# Patient Record
Sex: FEMALE | Race: WHITE | NOT HISPANIC OR LATINO | URBAN - METROPOLITAN AREA
[De-identification: names, ages, dates, MRNs, and addresses within clinical notes are randomized per-mention and may not be internally consistent; named-entity substitution may affect disease eponyms.]

---

## 2018-03-04 ENCOUNTER — EMERGENCY (EMERGENCY)
Facility: HOSPITAL | Age: 78
LOS: 1 days | Discharge: HOME | End: 2018-03-04
Attending: EMERGENCY MEDICINE | Admitting: EMERGENCY MEDICINE
Payer: MEDICARE

## 2018-03-04 VITALS
DIASTOLIC BLOOD PRESSURE: 80 MMHG | OXYGEN SATURATION: 99 % | HEART RATE: 84 BPM | RESPIRATION RATE: 14 BRPM | SYSTOLIC BLOOD PRESSURE: 150 MMHG

## 2018-03-04 VITALS
OXYGEN SATURATION: 97 % | RESPIRATION RATE: 20 BRPM | HEART RATE: 120 BPM | DIASTOLIC BLOOD PRESSURE: 74 MMHG | TEMPERATURE: 98 F | SYSTOLIC BLOOD PRESSURE: 169 MMHG

## 2018-03-04 DIAGNOSIS — Z88.8 ALLERGY STATUS TO OTHER DRUGS, MEDICAMENTS AND BIOLOGICAL SUBSTANCES STATUS: ICD-10-CM

## 2018-03-04 DIAGNOSIS — R19.7 DIARRHEA, UNSPECIFIED: ICD-10-CM

## 2018-03-04 DIAGNOSIS — R06.02 SHORTNESS OF BREATH: ICD-10-CM

## 2018-03-04 DIAGNOSIS — Z98.89 OTHER SPECIFIED POSTPROCEDURAL STATES: Chronic | ICD-10-CM

## 2018-03-04 DIAGNOSIS — Z90.49 ACQUIRED ABSENCE OF OTHER SPECIFIED PARTS OF DIGESTIVE TRACT: Chronic | ICD-10-CM

## 2018-03-04 DIAGNOSIS — Z98.62 PERIPHERAL VASCULAR ANGIOPLASTY STATUS: Chronic | ICD-10-CM

## 2018-03-04 DIAGNOSIS — R00.0 TACHYCARDIA, UNSPECIFIED: ICD-10-CM

## 2018-03-04 DIAGNOSIS — Z88.1 ALLERGY STATUS TO OTHER ANTIBIOTIC AGENTS STATUS: ICD-10-CM

## 2018-03-04 DIAGNOSIS — Z79.52 LONG TERM (CURRENT) USE OF SYSTEMIC STEROIDS: ICD-10-CM

## 2018-03-04 DIAGNOSIS — Z79.899 OTHER LONG TERM (CURRENT) DRUG THERAPY: ICD-10-CM

## 2018-03-04 LAB
ALBUMIN SERPL ELPH-MCNC: 3 G/DL — LOW (ref 3.4–5)
ALP SERPL-CCNC: 105 U/L — SIGNIFICANT CHANGE UP (ref 40–120)
ALT FLD-CCNC: 19 U/L — SIGNIFICANT CHANGE UP (ref 12–42)
AMYLASE P1 CFR SERPL: 66 U/L — SIGNIFICANT CHANGE UP (ref 25–115)
ANION GAP SERPL CALC-SCNC: 7 MMOL/L — LOW (ref 9–16)
AST SERPL-CCNC: 11 U/L — LOW (ref 15–37)
BILIRUB SERPL-MCNC: 0.2 MG/DL — SIGNIFICANT CHANGE UP (ref 0.2–1.2)
BUN SERPL-MCNC: 37 MG/DL — HIGH (ref 7–23)
CALCIUM SERPL-MCNC: 9.6 MG/DL — SIGNIFICANT CHANGE UP (ref 8.5–10.5)
CHLORIDE SERPL-SCNC: 109 MMOL/L — HIGH (ref 96–108)
CO2 SERPL-SCNC: 29 MMOL/L — SIGNIFICANT CHANGE UP (ref 22–31)
CREAT SERPL-MCNC: 1.35 MG/DL — HIGH (ref 0.5–1.3)
D DIMER BLD IA.RAPID-MCNC: 175 NG/ML DDU — SIGNIFICANT CHANGE UP
GLUCOSE SERPL-MCNC: 105 MG/DL — HIGH (ref 70–99)
HCT VFR BLD CALC: 31.4 % — LOW (ref 34.5–45)
HGB BLD-MCNC: 9.5 G/DL — LOW (ref 11.5–15.5)
INR BLD: 1.49 — HIGH (ref 0.88–1.16)
LACTATE SERPL-SCNC: 1.4 MMOL/L — SIGNIFICANT CHANGE UP (ref 0.4–2)
LIDOCAIN IGE QN: 245 U/L — SIGNIFICANT CHANGE UP (ref 73–393)
MCHC RBC-ENTMCNC: 27.5 PG — SIGNIFICANT CHANGE UP (ref 27–34)
MCHC RBC-ENTMCNC: 30.3 G/DL — LOW (ref 32–36)
MCV RBC AUTO: 91 FL — SIGNIFICANT CHANGE UP (ref 80–100)
NT-PROBNP SERPL-SCNC: 233 PG/ML — SIGNIFICANT CHANGE UP
PLATELET # BLD AUTO: 158 K/UL — SIGNIFICANT CHANGE UP (ref 150–400)
POTASSIUM SERPL-MCNC: 4.9 MMOL/L — SIGNIFICANT CHANGE UP (ref 3.5–5.3)
POTASSIUM SERPL-SCNC: 4.9 MMOL/L — SIGNIFICANT CHANGE UP (ref 3.5–5.3)
PROT SERPL-MCNC: 7.5 G/DL — SIGNIFICANT CHANGE UP (ref 6.4–8.2)
PROTHROM AB SERPL-ACNC: 16.5 SEC — HIGH (ref 9.8–12.7)
RBC # BLD: 3.45 M/UL — LOW (ref 3.8–5.2)
RBC # FLD: 16.4 % — SIGNIFICANT CHANGE UP (ref 10.3–16.9)
SODIUM SERPL-SCNC: 145 MMOL/L — SIGNIFICANT CHANGE UP (ref 132–145)
TROPONIN I SERPL-MCNC: <0.017 NG/ML — LOW (ref 0.02–0.06)
TSH SERPL-MCNC: 2.2 UIU/ML — SIGNIFICANT CHANGE UP (ref 0.36–3.74)
WBC # BLD: 6.5 K/UL — SIGNIFICANT CHANGE UP (ref 3.8–10.5)
WBC # FLD AUTO: 6.5 K/UL — SIGNIFICANT CHANGE UP (ref 3.8–10.5)

## 2018-03-04 PROCEDURE — 71045 X-RAY EXAM CHEST 1 VIEW: CPT | Mod: 26

## 2018-03-04 PROCEDURE — 93010 ELECTROCARDIOGRAM REPORT: CPT

## 2018-03-04 PROCEDURE — 99285 EMERGENCY DEPT VISIT HI MDM: CPT | Mod: 25

## 2018-03-04 PROCEDURE — 74177 CT ABD & PELVIS W/CONTRAST: CPT | Mod: 26

## 2018-03-04 RX ORDER — DIPHENOXYLATE HCL/ATROPINE 2.5-.025MG
2 TABLET ORAL ONCE
Refills: 0 | Status: COMPLETED | OUTPATIENT
Start: 2018-03-04 | End: 2018-03-04

## 2018-03-04 RX ORDER — IOHEXOL 300 MG/ML
50 INJECTION, SOLUTION INTRAVENOUS ONCE
Refills: 0 | Status: COMPLETED | OUTPATIENT
Start: 2018-03-04 | End: 2018-03-04

## 2018-03-04 RX ORDER — SODIUM CHLORIDE 9 MG/ML
1000 INJECTION INTRAMUSCULAR; INTRAVENOUS; SUBCUTANEOUS ONCE
Refills: 0 | Status: COMPLETED | OUTPATIENT
Start: 2018-03-04 | End: 2018-03-04

## 2018-03-04 RX ORDER — DIPHENOXYLATE HCL/ATROPINE 2.5-.025MG
1 TABLET ORAL
Qty: 12 | Refills: 0
Start: 2018-03-04 | End: 2018-03-06

## 2018-03-04 RX ADMIN — IOHEXOL 50 MILLILITER(S): 300 INJECTION, SOLUTION INTRAVENOUS at 16:00

## 2018-03-04 RX ADMIN — SODIUM CHLORIDE 1000 MILLILITER(S): 9 INJECTION INTRAMUSCULAR; INTRAVENOUS; SUBCUTANEOUS at 16:35

## 2018-03-04 NOTE — ED PROVIDER NOTE - CONSTITUTIONAL, MLM
normal... Well appearing, well nourished, awake, alert, oriented to person, place, time/situation and in no apparent distress. No word dyspnea.

## 2018-03-04 NOTE — ED PROVIDER NOTE - CHPI ED SYMPTOMS NEG
no burning urination/no chills/no dysuria/no blood in stool/no hematuria/no nausea/no abdominal distension/no vomiting/no fever

## 2018-03-04 NOTE — ED PROVIDER NOTE - PMH
Brain embolism and thrombosis    DVT (deep venous thrombosis)    Lyme disease    MRSA (methicillin resistant Staphylococcus aureus)    PNA (pneumonia)    Skin cancer

## 2018-03-04 NOTE — ED PROVIDER NOTE - OBJECTIVE STATEMENT
78 yo female pmhx DVT (right leg 5/15/16), fem-pop bypass, lyme dz to ED c/o sudden onset "explosive" diarrhea around 12 noon today after eating a sandwich. Also states feels  out of breath. Denies cough/hemoptysis, f/c, no international travel within past 30 days, no recent abx within past 3 months. Denies abd pain/n/v. Denies cp.

## 2018-03-04 NOTE — ED PROVIDER NOTE - DIAGNOSTIC INTERPRETATION
Interpreted by MD  _chest x-ray, 1_ view  Lungs clear, heart shadow normal, bony structures normal, no free air under diaphragm, no PTX

## 2019-07-25 ENCOUNTER — EMERGENCY (EMERGENCY)
Facility: HOSPITAL | Age: 79
LOS: 1 days | Discharge: HOME | End: 2019-07-25
Attending: EMERGENCY MEDICINE | Admitting: EMERGENCY MEDICINE
Payer: MEDICARE

## 2019-07-25 VITALS — OXYGEN SATURATION: 95 % | HEART RATE: 107 BPM | RESPIRATION RATE: 20 BRPM

## 2019-07-25 VITALS
TEMPERATURE: 99 F | SYSTOLIC BLOOD PRESSURE: 136 MMHG | HEART RATE: 108 BPM | OXYGEN SATURATION: 90 % | DIASTOLIC BLOOD PRESSURE: 83 MMHG | RESPIRATION RATE: 22 BRPM

## 2019-07-25 DIAGNOSIS — Z98.89 OTHER SPECIFIED POSTPROCEDURAL STATES: Chronic | ICD-10-CM

## 2019-07-25 DIAGNOSIS — Z98.62 PERIPHERAL VASCULAR ANGIOPLASTY STATUS: Chronic | ICD-10-CM

## 2019-07-25 DIAGNOSIS — Z90.49 ACQUIRED ABSENCE OF OTHER SPECIFIED PARTS OF DIGESTIVE TRACT: Chronic | ICD-10-CM

## 2019-07-25 DIAGNOSIS — L03.221 CELLULITIS OF NECK: ICD-10-CM

## 2019-07-25 DIAGNOSIS — R21 RASH AND OTHER NONSPECIFIC SKIN ERUPTION: ICD-10-CM

## 2019-07-25 PROCEDURE — 99284 EMERGENCY DEPT VISIT MOD MDM: CPT

## 2019-07-25 NOTE — ED ADULT TRIAGE NOTE - CHIEF COMPLAINT QUOTE
Pt complaining of redness, swelling, and warmth to left chest and neck x 1 day. Pt complaining of itchiness to site. Pt denies worsening of sob, fever, and chills.

## 2019-07-26 LAB
ANION GAP SERPL CALC-SCNC: 10 MMOL/L — SIGNIFICANT CHANGE UP (ref 9–16)
BASOPHILS NFR BLD AUTO: 0.2 % — SIGNIFICANT CHANGE UP (ref 0–2)
BUN SERPL-MCNC: 46 MG/DL — HIGH (ref 7–23)
CALCIUM SERPL-MCNC: 9.9 MG/DL — SIGNIFICANT CHANGE UP (ref 8.5–10.5)
CHLORIDE SERPL-SCNC: 106 MMOL/L — SIGNIFICANT CHANGE UP (ref 96–108)
CO2 SERPL-SCNC: 27 MMOL/L — SIGNIFICANT CHANGE UP (ref 22–31)
CREAT SERPL-MCNC: 1.86 MG/DL — HIGH (ref 0.5–1.3)
EOSINOPHIL NFR BLD AUTO: 3.6 % — SIGNIFICANT CHANGE UP (ref 0–6)
GLUCOSE SERPL-MCNC: 117 MG/DL — HIGH (ref 70–99)
HCT VFR BLD CALC: 36.5 % — SIGNIFICANT CHANGE UP (ref 34.5–45)
HGB BLD-MCNC: 11.5 G/DL — SIGNIFICANT CHANGE UP (ref 11.5–15.5)
IMM GRANULOCYTES NFR BLD AUTO: 0.5 % — SIGNIFICANT CHANGE UP (ref 0–1.5)
LYMPHOCYTES # BLD AUTO: 14.2 % — SIGNIFICANT CHANGE UP (ref 13–44)
MCHC RBC-ENTMCNC: 28.7 PG — SIGNIFICANT CHANGE UP (ref 27–34)
MCHC RBC-ENTMCNC: 31.5 G/DL — LOW (ref 32–36)
MCV RBC AUTO: 91 FL — SIGNIFICANT CHANGE UP (ref 80–100)
MONOCYTES NFR BLD AUTO: 8 % — SIGNIFICANT CHANGE UP (ref 2–14)
NEUTROPHILS NFR BLD AUTO: 73.5 % — SIGNIFICANT CHANGE UP (ref 43–77)
PLATELET # BLD AUTO: 175 K/UL — SIGNIFICANT CHANGE UP (ref 150–400)
POTASSIUM SERPL-MCNC: 5.2 MMOL/L — SIGNIFICANT CHANGE UP (ref 3.5–5.3)
POTASSIUM SERPL-SCNC: 5.2 MMOL/L — SIGNIFICANT CHANGE UP (ref 3.5–5.3)
RBC # BLD: 4.01 M/UL — SIGNIFICANT CHANGE UP (ref 3.8–5.2)
RBC # FLD: 16 % — HIGH (ref 10.3–14.5)
SODIUM SERPL-SCNC: 143 MMOL/L — SIGNIFICANT CHANGE UP (ref 132–145)
WBC # BLD: 8.3 K/UL — SIGNIFICANT CHANGE UP (ref 3.8–10.5)
WBC # FLD AUTO: 8.3 K/UL — SIGNIFICANT CHANGE UP (ref 3.8–10.5)

## 2019-07-26 RX ORDER — DEXAMETHASONE 0.5 MG/5ML
10 ELIXIR ORAL ONCE
Refills: 0 | Status: COMPLETED | OUTPATIENT
Start: 2019-07-26 | End: 2019-07-26

## 2019-07-26 RX ORDER — DIPHENHYDRAMINE HCL 50 MG
1 CAPSULE ORAL
Qty: 15 | Refills: 0
Start: 2019-07-26 | End: 2019-07-30

## 2019-07-26 RX ORDER — DIPHENHYDRAMINE HCL 50 MG
25 CAPSULE ORAL ONCE
Refills: 0 | Status: COMPLETED | OUTPATIENT
Start: 2019-07-26 | End: 2019-07-26

## 2019-07-26 RX ORDER — AZTREONAM 2 G
1 VIAL (EA) INJECTION
Qty: 20 | Refills: 0
Start: 2019-07-26 | End: 2019-08-04

## 2019-07-26 RX ADMIN — Medication 1 TABLET(S): at 01:00

## 2019-07-26 RX ADMIN — Medication 25 MILLIGRAM(S): at 01:00

## 2019-07-26 RX ADMIN — Medication 100 MILLIGRAM(S): at 01:00

## 2019-07-26 RX ADMIN — Medication 10 MILLIGRAM(S): at 01:01

## 2019-07-26 NOTE — ED PROVIDER NOTE - OBJECTIVE STATEMENT
77 y/o F w/hx HTN HLD DVTs (most recent RLE , on xarelto), obesity (taking metformin for weight loss), h/o angioplasty of groin and leg with stents, SCC of left leg (5060-1887) with complication of MRSA infection, brain embolism with coiling, Lyme disease with chronic neuropathy, p/w red raised pruritic rash which started with what pt believes is a bug bite L neck that has spread over most of her upper L chest and neck over the past 24hrs, unimproved with benadryl and calamine lotion. No fever/chills. No bleeding/discharge/ulcerations.

## 2019-07-26 NOTE — ED PROVIDER NOTE - NSFOLLOWUPINSTRUCTIONS_ED_ALL_ED_FT
Log Out.    [employer] CareNotes®     :  [facility]             CELLULITIS - AfterCare(R) Instructions(ER/ED)     Cellulitis    WHAT YOU NEED TO KNOW:    Cellulitis is a skin infection caused by bacteria. Cellulitis may go away on its own or you may need treatment. Your healthcare provider may draw a [place] around the outside edges of your cellulitis. If your cellulitis spreads, your healthcare provider will see it outside of the [place]. Cellulitis          DISCHARGE INSTRUCTIONS:    Call 911 if:     You have sudden trouble breathing or chest pain.        Return to the emergency department if:     Your wound gets larger and more painful.       You feel a crackling under your skin when you touch it.      You have purple dots or bumps on your skin, or you see bleeding under your skin.      You have new swelling and pain in your legs.      The red, warm, swollen area gets larger.      You see red streaks coming from the infected area.    Contact your healthcare provider if:     You have a fever.      Your fever or pain does not go away or gets worse.      The area does not get smaller after 2 days of antibiotics.      Your skin is flaking or peeling off.      You have questions or concerns about your condition or care.    Medicines:     Antibiotics help treat the bacterial infection.       NSAIDs, such as ibuprofen, help decrease swelling, pain, and fever. NSAIDs can cause stomach bleeding or kidney problems in certain people. If you take blood thinner medicine, always ask if NSAIDs are safe for you. Always read the medicine label and follow directions. Do not give these medicines to children under 6 months of age without direction from your child's healthcare provider.      Acetaminophen decreases pain and fever. It is available without a doctor's order. Ask how much to take and how often to take it. Follow directions. Read the labels of all other medicines you are using to see if they also contain acetaminophen, or ask your doctor or pharmacist. Acetaminophen can cause liver damage if not taken correctly. Do not use more than 4 grams (4,000 milligrams) total of acetaminophen in one day.       Take your medicine as directed. Contact your healthcare provider if you think your medicine is not helping or if you have side effects. Tell him or her if you are allergic to any medicine. Keep a list of the medicines, vitamins, and herbs you take. Include the amounts, and when and why you take them. Bring the list or the pill bottles to follow-up visits. Carry your medicine list with you in case of an emergency.    Self-care:     Elevate the area above the level of your heart as often as you can. This will help decrease swelling and pain. Prop the area on pillows or blankets to keep it elevated comfortably.       Clean the area daily until the wound scabs over. Gently wash the area with soap and water. Pat dry. Use dressings as directed.       Place cool or warm, wet cloths on the area as directed. Use clean cloths and clean water. Leave it on the area until the cloth is room temperature. Pat the area dry with a clean, dry cloth. The cloths may help decrease pain.     Prevent cellulitis:     Do not scratch bug bites or areas of injury. You increase your risk for cellulitis by scratching these areas.       Do not share personal items, such as towels, clothing, and razors.       Clean exercise equipment with germ-killing  before and after you use it.      Wash your hands often. Use soap and water. Wash your hands after you use the bathroom, change a child's diapers, or sneeze. Wash your hands before you prepare or eat food. Use lotion to prevent dry, cracked skin. Handwashing           Wear pressure stockings as directed. You may be told to wear the stockings if you have peripheral edema. The stockings improve blood flow and decrease swelling.      Treat athlete’s foot. This can help prevent the spread of a bacterial skin infection.    Follow up with your healthcare provider within 3 days, or as directed: Your healthcare provider will check if your cellulitis is getting better. You may need different medicine. Write down your questions so you remember to ask them during your visits.

## 2019-07-26 NOTE — ED PROVIDER NOTE - CLINICAL SUMMARY MEDICAL DECISION MAKING FREE TEXT BOX
+ cellulitic changes to insect bite w/possible allergic component, afebrile, checking labs, starting on oral abx (doxy and bactim) given pt's many drug allergies and need for MRSA coverage (prior MRSA coverage). cellulitic rim demarcated, will d/c home on oral abx and encourage prompt wound check f/u.

## 2019-07-26 NOTE — ED ADULT NURSE NOTE - OBJECTIVE STATEMENT
Patient to ED with red raised pruritic rash which started with what pt believes is a bug bite L neck that has spread over most of her upper L chest and neck over the past 24hrs, unimproved with benadryl and calamine lotion. No fever/chills.

## 2019-07-26 NOTE — ED PROVIDER NOTE - PHYSICAL EXAMINATION
VITAL SIGNS: I have reviewed nursing notes and confirm.  CONSTITUTIONAL: Well-developed; well-nourished; in no acute distress.  SKIN: + erythematous blanching warm rash w/out clearly demarcated border approx [id number] L anterior neck w/2mm scabbed lesion in the center non-ttp, no induration or fluctuance, Remainder of skin exam is warm and dry  HEAD: Normocephalic; atraumatic.  EYES: PERRL, EOM intact; conjunctiva and sclera clear.  ENT: No nasal discharge; airway clear.  NECK: Supple; non tender.  CARD: + tachycardic regular rhythm  RESP: No wheezes, rales or rhonchi.  ABD: NTND  EXT: Normal ROM. No clubbing, cyanosis or edema.  LYMPH: No acute cervical adenopathy.  NEURO: Alert, oriented. Grossly unremarkable.  PSYCH: Cooperative, appropriate.

## 2019-07-26 NOTE — ED PROVIDER NOTE - DISCHARGE DATE
26-Jul-2019 Complex Repair And Bilobe Flap Text: The defect edges were debeveled with a #15 scalpel blade.  The primary defect was closed partially with a complex linear closure.  Given the location of the remaining defect, shape of the defect and the proximity to free margins a bilobe flap was deemed most appropriate for complete closure of the defect.  Using a sterile surgical marker, an appropriate advancement flap was drawn incorporating the defect and placing the expected incisions within the relaxed skin tension lines where possible.    The area thus outlined was incised deep to adipose tissue with a #15 scalpel blade.  The skin margins were undermined to an appropriate distance in all directions utilizing iris scissors.

## 2019-08-08 ENCOUNTER — EMERGENCY (EMERGENCY)
Facility: HOSPITAL | Age: 79
LOS: 1 days | Discharge: HOME | End: 2019-08-08
Attending: EMERGENCY MEDICINE | Admitting: EMERGENCY MEDICINE
Payer: MEDICARE

## 2019-08-08 VITALS
HEART RATE: 97 BPM | RESPIRATION RATE: 24 BRPM | OXYGEN SATURATION: 99 % | DIASTOLIC BLOOD PRESSURE: 73 MMHG | SYSTOLIC BLOOD PRESSURE: 172 MMHG

## 2019-08-08 VITALS
HEART RATE: 108 BPM | OXYGEN SATURATION: 91 % | TEMPERATURE: 98 F | DIASTOLIC BLOOD PRESSURE: 72 MMHG | RESPIRATION RATE: 20 BRPM | SYSTOLIC BLOOD PRESSURE: 128 MMHG

## 2019-08-08 DIAGNOSIS — R30.0 DYSURIA: ICD-10-CM

## 2019-08-08 DIAGNOSIS — Z98.89 OTHER SPECIFIED POSTPROCEDURAL STATES: Chronic | ICD-10-CM

## 2019-08-08 DIAGNOSIS — Z98.62 PERIPHERAL VASCULAR ANGIOPLASTY STATUS: Chronic | ICD-10-CM

## 2019-08-08 DIAGNOSIS — Z90.49 ACQUIRED ABSENCE OF OTHER SPECIFIED PARTS OF DIGESTIVE TRACT: Chronic | ICD-10-CM

## 2019-08-08 DIAGNOSIS — B37.2 CANDIDIASIS OF SKIN AND NAIL: ICD-10-CM

## 2019-08-08 DIAGNOSIS — N39.0 URINARY TRACT INFECTION, SITE NOT SPECIFIED: ICD-10-CM

## 2019-08-08 LAB
ALBUMIN SERPL ELPH-MCNC: 2.5 G/DL — LOW (ref 3.4–5)
ALP SERPL-CCNC: 138 U/L — HIGH (ref 40–120)
ALT FLD-CCNC: 30 U/L — SIGNIFICANT CHANGE UP (ref 12–42)
ANION GAP SERPL CALC-SCNC: 6 MMOL/L — LOW (ref 9–16)
AST SERPL-CCNC: 23 U/L — SIGNIFICANT CHANGE UP (ref 15–37)
BASOPHILS NFR BLD AUTO: 0.3 % — SIGNIFICANT CHANGE UP (ref 0–2)
BILIRUB SERPL-MCNC: 0.1 MG/DL — LOW (ref 0.2–1.2)
BUN SERPL-MCNC: 70 MG/DL — HIGH (ref 7–23)
CALCIUM SERPL-MCNC: 9.1 MG/DL — SIGNIFICANT CHANGE UP (ref 8.5–10.5)
CHLORIDE SERPL-SCNC: 106 MMOL/L — SIGNIFICANT CHANGE UP (ref 96–108)
CO2 SERPL-SCNC: 28 MMOL/L — SIGNIFICANT CHANGE UP (ref 22–31)
CREAT SERPL-MCNC: 2.04 MG/DL — HIGH (ref 0.5–1.3)
EOSINOPHIL NFR BLD AUTO: 1.5 % — SIGNIFICANT CHANGE UP (ref 0–6)
GLUCOSE SERPL-MCNC: 121 MG/DL — HIGH (ref 70–99)
HCT VFR BLD CALC: 35.1 % — SIGNIFICANT CHANGE UP (ref 34.5–45)
HGB BLD-MCNC: 11.1 G/DL — LOW (ref 11.5–15.5)
IMM GRANULOCYTES NFR BLD AUTO: 0.7 % — SIGNIFICANT CHANGE UP (ref 0–1.5)
LACTATE SERPL-SCNC: 1 MMOL/L — SIGNIFICANT CHANGE UP (ref 0.4–2)
LYMPHOCYTES # BLD AUTO: 17.3 % — SIGNIFICANT CHANGE UP (ref 13–44)
MCHC RBC-ENTMCNC: 29 PG — SIGNIFICANT CHANGE UP (ref 27–34)
MCHC RBC-ENTMCNC: 31.6 G/DL — LOW (ref 32–36)
MCV RBC AUTO: 91.6 FL — SIGNIFICANT CHANGE UP (ref 80–100)
MONOCYTES NFR BLD AUTO: 5.1 % — SIGNIFICANT CHANGE UP (ref 2–14)
NEUTROPHILS NFR BLD AUTO: 75.1 % — SIGNIFICANT CHANGE UP (ref 43–77)
PLATELET # BLD AUTO: 189 K/UL — SIGNIFICANT CHANGE UP (ref 150–400)
POTASSIUM SERPL-MCNC: 4.9 MMOL/L — SIGNIFICANT CHANGE UP (ref 3.5–5.3)
POTASSIUM SERPL-SCNC: 4.9 MMOL/L — SIGNIFICANT CHANGE UP (ref 3.5–5.3)
PROT SERPL-MCNC: 7.2 G/DL — SIGNIFICANT CHANGE UP (ref 6.4–8.2)
RBC # BLD: 3.83 M/UL — SIGNIFICANT CHANGE UP (ref 3.8–5.2)
RBC # FLD: 16 % — HIGH (ref 10.3–14.5)
SODIUM SERPL-SCNC: 140 MMOL/L — SIGNIFICANT CHANGE UP (ref 132–145)
WBC # BLD: 8.8 K/UL — SIGNIFICANT CHANGE UP (ref 3.8–10.5)
WBC # FLD AUTO: 8.8 K/UL — SIGNIFICANT CHANGE UP (ref 3.8–10.5)

## 2019-08-08 PROCEDURE — 99284 EMERGENCY DEPT VISIT MOD MDM: CPT | Mod: GC

## 2019-08-08 RX ORDER — PHENAZOPYRIDINE HCL 100 MG
200 TABLET ORAL ONCE
Refills: 0 | Status: COMPLETED | OUTPATIENT
Start: 2019-08-08 | End: 2019-08-08

## 2019-08-08 RX ORDER — CEPHALEXIN 500 MG
1 CAPSULE ORAL
Qty: 20 | Refills: 0
Start: 2019-08-08 | End: 2019-08-17

## 2019-08-08 RX ORDER — PHENAZOPYRIDINE HCL 100 MG
1 TABLET ORAL
Qty: 6 | Refills: 0
Start: 2019-08-08 | End: 2019-08-09

## 2019-08-08 RX ORDER — CEFTRIAXONE 500 MG/1
1000 INJECTION, POWDER, FOR SOLUTION INTRAMUSCULAR; INTRAVENOUS ONCE
Refills: 0 | Status: COMPLETED | OUTPATIENT
Start: 2019-08-08 | End: 2019-08-08

## 2019-08-08 RX ADMIN — Medication 200 MILLIGRAM(S): at 17:38

## 2019-08-08 RX ADMIN — CEFTRIAXONE 100 MILLIGRAM(S): 500 INJECTION, POWDER, FOR SOLUTION INTRAMUSCULAR; INTRAVENOUS at 17:05

## 2019-08-08 NOTE — ED PROVIDER NOTE - NSFOLLOWUPINSTRUCTIONS_ED_ALL_ED_FT
TAKE THE ANTIBIOTIC WE ARE SENDING TO YOUR PHARMACY TO TREAT YOUR UTI    WE ARE ALSO SENDING PYRIDIUM TO YOUR PHARMACY, THIS MEDICATION WILL HELP WITH THE BURNING WITH URINATION UNTIL THE ANTIBIOTICS KICK IN    USE THE CREAM WE ARE SENDING TO YOUR PHARMACY TO TREAT THE YEAST INFECTION AROUND YOUR GROIN

## 2019-08-08 NOTE — ED PROVIDER NOTE - PROGRESS NOTE DETAILS
Alfreda, pgy3: uti on UA, will admin antibx here and send to pharmacy along w/ pyridium, will send clotrimazole cream, and dc pt home after meds and revital. Initially mild tachycardia likely due to exertion/discomfort during triage

## 2019-08-08 NOTE — ED PROVIDER NOTE - OBJECTIVE STATEMENT
79 y/o F w/hx HTN, HLD, DVTs (most recent RLE, on xarelto), CHRrEF, obesity (taking metformin for weight loss), h/o angioplasty of groin and leg with stents, SCC of left leg ([id number]) with complication of MRSA infection, brain embolism with coiling, Lyme disease with chronic neuropathy, p/w urinary burning and itchy, and rash on inner thighs. Pt states these symptoms have been present for approx 1 week. Reports occasional hematuria. Denies f/c/n/v/d.

## 2019-08-08 NOTE — ED PROVIDER NOTE - ATTENDING CONTRIBUTION TO CARE
Multiple co-morbidities, wheelchair/diaper use presents with weeks of groin rash, itching.  Exam consistent with tinea vs candidiasis.  Topical antifungal ordered.  Urine also consistent with UTI and will covered with antibiotics.  Chronically ill appearing.  Presents with her HHA.  Has close cardiology follow up.  Conservative management discussed with the patient in detail.  Close PMD follow up encouraged.  Strict ED return instructions discussed in detail and patient given the opportunity to ask any questions about their discharge diagnosis and instructions

## 2019-08-08 NOTE — ED PROVIDER NOTE - CARE PLAN
Principal Discharge DX:	Candidiasis of skin Principal Discharge DX:	UTI (urinary tract infection)  Secondary Diagnosis:	Candidiasis of skin

## 2019-08-08 NOTE — ED PROVIDER NOTE - CLINICAL SUMMARY MEDICAL DECISION MAKING FREE TEXT BOX
Pt w/ mult comorbidities p/w urinary sx susp for UTI, will obtain UA and check Ucx; pt also possibly with irritated urethra from frequent voiding 2/2 increased lasix dosing. Pt also reporting rash on inner thighs, susp for possible candida vs cellulitis, will empirically  treat. No systemic symptoms, no blood work indicated at this time, cont to reassess -Alfreda Pt w/ mult comorbidities p/w urinary sx susp for UTI, will obtain UA and check Ucx; pt also possibly with irritated urethra from frequent voiding 2/2 increased lasix dosing. Pt also reporting rash on inner thighs, susp for possible candida vs cellulitis, will empirically  treat. No systemic symptoms, but pt tachycardic in traige; will obtain basic labs and lactate at this time to assess for more significant infx, cont to reassess -Alfreda Pt w/ mult comorbidities p/w urinary sx susp for UTI, will obtain UA and check Ucx; pt also possibly with irritated urethra from frequent voiding 2/2 increased lasix dosing. Pt also reporting rash on inner thighs, susp for cutaneous candidiasis, will send clotrimazole cream to [email or web address]. No systemic symptoms, but pt tachycardic in traige; will obtain basic labs and lactate at this time to assess for more significant infx, cont to reassess -Alfreda

## 2019-08-08 NOTE — ED ADULT TRIAGE NOTE - CHIEF COMPLAINT QUOTE
Pt with burning on UTI and itching to groin are. Pt wheelchair bound. States "I feel like I have a UTI"

## 2020-01-11 ENCOUNTER — EMERGENCY (EMERGENCY)
Facility: HOSPITAL | Age: 80
LOS: 1 days | Discharge: HOME | End: 2020-01-11
Attending: EMERGENCY MEDICINE | Admitting: EMERGENCY MEDICINE
Payer: MEDICARE

## 2020-01-11 VITALS
TEMPERATURE: 98 F | HEIGHT: 68 IN | DIASTOLIC BLOOD PRESSURE: 70 MMHG | SYSTOLIC BLOOD PRESSURE: 145 MMHG | HEART RATE: 98 BPM | WEIGHT: 270.07 LBS | OXYGEN SATURATION: 95 % | RESPIRATION RATE: 17 BRPM

## 2020-01-11 DIAGNOSIS — Z98.62 PERIPHERAL VASCULAR ANGIOPLASTY STATUS: Chronic | ICD-10-CM

## 2020-01-11 DIAGNOSIS — Z98.89 OTHER SPECIFIED POSTPROCEDURAL STATES: Chronic | ICD-10-CM

## 2020-01-11 DIAGNOSIS — Z90.49 ACQUIRED ABSENCE OF OTHER SPECIFIED PARTS OF DIGESTIVE TRACT: Chronic | ICD-10-CM

## 2020-01-11 PROCEDURE — 99283 EMERGENCY DEPT VISIT LOW MDM: CPT

## 2020-01-11 RX ORDER — AZTREONAM 2 G
1 VIAL (EA) INJECTION
Qty: 20 | Refills: 0
Start: 2020-01-11 | End: 2020-01-20

## 2020-01-11 RX ORDER — CHLORHEXIDINE GLUCONATE 213 G/1000ML
1 SOLUTION TOPICAL
Qty: 1 | Refills: 0
Start: 2020-01-11 | End: 2020-01-20

## 2020-01-11 RX ORDER — HYDROXYZINE HCL 10 MG
2 TABLET ORAL
Qty: 30 | Refills: 0
Start: 2020-01-11 | End: 2020-01-11

## 2020-01-11 RX ORDER — MUPIROCIN 20 MG/G
1 OINTMENT TOPICAL
Qty: 44 | Refills: 1
Start: 2020-01-11 | End: 2020-02-07

## 2020-01-11 RX ORDER — HYDROXYZINE HCL 10 MG
25 TABLET ORAL ONCE
Refills: 0 | Status: COMPLETED | OUTPATIENT
Start: 2020-01-11 | End: 2020-01-11

## 2020-01-11 RX ORDER — SACCHAROMYCES BOULARDII 250 MG
1 POWDER IN PACKET (EA) ORAL
Qty: 30 | Refills: 0
Start: 2020-01-11 | End: 2020-02-09

## 2020-01-11 RX ADMIN — Medication 25 MILLIGRAM(S): at 14:55

## 2020-01-11 RX ADMIN — Medication 1 TABLET(S): at 14:55

## 2020-01-11 NOTE — ED PROVIDER NOTE - CLINICAL SUMMARY MEDICAL DECISION MAKING FREE TEXT BOX
Pt with scattered erythematous lesions on neck, back, anterior chest and bilateral upper extremities x 2 weeks. Given pt allergy to prednisone, will give atarax - Bactrim also ordered. will reassess. Pt with scattered erythematous lesions on neck, back, anterior chest and bilateral upper extremities x 2 weeks. Will ocver for MRSA as they look like they may be slightly super infected. Given pt allergy to prednisone, will give atarax - Bactrim also ordered. will reassess.

## 2020-01-11 NOTE — ED PROVIDER NOTE - NSFOLLOWUPINSTRUCTIONS_ED_ALL_ED_FT
Please apply the topical Mupirocin to the sores 2-3 times per day as well as in the nostrils.    use the Hibiclens as a body wash.    Please follow up with our dermatologist.    Return for worse symptoms.     I am treating you presumptively for a non-specific rash/follicultis.

## 2020-01-11 NOTE — ED ADULT TRIAGE NOTE - NSWEIGHTCALCTOOLDRUG_GEN_A_CORE
Hypertensive Retinopathy Counseling: The pathophysiology of hypertensive retinopathy and associated comorbidity was discussed with the patient. The importance of compliance with medications, and good blood pressure control was emphasized to limit risk of retinal ischemia and loss of vision. The patient should return for follow up immediately if any change of vision.  used

## 2020-01-11 NOTE — ED PROVIDER NOTE - PATIENT PORTAL LINK FT
You can access the FollowMyHealth Patient Portal offered by [facility] by registering at the following website: [email or web address]. By joining [employer]’s FollowMyHealth portal, you will also be able to view your health information using other applications (apps) compatible with our system.

## 2020-01-11 NOTE — ED PROVIDER NOTE - CARE PROVIDER_API CALL
Nallely Cardona)  Dermatology  [address] [place] [address], [place] [address]  Phone: [phone]  Fax: [phone]  Follow Up Time:

## 2020-01-11 NOTE — ED PROVIDER NOTE - OBJECTIVE STATEMENT
78 yo F itchy rash to chest, abd bl arm x 2wks  denies bed bugs  copd mrsa lyme neuropathy dvt sigmoidectomy squamous cell cardinoma diastolic dysfunction  lipitor 20 qd buproprion 150 bid eliquis 5 bid bumetanide 2 qd diltiazem 180 qd mirapex 1.5 mg yupelri 175 mcg  aller pred clinda augment altabax 80 yo F w/ PMHx of COPD, MRSA, neuropathy secondary to lyme disease, DVT, sigmoidectomy, squamous cell carcinoma, diastolic dysfunction c/o itchy scattered rash to chest, abdomen, and bilateral arms x 2 weeks. Denies fever, chills, SOB, CP, tongue/lip swelling, paresthesia, numbness, tingling.  No new products/meds/food reported. Pt denies exposure to bed bugs, but is concerned for scabies.     Medications: Lipitor 20mg qd, buproprion 150mg BID, Eliquis 5mg BID, bumetanide 2mg qd, diltiazem 180mg qd, Mirapex 1.5 mg, yupelri 175 mcg qd. Allergy: prednisone, clindamycin, augmentin Altabax

## 2020-01-11 NOTE — ED PROVIDER NOTE - TIMING
[facility], [place]    Brief Operative Note    Pre-operative diagnosis: Osteoarthritis Left Knee, Bilateral Knee Pain  Post-operative diagnosis Same  Procedure: Procedure(s):  Left Total Knee Replacement  Surgeon: Surgeon(s) and Role:     * Beto Jenkins MD - Primary     * Ciro Wilkes MD - Assisting     * Heriberto Acosta MD - Resident - Assisting  Anesthesia: General     Estimated blood loss: 25 cc  Drains: Vladimir-Riley  Specimens: * No specimens in log *  Findings:   None.  Complications: None.  Implants: [employer] Vanguard Total knee system Size 62.5 posterior stabilized femur, 67 mm tibial baseplate with locking bar, 34 mm patella, 18 mm posterior stabilized polyethylene liner.     Post op Plan:     Ortho Primary  Activity: Up with assist until independent.  Weight bearing status: WBAT LLE  Pain management: Transition from IV to PO as tolerated.    Antibiotics: Ancef x 24 hours.  Diet: Begin with clear fluids and progress diet as tolerated.   DVT prophylaxis: Aspirin and mechanical while in the hospital, discharge on Aspirin 325mg BID x 4 weeks.  Imaging: PACU.  Labs: Hgb POD#1-2.  Dressings: Keep clean, dry and intact until follow up   Drains: Document output per shift, discontinue when <30cc/shift.   Physical Therapy/Occupational Therapy: Eval and treat.  Consults: Hospitalist.  Follow-up: Clinic with Dr. Jenkins in 4 weeks   With left knee x-rays needed.   Disposition: Pending progress with therapies, pain control on orals, and medical stability, anticipate discharge to home vs. TCU on POD #2-3.    Heriberto Acosta MD  Orthopaedic Surgery Resident, PGY-4  Pager: [phone]              gradual onset

## 2020-01-15 DIAGNOSIS — R21 RASH AND OTHER NONSPECIFIC SKIN ERUPTION: ICD-10-CM

## 2020-01-15 DIAGNOSIS — Z79.52 LONG TERM (CURRENT) USE OF SYSTEMIC STEROIDS: ICD-10-CM

## 2020-01-15 DIAGNOSIS — Z87.891 PERSONAL HISTORY OF NICOTINE DEPENDENCE: ICD-10-CM

## 2020-01-15 DIAGNOSIS — Z79.899 OTHER LONG TERM (CURRENT) DRUG THERAPY: ICD-10-CM

## 2020-01-15 DIAGNOSIS — L53.9 ERYTHEMATOUS CONDITION, UNSPECIFIED: ICD-10-CM

## 2020-01-15 DIAGNOSIS — Z88.1 ALLERGY STATUS TO OTHER ANTIBIOTIC AGENTS STATUS: ICD-10-CM

## 2020-01-15 DIAGNOSIS — Z79.2 LONG TERM (CURRENT) USE OF ANTIBIOTICS: ICD-10-CM

## 2020-01-15 DIAGNOSIS — L29.9 PRURITUS, UNSPECIFIED: ICD-10-CM

## 2020-01-15 DIAGNOSIS — Z88.8 ALLERGY STATUS TO OTHER DRUGS, MEDICAMENTS AND BIOLOGICAL SUBSTANCES STATUS: ICD-10-CM

## 2020-02-04 ENCOUNTER — EMERGENCY (EMERGENCY)
Facility: HOSPITAL | Age: 80
LOS: 1 days | Discharge: HOME | End: 2020-02-04
Admitting: EMERGENCY MEDICINE
Payer: MEDICARE

## 2020-02-04 VITALS
WEIGHT: 266.1 LBS | DIASTOLIC BLOOD PRESSURE: 70 MMHG | RESPIRATION RATE: 16 BRPM | HEART RATE: 97 BPM | SYSTOLIC BLOOD PRESSURE: 137 MMHG | HEIGHT: 68 IN | OXYGEN SATURATION: 92 % | TEMPERATURE: 99 F

## 2020-02-04 DIAGNOSIS — Z90.49 ACQUIRED ABSENCE OF OTHER SPECIFIED PARTS OF DIGESTIVE TRACT: Chronic | ICD-10-CM

## 2020-02-04 DIAGNOSIS — F17.200 NICOTINE DEPENDENCE, UNSPECIFIED, UNCOMPLICATED: ICD-10-CM

## 2020-02-04 DIAGNOSIS — Z98.62 PERIPHERAL VASCULAR ANGIOPLASTY STATUS: Chronic | ICD-10-CM

## 2020-02-04 DIAGNOSIS — I73.9 PERIPHERAL VASCULAR DISEASE, UNSPECIFIED: ICD-10-CM
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
## 2020-02-04 DIAGNOSIS — Z88.1 ALLERGY STATUS TO OTHER ANTIBIOTIC AGENTS STATUS: ICD-10-CM

## 2020-02-04 DIAGNOSIS — Z88.8 ALLERGY STATUS TO OTHER DRUGS, MEDICAMENTS AND BIOLOGICAL SUBSTANCES STATUS: ICD-10-CM
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
## 2020-02-04 DIAGNOSIS — Z98.89 OTHER SPECIFIED POSTPROCEDURAL STATES: Chronic | ICD-10-CM

## 2020-02-04 DIAGNOSIS — S91.301A UNSPECIFIED OPEN WOUND, RIGHT FOOT, INITIAL ENCOUNTER: ICD-10-CM
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
## 2020-02-04 PROCEDURE — 99283 EMERGENCY DEPT VISIT LOW MDM: CPT

## 2020-02-04 RX ORDER — IBUPROFEN 200 MG
800 TABLET ORAL ONCE
Refills: 0 | Status: COMPLETED | OUTPATIENT
Start: 2020-02-04 | End: 2020-02-04

## 2020-02-04 RX ORDER — CEPHALEXIN 500 MG
1 CAPSULE ORAL
Qty: 14 | Refills: 0
Start: 2020-02-04 | End: 2020-02-10

## 2020-02-04 RX ORDER — CEPHALEXIN 500 MG
1 CAPSULE ORAL
Qty: 20 | Refills: 0
Start: 2020-02-04 | End: 2020-02-13

## 2020-02-04 RX ORDER — OXYCODONE AND ACETAMINOPHEN 5; 325 MG/1; MG/1
2 TABLET ORAL ONCE
Refills: 0 | Status: DISCONTINUED | OUTPATIENT
Start: 2020-02-04 | End: 2020-02-04

## 2020-02-04 RX ORDER — CEPHALEXIN 500 MG
500 CAPSULE ORAL ONCE
Refills: 0 | Status: COMPLETED | OUTPATIENT
Start: 2020-02-04 | End: 2020-02-04

## 2020-02-04 RX ADMIN — Medication 800 MILLIGRAM(S): at 13:26

## 2020-02-04 RX ADMIN — OXYCODONE AND ACETAMINOPHEN 2 TABLET(S): 5; 325 TABLET ORAL at 13:26

## 2020-02-04 RX ADMIN — Medication 500 MILLIGRAM(S): at 14:30

## 2020-02-04 NOTE — ED ADULT NURSE NOTE - OBJECTIVE STATEMENT
bilateral cellulitis, chronic, crusting and scaling noted with redness and edema; stated bleeding wounds but no clotted blood or actve bleeding noted

## 2020-02-04 NOTE — ED PROVIDER NOTE - CLINICAL SUMMARY MEDICAL DECISION MAKING FREE TEXT BOX
Pt. with PVD, c/o of her chronic le leg pain, pt. ran out of meds, requesting pain meds, exam not consistent with cellulites, vss, pain meds, stable for d/c, out pt f/u.

## 2020-02-04 NOTE — ED PROVIDER NOTE - OBJECTIVE STATEMENT
78 yo F w/ PMHx of COPD, MRSA, neuropathy secondary to lyme disease, DVT, sigmoidectomy, squamous cell carcinoma, diastolic dysfunction presents to the ED c/o wounds to b/l feet with associated pain. Pt is wheelchair bound. Denies fever, chills, chest pain, shortness of breath, numbness, or tingling.     Medications: Lipitor 40mg qd, Mirapex ER 15mg qd, Buproprion SR 150mg BID, Xarelto 15mg BID, Bumetanide 2mg qd, Diltiazem 180mg qd, Tupelri 175MCG qd, Perforomist 20MCG qd, Albuterol Neb, Proair HFA, Mupirocin Cream 2% TID, Dilaudid 4mg every 3 hours, Valium prn.   Allergy: Prednisone, Clindamycin, Augmentin

## 2020-02-04 NOTE — ED PROVIDER NOTE - MUSCULOSKELETAL, MLM
LE b/l  swelling, chronic changes of PVD, no erythema, not warm , no fluctuance, + pulse, n/v intact, Spine appears normal, range of motion is not limited, no muscle or joint tenderness

## 2020-02-04 NOTE — ED PROVIDER NOTE - NSFOLLOWUPINSTRUCTIONS_ED_ALL_ED_FT
Peripheral Vascular Disease     Peripheral vascular disease (PVD) is a disease of the blood vessels that are not part of your heart and brain. A simple term for PVD is poor circulation. In most cases, PVD narrows the blood vessels that carry blood from your heart to the rest of your body. This can reduce the supply of blood to your arms, legs, and internal organs, like your stomach or kidneys. However, PVD most often affects a person’s lower legs and feet. Without treatment, PVD tends to get worse.  PVD can also lead to acute ischemic limb. This is when an arm or leg suddenly cannot get enough blood. This is a medical emergency.  Follow these instructions at home:  Lifestyle     Do not use any products that contain nicotine or tobacco, such as cigarettes and e-cigarettes. If you need help quitting, ask your doctor.Lose weight if you are overweight. Or, stay at a healthy weight as told by your doctor.Eat a diet that is low in fat and cholesterol. If you need help, ask your doctor.Exercise regularly. Ask your doctor for activities that are right for you.General instructions     Take over-the-counter and prescription medicines only as told by your doctor.Take good care of your feet:  Wear comfortable shoes that fit well.Check your feet often for any cuts or sores.Keep all follow-up visits as told by your doctor This is important.Contact a doctor if:  You have cramps in your legs when you walk.You have leg pain when you are at rest.You have coldness in a leg or foot.Your skin changes.You are unable to get or have an erection (erectile dysfunction).You have cuts or sores on your feet that do not heal.Get help right away if:  Your arm or leg turns cold, numb, and blue.Your arms or legs become red, warm, swollen, painful, or numb.You have chest pain.You have trouble breathing.You suddenly have weakness in your face, arm, or leg.You become very confused or you cannot speak.You suddenly have a very bad headache.You suddenly cannot see.Summary  Peripheral vascular disease (PVD) is a disease of the blood vessels.A simple term for PVD is poor circulation. Without treatment, PVD tends to get worse.Treatment may include exercise, low fat and low cholesterol diet, and quitting smoking.This information is not intended to replace advice given to you by your health care provider. Make sure you discuss any questions you have with your health care provider.

## 2020-02-04 NOTE — ED ADULT NURSE NOTE - NSIMPLEMENTINTERV_GEN_ALL_ED
Implemented All Fall with Harm Risk Interventions:  [place] to call system. Call bell, personal items and telephone within reach. Instruct patient to call for assistance. Room bathroom lighting operational. Non-slip footwear when patient is off stretcher. Physically safe environment: no spills, clutter or unnecessary equipment. Stretcher in lowest position, wheels locked, appropriate side rails in place. Provide visual cue, wrist band, yellow gown, etc. Monitor gait and stability. Monitor for mental status changes and reorient to person, place, and time. Review medications for side effects contributing to fall risk. Reinforce activity limits and safety measures with patient and family. Provide visual clues: red socks.

## 2020-02-04 NOTE — ED PROVIDER NOTE - CARE PLAN
Principal Discharge DX:	PVD (posterior vitreous detachment), bilateral Principal Discharge DX:	PVD (peripheral vascular disease)

## 2020-02-04 NOTE — ED PROVIDER NOTE - PATIENT PORTAL LINK FT
You can access the FollowMyHealth Patient Portal offered by [facility] by registering at the following website: [email or web address]. By joining [employer]’s FollowMyHealth portal, you will also be able to view your health information using other applications (apps) compatible with our system.

## 2020-02-04 NOTE — ED PROVIDER NOTE - CONSTITUTIONAL, MLM
normal... obese, Well appearing, awake, alert, oriented to person, place, time/situation and in no apparent distress.

## 2020-02-16 ENCOUNTER — INPATIENT (INPATIENT)
Facility: HOSPITAL | Age: 80
LOS: 2 days | Discharge: HOME | DRG: 602 | End: 2020-02-19
Attending: INTERNAL MEDICINE | Admitting: INTERNAL MEDICINE
Payer: MEDICARE

## 2020-02-16 VITALS
WEIGHT: 266.98 LBS | HEART RATE: 97 BPM | HEIGHT: 68 IN | TEMPERATURE: 98 F | DIASTOLIC BLOOD PRESSURE: 74 MMHG | RESPIRATION RATE: 18 BRPM | SYSTOLIC BLOOD PRESSURE: 146 MMHG | OXYGEN SATURATION: 92 %

## 2020-02-16 DIAGNOSIS — Z91.89 OTHER SPECIFIED PERSONAL RISK FACTORS, NOT ELSEWHERE CLASSIFIED: ICD-10-CM

## 2020-02-16 DIAGNOSIS — Z98.89 OTHER SPECIFIED POSTPROCEDURAL STATES: Chronic | ICD-10-CM

## 2020-02-16 DIAGNOSIS — I50.9 HEART FAILURE, UNSPECIFIED: ICD-10-CM

## 2020-02-16 DIAGNOSIS — I10 ESSENTIAL (PRIMARY) HYPERTENSION: ICD-10-CM

## 2020-02-16 DIAGNOSIS — J44.9 CHRONIC OBSTRUCTIVE PULMONARY DISEASE, UNSPECIFIED: ICD-10-CM

## 2020-02-16 DIAGNOSIS — G62.9 POLYNEUROPATHY, UNSPECIFIED: ICD-10-CM

## 2020-02-16 DIAGNOSIS — F32.9 MAJOR DEPRESSIVE DISORDER, SINGLE EPISODE, UNSPECIFIED: ICD-10-CM

## 2020-02-16 DIAGNOSIS — R63.8 OTHER SYMPTOMS AND SIGNS CONCERNING FOOD AND FLUID INTAKE: ICD-10-CM

## 2020-02-16 DIAGNOSIS — Z90.49 ACQUIRED ABSENCE OF OTHER SPECIFIED PARTS OF DIGESTIVE TRACT: Chronic | ICD-10-CM

## 2020-02-16 DIAGNOSIS — Z86.718 PERSONAL HISTORY OF OTHER VENOUS THROMBOSIS AND EMBOLISM: ICD-10-CM

## 2020-02-16 DIAGNOSIS — L03.90 CELLULITIS, UNSPECIFIED: ICD-10-CM

## 2020-02-16 DIAGNOSIS — Z98.62 PERIPHERAL VASCULAR ANGIOPLASTY STATUS: Chronic | ICD-10-CM

## 2020-02-16 DIAGNOSIS — R60.0 LOCALIZED EDEMA: ICD-10-CM

## 2020-02-16 DIAGNOSIS — N18.9 CHRONIC KIDNEY DISEASE, UNSPECIFIED: ICD-10-CM

## 2020-02-16 LAB
ALBUMIN SERPL ELPH-MCNC: 3.5 G/DL — SIGNIFICANT CHANGE UP (ref 3.3–5)
ALP SERPL-CCNC: 107 U/L — SIGNIFICANT CHANGE UP (ref 40–120)
ALT FLD-CCNC: 19 U/L — SIGNIFICANT CHANGE UP (ref 10–45)
ANION GAP SERPL CALC-SCNC: 12 MMOL/L — SIGNIFICANT CHANGE UP (ref 5–17)
ANION GAP SERPL CALC-SCNC: 13 MMOL/L — SIGNIFICANT CHANGE UP (ref 5–17)
APPEARANCE UR: CLEAR — SIGNIFICANT CHANGE UP
AST SERPL-CCNC: 15 U/L — SIGNIFICANT CHANGE UP (ref 10–40)
BASOPHILS # BLD AUTO: 0.02 K/UL — SIGNIFICANT CHANGE UP (ref 0–0.2)
BASOPHILS # BLD AUTO: 0.03 K/UL — SIGNIFICANT CHANGE UP (ref 0–0.2)
BASOPHILS NFR BLD AUTO: 0.3 % — SIGNIFICANT CHANGE UP (ref 0–2)
BASOPHILS NFR BLD AUTO: 0.4 % — SIGNIFICANT CHANGE UP (ref 0–2)
BILIRUB SERPL-MCNC: 0.2 MG/DL — SIGNIFICANT CHANGE UP (ref 0.2–1.2)
BILIRUB UR-MCNC: NEGATIVE — SIGNIFICANT CHANGE UP
BUN SERPL-MCNC: 55 MG/DL — HIGH (ref 7–23)
BUN SERPL-MCNC: 58 MG/DL — HIGH (ref 7–23)
CALCIUM SERPL-MCNC: 9.3 MG/DL — SIGNIFICANT CHANGE UP (ref 8.4–10.5)
CALCIUM SERPL-MCNC: 9.4 MG/DL — SIGNIFICANT CHANGE UP (ref 8.4–10.5)
CHLORIDE SERPL-SCNC: 101 MMOL/L — SIGNIFICANT CHANGE UP (ref 96–108)
CHLORIDE SERPL-SCNC: 104 MMOL/L — SIGNIFICANT CHANGE UP (ref 96–108)
CO2 SERPL-SCNC: 22 MMOL/L — SIGNIFICANT CHANGE UP (ref 22–31)
CO2 SERPL-SCNC: 22 MMOL/L — SIGNIFICANT CHANGE UP (ref 22–31)
COLOR SPEC: YELLOW — SIGNIFICANT CHANGE UP
CREAT SERPL-MCNC: 1.85 MG/DL — HIGH (ref 0.5–1.3)
CREAT SERPL-MCNC: 1.9 MG/DL — HIGH (ref 0.5–1.3)
DIFF PNL FLD: NEGATIVE — SIGNIFICANT CHANGE UP
EOSINOPHIL # BLD AUTO: 0.18 K/UL — SIGNIFICANT CHANGE UP (ref 0–0.5)
EOSINOPHIL # BLD AUTO: 0.19 K/UL — SIGNIFICANT CHANGE UP (ref 0–0.5)
EOSINOPHIL NFR BLD AUTO: 2.4 % — SIGNIFICANT CHANGE UP (ref 0–6)
EOSINOPHIL NFR BLD AUTO: 2.8 % — SIGNIFICANT CHANGE UP (ref 0–6)
FLU A RESULT: SIGNIFICANT CHANGE UP
FLU A RESULT: SIGNIFICANT CHANGE UP
FLUAV AG NPH QL: SIGNIFICANT CHANGE UP
FLUBV AG NPH QL: SIGNIFICANT CHANGE UP
GAS PNL BLDA: SIGNIFICANT CHANGE UP
GAS PNL BLDV: SIGNIFICANT CHANGE UP
GAS PNL BLDV: SIGNIFICANT CHANGE UP
GLUCOSE SERPL-MCNC: 107 MG/DL — HIGH (ref 70–99)
GLUCOSE SERPL-MCNC: 113 MG/DL — HIGH (ref 70–99)
GLUCOSE UR QL: NEGATIVE — SIGNIFICANT CHANGE UP
HCT VFR BLD CALC: 31.5 % — LOW (ref 34.5–45)
HCT VFR BLD CALC: 33.2 % — LOW (ref 34.5–45)
HGB BLD-MCNC: 9.6 G/DL — LOW (ref 11.5–15.5)
HGB BLD-MCNC: 9.9 G/DL — LOW (ref 11.5–15.5)
IMM GRANULOCYTES NFR BLD AUTO: 0.6 % — SIGNIFICANT CHANGE UP (ref 0–1.5)
IMM GRANULOCYTES NFR BLD AUTO: 1 % — SIGNIFICANT CHANGE UP (ref 0–1.5)
KETONES UR-MCNC: NEGATIVE — SIGNIFICANT CHANGE UP
LEUKOCYTE ESTERASE UR-ACNC: NEGATIVE — SIGNIFICANT CHANGE UP
LYMPHOCYTES # BLD AUTO: 0.78 K/UL — LOW (ref 1–3.3)
LYMPHOCYTES # BLD AUTO: 0.91 K/UL — LOW (ref 1–3.3)
LYMPHOCYTES # BLD AUTO: 11.4 % — LOW (ref 13–44)
LYMPHOCYTES # BLD AUTO: 12.4 % — LOW (ref 13–44)
MAGNESIUM SERPL-MCNC: 2.3 MG/DL — SIGNIFICANT CHANGE UP (ref 1.6–2.6)
MCHC RBC-ENTMCNC: 28 PG — SIGNIFICANT CHANGE UP (ref 27–34)
MCHC RBC-ENTMCNC: 28.2 PG — SIGNIFICANT CHANGE UP (ref 27–34)
MCHC RBC-ENTMCNC: 29.8 GM/DL — LOW (ref 32–36)
MCHC RBC-ENTMCNC: 30.5 GM/DL — LOW (ref 32–36)
MCV RBC AUTO: 92.6 FL — SIGNIFICANT CHANGE UP (ref 80–100)
MCV RBC AUTO: 94.1 FL — SIGNIFICANT CHANGE UP (ref 80–100)
MONOCYTES # BLD AUTO: 0.57 K/UL — SIGNIFICANT CHANGE UP (ref 0–0.9)
MONOCYTES # BLD AUTO: 0.64 K/UL — SIGNIFICANT CHANGE UP (ref 0–0.9)
MONOCYTES NFR BLD AUTO: 8.3 % — SIGNIFICANT CHANGE UP (ref 2–14)
MONOCYTES NFR BLD AUTO: 8.7 % — SIGNIFICANT CHANGE UP (ref 2–14)
NEUTROPHILS # BLD AUTO: 5.26 K/UL — SIGNIFICANT CHANGE UP (ref 1.8–7.4)
NEUTROPHILS # BLD AUTO: 5.54 K/UL — SIGNIFICANT CHANGE UP (ref 1.8–7.4)
NEUTROPHILS NFR BLD AUTO: 75.2 % — SIGNIFICANT CHANGE UP (ref 43–77)
NEUTROPHILS NFR BLD AUTO: 76.5 % — SIGNIFICANT CHANGE UP (ref 43–77)
NITRITE UR-MCNC: NEGATIVE — SIGNIFICANT CHANGE UP
NRBC # BLD: 0 /100 WBCS — SIGNIFICANT CHANGE UP (ref 0–0)
NRBC # BLD: 0 /100 WBCS — SIGNIFICANT CHANGE UP (ref 0–0)
NT-PROBNP SERPL-SCNC: 456 PG/ML — HIGH (ref 0–300)
PH UR: 6 — SIGNIFICANT CHANGE UP (ref 5–8)
PLATELET # BLD AUTO: 187 K/UL — SIGNIFICANT CHANGE UP (ref 150–400)
PLATELET # BLD AUTO: 197 K/UL — SIGNIFICANT CHANGE UP (ref 150–400)
POTASSIUM SERPL-MCNC: 5.4 MMOL/L — HIGH (ref 3.5–5.3)
POTASSIUM SERPL-MCNC: 5.4 MMOL/L — HIGH (ref 3.5–5.3)
POTASSIUM SERPL-SCNC: 5.4 MMOL/L — HIGH (ref 3.5–5.3)
POTASSIUM SERPL-SCNC: 5.4 MMOL/L — HIGH (ref 3.5–5.3)
PROT SERPL-MCNC: 7.5 G/DL — SIGNIFICANT CHANGE UP (ref 6–8.3)
PROT UR-MCNC: 30 MG/DL
RBC # BLD: 3.4 M/UL — LOW (ref 3.8–5.2)
RBC # BLD: 3.53 M/UL — LOW (ref 3.8–5.2)
RBC # FLD: 15.8 % — HIGH (ref 10.3–14.5)
RBC # FLD: 15.9 % — HIGH (ref 10.3–14.5)
RSV RESULT: SIGNIFICANT CHANGE UP
RSV RNA RESP QL NAA+PROBE: SIGNIFICANT CHANGE UP
SODIUM SERPL-SCNC: 135 MMOL/L — SIGNIFICANT CHANGE UP (ref 135–145)
SODIUM SERPL-SCNC: 139 MMOL/L — SIGNIFICANT CHANGE UP (ref 135–145)
SP GR SPEC: 1.02 — SIGNIFICANT CHANGE UP (ref 1–1.03)
UROBILINOGEN FLD QL: 0.2 E.U./DL — SIGNIFICANT CHANGE UP
WBC # BLD: 6.87 K/UL — SIGNIFICANT CHANGE UP (ref 3.8–10.5)
WBC # BLD: 7.36 K/UL — SIGNIFICANT CHANGE UP (ref 3.8–10.5)
WBC # FLD AUTO: 6.87 K/UL — SIGNIFICANT CHANGE UP (ref 3.8–10.5)
WBC # FLD AUTO: 7.36 K/UL — SIGNIFICANT CHANGE UP (ref 3.8–10.5)

## 2020-02-16 PROCEDURE — 71045 X-RAY EXAM CHEST 1 VIEW: CPT | Mod: 26

## 2020-02-16 PROCEDURE — 99285 EMERGENCY DEPT VISIT HI MDM: CPT

## 2020-02-16 PROCEDURE — 93010 ELECTROCARDIOGRAM REPORT: CPT

## 2020-02-16 PROCEDURE — 99223 1ST HOSP IP/OBS HIGH 75: CPT | Mod: GC

## 2020-02-16 RX ORDER — HYDROMORPHONE HYDROCHLORIDE 2 MG/ML
0.5 INJECTION INTRAMUSCULAR; INTRAVENOUS; SUBCUTANEOUS EVERY 6 HOURS
Refills: 0 | Status: DISCONTINUED | OUTPATIENT
Start: 2020-02-16 | End: 2020-02-19

## 2020-02-16 RX ORDER — IPRATROPIUM/ALBUTEROL SULFATE 18-103MCG
3 AEROSOL WITH ADAPTER (GRAM) INHALATION ONCE
Refills: 0 | Status: COMPLETED | OUTPATIENT
Start: 2020-02-16 | End: 2020-02-16

## 2020-02-16 RX ORDER — ATORVASTATIN CALCIUM 80 MG/1
40 TABLET, FILM COATED ORAL AT BEDTIME
Refills: 0 | Status: DISCONTINUED | OUTPATIENT
Start: 2020-02-16 | End: 2020-02-19

## 2020-02-16 RX ORDER — ACETAMINOPHEN 500 MG
650 TABLET ORAL EVERY 6 HOURS
Refills: 0 | Status: DISCONTINUED | OUTPATIENT
Start: 2020-02-16 | End: 2020-02-16

## 2020-02-16 RX ORDER — ONDANSETRON 8 MG/1
4 TABLET, FILM COATED ORAL EVERY 8 HOURS
Refills: 0 | Status: DISCONTINUED | OUTPATIENT
Start: 2020-02-16 | End: 2020-02-16

## 2020-02-16 RX ORDER — VANCOMYCIN HCL 1 G
2000 VIAL (EA) INTRAVENOUS ONCE
Refills: 0 | Status: COMPLETED | OUTPATIENT
Start: 2020-02-16 | End: 2020-02-16

## 2020-02-16 RX ORDER — DILTIAZEM HCL 120 MG
180 CAPSULE, EXT RELEASE 24 HR ORAL DAILY
Refills: 0 | Status: DISCONTINUED | OUTPATIENT
Start: 2020-02-17 | End: 2020-02-19

## 2020-02-16 RX ORDER — AZITHROMYCIN 500 MG/1
500 TABLET, FILM COATED ORAL EVERY 24 HOURS
Refills: 0 | Status: DISCONTINUED | OUTPATIENT
Start: 2020-02-16 | End: 2020-02-19

## 2020-02-16 RX ORDER — APIXABAN 2.5 MG/1
5 TABLET, FILM COATED ORAL EVERY 12 HOURS
Refills: 0 | Status: DISCONTINUED | OUTPATIENT
Start: 2020-02-16 | End: 2020-02-19

## 2020-02-16 RX ORDER — FUROSEMIDE 40 MG
40 TABLET ORAL EVERY 12 HOURS
Refills: 0 | Status: DISCONTINUED | OUTPATIENT
Start: 2020-02-17 | End: 2020-02-18

## 2020-02-16 RX ORDER — DIPHENHYDRAMINE HCL 50 MG
25 CAPSULE ORAL ONCE
Refills: 0 | Status: COMPLETED | OUTPATIENT
Start: 2020-02-16 | End: 2020-02-16

## 2020-02-16 RX ORDER — IPRATROPIUM/ALBUTEROL SULFATE 18-103MCG
3 AEROSOL WITH ADAPTER (GRAM) INHALATION EVERY 4 HOURS
Refills: 0 | Status: DISCONTINUED | OUTPATIENT
Start: 2020-02-16 | End: 2020-02-17

## 2020-02-16 RX ORDER — SODIUM POLYSTYRENE SULFONATE 4.1 MEQ/G
15 POWDER, FOR SUSPENSION ORAL ONCE
Refills: 0 | Status: COMPLETED | OUTPATIENT
Start: 2020-02-16 | End: 2020-02-16

## 2020-02-16 RX ORDER — BUPROPION HYDROCHLORIDE 150 MG/1
300 TABLET, EXTENDED RELEASE ORAL DAILY
Refills: 0 | Status: DISCONTINUED | OUTPATIENT
Start: 2020-02-17 | End: 2020-02-19

## 2020-02-16 RX ORDER — HYDROMORPHONE HYDROCHLORIDE 2 MG/ML
4 INJECTION INTRAMUSCULAR; INTRAVENOUS; SUBCUTANEOUS EVERY 4 HOURS
Refills: 0 | Status: DISCONTINUED | OUTPATIENT
Start: 2020-02-16 | End: 2020-02-19

## 2020-02-16 RX ORDER — IPRATROPIUM/ALBUTEROL SULFATE 18-103MCG
3 AEROSOL WITH ADAPTER (GRAM) INHALATION EVERY 4 HOURS
Refills: 0 | Status: DISCONTINUED | OUTPATIENT
Start: 2020-02-16 | End: 2020-02-16

## 2020-02-16 RX ORDER — HYDROMORPHONE HYDROCHLORIDE 2 MG/ML
4 INJECTION INTRAMUSCULAR; INTRAVENOUS; SUBCUTANEOUS ONCE
Refills: 0 | Status: DISCONTINUED | OUTPATIENT
Start: 2020-02-16 | End: 2020-02-16

## 2020-02-16 RX ORDER — FUROSEMIDE 40 MG
60 TABLET ORAL ONCE
Refills: 0 | Status: COMPLETED | OUTPATIENT
Start: 2020-02-16 | End: 2020-02-16

## 2020-02-16 RX ORDER — FUROSEMIDE 40 MG
40 TABLET ORAL ONCE
Refills: 0 | Status: COMPLETED | OUTPATIENT
Start: 2020-02-16 | End: 2020-02-16

## 2020-02-16 RX ORDER — HYDROMORPHONE HYDROCHLORIDE 2 MG/ML
0.5 INJECTION INTRAMUSCULAR; INTRAVENOUS; SUBCUTANEOUS ONCE
Refills: 0 | Status: DISCONTINUED | OUTPATIENT
Start: 2020-02-16 | End: 2020-02-16

## 2020-02-16 RX ORDER — PRAMIPEXOLE DIHYDROCHLORIDE 0.12 MG/1
0.12 TABLET ORAL AT BEDTIME
Refills: 0 | Status: DISCONTINUED | OUTPATIENT
Start: 2020-02-16 | End: 2020-02-19

## 2020-02-16 RX ADMIN — Medication 60 MILLIGRAM(S): at 22:10

## 2020-02-16 RX ADMIN — Medication 40 MILLIGRAM(S): at 17:26

## 2020-02-16 RX ADMIN — Medication 3 MILLILITER(S): at 22:11

## 2020-02-16 RX ADMIN — Medication 250 MILLIGRAM(S): at 15:24

## 2020-02-16 RX ADMIN — ATORVASTATIN CALCIUM 40 MILLIGRAM(S): 80 TABLET, FILM COATED ORAL at 22:12

## 2020-02-16 RX ADMIN — APIXABAN 5 MILLIGRAM(S): 2.5 TABLET, FILM COATED ORAL at 22:12

## 2020-02-16 RX ADMIN — HYDROMORPHONE HYDROCHLORIDE 0.5 MILLIGRAM(S): 2 INJECTION INTRAMUSCULAR; INTRAVENOUS; SUBCUTANEOUS at 22:10

## 2020-02-16 RX ADMIN — Medication 3 MILLILITER(S): at 15:24

## 2020-02-16 RX ADMIN — Medication 3 MILLILITER(S): at 15:14

## 2020-02-16 RX ADMIN — PRAMIPEXOLE DIHYDROCHLORIDE 0.12 MILLIGRAM(S): 0.12 TABLET ORAL at 22:11

## 2020-02-16 RX ADMIN — HYDROMORPHONE HYDROCHLORIDE 0.5 MILLIGRAM(S): 2 INJECTION INTRAMUSCULAR; INTRAVENOUS; SUBCUTANEOUS at 22:40

## 2020-02-16 RX ADMIN — SODIUM POLYSTYRENE SULFONATE 15 GRAM(S): 4.1 POWDER, FOR SUSPENSION ORAL at 22:11

## 2020-02-16 RX ADMIN — Medication 3 MILLILITER(S): at 15:03

## 2020-02-16 RX ADMIN — HYDROMORPHONE HYDROCHLORIDE 4 MILLIGRAM(S): 2 INJECTION INTRAMUSCULAR; INTRAVENOUS; SUBCUTANEOUS at 15:41

## 2020-02-16 RX ADMIN — Medication 25 MILLIGRAM(S): at 16:16

## 2020-02-16 NOTE — H&P ADULT - NSHPSOCIALHISTORY_GEN_ALL_CORE
lives alone, former smoker (quit 2 years ago, 50 pack year hx), occasional alcohol use, denies other drug use. Lives on the ground floor. Doesn't have family.

## 2020-02-16 NOTE — ED PROVIDER NOTE - CLINICAL SUMMARY MEDICAL DECISION MAKING FREE TEXT BOX
Pt p/w worsening LE edema, skin changes, PHIPPS / SOB. No CP. Temp 100 rectally. DDx includes but not limited to decompensated CHF, COPD exacerbation, PNA, effusion, RVI, cellulitis, less likely other pathology. Steroids withheld as pt reports prior cardiac arrest in association w/ steroids and reports several times her doctors told her to never get steroids and has medical alert blair saying so. Duonebs, diuretics, labs, EKG, CXR, abx, anticipate admission

## 2020-02-16 NOTE — H&P ADULT - NSHPPHYSICALEXAM_GEN_ALL_CORE
PHYSICAL EXAM:  GENERAL: NAD, in mild discomfort due to the leg pain   HEAD:  Atraumatic, Normocephalic  EYES: EOMI, PERRLA, conjunctiva and sclera clear  ENMT: No tonsillar erythema, exudates, or enlargement; Moist mucous membranes  NECK: Supple, No JVD, Normal thyroid  HEART: Regular rate and rhythm; No murmurs, rubs, or gallops  RESPIRATORY: CTA B/L, No W/R/R  ABDOMEN: Soft, Nontender, Nondistended; Bowel sounds present  NEUROLOGY: A&Ox3, nonfocal, moving all extremities  EXTREMITIES:  2+ Peripheral Pulses, No clubbing, cyanosis, or edema  SKIN: warm, dry, normal color, no rash or abnormal lesions PHYSICAL EXAM:  GENERAL: NAD, elderly obese female in mild discomfort due to the leg pain   HEAD:  Atraumatic, Normocephalic  EYES: EOMI, PERRLA, conjunctiva and sclera clear  ENMT: No tonsillar erythema, exudates, or enlargement; Moist mucous membranes  NECK: Supple, No JVD, Normal thyroid  HEART: Regular rate and rhythm; No murmurs, rubs, or gallops  RESPIRATORY: decrease breath sounds throughout (wheezing on presentation per ED doctor)  ABDOMEN: obese, Soft, Nontender, Nondistended; Bowel sounds present, mid lower abdominal scar well healed  NEUROLOGY: A&Ox3, nonfocal, moving all extremities  EXTREMITIES:  1+ Peripheral Pulses, b/l edema, erythema and warmth tender to palpation up to knee level with lateral mid tibial skin scaling, and trace serous discharge  SKIN: warm, dry, normal color except b/l LE

## 2020-02-16 NOTE — H&P ADULT - PROBLEM SELECTOR PLAN 6
Pt with known CKD (unknown Cr baseline). On presentation elevated BUN/Cr at 58/1.9  -closely monitor BMP  -avoid nephrotoxic agents  -renally dose medications  -obtain collateral from PCP in the AM     #Normocytic Anemia  On presentation Hgb 9.9. No s/s of bleeding, hemodynamically stable. Likely anemia of chronic disease vs RADHA  -f/u iron studies   -closely monitor Hgb   -transfuse for Hgb <7

## 2020-02-16 NOTE — ED ADULT NURSE NOTE - OBJECTIVE STATEMENT
78 y/o female w/ hx of Lyme disease, MRSA, brain aneurysm w/ coil, DVT, CHF, venous stasis changes to LE, and PNA presents to the ED c/o worsening SOB x 2 days associated w/ increased leg swelling. Pt states, "My feet are infected". Pt also reports productive cough w/ yellow sputum. Denies fever, chills, abdominal pain, NVD, back pain, CP, and any other sx. No JVD. Venous stasis changes noted to bilateral LE w/ crusting.

## 2020-02-16 NOTE — H&P ADULT - PROBLEM SELECTOR PLAN 3
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 On home Eliquis 5mg q12h   -c/w Eliquis 5mg q12h Pt with hx of COPD, not on home Oxygen. On home Yupelri qd, perforomist BID, DuoNebs PRN, pro air HFA PRN. On presentation with wheezing on PE. Reports occasional cough with greenish sputum, unchanged from baseline. Former smoker (quit 2 years ago)  -s/p Duonebs x3 in the ED  -c/w Duonebs q4h  -start Azithromycin 500mg q24h

## 2020-02-16 NOTE — ED ADULT NURSE NOTE - NSIMPLEMENTINTERV_GEN_ALL_ED
Implemented All Fall Risk Interventions:  [place] to call system. Call bell, personal items and telephone within reach. Instruct patient to call for assistance. Room bathroom lighting operational. Non-slip footwear when patient is off stretcher. Physically safe environment: no spills, clutter or unnecessary equipment. Stretcher in lowest position, wheels locked, appropriate side rails in place. Provide visual cue, wrist band, yellow gown, etc. Monitor gait and stability. Monitor for mental status changes and reorient to person, place, and time. Review medications for side effects contributing to fall risk. Reinforce activity limits and safety measures with patient and family.

## 2020-02-16 NOTE — H&P ADULT - PROBLEM SELECTOR PLAN 7
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 On home Mirapex 1.5mg qd.   -c/w Mirapex 1.5mg qd  -obtain collateral from neurologist in the AM     #Chronic LBP  pt with hx of chronic LBP. 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 On home Mirapex 1.5mg qd.   -c/w Mirapex 1.5mg qd  -obtain collateral from neurologist in the AM     #Chronic LBP  pt with hx of chronic LBP. On home Dilaudid 4mg PRN and meloxicam 15mg qd  -c/w Dilaudid 4mg PO q4h for severe pain and Dilaudid 0.5 IV for breakthrough pain

## 2020-02-16 NOTE — H&P ADULT - NSICDXPASTMEDICALHX_GEN_ALL_CORE_FT
PAST MEDICAL HISTORY:  Brain embolism and thrombosis     DVT (deep venous thrombosis)     Lyme disease     MRSA (methicillin resistant Staphylococcus aureus)     PNA (pneumonia)     Skin cancer

## 2020-02-16 NOTE — H&P ADULT - PROBLEM SELECTOR PLAN 1
Pt with b/l LE worsening edema, weeping, and pain since Feb 5th. Went to urgent care last week and was prescribed mupirocin cream. Has hx of PVD s/p LE stents. Follows up outpatient with vascular (Dr. Barrera). Also w/ hx of SCC of L leg s/p resection c/b MRSA infection (~[id number]). Last hospitalization for similar complaints in October 2019 at [facility] c/b cardiac arrest 2/2 prednisone (per pt). Likely b/l LE cellulitis. Does not meet SIRS criteria, afebrile and no leukocytosis. No s/s of systemic infection given UA neg, Flu/RSV neg, and CXR with mild pulmonary edema.   -s/p Vancomycin 2g in the ED and Dilaudid 4mg for pain   -f/u blood cultures  -c/w Vancomycin q12h given hx of MRSA   -consider vascular consult if pain/erythema and edema worsens Pt with b/l LE worsening edema, weeping, and pain since Feb 5th. Went to urgent care last week and was prescribed mupirocin cream. Has hx of PVD s/p LE stents. Follows up outpatient with vascular (Dr. Barrera). Also w/ hx of SCC of L leg s/p resection c/b MRSA infection (~[id number]). Last hospitalization for similar complaints in October 2019 at [facility] c/b cardiac arrest 2/2 prednisone (per pt). Likely b/l LE cellulitis. Does not meet SIRS criteria, afebrile and no leukocytosis. No s/s of systemic infection given UA neg, Flu/RSV neg, and CXR with mild pulmonary edema.   -s/p Vancomycin 2g in the ED and Dilaudid 4mg for pain   -f/u blood cultures  -c/w Vancomycin 1.5g q12h given hx of MRSA   -consider vascular consult if pain/erythema and edema worsens Pt with b/l LE worsening edema, draining clear fluid and pain since Feb 5th. Went to urgent care last week and was prescribed mupirocin cream. Has hx of PVD s/p LE stents. Follows up outpatient with vascular (Dr. Barrera). Also w/ hx of SCC of L leg s/p resection c/b MRSA infection (~[id number]). Last hospitalization for similar complaints in October 2019 at [facility] c/b cardiac arrest 2/2 prednisone (per pt). Likely b/l LE edema 2/2 acute heart failure exacerbation. No purulent discharge, does not meet SIRS criteria, afebrile and no leukocytosis. No s/s of systemic infection given UA neg, Flu/RSV neg, and CXR with mild pulmonary edema.   -s/p Vancomycin 2g in the ED and Dilaudid 4mg for pain   -will hold off on antibiotics for now   -f/u blood cultures  -consider vascular consult if pain/erythema and edema worsens

## 2020-02-16 NOTE — H&P ADULT - NSHPLABSRESULTS_GEN_ALL_CORE
LABS:                         9.6    7.36  )-----------( 197      ( 2020 16:19 )             31.5     02-    139  |  104  |  55<H>  ----------------------------<  113<H>  5.4<H>   |  22  |  1.85<H>    Ca    9.4      2020 16:19  Mg     2.3         TPro  7.5  /  Alb  3.5  /  TBili  0.2  /  DBili  x   /  AST  15  /  ALT  19  /  AlkPhos  107  -16      Urinalysis Basic - ( 2020 16:30 )    Color: Yellow / Appearance: Clear / S.025 / pH: x  Gluc: x / Ketone: NEGATIVE  / Bili: Negative / Urobili: 0.2 E.U./dL   Blood: x / Protein: 30 mg/dL / Nitrite: NEGATIVE   Leuk Esterase: NEGATIVE / RBC: < 5 /HPF / WBC < 5 /HPF   Sq Epi: x / Non Sq Epi: 5-10 /HPF / Bacteria: None /HPF      CARDIAC MARKERS ( 2020 16:19 )  x     / 0.01 ng/mL / x     / x     / x          Serum Pro-Brain Natriuretic Peptide: 456 pg/mL ( @ 14:56)      RADIOLOGY, EKG & ADDITIONAL TESTS: Reviewed.

## 2020-02-16 NOTE — H&P ADULT - PROBLEM SELECTOR PLAN 2
Pt with hx of COPD, not on home Oxygen. On home Yupelri qd, perforomist BID, DuoNebs PRN, pro air HFA PRN. On presentation with wheezing on PE. Reports occasional cough with greenish sputum, unchanged from baseline. Former smoker (quit 2 years ago)  -s/p Duonebs x3 in the ED  -c/w Duonebs q4h  -c/w home Yupelri qd and perforomist BID, DuoNebs PRN, pro air HFA PRN Pt with hx of COPD, not on home Oxygen. On home Yupelri qd, perforomist BID, DuoNebs PRN, pro air HFA PRN. On presentation with wheezing on PE. Reports occasional cough with greenish sputum, unchanged from baseline. Former smoker (quit 2 years ago)  -s/p Duonebs x3 in the ED  -c/w Duonebs q4h Pt with known HFpEF, unknown EF. Presents with PHIPPS for one day. Able to walk 20 feet in the past, now can only walk 10 feet before getting short of breath. On home Bumex 2mg daily. Trops neg, BNP at 456 (could be low 2/2 obesity). CXR with mild pulmonary edema. Likely acute on chronic CHF   -s/p Lasix 40mg in the ED   -Lasix 60mg IV now, and then continue with Lasix 40mg IV q12h  -strict I&Os  -obtain collateral from cardiologist at [facility] in the AM   -obtain echo in the AM given pt with PHIPPS and b/l LE edema Pt with known HFpEF, unknown EF. Presents with PHIPPS for one day. Able to walk 20 feet in the past, now can only walk 10 feet before getting short of breath. On home Bumex 2mg daily. Trops neg, BNP at 456 (could be low 2/2 obesity). CXR with mild pulmonary edema. Likely acute on chronic CHF   -s/p Lasix 40mg in the ED   -Lasix 60mg IV now, and then continue with Lasix 40mg IV BID  -strict I&Os  -obtain collateral from cardiologist at [facility] in the AM   -obtain echo in the AM given pt with PHIPPS and b/l LE edema  -Monitor renal function

## 2020-02-16 NOTE — H&P ADULT - ATTENDING COMMENTS
Agree with above with the following changes/additions:    Pt with worsening LE serous drainage and pain x3d onset. Edema may be worse than baseline per pt. Worsening PHIPPS from baseline over same time period. Cough more productive than baseline.   Vitals reviewed, no significant hypoxia, afebrile, normo-hypertensive.      Obese  speaking in full sentences, mild expiratory diffuse wheezing b/l  JVD difficult to assess  Abd soft, NT, ND, BS+  B/L LEs edematous, erythematous, minimal serous drainage, chronic skin changes    Labs without leukocytosis, normocytic anemia, elevated BNP, elevated Cr (at baseline), Trop neg x1, Flu/RSV neg, ABG wnl    CXR with increased interstitial markings.    A/  // SOB likely 2/t HFpEF exacerbation, mild COPD exacerbation with atypical/viral PNA on DDx  // LE edema with increased pain and serous drainage from baseline, in setting of chronic edema, LE neuropathy, PVD with stents, and history of DVT, likely 2/t fluid overload, unlikely cellulitis  // CKD  // Anemia, normocytic  // History of LE DVTs compliant with Eliquis  // PVD s/p stenting, not on antiplatelet agents    P/  -S/p IV lasix 40mg in ED earlier, give additonal IV 60mg tonight, cont with IV 40mg BID tomorrow  -Hold home bumex  -External female catheter, strict I/Os  -Close renal function monitoring  -AM ECHO  -Check full RVP  -Duonebs q4-6h  -Avoid steroids given severe allergy history  -Azithromycin  -Consider repeat LE US to assess for clot burden  -Consider AM vascular and cardiology consults  -AM collateral     -Rest of plan per resident note Agree with above with the following changes/additions:    Pt with worsening LE serous drainage and pain x3d onset. Edema may be worse than baseline per pt. Worsening PHIPPS from baseline over same time period. Cough more productive than baseline.   Vitals reviewed, no significant hypoxia, afebrile, normo-hypertensive.      Obese  speaking in full sentences, mild expiratory diffuse wheezing b/l  JVD difficult to assess  Abd soft, NT, ND, BS+  B/L LEs edematous, erythematous, minimal serous drainage, chronic skin changes    Labs without leukocytosis, normocytic anemia, elevated BNP, elevated Cr (at baseline), Trop neg x1, Flu/RSV neg, ABG wnl    CXR with increased interstitial markings.    A/  // SOB likely 2/t HFpEF exacerbation, mild COPD exacerbation with atypical/viral PNA on DDx  // LE edema with increased pain and serous drainage from baseline, in setting of chronic edema, LE neuropathy, PVD with stents, and history of DVT, likely 2/t fluid overload, unlikely cellulitis  // CKD  // Anemia, normocytic  // History of LE DVTs compliant with Eliquis  // PVD s/p stenting, not on antiplatelet agents    P/  -S/p IV lasix 40mg in ED earlier, give additonal IV 60mg tonight, cont with IV 40mg BID tomorrow  -Hold home bumex  -External female catheter, strict I/Os  -Close renal function monitoring  -BP control with home meds  -AM ECHO  -Check full RVP  -Duonebs q4-6h  -Avoid steroids given severe allergy history  -Azithromycin  -Consider repeat LE US to assess for clot burden  -Consider AM vascular and cardiology consults  -AM collateral     -Rest of plan per resident note Agree with above with the following changes/additions:    Pt with worsening LE serous drainage and pain x3d onset. Edema may be worse than baseline per pt. Worsening PHIPPS from baseline over same time period. Cough more productive than baseline.   Vitals reviewed, no significant hypoxia, afebrile, normo-hypertensive.      Obese  speaking in full sentences, mild expiratory diffuse wheezing b/l  JVD difficult to assess  Abd soft, NT, ND, BS+  B/L LEs edematous, erythematous, minimal serous drainage, chronic skin changes    Labs without leukocytosis, normocytic anemia, elevated BNP, elevated Cr (at baseline), Trop neg x1, Flu/RSV neg, ABG wnl    CXR with increased interstitial markings.    A/  // SOB likely 2/t HFpEF exacerbation, mild COPD exacerbation with atypical/viral PNA on DDx  // LE edema with increased pain and serous drainage from baseline, in setting of chronic edema, LE neuropathy, PVD with stents, and history of DVT, likely 2/t fluid overload, unlikely cellulitis  // CKD  // Anemia, normocytic  // History of LE DVTs compliant with Eliquis  // PVD s/p stenting, not on antiplatelet agents    P/  -S/p IV lasix 40mg in ED earlier, give additonal IV 60mg tonight, cont with IV 40mg BID tomorrow  -Hold home bumex  -External female catheter, strict I/Os  -Close renal function monitoring  -BP control with home meds  -AM ECHO  -Check full RVP  -Duonebs q4-6h  -Avoid steroids given severe allergy history  -Azithromycin  -Keep SpO2 88-92%  -Consider repeat LE US to assess for clot burden  -Consider AM vascular and cardiology consults  -AM collateral     -Rest of plan per resident note

## 2020-02-16 NOTE — H&P ADULT - HISTORY OF PRESENT ILLNESS
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 Pain is "excrutiating" aleviates with cold water compresses, burning like sensation, 10/10 b/l lower legs. Denies any leg trauma. Last week went to urgent care and was prescribed mupirocin cream without improvement in symptoms. Also c/o worsening PHIPPS for one day able to walk 10 feet before getting SOB (at baseline able to walk 20 feet). Denies orthopnea, PND, and cough unchanged from baseline with green sputum. Last hospitalization was in October, 2019 at [facility] for similar complaints w/ hospital course complicated by cardiac arrest 2/2 prednisone (per pt) and one week admission in the ICU.     In the ED vitals were: 100F rectal, /74, RR18-20, Spo2 92% on RA. Labs significant for Hgb 9.9, K 5.4, BUN/Cr 58/1.9, , trops neg, UA neg, ABG with pH 7.38/CO2 40/HCO3 24, O2sat 97%. Flu/RSV neg.   In the ED received Vancomycin 2g at 3:30Pm, Benadryl 25mg for questionable rash (initially thought to be due to Vanc however pt with rash prior to admission), Dilaudid 4mg, DuoNebs x3, and Lasix 40mg.   CXR with mild b/l pulmonary edema. EKG NSR.     Pt seen and examined at bedside. 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 Pain is "excrutiating" aleviates with cold water compresses, burning like sensation, 10/10 b/l lower legs. Denies any leg trauma. Last week went to urgent care and was prescribed mupirocin cream without improvement in symptoms. Also c/o worsening PHIPPS for one day able to walk 10 feet before getting SOB (at baseline able to walk 20 feet). Denies orthopnea, PND, and cough unchanged from baseline with green sputum. Last hospitalization was in October, 2019 at [facility] [place] for similar complaints w/ hospital course complicated by cardiac arrest 2/2 prednisone (per pt) and one week admission in the ICU.     In the ED vitals were: 100F rectal, /74, RR18-20, Spo2 92% on RA. Labs significant for Hgb 9.9, K 5.4, BUN/Cr 58/1.9, , trops neg, UA neg, ABG with pH 7.38/CO2 40/HCO3 24, O2sat 97%. Flu/RSV neg.   In the ED received Vancomycin 2g at 3:30Pm, Benadryl 25mg for questionable rash (initially thought to be due to Vanc however pt with rash prior to admission), Dilaudid 4mg, DuoNebs x3, and Lasix 40mg.   CXR with mild b/l pulmonary edema. EKG NSR.     Pt seen and examined at bedside. Reports ongoing 10/10 b/l LE pain. Denies CP, SOB, abdominal pain, N/V/F/C. 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 Pain is "excruciating" alleviated with cold water compresses, burning like sensation, 10/10 b/l lower legs. Denies any leg trauma. Last week went to urgent care and was prescribed mupirocin cream without improvement in symptoms. Also c/o worsening PHIPPS for one day able to walk 10 feet before getting SOB (at baseline able to walk 20 feet). Denies orthopnea, PND, and cough unchanged from baseline with green sputum. Last hospitalization was in October, 2019 at [facility] for similar complaints w/ hospital course complicated by cardiac arrest 2/2 prednisone (per pt) and one week admission in the ICU.     In the ED vitals were: 100F rectal, /74, RR18-20, Spo2 92% on RA. Labs significant for Hgb 9.9, K 5.4, BUN/Cr 58/1.9, , trops neg, UA neg, ABG with pH 7.38/CO2 40/HCO3 24, O2sat 97%. Flu/RSV neg.   In the ED received Vancomycin 2g at 3:30Pm, Benadryl 25mg for questionable rash (initially thought to be due to Vanc however pt with rash prior to admission), Dilaudid 4mg, DuoNebs x3, and Lasix 40mg.   CXR with mild b/l pulmonary edema. EKG NSR.     Pt seen and examined at bedside. Reports ongoing 10/10 b/l LE pain. Denies CP, SOB, abdominal pain, N/V/F/C.

## 2020-02-16 NOTE — H&P ADULT - NSICDXPASTSURGICALHX_GEN_ALL_CORE_FT
PAST SURGICAL HISTORY:  H/O angioplasty     H/O shoulder surgery     Status post laparoscopic-assisted sigmoidectomy

## 2020-02-16 NOTE — ED PROVIDER NOTE - OBJECTIVE STATEMENT
Pt w/ PMHx HTN, HLD, CHF (diastolic dysfunction on Bumex 1 mg QD), COPD (no home O2, no hx intubations), DALIA non compliant w/ DALIA, chronic pain on oral Dilaudid, cardiac arrest s/p prednisone use, PVD s/p LE stents, DVT on Eliquis, cerebral aneurysm s/p coil, neuropathy, lyme disease, CKD p/w multiple medical complaints. Pt reports worsening b/l LE edema w/ weeping for days, and worsening PHIPPS. Denies orthopnea, PND. No CP. + cough w/ green phlegm. Pt also reports acute on chronic worsening b/l LE erythema over the past 1-2 months.

## 2020-02-16 NOTE — H&P ADULT - PROBLEM SELECTOR PLAN 8
Pt with hx of depression on home Bupropion 150mg BID and Valium 5mg qd PRN  -c/w Bupropion XL 300mg qd (therapeutic interchange)

## 2020-02-16 NOTE — ED ADULT TRIAGE NOTE - CHIEF COMPLAINT QUOTE
patient with SOB for the last 2 days, and worsening leg swelling. she states that her feet are infected and she feels like she has a fever. history of CHF. also complains of productive cough with yellow sputum

## 2020-02-16 NOTE — H&P ADULT - PROBLEM SELECTOR PLAN 4
Pt with known HFpEF, unknown EF. Presents with PHIPPS for one day. Able to walk 20 feet in the past, now can only walk 10 feet before getting short of breath. On home Bumex 2mg daily. Trops neg, BNP at 456 (could be low 2/2 obesity). CXR with mild pulmonary edema.   -s/p Lasix 40mg in the ED   -c/w Bumex 2mg qd  -strict I&Os  -obtain collateral from cardiologist at [facility] in the AM   -obtain echo in the AM given pt with PHIPPS and b/l LE edema Pt with hx of several LE DVTs in the past. On home Eliquis 5mg q12h   -c/w Eliquis 5mg q12h

## 2020-02-16 NOTE — ED PROVIDER NOTE - NS ED ROS FT
Constitutional: See HPI  Eyes: No pain, blurry vision, or discharge.  ENMT: No hearing changes, pain, discharge or infections. No neck pain or stiffness.  Cardiac: See HPI.  Respiratory: See HPI  GI: No nausea, vomiting, diarrhea or abdominal pain.  : No dysuria, frequency or burning.  MS: No myalgia, muscle weakness, joint pain or back pain.  Neuro: No headache or weakness. No LOC.  Skin: See HPI  Endocrine: No history of thyroid disease or diabetes.  Except as documented in the HPI, all other systems are negative.

## 2020-02-16 NOTE — H&P ADULT - PROBLEM SELECTOR PLAN 5
Pt w hx of HTN. On home Diltiazem 180mg qd and Bumex 2mg qd.  -c/w Diltiazem 180mg qd   -c/w Bumex 2mg qd Pt w hx of HTN. On home Diltiazem 180mg qd and Bumex 2mg qd.  -c/w Diltiazem 180mg qd   -hold bumex as pt on IV lasix

## 2020-02-16 NOTE — ED PROVIDER NOTE - PROGRESS NOTE DETAILS
H&P most c/w COPD, rather than CHF, w/ cellulitis. Will admit to medicine. Neg trop. Chronic CKD. VBG likely inaccurate based on lack of AG or acidosis on BMP. Pt clinically improved and non toxic appearing. ABG performed.

## 2020-02-16 NOTE — H&P ADULT - ASSESSMENT
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 Admitted for b/l LE cellulitis. 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 Admitted for b/l LE cellulitis. 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 Admitted for further management.

## 2020-02-16 NOTE — ED PROVIDER NOTE - CARE PLAN
Principal Discharge DX:	Cellulitis, unspecified cellulitis site  Secondary Diagnosis:	COPD exacerbation  Secondary Diagnosis:	CHF (congestive heart failure)

## 2020-02-16 NOTE — ED PROVIDER NOTE - PHYSICAL EXAMINATION
Constitutional: Well appearing, well nourished, awake, alert, oriented to person, place, time/situation and in no apparent distress.  ENMT: Airway patent. Normal MM  Eyes: Clear bilaterally  Cardiac: Normal rate, regular rhythm.  Heart sounds S1, S2.  No murmurs, rubs or gallops.  Respiratory: + diffuse exp wheezing with fair aeration. no appreciable rales or rhonchi. + mild tachypnea. No hypoxia - lowest O2 sat on RA 94%. No accessory mm use. Pt speaks in full sentences.   Gastrointestinal: Abd soft, NT, ND, NABS. No guarding, rebound, or rigidity. No pulsatile abdominal masses. No organomegaly appreciated. No CVAT   Musculoskeletal: Range of motion is not limited b/l LE edema, non pitting. no calf ttp  Neuro: Alert and oriented x 3, face symmetric and speech fluent. Strength 5/5 x 4 ext and symmetric, nml gross motor movement, nml gait. No focal deficits noted.  Skin: Skin normal color for race, warm, dry and intact. diffuse erythema, warmth to b/l LE, some scaling and weeping.  Psych: Alert and oriented to person, place, time/situation. normal mood and affect. no apparent risk to self or others.

## 2020-02-16 NOTE — H&P ADULT - PROBLEM SELECTOR PLAN 9
F: none  E: replete electrolytes K< 4, Mg <2  N: DASH/TLC diet  DVT PPx: Eliquis   Code status: Full Code  Dispo: EFREN

## 2020-02-17 LAB
ALBUMIN SERPL ELPH-MCNC: 3.4 G/DL — SIGNIFICANT CHANGE UP (ref 3.3–5)
ALP SERPL-CCNC: 98 U/L — SIGNIFICANT CHANGE UP (ref 40–120)
ALT FLD-CCNC: 15 U/L — SIGNIFICANT CHANGE UP (ref 10–45)
ANION GAP SERPL CALC-SCNC: 12 MMOL/L — SIGNIFICANT CHANGE UP (ref 5–17)
ANION GAP SERPL CALC-SCNC: 13 MMOL/L — SIGNIFICANT CHANGE UP (ref 5–17)
AST SERPL-CCNC: 14 U/L — SIGNIFICANT CHANGE UP (ref 10–40)
BASOPHILS # BLD AUTO: 0.01 K/UL — SIGNIFICANT CHANGE UP (ref 0–0.2)
BASOPHILS NFR BLD AUTO: 0.1 % — SIGNIFICANT CHANGE UP (ref 0–2)
BILIRUB SERPL-MCNC: 0.2 MG/DL — SIGNIFICANT CHANGE UP (ref 0.2–1.2)
BUN SERPL-MCNC: 53 MG/DL — HIGH (ref 7–23)
BUN SERPL-MCNC: 54 MG/DL — HIGH (ref 7–23)
CALCIUM SERPL-MCNC: 9.1 MG/DL — SIGNIFICANT CHANGE UP (ref 8.4–10.5)
CALCIUM SERPL-MCNC: 9.2 MG/DL — SIGNIFICANT CHANGE UP (ref 8.4–10.5)
CHLORIDE SERPL-SCNC: 101 MMOL/L — SIGNIFICANT CHANGE UP (ref 96–108)
CHLORIDE SERPL-SCNC: 102 MMOL/L — SIGNIFICANT CHANGE UP (ref 96–108)
CO2 SERPL-SCNC: 25 MMOL/L — SIGNIFICANT CHANGE UP (ref 22–31)
CO2 SERPL-SCNC: 26 MMOL/L — SIGNIFICANT CHANGE UP (ref 22–31)
CREAT SERPL-MCNC: 1.83 MG/DL — HIGH (ref 0.5–1.3)
CREAT SERPL-MCNC: 2 MG/DL — HIGH (ref 0.5–1.3)
EOSINOPHIL # BLD AUTO: 0.17 K/UL — SIGNIFICANT CHANGE UP (ref 0–0.5)
EOSINOPHIL NFR BLD AUTO: 2.5 % — SIGNIFICANT CHANGE UP (ref 0–6)
FERRITIN SERPL-MCNC: 70 NG/ML — SIGNIFICANT CHANGE UP (ref 15–150)
GLUCOSE SERPL-MCNC: 115 MG/DL — HIGH (ref 70–99)
GLUCOSE SERPL-MCNC: 151 MG/DL — HIGH (ref 70–99)
HCT VFR BLD CALC: 31.9 % — LOW (ref 34.5–45)
HGB BLD-MCNC: 9.7 G/DL — LOW (ref 11.5–15.5)
IMM GRANULOCYTES NFR BLD AUTO: 0.7 % — SIGNIFICANT CHANGE UP (ref 0–1.5)
IRON SATN MFR SERPL: 25 UG/DL — LOW (ref 30–160)
IRON SATN MFR SERPL: 9 % — LOW (ref 14–50)
LYMPHOCYTES # BLD AUTO: 0.54 K/UL — LOW (ref 1–3.3)
LYMPHOCYTES # BLD AUTO: 7.9 % — LOW (ref 13–44)
MAGNESIUM SERPL-MCNC: 2.1 MG/DL — SIGNIFICANT CHANGE UP (ref 1.6–2.6)
MCHC RBC-ENTMCNC: 28 PG — SIGNIFICANT CHANGE UP (ref 27–34)
MCHC RBC-ENTMCNC: 30.4 GM/DL — LOW (ref 32–36)
MCV RBC AUTO: 92.2 FL — SIGNIFICANT CHANGE UP (ref 80–100)
MONOCYTES # BLD AUTO: 0.34 K/UL — SIGNIFICANT CHANGE UP (ref 0–0.9)
MONOCYTES NFR BLD AUTO: 5 % — SIGNIFICANT CHANGE UP (ref 2–14)
NEUTROPHILS # BLD AUTO: 5.72 K/UL — SIGNIFICANT CHANGE UP (ref 1.8–7.4)
NEUTROPHILS NFR BLD AUTO: 83.8 % — HIGH (ref 43–77)
NRBC # BLD: 0 /100 WBCS — SIGNIFICANT CHANGE UP (ref 0–0)
PHOSPHATE SERPL-MCNC: 3.3 MG/DL — SIGNIFICANT CHANGE UP (ref 2.5–4.5)
PLATELET # BLD AUTO: 192 K/UL — SIGNIFICANT CHANGE UP (ref 150–400)
POTASSIUM SERPL-MCNC: 4.2 MMOL/L — SIGNIFICANT CHANGE UP (ref 3.5–5.3)
POTASSIUM SERPL-MCNC: 4.9 MMOL/L — SIGNIFICANT CHANGE UP (ref 3.5–5.3)
POTASSIUM SERPL-SCNC: 4.2 MMOL/L — SIGNIFICANT CHANGE UP (ref 3.5–5.3)
POTASSIUM SERPL-SCNC: 4.9 MMOL/L — SIGNIFICANT CHANGE UP (ref 3.5–5.3)
PROT SERPL-MCNC: 7.3 G/DL — SIGNIFICANT CHANGE UP (ref 6–8.3)
RBC # BLD: 3.46 M/UL — LOW (ref 3.8–5.2)
RBC # FLD: 15.7 % — HIGH (ref 10.3–14.5)
SODIUM SERPL-SCNC: 138 MMOL/L — SIGNIFICANT CHANGE UP (ref 135–145)
SODIUM SERPL-SCNC: 141 MMOL/L — SIGNIFICANT CHANGE UP (ref 135–145)
TIBC SERPL-MCNC: 284 UG/DL — SIGNIFICANT CHANGE UP (ref 220–430)
TRANSFERRIN SERPL-MCNC: 226 MG/DL — SIGNIFICANT CHANGE UP (ref 200–360)
UIBC SERPL-MCNC: 259 UG/DL — SIGNIFICANT CHANGE UP (ref 110–370)
WBC # BLD: 6.83 K/UL — SIGNIFICANT CHANGE UP (ref 3.8–10.5)
WBC # FLD AUTO: 6.83 K/UL — SIGNIFICANT CHANGE UP (ref 3.8–10.5)

## 2020-02-17 PROCEDURE — 99222 1ST HOSP IP/OBS MODERATE 55: CPT | Mod: GC

## 2020-02-17 PROCEDURE — 71045 X-RAY EXAM CHEST 1 VIEW: CPT | Mod: 26

## 2020-02-17 PROCEDURE — 99233 SBSQ HOSP IP/OBS HIGH 50: CPT | Mod: GC

## 2020-02-17 RX ORDER — CEFAZOLIN SODIUM 1 G
2000 VIAL (EA) INJECTION EVERY 12 HOURS
Refills: 0 | Status: DISCONTINUED | OUTPATIENT
Start: 2020-02-17 | End: 2020-02-19

## 2020-02-17 RX ORDER — CEFAZOLIN SODIUM 1 G
VIAL (EA) INJECTION
Refills: 0 | Status: DISCONTINUED | OUTPATIENT
Start: 2020-02-17 | End: 2020-02-17

## 2020-02-17 RX ORDER — IPRATROPIUM/ALBUTEROL SULFATE 18-103MCG
3 AEROSOL WITH ADAPTER (GRAM) INHALATION EVERY 4 HOURS
Refills: 0 | Status: DISCONTINUED | OUTPATIENT
Start: 2020-02-17 | End: 2020-02-19

## 2020-02-17 RX ADMIN — HYDROMORPHONE HYDROCHLORIDE 4 MILLIGRAM(S): 2 INJECTION INTRAMUSCULAR; INTRAVENOUS; SUBCUTANEOUS at 01:48

## 2020-02-17 RX ADMIN — BUPROPION HYDROCHLORIDE 300 MILLIGRAM(S): 150 TABLET, EXTENDED RELEASE ORAL at 10:06

## 2020-02-17 RX ADMIN — HYDROMORPHONE HYDROCHLORIDE 4 MILLIGRAM(S): 2 INJECTION INTRAMUSCULAR; INTRAVENOUS; SUBCUTANEOUS at 02:10

## 2020-02-17 RX ADMIN — HYDROMORPHONE HYDROCHLORIDE 4 MILLIGRAM(S): 2 INJECTION INTRAMUSCULAR; INTRAVENOUS; SUBCUTANEOUS at 20:38

## 2020-02-17 RX ADMIN — HYDROMORPHONE HYDROCHLORIDE 0.5 MILLIGRAM(S): 2 INJECTION INTRAMUSCULAR; INTRAVENOUS; SUBCUTANEOUS at 23:19

## 2020-02-17 RX ADMIN — APIXABAN 5 MILLIGRAM(S): 2.5 TABLET, FILM COATED ORAL at 09:43

## 2020-02-17 RX ADMIN — HYDROMORPHONE HYDROCHLORIDE 4 MILLIGRAM(S): 2 INJECTION INTRAMUSCULAR; INTRAVENOUS; SUBCUTANEOUS at 07:01

## 2020-02-17 RX ADMIN — HYDROMORPHONE HYDROCHLORIDE 4 MILLIGRAM(S): 2 INJECTION INTRAMUSCULAR; INTRAVENOUS; SUBCUTANEOUS at 21:40

## 2020-02-17 RX ADMIN — Medication 40 MILLIGRAM(S): at 23:19

## 2020-02-17 RX ADMIN — APIXABAN 5 MILLIGRAM(S): 2.5 TABLET, FILM COATED ORAL at 23:20

## 2020-02-17 RX ADMIN — ATORVASTATIN CALCIUM 40 MILLIGRAM(S): 80 TABLET, FILM COATED ORAL at 23:20

## 2020-02-17 RX ADMIN — Medication 3 MILLILITER(S): at 23:20

## 2020-02-17 RX ADMIN — Medication 3 MILLILITER(S): at 05:54

## 2020-02-17 RX ADMIN — HYDROMORPHONE HYDROCHLORIDE 0.5 MILLIGRAM(S): 2 INJECTION INTRAMUSCULAR; INTRAVENOUS; SUBCUTANEOUS at 23:40

## 2020-02-17 RX ADMIN — HYDROMORPHONE HYDROCHLORIDE 0.5 MILLIGRAM(S): 2 INJECTION INTRAMUSCULAR; INTRAVENOUS; SUBCUTANEOUS at 09:43

## 2020-02-17 RX ADMIN — Medication 3 MILLILITER(S): at 19:23

## 2020-02-17 RX ADMIN — AZITHROMYCIN 255 MILLIGRAM(S): 500 TABLET, FILM COATED ORAL at 05:53

## 2020-02-17 RX ADMIN — HYDROMORPHONE HYDROCHLORIDE 4 MILLIGRAM(S): 2 INJECTION INTRAMUSCULAR; INTRAVENOUS; SUBCUTANEOUS at 07:30

## 2020-02-17 RX ADMIN — Medication 40 MILLIGRAM(S): at 09:43

## 2020-02-17 RX ADMIN — Medication 2000 MILLIGRAM(S): at 15:47

## 2020-02-17 RX ADMIN — HYDROMORPHONE HYDROCHLORIDE 4 MILLIGRAM(S): 2 INJECTION INTRAMUSCULAR; INTRAVENOUS; SUBCUTANEOUS at 13:30

## 2020-02-17 RX ADMIN — PRAMIPEXOLE DIHYDROCHLORIDE 0.12 MILLIGRAM(S): 0.12 TABLET ORAL at 23:20

## 2020-02-17 RX ADMIN — Medication 3 MILLILITER(S): at 13:09

## 2020-02-17 RX ADMIN — HYDROMORPHONE HYDROCHLORIDE 4 MILLIGRAM(S): 2 INJECTION INTRAMUSCULAR; INTRAVENOUS; SUBCUTANEOUS at 13:09

## 2020-02-17 RX ADMIN — Medication 3 MILLILITER(S): at 09:43

## 2020-02-17 RX ADMIN — HYDROMORPHONE HYDROCHLORIDE 0.5 MILLIGRAM(S): 2 INJECTION INTRAMUSCULAR; INTRAVENOUS; SUBCUTANEOUS at 10:29

## 2020-02-17 NOTE — PROGRESS NOTE ADULT - ASSESSMENT
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 Admitted for further management.

## 2020-02-17 NOTE — PROGRESS NOTE ADULT - SUBJECTIVE AND OBJECTIVE BOX
OVERNIGHT EVENTS: None    SUBJECTIVE / INTERVAL HPI: Patient seen and examined at bedside. Complaining of bilateral leg pain unchanged from admission. Not currently short of breath or with chest pain/discomfort. No fevers/chills, N/V, abdominal pain.     VITAL SIGNS:  Vital Signs Last 24 Hrs  T(C): 37.1 (2020 05:19), Max: 37.8 (2020 14:30)  T(F): 98.7 (2020 05:19), Max: 100 (2020 14:30)  HR: 100 (2020 05:19) (92 - 100)  BP: 161/56 (2020 05:19) (115/69 - 161/56)  BP(mean): --  RR: 20 (2020 05:19) (18 - 21)  SpO2: 95% (2020 05:19) (92% - 97%)    20 @ 07:01  -  20 @ 07:00  --------------------------------------------------------  IN: 240 mL / OUT: 1400 mL / NET: -1160 mL        PHYSICAL EXAM:    General: WDWN  HEENT: NC/AT; PERRL, anicteric sclera; MMM  Neck: supple  Cardiovascular: +S1/S2; RRR  Respiratory: CTA B/L; no W/R/R  Gastrointestinal: soft, NT/ND; +BSx4  Extremities: WWP; no edema, clubbing or cyanosis  Vascular: 2+ radial, DP/PT pulses B/L  Neurological: AAOx3; no focal deficits    MEDICATIONS:  MEDICATIONS  (STANDING):  albuterol/ipratropium for Nebulization. 3 milliLiter(s) Nebulizer every 4 hours  apixaban 5 milliGRAM(s) Oral every 12 hours  atorvastatin 40 milliGRAM(s) Oral at bedtime  azithromycin  IVPB 500 milliGRAM(s) IV Intermittent every 24 hours  buPROPion XL . 300 milliGRAM(s) Oral daily  diltiazem    milliGRAM(s) Oral daily  furosemide   Injectable 40 milliGRAM(s) IV Push every 12 hours  pramipexole 0.125 milliGRAM(s) Oral at bedtime    MEDICATIONS  (PRN):  HYDROmorphone   Tablet 4 milliGRAM(s) Oral every 4 hours PRN Severe Pain (7 - 10)  HYDROmorphone  Injectable 0.5 milliGRAM(s) IV Push every 6 hours PRN breakthrough pain      ALLERGIES:  Allergies    Augmentin (Rash)  Augmentin (Unknown)  clindamycin (Hives)  clindamycin (Unknown)  predniSONE (Unknown)  Vaseline (Swelling)    Intolerances        LABS:                        9.7    6.83  )-----------( 192      ( 2020 06:36 )             31.9     02-    141  |  102  |  53<H>  ----------------------------<  115<H>  4.9   |  26  |  2.00<H>    Ca    9.2      2020 06:36  Phos  3.3     -  Mg     2.1         TPro  7.3  /  Alb  3.4  /  TBili  0.2  /  DBili  x   /  AST  14  /  ALT  15  /  AlkPhos  98  02-      Urinalysis Basic - ( 2020 16:30 )    Color: Yellow / Appearance: Clear / S.025 / pH: x  Gluc: x / Ketone: NEGATIVE  / Bili: Negative / Urobili: 0.2 E.U./dL   Blood: x / Protein: 30 mg/dL / Nitrite: NEGATIVE   Leuk Esterase: NEGATIVE / RBC: < 5 /HPF / WBC < 5 /HPF   Sq Epi: x / Non Sq Epi: 5-10 /HPF / Bacteria: None /HPF      CAPILLARY BLOOD GLUCOSE            RADIOLOGY & ADDITIONAL TESTS: Reviewed.    ASSESSMENT:    PLAN: OVERNIGHT EVENTS: None    SUBJECTIVE / INTERVAL HPI: Patient seen and examined at bedside. Complaining of bilateral leg pain unchanged from admission. Not currently short of breath or with chest pain/discomfort. No fevers/chills, N/V, abdominal pain.     VITAL SIGNS:  Vital Signs Last 24 Hrs  T(C): 37.1 (2020 05:19), Max: 37.8 (2020 14:30)  T(F): 98.7 (2020 05:19), Max: 100 (2020 14:30)  HR: 100 (2020 05:19) (92 - 100)  BP: 161/56 (2020 05:19) (115/69 - 161/56)  BP(mean): --  RR: 20 (2020 05:19) (18 - 21)  SpO2: 95% (2020 05:19) (92% - 97%)    20 @ 07:01  -  20 @ 07:00  --------------------------------------------------------  IN: 240 mL / OUT: 1400 mL / NET: -1160 mL        PHYSICAL EXAM:    General: WDWN  HEENT: NC/AT; PERRL, anicteric sclera; MMM  Neck: supple  Cardiovascular: +S1/S2; RRR  Respiratory: CTA B/L; no W/R/R  Gastrointestinal: soft, NT/ND; +BSx4  Extremities: bilateral chronic venous stasis changes with ford brown and red weeping rash without purulence  Vascular: DP pulses appreciated with doppler only  Neurological: AAOx3; no focal deficits    MEDICATIONS:  MEDICATIONS  (STANDING):  albuterol/ipratropium for Nebulization. 3 milliLiter(s) Nebulizer every 4 hours  apixaban 5 milliGRAM(s) Oral every 12 hours  atorvastatin 40 milliGRAM(s) Oral at bedtime  azithromycin  IVPB 500 milliGRAM(s) IV Intermittent every 24 hours  buPROPion XL . 300 milliGRAM(s) Oral daily  diltiazem    milliGRAM(s) Oral daily  furosemide   Injectable 40 milliGRAM(s) IV Push every 12 hours  pramipexole 0.125 milliGRAM(s) Oral at bedtime    MEDICATIONS  (PRN):  HYDROmorphone   Tablet 4 milliGRAM(s) Oral every 4 hours PRN Severe Pain (7 - 10)  HYDROmorphone  Injectable 0.5 milliGRAM(s) IV Push every 6 hours PRN breakthrough pain      ALLERGIES:  Allergies    Augmentin (Rash)  Augmentin (Unknown)  clindamycin (Hives)  clindamycin (Unknown)  predniSONE (Unknown)  Vaseline (Swelling)    Intolerances        LABS:                        9.7    6.83  )-----------( 192      ( 2020 06:36 )             31.9     02-    141  |  102  |  53<H>  ----------------------------<  115<H>  4.9   |  26  |  2.00<H>    Ca    9.2      2020 06:36  Phos  3.3     -  Mg     2.1         TPro  7.3  /  Alb  3.4  /  TBili  0.2  /  DBili  x   /  AST  14  /  ALT  15  /  AlkPhos  98  02-      Urinalysis Basic - ( 2020 16:30 )    Color: Yellow / Appearance: Clear / S.025 / pH: x  Gluc: x / Ketone: NEGATIVE  / Bili: Negative / Urobili: 0.2 E.U./dL   Blood: x / Protein: 30 mg/dL / Nitrite: NEGATIVE   Leuk Esterase: NEGATIVE / RBC: < 5 /HPF / WBC < 5 /HPF   Sq Epi: x / Non Sq Epi: 5-10 /HPF / Bacteria: None /HPF      CAPILLARY BLOOD GLUCOSE            RADIOLOGY & ADDITIONAL TESTS: Reviewed.    ASSESSMENT:    PLAN:

## 2020-02-17 NOTE — PROGRESS NOTE ADULT - PROBLEM SELECTOR PLAN 3
Pt with hx of COPD, not on home Oxygen. On home Yupelri qd, perforomist BID, DuoNebs PRN, pro air HFA PRN. On presentation with wheezing on PE. Reports occasional cough with greenish sputum, unchanged from baseline. Former smoker (quit 2 years ago)  -s/p Duonebs x3 in the ED  -c/w Duonebs q4h  -start Azithromycin 500mg q24h Pt with hx of COPD, not on home Oxygen. On home Yupelri qd, perforomist BID, DuoNebs PRN, pro air HFA PRN. On presentation with wheezing on PE. Reports occasional cough with greenish sputum, unchanged from baseline. Former smoker (quit 2 years ago)  -c/w Duonebs q4h  -start Azithromycin 500mg q24h

## 2020-02-17 NOTE — CONSULT NOTE ADULT - SUBJECTIVE AND OBJECTIVE BOX
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 Vascular surgery consulted for b/l LE swelling. Pt reports pain in the right foot for 1 day. Usually ambulates with a walker at home and use a wheelchair outside. Right foot pain described as excruciating, burning like sensation, improves with cold water compresses. Denies orthopnoea and PND. Complains of PHIPPS after walking 10 feet.     Pt follows up with vascular surgeon Dr. Barrera as outpatient. Last MICHELLE in 2019. 0.5 on the right, 0.55 on the left.       In the ED vitals were: 100F rectal, /74, RR18-20, Spo2 92% on RA. Labs significant for Hgb 9.9, K 5.4, BUN/Cr 58/1.9, , trops neg, UA neg, ABG with pH 7.38/CO2 40/HCO3 24, O2sat 97%. Flu/RSV neg.   In the ED received Vancomycin 2g at 3:30Pm, Benadryl 25mg for questionable rash (initially thought to be due to Vanc however pt with rash prior to admission), Dilaudid 4mg, DuoNebs x3, and Lasix 40mg.   CXR with mild b/l pulmonary edema. EKG NSR.         PAST MEDICAL & SURGICAL HISTORY:  PNA (pneumonia)  MRSA (methicillin resistant Staphylococcus aureus)  Brain embolism and thrombosis  Skin cancer  DVT (deep venous thrombosis)  Lyme disease  H/O shoulder surgery  Status post laparoscopic-assisted sigmoidectomy  H/O angioplasty      MEDICATIONS  (STANDING):  albuterol/ipratropium for Nebulization. 3 milliLiter(s) Nebulizer every 4 hours  apixaban 5 milliGRAM(s) Oral every 12 hours  atorvastatin 40 milliGRAM(s) Oral at bedtime  azithromycin  IVPB 500 milliGRAM(s) IV Intermittent every 24 hours  buPROPion XL . 300 milliGRAM(s) Oral daily  diltiazem    milliGRAM(s) Oral daily  furosemide   Injectable 40 milliGRAM(s) IV Push every 12 hours  pramipexole 0.125 milliGRAM(s) Oral at bedtime    MEDICATIONS  (PRN):  HYDROmorphone   Tablet 4 milliGRAM(s) Oral every 4 hours PRN Severe Pain (7 - 10)  HYDROmorphone  Injectable 0.5 milliGRAM(s) IV Push every 6 hours PRN breakthrough pain      Allergies    Augmentin (Rash)  Augmentin (Unknown)  clindamycin (Hives)  clindamycin (Unknown)  predniSONE (Unknown)  Vaseline (Swelling)      SOCIAL HISTORY:    FAMILY HISTORY:  Family history unknown    Vital Signs Last 24 Hrs  T(C): 37.1 (2020 05:19), Max: 37.8 (2020 14:30)  T(F): 98.7 (2020 05:19), Max: 100 (2020 14:30)  HR: 100 (2020 05:19) (92 - 100)  BP: 161/56 (2020 05:19) (115/69 - 161/56)  BP(mean): --  RR: 20 (2020 05:19) (18 - 21)  SpO2: 95% (2020 05:19) (92% - 97%)    I&O's Summary    2020 07:01  -  2020 07:00  --------------------------------------------------------  IN: 240 mL / OUT: 1400 mL / NET: -1160 mL        Physical Exam:  General: NAD, resting comfortably  HEENT: NC/AT, EOMI, normal hearing, no carotid bruits  Pulmonary: normal resp effort  Cardiovascular: NSR  Abdominal: soft, NT/ND, no organomegaly  Extremities: Bilateral venous stasis changes in the foot and legs with overlying erythematous changes, no discharge or drainage.   Neuro: A/O x 3, CNs II-XII grossly intact, normal sensation, no focal deficits  Pulses:   Right:  FEM [ ]2+ [ ]1+ [+]doppler: Triphasic  POP [ ]2+ [ ]1+ [+]doppler: Triphasic  DP [ ]2+ [ ]1+ [+]doppler: Triphasic  PT[ ]2+ [ ]1+ [+]doppler: Triphasic    Left:  FEM [ ]2+ [ ]1+ [+]doppler: Triphasic  POP [ ]2+ [ ]1+ [+]doppler: Triphasic  DP [ ]2+ [ ]1+ [+]doppler: Triphasic  PT [ ]2+ [ ]1+ [+]doppler:Triphasic    Lines/drains/tubes:    LABS:                        9.7    6.83  )-----------( 192      ( 2020 06:36 )             31.9         141  |  102  |  53<H>  ----------------------------<  115<H>  4.9   |  26  |  2.00<H>    Ca    9.2      2020 06:36  Phos  3.3       Mg     2.1         TPro  7.3  /  Alb  3.4  /  TBili  0.2  /  DBili  x   /  AST  14  /  ALT  15  /  AlkPhos  98        Urinalysis Basic - ( 2020 16:30 )    Color: Yellow / Appearance: Clear / S.025 / pH: x  Gluc: x / Ketone: NEGATIVE  / Bili: Negative / Urobili: 0.2 E.U./dL   Blood: x / Protein: 30 mg/dL / Nitrite: NEGATIVE   Leuk Esterase: NEGATIVE / RBC: < 5 /HPF / WBC < 5 /HPF   Sq Epi: x / Non Sq Epi: 5-10 /HPF / Bacteria: None /HPF      CAPILLARY BLOOD GLUCOSE        LIVER FUNCTIONS - ( 2020 06:36 )  Alb: 3.4 g/dL / Pro: 7.3 g/dL / ALK PHOS: 98 U/L / ALT: 15 U/L / AST: 14 U/L / GGT: x             Cultures:  Culture Results:   No growth at 12 hours ( @ 21:49)  Culture Results:   No growth at 12 hours ( @ 21:49)

## 2020-02-17 NOTE — PROGRESS NOTE ADULT - PROBLEM SELECTOR PLAN 1
Pt with b/l LE worsening edema, draining clear fluid and pain since Feb 5th. Went to urgent care last week and was prescribed mupirocin cream. Has hx of PVD s/p LE stents. Follows up outpatient with vascular (Dr. Barrera). Also w/ hx of SCC of L leg s/p resection c/b MRSA infection (~[id number]). Last hospitalization for similar complaints in October 2019 at [facility] c/b cardiac arrest 2/2 prednisone (per pt). Likely b/l LE edema 2/2 acute heart failure exacerbation. No purulent discharge, does not meet SIRS criteria, afebrile and no leukocytosis. No s/s of systemic infection given UA neg, Flu/RSV neg, and CXR with mild pulmonary edema.   -s/p Vancomycin 2g in the ED and Dilaudid 4mg for pain   -will hold off on antibiotics for now   -f/u blood cultures  -consider vascular consult if pain/erythema and edema worsens initially on abx, now dced given low suspicion for active infection  hx of PVD s/p LE stents. Follows up outpatient with vascular (Dr. Barrera). Also w/ hx of SCC of L leg s/p resection c/b MRSA infection (~[id number]).   Likely b/l LE edema 2/2 acute heart failure exacerbation. No purulent discharge, does not meet SIRS criteria, afebrile and no leukocytosis. No s/s of systemic infection given UA neg, Flu/RSV neg, and CXR with mild pulmonary edema.   -s/p Vancomycin 2g in the ED and Dilaudid 4mg for pain   -f/u blood cultures  -vascular consulted

## 2020-02-17 NOTE — CONSULT NOTE ADULT - ASSESSMENT
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 Vascular surgery consulted for b/l LE swelling.    Plan  No vascular intervention indicated  Moisturise bilateral LE daily  Antibiotics to cover the LE cellulitis  Lower extremity US to rule out DVT   No ACE bandage  Discussed with chief resident.   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 Vascular surgery consulted for b/l LE swelling.    Plan  No vascular intervention indicated  Moisturise bilateral LE daily  Antibiotics to cover the LE cellulitis  Lower extremity US to rule out DVT   Elevate b/l LE  No ACE bandage  Discussed with chief resident.   Vascular surgery will follow

## 2020-02-17 NOTE — PROGRESS NOTE ADULT - PROBLEM SELECTOR PLAN 2
Pt with known HFpEF, unknown EF. Presents with PHIPPS for one day. Able to walk 20 feet in the past, now can only walk 10 feet before getting short of breath. On home Bumex 2mg daily. Trops neg, BNP at 456 (could be low 2/2 obesity). CXR with mild pulmonary edema. Likely acute on chronic CHF   -s/p Lasix 40mg in the ED   -Lasix 60mg IV now, and then continue with Lasix 40mg IV BID  -strict I&Os  -obtain collateral from cardiologist at [facility] in the AM   -obtain echo in the AM given pt with PHIPPS and b/l LE edema  -Monitor renal function Pt with known HFpEF, unknown EF. Presents with PHIPPS for one day. Able to walk 20 feet in the past, now can only walk 10 feet before getting short of breath. On home Bumex 2mg daily. Trops neg, BNP at 456 (could be low 2/2 obesity). CXR with mild pulmonary edema. Likely acute on chronic CHF   -c/w Lasix 40mg IV BID  -strict I&Os  -obtain collateral from cardiologist at [facility] in the AM   -pending echo  -Monitor renal function

## 2020-02-17 NOTE — PROGRESS NOTE ADULT - PROBLEM SELECTOR PLAN 7
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 On home Mirapex 1.5mg qd.   -c/w Mirapex 1.5mg qd  -obtain collateral from neurologist in the AM     #Chronic LBP  pt with hx of chronic LBP. On home Dilaudid 4mg PRN and meloxicam 15mg qd  -c/w Dilaudid 4mg PO q4h for severe pain and Dilaudid 0.5 IV for breakthrough pain

## 2020-02-18 LAB
ANION GAP SERPL CALC-SCNC: 12 MMOL/L — SIGNIFICANT CHANGE UP (ref 5–17)
BUN SERPL-MCNC: 52 MG/DL — HIGH (ref 7–23)
CALCIUM SERPL-MCNC: 8.9 MG/DL — SIGNIFICANT CHANGE UP (ref 8.4–10.5)
CHLORIDE SERPL-SCNC: 104 MMOL/L — SIGNIFICANT CHANGE UP (ref 96–108)
CO2 SERPL-SCNC: 26 MMOL/L — SIGNIFICANT CHANGE UP (ref 22–31)
CREAT SERPL-MCNC: 1.89 MG/DL — HIGH (ref 0.5–1.3)
GLUCOSE SERPL-MCNC: 104 MG/DL — HIGH (ref 70–99)
PHOSPHATE SERPL-MCNC: 4.4 MG/DL — SIGNIFICANT CHANGE UP (ref 2.5–4.5)
POTASSIUM SERPL-MCNC: 4.4 MMOL/L — SIGNIFICANT CHANGE UP (ref 3.5–5.3)
POTASSIUM SERPL-SCNC: 4.4 MMOL/L — SIGNIFICANT CHANGE UP (ref 3.5–5.3)
SODIUM SERPL-SCNC: 142 MMOL/L — SIGNIFICANT CHANGE UP (ref 135–145)

## 2020-02-18 PROCEDURE — 99233 SBSQ HOSP IP/OBS HIGH 50: CPT | Mod: GC

## 2020-02-18 PROCEDURE — 93306 TTE W/DOPPLER COMPLETE: CPT | Mod: 26

## 2020-02-18 PROCEDURE — 93970 EXTREMITY STUDY: CPT | Mod: 26

## 2020-02-18 RX ORDER — FUROSEMIDE 40 MG
40 TABLET ORAL EVERY 24 HOURS
Refills: 0 | Status: DISCONTINUED | OUTPATIENT
Start: 2020-02-19 | End: 2020-02-19

## 2020-02-18 RX ADMIN — PRAMIPEXOLE DIHYDROCHLORIDE 0.12 MILLIGRAM(S): 0.12 TABLET ORAL at 21:54

## 2020-02-18 RX ADMIN — HYDROMORPHONE HYDROCHLORIDE 4 MILLIGRAM(S): 2 INJECTION INTRAMUSCULAR; INTRAVENOUS; SUBCUTANEOUS at 02:47

## 2020-02-18 RX ADMIN — HYDROMORPHONE HYDROCHLORIDE 4 MILLIGRAM(S): 2 INJECTION INTRAMUSCULAR; INTRAVENOUS; SUBCUTANEOUS at 12:52

## 2020-02-18 RX ADMIN — HYDROMORPHONE HYDROCHLORIDE 4 MILLIGRAM(S): 2 INJECTION INTRAMUSCULAR; INTRAVENOUS; SUBCUTANEOUS at 09:01

## 2020-02-18 RX ADMIN — HYDROMORPHONE HYDROCHLORIDE 0.5 MILLIGRAM(S): 2 INJECTION INTRAMUSCULAR; INTRAVENOUS; SUBCUTANEOUS at 05:42

## 2020-02-18 RX ADMIN — HYDROMORPHONE HYDROCHLORIDE 0.5 MILLIGRAM(S): 2 INJECTION INTRAMUSCULAR; INTRAVENOUS; SUBCUTANEOUS at 05:49

## 2020-02-18 RX ADMIN — Medication 40 MILLIGRAM(S): at 09:53

## 2020-02-18 RX ADMIN — BUPROPION HYDROCHLORIDE 300 MILLIGRAM(S): 150 TABLET, EXTENDED RELEASE ORAL at 12:50

## 2020-02-18 RX ADMIN — Medication 3 MILLILITER(S): at 15:22

## 2020-02-18 RX ADMIN — Medication 180 MILLIGRAM(S): at 05:51

## 2020-02-18 RX ADMIN — HYDROMORPHONE HYDROCHLORIDE 4 MILLIGRAM(S): 2 INJECTION INTRAMUSCULAR; INTRAVENOUS; SUBCUTANEOUS at 13:52

## 2020-02-18 RX ADMIN — AZITHROMYCIN 255 MILLIGRAM(S): 500 TABLET, FILM COATED ORAL at 07:53

## 2020-02-18 RX ADMIN — HYDROMORPHONE HYDROCHLORIDE 4 MILLIGRAM(S): 2 INJECTION INTRAMUSCULAR; INTRAVENOUS; SUBCUTANEOUS at 18:00

## 2020-02-18 RX ADMIN — Medication 3 MILLILITER(S): at 21:54

## 2020-02-18 RX ADMIN — HYDROMORPHONE HYDROCHLORIDE 4 MILLIGRAM(S): 2 INJECTION INTRAMUSCULAR; INTRAVENOUS; SUBCUTANEOUS at 03:50

## 2020-02-18 RX ADMIN — HYDROMORPHONE HYDROCHLORIDE 4 MILLIGRAM(S): 2 INJECTION INTRAMUSCULAR; INTRAVENOUS; SUBCUTANEOUS at 07:52

## 2020-02-18 RX ADMIN — APIXABAN 5 MILLIGRAM(S): 2.5 TABLET, FILM COATED ORAL at 09:53

## 2020-02-18 RX ADMIN — HYDROMORPHONE HYDROCHLORIDE 0.5 MILLIGRAM(S): 2 INJECTION INTRAMUSCULAR; INTRAVENOUS; SUBCUTANEOUS at 15:22

## 2020-02-18 RX ADMIN — Medication 3 MILLILITER(S): at 09:54

## 2020-02-18 RX ADMIN — Medication 3 MILLILITER(S): at 02:47

## 2020-02-18 RX ADMIN — APIXABAN 5 MILLIGRAM(S): 2.5 TABLET, FILM COATED ORAL at 21:54

## 2020-02-18 RX ADMIN — Medication 2000 MILLIGRAM(S): at 19:20

## 2020-02-18 RX ADMIN — HYDROMORPHONE HYDROCHLORIDE 4 MILLIGRAM(S): 2 INJECTION INTRAMUSCULAR; INTRAVENOUS; SUBCUTANEOUS at 21:54

## 2020-02-18 RX ADMIN — Medication 2000 MILLIGRAM(S): at 05:51

## 2020-02-18 RX ADMIN — ATORVASTATIN CALCIUM 40 MILLIGRAM(S): 80 TABLET, FILM COATED ORAL at 21:54

## 2020-02-18 RX ADMIN — Medication 40 MILLIGRAM(S): at 16:15

## 2020-02-18 RX ADMIN — HYDROMORPHONE HYDROCHLORIDE 4 MILLIGRAM(S): 2 INJECTION INTRAMUSCULAR; INTRAVENOUS; SUBCUTANEOUS at 17:36

## 2020-02-18 RX ADMIN — HYDROMORPHONE HYDROCHLORIDE 0.5 MILLIGRAM(S): 2 INJECTION INTRAMUSCULAR; INTRAVENOUS; SUBCUTANEOUS at 15:45

## 2020-02-18 RX ADMIN — HYDROMORPHONE HYDROCHLORIDE 4 MILLIGRAM(S): 2 INJECTION INTRAMUSCULAR; INTRAVENOUS; SUBCUTANEOUS at 22:50

## 2020-02-18 RX ADMIN — Medication 3 MILLILITER(S): at 05:52

## 2020-02-18 NOTE — PHYSICAL THERAPY INITIAL EVALUATION ADULT - PERTINENT HX OF CURRENT PROBLEM, REHAB EVAL
79F presenting with worsening SOB for one day and b/l LE swelling, serous discharge and pain since February 5th. Admitted for further management.

## 2020-02-18 NOTE — PHYSICAL THERAPY INITIAL EVALUATION ADULT - GENERAL OBSERVATIONS, REHAB EVAL
Received supine complaints of R dorsal foot pain 8/10 with light touch +IV hep, 2L NC, primafit. Left as found +RN Vicente (covering) aware Received supine complaints of R dorsal foot pain 8/10 with light touch +IV hep, 2L NC, primafit. Left as found +RN Benita aware

## 2020-02-18 NOTE — PROGRESS NOTE ADULT - SUBJECTIVE AND OBJECTIVE BOX
Pt seen and examined at bedside, no acute event overnight, haemodynamically stable. Denies fever, chills. Still complains of pain in the right foot.     Vital Signs Last 24 Hrs  T(C): 36.3 (2020 05:22), Max: 36.9 (2020 13:15)  T(F): 97.3 (2020 05:22), Max: 98.4 (2020 13:15)  HR: 88 (2020 05:22) (88 - 90)  BP: 114/68 (2020 05:22) (111/67 - 117/70)  BP(mean): --  RR: 18 (2020 05:22) (18 - 20)  SpO2: 93% (2020 05:22) (93% - 97%)    Physical Exam:  General: NAD  Pulmonary: Nonlaboured breathing, no respiratory distress, CTAB, no crackles.  Cardiovascular: RRR, no mrg.   Abdominal: soft, NT/ND, no organomegaly, no rebound tenderness  Extremities: Bilateral venous stasis changes in the foot and legs with overlying erythematous changes, no discharge or drainage.   Neuro: A/O x3, no focal deficits, normal sensation  Right:  FEM [ ]2+ [ ]1+ [+]doppler: Triphasic  POP [ ]2+ [ ]1+ [+]doppler: Triphasic  DP [ ]2+ [ ]1+ [+]doppler: Triphasic  PT[ ]2+ [ ]1+ [+]doppler: Triphasic    Left:  FEM [ ]2+ [ ]1+ [+]doppler: Triphasic  POP [ ]2+ [ ]1+ [+]doppler: Triphasic  DP [ ]2+ [ ]1+ [+]doppler: Triphasic  PT [ ]2+ [ ]1+ [+]doppler:Triphasic  Lines/drains/tubes:    I&O's Summary    2020 07:01  -  2020 07:00  --------------------------------------------------------  IN: 0 mL / OUT: 1000 mL / NET: -1000 mL        LABS:                        9.7    6.83  )-----------( 192      ( 2020 06:36 )             31.9     02-17    138  |  101  |  54<H>  ----------------------------<  151<H>  4.2   |  25  |  1.83<H>    Ca    9.1      2020 15:22  Phos  3.3     -  Mg     2.1         TPro  7.3  /  Alb  3.4  /  TBili  0.2  /  DBili  x   /  AST  14  /  ALT  15  /  AlkPhos  98  -      Urinalysis Basic - ( 2020 16:30 )    Color: Yellow / Appearance: Clear / S.025 / pH: x  Gluc: x / Ketone: NEGATIVE  / Bili: Negative / Urobili: 0.2 E.U./dL   Blood: x / Protein: 30 mg/dL / Nitrite: NEGATIVE   Leuk Esterase: NEGATIVE / RBC: < 5 /HPF / WBC < 5 /HPF   Sq Epi: x / Non Sq Epi: 5-10 /HPF / Bacteria: None /HPF      CAPILLARY BLOOD GLUCOSE        LIVER FUNCTIONS - ( 2020 06:36 )  Alb: 3.4 g/dL / Pro: 7.3 g/dL / ALK PHOS: 98 U/L / ALT: 15 U/L / AST: 14 U/L / GGT: x             RADIOLOGY & ADDITIONAL STUDIES:

## 2020-02-18 NOTE — PROGRESS NOTE ADULT - PROBLEM SELECTOR PLAN 5
Pt w hx of HTN. On home Diltiazem 180mg qd and Bumex 2mg qd.  -c/w Diltiazem 180mg qd   -hold bumex as pt on IV lasix
Pt w hx of HTN. On home Diltiazem 180mg qd and Bumex 2mg qd.  -c/w Diltiazem 180mg qd   -hold bumex as pt on IV lasix

## 2020-02-18 NOTE — PROGRESS NOTE ADULT - PROBLEM SELECTOR PLAN 6
Pt with known CKD (unknown Cr baseline). On presentation elevated BUN/Cr at 58/1.9  -closely monitor BMP  -avoid nephrotoxic agents  -renally dose medications  -obtain collateral from PCP in the AM     #Normocytic Anemia  On presentation Hgb 9.9. No s/s of bleeding, hemodynamically stable. Likely anemia of chronic disease vs RADHA  -f/u iron studies   -closely monitor Hgb   -transfuse for Hgb <7
Pt with known CKD (unknown Cr baseline). On presentation elevated BUN/Cr at 58/1.9  -closely monitor BMP  -avoid nephrotoxic agents  -renally dose medications  -obtain collateral from PCP in the AM     #Normocytic Anemia  On presentation Hgb 9.9. No s/s of bleeding, hemodynamically stable. Likely anemia of chronic disease vs RADHA  -f/u iron studies   -closely monitor Hgb   -transfuse for Hgb <7

## 2020-02-18 NOTE — PROGRESS NOTE ADULT - PROBLEM SELECTOR PLAN 8
Pt with hx of depression on home Bupropion 150mg BID and Valium 5mg qd PRN  -c/w Bupropion XL 300mg qd (therapeutic interchange)
Pt with hx of depression on home Bupropion 150mg BID and Valium 5mg qd PRN  -c/w Bupropion XL 300mg qd (therapeutic interchange)

## 2020-02-18 NOTE — PROGRESS NOTE ADULT - ASSESSMENT
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 Vascular surgery consulted for b/l LE swelling.    Plan  No vascular intervention indicated  Moisturise bilateral LE daily  Antibiotics to cover the LE cellulitis  URGENT Lower extremity US to rule out DVT   Elevate b/l LE  No ACE bandage  Discussed with chief resident. 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 Vascular surgery consulted for b/l LE swelling.    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 Vascular surgery consulted for b/l LE swelling.    Plan  No vascular intervention indicated  Moisturise bilateral LE daily  Antibiotics to cover the LE cellulitis  Kerlix, light ACE wrap, elevate b/l LEs  URGENT Lower extremity US to rule out DVT   Discussed with Chief Resident    ADDENDUM:  - US (-) for DVT  - Wound care instructions as above  - Vascular signing off at this time  - Discussed with Attending

## 2020-02-18 NOTE — PROGRESS NOTE ADULT - PROBLEM SELECTOR PLAN 7
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 On home Mirapex 1.5mg qd.   -c/w Mirapex 1.5mg qd  -obtain collateral from neurologist    #Chronic LBP  pt with hx of chronic LBP. 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 On home Mirapex 1.5mg qd.   -c/w Mirapex 1.5mg qd    #Chronic LBP  pt with hx of chronic LBP. On home Dilaudid 4mg PRN and meloxicam 15mg qd  -c/w Dilaudid 4mg PO q4h for severe pain and Dilaudid 0.5 IV for breakthrough pain

## 2020-02-18 NOTE — PHYSICAL THERAPY INITIAL EVALUATION ADULT - LIVES WITH, PROFILE
alone/has home health aid 2 days x 5-6 hours, has house keeper 3 days, has neighbor nearby to assist in addition

## 2020-02-18 NOTE — PHYSICAL THERAPY INITIAL EVALUATION ADULT - GAIT DEVIATIONS NOTED, PT EVAL
decreased step length/decreased bonny/minimal step clearance, unsteady crouch like gait, SOB, Desat to 86% on RA, supine rest break x 2 min to recover to 95% on 2L/decreased weight-shifting ability

## 2020-02-18 NOTE — DISCHARGE NOTE PROVIDER - NSDCMRMEDTOKEN_GEN_ALL_CORE_FT
Advair Diskus 250 mcg-50 mcg inhalation powder: 1 puff(s) inhaled 2 times a day  albuterol 2.5 mg/3 mL (0.083%) inhalation solution: 3 milliliter(s) inhaled every 6 hours with spacer, initially take every 6 hours regulalry then after 24 hours take as needed  bumetanide 2 mg oral tablet: 1 tab(s) orally once a day  buPROPion 150 mg/12 hours (SR) oral tablet, extended release: 1 tab(s) orally 2 times a day  dilTIAZem 180 mg/24 hours oral capsule, extended release: 1 cap(s) orally once a day  Eliquis 5 mg oral tablet: 1 tab(s) orally 2 times a day  Lipitor 40 mg oral tablet: 1 tab(s) orally once a day  meloxicam 15 mg oral tablet: 1 tab(s) orally once a day  Mirapex ER 1.5 mg oral tablet, extended release: 1 tab(s) orally once a day  Perforomist 20 mcg/2 mL inhalation solution: 2 milliliter(s) inhaled 2 times a day  Pyridium 200 mg oral tablet: 1 tab(s) orally 3 times a day (after meals)   Valium 5 mg oral tablet:  orally   Yupelri 175 mcg/3 mL inhalation solution: 175 microgram(s) inhaled once a day Advair Diskus 250 mcg-50 mcg inhalation powder: 1 puff(s) inhaled 2 times a day  albuterol 2.5 mg/3 mL (0.083%) inhalation solution: 3 milliliter(s) inhaled every 6 hours with spacer, initially take every 6 hours regulalry then after 24 hours take as needed  bumetanide 2 mg oral tablet: 1 tab(s) orally once a day  buPROPion 150 mg/12 hours (SR) oral tablet, extended release: 1 tab(s) orally 2 times a day  cephalexin 500 mg oral tablet: 1 tab(s) orally every 6 hours   dilTIAZem 180 mg/24 hours oral capsule, extended release: 1 cap(s) orally once a day  Eliquis 5 mg oral tablet: 1 tab(s) orally 2 times a day  Lipitor 40 mg oral tablet: 1 tab(s) orally once a day  meloxicam 15 mg oral tablet: 1 tab(s) orally once a day  Mirapex ER 1.5 mg oral tablet, extended release: 1 tab(s) orally once a day  Perforomist 20 mcg/2 mL inhalation solution: 2 milliliter(s) inhaled 2 times a day  predniSONE 20 mg oral tablet: 2 tab(s) orally once a day  Pyridium 200 mg oral tablet: 1 tab(s) orally 3 times a day (after meals)   Valium 5 mg oral tablet:  orally   Yupelri 175 mcg/3 mL inhalation solution: 175 microgram(s) inhaled once a day Advair Diskus 250 mcg-50 mcg inhalation powder: 1 puff(s) inhaled 2 times a day  albuterol 2.5 mg/3 mL (0.083%) inhalation solution: 3 milliliter(s) inhaled every 6 hours with spacer, initially take every 6 hours regulalry then after 24 hours take as needed  bumetanide 2 mg oral tablet: 1 tab(s) orally once a day  buPROPion 150 mg/12 hours (SR) oral tablet, extended release: 1 tab(s) orally 2 times a day  cephalexin 500 mg oral tablet: 1 tab(s) orally every 6 hours   dilTIAZem 180 mg/24 hours oral capsule, extended release: 1 cap(s) orally once a day  Eliquis 5 mg oral tablet: 1 tab(s) orally 2 times a day  Lipitor 40 mg oral tablet: 1 tab(s) orally once a day  Mirapex ER 1.5 mg oral tablet, extended release: 1 tab(s) orally once a day  Perforomist 20 mcg/2 mL inhalation solution: 2 milliliter(s) inhaled 2 times a day  predniSONE 20 mg oral tablet: 2 tab(s) orally once a day  Pyridium 200 mg oral tablet: 1 tab(s) orally 3 times a day (after meals)   Valium 5 mg oral tablet:  orally   Yupelri 175 mcg/3 mL inhalation solution: 175 microgram(s) inhaled once a day

## 2020-02-18 NOTE — DISCHARGE NOTE PROVIDER - CARE PROVIDER_API CALL
MARIALUISA Rothman  Phone: (   )    -  Fax: (   )    -  Established Patient  Follow Up Time: MARIALUISA Rothman  Phone: (   )    -  Fax: (   )    -  Established Patient  Follow Up Time:     NELA Bowser  Phone: (   )    -  Fax: (   )    -  Established Patient  Follow Up Time:

## 2020-02-18 NOTE — PROGRESS NOTE ADULT - PROBLEM SELECTOR PLAN 9
F: none  E: replete electrolytes K< 4, Mg <2  N: DASH/TLC diet  DVT PPx: Eliquis   Code status: Full Code  Dispo: EFREN
F: none  E: replete electrolytes K< 4, Mg <2  N: DASH/TLC diet  DVT PPx: Eliquis   Code status: Full Code  Dispo: EFREN

## 2020-02-18 NOTE — PROGRESS NOTE ADULT - ATTENDING COMMENTS
Patient seen and examined at bedside. Agree with exam, a/p as above with the following addendum/edits:     79 year old F w/ HFpEF, COPD, DALIA (never finished sleep study), ?cardiac arrest at [facility] Oct 2019 related to PNA, p/w chronic LE swelling and SOB. She emailed her vascular surgeon twice about her legs and did not get a response so came to ED so doctor could have a look. States worsening swelling and redness for months. As for SOB, she believes it is cardiac in origin although her wheezing is quite noticeable as she speaks. Had bad experience with prednisone although appears she was on it for a long time as she still has pills at home, she is concerned about weight gain with prednisone. On exam, she is obese, MMM, lungs w/ expiratory wheezes in all fields, difficult to auscultate heart 2/2 body habitus, abd soft NTND, b/l extremities covered in white ointment, nontender but underlying erythema see. Patient showed me a picture of her legs prior to admission as well. It appears that her LE swelling has significantly improved, unable to assess JVD but no crackles on lung exam. Her echo showed some diastolic dysfunction and I question whether she had a HF exacerbation to begin with. She agrees to trial of steroids for her likely COPD exacerbation. C/w 1st gen cepahalosporin for presumed strep cellulitis. Reduce lasix back to home dose. If breathing improved tomorrow, possible d/c. She uses wheelchair at baseline except in the house. Elevate legs as well. Dopplers negative for DVT, confirms low suspicion as she was already on Eliquis.
pt seen and examined by me case d/w housestaff agree with VS, PE, assessment and plan as above

## 2020-02-18 NOTE — PROGRESS NOTE ADULT - PROBLEM SELECTOR PLAN 2
-c/w Lasix 40mg IV daily  -strict I&Os  -obtain collateral from cardiologist at [facility] in the AM   -pending echo  -Monitor renal function

## 2020-02-18 NOTE — DISCHARGE NOTE PROVIDER - NSDCCPCAREPLAN_GEN_ALL_CORE_FT
PRINCIPAL DISCHARGE DIAGNOSIS  Diagnosis: Cellulitis, unspecified cellulitis site  Assessment and Plan of Treatment:       SECONDARY DISCHARGE DIAGNOSES  Diagnosis: Peripheral vascular disease  Assessment and Plan of Treatment:     Diagnosis: CHF (congestive heart failure)  Assessment and Plan of Treatment:     Diagnosis: COPD exacerbation  Assessment and Plan of Treatment: PRINCIPAL DISCHARGE DIAGNOSIS  Diagnosis: Cellulitis, unspecified cellulitis site  Assessment and Plan of Treatment: Please continue your antibiotics and do not skip doses. Please keep your legs wrapped and elevated. Please follow up with your primary care doctor Dr. Rothman and your other providers as needed for further care.      SECONDARY DISCHARGE DIAGNOSES  Diagnosis: CKD (chronic kidney disease) stage 4, GFR 15-29 ml/min  Assessment and Plan of Treatment: You currently have moderate to severe chronic kidney disease based on your bloodowrk this admission. This puts you at risk of eventually needed dialysis. Please continue to have your bloodwork closely monitored and avoid medications like advil, aleve, meloxicam (NSAIDS) that can worsen kidney function.    Diagnosis: Peripheral vascular disease  Assessment and Plan of Treatment: Please keep your legs wrapped and elevated as mentioned above and follow up with your vsacular surgeon as needed.    Diagnosis: CHF (congestive heart failure)  Assessment and Plan of Treatment: Please continue to follow up with Dr. Bowser and take your bumex.    Diagnosis: COPD exacerbation  Assessment and Plan of Treatment: Please continue taking the oral prednisone starting 2/20 through 2/23. Please continue all your home inhalers. Please follow up with Dr. Rothman and your pulmonologist for close monitoring of your breathing.

## 2020-02-18 NOTE — PROGRESS NOTE ADULT - PROBLEM SELECTOR PLAN 1
c/w cefazolin per vascular recs for cellulitis  f/u LE dopplers  -c/w lasix 40 IV daily (decreased from bid -- no crackles in lungs, no JVD)  -vascular following

## 2020-02-18 NOTE — PROGRESS NOTE ADULT - PROBLEM SELECTOR PLAN 4
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 On home Eliquis 5mg q12h   -c/w Eliquis 5mg q12h
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 On home Eliquis 5mg q12h   -c/w Eliquis 5mg q12h  pending repeat dopplers

## 2020-02-18 NOTE — PROGRESS NOTE ADULT - SUBJECTIVE AND OBJECTIVE BOX
OVERNIGHT EVENTS: None    SUBJECTIVE / INTERVAL HPI: Patient seen and examined at bedside.     VITAL SIGNS:  Vital Signs Last 24 Hrs  T(C): 36.3 (2020 05:22), Max: 36.9 (2020 13:15)  T(F): 97.3 (2020 05:22), Max: 98.4 (2020 13:15)  HR: 88 (2020 05:22) (88 - 90)  BP: 114/68 (2020 05:22) (111/67 - 117/70)  BP(mean): --  RR: 18 (2020 05:22) (18 - 20)  SpO2: 93% (2020 05:22) (93% - 97%)    20 @ 07:01  -  20 @ 07:00  --------------------------------------------------------  IN: 0 mL / OUT: 1000 mL / NET: -1000 mL        PHYSICAL EXAM:    General: WDWN  HEENT: NC/AT; PERRL, anicteric sclera; MMM  Neck: supple  Cardiovascular: +S1/S2; RRR  Respiratory: CTA B/L; no W/R/R  Gastrointestinal: soft, NT/ND; +BSx4  Extremities: WWP; no edema, clubbing or cyanosis  Vascular: 2+ radial, DP/PT pulses B/L  Neurological: AAOx3; no focal deficits    MEDICATIONS:  MEDICATIONS  (STANDING):  albuterol/ipratropium for Nebulization. 3 milliLiter(s) Nebulizer every 4 hours  apixaban 5 milliGRAM(s) Oral every 12 hours  atorvastatin 40 milliGRAM(s) Oral at bedtime  azithromycin  IVPB 500 milliGRAM(s) IV Intermittent every 24 hours  buPROPion XL . 300 milliGRAM(s) Oral daily  ceFAZolin  Injectable. 2000 milliGRAM(s) IV Push every 12 hours  diltiazem    milliGRAM(s) Oral daily  furosemide   Injectable 40 milliGRAM(s) IV Push every 12 hours  pramipexole 0.125 milliGRAM(s) Oral at bedtime    MEDICATIONS  (PRN):  HYDROmorphone   Tablet 4 milliGRAM(s) Oral every 4 hours PRN Severe Pain (7 - 10)  HYDROmorphone  Injectable 0.5 milliGRAM(s) IV Push every 6 hours PRN breakthrough pain      ALLERGIES:  Allergies    Augmentin (Rash)  Augmentin (Unknown)  clindamycin (Hives)  clindamycin (Unknown)  predniSONE (Unknown)  Vaseline (Swelling)    Intolerances        LABS:                        9.7    6.83  )-----------( 192      ( 2020 06:36 )             31.9     02-18    142  |  104  |  52<H>  ----------------------------<  104<H>  4.4   |  26  |  1.89<H>    Ca    8.9      2020 07:36  Phos  4.4     02-18  Mg     2.1     -    TPro  7.3  /  Alb  3.4  /  TBili  0.2  /  DBili  x   /  AST  14  /  ALT  15  /  AlkPhos  98  02-17      Urinalysis Basic - ( 2020 16:30 )    Color: Yellow / Appearance: Clear / S.025 / pH: x  Gluc: x / Ketone: NEGATIVE  / Bili: Negative / Urobili: 0.2 E.U./dL   Blood: x / Protein: 30 mg/dL / Nitrite: NEGATIVE   Leuk Esterase: NEGATIVE / RBC: < 5 /HPF / WBC < 5 /HPF   Sq Epi: x / Non Sq Epi: 5-10 /HPF / Bacteria: None /HPF      CAPILLARY BLOOD GLUCOSE            RADIOLOGY & ADDITIONAL TESTS: Reviewed.    ASSESSMENT:    PLAN: OVERNIGHT EVENTS: None    SUBJECTIVE / INTERVAL HPI: Patient seen and examined at bedside. Complaining of severe pain especially dorsal left foot. Otherwise denies shortness of breath, wheezing, abdominal pain, fevers, chills.     VITAL SIGNS:  Vital Signs Last 24 Hrs  T(C): 36.3 (2020 05:22), Max: 36.9 (2020 13:15)  T(F): 97.3 (2020 05:22), Max: 98.4 (2020 13:15)  HR: 88 (2020 05:22) (88 - 90)  BP: 114/68 (2020 05:22) (111/67 - 117/70)  BP(mean): --  RR: 18 (2020 05:22) (18 - 20)  SpO2: 93% (:22) (93% - 97%)    20 @ 07:01  -  20 @ 07:00  --------------------------------------------------------  IN: 0 mL / OUT: 1000 mL / NET: -1000 mL        PHYSICAL EXAM:    General: WDWN obese female on NC  HEENT: NC/AT; PERRL, anicteric sclera; MMM  Neck: supple  Cardiovascular: +S1/S2; RRR  Respiratory: poor air movement with expiratory wheezes in all fields  Gastrointestinal: soft, NT/ND; +BSx4  Extremities: b/l weeping/crusting/scaly red and sand-colored rashes up to knees  Vascular: no pedal pulses palpable  Neurological: AAOx3    MEDICATIONS:  MEDICATIONS  (STANDING):  albuterol/ipratropium for Nebulization. 3 milliLiter(s) Nebulizer every 4 hours  apixaban 5 milliGRAM(s) Oral every 12 hours  atorvastatin 40 milliGRAM(s) Oral at bedtime  azithromycin  IVPB 500 milliGRAM(s) IV Intermittent every 24 hours  buPROPion XL . 300 milliGRAM(s) Oral daily  ceFAZolin  Injectable. 2000 milliGRAM(s) IV Push every 12 hours  diltiazem    milliGRAM(s) Oral daily  furosemide   Injectable 40 milliGRAM(s) IV Push every 12 hours  pramipexole 0.125 milliGRAM(s) Oral at bedtime    MEDICATIONS  (PRN):  HYDROmorphone   Tablet 4 milliGRAM(s) Oral every 4 hours PRN Severe Pain (7 - 10)  HYDROmorphone  Injectable 0.5 milliGRAM(s) IV Push every 6 hours PRN breakthrough pain      ALLERGIES:  Allergies    Augmentin (Rash)  Augmentin (Unknown)  clindamycin (Hives)  clindamycin (Unknown)  predniSONE (Unknown)  Vaseline (Swelling)    Intolerances        LABS:                        9.7    6.83  )-----------( 192      ( 2020 06:36 )             31.9     02-18    142  |  104  |  52<H>  ----------------------------<  104<H>  4.4   |  26  |  1.89<H>    Ca    8.9      2020 07:36  Phos  4.4     -  Mg     2.1         TPro  7.3  /  Alb  3.4  /  TBili  0.2  /  DBili  x   /  AST  14  /  ALT  15  /  AlkPhos  98  -17      Urinalysis Basic - ( 2020 16:30 )    Color: Yellow / Appearance: Clear / S.025 / pH: x  Gluc: x / Ketone: NEGATIVE  / Bili: Negative / Urobili: 0.2 E.U./dL   Blood: x / Protein: 30 mg/dL / Nitrite: NEGATIVE   Leuk Esterase: NEGATIVE / RBC: < 5 /HPF / WBC < 5 /HPF   Sq Epi: x / Non Sq Epi: 5-10 /HPF / Bacteria: None /HPF      CAPILLARY BLOOD GLUCOSE            RADIOLOGY & ADDITIONAL TESTS: Reviewed.    ASSESSMENT:    PLAN:

## 2020-02-18 NOTE — PROGRESS NOTE ADULT - PROBLEM SELECTOR PLAN 3
Pt with hx of COPD, not on home Oxygen. On home Yupelri qd, perforomist BID, DuoNebs PRN, pro air HFA PRN. On presentation with wheezing on PE. Reports occasional cough with greenish sputum, unchanged from baseline. Former smoker (quit 2 years ago)  -c/w Duonebs q4h  -start Azithromycin 500mg q24h

## 2020-02-18 NOTE — DISCHARGE NOTE PROVIDER - PROVIDER TOKENS
FREE:[LAST:[Ford],FIRST:[M],PHONE:[(   )    -],FAX:[(   )    -],ESTABLISHEDPATIENT:[T]] FREE:[LAST:[Ford],FIRST:[M],PHONE:[(   )    -],FAX:[(   )    -],ESTABLISHEDPATIENT:[T]],FREE:[LAST:[Bowser],FIRST:[A],PHONE:[(   )    -],FAX:[(   )    -],ESTABLISHEDPATIENT:[T]]

## 2020-02-18 NOTE — DISCHARGE NOTE PROVIDER - HOSPITAL COURSE
#Discharge: do not delete        Patient is 78 yo F with past medical history of HFpEF, bilateral LE stents, chronic venous insufficiency, COPD, HTN, DALIA, DVTs on eliquis, CKD who presented with bilateral lower extremity pain and swelling, treated for bilateral cellulitis.        Problem List/Main Diagnoses (system-based):     #leg swelling    felt likely multifactorial --- component of CHF as well as cellulitis    diuresed with IV lasix    treated with IV cefazolin    evaluated by vascular - no interventions recommended apart from ace wrap and elevation        #COPD    persistent wheezing on exam    azithromycin     solumedrol IV then PO prednisone        #HFpEF    repeat echo ---        #HTN        #DVTs on eliquis    repeat lower extremity dopplers negative for DVT    eliquis continued        Inpatient treatment course:     New medications:     Labs to be followed outpatient:     Exam to be followed outpatient: #Discharge: do not delete        Patient is 80 yo F with past medical history of HFpEF, bilateral LE stents, chronic venous insufficiency, COPD, HTN, DALIA, DVTs on eliquis, CKD4 who presented with bilateral lower extremity pain and swelling, treated for bilateral cellulitis.        Problem List/Main Diagnoses (system-based):     #leg swelling    felt likely multifactorial --- component of CHF as well as cellulitis    diuresed with IV lasix    treated with IV cefazolin    evaluated by vascular - no interventions recommended apart from ace wrap and elevation    discharged on keflex        #COPD    persistent wheezing on exam    azithromycin administered    solumedrol IV then PO prednisone on discharge        #HFpEF    repeat echo --- no significant findings but per outpatient cardiologist has sig. hx of diastolic dysfunction    c/w home bumex    follow up with Dr. NELA Bowser cardiologist         #HTN    c/w home meds        #DVTs on eliquis    repeat lower extremity dopplers negative for DVT    eliquis continued        New medications: keflex

## 2020-02-19 VITALS
TEMPERATURE: 98 F | RESPIRATION RATE: 20 BRPM | SYSTOLIC BLOOD PRESSURE: 157 MMHG | HEART RATE: 97 BPM | OXYGEN SATURATION: 90 % | DIASTOLIC BLOOD PRESSURE: 74 MMHG

## 2020-02-19 LAB
ANION GAP SERPL CALC-SCNC: 13 MMOL/L — SIGNIFICANT CHANGE UP (ref 5–17)
BUN SERPL-MCNC: 48 MG/DL — HIGH (ref 7–23)
CALCIUM SERPL-MCNC: 9.4 MG/DL — SIGNIFICANT CHANGE UP (ref 8.4–10.5)
CHLORIDE SERPL-SCNC: 102 MMOL/L — SIGNIFICANT CHANGE UP (ref 96–108)
CO2 SERPL-SCNC: 25 MMOL/L — SIGNIFICANT CHANGE UP (ref 22–31)
CREAT SERPL-MCNC: 1.67 MG/DL — HIGH (ref 0.5–1.3)
GLUCOSE SERPL-MCNC: 262 MG/DL — HIGH (ref 70–99)
POTASSIUM SERPL-MCNC: 5.2 MMOL/L — SIGNIFICANT CHANGE UP (ref 3.5–5.3)
POTASSIUM SERPL-SCNC: 5.2 MMOL/L — SIGNIFICANT CHANGE UP (ref 3.5–5.3)
SODIUM SERPL-SCNC: 140 MMOL/L — SIGNIFICANT CHANGE UP (ref 135–145)

## 2020-02-19 PROCEDURE — 85025 COMPLETE CBC W/AUTO DIFF WBC: CPT

## 2020-02-19 PROCEDURE — 82728 ASSAY OF FERRITIN: CPT

## 2020-02-19 PROCEDURE — 81001 URINALYSIS AUTO W/SCOPE: CPT

## 2020-02-19 PROCEDURE — 83550 IRON BINDING TEST: CPT

## 2020-02-19 PROCEDURE — 84295 ASSAY OF SERUM SODIUM: CPT

## 2020-02-19 PROCEDURE — 87631 RESP VIRUS 3-5 TARGETS: CPT

## 2020-02-19 PROCEDURE — 36415 COLL VENOUS BLD VENIPUNCTURE: CPT

## 2020-02-19 PROCEDURE — 93005 ELECTROCARDIOGRAM TRACING: CPT

## 2020-02-19 PROCEDURE — 83880 ASSAY OF NATRIURETIC PEPTIDE: CPT

## 2020-02-19 PROCEDURE — 84484 ASSAY OF TROPONIN QUANT: CPT

## 2020-02-19 PROCEDURE — 83735 ASSAY OF MAGNESIUM: CPT

## 2020-02-19 PROCEDURE — 84132 ASSAY OF SERUM POTASSIUM: CPT

## 2020-02-19 PROCEDURE — 83540 ASSAY OF IRON: CPT

## 2020-02-19 PROCEDURE — 71045 X-RAY EXAM CHEST 1 VIEW: CPT

## 2020-02-19 PROCEDURE — 93306 TTE W/DOPPLER COMPLETE: CPT

## 2020-02-19 PROCEDURE — 80053 COMPREHEN METABOLIC PANEL: CPT

## 2020-02-19 PROCEDURE — 80048 BASIC METABOLIC PNL TOTAL CA: CPT

## 2020-02-19 PROCEDURE — 94640 AIRWAY INHALATION TREATMENT: CPT

## 2020-02-19 PROCEDURE — 93970 EXTREMITY STUDY: CPT

## 2020-02-19 PROCEDURE — 84466 ASSAY OF TRANSFERRIN: CPT

## 2020-02-19 PROCEDURE — 82330 ASSAY OF CALCIUM: CPT

## 2020-02-19 PROCEDURE — 87040 BLOOD CULTURE FOR BACTERIA: CPT

## 2020-02-19 PROCEDURE — 99239 HOSP IP/OBS DSCHRG MGMT >30: CPT

## 2020-02-19 PROCEDURE — 97161 PT EVAL LOW COMPLEX 20 MIN: CPT

## 2020-02-19 PROCEDURE — 99285 EMERGENCY DEPT VISIT HI MDM: CPT | Mod: 25

## 2020-02-19 PROCEDURE — 82803 BLOOD GASES ANY COMBINATION: CPT

## 2020-02-19 PROCEDURE — 96374 THER/PROPH/DIAG INJ IV PUSH: CPT

## 2020-02-19 PROCEDURE — 84100 ASSAY OF PHOSPHORUS: CPT

## 2020-02-19 PROCEDURE — 96375 TX/PRO/DX INJ NEW DRUG ADDON: CPT

## 2020-02-19 RX ORDER — MELOXICAM 15 MG/1
1 TABLET ORAL
Qty: 0 | Refills: 0 | DISCHARGE

## 2020-02-19 RX ORDER — CEPHALEXIN 500 MG
1 CAPSULE ORAL
Qty: 16 | Refills: 0
Start: 2020-02-19 | End: 2020-02-22

## 2020-02-19 RX ADMIN — Medication 3 MILLILITER(S): at 03:33

## 2020-02-19 RX ADMIN — AZITHROMYCIN 255 MILLIGRAM(S): 500 TABLET, FILM COATED ORAL at 07:45

## 2020-02-19 RX ADMIN — Medication 40 MILLIGRAM(S): at 05:52

## 2020-02-19 RX ADMIN — HYDROMORPHONE HYDROCHLORIDE 0.5 MILLIGRAM(S): 2 INJECTION INTRAMUSCULAR; INTRAVENOUS; SUBCUTANEOUS at 00:35

## 2020-02-19 RX ADMIN — BUPROPION HYDROCHLORIDE 300 MILLIGRAM(S): 150 TABLET, EXTENDED RELEASE ORAL at 11:59

## 2020-02-19 RX ADMIN — Medication 3 MILLILITER(S): at 07:46

## 2020-02-19 RX ADMIN — Medication 40 MILLIGRAM(S): at 11:59

## 2020-02-19 RX ADMIN — HYDROMORPHONE HYDROCHLORIDE 4 MILLIGRAM(S): 2 INJECTION INTRAMUSCULAR; INTRAVENOUS; SUBCUTANEOUS at 05:25

## 2020-02-19 RX ADMIN — APIXABAN 5 MILLIGRAM(S): 2.5 TABLET, FILM COATED ORAL at 10:05

## 2020-02-19 RX ADMIN — HYDROMORPHONE HYDROCHLORIDE 4 MILLIGRAM(S): 2 INJECTION INTRAMUSCULAR; INTRAVENOUS; SUBCUTANEOUS at 10:04

## 2020-02-19 RX ADMIN — HYDROMORPHONE HYDROCHLORIDE 4 MILLIGRAM(S): 2 INJECTION INTRAMUSCULAR; INTRAVENOUS; SUBCUTANEOUS at 04:25

## 2020-02-19 RX ADMIN — Medication 3 MILLILITER(S): at 11:59

## 2020-02-19 RX ADMIN — Medication 180 MILLIGRAM(S): at 05:52

## 2020-02-19 RX ADMIN — Medication 2000 MILLIGRAM(S): at 05:52

## 2020-02-19 RX ADMIN — Medication 3 MILLILITER(S): at 00:16

## 2020-02-19 RX ADMIN — HYDROMORPHONE HYDROCHLORIDE 0.5 MILLIGRAM(S): 2 INJECTION INTRAMUSCULAR; INTRAVENOUS; SUBCUTANEOUS at 00:17

## 2020-02-19 NOTE — DISCHARGE NOTE NURSING/CASE MANAGEMENT/SOCIAL WORK - PATIENT PORTAL LINK FT
You can access the FollowMyHealth Patient Portal offered by [facility] by registering at the following website: [email or web address]. By joining [employer]’s FollowMyHealth portal, you will also be able to view your health information using other applications (apps) compatible with our system.

## 2020-02-21 LAB
CULTURE RESULTS: SIGNIFICANT CHANGE UP
CULTURE RESULTS: SIGNIFICANT CHANGE UP
SPECIMEN SOURCE: SIGNIFICANT CHANGE UP
SPECIMEN SOURCE: SIGNIFICANT CHANGE UP

## 2020-02-24 DIAGNOSIS — L03.116 CELLULITIS OF LEFT LOWER LIMB: ICD-10-CM

## 2020-02-24 DIAGNOSIS — N18.4 CHRONIC KIDNEY DISEASE, STAGE 4 (SEVERE): ICD-10-CM

## 2020-02-24 DIAGNOSIS — Z88.1 ALLERGY STATUS TO OTHER ANTIBIOTIC AGENTS STATUS: ICD-10-CM

## 2020-02-24 DIAGNOSIS — Z91.19 PATIENT'S NONCOMPLIANCE WITH OTHER MEDICAL TREATMENT AND REGIMEN: ICD-10-CM

## 2020-02-24 DIAGNOSIS — Z85.828 PERSONAL HISTORY OF OTHER MALIGNANT NEOPLASM OF SKIN: ICD-10-CM

## 2020-02-24 DIAGNOSIS — Z98.62 PERIPHERAL VASCULAR ANGIOPLASTY STATUS: ICD-10-CM

## 2020-02-24 DIAGNOSIS — G47.33 OBSTRUCTIVE SLEEP APNEA (ADULT) (PEDIATRIC): ICD-10-CM

## 2020-02-24 DIAGNOSIS — G89.29 OTHER CHRONIC PAIN: ICD-10-CM

## 2020-02-24 DIAGNOSIS — J44.1 CHRONIC OBSTRUCTIVE PULMONARY DISEASE WITH (ACUTE) EXACERBATION: ICD-10-CM

## 2020-02-24 DIAGNOSIS — Z79.82 LONG TERM (CURRENT) USE OF ASPIRIN: ICD-10-CM

## 2020-02-24 DIAGNOSIS — I25.2 OLD MYOCARDIAL INFARCTION: ICD-10-CM
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
## 2020-02-24 DIAGNOSIS — Z87.891 PERSONAL HISTORY OF NICOTINE DEPENDENCE: ICD-10-CM

## 2020-02-24 DIAGNOSIS — E66.9 OBESITY, UNSPECIFIED: ICD-10-CM

## 2020-02-24 DIAGNOSIS — Z79.52 LONG TERM (CURRENT) USE OF SYSTEMIC STEROIDS: ICD-10-CM

## 2020-02-24 DIAGNOSIS — L03.115 CELLULITIS OF RIGHT LOWER LIMB: ICD-10-CM

## 2020-02-24 DIAGNOSIS — Z86.19 PERSONAL HISTORY OF OTHER INFECTIOUS AND PARASITIC DISEASES: ICD-10-CM

## 2020-02-24 DIAGNOSIS — Z79.891 LONG TERM (CURRENT) USE OF OPIATE ANALGESIC: ICD-10-CM

## 2020-02-24 DIAGNOSIS — F32.9 MAJOR DEPRESSIVE DISORDER, SINGLE EPISODE, UNSPECIFIED: ICD-10-CM
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
## 2020-02-24 DIAGNOSIS — B95.5 UNSPECIFIED STREPTOCOCCUS AS THE CAUSE OF DISEASES CLASSIFIED ELSEWHERE: ICD-10-CM

## 2020-02-24 DIAGNOSIS — Z86.14 PERSONAL HISTORY OF METHICILLIN RESISTANT STAPHYLOCOCCUS AUREUS INFECTION: ICD-10-CM

## 2020-02-24 DIAGNOSIS — D63.1 ANEMIA IN CHRONIC KIDNEY DISEASE: ICD-10-CM

## 2020-02-24 DIAGNOSIS — Z79.51 LONG TERM (CURRENT) USE OF INHALED STEROIDS: ICD-10-CM

## 2020-02-24 DIAGNOSIS — I73.9 PERIPHERAL VASCULAR DISEASE, UNSPECIFIED: ICD-10-CM

## 2020-02-24 DIAGNOSIS — I50.33 ACUTE ON CHRONIC DIASTOLIC (CONGESTIVE) HEART FAILURE: ICD-10-CM

## 2020-02-24 DIAGNOSIS — Z90.49 ACQUIRED ABSENCE OF OTHER SPECIFIED PARTS OF DIGESTIVE TRACT: ICD-10-CM

## 2020-02-24 DIAGNOSIS — Z79.01 LONG TERM (CURRENT) USE OF ANTICOAGULANTS: ICD-10-CM

## 2020-02-24 DIAGNOSIS — Z88.0 ALLERGY STATUS TO PENICILLIN: ICD-10-CM

## 2020-02-24 DIAGNOSIS — E78.5 HYPERLIPIDEMIA, UNSPECIFIED: ICD-10-CM

## 2020-02-24 DIAGNOSIS — Z86.718 PERSONAL HISTORY OF OTHER VENOUS THROMBOSIS AND EMBOLISM: ICD-10-CM

## 2020-02-24 DIAGNOSIS — M54.5 LOW BACK PAIN: ICD-10-CM

## 2020-02-24 DIAGNOSIS — I13.0 HYPERTENSIVE HEART AND CHRONIC KIDNEY DISEASE WITH HEART FAILURE AND STAGE 1 THROUGH STAGE 4 CHRONIC KIDNEY DISEASE, OR UNSPECIFIED CHRONIC KIDNEY DISEASE: ICD-10-CM

## 2020-05-02 ENCOUNTER — INPATIENT (INPATIENT)
Facility: HOSPITAL | Age: 80
LOS: 1 days | Discharge: LEFT BEFORE DISCHARGE | DRG: 871 | End: 2020-05-04
Attending: INTERNAL MEDICINE | Admitting: INTERNAL MEDICINE
Payer: MEDICARE

## 2020-05-02 VITALS
DIASTOLIC BLOOD PRESSURE: 49 MMHG | HEIGHT: 68 IN | OXYGEN SATURATION: 87 % | TEMPERATURE: 99 F | SYSTOLIC BLOOD PRESSURE: 117 MMHG | WEIGHT: 250 LBS | HEART RATE: 99 BPM | RESPIRATION RATE: 24 BRPM

## 2020-05-02 DIAGNOSIS — N17.9 ACUTE KIDNEY FAILURE, UNSPECIFIED: ICD-10-CM

## 2020-05-02 DIAGNOSIS — I82.409 ACUTE EMBOLISM AND THROMBOSIS OF UNSPECIFIED DEEP VEINS OF UNSPECIFIED LOWER EXTREMITY: ICD-10-CM

## 2020-05-02 DIAGNOSIS — I50.32 CHRONIC DIASTOLIC (CONGESTIVE) HEART FAILURE: ICD-10-CM

## 2020-05-02 DIAGNOSIS — I10 ESSENTIAL (PRIMARY) HYPERTENSION: ICD-10-CM

## 2020-05-02 DIAGNOSIS — Z90.49 ACQUIRED ABSENCE OF OTHER SPECIFIED PARTS OF DIGESTIVE TRACT: Chronic | ICD-10-CM

## 2020-05-02 DIAGNOSIS — Z98.89 OTHER SPECIFIED POSTPROCEDURAL STATES: Chronic | ICD-10-CM

## 2020-05-02 DIAGNOSIS — Z99.3 DEPENDENCE ON WHEELCHAIR: ICD-10-CM

## 2020-05-02 DIAGNOSIS — J96.01 ACUTE RESPIRATORY FAILURE WITH HYPOXIA: ICD-10-CM

## 2020-05-02 DIAGNOSIS — A41.9 SEPSIS, UNSPECIFIED ORGANISM: ICD-10-CM

## 2020-05-02 DIAGNOSIS — G47.33 OBSTRUCTIVE SLEEP APNEA (ADULT) (PEDIATRIC): ICD-10-CM

## 2020-05-02 DIAGNOSIS — Z98.62 PERIPHERAL VASCULAR ANGIOPLASTY STATUS: Chronic | ICD-10-CM

## 2020-05-02 LAB
ALBUMIN SERPL ELPH-MCNC: 3 G/DL — LOW (ref 3.4–5)
ALP SERPL-CCNC: 109 U/L — SIGNIFICANT CHANGE UP (ref 40–120)
ALT FLD-CCNC: 26 U/L — SIGNIFICANT CHANGE UP (ref 12–42)
ANION GAP SERPL CALC-SCNC: 9 MMOL/L — SIGNIFICANT CHANGE UP (ref 9–16)
APPEARANCE UR: CLEAR — SIGNIFICANT CHANGE UP
AST SERPL-CCNC: 15 U/L — SIGNIFICANT CHANGE UP (ref 15–37)
BILIRUB SERPL-MCNC: 0.2 MG/DL — SIGNIFICANT CHANGE UP (ref 0.2–1.2)
BILIRUB UR-MCNC: NEGATIVE — SIGNIFICANT CHANGE UP
BUN SERPL-MCNC: 83 MG/DL — HIGH (ref 7–23)
CALCIUM SERPL-MCNC: 9.3 MG/DL — SIGNIFICANT CHANGE UP (ref 8.5–10.5)
CHLORIDE SERPL-SCNC: 100 MMOL/L — SIGNIFICANT CHANGE UP (ref 96–108)
CO2 SERPL-SCNC: 28 MMOL/L — SIGNIFICANT CHANGE UP (ref 22–31)
COLOR SPEC: YELLOW — SIGNIFICANT CHANGE UP
CREAT SERPL-MCNC: 2.93 MG/DL — HIGH (ref 0.5–1.3)
CRP SERPL-MCNC: >12 MG/DL — HIGH (ref 0–0.9)
D DIMER BLD IA.RAPID-MCNC: <187 NG/ML DDU — SIGNIFICANT CHANGE UP
DIFF PNL FLD: NEGATIVE — SIGNIFICANT CHANGE UP
GLUCOSE SERPL-MCNC: 112 MG/DL — HIGH (ref 70–99)
GLUCOSE UR QL: NEGATIVE — SIGNIFICANT CHANGE UP
HCT VFR BLD CALC: 35.2 % — SIGNIFICANT CHANGE UP (ref 34.5–45)
HGB BLD-MCNC: 10.7 G/DL — LOW (ref 11.5–15.5)
KETONES UR-MCNC: NEGATIVE — SIGNIFICANT CHANGE UP
LACTATE SERPL-SCNC: 1 MMOL/L — SIGNIFICANT CHANGE UP (ref 0.4–2)
LEUKOCYTE ESTERASE UR-ACNC: NEGATIVE — SIGNIFICANT CHANGE UP
MAGNESIUM SERPL-MCNC: 2.5 MG/DL — SIGNIFICANT CHANGE UP (ref 1.6–2.6)
MCHC RBC-ENTMCNC: 27.6 PG — SIGNIFICANT CHANGE UP (ref 27–34)
MCHC RBC-ENTMCNC: 30.4 GM/DL — LOW (ref 32–36)
MCV RBC AUTO: 91 FL — SIGNIFICANT CHANGE UP (ref 80–100)
NITRITE UR-MCNC: NEGATIVE — SIGNIFICANT CHANGE UP
NRBC # BLD: 0 /100 WBCS — SIGNIFICANT CHANGE UP (ref 0–0)
NT-PROBNP SERPL-SCNC: 236 PG/ML — SIGNIFICANT CHANGE UP
PCO2 BLDV: 69 MMHG — HIGH (ref 41–51)
PH BLDV: 7.26 — LOW (ref 7.32–7.43)
PH UR: 5.5 — SIGNIFICANT CHANGE UP (ref 5–8)
PLATELET # BLD AUTO: 191 K/UL — SIGNIFICANT CHANGE UP (ref 150–400)
PO2 BLDV: 31 MMHG — LOW (ref 35–40)
POTASSIUM SERPL-MCNC: 4.9 MMOL/L — SIGNIFICANT CHANGE UP (ref 3.5–5.3)
POTASSIUM SERPL-SCNC: 4.9 MMOL/L — SIGNIFICANT CHANGE UP (ref 3.5–5.3)
PROT SERPL-MCNC: 8.5 G/DL — HIGH (ref 6.4–8.2)
PROT UR-MCNC: ABNORMAL MG/DL
RBC # BLD: 3.87 M/UL — SIGNIFICANT CHANGE UP (ref 3.8–5.2)
RBC # FLD: 18.6 % — HIGH (ref 10.3–14.5)
SAO2 % BLDV: 52 % — SIGNIFICANT CHANGE UP
SARS-COV-2 RNA SPEC QL NAA+PROBE: SIGNIFICANT CHANGE UP
SODIUM SERPL-SCNC: 137 MMOL/L — SIGNIFICANT CHANGE UP (ref 132–145)
SP GR SPEC: >=1.03 — SIGNIFICANT CHANGE UP (ref 1–1.03)
TROPONIN I SERPL-MCNC: <0.017 NG/ML — LOW (ref 0.02–0.06)
UROBILINOGEN FLD QL: 0.2 E.U./DL — SIGNIFICANT CHANGE UP
WBC # BLD: 7.57 K/UL — SIGNIFICANT CHANGE UP (ref 3.8–10.5)
WBC # FLD AUTO: 7.57 K/UL — SIGNIFICANT CHANGE UP (ref 3.8–10.5)

## 2020-05-02 PROCEDURE — 99285 EMERGENCY DEPT VISIT HI MDM: CPT | Mod: CS

## 2020-05-02 PROCEDURE — 71045 X-RAY EXAM CHEST 1 VIEW: CPT | Mod: 26

## 2020-05-02 PROCEDURE — 99223 1ST HOSP IP/OBS HIGH 75: CPT | Mod: GC

## 2020-05-02 PROCEDURE — 93010 ELECTROCARDIOGRAM REPORT: CPT

## 2020-05-02 RX ORDER — SODIUM CHLORIDE 9 MG/ML
500 INJECTION INTRAMUSCULAR; INTRAVENOUS; SUBCUTANEOUS ONCE
Refills: 0 | Status: COMPLETED | OUTPATIENT
Start: 2020-05-02 | End: 2020-05-02

## 2020-05-02 RX ORDER — FAMOTIDINE 10 MG/ML
20 INJECTION INTRAVENOUS DAILY
Refills: 0 | Status: DISCONTINUED | OUTPATIENT
Start: 2020-05-02 | End: 2020-05-04

## 2020-05-02 RX ORDER — AZITHROMYCIN 500 MG/1
500 TABLET, FILM COATED ORAL ONCE
Refills: 0 | Status: COMPLETED | OUTPATIENT
Start: 2020-05-02 | End: 2020-05-02

## 2020-05-02 RX ORDER — ALBUTEROL 90 UG/1
2 AEROSOL, METERED ORAL EVERY 6 HOURS
Refills: 0 | Status: DISCONTINUED | OUTPATIENT
Start: 2020-05-02 | End: 2020-05-02

## 2020-05-02 RX ORDER — SODIUM CHLORIDE 9 MG/ML
1000 INJECTION INTRAMUSCULAR; INTRAVENOUS; SUBCUTANEOUS
Refills: 0 | Status: DISCONTINUED | OUTPATIENT
Start: 2020-05-02 | End: 2020-05-03

## 2020-05-02 RX ORDER — AZITHROMYCIN 500 MG/1
250 TABLET, FILM COATED ORAL EVERY 24 HOURS
Refills: 0 | Status: DISCONTINUED | OUTPATIENT
Start: 2020-05-03 | End: 2020-05-03

## 2020-05-02 RX ORDER — ASPIRIN/CALCIUM CARB/MAGNESIUM 324 MG
162 TABLET ORAL ONCE
Refills: 0 | Status: COMPLETED | OUTPATIENT
Start: 2020-05-02 | End: 2020-05-02

## 2020-05-02 RX ADMIN — Medication 40 MILLIGRAM(S): at 23:53

## 2020-05-02 RX ADMIN — SODIUM CHLORIDE 1000 MILLILITER(S): 9 INJECTION INTRAMUSCULAR; INTRAVENOUS; SUBCUTANEOUS at 23:53

## 2020-05-02 RX ADMIN — ALBUTEROL 2 PUFF(S): 90 AEROSOL, METERED ORAL at 15:31

## 2020-05-02 RX ADMIN — Medication 162 MILLIGRAM(S): at 15:30

## 2020-05-02 RX ADMIN — AZITHROMYCIN 255 MILLIGRAM(S): 500 TABLET, FILM COATED ORAL at 19:00

## 2020-05-02 NOTE — ED ADULT NURSE NOTE - OBJECTIVE STATEMENT
80 y/o female with h/o copd, CHF - reports of having fever x 8 days and now sob x 4-5 days. pt denies chest pain, fever now- pt arrives hypoxic on 88ra. pt placed on 2 L with improvement. IV in place, blood and nasal swab sent. Pt placed on cardiac monitor and will continue to monitor/

## 2020-05-02 NOTE — H&P ADULT - ASSESSMENT
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 Pt states she has doubled her Bumetanide dose x 2 days with no relief in symptoms.   [facility] and/or [facility] infection. 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 [facility]or [facility]. Additionally found to have BRANT on CKD likely 2/2 increased diuretic usage and sepsis.

## 2020-05-02 NOTE — H&P ADULT - PROBLEM SELECTOR PLAN 9
BP stable  - Will hold Bumex as above BP stable  - Will hold Bumex and Diltiazem as above in setting of sepsis/BRANT

## 2020-05-02 NOTE — H&P ADULT - NSHPLABSRESULTS_GEN_ALL_CORE
10.7   7.57  )-----------( 191      ( 02 May 2020 16:08 )             35.2   05-02    137  |  100  |  83<H>  ----------------------------<  112<H>  4.9   |  28  |  2.93<H>    Ca    9.3      02 May 2020 16:08  Mg     2.5     05-02    TPro  8.5<H>  /  Alb  3.0<L>  /  TBili  0.2  /  DBili  x   /  AST  15  /  ALT  26  /  AlkPhos  109  05-02

## 2020-05-02 NOTE — PATIENT PROFILE ADULT - DISASTER - NSPROGENPREVTRANSF_GEN_A_NUR
November 24, 2019      yCnthia Oh Jr., MD  [address]  [address]  [place] [address]           [facility]  [address]  [place] [address]  Phone: [phone]  Fax: [phone]          Patient: Heri Muhammad   MR Number: [id number]   YOB: 1936   Date of Visit: 11/21/2019       Dear Dr. Cynthia Oh Jr.:    Thank you for referring Heri Muhammad to me for evaluation. Attached you will find relevant portions of my assessment and plan of care.    If you have questions, please do not hesitate to call me. I look forward to following Heri Muhammad along with you.    Sincerely,    Roque Villalobos, DPM    Enclosure  CC:  No Recipients    If you would like to receive this communication electronically, please contact [email or web address] or [phone] to request more information on [employer] Link access.    For providers and/or their staff who would like to refer a patient to [facility], please contact us through our one-stop-shop provider referral line, [facility], at [phone].    If you feel you have received this communication in error or would no longer like to receive these types of communications, please e-mail [email or web address]         
no

## 2020-05-02 NOTE — H&P ADULT - PROBLEM SELECTOR PLAN 7
Currently euvolemic to dry on exam. BNP WNL  - Echo on 2/18/20 revealed EF 55-60% and no diastolic dysfunction, but per medical record, pt with diastolic CHF  - Will hold home Bumex in light of BRANT

## 2020-05-02 NOTE — H&P ADULT - PROBLEM SELECTOR PLAN 4
Likely 2/2 COPD exacerbation vs Covid infection  - On presentation RR 24, SpO2 87% on RA, improved to 98% on 4L  - Incentive spirometry ordered Likely 2/2 COPD exacerbation vs Covid infection  - On presentation RR 24, SpO2 87% on RA, improved to 96% on 3L currently  - Incentive spirometry ordered

## 2020-05-02 NOTE — H&P ADULT - ATTENDING COMMENTS
patient seen and examined overnight ; reports fevers 3 weeks ago, progressing to cough that has become productive in the last few days; denies n/v, reports some loose stools ; reports cleaning person comes to her house ; also has a neighbor who has been coming by to help; had virtual visit w/ her cardiologist, informed to increase bumex dose ; pt also reports 5months of pruritus , rash on back , arms, chest and abdomen, gluteal region; reports being seen by 2 dermatologists, given topical medications w/ no relief.     reviewed pertinent data, h&p; pt is obese, w/ audible wheezing; s1s2; +b/l wheezing/crackles at bases; abdomen is large, nontender; +lower ext erythema b/l w/ patches of dry thick skin; there are some tender denuded skin at the RLEXT , w/ mild clear weeping discharge; +skin excoriation lesions scattered on back , upper arms b/l, chest , abdomen and Left gluteal region/ L posterior thigh     a/f sepsis /respiratory failure w/ COPD exacerbation; r/o COVID -19;     1. COPD exacerbation: Acute respiratory acidosis w/ hypercarbia and hypoxia; monitor respiratory status, inflammatory markers, QTc, moved to negative pressure room to start Bipap. c/w azithromycin for COPD, along w/ IV solumedrol. repeat ABG after BIPAP, consult pulm, obtain CT chest. may need 3rd swab if inflammatory markers consistent w/ covid. followup RVP   2. monitor troponins, EKG  3. BRANT on CKD: hold bumex, IVFs o/n, monitor fluid status.   4. permethrin x1 in setting of skin excoriations noted  5. wound care for lower ext stasis dermatitis, monitor for cellulitis     rest of plan as above.

## 2020-05-02 NOTE — ED PROVIDER NOTE - PROGRESS NOTE DETAILS
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    Patient is on the following meds: 40mg Lipitor, 15mg Mirapex ER, 150mg Bupropion ST, 15mg Meloxicam, 5mg Eliquis, 2mg Bumetanide, 180mg Diltiazem, 2mg Doxazosin, Yupelri, Perforomist, Albuterol neb, Proair. case dw the duyen and hospitalist @ 630pm - request for ddimer and crp prior to transfer

## 2020-05-02 NOTE — ED ADULT TRIAGE NOTE - CHIEF COMPLAINT QUOTE
pt sent in by PCP for increasing SOB x1 week, as per pt she has doubled her Bumetanide dose x2 days with no relief in symptoms. denies cough.

## 2020-05-02 NOTE — H&P ADULT - PROBLEM SELECTOR PLAN 1
Pt presented with 2/4 SIRS criteris (HR 99 and RR 24) with presumed source of infection (Covid, initial test negative), lactate WNL. Currently afebrile and no leukocytosis. UA unremarkable  - CXR c/f Covid infection, f/u official read  - Reswab for Covid, f/u results  - s/p Aziththromycin 500mg IV x 1 in ED  - BCx if pt develops fever or leukocytosis  - Will order IVF bolus Pt presented with 2/4 SIRS criteris (HR 99 and RR 24) with presumed source of infection (Covid, initial test negative), lactate WNL. Currently afebrile and no leukocytosis. UA unremarkable  - CXR c/f Covid infection, f/u official read  - Reswab for Covid, f/u results  - s/p Aziththromycin 500mg IV x 1 in ED. C/w Azithromycin 25mg QD x 4 days in light of ?COPD exacerbation and ?Covid infection  - Ordered 500cc NS bolus and 100cc/hr x 8 hours  - BCx if pt develops fever or leukocytosis Pt presented with 2/4 SIRS criteris (HR 99 and RR 24) with presumed source of infection (Covid, initial test negative), lactate WNL. Currently afebrile and no leukocytosis. UA unremarkable  - CXR c/f Covid infection, f/u official read  - Reswab for Covid, f/u results  - s/p Aziththromycin 500mg IV x 1 in ED. C/w Azithromycin 25mg QD x 4 days in light of ?COPD exacerbation and ?Covid infection  - Ordered 500cc NS bolus and 100cc/hr x 5 hours  - BCx if pt develops fever or leukocytosis

## 2020-05-02 NOTE — ED PROVIDER NOTE - SKIN, MLM
lower legs with chronic stasis changes 1, edema bilateral right leg erythematous (no change in color according to patient in months)

## 2020-05-02 NOTE — H&P ADULT - EXTREMITIES COMMENTS
lower legs with chronic stasis changes, 1+ edema bilateral LE, right leg erythematous (no change in color according to patient in months)

## 2020-05-02 NOTE — H&P ADULT - PROBLEM SELECTOR PLAN 2
Currently satting ... on 4L NC..., RR (pt not on home O2)  - s/p Albuterol inhaler at [facility]  - Per pt, recent cardiac arrest in October 2019 from Prednisone, but tolerated during last admission in February 2020  - C/w Albuterol inhaler  - Incentive spirometry ordered Currently satting 96% on 3L NC, RR 18 with audible wheezing on exam. Pt not on home O2  - s/p Albuterol inhaler and Azithromycin at [facility]  - Per pt, recent cardiac arrest in October 2019 from Prednisone, but tolerated during last admission in February 2020  - Started Solumderol 40mg IV BID x 5 days  - C/w Albuterol inhaler, home Advair TIC to Symbicort  - C/w Azithromycin 250mg QD x 4 days  - Incentive spirometry ordered Currently satting 96% on 3L NC, RR 18 with audible wheezing on exam. Pt not on home O2  - s/p Albuterol inhaler and Azithromycin at [facility]  - Per pt, recent cardiac arrest in October 2019 from Prednisone, but tolerated during last admission in February 2020  - Started Solumderol 40mg IV BID x 5 days  - C/w Albuterol inhaler and home Advair TIC to Symbicort. Home Yupelri (Revefenacin) non-formulary  - C/w Azithromycin 250mg QD x 4 days  - Incentive spirometry ordered Currently satting 96% on 3L NC, RR 18 with audible wheezing on exam. Pt not on home O2  - s/p Albuterol inhaler and Azithromycin at [facility]  - Per pt, recent cardiac arrest in October 2019 from Prednisone, but tolerated during last admission in February 2020  - Started Solumderol 40mg IV BID x 5 days  - C/w Albuterol inhaler and home Advair TIC to Symbicort. Home Yupelri (Revefenacin) non-formulary  - C/w Azithromycin 250mg QD x 4 days  - Incentive spirometry ordered  - ABG ordered, f/u results Currently satting 96% on 3L NC, RR 18 with audible wheezing on exam. Pt not on home O2  - s/p Albuterol inhaler and Azithromycin at [facility]  - Per pt, recent cardiac arrest in October 2019 from Prednisone, but tolerated during last admission in February 2020  - Started Solumderol 40mg IV BID x 5 days  - C/w Albuterol inhaler and home Advair TIC to Symbicort. Home Yupelri (Revefenacin) non-formulary  - C/w Azithromycin 250mg QD x 4 days  - Incentive spirometry ordered  - ABG ordered, f/u results    ADDENDUM: pt w/ increasing o2 requirements o/n, placed on bipap in setting of acute respiratory acidosis, +hypercarbia (pt moved to negative pressure room) ; pulm consult in AM

## 2020-05-02 NOTE — H&P ADULT - PROBLEM SELECTOR PLAN 5
BRANT on CKD, Cr 2.93 on admission with baseline ~1.7-2.0  - Likely pre-renal from diuretic and/or sepsis. Per pt, has been doubling Bumex dose with no relief of SOB. Will hold Bumex at this time and hydrate w/ IVF  - Consider ULytes if BRANT not improving BRANT on CKD, Cr 2.93 on admission with baseline ~1.7-2.0  - Likely pre-renal from diuretic and/or sepsis. Per pt, has been doubling Bumex dose with no relief of SOB. Will hold Bumex at this time and hydrate w/ IVF  - Consider ULytes if BRANT not improving  - Renally dose medications BRANT on CKD, Cr 2.93 on admission with baseline ~1.7-2.0  - Likely pre-renal from diuretic and/or sepsis. Per pt, has been doubling Bumex dose with no relief of SOB. Will hold Bumex at this time and hydrate w/ IVF  - Holding home Mirapex  - Consider ULytes if BRANT not improving  - Renally dose medications

## 2020-05-02 NOTE — ED PROVIDER NOTE - RESPIRATORY, MLM
[FreeTextEntry6] : Lily is a 6mo female here for sick visit. \par \par Patient returned 3 days ago from [place] with mother and PGM where she lived for the past 5 months due to pandemic and was unable to return to [place].\par Currently lives with parents and PGM currently. \par \par CC: coughing, diarrhea 15x/day the past 3 days ago, no fever, eating drinking well and active. 10 days ago she had cough and fever and was prescribed antibiotic for cold in [place]. Patient went to [facility] yesterday and was put on Amoxil for OM, COVID PCR done but won't get results for 7 days. Mother's COVID PCR negative upon arrival back to the [place]\par \par Enfamil powder formula made with boiled tap water in [place] - [id number]\par Currently eating both table food and baby food
Breath sounds clear and equal bilaterally.

## 2020-05-02 NOTE — H&P ADULT - PROBLEM SELECTOR PLAN 10
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 Pt lives alone  - PT and SW consults placed, f/u recs    VTE ppx: Eliquis  GI: Famotidine (renally dosed)  Case d/w Dr. 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 Pt lives alone  - PT and SW consults placed, f/u recs    #Possible scabies  Pt with excoriations 2/2 persistent itching on scapulas x 5 months  - Ordered for Permetherin x 1 and given Benadryl x 1 for itching    VTE ppx: Eliquis  GI: Famotidine (renally dosed)  Case d/w Dr. Goode

## 2020-05-02 NOTE — H&P ADULT - HISTORY OF PRESENT ILLNESS
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 Pt states she has doubled her Bumetanide dose x 2 days with no relief in symptoms. Pt reports having had cough 2+ weeks ago which has since resolved. She also endorses chronic b/l LE swelling, but not recent change. She denies HA, dizziness, fevers, chills, cough, CP, palpitations, abdominal pain, n/v/d, or any other symptoms at this time.   On arrival to ED, VS noted as T 98.6, HR 99, /49, RR 24, SpO2 87% on RA. Labs significant for D-Dimer WNL, CRP >12, Lactate 1.0, Trop negative, BNP WNL, BUN/Cr 83/2.93, pH 7.26, pCO2 69, pO2 31. EKG SR @ 86BPM, QTc 423. CXR wet read by PA w/ b/l haziness/opacities c/f COVID infection.   In ED, pt given ASA 162mg PO x 1, Azithromycin 500mg IV x 1, and Albuterol inhaler x 1.  Pt is now transferred to [facility] for management of COPD exacerbation and/or COVID infection. 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 Pt states she has doubled her Bumetanide dose x 2 days with no relief in symptoms. Pt reports having had cough 2+ weeks ago which has since resolved. She also endorses chronic b/l LE swelling, but not recent change. She denies HA, dizziness, fevers, chills, cough, CP, palpitations, abdominal pain, n/v/d, or any other symptoms at this time.   On arrival to ED, VS noted as T 98.6, HR 99, /49, RR 24, SpO2 87% on RA. Labs significant for D-Dimer WNL, CRP >12, Lactate 1.0, Trop negative, BNP WNL, BUN/Cr 83/2.93, pH 7.26, pCO2 69, pO2 31. EKG SR @ 86BPM, QTc 423. CXR wet read by PA w/ b/l haziness/opacities c/f COVID infection.   In ED, pt given ASA 162mg PO x 1, Azithromycin 500mg IV x 1, and Albuterol inhaler x 1.  Pt is now transferred to [facility] for management of COPD exacerbation and/or COVID infection. 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 She can walk a maximum of 10 steps before becoming SOB. Pt states she called her cardiologist 2 weeks ago and was instructed to double her Bumex dose for 2 weeks with no relief in symptoms. Pt reports having had cough 2+ weeks ago which has since resolved. She also endorses chronic b/l LE swelling, but no recent change. She denies HA, dizziness, fevers, chills, cough, CP, palpitations, abdominal pain, n/v/d, or any other symptoms at this time.   On arrival to ED, VS noted as T 98.6, HR 99, /49, RR 24, SpO2 87% on RA. Labs significant for D-Dimer WNL, CRP >12, Lactate 1.0, Trop negative, BNP WNL, BUN/Cr 83/2.93, pH 7.26, pCO2 69, pO2 31. EKG SR @ 86BPM, QTc 423. CXR wet read by PA w/ b/l haziness/opacities c/f COVID infection.   In ED, pt given ASA 162mg PO x 1, Azithromycin 500mg IV x 1, and Albuterol inhaler x 1.  Pt is now transferred to [facility] for management of COPD exacerbation and/or COVID infection. 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 on 5/2/20 c/o progressively worsening SOB and malaise x 4 weeks. She can walk a maximum of 10 steps before becoming SOB. Pt states she called her cardiologist 2 weeks ago and was instructed to double her Bumex dose for 2 weeks with no relief in symptoms. Pt reports having had cough 2+ weeks ago which has since resolved. She also endorses chronic b/l LE swelling, but no recent change. She denies HA, dizziness, fevers, chills, cough, CP, palpitations, abdominal pain, n/v/d, or any other symptoms at this time.   On arrival to ED, VS noted as T 98.6, HR 99, /49, RR 24, SpO2 87% on RA. Labs significant for D-Dimer WNL, CRP >12, Lactate 1.0, Trop negative, BNP WNL, BUN/Cr 83/2.93, pH 7.26, pCO2 69, pO2 31. EKG SR @ 86BPM, QTc 423. CXR wet read by PA w/ b/l haziness/opacities c/f COVID infection.   In ED, pt given ASA 162mg PO x 1, Azithromycin 500mg IV x 1, and Albuterol inhaler x 1.  Pt is now transferred to [facility] for management of COPD exacerbation and/or COVID infection.

## 2020-05-02 NOTE — H&P ADULT - PROBLEM SELECTOR PLAN 6
Pt with h/o multiple LE DVTs. D-Dimer WNL  - Continue home Eliquis 5mg BID Pt with h/o multiple LE DVTs. D-Dimer WNL  - Continue home Eliquis 5mg BID    ADDENDUM:   #venbous stasis dermatitis: pt w/ chronic lower ext venous stasis dermatitis; currently w/ b/l erythema, dry thick skin w/ denuded tender skin at the RLEXT, w/ mild amount of weeping clear discharge. low suspicion of cellulitis, will consult wound care

## 2020-05-02 NOTE — ED ADULT NURSE REASSESSMENT NOTE - NS ED NURSE REASSESS COMMENT FT1
pt tolerating PO, appears comfortable, TV provided, on telemetry and on continuous O2 monitoring. will continue to monitor.

## 2020-05-02 NOTE — ED PROVIDER NOTE - OBJECTIVE STATEMENT
Pt w/ PMHx HTN, HLD, CHF (diastolic dysfunction on Bumex 1 mg QD), COPD (no home O2, no hx intubations), DALIA non compliant w/ DALIA, chronic pain on oral Dilaudid, cardiac arrest s/p prednisone use, PVD s/p LE stents, DVT on Eliquis, cerebral aneurysm s/p coil, neuropathy, lyme disease, CKD p/w multiple medical complaints. Pt  presents to the ER from home - pt is motorized wheel chair bound secondary to lyme disease - with complaint of slowly progressive sob x 4 days. no chest pain no fever no cough (had cough 2 + weeks ago which resolved). chronic bilaterally leg swelling no recent change. admission to [facility] in 2/2020 for lower extremity cellulitis - record reviewed.

## 2020-05-02 NOTE — H&P ADULT - PROBLEM SELECTOR PLAN 3
1st Covid swab negative, f/u 2nd swab results  - CRP >12, D-Dimer WNL, no leukocytosis   - s/p Azithromycin 500mg IV x 1 in ED  - F/u daily Covid labs  - EKG QTc 423, daily EKG while on QT prolonging medications 1st Covid swab negative, f/u 2nd swab results  - CRP >12, D-Dimer WNL, no leukocytosis   - s/p Azithromycin 500mg IV x 1 in ED, c/w Azithro x 4 days as above  - F/u daily Covid labs  - EKG QTc 423, daily EKG while on QT prolonging medications 1st Covid swab negative, f/u 2nd swab results  - CRP >12, D-Dimer WNL, no leukocytosis   - s/p Azithromycin 500mg IV x 1 in ED, c/w Azithro x 4 days as above  - F/u daily Covid labs. F/u G6PD and Quantiferon results  - EKG QTc 423, daily EKG while on QT prolonging medications Covid negative x 2  - CXR wet read c/f COVID w/ b/l opacities (f/u official read), CRP >12, D-Dimer WNL, no leukocytosis   - CT Chest non-contrast ordered, f/u results  - s/p Azithromycin 500mg IV x 1 in ED, c/w Azithro x 4 days as above  - F/u daily Covid labs. F/u G6PD and Quantiferon results  - EKG QTc 423, daily EKG while on QT prolonging medications

## 2020-05-02 NOTE — ED ADULT NURSE NOTE - NS ED NURSE LEVEL OF CONSCIOUSNESS AFFECT
Appropriate/Calm Purse String (Intermediate) Text: Given the location of the defect and the characteristics of the surrounding skin a purse string intermediate closure was deemed most appropriate.  Undermining was performed circumfirentially around the surgical defect.  A purse string suture was then placed and tightened.

## 2020-05-02 NOTE — ED PROVIDER NOTE - CLINICAL SUMMARY MEDICAL DECISION MAKING FREE TEXT BOX
pt remained stable throughout ed stay. tolerates po   case discussed with dr feliciano at 730pm w/ d-dimer and crp results  plan to admit regional medicine r/o covid

## 2020-05-03 DIAGNOSIS — J96.02 ACUTE RESPIRATORY FAILURE WITH HYPERCAPNIA: ICD-10-CM

## 2020-05-03 DIAGNOSIS — J44.1 CHRONIC OBSTRUCTIVE PULMONARY DISEASE WITH (ACUTE) EXACERBATION: ICD-10-CM

## 2020-05-03 LAB
ALBUMIN SERPL ELPH-MCNC: 3.5 G/DL — SIGNIFICANT CHANGE UP (ref 3.3–5)
ALP SERPL-CCNC: 99 U/L — SIGNIFICANT CHANGE UP (ref 40–120)
ALT FLD-CCNC: 19 U/L — SIGNIFICANT CHANGE UP (ref 10–45)
ANION GAP SERPL CALC-SCNC: 10 MMOL/L — SIGNIFICANT CHANGE UP (ref 5–17)
ANION GAP SERPL CALC-SCNC: 9 MMOL/L — SIGNIFICANT CHANGE UP (ref 5–17)
ANION GAP SERPL CALC-SCNC: 9 MMOL/L — SIGNIFICANT CHANGE UP (ref 5–17)
AST SERPL-CCNC: 15 U/L — SIGNIFICANT CHANGE UP (ref 10–40)
BASE EXCESS BLDA CALC-SCNC: -0.1 MMOL/L — SIGNIFICANT CHANGE UP (ref -2–3)
BASE EXCESS BLDA CALC-SCNC: -0.7 MMOL/L — SIGNIFICANT CHANGE UP (ref -2–3)
BASE EXCESS BLDA CALC-SCNC: SIGNIFICANT CHANGE UP MMOL/L (ref -2–3)
BASOPHILS # BLD AUTO: 0 K/UL — SIGNIFICANT CHANGE UP (ref 0–0.2)
BASOPHILS NFR BLD AUTO: 0 % — SIGNIFICANT CHANGE UP (ref 0–2)
BILIRUB SERPL-MCNC: 0.2 MG/DL — SIGNIFICANT CHANGE UP (ref 0.2–1.2)
BUN SERPL-MCNC: 69 MG/DL — HIGH (ref 7–23)
BUN SERPL-MCNC: 73 MG/DL — HIGH (ref 7–23)
BUN SERPL-MCNC: 80 MG/DL — HIGH (ref 7–23)
CALCIUM SERPL-MCNC: 9.2 MG/DL — SIGNIFICANT CHANGE UP (ref 8.4–10.5)
CALCIUM SERPL-MCNC: 9.6 MG/DL — SIGNIFICANT CHANGE UP (ref 8.4–10.5)
CALCIUM SERPL-MCNC: 9.7 MG/DL — SIGNIFICANT CHANGE UP (ref 8.4–10.5)
CHLORIDE SERPL-SCNC: 101 MMOL/L — SIGNIFICANT CHANGE UP (ref 96–108)
CHLORIDE SERPL-SCNC: 101 MMOL/L — SIGNIFICANT CHANGE UP (ref 96–108)
CHLORIDE SERPL-SCNC: 104 MMOL/L — SIGNIFICANT CHANGE UP (ref 96–108)
CO2 SERPL-SCNC: 26 MMOL/L — SIGNIFICANT CHANGE UP (ref 22–31)
CO2 SERPL-SCNC: 27 MMOL/L — SIGNIFICANT CHANGE UP (ref 22–31)
CO2 SERPL-SCNC: 27 MMOL/L — SIGNIFICANT CHANGE UP (ref 22–31)
CREAT SERPL-MCNC: 2.15 MG/DL — HIGH (ref 0.5–1.3)
CREAT SERPL-MCNC: 2.34 MG/DL — HIGH (ref 0.5–1.3)
CREAT SERPL-MCNC: 2.61 MG/DL — HIGH (ref 0.5–1.3)
CRP SERPL-MCNC: 12.27 MG/DL — HIGH (ref 0–0.4)
D DIMER BLD IA.RAPID-MCNC: 167 NG/ML DDU — SIGNIFICANT CHANGE UP
EOSINOPHIL # BLD AUTO: 0.06 K/UL — SIGNIFICANT CHANGE UP (ref 0–0.5)
EOSINOPHIL NFR BLD AUTO: 0.9 % — SIGNIFICANT CHANGE UP (ref 0–6)
FERRITIN SERPL-MCNC: 107 NG/ML — SIGNIFICANT CHANGE UP (ref 15–150)
GAS PNL BLDA: SIGNIFICANT CHANGE UP
GLUCOSE BLDC GLUCOMTR-MCNC: 361 MG/DL — HIGH (ref 70–99)
GLUCOSE BLDC GLUCOMTR-MCNC: 50 MG/DL — LOW (ref 70–99)
GLUCOSE BLDC GLUCOMTR-MCNC: 91 MG/DL — SIGNIFICANT CHANGE UP (ref 70–99)
GLUCOSE SERPL-MCNC: 185 MG/DL — HIGH (ref 70–99)
GLUCOSE SERPL-MCNC: 216 MG/DL — HIGH (ref 70–99)
GLUCOSE SERPL-MCNC: 81 MG/DL — SIGNIFICANT CHANGE UP (ref 70–99)
HCO3 BLDA-SCNC: 27 MMOL/L — SIGNIFICANT CHANGE UP (ref 21–28)
HCO3 BLDA-SCNC: 28 MMOL/L — SIGNIFICANT CHANGE UP (ref 21–28)
HCO3 BLDA-SCNC: SIGNIFICANT CHANGE UP MMOL/L (ref 21–28)
HCT VFR BLD CALC: 35.4 % — SIGNIFICANT CHANGE UP (ref 34.5–45)
HGB BLD-MCNC: 10.2 G/DL — LOW (ref 11.5–15.5)
LYMPHOCYTES # BLD AUTO: 0.12 K/UL — LOW (ref 1–3.3)
LYMPHOCYTES # BLD AUTO: 1.7 % — LOW (ref 13–44)
MAGNESIUM SERPL-MCNC: 2.7 MG/DL — HIGH (ref 1.6–2.6)
MCHC RBC-ENTMCNC: 27.3 PG — SIGNIFICANT CHANGE UP (ref 27–34)
MCHC RBC-ENTMCNC: 28.8 GM/DL — LOW (ref 32–36)
MCV RBC AUTO: 94.7 FL — SIGNIFICANT CHANGE UP (ref 80–100)
MONOCYTES # BLD AUTO: 0.06 K/UL — SIGNIFICANT CHANGE UP (ref 0–0.9)
MONOCYTES NFR BLD AUTO: 0.9 % — LOW (ref 2–14)
NEUTROPHILS # BLD AUTO: 6.89 K/UL — SIGNIFICANT CHANGE UP (ref 1.8–7.4)
NEUTROPHILS NFR BLD AUTO: 96.5 % — HIGH (ref 43–77)
PCO2 BLDA: 62 MMHG — CRITICAL HIGH (ref 32–45)
PCO2 BLDA: 64 MMHG — CRITICAL HIGH (ref 32–45)
PCO2 BLDA: SIGNIFICANT CHANGE UP MMHG (ref 32–45)
PH BLDA: 7.26 — LOW (ref 7.35–7.45)
PH BLDA: 7.27 — LOW (ref 7.35–7.45)
PH BLDA: SIGNIFICANT CHANGE UP (ref 7.35–7.45)
PHOSPHATE SERPL-MCNC: 5 MG/DL — HIGH (ref 2.5–4.5)
PLATELET # BLD AUTO: 184 K/UL — SIGNIFICANT CHANGE UP (ref 150–400)
PO2 BLDA: 123 MMHG — HIGH (ref 83–108)
PO2 BLDA: 92 MMHG — SIGNIFICANT CHANGE UP (ref 83–108)
PO2 BLDA: SIGNIFICANT CHANGE UP MMHG (ref 83–108)
POTASSIUM SERPL-MCNC: 5 MMOL/L — SIGNIFICANT CHANGE UP (ref 3.5–5.3)
POTASSIUM SERPL-MCNC: 6 MMOL/L — HIGH (ref 3.5–5.3)
POTASSIUM SERPL-MCNC: 6.6 MMOL/L — CRITICAL HIGH (ref 3.5–5.3)
POTASSIUM SERPL-SCNC: 5 MMOL/L — SIGNIFICANT CHANGE UP (ref 3.5–5.3)
POTASSIUM SERPL-SCNC: 6 MMOL/L — HIGH (ref 3.5–5.3)
POTASSIUM SERPL-SCNC: 6.6 MMOL/L — CRITICAL HIGH (ref 3.5–5.3)
PROCALCITONIN SERPL-MCNC: 0.13 NG/ML — HIGH (ref 0.02–0.1)
PROT SERPL-MCNC: 8.1 G/DL — SIGNIFICANT CHANGE UP (ref 6–8.3)
RAPID RVP RESULT: SIGNIFICANT CHANGE UP
RBC # BLD: 3.74 M/UL — LOW (ref 3.8–5.2)
RBC # FLD: 17.9 % — HIGH (ref 10.3–14.5)
SAO2 % BLDA: 96 % — SIGNIFICANT CHANGE UP (ref 95–100)
SAO2 % BLDA: 98 % — SIGNIFICANT CHANGE UP (ref 95–100)
SAO2 % BLDA: SIGNIFICANT CHANGE UP % (ref 95–100)
SARS-COV-2 RNA SPEC QL NAA+PROBE: SIGNIFICANT CHANGE UP
SODIUM SERPL-SCNC: 137 MMOL/L — SIGNIFICANT CHANGE UP (ref 135–145)
SODIUM SERPL-SCNC: 137 MMOL/L — SIGNIFICANT CHANGE UP (ref 135–145)
SODIUM SERPL-SCNC: 140 MMOL/L — SIGNIFICANT CHANGE UP (ref 135–145)
TROPONIN T SERPL-MCNC: 0.01 NG/ML — SIGNIFICANT CHANGE UP (ref 0–0.01)
WBC # BLD: 7.14 K/UL — SIGNIFICANT CHANGE UP (ref 3.8–10.5)
WBC # FLD AUTO: 7.14 K/UL — SIGNIFICANT CHANGE UP (ref 3.8–10.5)

## 2020-05-03 PROCEDURE — 71250 CT THORAX DX C-: CPT | Mod: 26

## 2020-05-03 PROCEDURE — 99233 SBSQ HOSP IP/OBS HIGH 50: CPT

## 2020-05-03 RX ORDER — BUPROPION HYDROCHLORIDE 150 MG/1
300 TABLET, EXTENDED RELEASE ORAL DAILY
Refills: 0 | Status: DISCONTINUED | OUTPATIENT
Start: 2020-05-03 | End: 2020-05-04

## 2020-05-03 RX ORDER — PRAMIPEXOLE DIHYDROCHLORIDE 0.12 MG/1
0.12 TABLET ORAL AT BEDTIME
Refills: 0 | Status: DISCONTINUED | OUTPATIENT
Start: 2020-05-03 | End: 2020-05-04

## 2020-05-03 RX ORDER — FUROSEMIDE 40 MG
40 TABLET ORAL ONCE
Refills: 0 | Status: DISCONTINUED | OUTPATIENT
Start: 2020-05-03 | End: 2020-05-03

## 2020-05-03 RX ORDER — DIPHENHYDRAMINE HCL 50 MG
25 CAPSULE ORAL ONCE
Refills: 0 | Status: COMPLETED | OUTPATIENT
Start: 2020-05-03 | End: 2020-05-03

## 2020-05-03 RX ORDER — ATORVASTATIN CALCIUM 80 MG/1
40 TABLET, FILM COATED ORAL AT BEDTIME
Refills: 0 | Status: DISCONTINUED | OUTPATIENT
Start: 2020-05-03 | End: 2020-05-04

## 2020-05-03 RX ORDER — DEXTROSE 50 % IN WATER 50 %
50 SYRINGE (ML) INTRAVENOUS ONCE
Refills: 0 | Status: COMPLETED | OUTPATIENT
Start: 2020-05-03 | End: 2020-05-03

## 2020-05-03 RX ORDER — AZITHROMYCIN 500 MG/1
250 TABLET, FILM COATED ORAL EVERY 24 HOURS
Refills: 0 | Status: DISCONTINUED | OUTPATIENT
Start: 2020-05-03 | End: 2020-05-04

## 2020-05-03 RX ORDER — PERMETHRIN CREAM 5% W/W 50 MG/G
1 CREAM TOPICAL ONCE
Refills: 0 | Status: COMPLETED | OUTPATIENT
Start: 2020-05-03 | End: 2020-05-03

## 2020-05-03 RX ORDER — GLYCOPYRROLATE AND FORMOTEROL FUMARATE 9; 4.8 UG/1; UG/1
2 AEROSOL, METERED RESPIRATORY (INHALATION)
Refills: 0 | Status: DISCONTINUED | OUTPATIENT
Start: 2020-05-03 | End: 2020-05-03

## 2020-05-03 RX ORDER — APIXABAN 2.5 MG/1
5 TABLET, FILM COATED ORAL
Refills: 0 | Status: DISCONTINUED | OUTPATIENT
Start: 2020-05-03 | End: 2020-05-04

## 2020-05-03 RX ORDER — CALCIUM GLUCONATE 100 MG/ML
1 VIAL (ML) INTRAVENOUS ONCE
Refills: 0 | Status: COMPLETED | OUTPATIENT
Start: 2020-05-03 | End: 2020-05-03

## 2020-05-03 RX ORDER — DEXTROSE 50 % IN WATER 50 %
25 SYRINGE (ML) INTRAVENOUS ONCE
Refills: 0 | Status: COMPLETED | OUTPATIENT
Start: 2020-05-03 | End: 2020-05-03

## 2020-05-03 RX ORDER — BUDESONIDE AND FORMOTEROL FUMARATE DIHYDRATE 160; 4.5 UG/1; UG/1
2 AEROSOL RESPIRATORY (INHALATION)
Refills: 0 | Status: DISCONTINUED | OUTPATIENT
Start: 2020-05-02 | End: 2020-05-04

## 2020-05-03 RX ORDER — FUROSEMIDE 40 MG
20 TABLET ORAL ONCE
Refills: 0 | Status: COMPLETED | OUTPATIENT
Start: 2020-05-03 | End: 2020-05-03

## 2020-05-03 RX ORDER — SODIUM CHLORIDE 9 MG/ML
1000 INJECTION INTRAMUSCULAR; INTRAVENOUS; SUBCUTANEOUS
Refills: 0 | Status: DISCONTINUED | OUTPATIENT
Start: 2020-05-03 | End: 2020-05-03

## 2020-05-03 RX ORDER — HYDROMORPHONE HYDROCHLORIDE 2 MG/ML
2 INJECTION INTRAMUSCULAR; INTRAVENOUS; SUBCUTANEOUS THREE TIMES A DAY
Refills: 0 | Status: DISCONTINUED | OUTPATIENT
Start: 2020-05-02 | End: 2020-05-03

## 2020-05-03 RX ORDER — INSULIN HUMAN 100 [IU]/ML
10 INJECTION, SOLUTION SUBCUTANEOUS ONCE
Refills: 0 | Status: COMPLETED | OUTPATIENT
Start: 2020-05-03 | End: 2020-05-03

## 2020-05-03 RX ORDER — ALBUTEROL 90 UG/1
2 AEROSOL, METERED ORAL EVERY 6 HOURS
Refills: 0 | Status: DISCONTINUED | OUTPATIENT
Start: 2020-05-02 | End: 2020-05-04

## 2020-05-03 RX ADMIN — INSULIN HUMAN 10 UNIT(S): 100 INJECTION, SOLUTION SUBCUTANEOUS at 09:28

## 2020-05-03 RX ADMIN — Medication 40 MILLIGRAM(S): at 10:34

## 2020-05-03 RX ADMIN — APIXABAN 5 MILLIGRAM(S): 2.5 TABLET, FILM COATED ORAL at 00:53

## 2020-05-03 RX ADMIN — AZITHROMYCIN 250 MILLIGRAM(S): 500 TABLET, FILM COATED ORAL at 21:38

## 2020-05-03 RX ADMIN — APIXABAN 5 MILLIGRAM(S): 2.5 TABLET, FILM COATED ORAL at 19:17

## 2020-05-03 RX ADMIN — Medication 50 MILLILITER(S): at 09:28

## 2020-05-03 RX ADMIN — Medication 40 MILLIGRAM(S): at 21:39

## 2020-05-03 RX ADMIN — BUDESONIDE AND FORMOTEROL FUMARATE DIHYDRATE 2 PUFF(S): 160; 4.5 AEROSOL RESPIRATORY (INHALATION) at 06:10

## 2020-05-03 RX ADMIN — SODIUM CHLORIDE 100 MILLILITER(S): 9 INJECTION INTRAMUSCULAR; INTRAVENOUS; SUBCUTANEOUS at 02:08

## 2020-05-03 RX ADMIN — Medication 100 GRAM(S): at 09:28

## 2020-05-03 RX ADMIN — ALBUTEROL 2 PUFF(S): 90 AEROSOL, METERED ORAL at 10:33

## 2020-05-03 RX ADMIN — ALBUTEROL 2 PUFF(S): 90 AEROSOL, METERED ORAL at 22:10

## 2020-05-03 RX ADMIN — Medication 20 MILLIGRAM(S): at 23:30

## 2020-05-03 RX ADMIN — FAMOTIDINE 20 MILLIGRAM(S): 10 INJECTION INTRAVENOUS at 13:45

## 2020-05-03 RX ADMIN — ALBUTEROL 2 PUFF(S): 90 AEROSOL, METERED ORAL at 06:10

## 2020-05-03 RX ADMIN — HYDROMORPHONE HYDROCHLORIDE 2 MILLIGRAM(S): 2 INJECTION INTRAMUSCULAR; INTRAVENOUS; SUBCUTANEOUS at 12:25

## 2020-05-03 RX ADMIN — Medication 25 MILLILITER(S): at 12:17

## 2020-05-03 RX ADMIN — PERMETHRIN CREAM 5% W/W 1 APPLICATION(S): 50 CREAM TOPICAL at 06:09

## 2020-05-03 RX ADMIN — ATORVASTATIN CALCIUM 40 MILLIGRAM(S): 80 TABLET, FILM COATED ORAL at 21:38

## 2020-05-03 RX ADMIN — BUPROPION HYDROCHLORIDE 300 MILLIGRAM(S): 150 TABLET, EXTENDED RELEASE ORAL at 13:45

## 2020-05-03 RX ADMIN — Medication 25 MILLIGRAM(S): at 00:53

## 2020-05-03 NOTE — PROGRESS NOTE ADULT - PROBLEM SELECTOR PLAN 3
Covid negative x 2  - CXR  w/ b/l opacities., CRP >12, D-Dimer WNL, no leukocytosis   - CT Chest non-contrast ordered, f/u results  , c/w Azithro x 4 days as above  - F/u daily Covid labs. F/u G6PD and Quantiferon results  - EKG QTc 423, daily EKG while on QT prolonging medications

## 2020-05-03 NOTE — PROGRESS NOTE ADULT - SUBJECTIVE AND OBJECTIVE BOX
OVERNIGHT EVENTS: Maintained on BiPAP     SUBJECTIVE / INTERVAL HPI: Patient seen and examined at bedside. Awake and alert. Threatening AMA to leave AMA unless taken off BIPAP.     VITAL SIGNS:  Vital Signs Last 24 Hrs  T(C): 36.3 (03 May 2020 10:42), Max: 37 (02 May 2020 14:56)  T(F): 97.4 (03 May 2020 10:42), Max: 98.6 (02 May 2020 14:56)  HR: 82 (03 May 2020 10:42) (78 - 99)  BP: 110/65 (03 May 2020 10:42) (110/65 - 128/62)  BP(mean): 84 (02 May 2020 22:14) (84 - 84)  RR: 18 (03 May 2020 10:42) (14 - 24)  SpO2: 97% (03 May 2020 10:42) (87% - 100%)    PHYSICAL EXAM:    General: WDWN on BiPAP  HEENT: NC/AT; PERRL, anicteric sclera; MMM  Neck: supple  Cardiovascular: +S1/S2; RRR  Respiratory: CTA B/L; no W/R/R  Gastrointestinal: soft, NT/ND; +BSx4  Extremities: b/l edema with venous stasis changes   Vascular: 2+ radial, DP/PT pulses B/L  Neurological: AAOx3; no focal deficits    MEDICATIONS:  MEDICATIONS  (STANDING):  ALBUTerol    90 MICROgram(s) HFA Inhaler 2 Puff(s) Inhalation every 6 hours  apixaban 5 milliGRAM(s) Oral two times a day  atorvastatin 40 milliGRAM(s) Oral at bedtime  azithromycin   Tablet 250 milliGRAM(s) Oral every 24 hours  budesonide 160 MICROgram(s)/formoterol 4.5 MICROgram(s) Inhaler 2 Puff(s) Inhalation two times a day  buPROPion XL . 300 milliGRAM(s) Oral daily  famotidine    Tablet 20 milliGRAM(s) Oral daily  methylPREDNISolone sodium succinate Injectable 40 milliGRAM(s) IV Push two times a day  sodium chloride 0.9%. 1000 milliLiter(s) (100 mL/Hr) IV Continuous <Continuous>    MEDICATIONS  (PRN):  HYDROmorphone   Tablet 2 milliGRAM(s) Oral three times a day PRN Severe Pain (7 - 10)      ALLERGIES:  Allergies    Augmentin (Unknown)  clindamycin (Unknown)  Vaseline (Swelling)    Intolerances        LABS:                        10.2   7.14  )-----------( 184      ( 03 May 2020 07:03 )             35.4     05-03    140  |  104  |  69<H>  ----------------------------<  81  x    |  26  |  2.34<H>    Ca    9.7      03 May 2020 12:18  Phos  5.0     05-03  Mg     2.7     05-03    TPro  8.1  /  Alb  3.5  /  TBili  0.2  /  DBili  x   /  AST  15  /  ALT  19  /  AlkPhos  99  05-03      Urinalysis Basic - ( 02 May 2020 16:08 )    Color: Yellow / Appearance: Clear / SG: >=1.030 / pH: x  Gluc: x / Ketone: NEGATIVE  / Bili: NEGATIVE / Urobili: 0.2 E.U./dL   Blood: x / Protein: Trace mg/dL / Nitrite: NEGATIVE   Leuk Esterase: NEGATIVE / RBC: < 5 /HPF / WBC < 5 /HPF   Sq Epi: x / Non Sq Epi: 0-5 /HPF / Bacteria: Present /HPF      CAPILLARY BLOOD GLUCOSE      POCT Blood Glucose.: 91 mg/dL (03 May 2020 12:21)      RADIOLOGY & ADDITIONAL TESTS: Reviewed.    ASSESSMENT:    PLAN: OVERNIGHT EVENTS: Maintained on BiPAP     SUBJECTIVE / INTERVAL HPI: Patient seen and examined at bedside. Awake and alert. Threatening to leave AMA unless taken off BIPAP.     VITAL SIGNS:  Vital Signs Last 24 Hrs  T(C): 36.3 (03 May 2020 10:42), Max: 37 (02 May 2020 14:56)  T(F): 97.4 (03 May 2020 10:42), Max: 98.6 (02 May 2020 14:56)  HR: 82 (03 May 2020 10:42) (78 - 99)  BP: 110/65 (03 May 2020 10:42) (110/65 - 128/62)  BP(mean): 84 (02 May 2020 22:14) (84 - 84)  RR: 18 (03 May 2020 10:42) (14 - 24)  SpO2: 97% (03 May 2020 10:42) (87% - 100%)    PHYSICAL EXAM:    General: WDWN on BiPAP  HEENT: NC/AT; PERRL, anicteric sclera; MMM  Neck: supple  Cardiovascular: +S1/S2; RRR  Respiratory: CTA B/L; no W/R/R  Gastrointestinal: soft, NT/ND; +BSx4  Extremities: b/l edema with venous stasis changes   Vascular: 2+ radial, DP/PT pulses B/L  Neurological: AAOx3; no focal deficits    MEDICATIONS:  MEDICATIONS  (STANDING):  ALBUTerol    90 MICROgram(s) HFA Inhaler 2 Puff(s) Inhalation every 6 hours  apixaban 5 milliGRAM(s) Oral two times a day  atorvastatin 40 milliGRAM(s) Oral at bedtime  azithromycin   Tablet 250 milliGRAM(s) Oral every 24 hours  budesonide 160 MICROgram(s)/formoterol 4.5 MICROgram(s) Inhaler 2 Puff(s) Inhalation two times a day  buPROPion XL . 300 milliGRAM(s) Oral daily  famotidine    Tablet 20 milliGRAM(s) Oral daily  methylPREDNISolone sodium succinate Injectable 40 milliGRAM(s) IV Push two times a day  sodium chloride 0.9%. 1000 milliLiter(s) (100 mL/Hr) IV Continuous <Continuous>    MEDICATIONS  (PRN):  HYDROmorphone   Tablet 2 milliGRAM(s) Oral three times a day PRN Severe Pain (7 - 10)      ALLERGIES:  Allergies    Augmentin (Unknown)  clindamycin (Unknown)  Vaseline (Swelling)    Intolerances        LABS:                        10.2   7.14  )-----------( 184      ( 03 May 2020 07:03 )             35.4     05-03    140  |  104  |  69<H>  ----------------------------<  81  x    |  26  |  2.34<H>    Ca    9.7      03 May 2020 12:18  Phos  5.0     05-03  Mg     2.7     05-03    TPro  8.1  /  Alb  3.5  /  TBili  0.2  /  DBili  x   /  AST  15  /  ALT  19  /  AlkPhos  99  05-03      Urinalysis Basic - ( 02 May 2020 16:08 )    Color: Yellow / Appearance: Clear / SG: >=1.030 / pH: x  Gluc: x / Ketone: NEGATIVE  / Bili: NEGATIVE / Urobili: 0.2 E.U./dL   Blood: x / Protein: Trace mg/dL / Nitrite: NEGATIVE   Leuk Esterase: NEGATIVE / RBC: < 5 /HPF / WBC < 5 /HPF   Sq Epi: x / Non Sq Epi: 0-5 /HPF / Bacteria: Present /HPF      CAPILLARY BLOOD GLUCOSE      POCT Blood Glucose.: 91 mg/dL (03 May 2020 12:21)      RADIOLOGY & ADDITIONAL TESTS: Reviewed.    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 Of note, pt called her cardiologist 2 weeks ago and was instructed to double her Bumex dose for 2 weeks with no relief in symptoms.   On arrival to ED, VS noted as T 98.6, HR 99, /49, RR 24, SpO2 87% on RA. Labs significant for D-Dimer WNL, CRP >12, Lactate 1.0, Trop negative, BNP WNL, BUN/Cr 83/2.93, pH 7.26, pCO2 69, pO2 31. EKG SR @ 86BPM, QTc 423. CXR wet read by PA w/ b/l haziness/opacities concerning for COVID infection despite negative culture.    In ED, pt given ASA 162mg PO x 1, Azithromycin 500mg IV x 1, and Albuterol inhaler x 1.  Pt was admitted COPD exacerbation and/or COVID infection. She was maintained on Azithro, Steroid for COPD exacerbation. Pt required BiPAP for SOB and hypercarbia.  Bumex held 2/2 BRANT. Scabies treatment given. Hyperkalemia treatment given this AM. No EKG changes.   Plan to place patient back on BiPAP 14/6 40%. Holding home dilaudid as well       OVERNIGHT EVENTS: Maintained on BiPAP     SUBJECTIVE / INTERVAL HPI: Patient seen and examined at bedside. Awake and alert. Threatening to leave AMA unless taken off BIPAP.     VITAL SIGNS:  Vital Signs Last 24 Hrs  T(C): 36.3 (03 May 2020 10:42), Max: 37 (02 May 2020 14:56)  T(F): 97.4 (03 May 2020 10:42), Max: 98.6 (02 May 2020 14:56)  HR: 82 (03 May 2020 10:42) (78 - 99)  BP: 110/65 (03 May 2020 10:42) (110/65 - 128/62)  BP(mean): 84 (02 May 2020 22:14) (84 - 84)  RR: 18 (03 May 2020 10:42) (14 - 24)  SpO2: 97% (03 May 2020 10:42) (87% - 100%)    PHYSICAL EXAM:    General: WDWN on BiPAP  HEENT: NC/AT; PERRL, anicteric sclera; MMM  Neck: supple  Cardiovascular: +S1/S2; RRR  Respiratory: CTA B/L; no W/R/R  Gastrointestinal: soft, NT/ND; +BSx4  Extremities: b/l edema with venous stasis changes   Vascular: 2+ radial, DP/PT pulses B/L  Neurological: AAOx3; no focal deficits    MEDICATIONS:  MEDICATIONS  (STANDING):  ALBUTerol    90 MICROgram(s) HFA Inhaler 2 Puff(s) Inhalation every 6 hours  apixaban 5 milliGRAM(s) Oral two times a day  atorvastatin 40 milliGRAM(s) Oral at bedtime  azithromycin   Tablet 250 milliGRAM(s) Oral every 24 hours  budesonide 160 MICROgram(s)/formoterol 4.5 MICROgram(s) Inhaler 2 Puff(s) Inhalation two times a day  buPROPion XL . 300 milliGRAM(s) Oral daily  famotidine    Tablet 20 milliGRAM(s) Oral daily  methylPREDNISolone sodium succinate Injectable 40 milliGRAM(s) IV Push two times a day  sodium chloride 0.9%. 1000 milliLiter(s) (100 mL/Hr) IV Continuous <Continuous>    MEDICATIONS  (PRN):  HYDROmorphone   Tablet 2 milliGRAM(s) Oral three times a day PRN Severe Pain (7 - 10)      ALLERGIES:  Allergies    Augmentin (Unknown)  clindamycin (Unknown)  Vaseline (Swelling)    Intolerances        LABS:                        10.2   7.14  )-----------( 184      ( 03 May 2020 07:03 )             35.4     05-03    140  |  104  |  69<H>  ----------------------------<  81  x    |  26  |  2.34<H>    Ca    9.7      03 May 2020 12:18  Phos  5.0     05-03  Mg     2.7     05-03    TPro  8.1  /  Alb  3.5  /  TBili  0.2  /  DBili  x   /  AST  15  /  ALT  19  /  AlkPhos  99  05-03      Urinalysis Basic - ( 02 May 2020 16:08 )    Color: Yellow / Appearance: Clear / SG: >=1.030 / pH: x  Gluc: x / Ketone: NEGATIVE  / Bili: NEGATIVE / Urobili: 0.2 E.U./dL   Blood: x / Protein: Trace mg/dL / Nitrite: NEGATIVE   Leuk Esterase: NEGATIVE / RBC: < 5 /HPF / WBC < 5 /HPF   Sq Epi: x / Non Sq Epi: 0-5 /HPF / Bacteria: Present /HPF      CAPILLARY BLOOD GLUCOSE      POCT Blood Glucose.: 91 mg/dL (03 May 2020 12:21)      RADIOLOGY & ADDITIONAL TESTS: Reviewed.    ASSESSMENT:    PLAN:

## 2020-05-03 NOTE — CONSULT NOTE ADULT - PROBLEM SELECTOR RECOMMENDATION 2
Persistent hypercapnic respiratory failure since arrival to [facility] in the setting of AECOPD. She had a normal ABG in Feb 2020 and serum chemistries at that time also revealed normal bicarb which essentially rules out OHS as etiology for her CO2 retention.     Recommendations:  - would continue NiV with BiPAP, and increase pressure support given persistent hypercarbia -- consider IPAP 14 / EPAP 5-6 at minimal FiO2 (oxygenation does not appear to be an issue and would not hyperoxygenate her) and keep on during day today and overnight tonight  - repeat ABG about an hour after increasing the pressure support on BiPAP  - if persistently acidotic, would have low threshold to transfer to monitored unit

## 2020-05-03 NOTE — CONSULT NOTE ADULT - SUBJECTIVE AND OBJECTIVE BOX
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 She can walk a maximum of 10 steps before becoming SOB. Pt states she called her cardiologist 2 weeks ago and was instructed to double her Bumex dose for 2 weeks with no relief in symptoms. Pt reports having had cough 2+ weeks ago which has since resolved. She also endorses chronic b/l LE swelling, but no recent change. She denies HA, dizziness, fevers, chills, cough, CP, palpitations, abdominal pain, n/v/d, or any other symptoms at this time.   On arrival to ED, VS noted as T 98.6, HR 99, /49, RR 24, SpO2 87% on RA. Labs significant for D-Dimer WNL, CRP >12, Lactate 1.0, Trop negative, BNP WNL, BUN/Cr 83/2.93, pH 7.26, pCO2 69, pO2 31. EKG SR @ 86BPM, QTc 423. CXR wet read by PA w/ b/l haziness/opacities c/f COVID infection.   In ED, pt given ASA 162mg PO x 1, Azithromycin 500mg IV x 1, and Albuterol inhaler x 1.  Pt is now transferred to [facility] for management of COPD exacerbation and/or COVID infection. (02 May 2020 22:14)      REVIEW OF SYSTEMS:  Constitutional: No fever, weight loss or fatigue  Eyes: No eye pain, visual disturbances, or discharge  ENMT:  No difficulty hearing, tinnitus, vertigo; No sinus or throat pain  Neck: No pain, stiffness or neck swelling  Respiratory: see HPI  Cardiovascular: No chest pain, palpitations, dizziness or leg swelling  Gastrointestinal: No abdominal or epigastric pain. No nausea, vomiting or hematemesis; No diarrhea or constipation. No melena or hematochezia.  Genitourinary: No dysuria, frequency, hematuria or incontinence  Neurological: No headaches, memory loss, loss of strength, numbness or tremors  Skin: No itching, burning, rashes or lesions   Lymph Nodes: No enlarged glands  Endocrine: No heat or cold intolerance; No hair loss  Musculoskeletal: No joint pain or swelling; No muscle, back or extremity pain  Psychiatric: No depression, anxiety, mood swings or difficulty sleeping  Heme/Lymph: No easy bruising or bleeding gums  Allergy and Immunologic: No hives or eczema    PAST MEDICAL & SURGICAL HISTORY:  PNA (pneumonia)  MRSA (methicillin resistant Staphylococcus aureus)  Brain embolism and thrombosis  Skin cancer  DVT (deep venous thrombosis)  Lyme disease  H/O shoulder surgery  Status post laparoscopic-assisted sigmoidectomy  H/O angioplasty      FAMILY HISTORY:  FH: heart failure: mother      SOCIAL HISTORY:  Smoking Status: [ ] Current, [ ] Former, [ ] Never  Pack Years:    MEDICATIONS:  Pulmonary:  ALBUTerol    90 MICROgram(s) HFA Inhaler 2 Puff(s) Inhalation every 6 hours  budesonide 160 MICROgram(s)/formoterol 4.5 MICROgram(s) Inhaler 2 Puff(s) Inhalation two times a day    Antimicrobials:  azithromycin   Tablet 250 milliGRAM(s) Oral every 24 hours    Anticoagulants:  apixaban 5 milliGRAM(s) Oral two times a day    Onc:    GI/:  famotidine    Tablet 20 milliGRAM(s) Oral daily    Endocrine:  atorvastatin 40 milliGRAM(s) Oral at bedtime  methylPREDNISolone sodium succinate Injectable 40 milliGRAM(s) IV Push two times a day    Cardiac:    Other Medications:  buPROPion XL . 300 milliGRAM(s) Oral daily  sodium chloride 0.9%. 1000 milliLiter(s) IV Continuous <Continuous>      Allergies    Augmentin (Unknown)  clindamycin (Unknown)  Vaseline (Swelling)    Intolerances        Vital Signs Last 24 Hrs  T(C): 36.3 (03 May 2020 10:42), Max: 36.8 (02 May 2020 21:49)  T(F): 97.4 (03 May 2020 10:42), Max: 98.2 (02 May 2020 21:49)  HR: 82 (03 May 2020 10:42) (78 - 89)  BP: 110/65 (03 May 2020 10:42) (110/65 - 128/62)  BP(mean): 84 (02 May 2020 22:14) (84 - 84)  RR: 18 (03 May 2020 10:42) (14 - 24)  SpO2: 97% (03 May 2020 10:42) (96% - 100%)    05-02 @ 07:01  -  05-03 @ 07:00  --------------------------------------------------------  IN: 1100 mL / OUT: 0 mL / NET: 1100 mL          PHYSICAL EXAM:  Constitutional: WDWN  Head: NC/AT  EENT: PERRL, anicteric sclera; oropharynx clear, MMM  Neck: supple, no appreciable JVD  Respiratory: CTA B/L; no W/R/R  Cardiovascular: +S1/S2, RRR  Gastrointestinal: soft, NT/ND; +BSx4  Extremities: WWP; no edema, clubbing or cyanosis  Vascular: 2+ radial, DP/PT pulses B/L  Neurological: AAOx3; no focal deficits    LABS:  ABG - ( 03 May 2020 15:05 )  pH, Arterial: 7.29  pH, Blood: x     /  pCO2: 63    /  pO2: 234   / HCO3: 29    / Base Excess: 1.7   /  SaO2: 99                  CBC Full  -  ( 03 May 2020 07:03 )  WBC Count : 7.14 K/uL  RBC Count : 3.74 M/uL  Hemoglobin : 10.2 g/dL  Hematocrit : 35.4 %  Platelet Count - Automated : 184 K/uL  Mean Cell Volume : 94.7 fl  Mean Cell Hemoglobin : 27.3 pg  Mean Cell Hemoglobin Concentration : 28.8 gm/dL  Auto Neutrophil # : 6.89 K/uL  Auto Lymphocyte # : 0.12 K/uL  Auto Monocyte # : 0.06 K/uL  Auto Eosinophil # : 0.06 K/uL  Auto Basophil # : 0.00 K/uL  Auto Neutrophil % : 96.5 %  Auto Lymphocyte % : 1.7 %  Auto Monocyte % : 0.9 %  Auto Eosinophil % : 0.9 %  Auto Basophil % : 0.0 %    05-03    140  |  104  |  69<H>  ----------------------------<  81  5.0   |  26  |  2.34<H>    Ca    9.7      03 May 2020 12:18  Phos  5.0     05-03  Mg     2.7     05-03    TPro  8.1  /  Alb  3.5  /  TBili  0.2  /  DBili  x   /  AST  15  /  ALT  19  /  AlkPhos  99  05-03          Urinalysis Basic - ( 02 May 2020 16:08 )    Color: Yellow / Appearance: Clear / SG: >=1.030 / pH: x  Gluc: x / Ketone: NEGATIVE  / Bili: NEGATIVE / Urobili: 0.2 E.U./dL   Blood: x / Protein: Trace mg/dL / Nitrite: NEGATIVE   Leuk Esterase: NEGATIVE / RBC: < 5 /HPF / WBC < 5 /HPF   Sq Epi: x / Non Sq Epi: 0-5 /HPF / Bacteria: Present /HPF                RADIOLOGY & ADDITIONAL STUDIES: PULMONARY SERVICE INITIAL CONSULT NOTE    HPI:  80yo Female, mostly motorized wheelchair bound (2/2 lyme disease neuropathy), former smoker, with PMHx of HTN, HLD, HFpEF (EF 55-60% by Echo on 2/18/20), COPD (on no home O2), DALIA (non compliant with CPAP), chronic back pain on PO Dilaudid, PVD s/p LE stents, 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, cerebral aneurysm s/p coil, 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 She can walk a maximum of 10 steps before becoming SOB. Pt states she called her cardiologist 2 weeks ago and was instructed to double her Bumex dose for 2 weeks with no relief in symptoms. Pt reports having had cough 2+ weeks ago which has since resolved. She also endorses chronic b/l LE swelling, but no recent change. She denies HA, dizziness, fevers, chills, cough, CP, palpitations, abdominal pain, n/v/d, or any other symptoms at this time.   On arrival to ED, VS noted as T 98.6, HR 99, /49, RR 24, SpO2 87% on RA. Labs significant for D-Dimer WNL, CRP >12, Lactate 1.0, Trop negative, BNP WNL, BUN/Cr 83/2.93, pH 7.26, pCO2 69, pO2 31. EKG SR @ 86BPM, QTc 423. CXR wet read by PA w/ b/l haziness/opacities c/f COVID infection.   In ED, pt given ASA 162mg PO x 1, Azithromycin 500mg IV x 1, and Albuterol inhaler x 1.  Pt is now transferred to [facility] for management of COPD exacerbation and/or COVID infection. (02 May 2020 22:14)    INTERVAL/PULMONARY HPI:  80yo former ~50+py smoker, reports about 2 weeks of progressive PHIPPS of about 10 ft (unable to walk to bathroom from bedroom in apartment) associated w/ malaise/fatigue, and fever to 101.3. She denies cough, wheezing, or sputa production during this time or even when she is healthy. Had had a couple of instances of chest pressure in the last few days with her dyspnea for which she spoke to her cardiologist via telemedicine who told her to double her home bumex dose -- this did not seem to help her symptoms and patient notes she did not urinate more often as she'd expect from this higher dose.     She has previously followed with pulmonologist Dr. Maza who manages her COPD with Yupelri nebulizer, albuterol, and perforomist however she only uses her inhalers when she feels she is dyspneic, not in general for maintenance. She denies prior hospitalizations or prior known episodes for COPD exacerbation and cannot recall being given steroids in the past for her breathing. She has done two sleep studies in the past, and was prescribed a CPAP device for home but does not use it because she doesn't know why she needs it and finds the mask to be uncomfortable.     REVIEW OF SYSTEMS:  Constitutional: No fever, weight loss or fatigue  Eyes: No eye pain, visual disturbances, or discharge  ENMT:  No difficulty hearing, tinnitus, vertigo; No sinus or throat pain  Neck: No pain, stiffness or neck swelling  Respiratory: see HPI  Cardiovascular: No chest pain, palpitations, dizziness or leg swelling  Gastrointestinal: No abdominal or epigastric pain. No nausea, vomiting or hematemesis; No diarrhea or constipation. No melena or hematochezia.  Genitourinary: No dysuria, frequency, hematuria or incontinence  Neurological: No headaches, memory loss, loss of strength, numbness or tremors  Skin: No itching, burning, rashes or lesions   Lymph Nodes: No enlarged glands  Endocrine: No heat or cold intolerance; No hair loss  Musculoskeletal: No joint pain or swelling; No muscle, back or extremity pain  Psychiatric: No depression, anxiety, mood swings or difficulty sleeping  Heme/Lymph: No easy bruising or bleeding gums  Allergy and Immunologic: No hives or eczema    PAST MEDICAL & SURGICAL HISTORY:  PNA (pneumonia)  MRSA (methicillin resistant Staphylococcus aureus)  Brain embolism and thrombosis  Skin cancer  DVT (deep venous thrombosis)  Lyme disease  H/O shoulder surgery  Status post laparoscopic-assisted sigmoidectomy  H/O angioplasty    FAMILY HISTORY:  FH: heart failure: mother    SOCIAL HISTORY:  Smoking Status: [ ] Current, [X] Former, [ ] Never  Pack Years: ~50+py (1ppd, quit 3yrs ago)    Retired /stock exchange regulations consultant    MEDICATIONS:  Pulmonary:  ALBUTerol    90 MICROgram(s) HFA Inhaler 2 Puff(s) Inhalation every 6 hours  budesonide 160 MICROgram(s)/formoterol 4.5 MICROgram(s) Inhaler 2 Puff(s) Inhalation two times a day    Antimicrobials:  azithromycin   Tablet 250 milliGRAM(s) Oral every 24 hours    Anticoagulants:  apixaban 5 milliGRAM(s) Oral two times a day    Onc:    GI/:  famotidine    Tablet 20 milliGRAM(s) Oral daily    Endocrine:  atorvastatin 40 milliGRAM(s) Oral at bedtime  methylPREDNISolone sodium succinate Injectable 40 milliGRAM(s) IV Push two times a day    Cardiac:    Other Medications:  buPROPion XL . 300 milliGRAM(s) Oral daily  sodium chloride 0.9%. 1000 milliLiter(s) IV Continuous <Continuous>    Allergies    Augmentin (Unknown)  clindamycin (Unknown)  Vaseline (Swelling)    Intolerances    Vital Signs Last 24 Hrs  T(C): 36.3 (03 May 2020 10:42), Max: 36.8 (02 May 2020 21:49)  T(F): 97.4 (03 May 2020 10:42), Max: 98.2 (02 May 2020 21:49)  HR: 82 (03 May 2020 10:42) (78 - 89)  BP: 110/65 (03 May 2020 10:42) (110/65 - 128/62)  BP(mean): 84 (02 May 2020 22:14) (84 - 84)  RR: 18 (03 May 2020 10:42) (14 - 24)  SpO2: 97% (03 May 2020 10:42) (96% - 100%)    05-02 @ 07:01  -  05-03 @ 07:00  --------------------------------------------------------  IN: 1100 mL / OUT: 0 mL / NET: 1100 mL    PHYSICAL EXAM:  Constitutional: obese woman resting comfortably in bed on BiPAP; not appearing in distress  Head: NC/AT  EENT: PERRL, anicteric sclera; +BiPAP facemask in place  Neck: supple, no appreciable JVD  Respiratory: trace, faint end-expiratory wheezes heard in bilateral mid-upper posterior lung fields; good air movement otherwise bilaterally including the bases on BiPAP; no increased work of breathing  Cardiovascular: +S1/S2, RRR  Gastrointestinal: soft, NT/ND  Extremities: WWP; 2+ LE pitting, weeping edema with chronic venous stasis changes bilaterally; no clubbing or cyanosis, no calf asymmetry  Vascular: 2+ radial pulses B/L  Neurological: awake and alert; MOSLEY, following commands and conversive    LABS:  ABG - ( 03 May 2020 15:05 )  pH, Arterial: 7.29  pH, Blood: x     /  pCO2: 63    /  pO2: 234   / HCO3: 29    / Base Excess: 1.7   /  SaO2: 99        CBC Full  -  ( 03 May 2020 07:03 )  WBC Count : 7.14 K/uL  RBC Count : 3.74 M/uL  Hemoglobin : 10.2 g/dL  Hematocrit : 35.4 %  Platelet Count - Automated : 184 K/uL  Mean Cell Volume : 94.7 fl  Mean Cell Hemoglobin : 27.3 pg  Mean Cell Hemoglobin Concentration : 28.8 gm/dL  Auto Neutrophil # : 6.89 K/uL  Auto Lymphocyte # : 0.12 K/uL  Auto Monocyte # : 0.06 K/uL  Auto Eosinophil # : 0.06 K/uL  Auto Basophil # : 0.00 K/uL  Auto Neutrophil % : 96.5 %  Auto Lymphocyte % : 1.7 %  Auto Monocyte % : 0.9 %  Auto Eosinophil % : 0.9 %  Auto Basophil % : 0.0 %    05-03    140  |  104  |  69<H>  ----------------------------<  81  5.0   |  26  |  2.34<H>    Ca    9.7      03 May 2020 12:18  Phos  5.0     05-03  Mg     2.7     05-03    TPro  8.1  /  Alb  3.5  /  TBili  0.2  /  DBili  x   /  AST  15  /  ALT  19  /  AlkPhos  99  05-03    Urinalysis Basic - ( 02 May 2020 16:08 )    Color: Yellow / Appearance: Clear / SG: >=1.030 / pH: x  Gluc: x / Ketone: NEGATIVE  / Bili: NEGATIVE / Urobili: 0.2 E.U./dL   Blood: x / Protein: Trace mg/dL / Nitrite: NEGATIVE   Leuk Esterase: NEGATIVE / RBC: < 5 /HPF / WBC < 5 /HPF   Sq Epi: x / Non Sq Epi: 0-5 /HPF / Bacteria: Present /HPF    RADIOLOGY & ADDITIONAL STUDIES:  Personally reviewed CXRs and compared w/ priors, without focal infiltrate seen

## 2020-05-03 NOTE — CONSULT NOTE ADULT - ASSESSMENT
80yo woman and former ~50+py smoker w/ PMH COPD (not on home O2), DALIA (not using home CPAP), HFpEF, PVD, hx DVTs on Eliquis admitted for PHIPPS and rule-out SARS-CoV-2, found to have acute hypercapnic respiratory failure 2/2 AECOPD. Pulmonary service called for management recommendations for BiPAP in hypercapnic respiratory failure and COPD exacerbation.

## 2020-05-03 NOTE — PROGRESS NOTE ADULT - ASSESSMENT
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 Pt is now transferred to [facility] for management of COPD exacerbation and/or COVID infection. Additionally found to have BRANT on CKD likely 2/2 increased diuretic usage and sepsis    PLAN:  NEURO:  -AAOx3, no sedation    RESPIRATORY:  -COPD exacerbation. Per pt, recent cardiac arrest in October 2019 from Prednisone, but tolerated during last admission in February 2020  - Started Solumderol 40mg IV BID x 5 days  - C/w Albuterol inhaler and home Advair TIC to Symbicort. Home Yupelri (Revefenacin) non-formulary  - C/w Azithromycin 250mg QD x 4 days  - Incentive spirometry ordered  - On BIpap for hypercarbia. Will wean as tolerated.   - Judicious opioid use in the setting of hypercarbia.   - h/o DALIA (obstructive sleep apnea). Noncompliant w/ CPAP at home  - -c/w BiPAP.     CARDIAC:  - Echo on 2/18/20 revealed EF 55-60% and no diastolic dysfunction, but per medical record, pt with diastolic CHF  -hold off bumex  -h/o HTN; BP stable    ID:  - Covid negative x 2  - CXR  w/ b/l opacities., CRP >12, D-Dimer WNL, no leukocytosis   - CT Chest non-contrast ordered, f/u results  , c/w Azithro x 4 days as above  - F/u daily Covid labs. F/u G6PD and Quantiferon results  - EKG QTc 423, daily EKG while on QT prolonging medications    RENAL:  -BRANT (acute kidney injury)- Likely pre-renal from diuretic and/or sepsis. Per pt, has been doubling Bumex dose with no relief of SOB. Will hold Bumex at this time and hydrate w/ IVF  - Holding home Mirapex- monitor for withdrawal   - Consider ULytes if BRANT not improving  - Renally dose medications    GI:   -Famotidine (renally dosed).    DVT ppx:  - h/o multiple LE DVTs. D-Dimer WNL  - Continue home Eliquis 5mg BID    -Venbous stasis dermatitis: pt w/ chronic lower ext venous stasis dermatitis; currently w/ b/l erythema, dry thick skin w/ denuded tender skin at the RLEXT, w/ mild amount of weeping clear discharge. low suspicion of cellulitis, will consult wound care    Pt with excoriations 2/2 persistent itching on scapulas x 5 months  - Ordered for Permetherin x 1 and given Benadryl x 1 for itching    -Wheelchair bound. Plan; Pt mostly motorized wheelchair bound 2/2 lyme neuropathy, walks with walker in apartment. 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 Pt is now transferred to [facility] for management of COPD exacerbation and/or COVID infection. Additionally found to have BRANT on CKD likely 2/2 increased diuretic usage and sepsis    PLAN:  NEURO:  -AAOx3, no sedation    RESPIRATORY:  -COPD exacerbation. Per pt, recent cardiac arrest in October 2019 from Prednisone, but tolerated during last admission in February 2020  - Started Solumderol 40mg IV BID x 5 days  - C/w Albuterol inhaler and home Advair TIC to Symbicort. Home Yupelri (Revefenacin) non-formulary  - C/w Azithromycin 250mg QD x 4 days  - Incentive spirometry ordered  - On BIpap for hypercarbia. Will wean as tolerated.   - Judicious opioid use in the setting of hypercarbia.   - h/o DALIA (obstructive sleep apnea). Noncompliant w/ CPAP at home  - -c/w BiPAP.     CARDIAC:  - Echo on 2/18/20 revealed EF 55-60% and no diastolic dysfunction, but per medical record, pt with diastolic CHF  -hold off bumex  -h/o HTN; BP stable    ID:  - Covid negative x 2  - CXR  w/ b/l opacities., CRP >12, D-Dimer WNL, no leukocytosis   - CT Chest non-contrast ordered, f/u results  , c/w Azithro x 4 days as above  - F/u daily Covid labs. F/u G6PD and Quantiferon results  - EKG QTc 423, daily EKG while on QT prolonging medications    RENAL:  -BRANT (acute kidney injury)- Likely pre-renal from diuretic and/or sepsis. Per pt, has been doubling Bumex dose with no relief of SOB. Will hold Bumex at this time and hydrate w/ IVF  - Holding home Mirapex- monitor for withdrawal   - Consider ULytes if BRANT not improving  - Renally dose medications    GI:   -Famotidine (renally dosed).    DVT ppx:  - h/o multiple LE DVTs. D-Dimer WNL  - Continue home Eliquis 5mg BID    -Venbous stasis dermatitis: pt w/ chronic lower ext venous stasis dermatitis; currently w/ b/l erythema, dry thick skin w/ denuded tender skin at the RLEXT, w/ mild amount of weeping clear discharge. Low suspicion of cellulitis, will consult wound care    Pt with excoriations 2/2 persistent itching on scapulas x 5 months  - Ordered for Permetherin x 1 and given Benadryl x 1 for itching    -Wheelchair bound. Pt mostly motorized wheelchair bound 2/2 lyme neuropathy, walks with walker in apartment. Pt lives alone  - PT and SW consults placed, f/u recs    -D/w Dr. Dori Caldwell

## 2020-05-03 NOTE — PROGRESS NOTE ADULT - PROBLEM SELECTOR PLAN 4
Likely 2/2 COPD exacerbation vs Covid infection  - BiPAP  - Judicious opioid use in the setting of hypercarbia

## 2020-05-03 NOTE — CONSULT NOTE ADULT - PROBLEM SELECTOR RECOMMENDATION 3
Reported hx of DALIA; has home CPAP machine but does not use it as she does not know why she needs it and believes the mask to be uncomfortable. Does endorse having had two prior sleep studies.     She will ultimately be on NiV for the time being given problem #2 above; her CPAP needs and DALIA mgmt in general can be followed up as an outpatient. She expressed not wanting to return to her outpatient pulmonologist and can therefore follow-up with Dr. Génesis Vera [facility]ross [facility] street from [facility]) which is close to her home.     Patient s/e/d with pulmonary attending Dr. Fuller  We will follow with you.

## 2020-05-03 NOTE — CONSULT NOTE ADULT - PROBLEM SELECTOR RECOMMENDATION 9
Pulmonary service asked to evaluate patient for BiPAP management in hypercapnic respiratory failure and AECOPD. Does not appear in CHF exacerbation and had been instructed to increase diuretic dose by cardiologist prior to hospitalization - pro- here. Her increasing PHIPPS and acute CO2 retention with wheezing on exam support AECOPD. Follows w/ pulmonologist Dr. Maza as outpatient; on LAMA, LABA, and LIVE inhalers/nebulizers but generally non-compliant. RSV/Flu neg, COVID-19 negx1    Recommendations:  - agree w/ steroids and azithromycin for AECOPD  - would ensure she receives standing albuterol q4h MDI (while on BiPAP/COVID-r/o)   - can continue symbicort (LABA/ICS) while here as well  - can consider adding spiriva (LAMA) as she is on a LAMA as outpatient albeit intermittently compliant  - cont NIV as discussed in problem #2

## 2020-05-03 NOTE — PROGRESS NOTE ADULT - PROBLEM SELECTOR PLAN 6
Pt with h/o multiple LE DVTs. D-Dimer WNL  - Continue home Eliquis 5mg BID    ADDENDUM:   #venbous stasis dermatitis: pt w/ chronic lower ext venous stasis dermatitis; currently w/ b/l erythema, dry thick skin w/ denuded tender skin at the RLEXT, w/ mild amount of weeping clear discharge. low suspicion of cellulitis, will consult wound care

## 2020-05-03 NOTE — PROGRESS NOTE ADULT - SUBJECTIVE AND OBJECTIVE BOX
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 Of note, pt called her cardiologist 2 weeks ago and was instructed to double her Bumex dose for 2 weeks with no relief in symptoms.   On arrival to ED, VS noted as T 98.6, HR 99, /49, RR 24, SpO2 87% on RA. Labs significant for D-Dimer WNL, CRP >12, Lactate 1.0, Trop negative, BNP WNL, BUN/Cr 83/2.93, pH 7.26, pCO2 69, pO2 31. EKG SR @ 86BPM, QTc 423. CXR wet read by PA w/ b/l haziness/opacities concerning for COVID infection despite negative culture.    In ED, pt given ASA 162mg PO x 1, Azithromycin 500mg IV x 1, and Albuterol inhaler x 1.  Pt was admitted COPD exacerbation and/or COVID infection. She was maintained on Azithro, Steroid for COPD exacerbation. Pt required BiPAP for SOB and hypercarbia.  Bumex held 2/2 BRANT. Scabies treatment given. Hyperkalemia treatment given this AM. No EKG changes.   Plan to place patient back on BiPAP 14/6 40%. Holding home dilaudid as well       SUBJECTIVE / OVERNIGHT EVENTS:    ADDITIONAL REVIEW OF SYSTEMS:    MEDICATIONS  (STANDING):  ALBUTerol    90 MICROgram(s) HFA Inhaler 2 Puff(s) Inhalation every 6 hours  apixaban 5 milliGRAM(s) Oral two times a day  atorvastatin 40 milliGRAM(s) Oral at bedtime  azithromycin   Tablet 250 milliGRAM(s) Oral every 24 hours  budesonide 160 MICROgram(s)/formoterol 4.5 MICROgram(s) Inhaler 2 Puff(s) Inhalation two times a day  buPROPion XL . 300 milliGRAM(s) Oral daily  famotidine    Tablet 20 milliGRAM(s) Oral daily  methylPREDNISolone sodium succinate Injectable 40 milliGRAM(s) IV Push two times a day  sodium chloride 0.9%. 1000 milliLiter(s) (100 mL/Hr) IV Continuous <Continuous>    MEDICATIONS  (PRN):    CAPILLARY BLOOD GLUCOSE      POCT Blood Glucose.: 91 mg/dL (03 May 2020 12:21)  POCT Blood Glucose.: 50 mg/dL (03 May 2020 11:50)  POCT Blood Glucose.: 361 mg/dL (03 May 2020 09:41)    I&O's Summary    02 May 2020 07:01  -  03 May 2020 07:00  --------------------------------------------------------  IN: 1100 mL / OUT: 0 mL / NET: 1100 mL        PHYSICAL EXAM:  Vital Signs Last 24 Hrs  T(C): 36.3 (03 May 2020 10:42), Max: 36.8 (02 May 2020 21:49)  T(F): 97.4 (03 May 2020 10:42), Max: 98.2 (02 May 2020 21:49)  HR: 82 (03 May 2020 10:42) (78 - 89)  BP: 110/65 (03 May 2020 10:42) (110/65 - 128/62)  BP(mean): 84 (02 May 2020 22:14) (84 - 84)  RR: 18 (03 May 2020 10:42) (14 - 24)  SpO2: 97% (03 May 2020 10:42) (96% - 100%)    PHYSICAL EXAM:    General: WDWN on BiPAP  HEENT: NC/AT; PERRL, anicteric sclera; MMM  Neck: supple  Cardiovascular: +S1/S2; RRR  Respiratory: CTA B/L; no W/R/R  Gastrointestinal: soft, NT/ND; +BSx4  Extremities: b/l edema with venous stasis changes   Vascular: 2+ radial, DP/PT pulses B/L  Neurological: AAOx3; no focal deficits    LABS:                        10.2   7.14  )-----------( 184      ( 03 May 2020 07:03 )             35.4     05-03    140  |  104  |  69<H>  ----------------------------<  81  5.0   |  26  |  2.34<H>    Ca    9.7      03 May 2020 12:18  Phos  5.0     05-03  Mg     2.7     05-03    TPro  8.1  /  Alb  3.5  /  TBili  0.2  /  DBili  x   /  AST  15  /  ALT  19  /  AlkPhos  99  05-03      CARDIAC MARKERS ( 03 May 2020 07:03 )  x     / 0.01 ng/mL / x     / x     / x      CARDIAC MARKERS ( 02 May 2020 16:08 )  <0.017 ng/mL / x     / x     / x     / x          Urinalysis Basic - ( 02 May 2020 16:08 )    Color: Yellow / Appearance: Clear / SG: >=1.030 / pH: x  Gluc: x / Ketone: NEGATIVE  / Bili: NEGATIVE / Urobili: 0.2 E.U./dL   Blood: x / Protein: Trace mg/dL / Nitrite: NEGATIVE   Leuk Esterase: NEGATIVE / RBC: < 5 /HPF / WBC < 5 /HPF   Sq Epi: x / Non Sq Epi: 0-5 /HPF / Bacteria: Present /HPF        COVID-19 PCR: NotDetec (02 May 2020 23:22)      RADIOLOGY & ADDITIONAL TESTS:  Imaging from Last 24 Hours:    Electrocardiogram/QTc Interval:    COORDINATION OF CARE:  Care Discussed with Consultants/Other Providers:

## 2020-05-03 NOTE — PROGRESS NOTE ADULT - PROBLEM SELECTOR PLAN 2
wheezing on admission with SOB  - Per pt, recent cardiac arrest in October 2019 from Prednisone, but tolerated during last admission in February 2020  - Started Solumderol 40mg IV BID x 5 days  - C/w Albuterol inhaler and home Advair TIC to Symbicort. Home Yupelri (Revefenacin) non-formulary  - C/w Azithromycin 250mg QD x 4 days  - Incentive spirometry ordered  - On BIpap for hypercarbia. Will wean as tolerated

## 2020-05-03 NOTE — PROGRESS NOTE ADULT - ASSESSMENT
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 [facility] COPD exacerbation and/or COVID infection. Additionally found to have BRANT on CKD likely 2/2 increased diuretic usage and sepsis.

## 2020-05-03 NOTE — PROGRESS NOTE ADULT - PROBLEM SELECTOR PLAN 5
BRANT on CKD, Cr 2.93 on admission with baseline ~1.7-2.0  - Likely pre-renal from diuretic and/or sepsis. Per pt, has been doubling Bumex dose with no relief of SOB. Will hold Bumex at this time and hydrate w/ IVF  - Holding home Mirapex- monitor for withdrawal   - Consider ULytes if BRANT not improving  - Renally dose medications

## 2020-05-03 NOTE — PROGRESS NOTE ADULT - PROBLEM SELECTOR PLAN 10
Pt mostly motorized wheelchair bound 2/2 lyme neuropathy, walks with walker in apartment. Pt lives alone  - PT and SW consults placed, f/u recs    #Possible scabies  Pt with excoriations 2/2 persistent itching on scapulas x 5 months  - Ordered for Permetherin x 1 and given Benadryl x 1 for itching    VTE ppx: Eliquis  GI: Famotidine (renally dosed)

## 2020-05-03 NOTE — PROGRESS NOTE ADULT - PROBLEM SELECTOR PLAN 1
Pt presented with 2/4 SIRS criteria with presumed source of infection (Covid, initial test negative), lactate WNL. Currently afebrile and no leukocytosis. UA unremarkable  - CXR c/f Covid infection,  . C/w Azithromycin 25mg QD x 4 days in light of COPD exacerbation and possible Covid infection  - BCx if pt develops fever or leukocytosis

## 2020-05-03 NOTE — PROVIDER CONTACT NOTE (OTHER) - ACTION/TREATMENT ORDERED:
PA spoke with respiratory who will be coming by to make adjustments to the BiPAP. Until respiratory arrives, keep all settings as they are currently.

## 2020-05-04 VITALS
HEART RATE: 97 BPM | RESPIRATION RATE: 35 BRPM | OXYGEN SATURATION: 95 % | DIASTOLIC BLOOD PRESSURE: 62 MMHG | SYSTOLIC BLOOD PRESSURE: 137 MMHG

## 2020-05-04 LAB
BASE EXCESS BLDA CALC-SCNC: 1.6 MMOL/L — SIGNIFICANT CHANGE UP (ref -2–3)
HCO3 BLDA-SCNC: 29 MMOL/L — HIGH (ref 21–28)
PCO2 BLDA: 63 MMHG — CRITICAL HIGH (ref 32–45)
PH BLDA: 7.29 — LOW (ref 7.35–7.45)
PO2 BLDA: 95 MMHG — SIGNIFICANT CHANGE UP (ref 83–108)
SAO2 % BLDA: 97 % — SIGNIFICANT CHANGE UP (ref 95–100)
SARS-COV-2 RNA SPEC QL NAA+PROBE: SIGNIFICANT CHANGE UP

## 2020-05-04 PROCEDURE — 84100 ASSAY OF PHOSPHORUS: CPT

## 2020-05-04 PROCEDURE — 84484 ASSAY OF TROPONIN QUANT: CPT

## 2020-05-04 PROCEDURE — 81001 URINALYSIS AUTO W/SCOPE: CPT

## 2020-05-04 PROCEDURE — 82728 ASSAY OF FERRITIN: CPT

## 2020-05-04 PROCEDURE — 71045 X-RAY EXAM CHEST 1 VIEW: CPT

## 2020-05-04 PROCEDURE — 82955 ASSAY OF G6PD ENZYME: CPT

## 2020-05-04 PROCEDURE — 99233 SBSQ HOSP IP/OBS HIGH 50: CPT

## 2020-05-04 PROCEDURE — 94660 CPAP INITIATION&MGMT: CPT

## 2020-05-04 PROCEDURE — 85379 FIBRIN DEGRADATION QUANT: CPT

## 2020-05-04 PROCEDURE — 84132 ASSAY OF SERUM POTASSIUM: CPT

## 2020-05-04 PROCEDURE — 85027 COMPLETE CBC AUTOMATED: CPT

## 2020-05-04 PROCEDURE — 86140 C-REACTIVE PROTEIN: CPT

## 2020-05-04 PROCEDURE — 87798 DETECT AGENT NOS DNA AMP: CPT

## 2020-05-04 PROCEDURE — 93005 ELECTROCARDIOGRAM TRACING: CPT

## 2020-05-04 PROCEDURE — 83735 ASSAY OF MAGNESIUM: CPT

## 2020-05-04 PROCEDURE — 85025 COMPLETE CBC W/AUTO DIFF WBC: CPT

## 2020-05-04 PROCEDURE — 83605 ASSAY OF LACTIC ACID: CPT

## 2020-05-04 PROCEDURE — 96374 THER/PROPH/DIAG INJ IV PUSH: CPT

## 2020-05-04 PROCEDURE — 36415 COLL VENOUS BLD VENIPUNCTURE: CPT

## 2020-05-04 PROCEDURE — 87635 SARS-COV-2 COVID-19 AMP PRB: CPT

## 2020-05-04 PROCEDURE — 83880 ASSAY OF NATRIURETIC PEPTIDE: CPT

## 2020-05-04 PROCEDURE — 99285 EMERGENCY DEPT VISIT HI MDM: CPT | Mod: 25

## 2020-05-04 PROCEDURE — 82803 BLOOD GASES ANY COMBINATION: CPT

## 2020-05-04 PROCEDURE — 84145 PROCALCITONIN (PCT): CPT

## 2020-05-04 PROCEDURE — 82962 GLUCOSE BLOOD TEST: CPT

## 2020-05-04 PROCEDURE — 87486 CHLMYD PNEUM DNA AMP PROBE: CPT

## 2020-05-04 PROCEDURE — 87633 RESP VIRUS 12-25 TARGETS: CPT

## 2020-05-04 PROCEDURE — 80048 BASIC METABOLIC PNL TOTAL CA: CPT

## 2020-05-04 PROCEDURE — 86480 TB TEST CELL IMMUN MEASURE: CPT

## 2020-05-04 PROCEDURE — 87581 M.PNEUMON DNA AMP PROBE: CPT

## 2020-05-04 PROCEDURE — 71250 CT THORAX DX C-: CPT

## 2020-05-04 PROCEDURE — 82330 ASSAY OF CALCIUM: CPT

## 2020-05-04 PROCEDURE — 84295 ASSAY OF SERUM SODIUM: CPT

## 2020-05-04 PROCEDURE — 80053 COMPREHEN METABOLIC PANEL: CPT

## 2020-05-04 PROCEDURE — 94640 AIRWAY INHALATION TREATMENT: CPT

## 2020-05-04 RX ORDER — HYDROMORPHONE HYDROCHLORIDE 2 MG/ML
2 INJECTION INTRAMUSCULAR; INTRAVENOUS; SUBCUTANEOUS ONCE
Refills: 0 | Status: DISCONTINUED | OUTPATIENT
Start: 2020-05-04 | End: 2020-05-04

## 2020-05-04 RX ORDER — ACETAMINOPHEN 500 MG
650 TABLET ORAL ONCE
Refills: 0 | Status: COMPLETED | OUTPATIENT
Start: 2020-05-04 | End: 2020-05-04

## 2020-05-04 RX ORDER — HYDROMORPHONE HYDROCHLORIDE 2 MG/ML
1 INJECTION INTRAMUSCULAR; INTRAVENOUS; SUBCUTANEOUS ONCE
Refills: 0 | Status: DISCONTINUED | OUTPATIENT
Start: 2020-05-04 | End: 2020-05-04

## 2020-05-04 RX ADMIN — Medication 40 MILLIGRAM(S): at 06:16

## 2020-05-04 RX ADMIN — HYDROMORPHONE HYDROCHLORIDE 2 MILLIGRAM(S): 2 INJECTION INTRAMUSCULAR; INTRAVENOUS; SUBCUTANEOUS at 02:53

## 2020-05-04 RX ADMIN — BUDESONIDE AND FORMOTEROL FUMARATE DIHYDRATE 2 PUFF(S): 160; 4.5 AEROSOL RESPIRATORY (INHALATION) at 06:00

## 2020-05-04 RX ADMIN — PRAMIPEXOLE DIHYDROCHLORIDE 0.12 MILLIGRAM(S): 0.12 TABLET ORAL at 00:17

## 2020-05-04 RX ADMIN — HYDROMORPHONE HYDROCHLORIDE 1 MILLIGRAM(S): 2 INJECTION INTRAMUSCULAR; INTRAVENOUS; SUBCUTANEOUS at 00:54

## 2020-05-04 RX ADMIN — ALBUTEROL 2 PUFF(S): 90 AEROSOL, METERED ORAL at 05:55

## 2020-05-04 RX ADMIN — APIXABAN 5 MILLIGRAM(S): 2.5 TABLET, FILM COATED ORAL at 06:41

## 2020-05-04 NOTE — PROVIDER CONTACT NOTE (OTHER) - RECOMMENDATIONS
none at this time. The patient has been counseled by myself, the primary RN and physician and still refuses to stay on this inpatient unit.

## 2020-05-04 NOTE — PROGRESS NOTE ADULT - SUBJECTIVE AND OBJECTIVE BOX
Medicine Progress Note    Patient is a 79y old  Female who presents with a chief complaint of COPD exacerbation vs COVID (03 May 2020 15:49)    SUBJECTIVE / OVERNIGHT EVENTS:  FARIBA overnight. On NC, refusing bipap.    ADDITIONAL REVIEW OF SYSTEMS:    MEDICATIONS  (STANDING):  ALBUTerol    90 MICROgram(s) HFA Inhaler 2 Puff(s) Inhalation every 6 hours  apixaban 5 milliGRAM(s) Oral two times a day  atorvastatin 40 milliGRAM(s) Oral at bedtime  azithromycin   Tablet 250 milliGRAM(s) Oral every 24 hours  budesonide 160 MICROgram(s)/formoterol 4.5 MICROgram(s) Inhaler 2 Puff(s) Inhalation two times a day  buPROPion XL . 300 milliGRAM(s) Oral daily  famotidine    Tablet 20 milliGRAM(s) Oral daily  methylPREDNISolone sodium succinate Injectable 40 milliGRAM(s) IV Push every 8 hours  pramipexole 0.125 milliGRAM(s) Oral at bedtime    MEDICATIONS  (PRN):    CAPILLARY BLOOD GLUCOSE      POCT Blood Glucose.: 91 mg/dL (03 May 2020 12:21)  POCT Blood Glucose.: 50 mg/dL (03 May 2020 11:50)  POCT Blood Glucose.: 361 mg/dL (03 May 2020 09:41)    I&O's Summary    03 May 2020 07:01  -  04 May 2020 07:00  --------------------------------------------------------  IN: 240 mL / OUT: 500 mL / NET: -260 mL        PHYSICAL EXAM:  Vital Signs Last 24 Hrs  T(C): 36.6 (04 May 2020 04:00), Max: 36.7 (03 May 2020 16:20)  T(F): 97.8 (04 May 2020 04:00), Max: 98 (03 May 2020 16:20)  HR: 97 (04 May 2020 08:00) (79 - 97)  BP: 137/62 (04 May 2020 08:00) (110/65 - 181/69)  BP(mean): 87 (04 May 2020 06:00) (72 - 102)  RR: 35 (04 May 2020 08:00) (13 - 48)  SpO2: 95% (04 May 2020 08:00) (90% - 98%)    General: WDWN on BiPAP  HEENT: NC/AT; PERRL, anicteric sclera; MMM  Neck: supple  Cardiovascular: +S1/S2; RRR  Respiratory: CTA B/L; no W/R/R  Gastrointestinal: soft, NT/ND; +BSx4  Extremities: b/l edema with venous stasis changes   Vascular: 2+ radial, DP/PT pulses B/L  Neurological: AAOx3; no focal deficits      LABS:                        10.2   7.14  )-----------( 184      ( 03 May 2020 07:03 )             35.4     05-03    137  |  101  |  73<H>  ----------------------------<  216<H>  6.0<H>   |  27  |  2.15<H>    Ca    9.6      03 May 2020 17:40  Phos  5.0     05-03  Mg     2.7     05-03    TPro  8.1  /  Alb  3.5  /  TBili  0.2  /  DBili  x   /  AST  15  /  ALT  19  /  AlkPhos  99  05-03      CARDIAC MARKERS ( 03 May 2020 07:03 )  x     / 0.01 ng/mL / x     / x     / x      CARDIAC MARKERS ( 02 May 2020 16:08 )  <0.017 ng/mL / x     / x     / x     / x          Urinalysis Basic - ( 02 May 2020 16:08 )    Color: Yellow / Appearance: Clear / SG: >=1.030 / pH: x  Gluc: x / Ketone: NEGATIVE  / Bili: NEGATIVE / Urobili: 0.2 E.U./dL   Blood: x / Protein: Trace mg/dL / Nitrite: NEGATIVE   Leuk Esterase: NEGATIVE / RBC: < 5 /HPF / WBC < 5 /HPF   Sq Epi: x / Non Sq Epi: 0-5 /HPF / Bacteria: Present /HPF        COVID-19 PCR: NotDetec (02 May 2020 23:22)      RADIOLOGY & ADDITIONAL TESTS:  Imaging from Last 24 Hours:    Electrocardiogram/QTc Interval:    COORDINATION OF CARE:  Care Discussed with Consultants/Other Providers:

## 2020-05-04 NOTE — PROVIDER CONTACT NOTE (OTHER) - BACKGROUND
Newly admitted patient overnight to the unit. Unhappy with bed, food and communication around her care plan.

## 2020-05-04 NOTE — PROGRESS NOTE ADULT - ASSESSMENT
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 Pt is now transferred to [facility] for management of COPD exacerbation and/or COVID infection. Additionally found to have BRANT on CKD likely 2/2 increased diuretic usage and sepsis    PLAN:  NEURO:  -AAOx3, no sedation    RESPIRATORY:  -COPD exacerbation. Per pt, recent cardiac arrest in October 2019 from Prednisone, but tolerated during last admission in February 2020  - Started Solumderol 40mg IV BID x 5 days  - C/w Albuterol inhaler and home Advair TIC to Symbicort. Home Yupelri (Revefenacin) non-formulary  - C/w Azithromycin 250mg QD x 4 days  - Incentive spirometry ordered  - On BIpap for hypercarbia. Will wean as tolerated.   - Judicious opioid use in the setting of hypercarbia.   - h/o DALIA (obstructive sleep apnea). Noncompliant w/ CPAP at home  - -c/w BiPAP.     CARDIAC:  - Echo on 2/18/20 revealed EF 55-60% and no diastolic dysfunction, but per medical record, pt with diastolic CHF  -hold off bumex  -h/o HTN; BP stable    ID:  - Covid negative x 2  - CXR  w/ b/l opacities., CRP >12, D-Dimer WNL, no leukocytosis   - CT Chest non-contrast ordered, f/u results  , c/w Azithro x 4 days as above  - F/u daily Covid labs. F/u G6PD and Quantiferon results  - EKG QTc 423, daily EKG while on QT prolonging medications    RENAL:  -BRANT (acute kidney injury)- Likely pre-renal from diuretic and/or sepsis. Per pt, has been doubling Bumex dose with no relief of SOB. Will hold Bumex at this time and hydrate w/ IVF  - Holding home Mirapex- monitor for withdrawal   - Consider ULytes if BRANT not improving  - Renally dose medications    GI:   -Famotidine (renally dosed).    DVT ppx:  - h/o multiple LE DVTs. D-Dimer WNL  - Continue home Eliquis 5mg BID    -Venbous stasis dermatitis: pt w/ chronic lower ext venous stasis dermatitis; currently w/ b/l erythema, dry thick skin w/ denuded tender skin at the RLEXT, w/ mild amount of weeping clear discharge. Low suspicion of cellulitis, will consult wound care    Pt with excoriations 2/2 persistent itching on scapulas x 5 months  - Ordered for Permetherin x 1 and given Benadryl x 1 for itching    -Wheelchair bound. Pt mostly motorized wheelchair bound 2/2 lyme neuropathy, walks with walker in apartment. Pt lives alone  - PT and SW consults placed, f/u recs    -D/w Dr. Dori Caldwell

## 2020-05-04 NOTE — CHART NOTE - NSCHARTNOTEFT_GEN_A_CORE
Mrs. Phelps left AMA, refusing to sign paperwork. She was verbalized the risks of leaving, including hypoxic respiratory failure by JOSE Jaffe.

## 2020-05-06 DIAGNOSIS — Z87.891 PERSONAL HISTORY OF NICOTINE DEPENDENCE: ICD-10-CM

## 2020-05-06 DIAGNOSIS — E87.5 HYPERKALEMIA: ICD-10-CM

## 2020-05-06 DIAGNOSIS — Z86.74 PERSONAL HISTORY OF SUDDEN CARDIAC ARREST: ICD-10-CM

## 2020-05-06 DIAGNOSIS — G47.33 OBSTRUCTIVE SLEEP APNEA (ADULT) (PEDIATRIC): ICD-10-CM

## 2020-05-06 DIAGNOSIS — I13.0 HYPERTENSIVE HEART AND CHRONIC KIDNEY DISEASE WITH HEART FAILURE AND STAGE 1 THROUGH STAGE 4 CHRONIC KIDNEY DISEASE, OR UNSPECIFIED CHRONIC KIDNEY DISEASE: ICD-10-CM

## 2020-05-06 DIAGNOSIS — B86 SCABIES: ICD-10-CM

## 2020-05-06 DIAGNOSIS — J96.01 ACUTE RESPIRATORY FAILURE WITH HYPOXIA: ICD-10-CM

## 2020-05-06 DIAGNOSIS — Z91.19 PATIENT'S NONCOMPLIANCE WITH OTHER MEDICAL TREATMENT AND REGIMEN: ICD-10-CM

## 2020-05-06 DIAGNOSIS — E87.2 ACIDOSIS: ICD-10-CM

## 2020-05-06 DIAGNOSIS — Z86.718 PERSONAL HISTORY OF OTHER VENOUS THROMBOSIS AND EMBOLISM: ICD-10-CM

## 2020-05-06 DIAGNOSIS — N17.9 ACUTE KIDNEY FAILURE, UNSPECIFIED: ICD-10-CM

## 2020-05-06 DIAGNOSIS — Z99.3 DEPENDENCE ON WHEELCHAIR: ICD-10-CM

## 2020-05-06 DIAGNOSIS — Z79.01 LONG TERM (CURRENT) USE OF ANTICOAGULANTS: ICD-10-CM

## 2020-05-06 DIAGNOSIS — A69.22 OTHER NEUROLOGIC DISORDERS IN LYME DISEASE: ICD-10-CM

## 2020-05-06 DIAGNOSIS — J44.1 CHRONIC OBSTRUCTIVE PULMONARY DISEASE WITH (ACUTE) EXACERBATION: ICD-10-CM

## 2020-05-06 DIAGNOSIS — J12.89 OTHER VIRAL PNEUMONIA: ICD-10-CM

## 2020-05-06 DIAGNOSIS — R06.02 SHORTNESS OF BREATH: ICD-10-CM

## 2020-05-06 DIAGNOSIS — I50.32 CHRONIC DIASTOLIC (CONGESTIVE) HEART FAILURE: ICD-10-CM

## 2020-05-06 DIAGNOSIS — A41.89 OTHER SPECIFIED SEPSIS: ICD-10-CM

## 2020-05-06 DIAGNOSIS — Z85.828 PERSONAL HISTORY OF OTHER MALIGNANT NEOPLASM OF SKIN: ICD-10-CM

## 2020-05-06 DIAGNOSIS — N18.4 CHRONIC KIDNEY DISEASE, STAGE 4 (SEVERE): ICD-10-CM

## 2020-05-06 DIAGNOSIS — I87.2 VENOUS INSUFFICIENCY (CHRONIC) (PERIPHERAL): ICD-10-CM

## 2020-05-06 DIAGNOSIS — J96.02 ACUTE RESPIRATORY FAILURE WITH HYPERCAPNIA: ICD-10-CM

## 2020-05-06 LAB — G6PD RBC-CCNC: 17.7 U/G HGB — SIGNIFICANT CHANGE UP (ref 7–20.5)

## 2020-05-12 NOTE — H&P ADULT - NSHPPOAPULMEMBOLUS_GEN_A_CORE
This note was copied from a baby's chart.   Instructed Mom to call [facility] when she gets ready to feed no

## 2020-08-23 NOTE — CONSULT NOTE ADULT - CONSULT REQUESTED DATE/TIME
A&OX4. Up SBA. VSS. BGL checked every hour. Deneis any pain. Lungs sounds clear. Asympomatic COVID test results pending. Skin is intact. Regular diet. PIV D5 normal saline IVFinfusing @ 100ml/hr. Labs: Cre: 1.94, Hgb 7.3, Platelets 111, WBCs 80.2.     Treatment plan: BGL Q1 hour, D5 NS IV fluids, Recheck BMP in the morning per MD note.    17-Feb-2020 12:56

## 2020-09-01 ENCOUNTER — INPATIENT (INPATIENT)
Facility: HOSPITAL | Age: 80
LOS: 1 days | Discharge: HOME | DRG: 191 | End: 2020-09-03
Attending: INTERNAL MEDICINE | Admitting: INTERNAL MEDICINE
Payer: MEDICARE

## 2020-09-01 VITALS
RESPIRATION RATE: 19 BRPM | DIASTOLIC BLOOD PRESSURE: 74 MMHG | OXYGEN SATURATION: 99 % | HEART RATE: 97 BPM | SYSTOLIC BLOOD PRESSURE: 141 MMHG | HEIGHT: 68 IN | WEIGHT: 250 LBS | TEMPERATURE: 98 F

## 2020-09-01 DIAGNOSIS — R63.8 OTHER SYMPTOMS AND SIGNS CONCERNING FOOD AND FLUID INTAKE: ICD-10-CM

## 2020-09-01 DIAGNOSIS — E87.5 HYPERKALEMIA: ICD-10-CM

## 2020-09-01 DIAGNOSIS — S22.39XA FRACTURE OF ONE RIB, UNSPECIFIED SIDE, INITIAL ENCOUNTER FOR CLOSED FRACTURE: ICD-10-CM

## 2020-09-01 DIAGNOSIS — J44.1 CHRONIC OBSTRUCTIVE PULMONARY DISEASE WITH (ACUTE) EXACERBATION: ICD-10-CM

## 2020-09-01 DIAGNOSIS — Z98.62 PERIPHERAL VASCULAR ANGIOPLASTY STATUS: Chronic | ICD-10-CM

## 2020-09-01 DIAGNOSIS — Z98.89 OTHER SPECIFIED POSTPROCEDURAL STATES: Chronic | ICD-10-CM

## 2020-09-01 DIAGNOSIS — Z90.49 ACQUIRED ABSENCE OF OTHER SPECIFIED PARTS OF DIGESTIVE TRACT: Chronic | ICD-10-CM

## 2020-09-01 DIAGNOSIS — N18.4 CHRONIC KIDNEY DISEASE, STAGE 4 (SEVERE): ICD-10-CM

## 2020-09-01 DIAGNOSIS — I50.30 UNSPECIFIED DIASTOLIC (CONGESTIVE) HEART FAILURE: ICD-10-CM

## 2020-09-01 DIAGNOSIS — I10 ESSENTIAL (PRIMARY) HYPERTENSION: ICD-10-CM

## 2020-09-01 DIAGNOSIS — D64.9 ANEMIA, UNSPECIFIED: ICD-10-CM

## 2020-09-01 DIAGNOSIS — E78.5 HYPERLIPIDEMIA, UNSPECIFIED: ICD-10-CM

## 2020-09-01 DIAGNOSIS — Z86.718 PERSONAL HISTORY OF OTHER VENOUS THROMBOSIS AND EMBOLISM: ICD-10-CM

## 2020-09-01 LAB
ALBUMIN SERPL ELPH-MCNC: 2.7 G/DL — LOW (ref 3.4–5)
ALP SERPL-CCNC: 118 U/L — SIGNIFICANT CHANGE UP (ref 40–120)
ALT FLD-CCNC: 15 U/L — SIGNIFICANT CHANGE UP (ref 12–42)
ANION GAP SERPL CALC-SCNC: 3 MMOL/L — LOW (ref 9–16)
AST SERPL-CCNC: 15 U/L — SIGNIFICANT CHANGE UP (ref 15–37)
BASOPHILS # BLD AUTO: 0.03 K/UL — SIGNIFICANT CHANGE UP (ref 0–0.2)
BASOPHILS NFR BLD AUTO: 0.3 % — SIGNIFICANT CHANGE UP (ref 0–2)
BILIRUB SERPL-MCNC: 0.2 MG/DL — SIGNIFICANT CHANGE UP (ref 0.2–1.2)
BUN SERPL-MCNC: 35 MG/DL — HIGH (ref 7–23)
CALCIUM SERPL-MCNC: 9.3 MG/DL — SIGNIFICANT CHANGE UP (ref 8.5–10.5)
CHLORIDE SERPL-SCNC: 105 MMOL/L — SIGNIFICANT CHANGE UP (ref 96–108)
CO2 SERPL-SCNC: 31 MMOL/L — SIGNIFICANT CHANGE UP (ref 22–31)
CREAT SERPL-MCNC: 1.69 MG/DL — HIGH (ref 0.5–1.3)
EOSINOPHIL # BLD AUTO: 0.38 K/UL — SIGNIFICANT CHANGE UP (ref 0–0.5)
EOSINOPHIL NFR BLD AUTO: 4.2 % — SIGNIFICANT CHANGE UP (ref 0–6)
GLUCOSE SERPL-MCNC: 102 MG/DL — HIGH (ref 70–99)
HCT VFR BLD CALC: 28.2 % — LOW (ref 34.5–45)
HGB BLD-MCNC: 8.5 G/DL — LOW (ref 11.5–15.5)
IMM GRANULOCYTES NFR BLD AUTO: 0.9 % — SIGNIFICANT CHANGE UP (ref 0–1.5)
LYMPHOCYTES # BLD AUTO: 0.85 K/UL — LOW (ref 1–3.3)
LYMPHOCYTES # BLD AUTO: 9.4 % — LOW (ref 13–44)
MAGNESIUM SERPL-MCNC: 2.3 MG/DL — SIGNIFICANT CHANGE UP (ref 1.6–2.6)
MCHC RBC-ENTMCNC: 26.5 PG — LOW (ref 27–34)
MCHC RBC-ENTMCNC: 30.1 GM/DL — LOW (ref 32–36)
MCV RBC AUTO: 87.9 FL — SIGNIFICANT CHANGE UP (ref 80–100)
MONOCYTES # BLD AUTO: 0.6 K/UL — SIGNIFICANT CHANGE UP (ref 0–0.9)
MONOCYTES NFR BLD AUTO: 6.7 % — SIGNIFICANT CHANGE UP (ref 2–14)
NEUTROPHILS # BLD AUTO: 7.06 K/UL — SIGNIFICANT CHANGE UP (ref 1.8–7.4)
NEUTROPHILS NFR BLD AUTO: 78.5 % — HIGH (ref 43–77)
NRBC # BLD: 0 /100 WBCS — SIGNIFICANT CHANGE UP (ref 0–0)
NT-PROBNP SERPL-SCNC: 594 PG/ML — HIGH
PCO2 BLDA: 52 MMHG — HIGH (ref 32–45)
PCO2 BLDV: 67 MMHG — HIGH (ref 41–51)
PH BLDA: 7.35 — SIGNIFICANT CHANGE UP (ref 7.35–7.45)
PH BLDV: 7.27 — LOW (ref 7.32–7.43)
PLATELET # BLD AUTO: 258 K/UL — SIGNIFICANT CHANGE UP (ref 150–400)
PO2 BLDA: 91 MMHG — SIGNIFICANT CHANGE UP (ref 83–108)
PO2 BLDV: 35 MMHG — SIGNIFICANT CHANGE UP (ref 35–40)
POTASSIUM SERPL-MCNC: 5.4 MMOL/L — HIGH (ref 3.5–5.3)
POTASSIUM SERPL-SCNC: 5.4 MMOL/L — HIGH (ref 3.5–5.3)
PROT SERPL-MCNC: 8.5 G/DL — HIGH (ref 6.4–8.2)
RBC # BLD: 3.21 M/UL — LOW (ref 3.8–5.2)
RBC # FLD: 17.6 % — HIGH (ref 10.3–14.5)
SAO2 % BLDA: 97 % — SIGNIFICANT CHANGE UP (ref 95–100)
SAO2 % BLDV: 58 % — SIGNIFICANT CHANGE UP
SODIUM SERPL-SCNC: 139 MMOL/L — SIGNIFICANT CHANGE UP (ref 132–145)
TROPONIN I SERPL-MCNC: <0.017 NG/ML — LOW (ref 0.02–0.06)
WBC # BLD: 9 K/UL — SIGNIFICANT CHANGE UP (ref 3.8–10.5)
WBC # FLD AUTO: 9 K/UL — SIGNIFICANT CHANGE UP (ref 3.8–10.5)

## 2020-09-01 PROCEDURE — 71045 X-RAY EXAM CHEST 1 VIEW: CPT | Mod: 26

## 2020-09-01 PROCEDURE — 99222 1ST HOSP IP/OBS MODERATE 55: CPT | Mod: GC

## 2020-09-01 PROCEDURE — 71045 X-RAY EXAM CHEST 1 VIEW: CPT | Mod: 26,77

## 2020-09-01 PROCEDURE — 71250 CT THORAX DX C-: CPT | Mod: 26

## 2020-09-01 PROCEDURE — 93010 ELECTROCARDIOGRAM REPORT: CPT

## 2020-09-01 PROCEDURE — 99285 EMERGENCY DEPT VISIT HI MDM: CPT | Mod: CS,25

## 2020-09-01 RX ORDER — HYDROMORPHONE HYDROCHLORIDE 2 MG/ML
4 INJECTION INTRAMUSCULAR; INTRAVENOUS; SUBCUTANEOUS ONCE
Refills: 0 | Status: DISCONTINUED | OUTPATIENT
Start: 2020-09-01 | End: 2020-09-01

## 2020-09-01 RX ORDER — IPRATROPIUM/ALBUTEROL SULFATE 18-103MCG
3 AEROSOL WITH ADAPTER (GRAM) INHALATION ONCE
Refills: 0 | Status: COMPLETED | OUTPATIENT
Start: 2020-09-01 | End: 2020-09-01

## 2020-09-01 RX ORDER — METHOCARBAMOL 500 MG/1
1500 TABLET, FILM COATED ORAL ONCE
Refills: 0 | Status: COMPLETED | OUTPATIENT
Start: 2020-09-01 | End: 2020-09-01

## 2020-09-01 RX ORDER — BUPROPION HYDROCHLORIDE 150 MG/1
300 TABLET, EXTENDED RELEASE ORAL DAILY
Refills: 0 | Status: DISCONTINUED | OUTPATIENT
Start: 2020-09-01 | End: 2020-09-03

## 2020-09-01 RX ORDER — ACETAMINOPHEN 500 MG
1000 TABLET ORAL EVERY 6 HOURS
Refills: 0 | Status: DISCONTINUED | OUTPATIENT
Start: 2020-09-01 | End: 2020-09-03

## 2020-09-01 RX ORDER — APIXABAN 2.5 MG/1
5 TABLET, FILM COATED ORAL EVERY 12 HOURS
Refills: 0 | Status: DISCONTINUED | OUTPATIENT
Start: 2020-09-01 | End: 2020-09-03

## 2020-09-01 RX ORDER — ACETAMINOPHEN 500 MG
650 TABLET ORAL ONCE
Refills: 0 | Status: COMPLETED | OUTPATIENT
Start: 2020-09-01 | End: 2020-09-01

## 2020-09-01 RX ORDER — PRAMIPEXOLE DIHYDROCHLORIDE 0.12 MG/1
1.5 TABLET ORAL AT BEDTIME
Refills: 0 | Status: DISCONTINUED | OUTPATIENT
Start: 2020-09-01 | End: 2020-09-03

## 2020-09-01 RX ORDER — MAGNESIUM SULFATE 500 MG/ML
1 VIAL (ML) INJECTION ONCE
Refills: 0 | Status: COMPLETED | OUTPATIENT
Start: 2020-09-01 | End: 2020-09-01

## 2020-09-01 RX ORDER — BUDESONIDE AND FORMOTEROL FUMARATE DIHYDRATE 160; 4.5 UG/1; UG/1
2 AEROSOL RESPIRATORY (INHALATION)
Refills: 0 | Status: DISCONTINUED | OUTPATIENT
Start: 2020-09-01 | End: 2020-09-03

## 2020-09-01 RX ORDER — HYDROMORPHONE HYDROCHLORIDE 2 MG/ML
1 INJECTION INTRAMUSCULAR; INTRAVENOUS; SUBCUTANEOUS
Refills: 0 | Status: DISCONTINUED | OUTPATIENT
Start: 2020-09-01 | End: 2020-09-01

## 2020-09-01 RX ORDER — DILTIAZEM HCL 120 MG
180 CAPSULE, EXT RELEASE 24 HR ORAL DAILY
Refills: 0 | Status: DISCONTINUED | OUTPATIENT
Start: 2020-09-01 | End: 2020-09-02

## 2020-09-01 RX ORDER — BUMETANIDE 0.25 MG/ML
2 INJECTION INTRAMUSCULAR; INTRAVENOUS EVERY 24 HOURS
Refills: 0 | Status: DISCONTINUED | OUTPATIENT
Start: 2020-09-02 | End: 2020-09-03

## 2020-09-01 RX ORDER — IPRATROPIUM/ALBUTEROL SULFATE 18-103MCG
3 AEROSOL WITH ADAPTER (GRAM) INHALATION EVERY 6 HOURS
Refills: 0 | Status: DISCONTINUED | OUTPATIENT
Start: 2020-09-01 | End: 2020-09-03

## 2020-09-01 RX ORDER — PERMETHRIN CREAM 5% W/W 50 MG/G
1 CREAM TOPICAL ONCE
Refills: 0 | Status: COMPLETED | OUTPATIENT
Start: 2020-09-01 | End: 2020-09-01

## 2020-09-01 RX ORDER — ATORVASTATIN CALCIUM 80 MG/1
40 TABLET, FILM COATED ORAL AT BEDTIME
Refills: 0 | Status: DISCONTINUED | OUTPATIENT
Start: 2020-09-01 | End: 2020-09-03

## 2020-09-01 RX ORDER — HYDROMORPHONE HYDROCHLORIDE 2 MG/ML
1 INJECTION INTRAMUSCULAR; INTRAVENOUS; SUBCUTANEOUS
Refills: 0 | Status: DISCONTINUED | OUTPATIENT
Start: 2020-09-01 | End: 2020-09-02

## 2020-09-01 RX ORDER — ALBUTEROL 90 UG/1
2 AEROSOL, METERED ORAL ONCE
Refills: 0 | Status: DISCONTINUED | OUTPATIENT
Start: 2020-09-01 | End: 2020-09-01

## 2020-09-01 RX ADMIN — HYDROMORPHONE HYDROCHLORIDE 4 MILLIGRAM(S): 2 INJECTION INTRAMUSCULAR; INTRAVENOUS; SUBCUTANEOUS at 21:18

## 2020-09-01 RX ADMIN — HYDROMORPHONE HYDROCHLORIDE 1 MILLIGRAM(S): 2 INJECTION INTRAMUSCULAR; INTRAVENOUS; SUBCUTANEOUS at 19:58

## 2020-09-01 RX ADMIN — PERMETHRIN CREAM 5% W/W 1 APPLICATION(S): 50 CREAM TOPICAL at 22:05

## 2020-09-01 RX ADMIN — Medication 100 GRAM(S): at 12:44

## 2020-09-01 RX ADMIN — APIXABAN 5 MILLIGRAM(S): 2.5 TABLET, FILM COATED ORAL at 21:52

## 2020-09-01 RX ADMIN — Medication 650 MILLIGRAM(S): at 17:45

## 2020-09-01 RX ADMIN — Medication 3 MILLILITER(S): at 12:43

## 2020-09-01 RX ADMIN — HYDROMORPHONE HYDROCHLORIDE 1 MILLIGRAM(S): 2 INJECTION INTRAMUSCULAR; INTRAVENOUS; SUBCUTANEOUS at 19:37

## 2020-09-01 RX ADMIN — HYDROMORPHONE HYDROCHLORIDE 4 MILLIGRAM(S): 2 INJECTION INTRAMUSCULAR; INTRAVENOUS; SUBCUTANEOUS at 17:49

## 2020-09-01 RX ADMIN — Medication 1 GRAM(S): at 13:06

## 2020-09-01 RX ADMIN — Medication 650 MILLIGRAM(S): at 15:14

## 2020-09-01 RX ADMIN — ATORVASTATIN CALCIUM 40 MILLIGRAM(S): 80 TABLET, FILM COATED ORAL at 21:52

## 2020-09-01 RX ADMIN — Medication 1000 MILLIGRAM(S): at 20:01

## 2020-09-01 RX ADMIN — Medication 125 MILLIGRAM(S): at 12:43

## 2020-09-01 RX ADMIN — BUDESONIDE AND FORMOTEROL FUMARATE DIHYDRATE 2 PUFF(S): 160; 4.5 AEROSOL RESPIRATORY (INHALATION) at 21:59

## 2020-09-01 RX ADMIN — Medication 180 MILLIGRAM(S): at 21:52

## 2020-09-01 RX ADMIN — METHOCARBAMOL 1500 MILLIGRAM(S): 500 TABLET, FILM COATED ORAL at 15:16

## 2020-09-01 RX ADMIN — Medication 3 MILLILITER(S): at 21:52

## 2020-09-01 RX ADMIN — BUPROPION HYDROCHLORIDE 300 MILLIGRAM(S): 150 TABLET, EXTENDED RELEASE ORAL at 22:00

## 2020-09-01 RX ADMIN — Medication 3 MILLILITER(S): at 13:06

## 2020-09-01 RX ADMIN — Medication 1000 MILLIGRAM(S): at 20:02

## 2020-09-01 RX ADMIN — PRAMIPEXOLE DIHYDROCHLORIDE 1.5 MILLIGRAM(S): 0.12 TABLET ORAL at 22:39

## 2020-09-01 NOTE — ED PROVIDER NOTE - PROGRESS NOTE DETAILS
Breathing improved, however, Patient continues to have some discomfort. Will give analgesic. Labs show anemia and retaining related to COPD. Will admit for COPD exacerbation. Breathing improved, however, Patient continues to have some discomfort. Will give analgesic. Labs show anemia and retaining related to COPD. Will admit for COPD exacerbation. Respirations at 20s. Case discussed with Dr. Valentine who is requesting CT chest to r/o rib fracture prior to accepting admission. pt removed O2, SpO2 92% on RA, breathing comfortably, wheezing significantly improved. pt requesting discharge. pt states she uses a walker at home and lives independently. attempted to get patient up to get to the bathroom but she is unable to stand, transfer, or walk, becomes SOB with SpO2 87% on RA and tachypneic to 25 after rolling to place tammy. pt agrees with plan for admission. pt already left the department before her CT results. CT shows isolated right sided 6th rib fracture, nondisplaced. pt denied any trauma and described her pain as exclusively on the left side. called [facility] to update Dr. Gaxiola.

## 2020-09-01 NOTE — ED PROVIDER NOTE - ATTENDING CONTRIBUTION TO CARE
79 F presenting with pain to the L lateral chest wall. no falls. + multiple medical problems indulging CHF/ Emphysema. + dyspneic. Dec AE b/l. Labs and clinical sx consistent with COPD/emphysema exacerbation. Medicine asked for a CT chest prior to leaving. Prelim CT resulted after she left for [facility] and showed nondisplaced R 6th rib fx. Surprising given lack of fall, different location of fx and pain and lack major cough (has a baseline cough).    STEFANIE Johnson MD 18:10

## 2020-09-01 NOTE — ED PROVIDER NOTE - OBJECTIVE STATEMENT
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 Also reports increase in baseline SOB but also reports not taking her Albuterol inhaler. Reports pain started at 4:30pm yesterday and is unlike her usual back pain. Has not missed a dose of Eliquis. Denies fever, chills, cough, N/V, abdominal pain, chest pain, dysuria, blood in urine, and urine frequency.

## 2020-09-01 NOTE — H&P ADULT - PROBLEM SELECTOR PLAN 7
Patient with history of HFpEF (EF 55-60% last echo 2/20). On exam, patient appears euvolemic; low suspicion for heart failure exacerbation at this time.  -C/w Bumex 2 mg qd  -Continue to monitor fluid status and clinical presentation

## 2020-09-01 NOTE — H&P ADULT - PROBLEM SELECTOR PLAN 6
Patient with HLD, takes Atorvastatin 40 mg at home   -C/w Atorvastatin 40 mg    #Restless leg syndrome: patient reports hx of restless leg syndrome and neuropathy   -C/w Pramipexole 1.5 mg at bedtime Patient with HLD, takes Atorvastatin 40 mg at home   -C/w Atorvastatin 40 mg    #Restless leg syndrome: patient reports hx of restless leg syndrome and neuropathy 2/2 prior Lyme Disease infection   -C/w Pramipexole 1.5 mg at bedtime

## 2020-09-01 NOTE — H&P ADULT - PROBLEM SELECTOR PLAN 2
Patient with history of COPD. At home, takes Advair, Albuterol 2.5, Yupelri inhaler, and Perforomist inhaler. Patient reports increased SOB recently, but admits to not using her Albuterol at home. Denies recent cough, fevers, chest pain. Possible COPD exacerbation likely 2/2 increased pain and difficulty taking deep breaths due to rib pain.  -CT chest showed bibasilar atelectasis, severe emphysema, large airways disease, with bronchiectasis, bronchial wall thickening, and bronchial luminal impaction bilaterally.  -ABG pH 7.35/pCO2 52/pO2 91/97%  -Patient has home oxygen but has not had to use it.  -C/w nasal canula during the days as needed (patient currently comfortably and saturating 95% on 4 L NC)  -BiPAP at night (10/5 FiO2 40%)  -C/w standing duonebs q6  -C/w Symbicort 160 bid   -If worsening respiratory status, please obtain STAT ABG

## 2020-09-01 NOTE — H&P ADULT - PROBLEM SELECTOR PLAN 4
Patient with reported CKD stage 4 and baseline Cr ~1.7-2.0   -On this admission, BUN 35 and Cr 1.69   -No acute intervention at this  time - continue to monitor   -DASH/TLC diet

## 2020-09-01 NOTE — ED PROVIDER NOTE - CLINICAL SUMMARY MEDICAL DECISION MAKING FREE TEXT BOX
78 y/o female presenting with left sided back pain since yesterday and increase in baseline SOB. Will give Albuterol and Solumedrol for SOB relief. Will also obtain labs and a chest x-ray. 80 y/o female presenting with left sided back pain since yesterday and increase in baseline SOB. Will give Albuterol and Solumedrol for SOB relief. Will also obtain labs and a chest x-ray.    labs with new anemia, otherwise baseline for patient. pt initially improved after meds but became again SOB, tachypneic, and using accessory muscles and requiring O2 after minimal exertion. case discussed with Dr. Gaxiola who requests ABG and will accept.

## 2020-09-01 NOTE — H&P ADULT - PROBLEM SELECTOR PLAN 5
Patient with HTN, takes Diltiazem 180 mg daily   -C/w Diltiazem 180 mg daily  -Continue to monitor    #Scabies: patient reports recent scabies diagnosis   -C/w Permethrin cream   -Contact precautions

## 2020-09-01 NOTE — H&P ADULT - NSHPLABSRESULTS_GEN_ALL_CORE
.  LABS:                         8.5    9.00  )-----------( 258      ( 01 Sep 2020 13:12 )             28.2     09-01    139  |  105  |  35<H>  ----------------------------<  102<H>  5.4<H>   |  31  |  1.69<H>    Ca    9.3      01 Sep 2020 13:12  Mg     2.3     09-01    TPro  8.5<H>  /  Alb  2.7<L>  /  TBili  0.2  /  DBili  x   /  AST  15  /  ALT  15  /  AlkPhos  118  09-01        CARDIAC MARKERS ( 01 Sep 2020 13:12 )  <0.017 ng/mL / x     / x     / x     / x          Serum Pro-Brain Natriuretic Peptide: 594 pg/mL (09-01 @ 13:12)      RADIOLOGY, EKG & ADDITIONAL TESTS: Reviewed.   < from: CT Chest No Cont (09.01.20 @ 16:50) >    Impression:  1. Since 5/3/2020, there are new bilateral rib fractures. The fracture of the left sixth rib appears acute. The fracture of the right sixth rib appear subacute, as there is adjacent callus formation.    2. Bibasilar atelectasis.    3. Severe emphysema.    4. Large airways disease, with bronchiectasis, bronchial wall thickening, and bronchial luminal impaction bilaterally.    < end of copied text >    < from: Xray Chest 1 View AP/PA (09.01.20 @ 13:43) >    Impression:Nondisplaced left lateral sixth rib fracture. Please refer to subsequent CT for further details.    < end of copied text >

## 2020-09-01 NOTE — ED PROVIDER NOTE - CHPI ED SYMPTOMS NEG
no fever, no chills, no cough, no N/V, no abdominal pain, no chest pain, no dysuria, no blood in urine, no urine frequency

## 2020-09-01 NOTE — ED ADULT NURSE REASSESSMENT NOTE - NS ED NURSE REASSESS COMMENT FT1
pt was moved in bed but was unable to transfer from bed to chair and became hypoxic upon ambulation- pt now agrees to be admitted. provider made aware

## 2020-09-01 NOTE — H&P ADULT - PROBLEM SELECTOR PLAN 9
Patient with history of multiple DVTs, on Eliquis 5 mg bid at home.  -C/w Eliquis 5 mg BID in hospital   -Patient denies LE pain at this time.

## 2020-09-01 NOTE — H&P ADULT - HISTORY OF PRESENT ILLNESS
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 ED with complaints of left sided back pain with "clicking" over the left ribs, worse with deep respirations. Also reports increase in baseline SOB, however, admits to not taking her Albuterol inhaler. Reports pain started at 4:30 pm yesterday (8/31) and different from her chronic back pain. Patient reports the rib pain became so intense that she was unable to get up by herself, and had to call the superintendent of her building to call 911. She denies any recent trauma, falls, or coughing fits. Patient states she has not missed a dose of Eliquis. Denies fever, chills, cough, N/V, abdominal pain, chest pain, dysuria, blood in urine, headaches. Patient reports she is incontinent at home and has home O2 when she needs it, but denies any recent O2 requirement. Of note, patient reports she was recently diagnosed with scabies and prescribed a topical cream (likely Permethrin).  ED Vitals: 98.3 F, HR 97, /74, SpO2 99% on 4 L NC, RR 19  ED Labs: notable for WBC 9.0, Hgb 8.5/Hct 28.2, K+ 5.4, BUN 35, Cr 1.69, Albumin 2.7, proBNP 594; ABG pH 7.35/pCO2 52/pO2 91/97%  Covid negative   ED Imaging:   CXR: Nondisplaced left lateral sixth rib fracture  CT Chest: 1. Since 5/3/2020, there are new bilateral rib fractures. The fracture of the left sixth rib appears acute. The fracture of the right sixth rib appear subacute, as there is adjacent callus formation. 2. Bibasilar atelectasis. 3. Severe emphysema. 4. Large airways disease, with bronchiectasis, bronchial wall thickening, and bronchial luminal impaction bilaterally.  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 in February 2020 for LE cellulitis and CHF exacerbation who presented to [facility] ED with complaints of left sided back pain, worse with deep respirations. Also reports increase in baseline SOB, however, admits to not taking her Albuterol inhaler. Reports pain started at 4:30 pm yesterday (8/31) and different from her chronic back pain. Patient reports the rib pain became so intense that she was unable to get up by herself, and had to call the superintendent of her building to call 911. She denies any recent trauma, falls, or coughing fits. Patient states she has not missed a dose of Eliquis. Denies fever, chills, cough, N/V, abdominal pain, chest pain, dysuria, blood in urine, headaches. Patient reports she is incontinent at home and has home O2 when she needs it, but denies any recent O2 requirement. Of note, patient reports she was recently diagnosed with scabies and prescribed a topical cream (likely Permethrin).  ED Vitals: 98.3 F, HR 97, /74, SpO2 99% on 4 L NC, RR 19  ED Labs: notable for WBC 9.0, Hgb 8.5/Hct 28.2, K+ 5.4, BUN 35, Cr 1.69, Albumin 2.7, proBNP 594; ABG pH 7.35/pCO2 52/pO2 91/97%  Covid negative   ED Imaging:   CXR: Nondisplaced left lateral sixth rib fracture  CT Chest: 1. Since 5/3/2020, there are new bilateral rib fractures. The fracture of the left sixth rib appears acute. The fracture of the right sixth rib appear subacute, as there is adjacent callus formation. 2. Bibasilar atelectasis. 3. Severe emphysema. 4. Large airways disease, with bronchiectasis, bronchial wall thickening, and bronchial luminal impaction bilaterally.  EKG: normal sinus rhythm, , QRS 60  	  	  	  ED treatment course: solumedrol 125 mg x1, duonebs x2, Dilaudid 4 mg PO x1, acetaminophen 650 mg. Patient transferred to 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 in February 2020 for LE cellulitis and CHF exacerbation who presented to [facility] ED with complaints of left sided back pain, worse with deep respirations. Patient also reports recent increase in baseline SOB, however, admits to not taking her Albuterol inhaler. Reports pain started at 4:30 pm yesterday (8/31) when she was home alone and felt different from her chronic back pain. Patient reports the rib pain became so intense that she was unable to get up by herself, and had to notify the superintendent of her building to call 911. She denies any recent trauma, falls, or coughing fits. Patient states she has not missed a dose of Eliquis. Denies fever, chills, cough, N/V, abdominal pain, chest pain, dysuria, hematuria, headaches. Patient reports she is incontinent at home and has home O2 when she needs it, but denies any recent increased O2 requirement (reports that her O2 was 94% at home before she came to the ED). Of note, patient reports she was recently diagnosed with scabies and prescribed a topical cream (likely Permethrin) by a dermatologist.  ED Vitals: 98.3 F, HR 97, /74, SpO2 99% on 4 L NC, RR 19  ED Labs: notable for WBC 9.0, Hgb 8.5/Hct 28.2, K+ 5.4, BUN 35, Cr 1.69, Albumin 2.7, proBNP 594; ABG pH 7.35/pCO2 52/pO2 91/97%  Covid negative   ED Imaging:   CXR: Nondisplaced left lateral sixth rib fracture  CT Chest: 1. Since 5/3/2020, there are new bilateral rib fractures. The fracture of the left sixth rib appears acute. The fracture of the right sixth rib appear subacute, as there is adjacent callus formation. 2. Bibasilar atelectasis. 3. Severe emphysema. 4. Large airways disease, with bronchiectasis, bronchial wall thickening, and bronchial luminal impaction bilaterally.  EKG: normal sinus rhythm, , QRS 60  	  	  	  ED treatment course: solumedrol 125 mg x1, duonebs x2, Dilaudid 4 mg PO x1, acetaminophen 650 mg. Patient transferred to [facility] for closer monitoring

## 2020-09-01 NOTE — ED ADULT NURSE NOTE - OBJECTIVE STATEMENT
80 y/o female who reported left sided flank, rib pain- non radiating- pt with audible wheezing with labored breathing, pt is sleepy and not forthcoming with information. Pt denies chest pain, dizziness, intubation in the past.

## 2020-09-01 NOTE — H&P ADULT - NSHPSOCIALHISTORY_GEN_ALL_CORE
Patient lives alone at home, but as 2 home health aides who come in throughout the week. She uses a walker at home but uses a wheelchair when she goes outside with [facility]. She reports 50 pack year smoking history but stopped 5 years ago; prior to that, she smoked around 3/4 cigarettes per day. Patient lives on the ground floor; does not have any family.

## 2020-09-01 NOTE — H&P ADULT - NSHPPHYSICALEXAM_GEN_ALL_CORE
.  VITAL SIGNS:  T(F): 98 (09-01-20 @ 19:07), Max: 98.9 (09-01-20 @ 13:17)  HR: 104 (09-01-20 @ 20:25) (86 - 104)  BP: 157/67 (09-01-20 @ 20:25) (141/74 - 167/70)  BP(mean): 97 (09-01-20 @ 20:25) (97 - 101)  RR: 18 (09-01-20 @ 20:25) (17 - 24)  SpO2: 94% (09-01-20 @ 20:25) (94% - 100%)    PHYSICAL EXAM:    Constitutional: WDWN, obese woman lying comfortably in bed; NAD  HEENT: NC/AT, PERRL, EOMI, anicteric sclera, no nasal discharge; uvula midline, no oropharyngeal erythema or exudates; dry MM. Some yellow crusting around lower lip.  Neck: supple  Respiratory: mild diffuse b/l expiratory wheezes appreciated, but otherwise good air entry. No crackles or rhonchi appreciated. Patient is saturating in the 90s on 4 L NC, speaking in full sentences, no accessory muscle use or retractions.   Cardiac: +S1/S2; regular rhythm, tachycardic; no M/R/G  Gastrointestinal: soft, obese, NT/ND; no rebound or guarding; +BSx4.  Vascular: 2+ radial, DP/PT pulses B/L  Musculoskeletal: tenderness to palpation over L 7-8th ribs. No signs of bruising, trauma, erythema.  Dermatologic: skin warm, dry and intact. Patient has scattered, small, erythematous papules across chest, arms, abdomen and legs consistent with the appearance of scabies. Patient also has chronic vasculitis of her b/l lower legs from feet to knees; skin is edematous, erythematous, warm to touch, with chronic venous stasis changes. Patient reports this has been her baseline for 6 years.   Neurologic: AAOx3; no focal deficits  Psychiatric: affect and characteristics of appearance, verbalizations, behaviors are appropriate.

## 2020-09-01 NOTE — H&P ADULT - ATTENDING COMMENTS
Anemia is slightly worse than baseline, to check stool guaiac. She says she has been treated for the scabies.

## 2020-09-01 NOTE — ED PROVIDER NOTE - DIAGNOSTIC INTERPRETATION
Interpreted by ED MARVEL Rudolph   Chest x-ray, 1 view  Lungs clear, heart shadow normal, bony structures normal, no free air under diaphragm, no PTX

## 2020-09-01 NOTE — ED PROVIDER NOTE - CADM POA CENTRAL LINE
For information on Fall & Injury Prevention, visit [email or web address]
No

## 2020-09-01 NOTE — H&P ADULT - PROBLEM SELECTOR PLAN 8
On admission, patient's potassium increased to 5.4. Patient has history of CKD IV.  -EKG in [facility] ED showed normal sinus rhythm, RI: 166, QRS: 60  -F/u AM labs    #Smoking Cessation: patient with 50 year smoking history, quit 5 years ago.  -C/w Buproprion 300 mg daily On admission, patient's potassium increased to 5.4 which is within her usual range. Patient has history of CKD IV.  -EKG in [facility] ED showed normal sinus rhythm, CO: 166, QRS: 60  -F/u AM labs    #Smoking Cessation: patient with 50 year smoking history, quit 5 years ago.  -C/w Buproprion 300 mg daily

## 2020-09-01 NOTE — H&P ADULT - PROBLEM SELECTOR PLAN 3
Patient with Hgb 8.5 on admission, down from her baseline of 9-10s on prior admissions. No signs of active bleeding - patient denies light headedess, dizziness, melena, hematemesis, hematuria.  -continue to trend CBC   -Active T&S  -F/u iron studies   -Transfuse if Hgb <7   -Continue to monitor

## 2020-09-02 LAB
ALBUMIN SERPL ELPH-MCNC: 3.3 G/DL — SIGNIFICANT CHANGE UP (ref 3.3–5)
ALP SERPL-CCNC: 104 U/L — SIGNIFICANT CHANGE UP (ref 40–120)
ALT FLD-CCNC: 11 U/L — SIGNIFICANT CHANGE UP (ref 10–45)
ANION GAP SERPL CALC-SCNC: 11 MMOL/L — SIGNIFICANT CHANGE UP (ref 5–17)
APTT BLD: 29.8 SEC — SIGNIFICANT CHANGE UP (ref 27.5–35.5)
AST SERPL-CCNC: 9 U/L — LOW (ref 10–40)
BASOPHILS # BLD AUTO: 0 K/UL — SIGNIFICANT CHANGE UP (ref 0–0.2)
BASOPHILS NFR BLD AUTO: 0 % — SIGNIFICANT CHANGE UP (ref 0–2)
BILIRUB SERPL-MCNC: <0.2 MG/DL — SIGNIFICANT CHANGE UP (ref 0.2–1.2)
BUN SERPL-MCNC: 36 MG/DL — HIGH (ref 7–23)
CALCIUM SERPL-MCNC: 9.3 MG/DL — SIGNIFICANT CHANGE UP (ref 8.4–10.5)
CHLORIDE SERPL-SCNC: 106 MMOL/L — SIGNIFICANT CHANGE UP (ref 96–108)
CHLORIDE UR-SCNC: 103 MMOL/L — SIGNIFICANT CHANGE UP
CO2 SERPL-SCNC: 26 MMOL/L — SIGNIFICANT CHANGE UP (ref 22–31)
CREAT SERPL-MCNC: 1.43 MG/DL — HIGH (ref 0.5–1.3)
EOSINOPHIL # BLD AUTO: 0 K/UL — SIGNIFICANT CHANGE UP (ref 0–0.5)
EOSINOPHIL NFR BLD AUTO: 0 % — SIGNIFICANT CHANGE UP (ref 0–6)
FERRITIN SERPL-MCNC: 51 NG/ML — SIGNIFICANT CHANGE UP (ref 15–150)
GLUCOSE SERPL-MCNC: 240 MG/DL — HIGH (ref 70–99)
HCT VFR BLD CALC: 28.8 % — LOW (ref 34.5–45)
HGB BLD-MCNC: 8.5 G/DL — LOW (ref 11.5–15.5)
IMM GRANULOCYTES NFR BLD AUTO: 0.9 % — SIGNIFICANT CHANGE UP (ref 0–1.5)
INR BLD: 1.07 — SIGNIFICANT CHANGE UP (ref 0.88–1.16)
IRON SATN MFR SERPL: 24 UG/DL — LOW (ref 30–160)
IRON SATN MFR SERPL: 8 % — LOW (ref 14–50)
LYMPHOCYTES # BLD AUTO: 0.51 K/UL — LOW (ref 1–3.3)
LYMPHOCYTES # BLD AUTO: 5.8 % — LOW (ref 13–44)
MAGNESIUM SERPL-MCNC: 2.3 MG/DL — SIGNIFICANT CHANGE UP (ref 1.6–2.6)
MCHC RBC-ENTMCNC: 26.2 PG — LOW (ref 27–34)
MCHC RBC-ENTMCNC: 29.5 GM/DL — LOW (ref 32–36)
MCV RBC AUTO: 88.6 FL — SIGNIFICANT CHANGE UP (ref 80–100)
MONOCYTES # BLD AUTO: 0.19 K/UL — SIGNIFICANT CHANGE UP (ref 0–0.9)
MONOCYTES NFR BLD AUTO: 2.2 % — SIGNIFICANT CHANGE UP (ref 2–14)
NEUTROPHILS # BLD AUTO: 7.96 K/UL — HIGH (ref 1.8–7.4)
NEUTROPHILS NFR BLD AUTO: 91.1 % — HIGH (ref 43–77)
NRBC # BLD: 0 /100 WBCS — SIGNIFICANT CHANGE UP (ref 0–0)
PHOSPHATE SERPL-MCNC: 3.3 MG/DL — SIGNIFICANT CHANGE UP (ref 2.5–4.5)
PLATELET # BLD AUTO: 230 K/UL — SIGNIFICANT CHANGE UP (ref 150–400)
POTASSIUM SERPL-MCNC: 5.4 MMOL/L — HIGH (ref 3.5–5.3)
POTASSIUM SERPL-SCNC: 5.4 MMOL/L — HIGH (ref 3.5–5.3)
POTASSIUM UR-SCNC: 38 MMOL/L — SIGNIFICANT CHANGE UP
PROT SERPL-MCNC: 7.9 G/DL — SIGNIFICANT CHANGE UP (ref 6–8.3)
PROTHROM AB SERPL-ACNC: 12.8 SEC — SIGNIFICANT CHANGE UP (ref 10.6–13.6)
RBC # BLD: 3.25 M/UL — LOW (ref 3.8–5.2)
RBC # FLD: 17.2 % — HIGH (ref 10.3–14.5)
SODIUM SERPL-SCNC: 143 MMOL/L — SIGNIFICANT CHANGE UP (ref 135–145)
SODIUM UR-SCNC: 98 MMOL/L — SIGNIFICANT CHANGE UP
TIBC SERPL-MCNC: 299 UG/DL — SIGNIFICANT CHANGE UP (ref 220–430)
UIBC SERPL-MCNC: 275 UG/DL — SIGNIFICANT CHANGE UP (ref 110–370)
WBC # BLD: 8.74 K/UL — SIGNIFICANT CHANGE UP (ref 3.8–10.5)
WBC # FLD AUTO: 8.74 K/UL — SIGNIFICANT CHANGE UP (ref 3.8–10.5)

## 2020-09-02 PROCEDURE — 99233 SBSQ HOSP IP/OBS HIGH 50: CPT | Mod: GC

## 2020-09-02 RX ORDER — KETOROLAC TROMETHAMINE 30 MG/ML
30 SYRINGE (ML) INJECTION ONCE
Refills: 0 | Status: DISCONTINUED | OUTPATIENT
Start: 2020-09-02 | End: 2020-09-02

## 2020-09-02 RX ORDER — HYDROMORPHONE HYDROCHLORIDE 2 MG/ML
2 INJECTION INTRAMUSCULAR; INTRAVENOUS; SUBCUTANEOUS
Refills: 0 | Status: DISCONTINUED | OUTPATIENT
Start: 2020-09-02 | End: 2020-09-02

## 2020-09-02 RX ORDER — DIAZEPAM 5 MG
5 TABLET ORAL EVERY 12 HOURS
Refills: 0 | Status: DISCONTINUED | OUTPATIENT
Start: 2020-09-02 | End: 2020-09-03

## 2020-09-02 RX ORDER — DILTIAZEM HCL 120 MG
180 CAPSULE, EXT RELEASE 24 HR ORAL DAILY
Refills: 0 | Status: DISCONTINUED | OUTPATIENT
Start: 2020-09-02 | End: 2020-09-03

## 2020-09-02 RX ORDER — HYDROMORPHONE HYDROCHLORIDE 2 MG/ML
4 INJECTION INTRAMUSCULAR; INTRAVENOUS; SUBCUTANEOUS EVERY 6 HOURS
Refills: 0 | Status: DISCONTINUED | OUTPATIENT
Start: 2020-09-02 | End: 2020-09-03

## 2020-09-02 RX ADMIN — HYDROMORPHONE HYDROCHLORIDE 2 MILLIGRAM(S): 2 INJECTION INTRAMUSCULAR; INTRAVENOUS; SUBCUTANEOUS at 13:48

## 2020-09-02 RX ADMIN — Medication 180 MILLIGRAM(S): at 05:50

## 2020-09-02 RX ADMIN — Medication 30 MILLIGRAM(S): at 10:47

## 2020-09-02 RX ADMIN — Medication 1000 MILLIGRAM(S): at 07:05

## 2020-09-02 RX ADMIN — HYDROMORPHONE HYDROCHLORIDE 4 MILLIGRAM(S): 2 INJECTION INTRAMUSCULAR; INTRAVENOUS; SUBCUTANEOUS at 20:36

## 2020-09-02 RX ADMIN — Medication 1000 MILLIGRAM(S): at 21:12

## 2020-09-02 RX ADMIN — HYDROMORPHONE HYDROCHLORIDE 2 MILLIGRAM(S): 2 INJECTION INTRAMUSCULAR; INTRAVENOUS; SUBCUTANEOUS at 15:29

## 2020-09-02 RX ADMIN — HYDROMORPHONE HYDROCHLORIDE 2 MILLIGRAM(S): 2 INJECTION INTRAMUSCULAR; INTRAVENOUS; SUBCUTANEOUS at 16:00

## 2020-09-02 RX ADMIN — Medication 1000 MILLIGRAM(S): at 12:32

## 2020-09-02 RX ADMIN — Medication 30 MILLIGRAM(S): at 12:00

## 2020-09-02 RX ADMIN — BUMETANIDE 2 MILLIGRAM(S): 0.25 INJECTION INTRAMUSCULAR; INTRAVENOUS at 01:26

## 2020-09-02 RX ADMIN — HYDROMORPHONE HYDROCHLORIDE 1 MILLIGRAM(S): 2 INJECTION INTRAMUSCULAR; INTRAVENOUS; SUBCUTANEOUS at 04:48

## 2020-09-02 RX ADMIN — HYDROMORPHONE HYDROCHLORIDE 1 MILLIGRAM(S): 2 INJECTION INTRAMUSCULAR; INTRAVENOUS; SUBCUTANEOUS at 10:33

## 2020-09-02 RX ADMIN — BUDESONIDE AND FORMOTEROL FUMARATE DIHYDRATE 2 PUFF(S): 160; 4.5 AEROSOL RESPIRATORY (INHALATION) at 05:00

## 2020-09-02 RX ADMIN — APIXABAN 5 MILLIGRAM(S): 2.5 TABLET, FILM COATED ORAL at 18:21

## 2020-09-02 RX ADMIN — Medication 1000 MILLIGRAM(S): at 13:48

## 2020-09-02 RX ADMIN — Medication 1000 MILLIGRAM(S): at 18:21

## 2020-09-02 RX ADMIN — HYDROMORPHONE HYDROCHLORIDE 4 MILLIGRAM(S): 2 INJECTION INTRAMUSCULAR; INTRAVENOUS; SUBCUTANEOUS at 21:35

## 2020-09-02 RX ADMIN — BUPROPION HYDROCHLORIDE 300 MILLIGRAM(S): 150 TABLET, EXTENDED RELEASE ORAL at 12:32

## 2020-09-02 RX ADMIN — HYDROMORPHONE HYDROCHLORIDE 1 MILLIGRAM(S): 2 INJECTION INTRAMUSCULAR; INTRAVENOUS; SUBCUTANEOUS at 12:00

## 2020-09-02 RX ADMIN — Medication 3 MILLILITER(S): at 21:31

## 2020-09-02 RX ADMIN — HYDROMORPHONE HYDROCHLORIDE 1 MILLIGRAM(S): 2 INJECTION INTRAMUSCULAR; INTRAVENOUS; SUBCUTANEOUS at 06:38

## 2020-09-02 RX ADMIN — Medication 3 MILLILITER(S): at 12:31

## 2020-09-02 RX ADMIN — Medication 3 MILLILITER(S): at 15:29

## 2020-09-02 RX ADMIN — ATORVASTATIN CALCIUM 40 MILLIGRAM(S): 80 TABLET, FILM COATED ORAL at 21:31

## 2020-09-02 RX ADMIN — PRAMIPEXOLE DIHYDROCHLORIDE 1.5 MILLIGRAM(S): 0.12 TABLET ORAL at 21:31

## 2020-09-02 RX ADMIN — Medication 3 MILLILITER(S): at 04:55

## 2020-09-02 RX ADMIN — HYDROMORPHONE HYDROCHLORIDE 2 MILLIGRAM(S): 2 INJECTION INTRAMUSCULAR; INTRAVENOUS; SUBCUTANEOUS at 12:39

## 2020-09-02 RX ADMIN — APIXABAN 5 MILLIGRAM(S): 2.5 TABLET, FILM COATED ORAL at 05:48

## 2020-09-02 RX ADMIN — Medication 1000 MILLIGRAM(S): at 01:26

## 2020-09-02 RX ADMIN — BUDESONIDE AND FORMOTEROL FUMARATE DIHYDRATE 2 PUFF(S): 160; 4.5 AEROSOL RESPIRATORY (INHALATION) at 18:22

## 2020-09-02 RX ADMIN — Medication 1000 MILLIGRAM(S): at 05:48

## 2020-09-02 RX ADMIN — Medication 1000 MILLIGRAM(S): at 06:38

## 2020-09-02 NOTE — DIETITIAN INITIAL EVALUATION ADULT. - PROBLEM SELECTOR PLAN 8
On admission, patient's potassium increased to 5.4 which is within her usual range. Patient has history of CKD IV.  -EKG in [facility] ED showed normal sinus rhythm, KY: 166, QRS: 60  -F/u AM labs    #Smoking Cessation: patient with 50 year smoking history, quit 5 years ago.  -C/w Buproprion 300 mg daily

## 2020-09-02 NOTE — PROGRESS NOTE ADULT - PROBLEM SELECTOR PLAN 6
Patient with HLD, takes Atorvastatin 40 mg at home   -C/w Atorvastatin 40 mg    #Restless leg syndrome: patient reports hx of restless leg syndrome and neuropathy 2/2 prior Lyme Disease infection   -C/w Pramipexole 1.5 mg at bedtime

## 2020-09-02 NOTE — PHYSICAL THERAPY INITIAL EVALUATION ADULT - ADDITIONAL COMMENTS
Pt states she lives in the ground floor apt, no NADIA. Pt states she has an aide and a cleaning woman who assists her with ADLs

## 2020-09-02 NOTE — PROGRESS NOTE ADULT - ASSESSMENT
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

## 2020-09-02 NOTE — PROGRESS NOTE ADULT - PROBLEM SELECTOR PLAN 5
Patient with HTN, takes Diltiazem 180 mg daily   -C/w Diltiazem 180 mg daily  -Continue to monitor    #Scabies: patient reports recent scabies diagnosis   - patient took "pills" prescribed by PCP, likely Ivermectin, requires one more day   -C/w Permethrin cream   -Contact precautions

## 2020-09-02 NOTE — PHYSICAL THERAPY INITIAL EVALUATION ADULT - PERTINENT HX OF CURRENT PROBLEM, REHAB EVAL
79F Reports pain started at 4:30 pm yesterday (8/31) when she was home alone and felt different from her chronic back pain. Patient reports the rib pain became so intense that she was unable to get up by herself, and had to notify the superintendent of her building to call 911.

## 2020-09-02 NOTE — CONSULT NOTE ADULT - SUBJECTIVE AND OBJECTIVE BOX
Pain Management Consult Note - [facility] [phone]      Chief Complaint: R flank Pain, chronic bilateral lower extremity pain       HPI: Patient seen and examined today. 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 Patient reports L flank pain pain worse when lifting her left arm and chronic pain to her bilateral lower extremities. Patient currently receiving Dilaudid 2mg IVP Q3h for pain and reports poor pain relief with current pain medication regimen. Outpatient patient takes Diazepam 5mg PO 1-2 a day for chronic muscle spasms to her lower extremities and Dilaudid 4mg PO 2-3x a day, prescribed by Dr. Festus Huitron, Istop verified. reviewed pain medication regimen with patient at bedside. Patient is wheelchair bound, has a home health aide at bedside. Reports good apetitie today and x1 bm today.         Pain is _x__ sharp ____dull ___burning _x__achy ___ Intensity: ____ mild _x__mod ___severe     Location ____surgical site ____cervical _____lumbar ____abd ____upper ext__x__lower ext   _x_ R flank pain    Worse with _x___activity _x___movement _____physical therapy___ Rest    Improved with _x___medication __x__rest ____physical therapy      ROS: Const:  _-__febrile   Eyes:___ENT:___CV: _-__chest pain  Resp: _-___sob  GI:_-__nausea _-__vomiting ___abd pain ___npo ___clears __full diet __bm  :___ Musk: x___pain ___spasm  Skin:___ Neuro:  _-__vykearro_-__dwukdavti___ numbness _x__weakness __x_paresth  Psych:__anxiety  Endo:___ Heme:___Allergy:_________, __x_all others reviewed and negative      PAST MEDICAL & SURGICAL HISTORY:  PNA (pneumonia)  MRSA (methicillin resistant Staphylococcus aureus)  Brain embolism and thrombosis  Skin cancer  DVT (deep venous thrombosis)  Lyme disease  H/O shoulder surgery  Status post laparoscopic-assisted sigmoidectomy  H/O angioplasty      SH: _-__Tobacco   -___Alcohol                          FH:FAMILY HISTORY:  FH: heart failure: mother      acetaminophen   Tablet .. 1000 milliGRAM(s) Oral every 6 hours  albuterol/ipratropium for Nebulization 3 milliLiter(s) Nebulizer every 6 hours  apixaban 5 milliGRAM(s) Oral every 12 hours  atorvastatin 40 milliGRAM(s) Oral at bedtime  budesonide 160 MICROgram(s)/formoterol 4.5 MICROgram(s) Inhaler 2 Puff(s) Inhalation two times a day  buMETAnide 2 milliGRAM(s) Oral every 24 hours  buPROPion XL . 300 milliGRAM(s) Oral daily  diltiazem    milliGRAM(s) Oral daily  HYDROmorphone  Injectable 2 milliGRAM(s) IV Push every 3 hours  pramipexole 1.5 milliGRAM(s) Oral at bedtime      T(C): 36.6 (09-02-20 @ 13:33), Max: 37 (09-01-20 @ 21:50)  HR: 90 (09-02-20 @ 17:05) (84 - 104)  BP: 131/58 (09-02-20 @ 17:05) (131/58 - 177/79)  RR: 20 (09-02-20 @ 17:05) (16 - 23)  SpO2: 89% (09-02-20 @ 17:05) (87% - 97%)  Wt(kg): --    T(C): 36.6 (09-02-20 @ 13:33), Max: 37 (09-01-20 @ 21:50)  HR: 90 (09-02-20 @ 17:05) (84 - 104)  BP: 131/58 (09-02-20 @ 17:05) (131/58 - 177/79)  RR: 20 (09-02-20 @ 17:05) (16 - 23)  SpO2: 89% (09-02-20 @ 17:05) (87% - 97%)  Wt(kg): --    T(C): 36.6 (09-02-20 @ 13:33), Max: 37 (09-01-20 @ 21:50)  HR: 90 (09-02-20 @ 17:05) (84 - 104)  BP: 131/58 (09-02-20 @ 17:05) (131/58 - 177/79)  RR: 20 (09-02-20 @ 17:05) (16 - 23)  SpO2: 89% (09-02-20 @ 17:05) (87% - 97%)  Wt(kg): --      PHYSICAL EXAM:  Gen Appearance: __x_no acute distress _x__appropriate        Neuro: _x__SILT feet____ EOM Intact Psych: AAOX_3_, _x__mood/affect appropriate        Eyes: _x__conjunctiva WNL  __x___ Pupils equal and round        ENT: _x__ears and nose atraumatic___ Hearing grossly intact        Neck: _x__trachea midline, no visible masses ___thyroid without palpable mass    Resp: _x__Nml WOB____No tactile fremitus ___clear to auscultation    Cardio: _x__extremities free from edema __x__pedal pulses palpable    GI/Abdomen: _x__soft __x___ Nontender___x___Nondistended_____HSM    Lymphatic: ___no palpable nodes in neck  ___no palpable nodes calves and feet    Skin/Wound: ___Incision, ___Dressing c/d/i,   ____surrounding tissues soft,  ___drain/chest tube present____    Muscular: EHL __4_/5  Gastrocnemius__4_/5    ___absent clubbing/cyanosis        ASSESSMENT: I, 78 y/o Female with PMHx HTN, HLD, HFpEF (EF 55-60% last echo 2/20), COPD, DALIA (non-compliant with CPAP), chronic back pain, PVD s/p LE stents, 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           Recommended Treatment PLAN:  1. Dilaudid 4mg PO Q6h prn severe pain  2. Flexeril 5mg PO Q8h prn muscle spasms  3. tylenol 975mg PO Q8h   4. x2 lidocaine patches to affected area, 12hours on 12hours off  Plan discussed with Dr. Levy Pain Management Consult Note - [facility] [phone]      Chief Complaint: R flank Pain, chronic bilateral lower extremity pain       HPI: Patient seen and examined today. 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 Patient reports L flank pain pain worse when lifting her left arm and chronic pain to her bilateral lower extremities. Patient currently receiving Dilaudid 2mg IVP Q3h for pain and reports poor pain relief with current pain medication regimen. Outpatient patient takes Diazepam 5mg PO 1-2 a day for chronic muscle spasms to her lower extremities and Dilaudid 4mg PO 2-3x a day, prescribed by Dr. Festus Huitron, Istop verified. reviewed pain medication regimen with patient at bedside. Patient is wheelchair bound, has a home health aide at bedside. Reports good apetitie today and x1 bm today.         Pain is _x__ sharp ____dull ___burning _x__achy ___ Intensity: ____ mild _x__mod ___severe     Location ____surgical site ____cervical _____lumbar ____abd ____upper ext__x__lower ext   _x_ R flank pain    Worse with _x___activity _x___movement _____physical therapy___ Rest    Improved with _x___medication __x__rest ____physical therapy      ROS: Const:  _-__febrile   Eyes:___ENT:___CV: _-__chest pain  Resp: _-___sob  GI:_-__nausea _-__vomiting ___abd pain ___npo ___clears __full diet __bm  :___ Musk: x___pain ___spasm  Skin:___ Neuro:  _-__hcchbtkb_-__ppfgqdbcz___ numbness _x__weakness __x_paresth  Psych:__anxiety  Endo:___ Heme:___Allergy:_________, __x_all others reviewed and negative      PAST MEDICAL & SURGICAL HISTORY:  PNA (pneumonia)  MRSA (methicillin resistant Staphylococcus aureus)  Brain embolism and thrombosis  Skin cancer  DVT (deep venous thrombosis)  Lyme disease  H/O shoulder surgery  Status post laparoscopic-assisted sigmoidectomy  H/O angioplasty      SH: _-__Tobacco   -___Alcohol                          FH:FAMILY HISTORY:  FH: heart failure: mother      acetaminophen   Tablet .. 1000 milliGRAM(s) Oral every 6 hours  albuterol/ipratropium for Nebulization 3 milliLiter(s) Nebulizer every 6 hours  apixaban 5 milliGRAM(s) Oral every 12 hours  atorvastatin 40 milliGRAM(s) Oral at bedtime  budesonide 160 MICROgram(s)/formoterol 4.5 MICROgram(s) Inhaler 2 Puff(s) Inhalation two times a day  buMETAnide 2 milliGRAM(s) Oral every 24 hours  buPROPion XL . 300 milliGRAM(s) Oral daily  diltiazem    milliGRAM(s) Oral daily  HYDROmorphone  Injectable 2 milliGRAM(s) IV Push every 3 hours  pramipexole 1.5 milliGRAM(s) Oral at bedtime      T(C): 36.6 (09-02-20 @ 13:33), Max: 37 (09-01-20 @ 21:50)  HR: 90 (09-02-20 @ 17:05) (84 - 104)  BP: 131/58 (09-02-20 @ 17:05) (131/58 - 177/79)  RR: 20 (09-02-20 @ 17:05) (16 - 23)  SpO2: 89% (09-02-20 @ 17:05) (87% - 97%)  Wt(kg): --    T(C): 36.6 (09-02-20 @ 13:33), Max: 37 (09-01-20 @ 21:50)  HR: 90 (09-02-20 @ 17:05) (84 - 104)  BP: 131/58 (09-02-20 @ 17:05) (131/58 - 177/79)  RR: 20 (09-02-20 @ 17:05) (16 - 23)  SpO2: 89% (09-02-20 @ 17:05) (87% - 97%)  Wt(kg): --    T(C): 36.6 (09-02-20 @ 13:33), Max: 37 (09-01-20 @ 21:50)  HR: 90 (09-02-20 @ 17:05) (84 - 104)  BP: 131/58 (09-02-20 @ 17:05) (131/58 - 177/79)  RR: 20 (09-02-20 @ 17:05) (16 - 23)  SpO2: 89% (09-02-20 @ 17:05) (87% - 97%)  Wt(kg): --      PHYSICAL EXAM:  Gen Appearance: __x_no acute distress _x__appropriate        Neuro: _x__SILT feet____ EOM Intact Psych: AAOX_3_, _x__mood/affect appropriate        Eyes: _x__conjunctiva WNL  __x___ Pupils equal and round        ENT: _x__ears and nose atraumatic___ Hearing grossly intact        Neck: _x__trachea midline, no visible masses ___thyroid without palpable mass    Resp: _x__Nml WOB____No tactile fremitus ___clear to auscultation    Cardio: _x__extremities free from edema __x__pedal pulses palpable    GI/Abdomen: _x__soft __x___ Nontender___x___Nondistended_____HSM    Lymphatic: ___no palpable nodes in neck  ___no palpable nodes calves and feet    Skin/Wound: ___Incision, ___Dressing c/d/i,   ____surrounding tissues soft,  ___drain/chest tube present____    Muscular: EHL __4_/5  Gastrocnemius__4_/5    ___absent clubbing/cyanosis        ASSESSMENT: I, 78 y/o Female with PMHx HTN, HLD, HFpEF (EF 55-60% last echo 2/20), COPD, DALIA (non-compliant with CPAP), chronic back pain, PVD s/p LE stents, 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           Recommended Treatment PLAN:  1. Dilaudid 4mg PO Q6h prn severe pain  2. Diazepam 5mg PO Q12h prn muscle spasms  3. tylenol 975mg PO Q8h   4. x2 lidocaine patches to affected area, 12hours on 12hours off  Plan discussed with Dr. Levy

## 2020-09-02 NOTE — PROGRESS NOTE ADULT - PROBLEM SELECTOR PLAN 2
Patient with history of COPD. At home, takes Advair, Albuterol 2.5, Yupelri inhaler, and Perforomist inhaler. Patient reports increased SOB recently, but admits to not using her Albuterol at home. Denies recent cough, fevers, chest pain. Possible COPD exacerbation likely 2/2 increased pain and difficulty taking deep breaths due to rib pain.  -CT chest showed bibasilar atelectasis, severe emphysema, large airways disease, with bronchiectasis, bronchial wall thickening, and bronchial luminal impaction bilaterally.  -ABG pH 7.35/pCO2 52/pO2 91/97%  -Patient has home oxygen but uses it intermittently.   -C/w nasal canula during the days as needed (patient currently comfortably and saturating 93% on 4 L NC)  PLAN  -BiPAP at night (10/5 FiO2 40%) (although patient refusing)   -C/w standing duonebs q6  -C/w Symbicort 160 bid   -If worsening respiratory status, please obtain STAT ABG

## 2020-09-02 NOTE — DIETITIAN INITIAL EVALUATION ADULT. - PROBLEM SELECTOR PLAN 10
F: none  E: replete as needed   N: DASH/TLC diet   GI: none  DVT ppx: patient on Eliquis 5 mg bid for prior DVTs     Dispo: [facility] for closer monitoring

## 2020-09-02 NOTE — PHYSICAL THERAPY INITIAL EVALUATION ADULT - DIAGNOSIS, PT EVAL
6B: Impaired Aerobic Capacity/Endurance Associated with Deconditioning 4G: Impaired Joint Mobility, Muscle Performance, and Range of Motion Associated with Fracture

## 2020-09-02 NOTE — DIETITIAN INITIAL EVALUATION ADULT. - ENERGY NEEDS
Height 68"; .4kg; IBW 63.6kg; 178%IBW  BMI 38  Ideal body weight used for calculations as pt >120% of IBW. Needs estimated for age and adjusted for COPD

## 2020-09-02 NOTE — PROGRESS NOTE ADULT - SUBJECTIVE AND OBJECTIVE BOX
INTERVAL HPI/OVERNIGHT EVENTS:  Patient was seen and examined at bedside. Overnight patient refused BIPAP.  patient resting comfortably today. She is complaining of Left lower ribcage pain. She denies worsening SOB or cough. Patient denies: fever, chills, dizziness, weakness, HA, Changes in vision, CP, palpitations, SOB, cough, N/V/D/C, dysuria, changes in bowel movements, LE edema. ROS otherwise negative.    VITAL SIGNS:  T(F): 97.4 (09-02-20 @ 10:00)  HR: 84 (09-02-20 @ 09:49)  BP: 163/72 (09-02-20 @ 08:27)  RR: 18 (09-02-20 @ 09:49)  SpO2: 95% (09-02-20 @ 09:49)  Wt(kg): --    PHYSICAL EXAM:    Constitutional: WDWN, NAD  HEENT: PERRL, EOMI, sclera non-icteric, neck supple, trachea midline, no masses, no JVD, dry mucus membranes  Respiratory: CTA b/l,no wheezing, no rhonchi, no rales, without accessory muscle use and no intercostal retractions  Cardiovascular: RRR, normal S1S2, no M/R/G  Gastrointestinal: soft, NTND, no masses palpable, BS normal  Extremities: b/l venous stasis changes with erythema and induration. poorly perfused lower extremity pulses, +1 pulses b/l, DP/PT.    Neurological: AAOx3, CN Grossly intact  Skin: diffuse scabbing on trunk. Normal temperature, warm, dry    MEDICATIONS  (STANDING):  acetaminophen   Tablet .. 1000 milliGRAM(s) Oral every 6 hours  albuterol/ipratropium for Nebulization 3 milliLiter(s) Nebulizer every 6 hours  apixaban 5 milliGRAM(s) Oral every 12 hours  atorvastatin 40 milliGRAM(s) Oral at bedtime  budesonide 160 MICROgram(s)/formoterol 4.5 MICROgram(s) Inhaler 2 Puff(s) Inhalation two times a day  buMETAnide 2 milliGRAM(s) Oral every 24 hours  buPROPion XL . 300 milliGRAM(s) Oral daily  diltiazem    milliGRAM(s) Oral daily  HYDROmorphone  Injectable 2 milliGRAM(s) IV Push every 3 hours  pramipexole 1.5 milliGRAM(s) Oral at bedtime    MEDICATIONS  (PRN):      Allergies    Augmentin (Unknown)  clindamycin (Unknown)  Vaseline (Swelling)    Intolerances        LABS:                        8.5    8.74  )-----------( 230      ( 02 Sep 2020 07:18 )             28.8     09-02    143  |  106  |  36<H>  ----------------------------<  240<H>  5.4<H>   |  26  |  1.43<H>    Ca    9.3      02 Sep 2020 07:18  Phos  3.3     09-02  Mg     2.3     09-02    TPro  7.9  /  Alb  3.3  /  TBili  <0.2  /  DBili  x   /  AST  9<L>  /  ALT  11  /  AlkPhos  104  09-02    PT/INR - ( 02 Sep 2020 07:18 )   PT: 12.8 sec;   INR: 1.07          PTT - ( 02 Sep 2020 07:18 )  PTT:29.8 sec        RADIOLOGY & ADDITIONAL TESTS:  Reviewed INTERVAL HPI/OVERNIGHT EVENTS:  Patient was seen and examined at bedside. Overnight patient refused BIPAP.  patient resting comfortably today. She is complaining of Left lower ribcage pain. She denies worsening SOB or cough. Patient denies: fever, chills, dizziness, weakness, HA, Changes in vision, CP, palpitations, SOB, cough, N/V/D/C, dysuria, changes in bowel movements, LE edema. ROS otherwise negative.    VITAL SIGNS:  T(F): 97.4 (09-02-20 @ 10:00)  HR: 84 (09-02-20 @ 09:49)  BP: 163/72 (09-02-20 @ 08:27)  RR: 18 (09-02-20 @ 09:49)  SpO2: 95% (09-02-20 @ 09:49)  Wt(kg): --    PHYSICAL EXAM:    Constitutional: WDWN, NAD  HEENT: PERRL, EOMI, sclera non-icteric, neck supple, trachea midline, no masses, no JVD, dry mucus membranes  Respiratory: CTA b/l,no wheezing, no rhonchi, no rales, without accessory muscle use and no intercostal retractions  Cardiovascular: RRR, normal S1S2, no M/R/G  Gastrointestinal: soft, NTND, no masses palpable, BS normal  Extremities: b/l venous stasis changes with minimal erythema and induration. poorly perfused lower extremity pulses, +1 pulses b/l, DP/PT.    Neurological: AAOx3, CN Grossly intact  Skin: diffuse scabbing on trunk. Normal temperature, warm, dry    MEDICATIONS  (STANDING):  acetaminophen   Tablet .. 1000 milliGRAM(s) Oral every 6 hours  albuterol/ipratropium for Nebulization 3 milliLiter(s) Nebulizer every 6 hours  apixaban 5 milliGRAM(s) Oral every 12 hours  atorvastatin 40 milliGRAM(s) Oral at bedtime  budesonide 160 MICROgram(s)/formoterol 4.5 MICROgram(s) Inhaler 2 Puff(s) Inhalation two times a day  buMETAnide 2 milliGRAM(s) Oral every 24 hours  buPROPion XL . 300 milliGRAM(s) Oral daily  diltiazem    milliGRAM(s) Oral daily  HYDROmorphone  Injectable 2 milliGRAM(s) IV Push every 3 hours  pramipexole 1.5 milliGRAM(s) Oral at bedtime    MEDICATIONS  (PRN):      Allergies    Augmentin (Unknown)  clindamycin (Unknown)  Vaseline (Swelling)    Intolerances        LABS:                        8.5    8.74  )-----------( 230      ( 02 Sep 2020 07:18 )             28.8     09-02    143  |  106  |  36<H>  ----------------------------<  240<H>  5.4<H>   |  26  |  1.43<H>    Ca    9.3      02 Sep 2020 07:18  Phos  3.3     09-02  Mg     2.3     09-02    TPro  7.9  /  Alb  3.3  /  TBili  <0.2  /  DBili  x   /  AST  9<L>  /  ALT  11  /  AlkPhos  104  09-02    PT/INR - ( 02 Sep 2020 07:18 )   PT: 12.8 sec;   INR: 1.07          PTT - ( 02 Sep 2020 07:18 )  PTT:29.8 sec        RADIOLOGY & ADDITIONAL TESTS:  Reviewed

## 2020-09-02 NOTE — DIETITIAN INITIAL EVALUATION ADULT. - OTHER INFO
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 Pt seen in room, awake, alert, OOB in chair. Pt has been refusing interventions such as BiPAP overnight, and working w/PT. Pt reported eating well at home and usually orders in or has HHA cook for her. She reported "not eating carbohydrates" d/t steroid use. She usually has eggs, chicken, fish, potatoes, spinach, hamburgers, sandwiches, ham, cheese, etc. Reported lactose intolerance. Pt very picky eater and complaining about all food options available to her in the hospital. States that she "never uses salt" and does not eat salty foods. She reported intake of muffin for breakfast but everything else was "inedible". No N/V/C/D reported at this time. Pain endorsed in side- team aware. Skin intact pressure-wise. Afebrile. Attempted to provide diet education though pt did not believe that she needed a low-salt diet and was argumentative against all education points. Handout left at bedside. Will continue to follow per RD protocol.

## 2020-09-02 NOTE — CONSULT NOTE ADULT - ASSESSMENT
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       Problem/Plan - 1:  ·  Problem: Rib fracture.  Plan: Patient presents with a one day history of acute, L sided rib pain, progressively getting worse and worse with deep breaths. CT shows new bilateral rib fractures. The fracture of the left sixth rib appears acute. The fracture of the right sixth rib appear subacute, as there is adjacent callus formation. Patient denies recent trauma, falls, or coughing.   -Tylenol 1g q6h standing for pain  -Dilaudid 1 mg IV q3 as needed for severe pain   -PT consult in AM   -Patient currently denies SOB, chest pain, or difficulty breathing. Appears comfortable on 4 L NC.     Problem/Plan - 2:  ·  Problem: COPD exacerbation.  Plan: Patient with history of COPD. At home, takes Advair, Albuterol 2.5, Yupelri inhaler, and Perforomist inhaler. Patient reports increased SOB recently, but admits to not using her Albuterol at home. Denies recent cough, fevers, chest pain. Possible COPD exacerbation likely 2/2 increased pain and difficulty taking deep breaths due to rib pain.  -CT chest showed bibasilar atelectasis, severe emphysema, large airways disease, with bronchiectasis, bronchial wall thickening, and bronchial luminal impaction bilaterally.  -ABG pH 7.35/pCO2 52/pO2 91/97%  -Patient has home oxygen but has not had to use it.  -C/w nasal canula during the days as needed (patient currently comfortably and saturating 95% on 4 L NC)  -BiPAP at night (10/5 FiO2 40%)  -C/w standing duonebs q6  -C/w Symbicort 160 bid   -If worsening respiratory status, please obtain STAT ABG.     Problem/Plan - 3:  ·  Problem: Anemia.  Plan: Patient with Hgb 8.5 on admission, down from her baseline of 9-10s on prior admissions. No signs of active bleeding - patient denies light headedess, dizziness, melena, hematemesis, hematuria.  -continue to trend CBC   -Active T&S  -F/u iron studies   -Transfuse if Hgb <7   -Continue to monitor.     Problem/Plan - 4:  ·  Problem: CKD (chronic kidney disease), stage IV.  Plan: Patient with reported CKD stage 4 and baseline Cr ~1.7-2.0   -On this admission, BUN 35 and Cr 1.69   -No acute intervention at this  time - continue to monitor   -DASH/TLC diet.     Problem/Plan - 5:  ·  Problem: Hypertension.  Plan: Patient with HTN, takes Diltiazem 180 mg daily   -C/w Diltiazem 180 mg daily  -Continue to monitor    #Scabies: patient reports recent scabies diagnosis   -C/w Permethrin cream   -Contact precautions.     Problem/Plan - 6:  Problem: Hyperlipidemia. Plan: Patient with HLD, takes Atorvastatin 40 mg at home   -C/w Atorvastatin 40 mg    #Restless leg syndrome: patient reports hx of restless leg syndrome and neuropathy 2/2 prior Lyme Disease infection   -C/w Pramipexole 1.5 mg at bedtime.    Problem/Plan - 7:  ·  Problem: Heart failure with preserved ejection fraction.  Plan: Patient with history of HFpEF (EF 55-60% last echo 2/20). On exam, patient appears euvolemic; low suspicion for heart failure exacerbation at this time.  -C/w Bumex 2 mg qd  -Continue to monitor fluid status and clinical presentation.     Problem/Plan - 8:  ·  Problem: Hyperkalemia.  Plan: On admission, patient's potassium increased to 5.4. Patient has history of CKD IV.  -EKG in [facility] ED showed normal sinus rhythm, HI: 166, QRS: 60  -F/u AM labs    #Smoking Cessation: patient with 50 year smoking history, quit 5 years ago.  -C/w Buproprion 300 mg daily.     Problem/Plan - 9:  ·  Problem: History of DVT (deep vein thrombosis).  Plan: Patient with history of multiple DVTs, on Eliquis 5 mg bid at home.  -C/w Eliquis 5 mg BID in hospital   -Patient denies LE pain at this time.     Problem/Plan - 10:  Problem: Nutrition, metabolism, and development symptoms. Plan; F: none  E: replete as needed   N: DASH/TLC diet   GI: none  DVT ppx: patient on Eliquis 5 mg bid for prior DVTs     Dispo: [facility] for closer monitoring.

## 2020-09-02 NOTE — PROGRESS NOTE ADULT - PROBLEM SELECTOR PLAN 1
Patient presents with a one day history of acute, L sided rib pain, progressively getting worse and worse with deep breaths. CT shows new bilateral rib fractures. The fracture of the left sixth rib appears acute. The fracture of the right sixth rib appear subacute, as there is adjacent callus formation. Patient denies recent trauma, falls, or coughing.   PLAN  - Toradol 30mg IV x1  -Tylenol 1g q6h standing for pain  -Dilaudid 2 mg IV q3 as needed for severe pain   -PT consult     # Chronic pain  - patient has chronic pain 2/2 chronic back pain and PVD  - at home patient takes Dilaudid 8mg TID   - pain management consult Patient presents with a one day history of acute, L sided rib pain, progressively getting worse and worse with deep breaths. CT shows new bilateral rib fractures. The fracture of the left sixth rib appears acute. The fracture of the right sixth rib appear subacute, as there is adjacent callus formation. Patient denies recent trauma, falls, or coughing.   PLAN  - Toradol 30mg IV x1  -Tylenol 1g q6h standing for pain  -Dilaudid 2 mg IV q3 as needed for severe pain with pain consult  -PT consult     # Chronic pain  - patient has chronic pain 2/2 chronic back pain and PVD  - at home patient takes Dilaudid 8mg TID   - pain management consult

## 2020-09-02 NOTE — PROGRESS NOTE ADULT - PROBLEM SELECTOR PLAN 10
F: none  E: replete as needed   N: DASH/TLC diet, potassium restricted   GI: none  DVT ppx: patient on Eliquis 5 mg bid for prior DVTs     Dispo: [facility] for closer monitoring

## 2020-09-02 NOTE — DIETITIAN INITIAL EVALUATION ADULT. - NAME AND PHONE
Samantha Gitlin, RD, CDN, [facility], [id number] or available on [employer]

## 2020-09-02 NOTE — PROGRESS NOTE ADULT - PROBLEM SELECTOR PLAN 4
Patient with reported CKD stage 4 and baseline Cr ~1.7-2.0   -On this admission, BUN 35 and Cr 1.69   - Cr downtrending   -No acute intervention at this  time - continue to monitor   - potassium restricted diet at K mildly elevated at 5.5   - monitor BMP

## 2020-09-02 NOTE — PROGRESS NOTE ADULT - PROBLEM SELECTOR PLAN 3
Patient with Hgb 8.5 on admission, down from her baseline of 9-10s on prior admissions. No signs of active bleeding - patient denies light headedess, dizziness, melena, hematemesis, hematuria.  - iron studies with iron 24, and ferritin 51; unrevealing   -continue to trend CBC   -Active T&S  -Transfuse if Hgb <7   -Continue to monitor Patient with Hgb 8.5 on admission, down from her baseline of 9-10s on prior admissions. No signs of active bleeding - patient denies light headedess, dizziness, melena, hematemesis, hematuria.  - iron studies with iron 24, and ferritin 51; unrevealing   -continue to trend CBC   -Active T&S  -Transfuse if Hgb <7   -Continue to monitor, await guaiac

## 2020-09-02 NOTE — DIETITIAN INITIAL EVALUATION ADULT. - PROBLEM SELECTOR PLAN 1
Patient presents with a one day history of acute, L sided rib pain, progressively getting worse and worse with deep breaths. CT shows new bilateral rib fractures. The fracture of the left sixth rib appears acute. The fracture of the right sixth rib appear subacute, as there is adjacent callus formation. Patient denies recent trauma, falls, or coughing.   -Tylenol 1g q6h standing for pain  -Dilaudid 1 mg IV q3 as needed for severe pain   -PT consult in AM   -Patient currently denies SOB, chest pain, or difficulty breathing. Appears comfortable on 4 L NC.

## 2020-09-02 NOTE — PROGRESS NOTE ADULT - PROBLEM SELECTOR PLAN 8
On admission, patient's potassium increased to 5.4. Patient has history of CKD IV.  -EKG in [facility] ED showed normal sinus rhythm, MT: 166, QRS: 60  -K still elevated at 5.5  - potassium restricted diet     #Smoking Cessation: patient with 50 year smoking history, quit 5 years ago.  -C/w Buproprion 300 mg daily

## 2020-09-02 NOTE — CONSULT NOTE ADULT - SUBJECTIVE AND OBJECTIVE BOX
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 in February 2020 for LE cellulitis and CHF exacerbation who presented to [facility] ED with complaints of left sided back pain, worse with deep respirations. Patient also reports recent increase in baseline SOB, however, admits to not taking her Albuterol inhaler. Reports pain started at 4:30 pm yesterday (8/31) when she was home alone and felt different from her chronic back pain. Patient reports the rib pain became so intense that she was unable to get up by herself, and had to notify the superintendent of her building to call 911. She denies any recent trauma, falls, or coughing fits. Patient states she has not missed a dose of Eliquis. Denies fever, chills, cough, N/V, abdominal pain, chest pain, dysuria, hematuria, headaches. Patient reports she is incontinent at home and has home O2 when she needs it, but denies any recent increased O2 requirement (reports that her O2 was 94% at home before she came to the ED). Of note, patient reports she was recently diagnosed with scabies and prescribed a topical cream (likely Permethrin) by a dermatologist.  ED Vitals: 98.3 F, HR 97, /74, SpO2 99% on 4 L NC, RR 19  ED Labs: notable for WBC 9.0, Hgb 8.5/Hct 28.2, K+ 5.4, BUN 35, Cr 1.69, Albumin 2.7, proBNP 594; ABG pH 7.35/pCO2 52/pO2 91/97%  Covid negative   ED Imaging:   CXR: Nondisplaced left lateral sixth rib fracture  CT Chest: 1. Since 5/3/2020, there are new bilateral rib fractures. The fracture of the left sixth rib appears acute. The fracture of the right sixth rib appear subacute, as there is adjacent callus formation. 2. Bibasilar atelectasis. 3. Severe emphysema. 4. Large airways disease, with bronchiectasis, bronchial wall thickening, and bronchial luminal impaction bilaterally.  EKG: normal sinus rhythm, , QRS 60  	  	  	  ED treatment course: solumedrol 125 mg x1, duonebs x2, Dilaudid 4 mg PO x1, acetaminophen 650 mg. Patient transferred to [facility] for closer monitoring (01 Sep 2020 20:52)      PAST MEDICAL & SURGICAL HISTORY:  PNA (pneumonia)  MRSA (methicillin resistant Staphylococcus aureus)  Brain embolism and thrombosis  Skin cancer  DVT (deep venous thrombosis)  Lyme disease  H/O shoulder surgery  Status post laparoscopic-assisted sigmoidectomy  H/O angioplasty      MEDICATIONS  (STANDING):  acetaminophen   Tablet .. 1000 milliGRAM(s) Oral every 6 hours  albuterol/ipratropium for Nebulization 3 milliLiter(s) Nebulizer every 6 hours  apixaban 5 milliGRAM(s) Oral every 12 hours  atorvastatin 40 milliGRAM(s) Oral at bedtime  budesonide 160 MICROgram(s)/formoterol 4.5 MICROgram(s) Inhaler 2 Puff(s) Inhalation two times a day  buMETAnide 2 milliGRAM(s) Oral every 24 hours  buPROPion XL . 300 milliGRAM(s) Oral daily  diltiazem    milliGRAM(s) Oral daily  pramipexole 1.5 milliGRAM(s) Oral at bedtime    MEDICATIONS  (PRN):  HYDROmorphone  Injectable 1 milliGRAM(s) IV Push every 3 hours PRN Severe Pain (7 - 10)      Social History:           -  Home Living Status: lives alone in a ground floor apartment           -  Prior Home Care Services:  has 2 HHA's    Baseline Functional Level Prior to Admission:           - ADL's:    patient states she requires assistance with bathing/toileting/dressing,            - ambulatory status PTA:  walked with a walker at home, uses wheelchair in the community with HHA    FAMILY HISTORY:  FH: heart failure: mother      CBC Full  -  ( 02 Sep 2020 07:18 )  WBC Count : 8.74 K/uL  RBC Count : 3.25 M/uL  Hemoglobin : 8.5 g/dL  Hematocrit : 28.8 %  Platelet Count - Automated : 230 K/uL  Mean Cell Volume : 88.6 fl  Mean Cell Hemoglobin : 26.2 pg  Mean Cell Hemoglobin Concentration : 29.5 gm/dL  Auto Neutrophil # : 7.96 K/uL  Auto Lymphocyte # : 0.51 K/uL  Auto Monocyte # : 0.19 K/uL  Auto Eosinophil # : 0.00 K/uL  Auto Basophil # : 0.00 K/uL  Auto Neutrophil % : 91.1 %  Auto Lymphocyte % : 5.8 %  Auto Monocyte % : 2.2 %  Auto Eosinophil % : 0.0 %  Auto Basophil % : 0.0 %      09-02    143  |  106  |  36<H>  ----------------------------<  240<H>  5.4<H>   |  26  |  1.43<H>    Ca    9.3      02 Sep 2020 07:18  Phos  3.3     09-02  Mg     2.3     09-02    TPro  7.9  /  Alb  3.3  /  TBili  <0.2  /  DBili  x   /  AST  9<L>  /  ALT  11  /  AlkPhos  104  09-02            Radiology:    < from: CT Chest No Cont (09.01.20 @ 16:50) >  EXAM:  CT CHEST                           PROCEDURE DATE:  09/01/2020          INTERPRETATION:  CT SCAN OF CHEST    History: Shortness of breath, rule out rib fracture.    Technique: CT scan of chest performed from lung apices through lung bases. Axial, coronal, and sagittal multiplanar reformatted images were produced. Thin section axial images and axial MIPS were also produced. Intravenous contrast material was not administered, as ordered.    Comparison: Compared with prior CT chest from 5/30/2020 and CT abdomen pelvis from 3/4/2018.    Findings:    Lungs and large airways: There is again severe emphysema, worst in the upper lobes where there is advanced destructive emphysema. No pneumothorax. Small amount of atelectasis right middle lobe, lingula, and bilateral lower lobes. Bronchiectasis and bronchial wall thickening both lower lobes, with several foci of bronchial luminal impaction present.    Pleura:  No pleural effusion.    Mediastinum and hilar regions: No thoracic lymphadenopathy.    Heart and pericardium:  Heart size is normal. No pericardial effusion.    Vessels:  Mild coronary artery calcification. Moderate calcified plaque thoracic aorta and larger calcified plaque infrarenal abdominal aorta. IVC filter present, with several of its tongs again projecting outside the lumen of the inferior vena cava    Chest wall and lower neck:  Small calcification and nodularity left breast is unchanged.    Upper abdomen: A 2.2 cm hyperdense lesion with a density of 90 Hounsfield units in the upper pole of the right kidney is consistent with a hemorrhagic cyst. Small hiatal hernia.    Bones: Degenerative changes of the spine are seen. There is an acute, mildly displaced fracture of the lateral aspect of the left sixth rib. No change in appearance of left 11th rib, may be related to old fracture. A fracture of the lateral aspect of the right sixth rib is also new, but there is adjacent callus formation, which suggests that this may be a subacute fracture.      Impression:  1. Since 5/3/2020, there are new bilateral rib fractures. The fracture of the left sixth rib appears acute. The fracture of the right sixth rib appear subacute, as there is adjacent callus formation.    2. Bibasilar atelectasis.    3. Severe emphysema.    4. Large airways disease, with bronchiectasis, bronchial wall thickening, and bronchial luminal impaction bilaterally.              Vital Signs Last 24 Hrs  T(C): 36.7 (02 Sep 2020 05:59), Max: 37.2 (01 Sep 2020 13:17)  T(F): 98.1 (02 Sep 2020 05:59), Max: 98.9 (01 Sep 2020 13:17)  HR: 90 (02 Sep 2020 08:27) (86 - 104)  BP: 163/72 (02 Sep 2020 08:27) (141/74 - 177/79)  BP(mean): 104 (02 Sep 2020 08:27) (96 - 114)  RR: 20 (02 Sep 2020 08:27) (17 - 24)  SpO2: 92% (02 Sep 2020 08:27) (92% - 100%)    REVIEW OF SYSTEMS:    CONSTITUTIONAL: No fever, weight loss, or fatigue  EYES: No eye pain, visual disturbances, or discharge  ENMT:  No difficulty hearing, tinnitus, vertigo; No sinus or throat pain  NECK: No pain or stiffness  BREASTS: No pain, masses, or nipple discharge  RESPIRATORY: No cough, wheezing, chills or hemoptysis; No shortness of breath  CARDIOVASCULAR: No chest pain, palpitations, dizziness, or leg swelling  GASTROINTESTINAL: No abdominal or epigastric pain. No nausea, vomiting, or hematemesis; No diarrhea or constipation. No melena or hematochezia.  GENITOURINARY: No dysuria, frequency, hematuria, or incontinence  NEUROLOGICAL: No headaches, memory loss, loss of strength, numbness, or tremors  SKIN: No itching, burning, rashes, or lesions   LYMPH NODES: No enlarged glands  ENDOCRINE: No heat or cold intolerance; No hair loss  MUSCULOSKELETAL:  rib/ back pain  PSYCHIATRIC: No depression, anxiety, mood swings, or difficulty sleeping  HEME/LYMPH: No easy bruising, or bleeding gums  ALLERGY AND IMMUNOLOGIC: No hives or eczema  VASCULAR: no swelling, erythema,   : no dysuria, hematuria, frequency        Physical Exam: on contact isolation, obese 78 yo  woman lying in semi Brewster's position, c/o rib pain    Head: normocephalic, atraumatic    Eyes: PERRLA, EOMI, no nystagmus, sclera anicteric    ENT: nasal discharge, uvula midline, no oropharyngeal erythema/exudate    Neck: supple, negative JVD, negative carotid bruits, no thyromegaly    Chest: bilateral exp wheezes    Cardiovascular: regular rate and rhythm, neg murmurs/rubs/gallops    Abdomen: soft, obese, non tender to palpation in all 4 quadrants, negative rebound/guarding, normal bowel sounds    Extremities:  venous stasis changes, 1-2+ LE edema, erythematous    Musculoskeletal:      :     Neurologic Exam:    Motor Exam:    Upper Extremities:     Right:   no focal weakness > 3/5    Left:   no focal weakness > 3/5    Lower Extremities:    Right:    no focal weakness > 3/5    Left :   no focal weakness > 3/5               Sensation: Intact to light touch bilaterally                       DTR:            = biceps/     triceps/     brachioradialis                      = patella/   medial hamstring/ankle                      neg clonus                      neg Babinski                        Gait:  not tested        PM&R Impression:    1) deconditioned  2) no focal weakness  3) acute rib fractures    Plan:    1) Physical therapy focusing on therapeutic exercises, bed mobility/transfer out of bed evaluation, progressive ambulation with assistive devices prn.    2) Anticipated Disposition Plan/Recs:    pending functional progress

## 2020-09-03 VITALS — TEMPERATURE: 97 F

## 2020-09-03 PROCEDURE — 85610 PROTHROMBIN TIME: CPT

## 2020-09-03 PROCEDURE — 97161 PT EVAL LOW COMPLEX 20 MIN: CPT

## 2020-09-03 PROCEDURE — 84100 ASSAY OF PHOSPHORUS: CPT

## 2020-09-03 PROCEDURE — 94660 CPAP INITIATION&MGMT: CPT

## 2020-09-03 PROCEDURE — 94640 AIRWAY INHALATION TREATMENT: CPT

## 2020-09-03 PROCEDURE — 85730 THROMBOPLASTIN TIME PARTIAL: CPT

## 2020-09-03 PROCEDURE — 97116 GAIT TRAINING THERAPY: CPT

## 2020-09-03 PROCEDURE — 84484 ASSAY OF TROPONIN QUANT: CPT

## 2020-09-03 PROCEDURE — 93005 ELECTROCARDIOGRAM TRACING: CPT

## 2020-09-03 PROCEDURE — 82728 ASSAY OF FERRITIN: CPT

## 2020-09-03 PROCEDURE — 83735 ASSAY OF MAGNESIUM: CPT

## 2020-09-03 PROCEDURE — 85025 COMPLETE CBC W/AUTO DIFF WBC: CPT

## 2020-09-03 PROCEDURE — 82436 ASSAY OF URINE CHLORIDE: CPT

## 2020-09-03 PROCEDURE — 71250 CT THORAX DX C-: CPT

## 2020-09-03 PROCEDURE — 96375 TX/PRO/DX INJ NEW DRUG ADDON: CPT

## 2020-09-03 PROCEDURE — 99285 EMERGENCY DEPT VISIT HI MDM: CPT | Mod: 25

## 2020-09-03 PROCEDURE — 83550 IRON BINDING TEST: CPT

## 2020-09-03 PROCEDURE — 96365 THER/PROPH/DIAG IV INF INIT: CPT

## 2020-09-03 PROCEDURE — 82803 BLOOD GASES ANY COMBINATION: CPT

## 2020-09-03 PROCEDURE — 83540 ASSAY OF IRON: CPT

## 2020-09-03 PROCEDURE — 71045 X-RAY EXAM CHEST 1 VIEW: CPT

## 2020-09-03 PROCEDURE — 87635 SARS-COV-2 COVID-19 AMP PRB: CPT

## 2020-09-03 PROCEDURE — 99238 HOSP IP/OBS DSCHRG MGMT 30/<: CPT

## 2020-09-03 PROCEDURE — 87040 BLOOD CULTURE FOR BACTERIA: CPT

## 2020-09-03 PROCEDURE — 80053 COMPREHEN METABOLIC PANEL: CPT

## 2020-09-03 PROCEDURE — 83880 ASSAY OF NATRIURETIC PEPTIDE: CPT

## 2020-09-03 PROCEDURE — 84133 ASSAY OF URINE POTASSIUM: CPT

## 2020-09-03 PROCEDURE — 84300 ASSAY OF URINE SODIUM: CPT

## 2020-09-03 PROCEDURE — 36415 COLL VENOUS BLD VENIPUNCTURE: CPT

## 2020-09-03 RX ORDER — HYDROMORPHONE HYDROCHLORIDE 2 MG/ML
2 INJECTION INTRAMUSCULAR; INTRAVENOUS; SUBCUTANEOUS
Qty: 0 | Refills: 0 | DISCHARGE

## 2020-09-03 RX ORDER — ACETAMINOPHEN 500 MG
975 TABLET ORAL
Qty: 0 | Refills: 0 | DISCHARGE
Start: 2020-09-03

## 2020-09-03 RX ORDER — ACETAMINOPHEN 500 MG
2 TABLET ORAL
Qty: 0 | Refills: 0 | DISCHARGE
Start: 2020-09-03

## 2020-09-03 RX ADMIN — HYDROMORPHONE HYDROCHLORIDE 4 MILLIGRAM(S): 2 INJECTION INTRAMUSCULAR; INTRAVENOUS; SUBCUTANEOUS at 09:03

## 2020-09-03 RX ADMIN — BUMETANIDE 2 MILLIGRAM(S): 0.25 INJECTION INTRAMUSCULAR; INTRAVENOUS at 02:24

## 2020-09-03 RX ADMIN — Medication 1000 MILLIGRAM(S): at 02:25

## 2020-09-03 RX ADMIN — APIXABAN 5 MILLIGRAM(S): 2.5 TABLET, FILM COATED ORAL at 06:25

## 2020-09-03 RX ADMIN — BUDESONIDE AND FORMOTEROL FUMARATE DIHYDRATE 2 PUFF(S): 160; 4.5 AEROSOL RESPIRATORY (INHALATION) at 06:45

## 2020-09-03 RX ADMIN — Medication 3 MILLILITER(S): at 03:09

## 2020-09-03 RX ADMIN — Medication 5 MILLIGRAM(S): at 15:52

## 2020-09-03 RX ADMIN — HYDROMORPHONE HYDROCHLORIDE 4 MILLIGRAM(S): 2 INJECTION INTRAMUSCULAR; INTRAVENOUS; SUBCUTANEOUS at 06:25

## 2020-09-03 RX ADMIN — Medication 1000 MILLIGRAM(S): at 12:15

## 2020-09-03 RX ADMIN — BUPROPION HYDROCHLORIDE 300 MILLIGRAM(S): 150 TABLET, EXTENDED RELEASE ORAL at 11:07

## 2020-09-03 RX ADMIN — Medication 3 MILLILITER(S): at 09:08

## 2020-09-03 RX ADMIN — Medication 180 MILLIGRAM(S): at 06:41

## 2020-09-03 RX ADMIN — HYDROMORPHONE HYDROCHLORIDE 4 MILLIGRAM(S): 2 INJECTION INTRAMUSCULAR; INTRAVENOUS; SUBCUTANEOUS at 14:32

## 2020-09-03 RX ADMIN — Medication 1000 MILLIGRAM(S): at 11:08

## 2020-09-03 RX ADMIN — Medication 1000 MILLIGRAM(S): at 00:05

## 2020-09-03 NOTE — DISCHARGE NOTE PROVIDER - NSDCCPCAREPLAN_GEN_ALL_CORE_FT
PRINCIPAL DISCHARGE DIAGNOSIS  Diagnosis: Rib fracture  Assessment and Plan of Treatment: You have a rib fracture of your left 6th rib. The fracture is not displaced. As this may cause you pain, there is no need to intervene at this time. You should use the following for pain : dilaudid 4mg every 6 hours as needed, and Valium 5mg twice a day as needed, and Tylenol over the counter, 650mg as needed. 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 Please follow up with your Primary care doctor, Dr. Rothman, in [place] in 1-2 weeks.      SECONDARY DISCHARGE DIAGNOSES  Diagnosis: COPD exacerbation  Assessment and Plan of Treatment: You have a lung condition called chronic obstructive pulmonary disease (COPD). It is caused by lung damage from chronic irritation and inflammation from smoking. COPD is a serious condition that can get worse over time. However, there are measures you can take to help you feel better and prevent exacerbations. These measure include smoking cessation (if you have not already), avoiding others who smoke, exercising for at least 20 minutes a day, annual influenza vaccine, and a pneumonia vaccine (if you have not received this already). When you feel short of breath, take a deep breath through your nose and slowly breath out through your mouth with your lips pursed for twice as long as you inhaled. It is important for you to take your medication as directed since it can prevent future attacks. Please return to the emergency department should have symptoms such as but not limited to: severe shortness of breath, wheezing, cough, coughing up blood, vomiting associated with cough, chest pain. Please continue to take your inhalers, Advair, Yupelri, and Perforomist and follow-up with your primary care physician and pulmonologist.      Diagnosis: CKD (chronic kidney disease), stage IV  Assessment and Plan of Treatment: You were noted to have a history of chronic kidney disease (CKD). Chronic kidney disease is the gradual and/or permanent loss of kidney function. Normally, the kidneys remove fluids, chemicals, and waste from your blood and turn these waste chemicals into urine. As kidney disease worsens, one may lose the ability to remove waste from the body. Please continue with a renal diet and follow-up with your primary care physician and nephrologist. Your potassium was elevated, please take a low-potassium diet.       Diagnosis: Heart failure with preserved ejection fraction  Assessment and Plan of Treatment: Please continue to Weigh yourself daily.  If you gain 3lbs in 3 days, or 5lbs in a week call your Health Care Provider.  Eat a low sodium diet (less than 2 grams of sodium a day).  Call your Health Care Provider if you have any swelling or increased swelling in your feet, ankles, and/or stomach.  Take all of your medication as directed.  If you become dizzy call your Health Care Provider.  If you experience chest pain or shortness of breath, call an ambulance and come to the emergency department. Please continue to take bumetanide for your swelling.       Diagnosis: Hyperlipidemia  Assessment and Plan of Treatment: You have a known history of high cholesterol prior to your admission. To manage this you are on a medication called lipitor. High cholesterol is bad because it can cause damage to your heart and puts you at risk for heart attack and stroke.  It is important that you continue to take this medication when you are discharged so that you can continue to control your level of cholesterol. Additionally be sure to follow up with your primary care physician on a regular basis to make sure your triglyceride and cholesterol levels continue to be well controlled.      Diagnosis: Hypertension  Assessment and Plan of Treatment: You have a known history of high blood pressure prior to your admission. To manage this you are on a medication called diltiazem. High blood pressure can cause damage to your heart and kidneys and increases your risk of heart attack and stroke. To avoid this, It is important that you continue to take this medication when you are discharged so that you can continue to control your blood pressure. Additionally be sure to follow up with your primary care physician on a regular basis to make sure your blood pressure continues to be well controlled. If you experience symptoms such as but not limited to: sudden onset blurry vision, nausea, vomiting, chest pain, shortness of breath, or palpitations, please go to the nearest emergency room.      Diagnosis: Anemia  Assessment and Plan of Treatment: Anemia is the medical term for when a person has too few red blood cells. Red blood cells are the cells in your blood that carry oxygen. If you have too few red blood cells, your body does not get all the oxygen it needs. Most people with anemia have no symptoms. They find out they have it after their doctor does blood tests for another reason. People who do have symptoms might feel tired or weak, especially if they try to exercise or have headaches. If you experience these symptoms you should see your primary care provider for further evaluation. Please follow up with your primary care doctor for further evaluation. PRINCIPAL DISCHARGE DIAGNOSIS  Diagnosis: Rib fracture  Assessment and Plan of Treatment: You have a rib fracture of your left 6th rib. The fracture is not displaced. As this may cause you pain, there is no need to intervene at this time. You should use the following for pain : dilaudid 4mg every 6 hours as needed, and Valium 5mg twice a day as needed, and Tylenol over the counter, 650mg as needed. 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 Please follow up with your Primary care doctor, Dr. Rothman, in [place] in 1-2 weeks.      SECONDARY DISCHARGE DIAGNOSES  Diagnosis: Scabies  Assessment and Plan of Treatment: Please take the last dose of the Iveramectin that you have at home. This is important to help resolve your scabies and control your sympotms.    Diagnosis: COPD exacerbation  Assessment and Plan of Treatment: You have a lung condition called chronic obstructive pulmonary disease (COPD). It is caused by lung damage from chronic irritation and inflammation from smoking. COPD is a serious condition that can get worse over time. However, there are measures you can take to help you feel better and prevent exacerbations. These measure include smoking cessation (if you have not already), avoiding others who smoke, exercising for at least 20 minutes a day, annual influenza vaccine, and a pneumonia vaccine (if you have not received this already). When you feel short of breath, take a deep breath through your nose and slowly breath out through your mouth with your lips pursed for twice as long as you inhaled. It is important for you to take your medication as directed since it can prevent future attacks. Please return to the emergency department should have symptoms such as but not limited to: severe shortness of breath, wheezing, cough, coughing up blood, vomiting associated with cough, chest pain. Please continue to take your inhalers, Advair, Yupelri, and Perforomist and follow-up with your primary care physician and pulmonologist.      Diagnosis: CKD (chronic kidney disease), stage IV  Assessment and Plan of Treatment: You were noted to have a history of chronic kidney disease (CKD). Chronic kidney disease is the gradual and/or permanent loss of kidney function. Normally, the kidneys remove fluids, chemicals, and waste from your blood and turn these waste chemicals into urine. As kidney disease worsens, one may lose the ability to remove waste from the body. Please continue with a renal diet and follow-up with your primary care physician and nephrologist. Your potassium was elevated, please take a low-potassium diet.       Diagnosis: Heart failure with preserved ejection fraction  Assessment and Plan of Treatment: Please continue to Weigh yourself daily.  If you gain 3lbs in 3 days, or 5lbs in a week call your Health Care Provider.  Eat a low sodium diet (less than 2 grams of sodium a day).  Call your Health Care Provider if you have any swelling or increased swelling in your feet, ankles, and/or stomach.  Take all of your medication as directed.  If you become dizzy call your Health Care Provider.  If you experience chest pain or shortness of breath, call an ambulance and come to the emergency department. Please continue to take bumetanide for your swelling.       Diagnosis: Hyperlipidemia  Assessment and Plan of Treatment: You have a known history of high cholesterol prior to your admission. To manage this you are on a medication called lipitor. High cholesterol is bad because it can cause damage to your heart and puts you at risk for heart attack and stroke.  It is important that you continue to take this medication when you are discharged so that you can continue to control your level of cholesterol. Additionally be sure to follow up with your primary care physician on a regular basis to make sure your triglyceride and cholesterol levels continue to be well controlled.      Diagnosis: Hypertension  Assessment and Plan of Treatment: You have a known history of high blood pressure prior to your admission. To manage this you are on a medication called diltiazem. High blood pressure can cause damage to your heart and kidneys and increases your risk of heart attack and stroke. To avoid this, It is important that you continue to take this medication when you are discharged so that you can continue to control your blood pressure. Additionally be sure to follow up with your primary care physician on a regular basis to make sure your blood pressure continues to be well controlled. If you experience symptoms such as but not limited to: sudden onset blurry vision, nausea, vomiting, chest pain, shortness of breath, or palpitations, please go to the nearest emergency room.      Diagnosis: Anemia  Assessment and Plan of Treatment: Anemia is the medical term for when a person has too few red blood cells. Red blood cells are the cells in your blood that carry oxygen. If you have too few red blood cells, your body does not get all the oxygen it needs. Most people with anemia have no symptoms. They find out they have it after their doctor does blood tests for another reason. People who do have symptoms might feel tired or weak, especially if they try to exercise or have headaches. If you experience these symptoms you should see your primary care provider for further evaluation. Please follow up with your primary care doctor for further evaluation. PRINCIPAL DISCHARGE DIAGNOSIS  Diagnosis: Rib fracture  Assessment and Plan of Treatment: You have a rib fracture of your left 6th rib. The fracture is not displaced. As this may cause you pain, there is no need to intervene at this time. You should use the following for pain : dilaudid 4mg every 6 hours as needed, and Valium 5mg twice a day as needed, and Tylenol 975mg every 8 hours as needed. . 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 Please follow up with your Primary care doctor, Dr. Rothman, in [place] in 1-2 weeks.      SECONDARY DISCHARGE DIAGNOSES  Diagnosis: Scabies  Assessment and Plan of Treatment: Please take the last dose of the Iveramectin that you have at home. This is important to help resolve your scabies and control your sympotms.    Diagnosis: COPD exacerbation  Assessment and Plan of Treatment: You have a lung condition called chronic obstructive pulmonary disease (COPD). It is caused by lung damage from chronic irritation and inflammation from smoking. COPD is a serious condition that can get worse over time. However, there are measures you can take to help you feel better and prevent exacerbations. These measure include smoking cessation (if you have not already), avoiding others who smoke, exercising for at least 20 minutes a day, annual influenza vaccine, and a pneumonia vaccine (if you have not received this already). When you feel short of breath, take a deep breath through your nose and slowly breath out through your mouth with your lips pursed for twice as long as you inhaled. It is important for you to take your medication as directed since it can prevent future attacks. Please return to the emergency department should have symptoms such as but not limited to: severe shortness of breath, wheezing, cough, coughing up blood, vomiting associated with cough, chest pain. Please continue to take your inhalers, Advair, Yupelri, and Perforomist and follow-up with your primary care physician and pulmonologist.      Diagnosis: CKD (chronic kidney disease), stage IV  Assessment and Plan of Treatment: You were noted to have a history of chronic kidney disease (CKD). Chronic kidney disease is the gradual and/or permanent loss of kidney function. Normally, the kidneys remove fluids, chemicals, and waste from your blood and turn these waste chemicals into urine. As kidney disease worsens, one may lose the ability to remove waste from the body. Please continue with a renal diet and follow-up with your primary care physician and nephrologist. Your potassium was elevated, please take a low-potassium diet.       Diagnosis: Heart failure with preserved ejection fraction  Assessment and Plan of Treatment: Please continue to Weigh yourself daily.  If you gain 3lbs in 3 days, or 5lbs in a week call your Health Care Provider.  Eat a low sodium diet (less than 2 grams of sodium a day).  Call your Health Care Provider if you have any swelling or increased swelling in your feet, ankles, and/or stomach.  Take all of your medication as directed.  If you become dizzy call your Health Care Provider.  If you experience chest pain or shortness of breath, call an ambulance and come to the emergency department. Please continue to take bumetanide for your swelling.       Diagnosis: Hyperlipidemia  Assessment and Plan of Treatment: You have a known history of high cholesterol prior to your admission. To manage this you are on a medication called lipitor. High cholesterol is bad because it can cause damage to your heart and puts you at risk for heart attack and stroke.  It is important that you continue to take this medication when you are discharged so that you can continue to control your level of cholesterol. Additionally be sure to follow up with your primary care physician on a regular basis to make sure your triglyceride and cholesterol levels continue to be well controlled.      Diagnosis: Hypertension  Assessment and Plan of Treatment: You have a known history of high blood pressure prior to your admission. To manage this you are on a medication called diltiazem. High blood pressure can cause damage to your heart and kidneys and increases your risk of heart attack and stroke. To avoid this, It is important that you continue to take this medication when you are discharged so that you can continue to control your blood pressure. Additionally be sure to follow up with your primary care physician on a regular basis to make sure your blood pressure continues to be well controlled. If you experience symptoms such as but not limited to: sudden onset blurry vision, nausea, vomiting, chest pain, shortness of breath, or palpitations, please go to the nearest emergency room.      Diagnosis: Anemia  Assessment and Plan of Treatment: Anemia is the medical term for when a person has too few red blood cells. Red blood cells are the cells in your blood that carry oxygen. If you have too few red blood cells, your body does not get all the oxygen it needs. Most people with anemia have no symptoms. They find out they have it after their doctor does blood tests for another reason. People who do have symptoms might feel tired or weak, especially if they try to exercise or have headaches. If you experience these symptoms you should see your primary care provider for further evaluation. Please follow up with your primary care doctor for further evaluation.

## 2020-09-03 NOTE — DISCHARGE NOTE PROVIDER - CARE PROVIDER_API CALL
Eulogio Rothman [address], [place] [address]  Phone: [phone]  Fax: (   )    -  Follow Up Time: 2 weeks

## 2020-09-03 NOTE — DISCHARGE NOTE PROVIDER - HOSPITAL COURSE
#Discharge: do not delete        Patient is __ yo M/F with past medical history of _____    Presented with _____, found to have _____    Problem List/Main Diagnoses (system-based):     Inpatient treatment course:     New medications:     Labs to be followed outpatient:     Exam to be followed outpatient: #Discharge: do not delete        Patient is _yo M/F with past medical history of _____                    Presented with _____, found to have _____                    Problem List/Main Diagnoses (system-based):                     Inpatient treatment course:                 New medications:             Labs to be followed outpatient:         Exam to be followed outpatient: #Discharge: do not delete        Patient is a 78 yo F with past medical history of HTN, HLD, HFpEF (EF 55-60% last echo 2/20), COPD (no home O2), DALIA, chronic back pain, PVD s/p LE stents, 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 5 mg BID, cerebral aneurysm s/p coil, 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, CKD stage 4, SCC of left leg s/p resection with skin graft (~2010) c/b MRSA infection, diverticulitis s/p sigmoidectomy (2010), and recent admission to [facility] in February 2020 for LE cellulitis and CHF exacerbation who presented with left sided rib pain and shortness of breath, found to have left lateral sixth                Problem List/Main Diagnoses (system-based):                     Inpatient treatment course:                 New medications:             Labs to be followed outpatient:         Exam to be followed outpatient: #Discharge: do not delete        Patient is a 78 yo F with past medical history of HTN, HLD, HFpEF (EF 55-60% last echo 2/20), COPD (has home O2, uses intermittently), DALIA, chronic back pain, PVD s/p LE stents, 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 5 mg BID, cerebral aneurysm s/p coil, 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         Inpatient treatment course:  Patient was treated with IV steroids and duonebs for COPD exacerbation, with marked improvement in her respiratory status, requiring minimal oxygen on nasal cannula then saturating well on RA. She was given Dilaudid for pain control and continued on all her home inhalers. Pain management saw patient along with PT, and adjusted her pain medication, her pain was controlled and she was safe for discharge home with nursing assistance due to difficulty ambulating at baseline (uses wheelchair).         Problem List/Main Diagnoses (system-based):         # Rib fracture.      CT shows acute fracture of the left sixth rib, and subacute     PLAN    Pain control: Dilaudid 4mg PO Q6h prn severe pain, Diazepam 5mg PO Q12h prn muscle spasms, tylenol 975mg PO Q8h         #COPD exacerbation.      - increased SOB recently, COPD exacerbation possibly 2/2 increased pain/ difficulty taking deep breath     - At home, takes Advair, Albuterol 2.5, Yupelri inhaler, and Perforomist inhaler.    -CT chest showed bibasilar atelectasis, severe emphysema, large airways disease, with bronchiectasis, bronchial wall thickening, and bronchial luminal impaction bilaterally.    -ABG pH 7.35/pCO2 52/pO2 91/97%    -Patient has home oxygen but uses it intermittently.     PLAN    -BiPAP at night, O2 as needed (patient has O2 at home)     -C/w home inhalers: Advair, Yupelri, and Perforomist and albuterol as needed         # Anemia.      Patient with Hgb 8.5 on admission, down from her baseline of 9-10s on prior admissions. No signs of active bleeding - patient denies light headedness dizziness, melena, hematemesis, hematuria.    - iron studies with iron 24, and ferritin 51; unrevealing     - f/u with PCP for further evaluation          Problem/Plan - 4:    ·  Problem: CKD (chronic kidney disease), stage IV.  Plan: Patient with reported CKD stage 4 and baseline Cr ~1.7-2.0     -On this admission, BUN 35 and Cr 1.69     - Cr downtrending     - potassium restricted diet at K mildly elevated at 5.5     - follow up wuth          Problem/Plan - 5:    ·  Problem: Hypertension.  Plan: Patient with HTN, takes Diltiazem 180 mg daily     -C/w Diltiazem 180 mg daily    -Continue to monitor        #Scabies: patient reports recent scabies diagnosis     - patient took "pills" prescribed by PCP, likely Ivermectin, requires one more day     -C/w Permethrin cream     -Contact precautions.          Problem/Plan - 6:    Problem: Hyperlipidemia. Plan: Patient with HLD, takes Atorvastatin 40 mg at home     -C/w Atorvastatin 40 mg        #Restless leg syndrome: patient reports hx of restless leg syndrome and neuropathy 2/2 prior Lyme Disease infection     -C/w Pramipexole 1.5 mg at bedtime.         Problem/Plan - 7:    ·  Problem: Heart failure with preserved ejection fraction.  Plan: Patient with history of HFpEF (EF 55-60% last echo 2/20). On exam, patient appears euvolemic; low suspicion for heart failure exacerbation at this time.    -C/w Bumex 2 mg qd    -Continue to monitor fluid status and clinical presentation.          Problem/Plan - 8:    ·  Problem: Hyperkalemia.  Plan: On admission, patient's potassium increased to 5.4. Patient has history of CKD IV.    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         Inpatient treatment course:  Patient was treated with IV steroids and duonebs for COPD exacerbation, with marked improvement in her respiratory status, requiring minimal oxygen on nasal cannula then saturating well on RA. She was given Dilaudid for pain control and continued on all her home inhalers. Pain management saw patient along with PT, and adjusted her pain medication, her pain was controlled and she was safe for discharge home with nursing assistance due to difficulty ambulating at baseline (uses wheelchair).         Problem List/Main Diagnoses (system-based):         # Rib fracture.      CT shows acute fracture of the left sixth rib, and subacute     PLAN    Pain control: Dilaudid 4mg PO Q6h prn severe pain, Diazepam 5mg PO Q12h prn muscle spasms, tylenol 975mg PO Q8h         #COPD exacerbation.      - increased SOB recently, COPD exacerbation possibly 2/2 increased pain/ difficulty taking deep breath     - At home, takes Advair, Albuterol 2.5, Yupelri inhaler, and Perforomist inhaler.    -CT chest showed bibasilar atelectasis, severe emphysema, large airways disease, with bronchiectasis, bronchial wall thickening, and bronchial luminal impaction bilaterally.    -ABG pH 7.35/pCO2 52/pO2 91/97%    -Patient has home oxygen but uses it intermittently.     PLAN    -BiPAP at night, O2 as needed (patient has O2 at home)     -C/w home inhalers: Advair, Yupelri, and Perforomist and albuterol as needed         # Anemia.      Patient with Hgb 8.5 on admission, down from her baseline of 9-10s on prior admissions. No signs of active bleeding - patient denies light headedness dizziness, melena, hematemesis, hematuria.    - iron studies with iron 24, and ferritin 51; unrevealing     - f/u with PCP for further evaluation         #CKD (chronic kidney disease), stage IV.  Plan: Patient with reported CKD stage 4 and baseline Cr ~1.7-2.0     -On this admission, BUN 35 and Cr 1.69     - Cr downtrending     - potassium restricted diet at K mildly elevated at 5.5     - follow up with nephrologist and PCP         #Hypertension.      Patient with HTN, takes Diltiazem 180 mg daily             #Scabies: patient reports recent scabies diagnosis     - c/w Ivermectin at home (patient has medication at home)     -C/w Permethrin cream         #Hyperlipidemia.     Patient with HLD, takes Atorvastatin 40 mg at home         #Restless leg syndrom    -C/w Pramipexole 1.5 mg at bedtime.        #Heart failure with preserved ejection fraction.      Patient with history of HFpEF (EF 55-60% last echo 2/20). On exam, patient appears euvolemic; low suspicion for heart failure exacerbation at this time.    -C/w Bumex 2 mg qd        #Hyperkalemia.  Plan: On admission, patient's potassium increased to 5.4. Patient has history of CKD IV.    -EKG in [facility] ED showed normal sinus rhythm, CT: 166, QRS: 60    -K still elevated at 5.5    - potassium restricted diet          # History of DVT (deep vein thrombosis).      Patient with history of multiple DVTs, on Eliquis 5 mg bid at home.    -C/w Eliquis 5 mg BID in hospital             New medications: No new medications but adjustment made to home pain management: Dilaudid 4mg PO Q6h prn severe pain, Diazepam 5mg PO Q12h prn muscle spasms, tylenol 975mg PO Q8h             Labs to be followed outpatient: CBC, BMP         Exam to be followed outpatient: Lung exam #Discharge: do not delete        Patient is a 78 yo F with past medical history of HTN, HLD, HFpEF (EF 55-60% last echo 2/20), COPD (has home O2, uses intermittently), DALIA, chronic back pain, PVD s/p LE stents, 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 5 mg BID, cerebral aneurysm s/p coil, 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         Inpatient treatment course:  Patient was treated with IV steroids and duonebs for COPD exacerbation, with marked improvement in her respiratory status, requiring minimal oxygen on nasal cannula then saturating well on RA. She was given Dilaudid for pain control and continued on all her home inhalers. Pain management saw patient along with PT, and adjusted her pain medication, her pain was controlled and she was safe for discharge home with nursing assistance due to difficulty ambulating at baseline (uses wheelchair).         Problem List/Main Diagnoses (system-based):         # Rib fracture.      CT shows acute fracture of the left sixth rib, and subacute     PLAN    Pain control: Dilaudid 4mg PO Q6h prn severe pain, Diazepam 5mg PO Q12h prn muscle spasms, tylenol 975mg PO Q8h         #COPD exacerbation.      - increased SOB recently, COPD exacerbation possibly 2/2 increased pain/ difficulty taking deep breath     - At home, takes Advair, Albuterol 2.5, Yupelri inhaler, and Perforomist inhaler.    -CT chest showed bibasilar atelectasis, severe emphysema, large airways disease, with bronchiectasis, bronchial wall thickening, and bronchial luminal impaction bilaterally.    -ABG pH 7.35/pCO2 52/pO2 91/97%    -Patient has home oxygen but uses it intermittently.     PLAN    -BiPAP at night, O2 as needed (patient has O2 at home)     -C/w home inhalers: Advair, Yupelri, and Perforomist and albuterol as needed         # Anemia.      Patient with Hgb 8.5 on admission, down from her baseline of 9-10s on prior admissions. No signs of active bleeding - patient denies light headedness dizziness, melena, hematemesis, hematuria.    - iron studies with iron 24, and ferritin 51; unrevealing     - f/u with PCP for further evaluation         #CKD (chronic kidney disease), stage IV.  Plan: Patient with reported CKD stage 4 and baseline Cr ~1.7-2.0     -On this admission, BUN 35 and Cr 1.69     - Cr downtrending     - potassium restricted diet at K mildly elevated at 5.5     - follow up with nephrologist and PCP         #Hypertension.      Patient with HTN, takes Diltiazem 180 mg daily         #Scabies: patient reports recent scabies diagnosis     - c/w Ivermectin at home (patient has medication at home)     -C/w Permethrin cream         #Hyperlipidemia.     Patient with HLD, takes Atorvastatin 40 mg at home         #Restless leg syndrom    -C/w Pramipexole 1.5 mg at bedtime.        #Heart failure with preserved ejection fraction.      Patient with history of HFpEF (EF 55-60% last echo 2/20). On exam, patient appears euvolemic; low suspicion for heart failure exacerbation at this time.    -C/w Bumex 2 mg qd        #Hyperkalemia.  Plan: On admission, patient's potassium increased to 5.4. Patient has history of CKD IV.    -EKG in [facility] ED showed normal sinus rhythm, CT: 166, QRS: 60    -K still elevated at 5.5    - potassium restricted diet          # History of DVT (deep vein thrombosis).      Patient with history of multiple DVTs, on Eliquis 5 mg bid at home.    -C/w Eliquis 5 mg BID in hospital             New medications: No new medications but adjustment made to home pain management: Dilaudid 4mg PO Q6h prn severe pain, Diazepam 5mg PO Q12h prn muscle spasms, tylenol 975mg PO Q8h             Labs to be followed outpatient: CBC, BMP         Exam to be followed outpatient: Lung exam, skin exam

## 2020-09-03 NOTE — DISCHARGE NOTE NURSING/CASE MANAGEMENT/SOCIAL WORK - PATIENT PORTAL LINK FT
You can access the FollowMyHealth Patient Portal offered by [facility] by registering at the following website: [email or web address]. By joining [facility]’s FollowMyHealth portal, you will also be able to view your health information using other applications (apps) compatible with our system.

## 2020-09-03 NOTE — DISCHARGE NOTE PROVIDER - PROVIDER TOKENS
FREE:[LAST:[Rothman],FIRST:[Eulogio],PHONE:[phone]],FAX:[(   [phone]    -],ADDRESS:[[facility] [address], [place] [address]],FOLLOWUP:[2 weeks]]

## 2020-09-03 NOTE — DISCHARGE NOTE PROVIDER - NSDCFUADDAPPT_GEN_ALL_CORE_FT
Patient already has follow up appointments scheduled with her primary care doctor, Dr. Rothman in [place].     Patient also made follow up appointments with the following (appointment dates written down in calendar at home):  - Dr. Jeremie Bowser, [facility]   - Dr. Juan Manuel MEJÍA, pulmonology

## 2020-09-03 NOTE — DISCHARGE NOTE PROVIDER - NSDCMRMEDTOKEN_GEN_ALL_CORE_FT
Advair Diskus 250 mcg-50 mcg inhalation powder: 1 puff(s) inhaled 2 times a day  albuterol 2.5 mg/3 mL (0.083%) inhalation solution: 3 milliliter(s) inhaled every 6 hours with spacer, initially take every 6 hours regulalry then after 24 hours take as needed  bumetanide 2 mg oral tablet: 1 tab(s) orally once a day  buPROPion 150 mg/12 hours (SR) oral tablet, extended release: 1 tab(s) orally 2 times a day  Dilaudid 4 mg oral tablet: 2 tab(s) orally 3 times a day, As Needed  dilTIAZem 180 mg/24 hours oral capsule, extended release: 1 cap(s) orally once a day  Eliquis 5 mg oral tablet: 1 tab(s) orally 2 times a day  Lipitor 40 mg oral tablet: 1 tab(s) orally once a day  Mirapex ER 1.5 mg oral tablet, extended release: 1 tab(s) orally once a day  Perforomist 20 mcg/2 mL inhalation solution: 2 milliliter(s) inhaled 2 times a day  Valium 5 mg oral tablet:   Yupelri 175 mcg/3 mL inhalation solution: 175 microgram(s) inhaled once a day acetaminophen 500 mg oral tablet: 2 tab(s) orally every 6 hours  Advair Diskus 250 mcg-50 mcg inhalation powder: 1 puff(s) inhaled 2 times a day  albuterol 2.5 mg/3 mL (0.083%) inhalation solution: 3 milliliter(s) inhaled every 6 hours with spacer, initially take every 6 hours regulalry then after 24 hours take as needed  bumetanide 2 mg oral tablet: 1 tab(s) orally once a day  buPROPion 150 mg/12 hours (SR) oral tablet, extended release: 1 tab(s) orally 2 times a day  Dilaudid 4 mg oral tablet: 2 tab(s) orally 4 times a day, As Needed  dilTIAZem 180 mg/24 hours oral capsule, extended release: 1 cap(s) orally once a day  Eliquis 5 mg oral tablet: 1 tab(s) orally 2 times a day  ivermectin 3 mg oral tablet: 7 tab(s) orally once  Lipitor 40 mg oral tablet: 1 tab(s) orally once a day  Mirapex ER 1.5 mg oral tablet, extended release: 1 tab(s) orally once a day  Perforomist 20 mcg/2 mL inhalation solution: 2 milliliter(s) inhaled 2 times a day  permethrin 5% topical cream: Apply topically to affected area once  Valium 5 mg oral tablet: orally 2 times a day, As Needed  Yupelri 175 mcg/3 mL inhalation solution: 175 microgram(s) inhaled once a day acetaminophen 500 mg oral tablet: 975 milligram(s) orally every 8 hours  Advair Diskus 250 mcg-50 mcg inhalation powder: 1 puff(s) inhaled 2 times a day  albuterol 2.5 mg/3 mL (0.083%) inhalation solution: 3 milliliter(s) inhaled every 6 hours with spacer, initially take every 6 hours regulalry then after 24 hours take as needed  bumetanide 2 mg oral tablet: 1 tab(s) orally once a day  buPROPion 150 mg/12 hours (SR) oral tablet, extended release: 1 tab(s) orally 2 times a day  Dilaudid 4 mg oral tablet: 2 tab(s) orally 4 times a day, As Needed  dilTIAZem 180 mg/24 hours oral capsule, extended release: 1 cap(s) orally once a day  Eliquis 5 mg oral tablet: 1 tab(s) orally 2 times a day  ivermectin 3 mg oral tablet: 7 tab(s) orally once  Lipitor 40 mg oral tablet: 1 tab(s) orally once a day  Mirapex ER 1.5 mg oral tablet, extended release: 1 tab(s) orally once a day  Perforomist 20 mcg/2 mL inhalation solution: 2 milliliter(s) inhaled 2 times a day  permethrin 5% topical cream: Apply topically to affected area once  Valium 5 mg oral tablet: orally 2 times a day, As Needed  Yupelri 175 mcg/3 mL inhalation solution: 175 microgram(s) inhaled once a day

## 2020-09-03 NOTE — PROGRESS NOTE ADULT - SUBJECTIVE AND OBJECTIVE BOX
Pain Management Progress Note - [facility] [phone]        HPI: Patient seen and examined today. 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 Patient reports L flank pain worse when lifting her left arm and chronic pain to her bilateral lower extremities, pain controlled with current pain medication regimen. Patient Axox3, denies n,v , no s/s of oversedation. Reviewed pain medication regimen with patient at bedside. Patient reports good appetite today, and is eager to go home.       Pain is _x__ sharp ____dull ___burning _x__achy ___ Intensity: ____ mild _x__mod ___severe     Location ____surgical site ____cervical _____lumbar ____abd ____upper ext__x__lower ext   _x_ R flank pain    Worse with _x___activity _x___movement _____physical therapy___ Rest    Improved with _x___medication __x__rest ____physical therapy      ALBUTerol    90 MICROgram(s) HFA Inhaler  methylPREDNISolone sodium succinate Injectable  albuterol/ipratropium for Nebulization.  magnesium sulfate  IVPB  (Floor[place])  albuterol/ipratropium for Nebulization.  albuterol/ipratropium for Nebulization.  methocarbamol  acetaminophen   Tablet ..  (Floor[place])  HYDROmorphone   Tablet  HYDROmorphone  IVPB  HYDROmorphone  Injectable  acetaminophen   Tablet ..  albuterol/ipratropium for Nebulization  apixaban  pramipexole  buPROPion XL .  budesonide 160 MICROgram(s)/formoterol 4.5 MICROgram(s) Inhaler  buMETAnide  atorvastatin  diltiazem   CD  permethrin 5% Cream  diltiazem   CD  ketorolac   Injectable  HYDROmorphone  Injectable  HYDROmorphone  Injectable  HYDROmorphone   Tablet  diazepam    Tablet      ROS: Const:  _-__febrile   Eyes:___ENT:___CV: _-__chest pain  Resp: _-___sob  GI:_-__nausea _-__vomiting ___abd pain ___npo ___clears __full diet __bm  :___ Musk: x___pain ___spasm  Skin:___ Neuro:  _-__huvepmev_-__lxyccezbf___ numbness _x__weakness __x_paresth  Psych:__anxiety  Endo:___ Heme:___Allergy:_________, __x_all others reviewed and negative      PAST MEDICAL & SURGICAL HISTORY:  PNA (pneumonia)  MRSA (methicillin resistant Staphylococcus aureus)  Brain embolism and thrombosis  Skin cancer  DVT (deep venous thrombosis)  Lyme disease  H/O shoulder surgery  Status post laparoscopic-assisted sigmoidectomy  H/O angioplasty          Hemoglobin: 8.5 g/dL (09-02 @ 07:18)  Hemoglobin: 8.5 g/dL (09-01 @ 13:12)        T(C): 36.3 (09-03-20 @ 10:06), Max: 36.8 (09-03-20 @ 05:13)  HR: 92 (09-03-20 @ 09:12) (80 - 92)  BP: 128/58 (09-03-20 @ 09:12) (123/60 - 141/61)  RR: 22 (09-03-20 @ 09:12) (13 - 23)  SpO2: 92% (09-03-20 @ 09:12) (86% - 100%)  Wt(kg): --     PHYSICAL EXAM:  Gen Appearance: __x_no acute distress _x__appropriate        Neuro: _x__SILT feet____ EOM Intact Psych: AAOX_3_, _x__mood/affect appropriate        Eyes: _x__conjunctiva WNL  __x___ Pupils equal and round        ENT: _x__ears and nose atraumatic___ Hearing grossly intact        Neck: _x__trachea midline, no visible masses ___thyroid without palpable mass    Resp: _x__Nml WOB____No tactile fremitus ___clear to auscultation    Cardio: _x__extremities free from edema __x__pedal pulses palpable    GI/Abdomen: _x__soft __x___ Nontender___x___Nondistended_____HSM    Lymphatic: ___no palpable nodes in neck  ___no palpable nodes calves and feet    Skin/Wound: ___Incision, ___Dressing c/d/i,   ____surrounding tissues soft,  ___drain/chest tube present____    Muscular: EHL __4_/5  Gastrocnemius__4_/5    ___absent clubbing/cyanosis        ASSESSMENT: I, 78 y/o Female with PMHx HTN, HLD, HFpEF (EF 55-60% last echo 2/20), COPD, DALIA (non-compliant with CPAP), chronic back pain, PVD s/p LE stents, 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, cerebral aneurysm s/p coil, 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          Recommended Treatment PLAN:  1. Dilaudid 4mg PO Q6h prn severe pain  2. Diazepam 5mg PO Q12h prn muscle spasms  3. tylenol 975mg PO Q8h   4. x2 lidocaine patches to affected area, 12hours on 12hours off  Plan discussed with Dr. Levy

## 2020-09-04 ENCOUNTER — NON-APPOINTMENT (OUTPATIENT)
Age: 80
End: 2020-09-04

## 2020-09-04 DIAGNOSIS — Z87.09 PERSONAL HISTORY OF OTHER DISEASES OF THE RESPIRATORY SYSTEM: ICD-10-CM

## 2020-09-04 DIAGNOSIS — J44.9 CHRONIC OBSTRUCTIVE PULMONARY DISEASE, UNSPECIFIED: ICD-10-CM

## 2020-09-04 PROBLEM — Z00.00 ENCOUNTER FOR PREVENTIVE HEALTH EXAMINATION: Status: ACTIVE | Noted: 2020-09-04

## 2020-09-09 DIAGNOSIS — M54.9 DORSALGIA, UNSPECIFIED: ICD-10-CM

## 2020-09-09 DIAGNOSIS — A69.22 OTHER NEUROLOGIC DISORDERS IN LYME DISEASE: ICD-10-CM

## 2020-09-09 DIAGNOSIS — Z99.89 DEPENDENCE ON OTHER ENABLING MACHINES AND DEVICES: ICD-10-CM

## 2020-09-09 DIAGNOSIS — J44.1 CHRONIC OBSTRUCTIVE PULMONARY DISEASE WITH (ACUTE) EXACERBATION: ICD-10-CM

## 2020-09-09 DIAGNOSIS — R09.02 HYPOXEMIA: ICD-10-CM

## 2020-09-09 DIAGNOSIS — G89.29 OTHER CHRONIC PAIN: ICD-10-CM

## 2020-09-09 DIAGNOSIS — D64.9 ANEMIA, UNSPECIFIED: ICD-10-CM

## 2020-09-09 DIAGNOSIS — J43.9 EMPHYSEMA, UNSPECIFIED: ICD-10-CM

## 2020-09-09 DIAGNOSIS — E87.5 HYPERKALEMIA: ICD-10-CM

## 2020-09-09 DIAGNOSIS — B86 SCABIES: ICD-10-CM

## 2020-09-09 DIAGNOSIS — I50.32 CHRONIC DIASTOLIC (CONGESTIVE) HEART FAILURE: ICD-10-CM

## 2020-09-09 DIAGNOSIS — G47.33 OBSTRUCTIVE SLEEP APNEA (ADULT) (PEDIATRIC): ICD-10-CM

## 2020-09-09 DIAGNOSIS — I13.0 HYPERTENSIVE HEART AND CHRONIC KIDNEY DISEASE WITH HEART FAILURE AND STAGE 1 THROUGH STAGE 4 CHRONIC KIDNEY DISEASE, OR UNSPECIFIED CHRONIC KIDNEY DISEASE: ICD-10-CM

## 2020-09-09 DIAGNOSIS — I73.9 PERIPHERAL VASCULAR DISEASE, UNSPECIFIED: ICD-10-CM

## 2020-09-09 DIAGNOSIS — Z87.891 PERSONAL HISTORY OF NICOTINE DEPENDENCE: ICD-10-CM

## 2020-09-09 DIAGNOSIS — Z86.718 PERSONAL HISTORY OF OTHER VENOUS THROMBOSIS AND EMBOLISM: ICD-10-CM

## 2020-09-09 DIAGNOSIS — Z79.01 LONG TERM (CURRENT) USE OF ANTICOAGULANTS: ICD-10-CM

## 2020-09-09 DIAGNOSIS — Z99.3 DEPENDENCE ON WHEELCHAIR: ICD-10-CM

## 2020-09-09 DIAGNOSIS — Z88.1 ALLERGY STATUS TO OTHER ANTIBIOTIC AGENTS STATUS: ICD-10-CM

## 2020-09-09 DIAGNOSIS — M62.838 OTHER MUSCLE SPASM: ICD-10-CM

## 2020-09-09 DIAGNOSIS — N18.4 CHRONIC KIDNEY DISEASE, STAGE 4 (SEVERE): ICD-10-CM

## 2020-09-09 DIAGNOSIS — S22.43XA MULTIPLE FRACTURES OF RIBS, BILATERAL, INITIAL ENCOUNTER FOR CLOSED FRACTURE: ICD-10-CM

## 2020-09-09 DIAGNOSIS — Z91.19 PATIENT'S NONCOMPLIANCE WITH OTHER MEDICAL TREATMENT AND REGIMEN: ICD-10-CM

## 2020-09-09 DIAGNOSIS — G25.81 RESTLESS LEGS SYNDROME: ICD-10-CM

## 2020-09-09 DIAGNOSIS — Z53.20 PROCEDURE AND TREATMENT NOT CARRIED OUT BECAUSE OF PATIENT'S DECISION FOR UNSPECIFIED REASONS: ICD-10-CM

## 2020-09-09 DIAGNOSIS — E78.5 HYPERLIPIDEMIA, UNSPECIFIED: ICD-10-CM

## 2020-09-09 DIAGNOSIS — J98.11 ATELECTASIS: ICD-10-CM

## 2020-09-10 ENCOUNTER — TRANSCRIPTION ENCOUNTER (OUTPATIENT)
Age: 80
End: 2020-09-10

## 2020-09-10 ENCOUNTER — INPATIENT (INPATIENT)
Facility: HOSPITAL | Age: 80
LOS: 4 days | Discharge: HOME HEALTH | DRG: 208 | End: 2020-09-15
Admitting: INTERNAL MEDICINE
Payer: MEDICARE

## 2020-09-10 VITALS
RESPIRATION RATE: 20 BRPM | OXYGEN SATURATION: 91 % | HEART RATE: 95 BPM | WEIGHT: 250 LBS | TEMPERATURE: 98 F | SYSTOLIC BLOOD PRESSURE: 146 MMHG | HEIGHT: 68 IN | DIASTOLIC BLOOD PRESSURE: 72 MMHG

## 2020-09-10 DIAGNOSIS — Z98.62 PERIPHERAL VASCULAR ANGIOPLASTY STATUS: Chronic | ICD-10-CM

## 2020-09-10 DIAGNOSIS — Z90.49 ACQUIRED ABSENCE OF OTHER SPECIFIED PARTS OF DIGESTIVE TRACT: Chronic | ICD-10-CM

## 2020-09-10 DIAGNOSIS — Z98.89 OTHER SPECIFIED POSTPROCEDURAL STATES: Chronic | ICD-10-CM

## 2020-09-10 LAB
ALBUMIN SERPL ELPH-MCNC: 2.6 G/DL — LOW (ref 3.4–5)
ALP SERPL-CCNC: 107 U/L — SIGNIFICANT CHANGE UP (ref 40–120)
ALT FLD-CCNC: 20 U/L — SIGNIFICANT CHANGE UP (ref 12–42)
ANION GAP SERPL CALC-SCNC: 6 MMOL/L — LOW (ref 9–16)
APPEARANCE UR: CLEAR — SIGNIFICANT CHANGE UP
APTT BLD: 33.2 SEC — SIGNIFICANT CHANGE UP (ref 27.5–35.5)
AST SERPL-CCNC: 18 U/L — SIGNIFICANT CHANGE UP (ref 15–37)
BACTERIA # UR AUTO: PRESENT /HPF
BASOPHILS # BLD AUTO: 0.03 K/UL — SIGNIFICANT CHANGE UP (ref 0–0.2)
BASOPHILS # BLD AUTO: 0.08 K/UL — SIGNIFICANT CHANGE UP (ref 0–0.2)
BASOPHILS NFR BLD AUTO: 0.4 % — SIGNIFICANT CHANGE UP (ref 0–2)
BASOPHILS NFR BLD AUTO: 0.9 % — SIGNIFICANT CHANGE UP (ref 0–2)
BILIRUB SERPL-MCNC: 0.1 MG/DL — LOW (ref 0.2–1.2)
BILIRUB UR-MCNC: NEGATIVE — SIGNIFICANT CHANGE UP
BUN SERPL-MCNC: 37 MG/DL — HIGH (ref 7–23)
CALCIUM SERPL-MCNC: 9.2 MG/DL — SIGNIFICANT CHANGE UP (ref 8.5–10.5)
CHLORIDE SERPL-SCNC: 108 MMOL/L — SIGNIFICANT CHANGE UP (ref 96–108)
CO2 SERPL-SCNC: 28 MMOL/L — SIGNIFICANT CHANGE UP (ref 22–31)
COLOR SPEC: YELLOW — SIGNIFICANT CHANGE UP
CREAT SERPL-MCNC: 1.83 MG/DL — HIGH (ref 0.5–1.3)
DIFF PNL FLD: ABNORMAL
EOSINOPHIL # BLD AUTO: 0 K/UL — SIGNIFICANT CHANGE UP (ref 0–0.5)
EOSINOPHIL # BLD AUTO: 0.3 K/UL — SIGNIFICANT CHANGE UP (ref 0–0.5)
EOSINOPHIL NFR BLD AUTO: 0 % — SIGNIFICANT CHANGE UP (ref 0–6)
EOSINOPHIL NFR BLD AUTO: 3.8 % — SIGNIFICANT CHANGE UP (ref 0–6)
EPI CELLS # UR: SIGNIFICANT CHANGE UP /HPF (ref 0–5)
GAS PNL BLDA: SIGNIFICANT CHANGE UP
GLUCOSE SERPL-MCNC: 117 MG/DL — HIGH (ref 70–99)
GLUCOSE UR QL: NEGATIVE — SIGNIFICANT CHANGE UP
HCT VFR BLD CALC: 26.3 % — LOW (ref 34.5–45)
HCT VFR BLD CALC: 27.7 % — LOW (ref 34.5–45)
HGB BLD-MCNC: 7.5 G/DL — LOW (ref 11.5–15.5)
HGB BLD-MCNC: 8.1 G/DL — LOW (ref 11.5–15.5)
IMM GRANULOCYTES NFR BLD AUTO: 0.9 % — SIGNIFICANT CHANGE UP (ref 0–1.5)
INR BLD: 1.04 — SIGNIFICANT CHANGE UP (ref 0.88–1.16)
INR BLD: 1.06 — SIGNIFICANT CHANGE UP (ref 0.88–1.16)
KETONES UR-MCNC: NEGATIVE — SIGNIFICANT CHANGE UP
LACTATE SERPL-SCNC: 1.5 MMOL/L — SIGNIFICANT CHANGE UP (ref 0.4–2)
LEUKOCYTE ESTERASE UR-ACNC: NEGATIVE — SIGNIFICANT CHANGE UP
LYMPHOCYTES # BLD AUTO: 0.24 K/UL — LOW (ref 1–3.3)
LYMPHOCYTES # BLD AUTO: 1.07 K/UL — SIGNIFICANT CHANGE UP (ref 1–3.3)
LYMPHOCYTES # BLD AUTO: 13.4 % — SIGNIFICANT CHANGE UP (ref 13–44)
LYMPHOCYTES # BLD AUTO: 2.6 % — LOW (ref 13–44)
MAGNESIUM SERPL-MCNC: 2.4 MG/DL — SIGNIFICANT CHANGE UP (ref 1.6–2.6)
MCHC RBC-ENTMCNC: 25.6 PG — LOW (ref 27–34)
MCHC RBC-ENTMCNC: 25.6 PG — LOW (ref 27–34)
MCHC RBC-ENTMCNC: 28.5 GM/DL — LOW (ref 32–36)
MCHC RBC-ENTMCNC: 29.2 GM/DL — LOW (ref 32–36)
MCV RBC AUTO: 87.7 FL — SIGNIFICANT CHANGE UP (ref 80–100)
MCV RBC AUTO: 89.8 FL — SIGNIFICANT CHANGE UP (ref 80–100)
MONOCYTES # BLD AUTO: 0 K/UL — SIGNIFICANT CHANGE UP (ref 0–0.9)
MONOCYTES # BLD AUTO: 0.61 K/UL — SIGNIFICANT CHANGE UP (ref 0–0.9)
MONOCYTES NFR BLD AUTO: 0 % — LOW (ref 2–14)
MONOCYTES NFR BLD AUTO: 7.6 % — SIGNIFICANT CHANGE UP (ref 2–14)
NEUTROPHILS # BLD AUTO: 5.9 K/UL — SIGNIFICANT CHANGE UP (ref 1.8–7.4)
NEUTROPHILS # BLD AUTO: 8.89 K/UL — HIGH (ref 1.8–7.4)
NEUTROPHILS NFR BLD AUTO: 73.9 % — SIGNIFICANT CHANGE UP (ref 43–77)
NEUTROPHILS NFR BLD AUTO: 94.7 % — HIGH (ref 43–77)
NITRITE UR-MCNC: NEGATIVE — SIGNIFICANT CHANGE UP
NRBC # BLD: 0 /100 WBCS — SIGNIFICANT CHANGE UP (ref 0–0)
NT-PROBNP SERPL-SCNC: 1540 PG/ML — HIGH
PCO2 BLDA: 54 MMHG — HIGH (ref 32–45)
PH BLDA: 7.31 — LOW (ref 7.35–7.45)
PH UR: 6 — SIGNIFICANT CHANGE UP (ref 5–8)
PLATELET # BLD AUTO: 204 K/UL — SIGNIFICANT CHANGE UP (ref 150–400)
PLATELET # BLD AUTO: 240 K/UL — SIGNIFICANT CHANGE UP (ref 150–400)
PO2 BLDA: 428 MMHG — HIGH (ref 83–108)
POTASSIUM SERPL-MCNC: 5.2 MMOL/L — SIGNIFICANT CHANGE UP (ref 3.5–5.3)
POTASSIUM SERPL-SCNC: 5.2 MMOL/L — SIGNIFICANT CHANGE UP (ref 3.5–5.3)
PROT SERPL-MCNC: 8.1 G/DL — SIGNIFICANT CHANGE UP (ref 6.4–8.2)
PROT UR-MCNC: 30 MG/DL
PROTHROM AB SERPL-ACNC: 12.4 SEC — SIGNIFICANT CHANGE UP (ref 10.6–13.6)
PROTHROM AB SERPL-ACNC: 12.7 SEC — SIGNIFICANT CHANGE UP (ref 10.6–13.6)
RBC # BLD: 2.93 M/UL — LOW (ref 3.8–5.2)
RBC # BLD: 3.16 M/UL — LOW (ref 3.8–5.2)
RBC # FLD: 17.4 % — HIGH (ref 10.3–14.5)
RBC # FLD: 17.8 % — HIGH (ref 10.3–14.5)
RBC CASTS # UR COMP ASSIST: < 5 /HPF — SIGNIFICANT CHANGE UP
SAO2 % BLDA: >100 % — SIGNIFICANT CHANGE UP (ref 95–100)
SARS-COV-2 RNA SPEC QL NAA+PROBE: SIGNIFICANT CHANGE UP
SODIUM SERPL-SCNC: 142 MMOL/L — SIGNIFICANT CHANGE UP (ref 132–145)
SP GR SPEC: 1.02 — SIGNIFICANT CHANGE UP (ref 1–1.03)
TROPONIN I SERPL-MCNC: <0.017 NG/ML — LOW (ref 0.02–0.06)
UROBILINOGEN FLD QL: 0.2 E.U./DL — SIGNIFICANT CHANGE UP
WBC # BLD: 7.98 K/UL — SIGNIFICANT CHANGE UP (ref 3.8–10.5)
WBC # BLD: 9.3 K/UL — SIGNIFICANT CHANGE UP (ref 3.8–10.5)
WBC # FLD AUTO: 7.98 K/UL — SIGNIFICANT CHANGE UP (ref 3.8–10.5)
WBC # FLD AUTO: 9.3 K/UL — SIGNIFICANT CHANGE UP (ref 3.8–10.5)
WBC UR QL: < 5 /HPF — SIGNIFICANT CHANGE UP

## 2020-09-10 PROCEDURE — 93010 ELECTROCARDIOGRAM REPORT: CPT

## 2020-09-10 PROCEDURE — 31500 INSERT EMERGENCY AIRWAY: CPT

## 2020-09-10 PROCEDURE — 36556 INSERT NON-TUNNEL CV CATH: CPT

## 2020-09-10 PROCEDURE — 71045 X-RAY EXAM CHEST 1 VIEW: CPT | Mod: 26,76

## 2020-09-10 PROCEDURE — 99291 CRITICAL CARE FIRST HOUR: CPT

## 2020-09-10 PROCEDURE — 71250 CT THORAX DX C-: CPT | Mod: 26

## 2020-09-10 PROCEDURE — 71045 X-RAY EXAM CHEST 1 VIEW: CPT | Mod: 26,77

## 2020-09-10 PROCEDURE — 99291 CRITICAL CARE FIRST HOUR: CPT | Mod: CS,25

## 2020-09-10 RX ORDER — PROPOFOL 10 MG/ML
5 INJECTION, EMULSION INTRAVENOUS
Qty: 1000 | Refills: 0 | Status: DISCONTINUED | OUTPATIENT
Start: 2020-09-10 | End: 2020-09-11

## 2020-09-10 RX ORDER — CHLORHEXIDINE GLUCONATE 213 G/1000ML
15 SOLUTION TOPICAL EVERY 12 HOURS
Refills: 0 | Status: DISCONTINUED | OUTPATIENT
Start: 2020-09-10 | End: 2020-09-11

## 2020-09-10 RX ORDER — FUROSEMIDE 40 MG
80 TABLET ORAL ONCE
Refills: 0 | Status: COMPLETED | OUTPATIENT
Start: 2020-09-10 | End: 2020-09-10

## 2020-09-10 RX ORDER — PROPOFOL 10 MG/ML
50 INJECTION, EMULSION INTRAVENOUS ONCE
Refills: 0 | Status: COMPLETED | OUTPATIENT
Start: 2020-09-10 | End: 2020-09-10

## 2020-09-10 RX ORDER — FENTANYL CITRATE 50 UG/ML
0.5 INJECTION INTRAVENOUS
Qty: 2500 | Refills: 0 | Status: DISCONTINUED | OUTPATIENT
Start: 2020-09-10 | End: 2020-09-11

## 2020-09-10 RX ORDER — PROPOFOL 10 MG/ML
5 INJECTION, EMULSION INTRAVENOUS
Qty: 1000 | Refills: 0 | Status: DISCONTINUED | OUTPATIENT
Start: 2020-09-10 | End: 2020-09-10

## 2020-09-10 RX ORDER — NITROGLYCERIN 6.5 MG
5 CAPSULE, EXTENDED RELEASE ORAL
Qty: 50 | Refills: 0 | Status: DISCONTINUED | OUTPATIENT
Start: 2020-09-10 | End: 2020-09-10

## 2020-09-10 RX ORDER — CEFTRIAXONE 500 MG/1
1000 INJECTION, POWDER, FOR SOLUTION INTRAMUSCULAR; INTRAVENOUS ONCE
Refills: 0 | Status: COMPLETED | OUTPATIENT
Start: 2020-09-10 | End: 2020-09-10

## 2020-09-10 RX ORDER — AZITHROMYCIN 500 MG/1
500 TABLET, FILM COATED ORAL DAILY
Refills: 0 | Status: DISCONTINUED | OUTPATIENT
Start: 2020-09-11 | End: 2020-09-11

## 2020-09-10 RX ORDER — ATORVASTATIN CALCIUM 80 MG/1
40 TABLET, FILM COATED ORAL AT BEDTIME
Refills: 0 | Status: DISCONTINUED | OUTPATIENT
Start: 2020-09-10 | End: 2020-09-15

## 2020-09-10 RX ORDER — PANTOPRAZOLE SODIUM 20 MG/1
40 TABLET, DELAYED RELEASE ORAL DAILY
Refills: 0 | Status: DISCONTINUED | OUTPATIENT
Start: 2020-09-10 | End: 2020-09-11

## 2020-09-10 RX ORDER — SUCCINYLCHOLINE CHLORIDE 100 MG/5ML
100 SYRINGE (ML) INTRAVENOUS ONCE
Refills: 0 | Status: COMPLETED | OUTPATIENT
Start: 2020-09-10 | End: 2020-09-10

## 2020-09-10 RX ORDER — MAGNESIUM SULFATE 500 MG/ML
2 VIAL (ML) INJECTION ONCE
Refills: 0 | Status: COMPLETED | OUTPATIENT
Start: 2020-09-10 | End: 2020-09-10

## 2020-09-10 RX ORDER — ROCURONIUM BROMIDE 10 MG/ML
80 VIAL (ML) INTRAVENOUS ONCE
Refills: 0 | Status: COMPLETED | OUTPATIENT
Start: 2020-09-10 | End: 2020-09-10

## 2020-09-10 RX ORDER — VANCOMYCIN HCL 1 G
1750 VIAL (EA) INTRAVENOUS ONCE
Refills: 0 | Status: COMPLETED | OUTPATIENT
Start: 2020-09-10 | End: 2020-09-10

## 2020-09-10 RX ORDER — IPRATROPIUM/ALBUTEROL SULFATE 18-103MCG
3 AEROSOL WITH ADAPTER (GRAM) INHALATION EVERY 6 HOURS
Refills: 0 | Status: DISCONTINUED | OUTPATIENT
Start: 2020-09-10 | End: 2020-09-15

## 2020-09-10 RX ORDER — APIXABAN 2.5 MG/1
5 TABLET, FILM COATED ORAL EVERY 12 HOURS
Refills: 0 | Status: DISCONTINUED | OUTPATIENT
Start: 2020-09-10 | End: 2020-09-15

## 2020-09-10 RX ORDER — IPRATROPIUM/ALBUTEROL SULFATE 18-103MCG
3 AEROSOL WITH ADAPTER (GRAM) INHALATION ONCE
Refills: 0 | Status: COMPLETED | OUTPATIENT
Start: 2020-09-10 | End: 2020-09-10

## 2020-09-10 RX ORDER — VANCOMYCIN HCL 1 G
1000 VIAL (EA) INTRAVENOUS ONCE
Refills: 0 | Status: DISCONTINUED | OUTPATIENT
Start: 2020-09-10 | End: 2020-09-10

## 2020-09-10 RX ORDER — CHLORHEXIDINE GLUCONATE 213 G/1000ML
1 SOLUTION TOPICAL
Refills: 0 | Status: DISCONTINUED | OUTPATIENT
Start: 2020-09-10 | End: 2020-09-10

## 2020-09-10 RX ORDER — ETOMIDATE 2 MG/ML
20 INJECTION INTRAVENOUS ONCE
Refills: 0 | Status: COMPLETED | OUTPATIENT
Start: 2020-09-10 | End: 2020-09-10

## 2020-09-10 RX ORDER — ENOXAPARIN SODIUM 100 MG/ML
40 INJECTION SUBCUTANEOUS EVERY 12 HOURS
Refills: 0 | Status: DISCONTINUED | OUTPATIENT
Start: 2020-09-10 | End: 2020-09-10

## 2020-09-10 RX ORDER — FENTANYL CITRATE 50 UG/ML
0.5 INJECTION INTRAVENOUS
Qty: 2500 | Refills: 0 | Status: DISCONTINUED | OUTPATIENT
Start: 2020-09-10 | End: 2020-09-10

## 2020-09-10 RX ADMIN — Medication 0.25 MILLIGRAM(S): at 14:51

## 2020-09-10 RX ADMIN — FENTANYL CITRATE 5.67 MICROGRAM(S)/KG/HR: 50 INJECTION INTRAVENOUS at 15:00

## 2020-09-10 RX ADMIN — FENTANYL CITRATE 5.67 MICROGRAM(S)/KG/HR: 50 INJECTION INTRAVENOUS at 12:55

## 2020-09-10 RX ADMIN — PROPOFOL 50 MILLIGRAM(S): 10 INJECTION, EMULSION INTRAVENOUS at 11:56

## 2020-09-10 RX ADMIN — APIXABAN 5 MILLIGRAM(S): 2.5 TABLET, FILM COATED ORAL at 21:24

## 2020-09-10 RX ADMIN — PROPOFOL 3.4 MICROGRAM(S)/KG/MIN: 10 INJECTION, EMULSION INTRAVENOUS at 23:38

## 2020-09-10 RX ADMIN — ETOMIDATE 20 MILLIGRAM(S): 2 INJECTION INTRAVENOUS at 11:46

## 2020-09-10 RX ADMIN — PROPOFOL 3.4 MICROGRAM(S)/KG/MIN: 10 INJECTION, EMULSION INTRAVENOUS at 12:56

## 2020-09-10 RX ADMIN — PROPOFOL 3.4 MICROGRAM(S)/KG/MIN: 10 INJECTION, EMULSION INTRAVENOUS at 14:42

## 2020-09-10 RX ADMIN — Medication 2 GRAM(S): at 15:43

## 2020-09-10 RX ADMIN — Medication 250 MILLIGRAM(S): at 12:00

## 2020-09-10 RX ADMIN — PROPOFOL 3.4 MICROGRAM(S)/KG/MIN: 10 INJECTION, EMULSION INTRAVENOUS at 17:14

## 2020-09-10 RX ADMIN — Medication 3 MILLILITER(S): at 19:57

## 2020-09-10 RX ADMIN — Medication 40 MILLIGRAM(S): at 22:55

## 2020-09-10 RX ADMIN — FENTANYL CITRATE 5.67 MICROGRAM(S)/KG/HR: 50 INJECTION INTRAVENOUS at 20:08

## 2020-09-10 RX ADMIN — CEFTRIAXONE 100 MILLIGRAM(S): 500 INJECTION, POWDER, FOR SOLUTION INTRAMUSCULAR; INTRAVENOUS at 11:30

## 2020-09-10 RX ADMIN — FENTANYL CITRATE 5.67 MICROGRAM(S)/KG/HR: 50 INJECTION INTRAVENOUS at 14:00

## 2020-09-10 RX ADMIN — FENTANYL CITRATE 5.67 MICROGRAM(S)/KG/HR: 50 INJECTION INTRAVENOUS at 13:00

## 2020-09-10 RX ADMIN — Medication 100 MILLIGRAM(S): at 11:46

## 2020-09-10 RX ADMIN — Medication 80 MILLIGRAM(S): at 13:25

## 2020-09-10 RX ADMIN — CEFTRIAXONE 1000 MILLIGRAM(S): 500 INJECTION, POWDER, FOR SOLUTION INTRAMUSCULAR; INTRAVENOUS at 12:00

## 2020-09-10 RX ADMIN — Medication 125 MILLIGRAM(S): at 11:44

## 2020-09-10 RX ADMIN — Medication 3 MILLILITER(S): at 21:33

## 2020-09-10 RX ADMIN — PROPOFOL 50 MILLIGRAM(S): 10 INJECTION, EMULSION INTRAVENOUS at 12:01

## 2020-09-10 RX ADMIN — Medication 80 MILLIGRAM(S): at 12:23

## 2020-09-10 RX ADMIN — Medication 1750 MILLIGRAM(S): at 14:00

## 2020-09-10 RX ADMIN — Medication 50 GRAM(S): at 14:43

## 2020-09-10 RX ADMIN — ATORVASTATIN CALCIUM 40 MILLIGRAM(S): 80 TABLET, FILM COATED ORAL at 21:24

## 2020-09-10 NOTE — ED ADULT NURSE REASSESSMENT NOTE - NS ED NURSE REASSESS COMMENT FT1
EMS arrived, LIP at bedside. Ventilator settings now ;  , RR16, o2 60% given ABG results. Pt more awake, sedation titrated to ensure safe transport and pt comfort. VSS. Pt transferred into EMS care without incident.  [facility] ICU [facility] staff made aware of pts pending arrival.

## 2020-09-10 NOTE — H&P ADULT - NSHPPHYSICALEXAM_GEN_ALL_CORE
.  VITAL SIGNS:  T(C): 37.2 (09-10-20 @ 12:20), Max: 37.2 (09-10-20 @ 12:20)  T(F): 99 (09-10-20 @ 12:20), Max: 99 (09-10-20 @ 12:20)  HR: 71 (09-10-20 @ 19:12) (69 - 115)  BP: 114/55 (09-10-20 @ 19:00) (103/52 - 155/78)  BP(mean): 79 (09-10-20 @ 19:00) (79 - 79)  RR: 16 (09-10-20 @ 19:12) (16 - 22)  SpO2: 100% (09-10-20 @ 19:12) (91% - 100%)  Wt(kg): --    PHYSICAL EXAM:    Constitutional: Obese female, currently intubated and sedated   Head: NC/AT  Eyes: PERRL, clear conjunctiva  ENT: s/p intubation; no oropharyngeal trauma noted   Neck: supple; no JVD or thyromegaly, R IJ line in place  Respiratory: CTAB, poor inspiratory effort with diminished breath sounds, wheezing or crackles not appreciated   Cardiac: +S1/S2; RRR; no M/R/G  Gastrointestinal: soft, NT/ND; no rebound or guarding; +BSx4  Back: spine midline, no bony tenderness or step-offs; no CVAT B/L  Extremities: chronic venous changes of the bilateral lower extremities with an ulcerative wound noted on the L lateral maleolus  Vascular: 2+ radial, femoral, DP/PT pulses B/L  Dermatologic: chronic venous chages of the b/l lower extremities; diffuse small skin wounds likely 2/2 prior scabies on arms, legs, abdomen, back; edematous, erythematous, indurated area noted on back of R thigh   Lymphatic: no submandibular or cervical LAD  Neurologic: currently intubated/sedated

## 2020-09-10 NOTE — ED ADULT NURSE NOTE - OBJECTIVE STATEMENT
BIB [facility] with c/o worsening b/l lower leg edema/ redness and feeling "feverish" with chills x2days. Pt recently dc from [facility] with rib fracture COPD exacerbation. Clinical upgrade and sepsis protocol initiated, (no IVF as pt is known fluid overload r/t CHF). All orders carried out, pt saturating well on o2 3L nc. Noted 3+ edema with streaking redness up b/l lower legs, + periorbital edema. Pt admits to inability to lay flat for sleep, worsening PHIPPS since last night. Denies and current CP, SOB somewhat relieved by 02 admin and decreased exertion. MARVEL Reyes at bedside

## 2020-09-10 NOTE — ED PROVIDER NOTE - PROGRESS NOTE DETAILS
Urgently called into room for sudden tachycardia and hypoxia. Pt was intubated successfully. Propofol ordered for sedation. Preload reduction with IV Nitroglycerine and Lasix. Called to patient bedside after leaving room to review chart. Patient acutely lost airway described by RN as patient turning blue and stopping breathing. Patient clinically upgraded and was bagged to 90s O2. Patient airway no longer secure. Acute loss of airway. Marked change from arrival. Airway secured with intubation by Resident. Patient then moved to Resus room workup continued and central line placed. Nitro initially ordered for acute SOB change but never placed. Patient given lasix given history of CHF. Patient placed on propofol drip and fentanyl drip. Patient stabilized doing well with sedation. Patient No wheezing on arrival. No Wheezing post intubation moving air well. CT -increased pulmonary edema. Patient Endorsed to Dr. Gaxiola for MICU admission. Patient well on Vent vitals normalized. Accepted to MICU. Inquired about potential CTA given patient was acutely hypoxic in the setting of a normal lung exam and a history of clotting. Advised transport to MICU for expedited care rather than get CTA as risks of CTA given kidney function outweight benefits. Patient awaiting bed placement. Bed board advised two beds will open up soon given Patient yuliana status team agrees patient can wait for bed placement. Will up date will clinical changes Called to patient bedside after leaving room to review chart. Patient acutely lost airway described by RN as patient turning blue and stopping breathing. Patient clinically upgraded and was bagged to 90s O2. Patient airway no longer secure. Acute loss of airway. Marked change from arrival. Airway secured with intubation by Resident. Patient then moved to Resus room workup continued and central line placed. Nitro initially ordered for acute SOB change but never given. Terbuteline never given. Patient given lasix given history of CHF. Patient placed on propofol drip and fentanyl drip. Once intubated and sedated breathing improved post lasix. Stabilized Ct official Read with new pulmonary edema compared to last. Patient stabilized and no change in clinical status comfortable on sedation. Patient not acidotic. Unclear whether Acute respiratory failure is from APE, known COPD-given patient is not retaining CO2, or other unknown pathology. Patient stable and vitals normalized on vent with normal pressures

## 2020-09-10 NOTE — ED ADULT NURSE NOTE - CAS EDN DISCHARGE INTERVENTIONS
Way overdue to be seen.  Refill for 1 month sent.  Please let them know.   Indwelling catheter secured and draining/Arm band on/IV intact

## 2020-09-10 NOTE — ED PROVIDER NOTE - CLINICAL SUMMARY MEDICAL DECISION MAKING FREE TEXT BOX
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   Acute onset of SOB in ED loosing Airway.  Patient airway secured and central line placed both by Resident. No Change.

## 2020-09-10 NOTE — ED ADULT TRIAGE NOTE - CHIEF COMPLAINT QUOTE
recent hospital admission for broken ribs. patient c/o increased SOB and swelling + redness to legs.
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
## 2020-09-10 NOTE — ED PROCEDURE NOTE - PROCEDURE ADDITIONAL DETAILS
carotid artery was located by ultrasound, in a good view. Guideline, catheter were confirmed in place in the intra jaguar venous by ultrasound.
I visualized the vocal cord in my view, verified the ETT passed through the vocal cord, confirmed the successful intubation with the techniques mentioned above. b/l lung sound was also verified by multiple providers.

## 2020-09-10 NOTE — ED CLERICAL - NS ED CLERK NOTE PRE-ARRIVAL INFORMATION; ADDITIONAL PRE-ARRIVAL INFORMATION
This patient is enrolled in the COPD/STARs readmission reduction program and has active care navigation. This patient can be followed up by the care navigation team within 24 hours. To arrange close follow-up or to obtain additional clinical information about this patient, please call the contact number above and please contact the on call pulmonary fellow (Long range pager is [phone]). Please also alert either Dr. Yeager or the hospitalist on call [phone] PRIOR to admission. Dr. Yeager can be reached at [phone].

## 2020-09-10 NOTE — ED PROVIDER NOTE - CRITICAL CARE ATTESTATION
DVP 7.3 NST reactive denies LOF/VB/UC's doing well has LTCS next week kick counts and labor precautions all questions answered   
I have personally provided the amount of critical care time documented below excluding time spent on separate procedures

## 2020-09-10 NOTE — ED ADULT NURSE REASSESSMENT NOTE - NS ED NURSE REASSESS COMMENT FT1
Pt in resting in stretcher, NAD, tolerating vent at this time VSS. See flowsheet for sedation titration/ RASS. Report given to [facility] ICU, spoke with [facility] as transport has not yet arrived, states no critical care medics at this time,  ETA within an hour. Will continue to monitor closely and f/u as indicated.

## 2020-09-10 NOTE — ED ADULT NURSE REASSESSMENT NOTE - NS ED NURSE REASSESS COMMENT FT1
Writing RN in room completing sepsis workup when pt states " I have a headache" and then immediately " I cannot breathe" Assistance called to room, pt quickly assessed to r/o allergic reaction to rocephin IVPB, no angioedema, no rash, was able to verbalize no throat tightness. Pt Spo2 quickly began to drop, noted circumoral cyanosis and retractions. RN immediately began to oxygenate with BVW while LIPs initiated plans for intubation. Pt intubated successfully with  7.5 cuffed ETT, secured 23cm at the lip. Pt moved to resusc room without incident cardiac/respiratry monitoring maintained. Triple Lumen CVC placed to right IJ and sedation titrated. Bedside XRay to confirm ETT/ CVC and OGtube placement completed. All orders carried out, will continue to monitor and f/u as indicated. Awaiting ICU placement. Writing RN in room completing sepsis workup when pt states " I have a headache" and then immediately " I cannot breathe" Assistance called to room, pt quickly assessed to r/o allergic reaction to rocephin IVPB, no angioedema, no rash, was able to verbalize no throat tightness. Pt Spo2 quickly began to drop, noted circumoral cyanosis and retractions. RN immediately began to oxygenate with BVW while LIPs initiated plans for intubation. Pt intubated successfully with  7.5 cuffed ETT, secured 23cm at the lip. Pt moved to resusc room without incident cardiac/respiratry monitoring maintained. Triple Lumen CVC placed to right IJ and sedation titrated. Bedside XRay to confirm ETT/ CVC and OGtube placement completed. 14Fr indwelling landis catheter placed, stat-lock to right thigh. All orders carried out, will continue to monitor and f/u as indicated. Awaiting ICU placement.

## 2020-09-10 NOTE — ED PROVIDER NOTE - PHYSICAL EXAMINATION
b/l pitting edema with overlying cellulitis   Patient moving air well b/l with no auditory wheezing.   Patient has moderate increased work of breathing on nasal cannula. Patient will potentially require bipap but not in this moment.

## 2020-09-10 NOTE — ED ADULT NURSE NOTE - SKIN INTEGRITY
multiple wounds to entire body/ torso. as per [facility] pt had scabies 2x weeks ago, was treated and now resolved., still healing

## 2020-09-10 NOTE — H&P ADULT - NSHPLABSRESULTS_GEN_ALL_CORE
.  LABS:                         8.1    7.98  )-----------( 240      ( 10 Sep 2020 11:32 )             27.7     09-10    142  |  108  |  37<H>  ----------------------------<  117<H>  5.2   |  28  |  1.83<H>    Ca    9.2      10 Sep 2020 11:32  Mg     2.4     09-10    TPro  8.1  /  Alb  2.6<L>  /  TBili  0.1<L>  /  DBili  x   /  AST  18  /  ALT  20  /  AlkPhos  107  09-10    PT/INR - ( 10 Sep 2020 11:32 )   PT: 12.4 sec;   INR: 1.04          PTT - ( 10 Sep 2020 11:32 )  PTT:33.2 sec  Urinalysis Basic - ( 10 Sep 2020 12:33 )    Color: Yellow / Appearance: Clear / S.020 / pH: x  Gluc: x / Ketone: NEGATIVE  / Bili: NEGATIVE / Urobili: 0.2 E.U./dL   Blood: x / Protein: 30 mg/dL / Nitrite: NEGATIVE   Leuk Esterase: NEGATIVE / RBC: < 5 /HPF / WBC < 5 /HPF   Sq Epi: x / Non Sq Epi: 0-5 /HPF / Bacteria: Present /HPF      CARDIAC MARKERS ( 10 Sep 2020 11:32 )  <0.017 ng/mL / x     / x     / x     / x          Serum Pro-Brain Natriuretic Peptide: 1540 pg/mL (09-10 @ 11:32)    Lactate, Blood: 1.5 mmoL/L (09-10 @ 11:32)      RADIOLOGY, EKG & ADDITIONAL TESTS: Reviewed.

## 2020-09-10 NOTE — ED PROVIDER NOTE - ATTENDING CONTRIBUTION TO CARE
80 yo female pmhx chf/copd with sob. Pt with chf on portable cxr then flash pulmonary edema. RSI intubation for intercostal mm fatigue/hypoxia, agonal respirations.    Central line placed. NTG drip/lasix. 78 yo female pmhx chf/copd with sob. Pt with [facility] portable cxr . RSI intubation for intercostal mm fatigue/hypoxia, agonal respirations. ? COPD exacerbation    Central line placed. Pulmonary toilet (steroids, nebs/magnesium/terbutaline.).    imp:  respiratory failure requiring intubation. Probable COPD exacerabtion. Diff dx includes ptx/pneumonia/copd exac/MI

## 2020-09-10 NOTE — H&P ADULT - ASSESSMENT
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     Neuro:   Patient currently sedated, reportedly at baseline (A&Ox3) upon presentation to [facility].   -perform neuro assessment after patient awakens from sedation     CV:   #HTN   Patient with HTN, takes Diltiazem 180 mg daily   -Consider restarting diltiazem 180 mg daily if needed after patient is off pressors   -Continue to monitor    #Heart failure with preserved ejection fraction   Patient with history of HFpEF (EF 55-60% last echo 2/20). On exam, patient does not appear to be volume overloaded (wheezes not noted on exam, minimal B lines on bedside ultrasound of lungs). Low suspicion for CHF exacerbation at this time.  -Hold home bumex 2 mg qd in setting of euvolemia; consider restarting as necessary   -Continue to monitor fluid status and clinical presentation.       Pulm:     MSK:   #Rib fracture  Patient discharged on previous admission with bilateral rib fractures and an acute fracture of the left sixth rib. The fracture of the right sixth rib was noted to be subacute, as there is adjacent callus formation. Patient denies recent trauma, falls, or coughing.   -Assess pain levels once patient is off sedation; consider restarting   -PT consult in AM   -Patient currently denies SOB, chest pain, or difficulty breathing. Appears comfortable on 4 L NC.       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 today with one day of SOB and fatigue and acutely became hypoxic and cyanotic, now s/p intubation and admitted to the MICU for further management. The etiology of patient's current respiratory failure is most likely COPD exacerbation, with less likely possibilities including CHF exacerbation (patient euvolemic; no pulmonary edema noted on bedside ultrasound of the lung) or PE (patient not tachycardic).     Neuro:   Patient currently sedated, reportedly at baseline (A&Ox3) upon presentation to [facility].   -perform neuro assessment after patient awakens from sedation     #Restless leg syndrome: patient reports hx of restless leg syndrome and neuropathy 2/2 prior Lyme Disease infection   -C/w Pramipexole 1.5 mg at bedtime.  -f/u iron studies     CV:   #HTN   Patient with HTN, takes Diltiazem 180 mg daily   -Consider restarting diltiazem 180 mg daily if needed after patient is off pressors   -Continue to monitor    #Heart failure with preserved ejection fraction   Patient with history of HFpEF (EF 55-60% last echo 2/20). On exam, patient does not appear to be volume overloaded (wheezes not noted on exam, minimal B lines on bedside ultrasound of lungs). Low suspicion for CHF exacerbation at this time.  -Hold home bumex 2 mg qd in setting of euvolemia; consider restarting as necessary   -Continue to monitor fluid status and clinical presentation.     #DVT   Patient with history of multiple DVTs, on Eliquis 5 mg bid at home.  -C/w Eliquis 5 mg BID in hospital   -Patient denies LE pain at this time.     Pulm:   #COPD exacerbation    Patient with history of COPD. At home, takes Advair, Albuterol 2.5, Yupelri inhaler, and Perforomist inhaler. She was recently discharged from the hospital for a COPD exacerbation on 9/3. No wheezing is noted on current exam, though the patient has decreased breath sounds bilaterally.   -Now intubated/sedated and on ventilator support, on fentanyl/propofol   -ABG pH 7.31/pCO2 54/pO2 428  -given duoneb on presentation to [facility]  -C/w standing duonebs q6  -If worsening respiratory status, STAT ABG.     Renal:   #CKD stage IV    Patient with reported CKD stage 4 and baseline Cr ~1.7-2.0   -On this admission, BUN 37 and Cr 1.83, consistent with that of her last admission - BUN 35 and Cr 1.69   -No acute intervention at this  time - continue to monitor   -DASH/TLC diet.       MSK:   #Rib fracture  Patient discharged on previous admission with bilateral rib fractures and an acute fracture of the left sixth rib. The fracture of the right sixth rib was noted to be subacute, as there is adjacent callus formation. Patient denies recent trauma, falls, or coughing.   -Assess pain levels once patient is off sedation; consider restarting Dilaudid for pain control   -PT consult once patient is off sedation      Heme:   #anemia   Patient with Hgb 8.1 on admission, down from her baseline of 9-10s on prior admissions. No signs of active bleeding. Patient was also noted to be anemic on last admission and did not require transfusion.   -continue to trend CBC   -Active T&S  -F/u iron studies   -Transfuse if Hgb <7   -Continue to monitor.     #Endo  Hyperlipidemia. 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 The etiology of patient's current respiratory failure is most likely COPD exacerbation, with less likely possibilities including CHF exacerbation (patient euvolemic; no pulmonary edema noted on bedside ultrasound of the lung) or PE (patient not tachycardic).     Neuro:   Patient currently sedated, reportedly at baseline (A&Ox3) upon presentation to [facility].   -perform neuro assessment after patient awakens from sedation     #Restless leg syndrome: patient reports hx of restless leg syndrome and neuropathy 2/2 prior Lyme Disease infection   -C/w Pramipexole 1.5 mg at bedtime.  -f/u iron studies     CV:   #HTN   Patient with HTN, takes Diltiazem 180 mg daily   -Consider restarting diltiazem 180 mg daily if needed after patient is off pressors   -Continue to monitor    #Heart failure with preserved ejection fraction   Patient with history of HFpEF (EF 55-60% last echo 2/20). On exam, patient does not appear to be volume overloaded (wheezes not noted on exam, minimal B lines on bedside ultrasound of lungs). Low suspicion for CHF exacerbation at this time.  -Hold home bumex 2 mg qd in setting of euvolemia; consider restarting as necessary   -Continue to monitor fluid status and clinical presentation.     #DVT   Patient with history of multiple DVTs, on Eliquis 5 mg bid at home.  -C/w Eliquis 5 mg BID in hospital   -Patient denies LE pain at this time.     Pulm:   #COPD exacerbation    Patient with history of COPD. At home, takes Advair, Albuterol 2.5, Yupelri inhaler, and Perforomist inhaler. She was recently discharged from the hospital for a COPD exacerbation on 9/3. No wheezing is noted on current exam, though the patient has decreased breath sounds bilaterally.   -Now intubated/sedated and on ventilator support, on fentanyl/propofol   -ABG pH 7.31/pCO2 54/pO2 428  -given duoneb on presentation to [facility]  -C/w standing duonebs q6  -azithromycin 500mg daily   -prednisone 40mg q12  -If worsening respiratory status, STAT ABG.     Renal:   #CKD stage IV    Patient with reported CKD stage 4 and baseline Cr ~1.7-2.0   -On this admission, BUN 37 and Cr 1.83, consistent with that of her last admission - BUN 35 and Cr 1.69   -No acute intervention at this  time - continue to monitor   -DASH/TLC diet.       MSK:   #Rib fracture  Patient discharged on previous admission with bilateral rib fractures and an acute fracture of the left sixth rib. The fracture of the right sixth rib was noted to be subacute, as there is adjacent callus formation. Patient denies recent trauma, falls, or coughing.   -Assess pain levels once patient is off sedation; consider restarting Dilaudid for pain control   -PT consult once patient is off sedation      Heme:   #anemia   Patient with Hgb 8.1 on admission, down from her baseline of 9-10s on prior admissions. No signs of active bleeding. Patient was also noted to be anemic on last admission and did not require transfusion.   -continue to trend CBC   -Active T&S  -F/u iron studies   -Transfuse if Hgb <7   -Continue to monitor.     #Endo  Hyperlipidemia. 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 on 9/1 for a nondisplaced left lateral rib fractuer and COPD exacerbation, who presented to [facility] today with one day of SOB and fatigue and acutely became hypoxic and cyanotic, now s/p intubation for acute hypoxic respiratory failure and admitted to the MICU for further management. The etiology of patient's current respiratory failure is most likely COPD exacerbation, with less likely possibilities including CHF exacerbation (patient euvolemic; no pulmonary edema noted on bedside ultrasound of the lung) or PE (patient not tachycardic and on apixaban).     Neuro:   Patient currently sedated, reportedly at baseline (A&Ox3) upon presentation to [facility].   -perform neuro assessment after patient awakens from sedation     #Restless leg syndrome: patient reports hx of restless leg syndrome and neuropathy 2/2 prior Lyme Disease infection   -C/w Pramipexole 1.5 mg at bedtime.  -f/u iron studies     CV:   #HTN   Patient with HTN, takes Diltiazem 180 mg daily   -Consider restarting diltiazem 180 mg daily if needed after patient is off pressors   -Continue to monitor    #Heart failure with preserved ejection fraction   Patient with history of HFpEF (EF 55-60% last echo 2/20). On exam, patient does not appear to be volume overloaded (wheezes not noted on exam, minimal B lines on bedside ultrasound of lungs). Low suspicion for CHF exacerbation at this time.  -Hold home bumex 2 mg qd in setting of euvolemia; consider restarting as necessary   -Continue to monitor fluid status and clinical presentation.     #DVT   Patient with history of multiple DVTs, on Eliquis 5 mg bid at home.  -C/w Eliquis 5 mg BID in hospital   -Patient denies LE pain at this time.     Pulm:   #COPD exacerbation    Patient with history of COPD. At home, takes Advair, Albuterol 2.5, Yupelri inhaler, and Perforomist inhaler. She was recently discharged from the hospital for a COPD exacerbation on 9/3. No wheezing is noted on current exam, though the patient has decreased breath sounds bilaterally.   -Now intubated/sedated and on ventilator support, on fentanyl/propofol   -ABG pH 7.31/pCO2 54/pO2 428  -given duoneb on presentation to [facility]  -C/w standing duonebs q6  -azithromycin 500mg daily   -prednisone 40mg q12  -If worsening respiratory status, STAT ABG.     Renal:   #CKD stage IV    Patient with reported CKD stage 4 and baseline Cr ~1.7-2.0   -On this admission, BUN 37 and Cr 1.83, consistent with that of her last admission - BUN 35 and Cr 1.69   -No acute intervention at this  time - continue to monitor   -DASH/TLC diet.       MSK:   #Rib fracture  Patient discharged on previous admission with bilateral rib fractures and an acute fracture of the left sixth rib. The fracture of the right sixth rib was noted to be subacute, as there is adjacent callus formation. Patient denies recent trauma, falls, or coughing.   -Assess pain levels once patient is off sedation; consider restarting Dilaudid for pain control   -PT consult once patient is off sedation      Heme:   #anemia   Patient with Hgb 8.1 on admission, down from her baseline of 9-10s on prior admissions. No signs of active bleeding. Patient was also noted to be anemic on last admission and did not require transfusion.   -continue to trend CBC   -Active T&S  -F/u iron studies   -Transfuse if Hgb <7   -Continue to monitor.     #Endo  Hyperlipidemia. Plan: Patient with HLD, takes Atorvastatin 40 mg at home   -C/w Atorvastatin 40 mg    GI:   #GI ppx  -protonix 40mg     F: none   E: replete as necessary   N: NPO   DVT Ppx: Eliquis 5 BID   Dispo: MICU

## 2020-09-10 NOTE — ED ADULT NURSE NOTE - NS ED TRIAGE CLINICAL UPGRADE
breastfeeding exclusively Deteriorating patient status - Patient was clinically upgraded due to deteriorating patient status.

## 2020-09-10 NOTE — H&P ADULT - HISTORY OF PRESENT ILLNESS
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 on 9/1 for a nondisplaced left lateral rib fractuer and COPD exacerbation, treated with IV duonebs and pain control with Dilauded, who presented to [facility] today with one day of SOB and fatigue. In the ED, the patient was reported to initially be in no acute distress, with an Sp02 of 100% on NC. However, while in the ED she became acutely hypoxic and cyanotic, requiring intubation and sedation. She was placed on fentanyl and propofol drips in the ED and transported to [facility] for further care in the MICU. 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 today with one day of SOB and fatigue. In the ED, the patient was reported to initially be in no acute distress, with an Sp02 of 100% on NC. However, while in the ED she became acutely hypoxic and cyanotic, requiring intubation and sedation. She was placed on fentanyl and propofol drips in the ED and transported to [facility] for further care in the MICU. Upon arrival in the MICU, she is intubated and sedated, with an SpO2 of 100% of 50% FiO2.

## 2020-09-10 NOTE — ED PROCEDURE NOTE - CPROC ED INFUS LINE DETAIL1
All lumen(s) aspirated and flushed without difficulty./The catheter was placed using sterile technique./The guidewire was recovered./The location was identified, and the area was draped and prepped./Ultrasound guidance was used during placement.

## 2020-09-11 DIAGNOSIS — N18.9 CHRONIC KIDNEY DISEASE, UNSPECIFIED: ICD-10-CM

## 2020-09-11 LAB
ALBUMIN SERPL ELPH-MCNC: 2.8 G/DL — LOW (ref 3.3–5)
ALP SERPL-CCNC: 98 U/L — SIGNIFICANT CHANGE UP (ref 40–120)
ALT FLD-CCNC: 22 U/L — SIGNIFICANT CHANGE UP (ref 10–45)
ANION GAP SERPL CALC-SCNC: 10 MMOL/L — SIGNIFICANT CHANGE UP (ref 5–17)
ANION GAP SERPL CALC-SCNC: 13 MMOL/L — SIGNIFICANT CHANGE UP (ref 5–17)
APTT BLD: 29.4 SEC — SIGNIFICANT CHANGE UP (ref 27.5–35.5)
AST SERPL-CCNC: 16 U/L — SIGNIFICANT CHANGE UP (ref 10–40)
BILIRUB SERPL-MCNC: <0.2 MG/DL — SIGNIFICANT CHANGE UP (ref 0.2–1.2)
BLD GP AB SCN SERPL QL: NEGATIVE — SIGNIFICANT CHANGE UP
BUN SERPL-MCNC: 41 MG/DL — HIGH (ref 7–23)
BUN SERPL-MCNC: 43 MG/DL — HIGH (ref 7–23)
CALCIUM SERPL-MCNC: 8.8 MG/DL — SIGNIFICANT CHANGE UP (ref 8.4–10.5)
CALCIUM SERPL-MCNC: 9 MG/DL — SIGNIFICANT CHANGE UP (ref 8.4–10.5)
CHLORIDE SERPL-SCNC: 103 MMOL/L — SIGNIFICANT CHANGE UP (ref 96–108)
CHLORIDE SERPL-SCNC: 106 MMOL/L — SIGNIFICANT CHANGE UP (ref 96–108)
CO2 SERPL-SCNC: 24 MMOL/L — SIGNIFICANT CHANGE UP (ref 22–31)
CO2 SERPL-SCNC: 24 MMOL/L — SIGNIFICANT CHANGE UP (ref 22–31)
CREAT ?TM UR-MCNC: 64 MG/DL — SIGNIFICANT CHANGE UP
CREAT SERPL-MCNC: 1.63 MG/DL — HIGH (ref 0.5–1.3)
CREAT SERPL-MCNC: 1.7 MG/DL — HIGH (ref 0.5–1.3)
CULTURE RESULTS: NO GROWTH — SIGNIFICANT CHANGE UP
D DIMER BLD IA.RAPID-MCNC: 647 NG/ML DDU — HIGH
GLUCOSE SERPL-MCNC: 171 MG/DL — HIGH (ref 70–99)
GLUCOSE SERPL-MCNC: 227 MG/DL — HIGH (ref 70–99)
HCT VFR BLD CALC: 26.1 % — LOW (ref 34.5–45)
HGB BLD-MCNC: 7.4 G/DL — LOW (ref 11.5–15.5)
MAGNESIUM SERPL-MCNC: 2.7 MG/DL — HIGH (ref 1.6–2.6)
MCHC RBC-ENTMCNC: 25.6 PG — LOW (ref 27–34)
MCHC RBC-ENTMCNC: 28.4 GM/DL — LOW (ref 32–36)
MCV RBC AUTO: 90.3 FL — SIGNIFICANT CHANGE UP (ref 80–100)
NRBC # BLD: 0 /100 WBCS — SIGNIFICANT CHANGE UP (ref 0–0)
PHOSPHATE SERPL-MCNC: 4.8 MG/DL — HIGH (ref 2.5–4.5)
PLATELET # BLD AUTO: 204 K/UL — SIGNIFICANT CHANGE UP (ref 150–400)
POTASSIUM SERPL-MCNC: 5 MMOL/L — SIGNIFICANT CHANGE UP (ref 3.5–5.3)
POTASSIUM SERPL-MCNC: 6 MMOL/L — HIGH (ref 3.5–5.3)
POTASSIUM SERPL-SCNC: 5 MMOL/L — SIGNIFICANT CHANGE UP (ref 3.5–5.3)
POTASSIUM SERPL-SCNC: 6 MMOL/L — HIGH (ref 3.5–5.3)
POTASSIUM UR-SCNC: 34 MMOL/L — SIGNIFICANT CHANGE UP
PROT SERPL-MCNC: 6.6 G/DL — SIGNIFICANT CHANGE UP (ref 6–8.3)
RBC # BLD: 2.89 M/UL — LOW (ref 3.8–5.2)
RBC # FLD: 17.6 % — HIGH (ref 10.3–14.5)
RH IG SCN BLD-IMP: POSITIVE — SIGNIFICANT CHANGE UP
SODIUM SERPL-SCNC: 140 MMOL/L — SIGNIFICANT CHANGE UP (ref 135–145)
SODIUM SERPL-SCNC: 140 MMOL/L — SIGNIFICANT CHANGE UP (ref 135–145)
SPECIMEN SOURCE: SIGNIFICANT CHANGE UP
WBC # BLD: 8.4 K/UL — SIGNIFICANT CHANGE UP (ref 3.8–10.5)
WBC # FLD AUTO: 8.4 K/UL — SIGNIFICANT CHANGE UP (ref 3.8–10.5)

## 2020-09-11 PROCEDURE — 99291 CRITICAL CARE FIRST HOUR: CPT

## 2020-09-11 PROCEDURE — 99223 1ST HOSP IP/OBS HIGH 75: CPT

## 2020-09-11 PROCEDURE — 71045 X-RAY EXAM CHEST 1 VIEW: CPT | Mod: 26

## 2020-09-11 PROCEDURE — 99497 ADVNCD CARE PLAN 30 MIN: CPT | Mod: 25

## 2020-09-11 RX ORDER — PRAMIPEXOLE DIHYDROCHLORIDE 0.12 MG/1
1.5 TABLET ORAL AT BEDTIME
Refills: 0 | Status: DISCONTINUED | OUTPATIENT
Start: 2020-09-11 | End: 2020-09-15

## 2020-09-11 RX ORDER — SODIUM ZIRCONIUM CYCLOSILICATE 10 G/10G
10 POWDER, FOR SUSPENSION ORAL ONCE
Refills: 0 | Status: DISCONTINUED | OUTPATIENT
Start: 2020-09-11 | End: 2020-09-11

## 2020-09-11 RX ORDER — SODIUM ZIRCONIUM CYCLOSILICATE 10 G/10G
10 POWDER, FOR SUSPENSION ORAL EVERY 8 HOURS
Refills: 0 | Status: DISCONTINUED | OUTPATIENT
Start: 2020-09-11 | End: 2020-09-12

## 2020-09-11 RX ORDER — AZITHROMYCIN 500 MG/1
500 TABLET, FILM COATED ORAL ONCE
Refills: 0 | Status: COMPLETED | OUTPATIENT
Start: 2020-09-11 | End: 2020-09-11

## 2020-09-11 RX ORDER — SODIUM ZIRCONIUM CYCLOSILICATE 10 G/10G
10 POWDER, FOR SUSPENSION ORAL THREE TIMES A DAY
Refills: 0 | Status: DISCONTINUED | OUTPATIENT
Start: 2020-09-11 | End: 2020-09-11

## 2020-09-11 RX ORDER — BUDESONIDE, MICRONIZED 100 %
0.25 POWDER (GRAM) MISCELLANEOUS
Refills: 0 | Status: DISCONTINUED | OUTPATIENT
Start: 2020-09-11 | End: 2020-09-15

## 2020-09-11 RX ORDER — DILTIAZEM HCL 120 MG
180 CAPSULE, EXT RELEASE 24 HR ORAL DAILY
Refills: 0 | Status: DISCONTINUED | OUTPATIENT
Start: 2020-09-11 | End: 2020-09-15

## 2020-09-11 RX ORDER — HYDROMORPHONE HYDROCHLORIDE 2 MG/ML
2 INJECTION INTRAMUSCULAR; INTRAVENOUS; SUBCUTANEOUS ONCE
Refills: 0 | Status: DISCONTINUED | OUTPATIENT
Start: 2020-09-11 | End: 2020-09-11

## 2020-09-11 RX ORDER — ACETAMINOPHEN 500 MG
650 TABLET ORAL EVERY 6 HOURS
Refills: 0 | Status: DISCONTINUED | OUTPATIENT
Start: 2020-09-11 | End: 2020-09-15

## 2020-09-11 RX ORDER — CHLORHEXIDINE GLUCONATE 213 G/1000ML
1 SOLUTION TOPICAL
Refills: 0 | Status: DISCONTINUED | OUTPATIENT
Start: 2020-09-11 | End: 2020-09-12

## 2020-09-11 RX ORDER — BUPROPION HYDROCHLORIDE 150 MG/1
300 TABLET, EXTENDED RELEASE ORAL DAILY
Refills: 0 | Status: DISCONTINUED | OUTPATIENT
Start: 2020-09-11 | End: 2020-09-15

## 2020-09-11 RX ORDER — SODIUM ZIRCONIUM CYCLOSILICATE 10 G/10G
10 POWDER, FOR SUSPENSION ORAL DAILY
Refills: 0 | Status: DISCONTINUED | OUTPATIENT
Start: 2020-09-13 | End: 2020-09-15

## 2020-09-11 RX ORDER — FUROSEMIDE 40 MG
40 TABLET ORAL ONCE
Refills: 0 | Status: COMPLETED | OUTPATIENT
Start: 2020-09-11 | End: 2020-09-11

## 2020-09-11 RX ADMIN — Medication 40 MILLIGRAM(S): at 04:35

## 2020-09-11 RX ADMIN — CHLORHEXIDINE GLUCONATE 15 MILLILITER(S): 213 SOLUTION TOPICAL at 05:08

## 2020-09-11 RX ADMIN — Medication 3 MILLILITER(S): at 22:00

## 2020-09-11 RX ADMIN — Medication 3 MILLILITER(S): at 05:29

## 2020-09-11 RX ADMIN — PRAMIPEXOLE DIHYDROCHLORIDE 1.5 MILLIGRAM(S): 0.12 TABLET ORAL at 21:10

## 2020-09-11 RX ADMIN — APIXABAN 5 MILLIGRAM(S): 2.5 TABLET, FILM COATED ORAL at 21:10

## 2020-09-11 RX ADMIN — AZITHROMYCIN 255 MILLIGRAM(S): 500 TABLET, FILM COATED ORAL at 09:49

## 2020-09-11 RX ADMIN — Medication 180 MILLIGRAM(S): at 17:06

## 2020-09-11 RX ADMIN — ATORVASTATIN CALCIUM 40 MILLIGRAM(S): 80 TABLET, FILM COATED ORAL at 21:10

## 2020-09-11 RX ADMIN — HYDROMORPHONE HYDROCHLORIDE 2 MILLIGRAM(S): 2 INJECTION INTRAMUSCULAR; INTRAVENOUS; SUBCUTANEOUS at 19:58

## 2020-09-11 RX ADMIN — APIXABAN 5 MILLIGRAM(S): 2.5 TABLET, FILM COATED ORAL at 12:58

## 2020-09-11 RX ADMIN — PROPOFOL 3.4 MICROGRAM(S)/KG/MIN: 10 INJECTION, EMULSION INTRAVENOUS at 05:31

## 2020-09-11 RX ADMIN — HYDROMORPHONE HYDROCHLORIDE 2 MILLIGRAM(S): 2 INJECTION INTRAMUSCULAR; INTRAVENOUS; SUBCUTANEOUS at 20:15

## 2020-09-11 RX ADMIN — SODIUM ZIRCONIUM CYCLOSILICATE 10 GRAM(S): 10 POWDER, FOR SUSPENSION ORAL at 21:10

## 2020-09-11 RX ADMIN — Medication 3 MILLILITER(S): at 09:10

## 2020-09-11 NOTE — CONSULT NOTE ADULT - PROBLEM SELECTOR RECOMMENDATION 9
CKD 3/4 ( baseline Cr 1.7-2)  renal function appears at baseline, labs reviewed form prior admissions, K noted to be running intermittently borderline-high for the past few months, estimated GFR is calculated ~ 30 ml/min however doubt this is accurate given sig obesity and lyte abnormalities, given worsening of Anemia would need to r/o hemolysis as an extrarenal source of hyperkalemia, less likely nutritional as pt has been NPO since admission, please  - Repeat BMP with Cystatin-C eGFR, hapto, LDH and retic count    - obtain FeK( urine K and Cr)  - add renal restriction to diet  Will follow CKD 3/4 ( baseline Cr 1.7-2)  renal function appears at baseline, labs reviewed form prior admissions, K noted to be running intermittently borderline-high for the past few months, estimated GFR is calculated ~ 30 ml/min however doubt this is accurate given sig obesity and lyte abnormalities, given worsening of Anemia would need to r/o hemolysis as an extrarenal source of hyperkalemia, less likely nutritional as pt has been NPO since admission, please  - Repeat BMP with Cystatin-C eGFR, hapto, LDH and retic count    - obtain FeK( urine K and Cr)  - add renal restriction to diet/ redose 40 mg Iv lasix in the afternoon and c/w Lokelma 10 g every 8 hours for 6 doses and transition to daily after 48 hours, if serum K < 5.2 dc Lokelma  Will follow CKD 3/4 ( baseline Cr 1.7-2)  renal function appears at baseline, labs reviewed form prior admissions, K noted to be running intermittently borderline-high for the past few months, estimated GFR is calculated ~ 30 ml/min however doubt this is accurate given sig obesity and lyte abnormalities, given worsening of Anemia would need to r/o hemolysis as an extrarenal source of hyperkalemia, less likely nutritional as pt has been NPO since admission, please  - Repeat BMP with Cystatin-C eGFR, hapto, LDH and retic count    - obtain FeK( urine K and Cr)  - add renal restriction to diet/ redose 40 mg Iv lasix in the afternoon and c/w Lokelma 10 g every 8 hours for 6 doses and transition to daily after 48 hours  Will follow

## 2020-09-11 NOTE — DIETITIAN INITIAL EVALUATION ADULT. - ENERGY NEEDS
Height: 68" Weight: 250lbs, IBW 140lbs+/-10%, %%, BMI 38.0,  IBW used for calculations as pt >120% of IBW, adjusted for COPD, age

## 2020-09-11 NOTE — CONSULT NOTE ADULT - SUBJECTIVE AND OBJECTIVE BOX
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 In the ED, the patient was reported to initially be in no acute distress, with an Sp02 of 100% on NC. However, while in the ED she became acutely hypoxic and cyanotic, requiring intubation and sedation. She was placed on fentanyl and propofol drips in the ED and transported to [facility] for further care in the MICU. Upon arrival in the MICU, she is intubated and sedated, with an SpO2 of 100% of 50% FiO2. (10 Sep 2020 15:05)    PERTINENT PM/SXH:   PNA (pneumonia)    MRSA (methicillin resistant Staphylococcus aureus)    Brain embolism and thrombosis    Skin cancer    DVT (deep venous thrombosis)    Lyme disease      H/O shoulder surgery    Status post laparoscopic-assisted sigmoidectomy    H/O angioplasty      FAMILY HISTORY:  FH: heart failure  mother      ITEMS NOT CHECKED ARE NOT PRESENT    SOCIAL HISTORY:   Significant other/partner:  [ ]  Children:  [ ]  Adventism/Spirituality: [employer]  Substance hx:  [ ]   Tobacco hx:  [x ]   Alcohol hx: [ ]   Home Opioid hx:  [ ] I-Stop Reference No:  Living Situation: [x ]Home  [ ]Long term care  [ ]Rehab [ ]Other    ADVANCE DIRECTIVES:    DNR  Yes    MOLST  [ ]  Living Will  [ ]   DECISION MAKER(s):  [x ] Health Care Proxy(s)  [ ] Surrogate(s)  [ ] Guardian           Name(s): Phone Number(s): Lisa Miner (friend) [phone]     BASELINE (I)ADL(s) (prior to admission):  [place]: [ ]Total  [x ] Moderate [ ]Dependent    Allergies    Altabax (Unknown)  Augmentin (Unknown)  clindamycin (Unknown)  Vaseline (Swelling)    Intolerances    MEDICATIONS  (STANDING):  albuterol/ipratropium for Nebulization 3 milliLiter(s) Nebulizer every 6 hours  apixaban 5 milliGRAM(s) Oral every 12 hours  atorvastatin 40 milliGRAM(s) Oral at bedtime  buDESOnide    Inhalation Suspension 0.25 milliGRAM(s) Inhalation two times a day  buPROPion XL . 300 milliGRAM(s) Oral daily  cephalexin 500 milliGRAM(s) Oral every 6 hours  diltiazem    milliGRAM(s) Oral daily  furosemide    Tablet 40 milliGRAM(s) Oral every 24 hours  pramipexole 1.5 milliGRAM(s) Oral at bedtime  sodium zirconium cyclosilicate 10 Gram(s) Oral daily    MEDICATIONS  (PRN):  acetaminophen   Tablet .. 650 milliGRAM(s) Oral every 6 hours PRN Mild Pain (1 - 3)  calamine/zinc oxide Lotion 1 Application(s) Topical two times a day PRN Itching  HYDROmorphone   Tablet 4 milliGRAM(s) Oral four times a day PRN Severe Pain (7 - 10)    PRESENT SYMPTOMS: [ ]Unable to obtain due to poor mentation   Source if other than patient:  [ ]Family   [ ]Team     Pain (Impact on QOL):  None  Location -         Minimal acceptable level (0-10 scale):                    Aggravating factors -  Quality -  Radiation -  Severity (0-10 scale) -    Timing -    PAIN AD Score:     [email or web address] (press ctrl +  left click to view)    Dyspnea:                           [ x]Mild [ ]Moderate [ ]Severe  Anxiety:                             [ x]Mild [ ]Moderate [ ]Severe  Fatigue:                             [ ]Mild [ ]Moderate [x]Severe  Nausea:                             [ ]Mild [ ]Moderate [ ]Severe  Loss of appetite:              [ x]Mild [ ]Moderate [ ]Severe  Constipation:                    [ ]Mild [ ]Moderate [ ]Severe  Grief Present                    [ ] Yes   [x ] No   Other Symptoms:  [ x]All other review of systems negative     Karnofsky Performance Score/Palliative Performance Status Version 2:  40       %    http://palliative.info/resource_material/[email or web address].pdf  PHYSICAL EXAM:  Vital Signs Last 24 Hrs  T(C): 36.4 (15 Sep 2020 08:31), Max: 36.8 (14 Sep 2020 21:12)  T(F): 97.6 (15 Sep 2020 08:31), Max: 98.2 (14 Sep 2020 21:12)  HR: 81 (15 Sep 2020 08:31) (81 - 90)  BP: 146/73 (15 Sep 2020 08:31) (145/70 - 152/75)  BP(mean): --  RR: 18 (15 Sep 2020 08:31) (16 - 18)  SpO2: 92% (15 Sep 2020 08:31) (92% - 94%) I&O's Summary  GENERAL:  [x ]Alert  [ x]Oriented x 3  [ ]Lethargic  [ ]Cachexia  [ ]Unarousable  [ ]Verbal  [ ]Non-Verbal  Behavioral:   [ ] Anxiety  [ ] Delirium [ ] Agitation [x ] Other  HEENT:  [ ]Normal   [ x]Dry mouth   [ ]ET Tube/Trach  [ ]Oral lesions  PULMONARY:   [ ]Clear [ ]Tachypnea  [ ]Audible excessive secretions   [x ]Rhonchi        [ ]Right [ ]Left [x ]Bilateral  [ ]Crackles        [ ]Right [ ]Left [ ]Bilateral  [ ]Wheezing     [ ]Right [ ]Left [ ]Bilateral  CARDIOVASCULAR:    [x ]Regular [ ]Irregular [ ]Tachy  [ ]Jonathon [ ]Murmur [ ]Other  GASTROINTESTINAL:  [ x]Soft  [ ]Distended   [x ]+BS  [ x]Non tender [ ]Tender  [ ]PEG [ ]OGT/ NGT  Last BM:   GENITOURINARY:  [ ]Normal [ ] Incontinent   [ ]Oliguria/Anuria   [ x][place]  MUSCULOSKELETAL:   [ ]Normal   [x ]Weakness  [ ]Bed/Wheelchair bound [ ]Edema  NEUROLOGIC:   [x ]No focal deficits  [ ] Cognitive impairment  [ ] Dysphagia [ ]Dysarthria [ ] Paresis [ ]Other   SKIN:   [x ]Normal   [ ]Pressure ulcer(s)  [ ]Rash    CRITICAL CARE:  [ ] Shock Present  [ ]Septic [ ]Cardiogenic [ ]Neurologic [ ]Hypovolemic  [ ]  Vasopressors [ ]  Inotropes   [ ] Respiratory failure present  [ ] Acute  [ ] Chronic [ ] Hypoxic  [ ] Hypercarbic [ ] Other  [ ] Other organ failure     LABS:                        7.8    7.45  )-----------( 217      ( 15 Sep 2020 06:14 )             27.1   09-15    141  |  98  |  31<H>  ----------------------------<  100<H>  3.7   |  31  |  1.13    Ca    9.1      15 Sep 2020 06:14  Phos  3.0     09-15  Mg     1.8     09-15          RADIOLOGY & ADDITIONAL STUDIES:    PROTEIN CALORIE MALNUTRITION PRESENT: [ ] Yes [ ] No  [ ] PPSV2 < or = to 30% [ ] significant weight loss  [ ] poor nutritional intake [ ] catabolic state [ ] anasarca     Albumin, Serum: 2.8 g/dL (09-13-20 @ 06:17)  Artificial Nutrition [ ]     REFERRALS:   [ ]Chaplaincy  [ ] Hospice  [ ]Child Life  [ ]Social Work  [ ]Case management [ ]Holistic Therapy   Goals of Care Discussion Document:

## 2020-09-11 NOTE — CONSULT NOTE ADULT - PROBLEM SELECTOR RECOMMENDATION 9
- Patient self extubated earlier today.  - Currently with appropriate mental status.  - Stable respiratory status.  - After discussion with patient, she revoked DNR that was previously put in place.  - Respiratory failure is multifactorial, due to advanced COPD, obesity, CHF.   - Supportive care.

## 2020-09-11 NOTE — PROGRESS NOTE ADULT - ATTENDING COMMENTS
Etiology of respiratory failure not clear. She is extubated, now feels her breathing is normal. Will observe off steroids. No evidence on POCUS of pulmonary edema.

## 2020-09-11 NOTE — CONSULT NOTE ADULT - PROBLEM SELECTOR RECOMMENDATION 2
- Noted to have severe COPD on CT chest.  - Requires O2 at baseline.  - Supportive care.  - Low dose opiates via Dilaudid PO (home med).

## 2020-09-11 NOTE — PROGRESS NOTE ADULT - SUBJECTIVE AND OBJECTIVE BOX
OVERNIGHT EVENTS: The patient's fentanyl dose was titrated down overnight and stopped in the AM. She was given 40mg of lasix overnight. She was also titrated off of propofol overnight, and this AM the patient self-extubated.     SUBJECTIVE / INTERVAL HPI: Patient seen and examined at bedside. She states that she does not remember passing out and that she no longer feels that she is having SOB.     REVIEW OF SYSTEMS:    CONSTITUTIONAL: No weakness, fevers or chills  EYES/ENT: Patient endorses hoarseness but denies any throat pain   NECK: No pain or stiffness  RESPIRATORY: No cough, wheezing, hemoptysis; No shortness of breath at this time   CARDIOVASCULAR: No chest pain or palpitations  GASTROINTESTINAL: No abdominal or epigastric pain. No nausea, vomiting, or hematemesis; No diarrhea or constipation. No melena or hematochezia.  GENITOURINARY: No dysuria, frequency or hematuria  NEUROLOGICAL: No numbness or weakness  SKIN: No pruritis   All other review of systems is negative unless indicated above.    VITAL SIGNS:  Vital Signs Last 24 Hrs  T(C): 36.7 (11 Sep 2020 14:21), Max: 36.9 (11 Sep 2020 09:29)  T(F): 98 (11 Sep 2020 14:21), Max: 98.4 (11 Sep 2020 09:29)  HR: 95 (11 Sep 2020 14:00) (68 - 95)  BP: 189/80 (11 Sep 2020 14:00) (84/44 - 189/80)  BP(mean): 115 (11 Sep 2020 14:00) (61 - 132)  RR: 18 (11 Sep 2020 14:00) (12 - 23)  SpO2: 94% (11 Sep 2020 14:00) (82% - 100%)    PHYSICAL EXAM:    Constitutional: Obese female, now off sedation and awake/alert, in no apparent distress  Head: NC/AT  Eyes: PERRL, clear conjunctiva  ENT: s/p intubation; no oropharyngeal trauma noted   Neck: supple; no JVD or thyromegaly, R IJ line in place  Respiratory: CTAB, poor inspiratory effort with diminished breath sounds, wheezing or crackles not appreciated   Cardiac: +S1/S2; RRR; no M/R/G  Gastrointestinal: soft, NT/ND; no rebound or guarding; +BSx4  Back: spine midline, no bony tenderness or step-offs; no CVAT B/L  Extremities: chronic venous changes of the bilateral lower extremities with an ulcerative wound noted on the L lateral maleolus  Vascular: 2+ radial, femoral, DP/PT pulses B/L  Dermatologic: chronic venous chages of the b/l lower extremities; diffuse small skin wounds likely 2/2 prior scabies on arms, legs, abdomen, back; edematous, erythematous, indurated area noted on back of R thigh   Lymphatic: no submandibular or cervical LAD  Neurologic: currently intubated/sedated    MEDICATIONS:  MEDICATIONS  (STANDING):  albuterol/ipratropium for Nebulization 3 milliLiter(s) Nebulizer every 6 hours  apixaban 5 milliGRAM(s) Oral every 12 hours  atorvastatin 40 milliGRAM(s) Oral at bedtime  chlorhexidine 2% Cloths 1 Application(s) Topical <User Schedule>  diltiazem    milliGRAM(s) Oral daily  sodium zirconium cyclosilicate 10 Gram(s) Oral three times a day    MEDICATIONS  (PRN):      ALLERGIES:  Allergies    Altabax (Unknown)  Augmentin (Unknown)  clindamycin (Unknown)  Vaseline (Swelling)    Intolerances        LABS:                        7.4    8.40  )-----------( 204      ( 11 Sep 2020 04:48 )             26.1     09-11    140  |  103  |  43<H>  ----------------------------<  227<H>  5.0   |  24  |  1.63<H>    Ca    9.0      11 Sep 2020 14:20  Phos  4.8     -11  Mg     2.7     -11    TPro  6.6  /  Alb  2.8<L>  /  TBili  <0.2  /  DBili  x   /  AST  16  /  ALT  22  /  AlkPhos  98  09-11    PT/INR - ( 10 Sep 2020 20:29 )   PT: 12.7 sec;   INR: 1.06          PTT - ( 11 Sep 2020 04:48 )  PTT:29.4 sec  Urinalysis Basic - ( 10 Sep 2020 12:33 )    Color: Yellow / Appearance: Clear / S.020 / pH: x  Gluc: x / Ketone: NEGATIVE  / Bili: NEGATIVE / Urobili: 0.2 E.U./dL   Blood: x / Protein: 30 mg/dL / Nitrite: NEGATIVE   Leuk Esterase: NEGATIVE / RBC: < 5 /HPF / WBC < 5 /HPF   Sq Epi: x / Non Sq Epi: 0-5 /HPF / Bacteria: Present /HPF      CAPILLARY BLOOD GLUCOSE          RADIOLOGY & ADDITIONAL TESTS: Reviewed.    PLAN: OVERNIGHT EVENTS: The patient's fentanyl dose was titrated down overnight and stopped in the AM. She was given 40mg of lasix overnight. She was also titrated off of propofol overnight, and this AM the patient self-extubated.     SUBJECTIVE / INTERVAL HPI: Patient seen and examined at bedside. She states that she does not remember passing out and that she no longer feels that she is having SOB.     REVIEW OF SYSTEMS:    CONSTITUTIONAL: No weakness, fevers or chills  EYES/ENT: Patient endorses hoarseness but denies any throat pain   NECK: No pain or stiffness  RESPIRATORY: No cough, wheezing, hemoptysis; No shortness of breath at this time   CARDIOVASCULAR: No chest pain or palpitations  GASTROINTESTINAL: No abdominal or epigastric pain. No nausea, vomiting, or hematemesis; No diarrhea or constipation. No melena or hematochezia.  GENITOURINARY: No dysuria, frequency or hematuria  NEUROLOGICAL: No numbness or weakness  SKIN: No pruritis   All other review of systems is negative unless indicated above.    VITAL SIGNS:  Vital Signs Last 24 Hrs  T(C): 36.7 (11 Sep 2020 14:21), Max: 36.9 (11 Sep 2020 09:29)  T(F): 98 (11 Sep 2020 14:21), Max: 98.4 (11 Sep 2020 09:29)  HR: 95 (11 Sep 2020 14:00) (68 - 95)  BP: 189/80 (11 Sep 2020 14:00) (84/44 - 189/80)  BP(mean): 115 (11 Sep 2020 14:00) (61 - 132)  RR: 18 (11 Sep 2020 14:00) (12 - 23)  SpO2: 94% (11 Sep 2020 14:00) (82% - 100%)    PHYSICAL EXAM:    Constitutional: Obese female, now off sedation and awake/alert, in no apparent distress  Head: NC/AT  Eyes: PERRL, clear conjunctiva  ENT: s/p intubation; no oropharyngeal trauma noted   Neck: supple; no JVD or thyromegaly, R IJ line in place  Respiratory: Diminished breath sounds noted b/l, wheezing or crackles not appreciated   Cardiac: +S1/S2; RRR; no M/R/G  Gastrointestinal: soft, NT/ND; no rebound or guarding; +BSx4  Back: spine midline, no bony tenderness or step-offs; no CVAT B/L  Extremities: chronic venous changes of the bilateral lower extremities with an ulcerative wound noted on the L lateral maleolus  Vascular: 2+ radial, femoral, DP/PT pulses B/L  Dermatologic: chronic venous chages of the b/l lower extremities; diffuse small skin wounds likely 2/2 prior scabies on arms, legs, abdomen, back; edematous, erythematous, indurated area noted on back of R thigh   Lymphatic: no submandibular or cervical LAD  Neurologic: AOx3, no focal deficits, CN II-XII intact     MEDICATIONS:  MEDICATIONS  (STANDING):  albuterol/ipratropium for Nebulization 3 milliLiter(s) Nebulizer every 6 hours  apixaban 5 milliGRAM(s) Oral every 12 hours  atorvastatin 40 milliGRAM(s) Oral at bedtime  chlorhexidine 2% Cloths 1 Application(s) Topical <User Schedule>  diltiazem    milliGRAM(s) Oral daily  sodium zirconium cyclosilicate 10 Gram(s) Oral three times a day    MEDICATIONS  (PRN):      ALLERGIES:  Allergies    Altabax (Unknown)  Augmentin (Unknown)  clindamycin (Unknown)  Vaseline (Swelling)    Intolerances        LABS:                        7.4    8.40  )-----------( 204      ( 11 Sep 2020 04:48 )             26.1     09-11    140  |  103  |  43<H>  ----------------------------<  227<H>  5.0   |  24  |  1.63<H>    Ca    9.0      11 Sep 2020 14:20  Phos  4.8     -  Mg     2.7     -11    TPro  6.6  /  Alb  2.8<L>  /  TBili  <0.2  /  DBili  x   /  AST  16  /  ALT  22  /  AlkPhos  98  09-11    PT/INR - ( 10 Sep 2020 20:29 )   PT: 12.7 sec;   INR: 1.06          PTT - ( 11 Sep 2020 04:48 )  PTT:29.4 sec  Urinalysis Basic - ( 10 Sep 2020 12:33 )    Color: Yellow / Appearance: Clear / S.020 / pH: x  Gluc: x / Ketone: NEGATIVE  / Bili: NEGATIVE / Urobili: 0.2 E.U./dL   Blood: x / Protein: 30 mg/dL / Nitrite: NEGATIVE   Leuk Esterase: NEGATIVE / RBC: < 5 /HPF / WBC < 5 /HPF   Sq Epi: x / Non Sq Epi: 0-5 /HPF / Bacteria: Present /HPF      CAPILLARY BLOOD GLUCOSE          RADIOLOGY & ADDITIONAL TESTS: Reviewed.

## 2020-09-11 NOTE — CONSULT NOTE ADULT - SUBJECTIVE AND OBJECTIVE BOX
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, diverticulitis s/p sigmoidectomy and CKD stage 4, recently admitted at [facility] and recent admission to [facility] on  for COPD exacerbation, readmitted for similar presentation with SOB, required intubation in ED for acute hypoxic respiratory failure, nephrology consulted for electrolyte abnormaliry in advanced CKD, K noted to be running high since admission inially in mid 5s which trended up to 6 today   Mg is high to 2.7 however expected as pt supplemented with 2 grams of Mg for a level of 2.4   labs reviewed from earlier this year with almost similar trend for hyperkalemia, lytes otherwise acceptable except for mildly elevated phos 4.8  medication reviewed   renal function appears at baseline  pt extubated successfully to Bipap, currently BP is running in 150-160 systolic, CXR with congestion and basilar opacities? effusion  has been getting diuresed with 80 mg IV lasix yesterday and 40 mg IV today     Review of Systems:  Review of Systems: as per HPI	      Allergies and Intolerances:        Allergies:  	Vaseline: Drug, Swelling  	clindamycin: Drug, Unknown  	Augmentin: Drug, Unknown, tolerating cefazolin on 2020 hospital admission  	Altabax: Drug, Unknown    Home Medications:   * Patient Currently Takes Medications as of 10-Sep-2020 12:09 documented in Structured Notes  · 	acetaminophen 500 mg oral tablet: 975 milligram(s) orally every 8 hours  · 	Advair Diskus 250 mcg-50 mcg inhalation powder: 1 puff(s) inhaled 2 times a day  · 	albuterol 2.5 mg/3 mL (0.083%) inhalation solution: 3 milliliter(s) inhaled every 6 hours with spacer, initially take every 6 hours regulalry then after 24 hours take as needed  · 	buPROPion 150 mg/12 hours (SR) oral tablet, extended release: 1 tab(s) orally 2 times a day  · 	permethrin 5% topical cream: Apply topically to affected area once  · 	ivermectin 3 mg oral tablet: 7 tab(s) orally once  · 	Eliquis 5 mg oral tablet: 1 tab(s) orally 2 times a day  · 	Lipitor 40 mg oral tablet: 1 tab(s) orally once a day  · 	Mirapex ER 1.5 mg oral tablet, extended release: 1 tab(s) orally once a day  · 	bumetanide 2 mg oral tablet: 1 tab(s) orally once a day  · 	dilTIAZem 180 mg/24 hours oral capsule, extended release: 1 cap(s) orally once a day  · 	Yupelri 175 mcg/3 mL inhalation solution: 175 microgram(s) inhaled once a day  · 	Perforomist 20 mcg/2 mL inhalation solution: 2 milliliter(s) inhaled 2 times a day  · 	Dilaudid 4 mg oral tablet: 2 tab(s) orally 4 times a day, As Needed  · 	Valium 5 mg oral tablet: orally 2 times a day, As Needed      Patient History:    Past Medical, Past Surgical, and Family History:  PAST MEDICAL HISTORY:  Brain embolism and thrombosis   DVT (deep venous thrombosis)   Lyme disease   MRSA (methicillin resistant Staphylococcus aureus)   PNA (pneumonia)   Skin cancer.     PAST SURGICAL HISTORY:  H/O angioplasty   H/O shoulder surgery   Status post laparoscopic-assisted sigmoidectomy.     FAMILY HISTORY:  HF     Social History:  Social History (marital status, living situation, occupation, tobacco use, alcohol and drug use, and sexual history): remote smoker quit ~ 5 year ago	    VITAL SIGNS:  T(C): 36.9 (20 @ 09:29), Max: 36.9 (20 @ 09:29)  T(F): 98.4 (20 @ 09:29), Max: 98.4 (20 @ 09:29)  HR: 91 (20 @ 11:00) (68 - 91)  BP: 166/79 (20 @ 11:00) (84/44 - 166/79)  BP(mean): 114 (20 @ 11:00) (61 - 114)  RR: 23 (20 @ 11:00) (12 - 23)  SpO2: 89% (20 @ 11:00) (82% - 100%)    PHYSICAL EXAM:  Constitutional: WDWN resting comfortably in bed; NAD  Head: NC/AT  Eyes: PERRL, EOMI, clear conjunctiva  ENT: no nasal discharge; uvula midline, no oropharyngeal erythema or exudates; MMM  Neck: supple; no JVD or thyromegaly  Respiratory: CTA B/L; no W/R/R, no retractions  Cardiac: +S1/S2; RRR; no M/R/G; PMI non-displaced  Gastrointestinal: soft, NT/ND; no rebound or guarding; +BSx4  Genitourinary: normal external genitalia  Back: spine midline, no bony tenderness or step-offs; no CVAT B/L  Extremities: WWP, no clubbing or cyanosis; no peripheral edema  Musculoskeletal: NROM x4; no joint swelling, tenderness or erythema  Vascular: 2+ radial, femoral, DP/PT pulses B/L  Dermatologic: skin warm, dry and intact; no rashes, wounds, or scars  Lymphatic: no submandibular or cervical LAD  Neurologic: AAOx3; CNII-XII grossly intact; no focal deficits  Psychiatric: affect and characteristics of appearance, verbalizations, behaviors are appropriate    LABS:                         7.4    8.40  )-----------( 204      ( 11 Sep 2020 04:48 )             26.1     09-    140  |  106  |  41<H>  ----------------------------<  171<H>  6.0<H>   |  24  |  1.70<H>    Ca    8.8      11 Sep 2020 04:48  Phos  4.8     -  Mg     2.7         TPro  6.6  /  Alb  2.8<L>  /  TBili  <0.2  /  DBili  x   /  AST  16  /  ALT  22  /  AlkPhos  98  09-11    PT/INR - ( 10 Sep 2020 20:29 )   PT: 12.7 sec;   INR: 1.06          PTT - ( 11 Sep 2020 04:48 )  PTT:29.4 sec  Urinalysis Basic - ( 10 Sep 2020 12:33 )    Color: Yellow / Appearance: Clear / S.020 / pH: x  Gluc: x / Ketone: NEGATIVE  / Bili: NEGATIVE / Urobili: 0.2 E.U./dL   Blood: x / Protein: 30 mg/dL / Nitrite: NEGATIVE   Leuk Esterase: NEGATIVE / RBC: < 5 /HPF / WBC < 5 /HPF   Sq Epi: x / Non Sq Epi: 0-5 /HPF / Bacteria: Present /HPF    CARDIAC MARKERS ( 10 Sep 2020 11:32 )  <0.017 ng/mL / x     / x     / x     / x        RADIOLOGY, EKG & ADDITIONAL TESTS: Reviewed. 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, diverticulitis s/p sigmoidectomy and CKD stage 4, recently admitted at [facility] and recent admission to [facility] on  for COPD exacerbation, readmitted for similar presentation with SOB, required intubation in ED for acute hypoxic respiratory failure, nephrology consulted for electrolyte abnormaliry in advanced CKD, K noted to be running high since admission inially in mid 5s which trended up to 6 today   Mg is high to 2.7 however expected as pt supplemented with 2 grams of Mg for a level of 2.4   labs reviewed from earlier this year with almost similar trend for hyperkalemia, lytes otherwise acceptable except for mildly elevated phos 4.8  medication reviewed   renal function appears at baseline  pt extubated successfully to Bipap, currently on 2 L NC, NAD,  BP is running in 160-180 systolic, CXR with congestion and basilar opacities? effusion  has been getting diuresed with 80 mg IV lasix yesterday and 40 mg IV today     Review of Systems:  Review of Systems: as per HPI	      Allergies and Intolerances:        Allergies:  	Vaseline: Drug, Swelling  	clindamycin: Drug, Unknown  	Augmentin: Drug, Unknown, tolerating cefazolin on 2020 hospital admission  	Altabax: Drug, Unknown    Home Medications:   * Patient Currently Takes Medications as of 10-Sep-2020 12:09 documented in Structured Notes  · 	acetaminophen 500 mg oral tablet: 975 milligram(s) orally every 8 hours  · 	Advair Diskus 250 mcg-50 mcg inhalation powder: 1 puff(s) inhaled 2 times a day  · 	albuterol 2.5 mg/3 mL (0.083%) inhalation solution: 3 milliliter(s) inhaled every 6 hours with spacer, initially take every 6 hours regulalry then after 24 hours take as needed  · 	buPROPion 150 mg/12 hours (SR) oral tablet, extended release: 1 tab(s) orally 2 times a day  · 	permethrin 5% topical cream: Apply topically to affected area once  · 	ivermectin 3 mg oral tablet: 7 tab(s) orally once  · 	Eliquis 5 mg oral tablet: 1 tab(s) orally 2 times a day  · 	Lipitor 40 mg oral tablet: 1 tab(s) orally once a day  · 	Mirapex ER 1.5 mg oral tablet, extended release: 1 tab(s) orally once a day  · 	bumetanide 2 mg oral tablet: 1 tab(s) orally once a day  · 	dilTIAZem 180 mg/24 hours oral capsule, extended release: 1 cap(s) orally once a day  · 	Yupelri 175 mcg/3 mL inhalation solution: 175 microgram(s) inhaled once a day  · 	Perforomist 20 mcg/2 mL inhalation solution: 2 milliliter(s) inhaled 2 times a day  · 	Dilaudid 4 mg oral tablet: 2 tab(s) orally 4 times a day, As Needed  · 	Valium 5 mg oral tablet: orally 2 times a day, As Needed      Patient History:    Past Medical, Past Surgical, and Family History:  PAST MEDICAL HISTORY:  Brain embolism and thrombosis   DVT (deep venous thrombosis)   Lyme disease   MRSA (methicillin resistant Staphylococcus aureus)   PNA (pneumonia)   Skin cancer.     PAST SURGICAL HISTORY:  H/O angioplasty   H/O shoulder surgery   Status post laparoscopic-assisted sigmoidectomy.     FAMILY HISTORY:  HF     Social History:  Social History (marital status, living situation, occupation, tobacco use, alcohol and drug use, and sexual history): remote smoker quit ~ 5 year ago	    VITAL SIGNS:  T(C): 36.9 (20 @ 09:29), Max: 36.9 (20 @ 09:29)  T(F): 98.4 (20 @ 09:29), Max: 98.4 (20 @ 09:29)  HR: 91 (20 @ 11:00) (68 - 91)  BP: 166/79 (20 @ 11:00) (84/44 - 166/79)  BP(mean): 114 (20 @ 11:00) (61 - 114)  RR: 23 (20 @ 11:00) (12 - 23)  SpO2: 89% (20 @ 11:00) (82% - 100%)    PHYSICAL EXAM:  Constitutional: NAD on 2 L NC  Neck: supple; aracely to assess JVD  Respiratory: diminished BS throughout, occasional expiratory wheezes   Cardiac: +S1/S2; RRR  Gastrointestinal: obses, soft, NT/ND  Extremities: Warm, 1+ pitting edema up to knees b/l with chronic cellulitis skin changes   Neurologic: AAOx3; non focal   : landis draining urine     LABS:                         7.4    8.40  )-----------( 204      ( 11 Sep 2020 04:48 )             26.1     09-11    140  |  106  |  41<H>  ----------------------------<  171<H>  6.0<H>   |  24  |  1.70<H>    Ca    8.8      11 Sep 2020 04:48  Phos  4.8     -  Mg     2.7     -11    TPro  6.6  /  Alb  2.8<L>  /  TBili  <0.2  /  DBili  x   /  AST  16  /  ALT  22  /  AlkPhos  98  09-11    PT/INR - ( 10 Sep 2020 20:29 )   PT: 12.7 sec;   INR: 1.06          PTT - ( 11 Sep 2020 04:48 )  PTT:29.4 sec  Urinalysis Basic - ( 10 Sep 2020 12:33 )    Color: Yellow / Appearance: Clear / S.020 / pH: x  Gluc: x / Ketone: NEGATIVE  / Bili: NEGATIVE / Urobili: 0.2 E.U./dL   Blood: x / Protein: 30 mg/dL / Nitrite: NEGATIVE   Leuk Esterase: NEGATIVE / RBC: < 5 /HPF / WBC < 5 /HPF   Sq Epi: x / Non Sq Epi: 0-5 /HPF / Bacteria: Present /HPF    CARDIAC MARKERS ( 10 Sep 2020 11:32 )  <0.017 ng/mL / x     / x     / x     / x        RADIOLOGY, EKG & ADDITIONAL TESTS: Reviewed.

## 2020-09-11 NOTE — CONSULT NOTE ADULT - PROBLEM SELECTOR RECOMMENDATION 4
- Patient with advanced COPD, progressive functional decline.  - Initiated goals of care discussions, as documented in advanced care planning section.   - Patient wishes to remain full code for the time being.   - Wishes to continue current medical interventions.

## 2020-09-11 NOTE — CONSULT NOTE ADULT - I WAS PHYSICALLY PRESENT FOR THE KEY PORTIONS OF THE EVALUATION AND MANAGEMENT (E/M) SERVICE PROVIDED.  I AGREE WITH THE ABOVE HISTORY, PHYSICAL, AND PLAN WHICH I HAVE REVIEWED AND EDITED WHERE APPROPRIATE
[address] [address] [facility], [facility] [address]  Phone: [phone] Fax: [phone]      Physical Therapy Daily Treatment Note  Date:  2017    Patient Name:  Di Chadwick    :  1964  MRN: [id number]  Restrictions/Precautions:    Medical/Treatment Diagnosis Information:  · Diagnosis: R shoulder pain  · Treatment Diagnosis: Acute pain of right shoulder M25.511, adhesive capsulitis of right shoulder Y25.14  Insurance/Certification information:  PT Insurance Information: [employer]  Physician Information:  Referring Practitioner: Dr. Burns [facility] of care signed (Y/N):     Date of Patient follow up with Physician:     G-Code (if applicable):      Date G-Code Applied:    PT G-Codes  Functional Assessment Tool Used: Quick DASH  Score: 45.45% impairment  Functional Limitation: Carrying, moving and handling objects  Carrying, Moving and Handling Objects Current Status (): At least 40 percent but less than 60 percent impaired, limited or restricted  Carrying, Moving and Handling Objects Goal Status ():  At least 20 percent but less than 40 percent impaired, limited or restricted    Progress Note: [x]  Yes  []  No  Next due by: Visit #10      Latex Allergy:  [x]NO      []YES  Preferred Language for Healthcare:   [x]English       []other:    Visit # Insurance Allowable   1 20     Pain level: 0-8/10     SUBJECTIVE:  See eval    OBJECTIVE: See eval  Observation:   Test measurements:      RESTRICTIONS/PRECAUTIONS: none    Exercises/Interventions:   Therapeutic Ex Sets/sec Reps Notes   UBE      Cane AAROM ER stretch 10sec 10    3 way Isomet      T- band Row/pinch      T- band lower pinch      T- band ER activation      Supine SA punch      SL abduction 1 15    SL ER 1 15    Prone Rows/HAB/ext/scap retr 5s 20    Seat Table slides/Wall Slides 10s 10 Flex, abd   Seated HH  Depression      No Money      Standing flex/scap      Standing Punch
Statement Selected

## 2020-09-11 NOTE — CONSULT NOTE ADULT - CONVERSATION DETAILS
Discussed patients numerous comorbidities, progressive functional decline and hospital course, with goals of assessing what's important to the patient and what she values.  Patient is aware of overall decline, with culmination of illness centered around patients recent MICU admission.   Patient initially made herself DNR/DNI, but subsequently revoked.  Still is interested in intubation for acute illnesses or situations.  However, would not want prolonged intubation.   Goal is to return home. Currently does not qualify for hospice services.   Patient wishes Lisa Miner to be her HCP.

## 2020-09-11 NOTE — DIETITIAN INITIAL EVALUATION ADULT. - OTHER INFO
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 on 9/1 for a nondisplaced left lateral rib fractuer and COPD exacerbation, who presented to [facility] today with one day of SOB and fatigue and acutely became hypoxic and cyanotic, requiring intubation and admitted to the MICU for further management. Pt now s/p extubation. Off fentanyl, propofol running at 20ml/hr (provides 528kcal/day). Map 65. Pt seen in room, awake and very pleasant. Pt reports good appetite PTA, eating 3meals/day not following any specific diet. Pt currently NPO. Pt reports [id number], consistent with ABW, pt denies wt changes. Denies N/V/D/C. Pt reports throat is sore from tube. No edema noted. Skin intact pressure wise. NFPE not pertinent. Please see recs below. Will continue to follow per RD protocol.

## 2020-09-11 NOTE — CONSULT NOTE ADULT - ATTENDING COMMENTS
advanced CKD with hyperkalemia-  now with potassium elevated to 6 with creat 1.7; No acute changes to EKG  pt awake and alert; says she feels comfortable  her h/o COPD and chronic respiratory acidosis with various degrees of CO2 retention, certainly contributing to her elevated potassium level  does consume potassium rich foods  platelet count, glucose level and serum CO2 acceptable  as above, lasix and lokelma to enhance potassium excretion.  Would probably benefit from daily lokelma use even as outpt given her PMH

## 2020-09-11 NOTE — PROGRESS NOTE ADULT - ASSESSMENT
OVERNIGHT EVENTS: The patient's fentanyl dose was titrated down overnight and stopped in the AM. She was given 40mg of lasix overnight. She was also titrated off of propofol overnight, and this AM the patient self-extubated.     SUBJECTIVE / INTERVAL HPI: Patient seen and examined at bedside. She states that she does not remember passing out and that she no longer feels that she is having SOB.     REVIEW OF SYSTEMS:    CONSTITUTIONAL: No weakness, fevers or chills  EYES/ENT: Patient endorses hoarseness but denies any throat pain   NECK: No pain or stiffness  RESPIRATORY: No cough, wheezing, hemoptysis; No shortness of breath at this time   CARDIOVASCULAR: No chest pain or palpitations  GASTROINTESTINAL: No abdominal or epigastric pain. No nausea, vomiting, or hematemesis; No diarrhea or constipation. No melena or hematochezia.  GENITOURINARY: No dysuria, frequency or hematuria  NEUROLOGICAL: No numbness or weakness  SKIN: Patient   All other review of systems is negative unless indicated above.    VITAL SIGNS:  Vital Signs Last 24 Hrs  T(C): 36.7 (11 Sep 2020 14:21), Max: 36.9 (11 Sep 2020 09:29)  T(F): 98 (11 Sep 2020 14:21), Max: 98.4 (11 Sep 2020 09:29)  HR: 95 (11 Sep 2020 14:00) (68 - 95)  BP: 189/80 (11 Sep 2020 14:00) (84/44 - 189/80)  BP(mean): 115 (11 Sep 2020 14:00) (61 - 132)  RR: 18 (11 Sep 2020 14:00) (12 - 23)  SpO2: 94% (11 Sep 2020 14:00) (82% - 100%)    PHYSICAL EXAM:    General: Well developed, well nourished, no acute distress  HEENT: NC/AT; PERRL, anicteric sclera; MMM  Neck: supple  Cardiovascular: +S1/S2, RRR, no murmurs, rubs, gallops  Respiratory: CTA B/L; no W/R/R  Gastrointestinal: soft, NT/ND; +BSx4  Extremities: WWP; no edema, clubbing or cyanosis  Vascular: 2+ radial, DP/PT pulses B/L  Neurological: AAOx3; no focal deficits    MEDICATIONS:  MEDICATIONS  (STANDING):  albuterol/ipratropium for Nebulization 3 milliLiter(s) Nebulizer every 6 hours  apixaban 5 milliGRAM(s) Oral every 12 hours  atorvastatin 40 milliGRAM(s) Oral at bedtime  chlorhexidine 2% Cloths 1 Application(s) Topical <User Schedule>  diltiazem    milliGRAM(s) Oral daily  sodium zirconium cyclosilicate 10 Gram(s) Oral three times a day    MEDICATIONS  (PRN):      ALLERGIES:  Allergies    Altabax (Unknown)  Augmentin (Unknown)  clindamycin (Unknown)  Vaseline (Swelling)    Intolerances        LABS:                        7.4    8.40  )-----------( 204      ( 11 Sep 2020 04:48 )             26.1     09-11    140  |  103  |  43<H>  ----------------------------<  227<H>  5.0   |  24  |  1.63<H>    Ca    9.0      11 Sep 2020 14:20  Phos  4.8     -  Mg     2.7     -11    TPro  6.6  /  Alb  2.8<L>  /  TBili  <0.2  /  DBili  x   /  AST  16  /  ALT  22  /  AlkPhos  98  09-11    PT/INR - ( 10 Sep 2020 20:29 )   PT: 12.7 sec;   INR: 1.06          PTT - ( 11 Sep 2020 04:48 )  PTT:29.4 sec  Urinalysis Basic - ( 10 Sep 2020 12:33 )    Color: Yellow / Appearance: Clear / S.020 / pH: x  Gluc: x / Ketone: NEGATIVE  / Bili: NEGATIVE / Urobili: 0.2 E.U./dL   Blood: x / Protein: 30 mg/dL / Nitrite: NEGATIVE   Leuk Esterase: NEGATIVE / RBC: < 5 /HPF / WBC < 5 /HPF   Sq Epi: x / Non Sq Epi: 0-5 /HPF / Bacteria: Present /HPF      CAPILLARY BLOOD GLUCOSE          RADIOLOGY & ADDITIONAL TESTS: Reviewed.    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 with one day of SOB and fatigue and acutely became hypoxic and cyanotic, now s/p intubation for acute hypoxic respiratory failure and admitted to the MICU for further management. The etiology of patient's current respiratory failure is most likely COPD exacerbation, with less likely possibilities including CHF exacerbation (patient euvolemic; no pulmonary edema noted on bedside ultrasound of the lung) or PE (patient not tachycardic and on apixaban).     Neuro:   Patient now off sedation and extubated to NC. AOx3, no neurological deficits.     CV:   #HTN   Patient with HTN, takes Diltiazem 180 mg daily   -Diltiazem restarted after patient taken off sedation   -Continue to monitor    #Heart failure with preserved ejection fraction   Patient with history of HFpEF (EF 55-60% last echo 2/20). On exam, patient does not appear to be volume overloaded (wheezes not noted on exam, minimal B lines on bedside ultrasound of lungs). Low suspicion for CHF exacerbation at this time.  -Hold home bumex 2 mg qd in setting of euvolemia; consider restarting as necessary   -Continue to monitor fluid status and clinical presentation.     #DVT   Patient with history of multiple DVTs, on Eliquis 5 mg bid at home.  -C/w Eliquis 5 mg BID in hospital   -Patient denies LE pain at this time.     Pulm:   #COPD exacerbation    Patient with history of COPD. At home, takes Advair, Albuterol 2.5, Yupelri inhaler, and Perforomist inhaler. She was recently discharged from the hospital for a COPD exacerbation on 9/3. No wheezing is noted on current exam, though the patient has decreased breath sounds bilaterally.   -Now extubated to NC; patient self-extubated after being taken off propofol.   -given duoneb on presentation to [facility]  -C/w standing duonebs q6  -azithromycin 500mg   -s/p prednisone 40mg   -If worsening respiratory status, STAT ABG.     Renal:   #CKD stage IV    Patient with reported CKD stage 4 and baseline Cr ~1.7-2.0   -On this admission, BUN 37 and Cr 1.83, consistent with that of her last admission - BUN 35 and Cr 1.69   -Renal consulted, following   -trend BMP  -f/u Cystatin-C eGFR   -f/u urine K and Cr  -lasix 40mg daily in evening for K excretion   -Lokelma 10g q8 for six doses; daily after    MSK:   #Rib fracture  Patient discharged on previous admission with bilateral rib fractures and an acute fracture of the left sixth rib. The fracture of the right sixth rib was noted to be subacute, as there is adjacent callus formation. Patient denies recent trauma, falls, or coughing.   -Patient states pain is currently tolerable; consider restarting Dilaudid for pain control if needed   -Bed to chair     Heme:   #anemia   Patient with Hgb 8.1 on admission, now downtrended to 7.4, down from her baseline of 9-10s on prior admissions. No signs of active bleeding. Patient was also noted to be anemic on last admission and did not require transfusion.   -continue to trend CBC   -Active T&S  -F/u iron studies   -Transfuse if Hgb <7   -f/u haptoglobin, LDH, retic count   -Continue to monitor.     #Endo  Hyperlipidemia. 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 on 9/1 for a nondisplaced left lateral rib fractuer and COPD exacerbation, who presented to [facility] with one day of SOB and fatigue and acutely became hypoxic and cyanotic, now s/p intubation for acute hypoxic respiratory failure and admitted to the MICU for further management. The etiology of patient's current respiratory failure is most likely COPD exacerbation, with less likely possibilities including CHF exacerbation (patient euvolemic; no pulmonary edema noted on bedside ultrasound of the lung) or PE (patient not tachycardic and on apixaban).     Neuro:   Patient now off sedation and extubated to NC. AOx3, no neurological deficits.     CV:   #HTN   Patient with HTN, takes Diltiazem 180 mg daily   -Diltiazem restarted after patient taken off sedation   -Continue to monitor    #Heart failure with preserved ejection fraction   Patient with history of HFpEF (EF 55-60% last echo 2/20). On exam, patient does not appear to be volume overloaded (wheezes not noted on exam, minimal B lines on bedside ultrasound of lungs). Low suspicion for CHF exacerbation at this time.  -Hold home bumex 2 mg qd in setting of euvolemia; consider restarting as necessary   -Continue to monitor fluid status and clinical presentation.     #DVT   Patient with history of multiple DVTs, on Eliquis 5 mg bid at home.  -C/w Eliquis 5 mg BID in hospital   -Patient denies LE pain at this time.     Pulm:   #COPD exacerbation    Patient with history of COPD. At home, takes Advair, Albuterol 2.5, Yupelri inhaler, and Perforomist inhaler. She was recently discharged from the hospital for a COPD exacerbation on 9/3. No wheezing is noted on current exam, though the patient has decreased breath sounds bilaterally.   -Now extubated to NC; patient self-extubated after being taken off propofol.   -given duoneb on presentation to [facility]  -C/w standing duonebs q6  -azithromycin 500mg   -s/p prednisone 40mg but no wheezing today so will not give further    Renal:   #CKD stage IV    Patient with reported CKD stage 4 and baseline Cr ~1.7-2.0   -On this admission, BUN 37 and Cr 1.83, consistent with that of her last admission - BUN 35 and Cr 1.69   -Renal consulted, following   -trend BMP  -f/u Cystatin-C eGFR   -f/u urine K and Cr  -lasix 40mg daily in evening for K excretion   -Lokelma 10g q8 for six doses; daily after    MSK:   #Rib fracture  Patient discharged on previous admission with bilateral rib fractures and an acute fracture of the left sixth rib. The fracture of the right sixth rib was noted to be subacute, as there is adjacent callus formation. Patient denies recent trauma, falls, or coughing.   -Patient states pain is currently tolerable; consider restarting Dilaudid for pain control if needed   -Bed to chair     Heme:   #anemia   Patient with Hgb 8.1 on admission, now downtrended to 7.4, down from her baseline of 9-10s on prior admissions. No signs of active bleeding. Patient was also noted to be anemic on last admission and did not require transfusion.   -continue to trend CBC   -Active T&S  -F/u iron studies   -Transfuse if Hgb <7   -f/u haptoglobin, LDH, retic count   -Continue to monitor.     #Endo  Hyperlipidemia. Plan: Patient with HLD, takes Atorvastatin 40 mg at home   -C/w Atorvastatin 40 mg    GI:   #GI ppx  -protonix 40mg     F: none   E: replete as necessary   N: renal diet   DVT Ppx: Eliquis 5 BID   Dispo: MICU

## 2020-09-11 NOTE — PHARMACY COMMUNICATION NOTE - COMMENTS
Medication reconciliation updated based on list provided by patient.    List placed in chart.    For questions, please call [phone].

## 2020-09-11 NOTE — DIETITIAN INITIAL EVALUATION ADULT. - ADD RECOMMEND
1. When medically able, advance diet as tolerated to DASH/TLC. Monitor tolerance. 2. Trend wts weekly 3. Monitor lytes and replete 4. Follow up with diet education

## 2020-09-11 NOTE — PATIENT PROFILE ADULT - VISION (WITH CORRECTIVE LENSES IF THE PATIENT USUALLY WEARS THEM):
Partially impaired: cannot see medication labels or newsprint, but can see obstacles in path, and the surrounding layout; can count fingers at arm's length/lost vision in right eye. Doesn't wear glasses.

## 2020-09-11 NOTE — CONSULT NOTE ADULT - ASSESSMENT
79 F with acute respiratory failure, COPD, debility, encounter for palliative care.
A/p  78 y/o F w/PMHx HTN/dCHF/ COPD, DALIA , CKD stage 4, admitted for hypoxic respiratory failure requiring intubation, nephrology consulted for hyperkalemia in advanced CKD

## 2020-09-12 DIAGNOSIS — I50.30 UNSPECIFIED DIASTOLIC (CONGESTIVE) HEART FAILURE: ICD-10-CM

## 2020-09-12 DIAGNOSIS — L03.90 CELLULITIS, UNSPECIFIED: ICD-10-CM

## 2020-09-12 DIAGNOSIS — I82.409 ACUTE EMBOLISM AND THROMBOSIS OF UNSPECIFIED DEEP VEINS OF UNSPECIFIED LOWER EXTREMITY: ICD-10-CM

## 2020-09-12 DIAGNOSIS — R06.02 SHORTNESS OF BREATH: ICD-10-CM

## 2020-09-12 DIAGNOSIS — R63.8 OTHER SYMPTOMS AND SIGNS CONCERNING FOOD AND FLUID INTAKE: ICD-10-CM

## 2020-09-12 DIAGNOSIS — S22.39XA FRACTURE OF ONE RIB, UNSPECIFIED SIDE, INITIAL ENCOUNTER FOR CLOSED FRACTURE: ICD-10-CM

## 2020-09-12 DIAGNOSIS — J44.9 CHRONIC OBSTRUCTIVE PULMONARY DISEASE, UNSPECIFIED: ICD-10-CM

## 2020-09-12 DIAGNOSIS — D64.9 ANEMIA, UNSPECIFIED: ICD-10-CM

## 2020-09-12 LAB
ANION GAP SERPL CALC-SCNC: 9 MMOL/L — SIGNIFICANT CHANGE UP (ref 5–17)
BUN SERPL-MCNC: 47 MG/DL — HIGH (ref 7–23)
CALCIUM SERPL-MCNC: 8.8 MG/DL — SIGNIFICANT CHANGE UP (ref 8.4–10.5)
CHLORIDE SERPL-SCNC: 106 MMOL/L — SIGNIFICANT CHANGE UP (ref 96–108)
CO2 SERPL-SCNC: 26 MMOL/L — SIGNIFICANT CHANGE UP (ref 22–31)
CREAT SERPL-MCNC: 1.6 MG/DL — HIGH (ref 0.5–1.3)
CYSTATIN C SERPL-MCNC: 1.9 MG/L — HIGH (ref 0.68–1.36)
GFR/BSA.PRED SERPLBLD CYS-BASED-ARV: 29 ML/MIN — LOW
GLUCOSE SERPL-MCNC: 162 MG/DL — HIGH (ref 70–99)
HAPTOGLOB SERPL-MCNC: 305 MG/DL — HIGH (ref 34–200)
HCT VFR BLD CALC: 25.7 % — LOW (ref 34.5–45)
HCT VFR BLD CALC: 25.9 % — LOW (ref 34.5–45)
HGB BLD-MCNC: 7.3 G/DL — LOW (ref 11.5–15.5)
HGB BLD-MCNC: 7.4 G/DL — LOW (ref 11.5–15.5)
LDH SERPL L TO P-CCNC: 162 U/L — SIGNIFICANT CHANGE UP (ref 50–242)
MAGNESIUM SERPL-MCNC: 2.5 MG/DL — SIGNIFICANT CHANGE UP (ref 1.6–2.6)
MCHC RBC-ENTMCNC: 25.3 PG — LOW (ref 27–34)
MCHC RBC-ENTMCNC: 25.8 PG — LOW (ref 27–34)
MCHC RBC-ENTMCNC: 28.2 GM/DL — LOW (ref 32–36)
MCHC RBC-ENTMCNC: 28.8 GM/DL — LOW (ref 32–36)
MCV RBC AUTO: 89.5 FL — SIGNIFICANT CHANGE UP (ref 80–100)
MCV RBC AUTO: 89.6 FL — SIGNIFICANT CHANGE UP (ref 80–100)
NRBC # BLD: 0 /100 WBCS — SIGNIFICANT CHANGE UP (ref 0–0)
NRBC # BLD: 0 /100 WBCS — SIGNIFICANT CHANGE UP (ref 0–0)
PHOSPHATE SERPL-MCNC: 3.4 MG/DL — SIGNIFICANT CHANGE UP (ref 2.5–4.5)
PLATELET # BLD AUTO: 218 K/UL — SIGNIFICANT CHANGE UP (ref 150–400)
PLATELET # BLD AUTO: 257 K/UL — SIGNIFICANT CHANGE UP (ref 150–400)
POTASSIUM SERPL-MCNC: 4.6 MMOL/L — SIGNIFICANT CHANGE UP (ref 3.5–5.3)
POTASSIUM SERPL-SCNC: 4.6 MMOL/L — SIGNIFICANT CHANGE UP (ref 3.5–5.3)
RBC # BLD: 2.87 M/UL — LOW (ref 3.8–5.2)
RBC # BLD: 2.89 M/UL — LOW (ref 3.8–5.2)
RBC # BLD: 2.89 M/UL — LOW (ref 3.8–5.2)
RBC # FLD: 17.5 % — HIGH (ref 10.3–14.5)
RBC # FLD: 17.8 % — HIGH (ref 10.3–14.5)
RETICS #: 38.6 K/UL — SIGNIFICANT CHANGE UP (ref 25–125)
RETICS/RBC NFR: 1.4 % — SIGNIFICANT CHANGE UP (ref 0.5–2.5)
SODIUM SERPL-SCNC: 141 MMOL/L — SIGNIFICANT CHANGE UP (ref 135–145)
WBC # BLD: 10.09 K/UL — SIGNIFICANT CHANGE UP (ref 3.8–10.5)
WBC # BLD: 11.98 K/UL — HIGH (ref 3.8–10.5)
WBC # FLD AUTO: 10.09 K/UL — SIGNIFICANT CHANGE UP (ref 3.8–10.5)
WBC # FLD AUTO: 11.98 K/UL — HIGH (ref 3.8–10.5)

## 2020-09-12 PROCEDURE — 99232 SBSQ HOSP IP/OBS MODERATE 35: CPT

## 2020-09-12 PROCEDURE — 99233 SBSQ HOSP IP/OBS HIGH 50: CPT | Mod: GC

## 2020-09-12 PROCEDURE — 71045 X-RAY EXAM CHEST 1 VIEW: CPT | Mod: 26

## 2020-09-12 RX ORDER — CEFAZOLIN SODIUM 1 G
1000 VIAL (EA) INJECTION EVERY 8 HOURS
Refills: 0 | Status: DISCONTINUED | OUTPATIENT
Start: 2020-09-12 | End: 2020-09-14

## 2020-09-12 RX ORDER — HYDROMORPHONE HYDROCHLORIDE 2 MG/ML
4 INJECTION INTRAMUSCULAR; INTRAVENOUS; SUBCUTANEOUS
Refills: 0 | Status: DISCONTINUED | OUTPATIENT
Start: 2020-09-12 | End: 2020-09-14

## 2020-09-12 RX ORDER — FUROSEMIDE 40 MG
40 TABLET ORAL ONCE
Refills: 0 | Status: COMPLETED | OUTPATIENT
Start: 2020-09-12 | End: 2020-09-12

## 2020-09-12 RX ORDER — IPRATROPIUM/ALBUTEROL SULFATE 18-103MCG
3 AEROSOL WITH ADAPTER (GRAM) INHALATION ONCE
Refills: 0 | Status: COMPLETED | OUTPATIENT
Start: 2020-09-12 | End: 2020-09-12

## 2020-09-12 RX ADMIN — Medication 3 MILLILITER(S): at 10:30

## 2020-09-12 RX ADMIN — HYDROMORPHONE HYDROCHLORIDE 4 MILLIGRAM(S): 2 INJECTION INTRAMUSCULAR; INTRAVENOUS; SUBCUTANEOUS at 19:17

## 2020-09-12 RX ADMIN — Medication 100 MILLIGRAM(S): at 02:00

## 2020-09-12 RX ADMIN — Medication 180 MILLIGRAM(S): at 06:00

## 2020-09-12 RX ADMIN — APIXABAN 5 MILLIGRAM(S): 2.5 TABLET, FILM COATED ORAL at 11:44

## 2020-09-12 RX ADMIN — BUPROPION HYDROCHLORIDE 300 MILLIGRAM(S): 150 TABLET, EXTENDED RELEASE ORAL at 12:49

## 2020-09-12 RX ADMIN — Medication 40 MILLIGRAM(S): at 21:05

## 2020-09-12 RX ADMIN — Medication 3 MILLILITER(S): at 16:57

## 2020-09-12 RX ADMIN — Medication 0.25 MILLIGRAM(S): at 06:00

## 2020-09-12 RX ADMIN — APIXABAN 5 MILLIGRAM(S): 2.5 TABLET, FILM COATED ORAL at 21:41

## 2020-09-12 RX ADMIN — HYDROMORPHONE HYDROCHLORIDE 4 MILLIGRAM(S): 2 INJECTION INTRAMUSCULAR; INTRAVENOUS; SUBCUTANEOUS at 13:51

## 2020-09-12 RX ADMIN — HYDROMORPHONE HYDROCHLORIDE 4 MILLIGRAM(S): 2 INJECTION INTRAMUSCULAR; INTRAVENOUS; SUBCUTANEOUS at 14:56

## 2020-09-12 RX ADMIN — SODIUM ZIRCONIUM CYCLOSILICATE 10 GRAM(S): 10 POWDER, FOR SUSPENSION ORAL at 06:00

## 2020-09-12 RX ADMIN — ATORVASTATIN CALCIUM 40 MILLIGRAM(S): 80 TABLET, FILM COATED ORAL at 21:41

## 2020-09-12 RX ADMIN — Medication 100 MILLIGRAM(S): at 21:41

## 2020-09-12 RX ADMIN — Medication 0.25 MILLIGRAM(S): at 19:26

## 2020-09-12 RX ADMIN — PRAMIPEXOLE DIHYDROCHLORIDE 1.5 MILLIGRAM(S): 0.12 TABLET ORAL at 22:38

## 2020-09-12 RX ADMIN — Medication 3 MILLILITER(S): at 04:00

## 2020-09-12 RX ADMIN — Medication 3 MILLILITER(S): at 21:06

## 2020-09-12 RX ADMIN — Medication 650 MILLIGRAM(S): at 00:05

## 2020-09-12 RX ADMIN — CHLORHEXIDINE GLUCONATE 1 APPLICATION(S): 213 SOLUTION TOPICAL at 06:00

## 2020-09-12 RX ADMIN — SODIUM ZIRCONIUM CYCLOSILICATE 10 GRAM(S): 10 POWDER, FOR SUSPENSION ORAL at 13:57

## 2020-09-12 RX ADMIN — Medication 100 MILLIGRAM(S): at 13:57

## 2020-09-12 NOTE — PROGRESS NOTE ADULT - SUBJECTIVE AND OBJECTIVE BOX
Hospital course:   The patient presented to [facility] on 9/10 with SOB and fatigue and required intubation and sedation for acute hypoxia and respiratory distress. She was placed on fentanyl and propofol and transported to [facility]/admitted to the MICU. Upon arrival to [facility], she was started on azithromycin, duonebs, and prednisone for possible COPD exacerbation as the patient did not appear to be fluid overloaded or in CHF exacerbation on exam or bedside ultrasound. Overnight, her fentanyl was titrated down and eventually stopped. She was also given IV lasix 40 due to decreasing urine output and signs of fluid overload following admission. On 9/11, the patients propofol was titrated off and she self extubated. She was seen by nephrology for hyperkalemia and was started on lokelma. Her azithromycin and steroids were also stopped at this time as she no longer seemed to be in COPD exacerbation. She was started on her home budesonide and continued on duonebs . On 9/12, the patient remained stable s/p extubation, with SpO2 97-99% on NC. Stable for transfer.     OVERNIGHT EVENTS:    SUBJECTIVE / INTERVAL HPI: Patient seen and examined at bedside.     VITAL SIGNS:  Vital Signs Last 24 Hrs  T(C): 36.9 (11 Sep 2020 17:47), Max: 36.9 (11 Sep 2020 17:47)  T(F): 98.5 (11 Sep 2020 17:47), Max: 98.5 (11 Sep 2020 17:47)  HR: 71 (12 Sep 2020 12:00) (71 - 109)  BP: 141/63 (12 Sep 2020 12:00) (141/60 - 189/80)  BP(mean): 90 (12 Sep 2020 12:00) (87 - 116)  RR: 16 (12 Sep 2020 12:00) (16 - 40)  SpO2: 99% (12 Sep 2020 12:00) (89% - 100%)    PHYSICAL EXAM:    General: Well developed, well nourished, no acute distress  HEENT: NC/AT; PERRL, anicteric sclera; MMM  Neck: supple  Cardiovascular: +S1/S2, RRR, no murmurs, rubs, gallops  Respiratory: CTA B/L; no W/R/R  Gastrointestinal: soft, NT/ND; +BSx4  Extremities: WWP; no edema, clubbing or cyanosis  Vascular: 2+ radial, DP/PT pulses B/L  Neurological: AAOx3; no focal deficits    MEDICATIONS:  MEDICATIONS  (STANDING):  albuterol/ipratropium for Nebulization 3 milliLiter(s) Nebulizer every 6 hours  apixaban 5 milliGRAM(s) Oral every 12 hours  atorvastatin 40 milliGRAM(s) Oral at bedtime  buDESOnide    Inhalation Suspension 0.25 milliGRAM(s) Inhalation two times a day  buPROPion XL . 300 milliGRAM(s) Oral daily  ceFAZolin   IVPB 1000 milliGRAM(s) IV Intermittent every 8 hours  chlorhexidine 2% Cloths 1 Application(s) Topical <User Schedule>  diltiazem    milliGRAM(s) Oral daily  pramipexole 1.5 milliGRAM(s) Oral at bedtime  sodium zirconium cyclosilicate 10 Gram(s) Oral every 8 hours    MEDICATIONS  (PRN):  acetaminophen   Tablet .. 650 milliGRAM(s) Oral every 6 hours PRN Mild Pain (1 - 3)      ALLERGIES:  Allergies    Altabax (Unknown)  Augmentin (Unknown)  clindamycin (Unknown)  Vaseline (Swelling)    Intolerances        LABS:                        7.3    10.09 )-----------( 257      ( 12 Sep 2020 04:15 )             25.9     09-12    141  |  106  |  47<H>  ----------------------------<  162<H>  4.6   |  26  |  1.60<H>    Ca    8.8      12 Sep 2020 04:15  Phos  3.4     09-12  Mg     2.5     09-12    TPro  6.6  /  Alb  2.8<L>  /  TBili  <0.2  /  DBili  x   /  AST  16  /  ALT  22  /  AlkPhos  98  09-11    PT/INR - ( 10 Sep 2020 20:29 )   PT: 12.7 sec;   INR: 1.06          PTT - ( 11 Sep 2020 04:48 )  PTT:29.4 sec    CAPILLARY BLOOD GLUCOSE          RADIOLOGY & ADDITIONAL TESTS: Reviewed.    PLAN: Hospital course:   The patient presented to [facility] on 9/10 with SOB and fatigue and required intubation and sedation for acute hypoxia and respiratory distress. She was placed on fentanyl and propofol and transported to [facility]/admitted to the MICU. Upon arrival to [facility], she was started on azithromycin, duonebs, and prednisone for possible COPD exacerbation as the patient did not appear to be fluid overloaded or in CHF exacerbation on exam or bedside ultrasound. Overnight, her fentanyl was titrated down and eventually stopped. She was also given IV lasix 40 due to decreasing urine output and signs of fluid overload following admission. On 9/11, the patients propofol was titrated off and she self extubated. She was seen by nephrology for hyperkalemia and was started on lokelma. Her azithromycin and steroids were also stopped at this time as she no longer seemed to be in COPD exacerbation. She was started on her home budesonide and continued on duonebs . On 9/12, the patient remained stable s/p extubation, with SpO2 97-99% on NC. Stable for transfer. Overnight, she complained of pain in her     OVERNIGHT EVENTS:    SUBJECTIVE / INTERVAL HPI: Patient seen and examined at bedside.     VITAL SIGNS:  Vital Signs Last 24 Hrs  T(C): 36.9 (11 Sep 2020 17:47), Max: 36.9 (11 Sep 2020 17:47)  T(F): 98.5 (11 Sep 2020 17:47), Max: 98.5 (11 Sep 2020 17:47)  HR: 71 (12 Sep 2020 12:00) (71 - 109)  BP: 141/63 (12 Sep 2020 12:00) (141/60 - 189/80)  BP(mean): 90 (12 Sep 2020 12:00) (87 - 116)  RR: 16 (12 Sep 2020 12:00) (16 - 40)  SpO2: 99% (12 Sep 2020 12:00) (89% - 100%)    PHYSICAL EXAM:    General: Well developed, well nourished, no acute distress  HEENT: NC/AT; PERRL, anicteric sclera; MMM  Neck: supple  Cardiovascular: +S1/S2, RRR, no murmurs, rubs, gallops  Respiratory: CTA B/L; no W/R/R  Gastrointestinal: soft, NT/ND; +BSx4  Extremities: WWP; no edema, clubbing or cyanosis  Vascular: 2+ radial, DP/PT pulses B/L  Neurological: AAOx3; no focal deficits    MEDICATIONS:  MEDICATIONS  (STANDING):  albuterol/ipratropium for Nebulization 3 milliLiter(s) Nebulizer every 6 hours  apixaban 5 milliGRAM(s) Oral every 12 hours  atorvastatin 40 milliGRAM(s) Oral at bedtime  buDESOnide    Inhalation Suspension 0.25 milliGRAM(s) Inhalation two times a day  buPROPion XL . 300 milliGRAM(s) Oral daily  ceFAZolin   IVPB 1000 milliGRAM(s) IV Intermittent every 8 hours  chlorhexidine 2% Cloths 1 Application(s) Topical <User Schedule>  diltiazem    milliGRAM(s) Oral daily  pramipexole 1.5 milliGRAM(s) Oral at bedtime  sodium zirconium cyclosilicate 10 Gram(s) Oral every 8 hours    MEDICATIONS  (PRN):  acetaminophen   Tablet .. 650 milliGRAM(s) Oral every 6 hours PRN Mild Pain (1 - 3)      ALLERGIES:  Allergies    Altabax (Unknown)  Augmentin (Unknown)  clindamycin (Unknown)  Vaseline (Swelling)    Intolerances        LABS:                        7.3    10.09 )-----------( 257      ( 12 Sep 2020 04:15 )             25.9     09-12    141  |  106  |  47<H>  ----------------------------<  162<H>  4.6   |  26  |  1.60<H>    Ca    8.8      12 Sep 2020 04:15  Phos  3.4     09-12  Mg     2.5     09-12    TPro  6.6  /  Alb  2.8<L>  /  TBili  <0.2  /  DBili  x   /  AST  16  /  ALT  22  /  AlkPhos  98  09-11    PT/INR - ( 10 Sep 2020 20:29 )   PT: 12.7 sec;   INR: 1.06          PTT - ( 11 Sep 2020 04:48 )  PTT:29.4 sec    CAPILLARY BLOOD GLUCOSE          RADIOLOGY & ADDITIONAL TESTS: Reviewed.    PLAN: Hospital course:   The patient presented to [facility] on 9/10 with SOB and fatigue and required intubation and sedation for acute hypoxia and respiratory distress. She was placed on fentanyl and propofol and transported to [facility]/admitted to the MICU. Upon arrival to [facility], she was started on azithromycin, duonebs, and prednisone for possible COPD exacerbation as the patient did not appear to be fluid overloaded or in CHF exacerbation on exam or bedside ultrasound. Overnight, her fentanyl was titrated down and eventually stopped. She was also given IV lasix 40 due to decreasing urine output and signs of fluid overload following admission. On 9/11, the patients propofol was titrated off and she self extubated. She was seen by nephrology for hyperkalemia and was started on lokelma. Her azithromycin and steroids were also stopped at this time as she no longer seemed to be in COPD exacerbation. She was started on her home budesonide and continued on duonebs. She was also started on her home diltiazem for HTN. Overnight, she complained of pain in her L leg and was started on ancef for LLE cellulitis. On 9/12, the patient remained stable s/p extubation, with SpO2 97-99% on NC. She was started on her home    OVERNIGHT EVENTS:    SUBJECTIVE / INTERVAL HPI: Patient seen and examined at bedside.     VITAL SIGNS:  Vital Signs Last 24 Hrs  T(C): 36.9 (11 Sep 2020 17:47), Max: 36.9 (11 Sep 2020 17:47)  T(F): 98.5 (11 Sep 2020 17:47), Max: 98.5 (11 Sep 2020 17:47)  HR: 71 (12 Sep 2020 12:00) (71 - 109)  BP: 141/63 (12 Sep 2020 12:00) (141/60 - 189/80)  BP(mean): 90 (12 Sep 2020 12:00) (87 - 116)  RR: 16 (12 Sep 2020 12:00) (16 - 40)  SpO2: 99% (12 Sep 2020 12:00) (89% - 100%)    PHYSICAL EXAM:    General: Well developed, well nourished, no acute distress  HEENT: NC/AT; PERRL, anicteric sclera; MMM  Neck: supple  Cardiovascular: +S1/S2, RRR, no murmurs, rubs, gallops  Respiratory: CTA B/L; no W/R/R  Gastrointestinal: soft, NT/ND; +BSx4  Extremities: WWP; no edema, clubbing or cyanosis  Vascular: 2+ radial, DP/PT pulses B/L  Neurological: AAOx3; no focal deficits    MEDICATIONS:  MEDICATIONS  (STANDING):  albuterol/ipratropium for Nebulization 3 milliLiter(s) Nebulizer every 6 hours  apixaban 5 milliGRAM(s) Oral every 12 hours  atorvastatin 40 milliGRAM(s) Oral at bedtime  buDESOnide    Inhalation Suspension 0.25 milliGRAM(s) Inhalation two times a day  buPROPion XL . 300 milliGRAM(s) Oral daily  ceFAZolin   IVPB 1000 milliGRAM(s) IV Intermittent every 8 hours  chlorhexidine 2% Cloths 1 Application(s) Topical <User Schedule>  diltiazem    milliGRAM(s) Oral daily  pramipexole 1.5 milliGRAM(s) Oral at bedtime  sodium zirconium cyclosilicate 10 Gram(s) Oral every 8 hours    MEDICATIONS  (PRN):  acetaminophen   Tablet .. 650 milliGRAM(s) Oral every 6 hours PRN Mild Pain (1 - 3)      ALLERGIES:  Allergies    Altabax (Unknown)  Augmentin (Unknown)  clindamycin (Unknown)  Vaseline (Swelling)    Intolerances        LABS:                        7.3    10.09 )-----------( 257      ( 12 Sep 2020 04:15 )             25.9     09-12    141  |  106  |  47<H>  ----------------------------<  162<H>  4.6   |  26  |  1.60<H>    Ca    8.8      12 Sep 2020 04:15  Phos  3.4     09-12  Mg     2.5     09-12    TPro  6.6  /  Alb  2.8<L>  /  TBili  <0.2  /  DBili  x   /  AST  16  /  ALT  22  /  AlkPhos  98  09-11    PT/INR - ( 10 Sep 2020 20:29 )   PT: 12.7 sec;   INR: 1.06          PTT - ( 11 Sep 2020 04:48 )  PTT:29.4 sec    CAPILLARY BLOOD GLUCOSE          RADIOLOGY & ADDITIONAL TESTS: Reviewed.    PLAN: Hospital course:   The patient presented to [facility] on 9/10 with SOB and fatigue and required intubation and sedation for acute hypoxia and respiratory distress. She was placed on fentanyl and propofol and transported to [facility]/admitted to the MICU. Upon arrival to [facility], she was started on azithromycin, duonebs, and prednisone for possible COPD exacerbation as the patient did not appear to be fluid overloaded or in CHF exacerbation on exam or bedside ultrasound. Overnight, her fentanyl was titrated down and eventually stopped. She was also given IV lasix 40 due to decreasing urine output and signs of fluid overload following admission. On 9/11, the patients propofol was titrated off and she self extubated. She was seen by nephrology for hyperkalemia and was started on lokelma. Her azithromycin and steroids were also stopped at this time as she no longer seemed to be in COPD exacerbation. She was started on her home budesonide and continued on duonebs. She was also started on her home diltiazem for HTN. Overnight, she complained of pain in her L leg and was started on ancef for LLE cellulitis. On 9/12, the patient remained stable s/p extubation, with SpO2 97-99% on NC. Stable for step down to [facility].     OVERNIGHT EVENTS: Overnight, the patient complained of pain in her LLE and was noted to have warmth in the leg on exam. She was started on ancef for treatment of cellulitis.     SUBJECTIVE / INTERVAL HPI: Patient seen and examined at bedside. She states that she still feels tired and SOB with movement but is not in any acute respiratory distress. She also complains of back and LLE pain.     VITAL SIGNS:  Vital Signs Last 24 Hrs  T(C): 36.9 (11 Sep 2020 17:47), Max: 36.9 (11 Sep 2020 17:47)  T(F): 98.5 (11 Sep 2020 17:47), Max: 98.5 (11 Sep 2020 17:47)  HR: 71 (12 Sep 2020 12:00) (71 - 109)  BP: 141/63 (12 Sep 2020 12:00) (141/60 - 189/80)  BP(mean): 90 (12 Sep 2020 12:00) (87 - 116)  RR: 16 (12 Sep 2020 12:00) (16 - 40)  SpO2: 99% (12 Sep 2020 12:00) (89% - 100%)    PHYSICAL EXAM:    Constitutional: Obese female, awake/alert, in no apparent distress  Head: NC/AT  Eyes: PERRL, clear conjunctiva  ENT: s/p intubation; no oropharyngeal trauma noted   Neck: supple; no JVD or thyromegaly, R IJ line in place  Respiratory: Diminished breath sounds noted b/l, especially in lower lobes, wheezing or crackles not appreciated   Cardiac: +S1/S2; RRR; no M/R/G  Gastrointestinal: soft, NT/ND; no rebound or guarding; +BSx4  Back: spine midline, no bony tenderness or step-offs; no CVAT B/L  Extremities: chronic venous changes of the bilateral lower extremities with an ulcerative wound noted on the L lateral maleolus. LLE warm to touch.   Vascular: 2+ radial and femoral pulses, unable to palpate pulses in LE.   Dermatologic: chronic venous chages of the b/l lower extremities; diffuse small skin wounds likely 2/2 prior scabies on arms, legs, abdomen, back; edematous, erythematous, indurated area noted on back of R thigh   Lymphatic: no submandibular or cervical LAD  Neurologic: AOx3, no focal deficits, CN II-XII intact     MEDICATIONS:  MEDICATIONS  (STANDING):  albuterol/ipratropium for Nebulization 3 milliLiter(s) Nebulizer every 6 hours  apixaban 5 milliGRAM(s) Oral every 12 hours  atorvastatin 40 milliGRAM(s) Oral at bedtime  buDESOnide    Inhalation Suspension 0.25 milliGRAM(s) Inhalation two times a day  buPROPion XL . 300 milliGRAM(s) Oral daily  ceFAZolin   IVPB 1000 milliGRAM(s) IV Intermittent every 8 hours  chlorhexidine 2% Cloths 1 Application(s) Topical <User Schedule>  diltiazem    milliGRAM(s) Oral daily  pramipexole 1.5 milliGRAM(s) Oral at bedtime  sodium zirconium cyclosilicate 10 Gram(s) Oral every 8 hours    MEDICATIONS  (PRN):  acetaminophen   Tablet .. 650 milliGRAM(s) Oral every 6 hours PRN Mild Pain (1 - 3)      ALLERGIES:  Allergies    Altabax (Unknown)  Augmentin (Unknown)  clindamycin (Unknown)  Vaseline (Swelling)    Intolerances        LABS:                        7.3    10.09 )-----------( 257      ( 12 Sep 2020 04:15 )             25.9     09-12    141  |  106  |  47<H>  ----------------------------<  162<H>  4.6   |  26  |  1.60<H>    Ca    8.8      12 Sep 2020 04:15  Phos  3.4     09-12  Mg     2.5     09-12    TPro  6.6  /  Alb  2.8<L>  /  TBili  <0.2  /  DBili  x   /  AST  16  /  ALT  22  /  AlkPhos  98  09-11    PT/INR - ( 10 Sep 2020 20:29 )   PT: 12.7 sec;   INR: 1.06          PTT - ( 11 Sep 2020 04:48 )  PTT:29.4 sec    CAPILLARY BLOOD GLUCOSE          RADIOLOGY & ADDITIONAL TESTS: Reviewed.    PLAN:

## 2020-09-12 NOTE — PROGRESS NOTE ADULT - PROBLEM SELECTOR PLAN 8
Patient with Hgb 8.1 on admission, now downtrended to 7.3, down from her baseline of 9-10s on prior admissions. No signs of active bleeding. Patient was also noted to be anemic on last admission and did not require transfusion.   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

## 2020-09-12 NOTE — PROGRESS NOTE ADULT - PROBLEM SELECTOR PLAN 2
Patient complaining of pain in LLE, warmth noted on exam. Patient started on Ancef on 9/11.   -c/w ancef 1g q8 (9/11-)  -pt has a history of DVT but is on Eliquis; consider doppler if pain/warmth do not resolve with antibiotic treatment Patient complaining of pain in LLE, warmth noted on exam. Patient started on Ancef on 9/11. Per home health aide, pt has recurrent cellulitis multiple times a month.   -c/w ancef 1g q8 (9/11-)  -pt has a history of DVT but is on Eliquis; consider doppler if pain/warmth do not resolve with antibiotic treatment

## 2020-09-12 NOTE — PROGRESS NOTE ADULT - PROBLEM SELECTOR PLAN 3
Patient with reported CKD stage 4 and baseline Cr ~1.7-2.0 On this admission, BUN 37 and Cr 1.83, consistent with that of her last admission - BUN 35 and Cr 1.69   -Renal consulted, following   -trend BMP  -f/u Cystatin-C eGFR   -f/u urine K and Cr    #Hyperkalemia:  -Lasix 40mg daily in evening for K excretion   -s/p lokelma 10g q8 for six doses; now on daily Lokelma per renal

## 2020-09-12 NOTE — PROGRESS NOTE ADULT - PROBLEM SELECTOR PLAN 7
Patient with history of COPD. At home, takes Advair, Albuterol 2.5, Yupelri inhaler, and Perforomist inhaler. She was recently discharged from the hospital for a COPD exacerbation on 9/3. No wheezing is noted on current exam, though the patient has decreased breath sounds bilaterally.   -C/w standing duonebs q6  -c/w home budesonide   -s/p prednisone 40mg but stopped on 9/11 as patient was not wheezing Patient with history of COPD. At home, takes Advair, Albuterol 2.5, Yupelri inhaler, and Perforomist inhaler. She was recently discharged from the hospital for a COPD exacerbation on 9/3. No wheezing is noted on current exam, though the patient has decreased breath sounds bilaterally.   -C/w standing duonebs q6  -c/w home budesonide

## 2020-09-12 NOTE — PROGRESS NOTE ADULT - PROBLEM SELECTOR PLAN 1
The etiology of patient's current respiratory failure is most likely COPD exacerbation compounded by difficulty breathing 2/2 rib fracture, though she does not have wheezing on exam, with less likely possibilities including CHF exacerbation (patient not hypervolemic; no pulmonary edema noted on bedside ultrasound of the lung). s/p prednisone 40mg but stopped on 9/11 as patient was not wheezing  Patient now off sedation and extubated to NC. AOx3, no neurological deficits. Currently satting well on NC.  -c/w standing duonebs q6  -c/w home budesonide The etiology of patient's current respiratory failure is most likely COPD exacerbation compounded by difficulty breathing 2/2 rib fracture, though she does not have wheezing on exam, with less likely possibilities including CHF exacerbation (patient not hypervolemic; no pulmonary edema noted on bedside ultrasound of the lung). s/p prednisone 40mg but stopped on 9/11 as patient was not wheezing  Patient now off sedation and extubated to NC. AOx3, no neurological deficits. Currently satting well on 4L NC w/ mild tachypnea.  -c/w standing duonebs q6  -c/w home budesonide The etiology of patient's current respiratory failure is most likely COPD exacerbation compounded by difficulty breathing 2/2 rib fracture, though she does not have wheezing on exam, with less likely possibilities including CHF exacerbation (patient not hypervolemic; no pulmonary edema noted on bedside ultrasound of the lung). s/p prednisone 40mg but stopped on 9/11 as patient was not wheezing  Patient now off sedation and extubated to NC. AOx3, no neurological deficits.   CXR this morning showing slightly worsening b/l effusions  Currently satting well on 4L NC w/ mild tachypnea  -c/w standing duonebs q6  -c/w home budesonide

## 2020-09-12 NOTE — PROGRESS NOTE ADULT - ASSESSMENT
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

## 2020-09-12 NOTE — PROGRESS NOTE ADULT - ASSESSMENT
80 y/o F w/PMHx HTN/dCHF/ COPD, DALIA , CKD stage 4, admitted for hypoxic respiratory failure requiring intubation, nephrology consulted for hyperkalemia in advanced CKD and chronic hyperkalemia     #CKD 3/4 ( baseline Cr 1.7-2)  #hyperkalemia   renal function at baseline, labs reviewed form prior admissions, K noted to be running intermittently borderline-high for the past few months,pt states she has hx of hyperkalemia, given worsening of Anemia would need to r/o hemolysis as an extrarenal source of hyperkalemia, less likely nutritional as pt has been NPO since admission  - Repeat BMP BID   - pending Cystatin-C eGFR   - recommend hapto, LDH and retic count    - FeK 17% indicative of extra-renal origin of hyperkalemia   - renal diet   - c/w Lokelma 10 g every 8 hours for 6 doses and transition to daily after 48 hours 80 y/o F w/PMHx HTN/dCHF/ COPD, DALIA , CKD stage 4, admitted for hypoxic respiratory failure requiring intubation, nephrology consulted for hyperkalemia in advanced CKD and chronic hyperkalemia     #CKD 3/4 ( baseline Cr 1.7-2)  #hyperkalemia   renal function at baseline, labs reviewed form prior admissions, K noted to be running intermittently borderline-high for the past few months,pt states she has hx of hyperkalemia, given worsening of Anemia would need to r/o hemolysis as an extrarenal source of hyperkalemia, less likely nutritional as pt has been NPO since admission  - Repeat BMP BID   - pending Cystatin-C eGFR   - recommend hapto, LDH and retic count    - FeK 17% indicative of extra-renal origin of hyperkalemia   - renal diet   - c/w Lokelma 10 g every 24 to 48 hours or more frequently PRN

## 2020-09-12 NOTE — PROGRESS NOTE ADULT - PROBLEM SELECTOR PLAN 5
Patient with history of HFpEF (EF 55-60% last echo 2/20). On initial exam, patient did not appear to be volume overloaded (crackles not noted on exam, minimal B lines on bedside ultrasound of lungs). Low suspicion for CHF exacerbation as cause of patient's acute hypoxia.    -Lasix 40mg PO daily   -Continue to monitor fluid status and clinical presentation.    #Hyperlipidemia. Plan: Patient with HLD, takes Atorvastatin 40 mg at home   -C/w Atorvastatin 40 mg    #HTN: Patient with HTN, takes Diltiazem 180 mg daily   -Continue diltiazem 180 mg daily  -Continue to monitor Patient with history of HFpEF (EF 55-60% last echo 2/20). On initial exam, patient did not appear to be volume overloaded (crackles not noted on exam, minimal B lines on bedside ultrasound of lungs). Low suspicion at first for CHF exacerbation as cause of patient's acute hypoxia.  CXR this morning showing worsening b/l pleural effusions.   -Lasix 40mg PO daily   -Continue to monitor fluid status and clinical presentation  -Consider repeat Echo     #Hyperlipidemia. Plan: Patient with HLD, takes Atorvastatin 40 mg at home   -C/w Atorvastatin 40 mg    #HTN: Patient with HTN, takes Diltiazem 180 mg daily   -Continue diltiazem 180 mg daily  -Continue to monitor

## 2020-09-12 NOTE — PROGRESS NOTE ADULT - PROBLEM SELECTOR PLAN 4
Patient discharged on previous admission with bilateral rib fractures and an acute fracture of the left sixth rib. The fracture of the right sixth rib was noted to be subacute, as there is adjacent callus formation. Patient denies recent trauma, falls, or coughing.   -Patient has increasing pain in ribs today; restarted Dilaudid for pain control   -Bed to chair

## 2020-09-12 NOTE — PROGRESS NOTE ADULT - SUBJECTIVE AND OBJECTIVE BOX
TRANSFER NOTE  MICU -> [facility]     Hospital Course:  The patient presented to [facility] on 9/10 with SOB and fatigue and required intubation and sedation for acute hypoxia and respiratory distress. She was placed on fentanyl and propofol and transported to [facility]/admitted to the MICU. Upon arrival to [facility], she was started on azithromycin, duonebs, and prednisone for possible COPD exacerbation as the patient did not appear to be fluid overloaded or in CHF exacerbation on exam or bedside ultrasound. Overnight, her fentanyl was titrated down and eventually stopped. She was also given IV lasix 40 due to decreasing urine output and signs of fluid overload following admission. On 9/11, the patients propofol was titrated off and she self extubated. She was seen by nephrology for hyperkalemia and was started on lokelma. Her azithromycin and steroids were also stopped at this time as she no longer seemed to be in COPD exacerbation. She was started on her home budesonide and continued on duonebs. She was also started on her home diltiazem for HTN. Overnight, she complained of pain in her L leg and was started on ancef for LLE cellulitis. On 9/12, the patient remained stable s/p extubation, with SpO2 97-99% on NC. Stable for step down to [facility].     Subjective/ROS: Denies HA, CP, SOB, n/v, changes in bowel/urinary habits.  12pt ROS otherwise negative.    Family History, Social History, Past Medical History, and Home Medications from admission H&P were reviewed and are unchanged unless noted below.     VITALS  Vital Signs Last 24 Hrs  T(C): 36.7 (12 Sep 2020 13:07), Max: 36.9 (11 Sep 2020 17:47)  T(F): 98.1 (12 Sep 2020 13:07), Max: 98.5 (11 Sep 2020 17:47)  HR: 76 (12 Sep 2020 14:54) (71 - 109)  BP: 128/59 (12 Sep 2020 14:00) (128/59 - 183/81)  BP(mean): 85 (12 Sep 2020 14:00) (85 - 116)  RR: 26 (12 Sep 2020 14:54) (16 - 40)  SpO2: 100% (12 Sep 2020 14:54) (89% - 100%)    CAPILLARY BLOOD GLUCOSE    PHYSICAL EXAM  General: A&Ox3; NAD  Head: NC/AT; MMM; PERRL; EOMI;  Neck: Supple; no JVD  Respiratory: CTA B/L; no wheezes/crackles   Cardiovascular: Regular rhythm/rate; S1/S2   Gastrointestinal: Soft; NTND; normoactive BS  Extremities: WWP; no edema/cyanosis  Neurological:  CNII-XII grossly intact; no obvious focal deficits    MEDICATIONS  (STANDING):  albuterol/ipratropium for Nebulization 3 milliLiter(s) Nebulizer every 6 hours  apixaban 5 milliGRAM(s) Oral every 12 hours  atorvastatin 40 milliGRAM(s) Oral at bedtime  buDESOnide    Inhalation Suspension 0.25 milliGRAM(s) Inhalation two times a day  buPROPion XL . 300 milliGRAM(s) Oral daily  ceFAZolin   IVPB 1000 milliGRAM(s) IV Intermittent every 8 hours  chlorhexidine 2% Cloths 1 Application(s) Topical <User Schedule>  diltiazem    milliGRAM(s) Oral daily  pramipexole 1.5 milliGRAM(s) Oral at bedtime  sodium zirconium cyclosilicate 10 Gram(s) Oral every 8 hours    MEDICATIONS  (PRN):  acetaminophen   Tablet .. 650 milliGRAM(s) Oral every 6 hours PRN Mild Pain (1 - 3)  HYDROmorphone   Tablet 4 milliGRAM(s) Oral four times a day PRN Severe Pain (7 - 10)      Altabax (Unknown)  Augmentin (Unknown)  clindamycin (Unknown)  Vaseline (Swelling)    LABS                        7.3    10.09 )-----------( 257      ( 12 Sep 2020 04:15 )             25.9     09-12    141  |  106  |  47<H>  ----------------------------<  162<H>  4.6   |  26  |  1.60<H>    Ca    8.8      12 Sep 2020 04:15  Phos  3.4     09-12  Mg     2.5     09-12    TPro  6.6  /  Alb  2.8<L>  /  TBili  <0.2  /  DBili  x   /  AST  16  /  ALT  22  /  AlkPhos  98  09-11    PT/INR - ( 10 Sep 2020 20:29 )   PT: 12.7 sec;   INR: 1.06          PTT - ( 11 Sep 2020 04:48 )  PTT:29.4 sec      IMAGING/EKG/ETC  EKG:  Xray:  CT:  MRI: TRANSFER ACCEPTANCE NOTE  MICU -> [facility]     Hospital Course:  The patient presented to [facility] on 9/10 with SOB and fatigue and required intubation and sedation for acute hypoxia and respiratory distress. She was placed on fentanyl and propofol and transported to [facility]/admitted to the MICU. Upon arrival to [facility], she was started on azithromycin, duonebs, and prednisone for possible COPD exacerbation as the patient did not appear to be fluid overloaded or in CHF exacerbation on exam or bedside ultrasound. Overnight, her fentanyl was titrated down and eventually stopped. She was also given IV lasix 40 due to decreasing urine output and signs of fluid overload following admission. On 9/11, the patients propofol was titrated off and she self extubated. She was seen by nephrology for hyperkalemia and was started on lokelma. Her azithromycin and steroids were also stopped at this time as she no longer seemed to be in COPD exacerbation. She was started on her home budesonide and continued on duonebs. She was also started on her home diltiazem for HTN. Overnight, she complained of pain in her L leg and was started on ancef for LLE cellulitis. On 9/12, the patient remained stable s/p extubation, with SpO2 97-99% on NC. Stable for step down to [facility].     OVERNIGHT EVENTS: Overnight, the patient complained of pain in her LLE and was noted to have warmth in the leg on exam. Was started on aAncef for treatment of cellulitis.     SUBJECTIVE / INTERVAL HPI: Patient seen and examined at bedside. Patient reports feeling fatigued and short of breath w/ movements, however not in distress.+ LLE pain.  Denies HA, CP,  n/v, changes in bowel/urinary habits.  12pt ROS otherwise negative.    Family History, Social History, Past Medical History, and Home Medications from admission H&P were reviewed and are unchanged unless noted below.     VITALS  Vital Signs Last 24 Hrs  T(C): 36.7 (12 Sep 2020 13:07), Max: 36.9 (11 Sep 2020 17:47)  T(F): 98.1 (12 Sep 2020 13:07), Max: 98.5 (11 Sep 2020 17:47)  HR: 76 (12 Sep 2020 14:54) (71 - 109)  BP: 128/59 (12 Sep 2020 14:00) (128/59 - 183/81)  BP(mean): 85 (12 Sep 2020 14:00) (85 - 116)  RR: 26 (12 Sep 2020 14:54) (16 - 40)  SpO2: 100% (12 Sep 2020 14:54) (89% - 100%)    CAPILLARY BLOOD GLUCOSE    Constitutional: Obese female, no acute distress  Head: NC/AT  Eyes: PERRL, clear conjunctiva  ENT: s/p intubation; no oropharyngeal trauma noted   Neck: Supple; no JVD or thyromegaly, R IJ line in place  Respiratory: No respiratory distress on 4L NC Diminished breath sounds noted b/l, more prominent in lower lobes  Cardiac: +S1/S2; RRR; no M/R/G  Gastrointestinal: soft, NT/ND; no rebound or guarding; +BSx4  Extremities: 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 LLE warm to touch. +1 b/l pitting edema  Vascular: 2+ radial and femoral pulses, unable to palpate pulses in LE  Dermatologic: Diffuse small skin wounds likely 2/2 prior scabies on arms, legs, abdomen, back; edematous, erythematous, indurated area noted on back of R thigh   Lymphatic: no submandibular or cervical LAD  Neurologic: AOx3, no focal deficits, CN II-XII intact     MEDICATIONS  (STANDING):  albuterol/ipratropium for Nebulization 3 milliLiter(s) Nebulizer every 6 hours  apixaban 5 milliGRAM(s) Oral every 12 hours  atorvastatin 40 milliGRAM(s) Oral at bedtime  buDESOnide    Inhalation Suspension 0.25 milliGRAM(s) Inhalation two times a day  buPROPion XL . 300 milliGRAM(s) Oral daily  ceFAZolin   IVPB 1000 milliGRAM(s) IV Intermittent every 8 hours  chlorhexidine 2% Cloths 1 Application(s) Topical <User Schedule>  diltiazem    milliGRAM(s) Oral daily  pramipexole 1.5 milliGRAM(s) Oral at bedtime  sodium zirconium cyclosilicate 10 Gram(s) Oral every 8 hours    MEDICATIONS  (PRN):  acetaminophen   Tablet .. 650 milliGRAM(s) Oral every 6 hours PRN Mild Pain (1 - 3)  HYDROmorphone   Tablet 4 milliGRAM(s) Oral four times a day PRN Severe Pain (7 - 10)    Altabax (Unknown)  Augmentin (Unknown)  clindamycin (Unknown)  Vaseline (Swelling)    LABS                        7.3    10.09 )-----------( 257      ( 12 Sep 2020 04:15 )             25.9     09-12    141  |  106  |  47<H>  ----------------------------<  162<H>  4.6   |  26  |  1.60<H>    Ca    8.8      12 Sep 2020 04:15  Phos  3.4     09-12  Mg     2.5     09-12    TPro  6.6  /  Alb  2.8<L>  /  TBili  <0.2  /  DBili  x   /  AST  16  /  ALT  22  /  AlkPhos  98  09-11    PT/INR - ( 10 Sep 2020 20:29 )   PT: 12.7 sec;   INR: 1.06          PTT - ( 11 Sep 2020 04:48 )  PTT:29.4 sec      Most recent CXR  Xray Chest 1 View- PORTABLE-Routine (Xray Chest 1 View- PORTABLE-Routine in AM.)  INTERPRETATION:  Clinical History: Shortness Of breath  Portable examination of the chest demonstrates no interval change lung pathology in comparison to prior examination of the chest 11 9/11/2020. No interval change this remaining support device.  IMPRESSION: No interval change lung pathology    CT Chest No Cont (09.10.20 @ 13:41)   Impression: 1. Since 9/1/2020, there is new interlobular septal thickening in both upper lobes, consistent with pulmonary edema.    2. Nodular opacities in the dependent portion of the right upper lobe could be due to dependent atelectasis versus infection or edema.    3. There is again severe emphysema.    4. A left seventh rib fracture is better visualized on this exam. Other rib fractures are unchanged.   TRANSFER ACCEPTANCE NOTE  MICU -> [facility]     Hospital Course:  The patient presented to [facility] on 9/10 with SOB and fatigue and required intubation and sedation for acute hypoxia and respiratory distress. She was placed on fentanyl and propofol and transported to [facility]/admitted to the MICU. Upon arrival to [facility], she was started on azithromycin, duonebs, and prednisone for possible COPD exacerbation as the patient did not appear to be fluid overloaded or in CHF exacerbation on exam or bedside ultrasound. Overnight, her fentanyl was titrated down and eventually stopped. She was also given IV lasix 40 due to decreasing urine output and signs of fluid overload following admission. On 9/11, the patients propofol was titrated off and she self extubated. She was seen by nephrology for hyperkalemia and was started on lokelma. Her azithromycin and steroids were also stopped at this time as she no longer seemed to be in COPD exacerbation. She was started on her home budesonide and continued on duonebs. She was also started on her home diltiazem for HTN. Overnight, she complained of pain in her L leg and was started on ancef for LLE cellulitis. On 9/12, the patient remained stable s/p extubation, with SpO2 97-99% on NC. Stable for step down to [facility].     OVERNIGHT EVENTS: Overnight, the patient complained of pain in her LLE and was noted to have warmth in the leg on exam. Was started on aAncef for treatment of cellulitis.     SUBJECTIVE / INTERVAL HPI: Patient seen and examined at bedside. Patient reports bilateral rib cage pain (more prominent on L side where the rib fracture is located), and L lower extremity pain that she describes as burning. Per home health aide who is at bedside, pt is usually less tachypneic and more comfortable on room air. Also, home aide reports that pt has chronic cellulitis and gets treatment for it multiple times a month. She says that her legs are currently better than baseline, and usually turns bright red. Denies HA, CP, n/v, changes in bowel/urinary habits.     Family History, Social History, Past Medical History, and Home Medications from admission H&P were reviewed and are unchanged unless noted below.     VITALS  Vital Signs Last 24 Hrs  T(C): 36.7 (12 Sep 2020 13:07), Max: 36.9 (11 Sep 2020 17:47)  T(F): 98.1 (12 Sep 2020 13:07), Max: 98.5 (11 Sep 2020 17:47)  HR: 76 (12 Sep 2020 14:54) (71 - 109)  BP: 128/59 (12 Sep 2020 14:00) (128/59 - 183/81)  BP(mean): 85 (12 Sep 2020 14:00) (85 - 116)  RR: 26 (12 Sep 2020 14:54) (16 - 40)  SpO2: 100% (12 Sep 2020 14:54) (89% - 100%)    CAPILLARY BLOOD GLUCOSE    Constitutional: Obese female, pleasant. Short of breath while speaking.   Head: NC/AT  Eyes: PERRL, clear conjunctiva  ENT: s/p intubation; no oropharyngeal trauma noted, dry mucous membranes   Neck: Supple; no JVD or thyromegaly  Respiratory: Work of breathing on 4L NC. Diminished breath sounds noted b/l. No audible wheezes, rales  Cardiac: +S1/S2; RRR; no M/R/G  Gastrointestinal: Obese, soft, NT/ND; no rebound or guarding; +BSx4  Extremities: Severe b/l chronic venous dermatitis. L lower extremity: warm to touch, clear red demarcation line below the knee. +1 b/l pitting edema  Vascular: 2+ radial and femoral pulses, unable to palpate pulses in LE  Dermatologic: Diffuse small skin wounds likely 2/2 prior scabies on arms, legs, abdomen, back; edematous, erythematous, indurated area noted on back of R thigh   Lymphatic: no submandibular or cervical LAD  Neurologic: AOx3, no focal deficits, CN II-XII intact     MEDICATIONS  (STANDING):  albuterol/ipratropium for Nebulization 3 milliLiter(s) Nebulizer every 6 hours  apixaban 5 milliGRAM(s) Oral every 12 hours  atorvastatin 40 milliGRAM(s) Oral at bedtime  buDESOnide    Inhalation Suspension 0.25 milliGRAM(s) Inhalation two times a day  buPROPion XL . 300 milliGRAM(s) Oral daily  ceFAZolin   IVPB 1000 milliGRAM(s) IV Intermittent every 8 hours  chlorhexidine 2% Cloths 1 Application(s) Topical <User Schedule>  diltiazem    milliGRAM(s) Oral daily  pramipexole 1.5 milliGRAM(s) Oral at bedtime  sodium zirconium cyclosilicate 10 Gram(s) Oral every 8 hours    MEDICATIONS  (PRN):  acetaminophen   Tablet .. 650 milliGRAM(s) Oral every 6 hours PRN Mild Pain (1 - 3)  HYDROmorphone   Tablet 4 milliGRAM(s) Oral four times a day PRN Severe Pain (7 - 10)    Altabax (Unknown)  Augmentin (Unknown)  clindamycin (Unknown)  Vaseline (Swelling)    LABS                        7.3    10.09 )-----------( 257      ( 12 Sep 2020 04:15 )             25.9     09-12    141  |  106  |  47<H>  ----------------------------<  162<H>  4.6   |  26  |  1.60<H>    Ca    8.8      12 Sep 2020 04:15  Phos  3.4     09-12  Mg     2.5     09-12    TPro  6.6  /  Alb  2.8<L>  /  TBili  <0.2  /  DBili  x   /  AST  16  /  ALT  22  /  AlkPhos  98  09-11    PT/INR - ( 10 Sep 2020 20:29 )   PT: 12.7 sec;   INR: 1.06          PTT - ( 11 Sep 2020 04:48 )  PTT:29.4 sec      Most recent CXR  Xray Chest 1 View- PORTABLE-Routine (Xray Chest 1 View- PORTABLE-Routine in AM.)  INTERPRETATION:  Clinical History: Shortness Of breath  Portable examination of the chest demonstrates no interval change lung pathology in comparison to prior examination of the chest 11 9/11/2020. No interval change this remaining support device.  IMPRESSION: No interval change lung pathology    CT Chest No Cont (09.10.20 @ 13:41)   Impression: 1. Since 9/1/2020, there is new interlobular septal thickening in both upper lobes, consistent with pulmonary edema.    2. Nodular opacities in the dependent portion of the right upper lobe could be due to dependent atelectasis versus infection or edema.    3. There is again severe emphysema.    4. A left seventh rib fracture is better visualized on this exam. Other rib fractures are unchanged.   TRANSFER ACCEPTANCE NOTE  MICU -> [facility]     Hospital Course:  The patient presented to [facility] on 9/10 with SOB and fatigue and required intubation and sedation for acute hypoxia and respiratory distress. She was placed on fentanyl and propofol and transported to [facility]/admitted to the MICU. Upon arrival to [facility], she was started on azithromycin, duonebs, and prednisone for possible COPD exacerbation as the patient did not appear to be fluid overloaded or in CHF exacerbation on exam or bedside ultrasound. Overnight, her fentanyl was titrated down and eventually stopped. She was also given IV lasix 40 due to decreasing urine output and signs of fluid overload following admission. On 9/11, the patients propofol was titrated off and she self extubated. She was seen by nephrology for hyperkalemia and was started on lokelma. Her azithromycin and steroids were also stopped at this time as she no longer seemed to be in COPD exacerbation. She was started on her home budesonide and continued on duonebs. She was also started on her home diltiazem for HTN. Overnight, she complained of pain in her L leg and was started on ancef for LLE cellulitis. On 9/12, the patient remained stable s/p extubation, with SpO2 97-99% on NC. Stable for step down to [facility].     OVERNIGHT EVENTS: Overnight, the patient complained of pain in her LLE and was noted to have warmth in the leg on exam. Was started on aAncef for treatment of cellulitis.     SUBJECTIVE / INTERVAL HPI: Patient seen and examined at bedside. Patient reports bilateral rib cage pain (more prominent on L side where the rib fracture is located), and L lower extremity pain that she describes as burning. Per home health aide who is at bedside, pt is usually less tachypneic and more comfortable on room air. Also, home aide reports that pt has chronic cellulitis and gets treatment for it multiple times a month. She says that her legs are currently better than baseline, and usually turns bright red. Denies HA, CP, n/v, changes in bowel/urinary habits.     Family History, Social History, Past Medical History, and Home Medications from admission H&P were reviewed and are unchanged unless noted below.     VITALS  Vital Signs Last 24 Hrs  T(C): 36.7 (12 Sep 2020 13:07), Max: 36.9 (11 Sep 2020 17:47)  T(F): 98.1 (12 Sep 2020 13:07), Max: 98.5 (11 Sep 2020 17:47)  HR: 76 (12 Sep 2020 14:54) (71 - 109)  BP: 128/59 (12 Sep 2020 14:00) (128/59 - 183/81)  BP(mean): 85 (12 Sep 2020 14:00) (85 - 116)  RR: 26 (12 Sep 2020 14:54) (16 - 40)  SpO2: 100% (12 Sep 2020 14:54) (89% - 100%)    CAPILLARY BLOOD GLUCOSE    Constitutional: Obese female, pleasant. Short of breath while speaking.   Head: NC/AT  Eyes: PERRL, clear conjunctiva  ENT: s/p intubation; no oropharyngeal trauma noted, dry mucous membranes   Neck: Supple; no JVD or thyromegaly  Respiratory: Work of breathing on 4L NC. Diminished breath sounds noted b/l, however mid-lower L sided crackles   Cardiac: +S1/S2; RRR; no M/R/G  Gastrointestinal: Obese, soft, NT/ND; no rebound or guarding; +BSx4  Extremities: Severe b/l chronic venous dermatitis. L lower extremity: warm to touch, clear red demarcation line below the knee. +1 b/l pitting edema  Vascular: 2+ radial and femoral pulses, unable to palpate pulses in LE  Dermatologic: Diffuse small skin wounds likely 2/2 prior scabies on arms, legs, abdomen, back; edematous, erythematous, indurated area noted on back of R thigh   Lymphatic: no submandibular or cervical LAD  Neurologic: AOx3, no focal deficits, CN II-XII intact     MEDICATIONS  (STANDING):  albuterol/ipratropium for Nebulization 3 milliLiter(s) Nebulizer every 6 hours  apixaban 5 milliGRAM(s) Oral every 12 hours  atorvastatin 40 milliGRAM(s) Oral at bedtime  buDESOnide    Inhalation Suspension 0.25 milliGRAM(s) Inhalation two times a day  buPROPion XL . 300 milliGRAM(s) Oral daily  ceFAZolin   IVPB 1000 milliGRAM(s) IV Intermittent every 8 hours  chlorhexidine 2% Cloths 1 Application(s) Topical <User Schedule>  diltiazem    milliGRAM(s) Oral daily  pramipexole 1.5 milliGRAM(s) Oral at bedtime  sodium zirconium cyclosilicate 10 Gram(s) Oral every 8 hours    MEDICATIONS  (PRN):  acetaminophen   Tablet .. 650 milliGRAM(s) Oral every 6 hours PRN Mild Pain (1 - 3)  HYDROmorphone   Tablet 4 milliGRAM(s) Oral four times a day PRN Severe Pain (7 - 10)    Altabax (Unknown)  Augmentin (Unknown)  clindamycin (Unknown)  Vaseline (Swelling)    LABS                        7.3    10.09 )-----------( 257      ( 12 Sep 2020 04:15 )             25.9     09-12    141  |  106  |  47<H>  ----------------------------<  162<H>  4.6   |  26  |  1.60<H>    Ca    8.8      12 Sep 2020 04:15  Phos  3.4     09-12  Mg     2.5     09-12    TPro  6.6  /  Alb  2.8<L>  /  TBili  <0.2  /  DBili  x   /  AST  16  /  ALT  22  /  AlkPhos  98  09-11    PT/INR - ( 10 Sep 2020 20:29 )   PT: 12.7 sec;   INR: 1.06          PTT - ( 11 Sep 2020 04:48 )  PTT:29.4 sec      Most recent CXR  Xray Chest 1 View- PORTABLE-Routine (Xray Chest 1 View- PORTABLE-Routine in AM.)  INTERPRETATION:  Clinical History: Shortness Of breath  Portable examination of the chest demonstrates no interval change lung pathology in comparison to prior examination of the chest 11 9/11/2020. No interval change this remaining support device.  IMPRESSION: No interval change lung pathology    CT Chest No Cont (09.10.20 @ 13:41)   Impression: 1. Since 9/1/2020, there is new interlobular septal thickening in both upper lobes, consistent with pulmonary edema.    2. Nodular opacities in the dependent portion of the right upper lobe could be due to dependent atelectasis versus infection or edema.    3. There is again severe emphysema.    4. A left seventh rib fracture is better visualized on this exam. Other rib fractures are unchanged.

## 2020-09-12 NOTE — PROGRESS NOTE ADULT - ASSESSMENT
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 The etiology of patient's current respiratory failure is most likely COPD exacerbation compounded by difficulty breathing 2/2 rib fracture, though she does not have wheezing on exam, with less likely possibilities including CHF exacerbation (patient euvolemic; no pulmonary edema noted on bedside ultrasound of the lung).    Neuro:   Patient now off sedation and extubated to NC. AOx3, no neurological deficits.     #restless leg syndrome   -c/w home pramipexole     CV:   #HTN   Patient with HTN, takes Diltiazem 180 mg daily   -c/w diltiazem   -Continue to monitor    #Heart failure with preserved ejection fraction   Patient with history of HFpEF (EF 55-60% last echo 2/20). On initial exam, patient did not appear to be volume overloaded (crackles not noted on exam, minimal B lines on bedside ultrasound of lungs). Low suspicion for CHF exacerbation as cause of patient's acute hypoxia.    -lasix 40mg PO daily   -Continue to monitor fluid status and clinical presentation.     #DVT   Patient with history of multiple DVTs, on Eliquis 5 mg bid at home.  -C/w Eliquis 5 mg BID in hospital     Pulm:   #COPD exacerbation    Patient with history of COPD. At home, takes Advair, Albuterol 2.5, Yupelri inhaler, and Perforomist inhaler. She was recently discharged from the hospital for a COPD exacerbation on 9/3. No wheezing is noted on current exam, though the patient has decreased breath sounds bilaterally.   -Now extubated to NC; patient self-extubated after being taken off propofol on 9/12. Currently satting well on NC.   -given duoneb on presentation to [facility]  -C/w standing duonebs q6  -c/w home budesonide   -s/p prednisone 40mg but stopped on 9/11 as patient was not wheezing     ID:   #cellulitis   Patient complaining of pain in LLE, warmth noted on exam. Patient started on ancef on 9/11.   -c/w ancef 1g q8   -pt has a history of DVT but is on eliquis; consider doppler if pain/warmth do not resolve with antibiotic treatment     Renal:   #CKD stage IV    Patient with reported CKD stage 4 and baseline Cr ~1.7-2.0   -On this admission, BUN 37 and Cr 1.83, consistent with that of her last admission - BUN 35 and Cr 1.69   -Renal consulted, following   -trend BMP  -f/u Cystatin-C eGFR   -f/u urine K and Cr  -lasix 40mg daily in evening for K excretion   -s/p lokelma 10g q8 for six doses; now on daily lokelma per renal     MSK:   #Rib fracture  Patient discharged on previous admission with bilateral rib fractures and an acute fracture of the left sixth rib. The fracture of the right sixth rib was noted to be subacute, as there is adjacent callus formation. Patient denies recent trauma, falls, or coughing.   -Patient has increasing pain in ribs today; restarted dilaudid for pain control   -Bed to chair     Heme:   #anemia   Patient with Hgb 8.1 on admission, now downtrended to 7.3, down from her baseline of 9-10s on prior admissions. No signs of active bleeding. Patient was also noted to be anemic on last admission and did not require transfusion.   -continue to trend CBC   -Active T&S, most recently 9/11  -F/u iron studies   -Transfuse if Hgb <7   -f/u haptoglobin, LDH, retic count   -Continue to monitor.     #Endo  Hyperlipidemia. Plan: Patient with HLD, takes Atorvastatin 40 mg at home   -C/w Atorvastatin 40 mg    GI:   #GI ppx  -protonix 40mg     F: none   E: replete as necessary   N: renal diet   DVT Ppx: Eliquis 5 BID   Dispo: MICU, for step down

## 2020-09-12 NOTE — PROGRESS NOTE ADULT - SUBJECTIVE AND OBJECTIVE BOX
Patient is a 79y Female seen and evaluated at bedside. K+ improved. No complaints. BP acceptable.       Meds:    acetaminophen   Tablet .. 650 every 6 hours PRN  albuterol/ipratropium for Nebulization 3 every 6 hours  apixaban 5 every 12 hours  atorvastatin 40 at bedtime  buDESOnide    Inhalation Suspension 0.25 two times a day  buPROPion XL . 300 daily  ceFAZolin   IVPB 1000 every 8 hours  chlorhexidine 2% Cloths 1 <User Schedule>  diltiazem    daily  pramipexole 1.5 at bedtime  sodium zirconium cyclosilicate 10 every 8 hours      T(C): , Max: 36.9 (09-11-20 @ 17:47)  T(F): , Max: 98.5 (09-11-20 @ 17:47)  HR: 71 (09-12-20 @ 12:00)  BP: 141/63 (09-12-20 @ 12:00)  BP(mean): 90 (09-12-20 @ 12:00)  RR: 16 (09-12-20 @ 12:00)  SpO2: 99% (09-12-20 @ 12:00)  Wt(kg): --    09-11 @ 07:01  -  09-12 @ 07:00  --------------------------------------------------------  IN: 710 mL / OUT: 960 mL / NET: -250 mL    09-12 @ 07:01  -  09-12 @ 12:53  --------------------------------------------------------  IN: 350 mL / OUT: 1200 mL / NET: -850 mL          Review of Systems:  CONSTITUTIONAL: No fever  RESPIRATORY: No shortness of breath, cough  CARDIOVASCULAR: No chest pain or shortness of breath  GASTROINTESTINAL: No abdominal pain, No nausea or vomiting, No diarrhea      PHYSICAL EXAM:  Constitutional: NAD on 2 L NC  Respiratory: diminished BS throughout  Cardiac: +S1/S2; RRR  Gastrointestinal: obsese, soft, NT/ND  Extremities: Warm, 1+ pitting edema up to knees b/l with chronic cellulitis skin changes   Neurologic: AAOx3; no tremor         LABS:                        7.3    10.09 )-----------( 257      ( 12 Sep 2020 04:15 )             25.9     09-12    141  |  106  |  47<H>  ----------------------------<  162<H>  4.6   |  26  |  1.60<H>    Ca    8.8      12 Sep 2020 04:15  Phos  3.4     09-12  Mg     2.5     09-12    TPro  6.6  /  Alb  2.8<L>  /  TBili  <0.2  /  DBili  x   /  AST  16  /  ALT  22  /  AlkPhos  98  09-11      PT/INR - ( 10 Sep 2020 20:29 )   PT: 12.7 sec;   INR: 1.06          PTT - ( 11 Sep 2020 04:48 )  PTT:29.4 sec    Creatinine, Random Urine: 64 mg/dL (09-11 @ 14:20)  Potassium, Random Urine: 34 mmol/L (09-11 @ 14:20)        RADIOLOGY & ADDITIONAL STUDIES:           Patient is a 79y Female seen and evaluated at bedside. K+ improved. No complaints except some leg pain . BP acceptable.       Meds:    acetaminophen   Tablet .. 650 every 6 hours PRN  albuterol/ipratropium for Nebulization 3 every 6 hours  apixaban 5 every 12 hours  atorvastatin 40 at bedtime  buDESOnide    Inhalation Suspension 0.25 two times a day  buPROPion XL . 300 daily  ceFAZolin   IVPB 1000 every 8 hours  chlorhexidine 2% Cloths 1 <User Schedule>  diltiazem    daily  pramipexole 1.5 at bedtime  sodium zirconium cyclosilicate 10 every 8 hours      T(C): , Max: 36.9 (09-11-20 @ 17:47)  T(F): , Max: 98.5 (09-11-20 @ 17:47)  HR: 71 (09-12-20 @ 12:00)  BP: 141/63 (09-12-20 @ 12:00)  BP(mean): 90 (09-12-20 @ 12:00)  RR: 16 (09-12-20 @ 12:00)  SpO2: 99% (09-12-20 @ 12:00)  Wt(kg): --    09-11 @ 07:01  -  09-12 @ 07:00  --------------------------------------------------------  IN: 710 mL / OUT: 960 mL / NET: -250 mL    09-12 @ 07:01  -  09-12 @ 12:53  --------------------------------------------------------  IN: 350 mL / OUT: 1200 mL / NET: -850 mL          Review of Systems:  CONSTITUTIONAL: No fever  RESPIRATORY: No shortness of breath, cough  CARDIOVASCULAR: No chest pain or shortness of breath  GASTROINTESTINAL: No abdominal pain, No nausea or vomiting, No diarrhea      PHYSICAL EXAM:  Constitutional: NAD on 2 L NC  Respiratory: diminished BS throughout  Cardiac: +S1/S2; RRR  Gastrointestinal: obsese, soft, NT/ND  Extremities: Warm, 1+ pitting edema up to knees b/l with chronic cellulitis skin changes   Neurologic: AAOx3; no tremor         LABS:                        7.3    10.09 )-----------( 257      ( 12 Sep 2020 04:15 )             25.9     09-12    141  |  106  |  47<H>  ----------------------------<  162<H>  4.6   |  26  |  1.60<H>    Ca    8.8      12 Sep 2020 04:15  Phos  3.4     09-12  Mg     2.5     09-12    TPro  6.6  /  Alb  2.8<L>  /  TBili  <0.2  /  DBili  x   /  AST  16  /  ALT  22  /  AlkPhos  98  09-11      PT/INR - ( 10 Sep 2020 20:29 )   PT: 12.7 sec;   INR: 1.06          PTT - ( 11 Sep 2020 04:48 )  PTT:29.4 sec    Creatinine, Random Urine: 64 mg/dL (09-11 @ 14:20)  Potassium, Random Urine: 34 mmol/L (09-11 @ 14:20)        RADIOLOGY & ADDITIONAL STUDIES:

## 2020-09-12 NOTE — PROGRESS NOTE ADULT - ATTENDING COMMENTS
79F PMHx morbid obesity, HTN, HLD, HFpEF (EF 55-60% - Feb ‘20), COPD, DALIA (unable to tolerate CPAP), PVD s/p PCI, H/o DVT (on Eliquis), Cerebral Aneurysm s/p coil, 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, CKD stage 4, SCC of left leg s/p resection with skin graft (~2010) c/b MRSA infection, diverticulitis s/p sigmoidectomy (2010), LE cellulitis (2/2020), and recent admission to [facility] on 9/1 for a nondisplaced left lateral rib fracture and COPD exacerbation - who presented to [facility] with one day of SOB and fatigue and acutely became hypoxic and cyanotic, s/p intubation for acute hypoxic respiratory failure of unclear etiology, since extubated and stepped down to [facility].     She reports increased exertional dyspnea since being extubated and is short of breath walking to the washroom. Whereas, at baseline, she can walk outside. No retrosternal chest pain, reports pleuritic pain related to known rib fracture. Relays baseline b/l LE swelling - related to venous stasis - possibly slightly increased at this time.     On exam - patient sitting upright in bed. Obese habitus. In no acute distress. No JVD. No increased respiratory effort on NC, mild b/l wheeze and bibasilar crackles. S1, S2 no murmurs. Benign abdomen. B/l LE swelling and erythema with scaling skin.       Hypoxic Respiratory Failure  - Inciting event precipitating intubation isn't clear - patient was initially being treated for COPD exacerbation, but steroids d/maryann given lack of wheeze during ICU stay.   - Patient with b/l LE edema and what appears to be b/l pleural effusions on CXR. BNP on admission - 1540 on admission, 500 on prior admission week prior.   - Will repeat ECHO - though not clear picture of heart failure. Will continue with diuresis - addl 40mg IV Lasic given tonight.    - PE unlikely - age adjusted D-dimer is negative and patient on chronic Eliquis for DVT history.   - Will however obtain LE Dopplers.   - OHS can be a consideration, however, she reports an acuity to this SOB - can obtain daytime ABG.  - Consider restarting steroids if wheeze becomes more prominent       Hyperkalemia and Renal Failure   - Nephrology following   - Extra-renal cause   - C/w workup as per Renal and Lokelma       Cellultiis   - C/w Ancef

## 2020-09-13 LAB
ALBUMIN SERPL ELPH-MCNC: 2.8 G/DL — LOW (ref 3.3–5)
ALP SERPL-CCNC: 92 U/L — SIGNIFICANT CHANGE UP (ref 40–120)
ALT FLD-CCNC: 20 U/L — SIGNIFICANT CHANGE UP (ref 10–45)
ANION GAP SERPL CALC-SCNC: 11 MMOL/L — SIGNIFICANT CHANGE UP (ref 5–17)
AST SERPL-CCNC: 10 U/L — SIGNIFICANT CHANGE UP (ref 10–40)
BASOPHILS # BLD AUTO: 0.03 K/UL — SIGNIFICANT CHANGE UP (ref 0–0.2)
BASOPHILS NFR BLD AUTO: 0.4 % — SIGNIFICANT CHANGE UP (ref 0–2)
BILIRUB SERPL-MCNC: <0.2 MG/DL — SIGNIFICANT CHANGE UP (ref 0.2–1.2)
BUN SERPL-MCNC: 46 MG/DL — HIGH (ref 7–23)
CALCIUM SERPL-MCNC: 8.9 MG/DL — SIGNIFICANT CHANGE UP (ref 8.4–10.5)
CHLORIDE SERPL-SCNC: 105 MMOL/L — SIGNIFICANT CHANGE UP (ref 96–108)
CO2 SERPL-SCNC: 28 MMOL/L — SIGNIFICANT CHANGE UP (ref 22–31)
CREAT SERPL-MCNC: 1.68 MG/DL — HIGH (ref 0.5–1.3)
EOSINOPHIL # BLD AUTO: 0.36 K/UL — SIGNIFICANT CHANGE UP (ref 0–0.5)
EOSINOPHIL NFR BLD AUTO: 4.4 % — SIGNIFICANT CHANGE UP (ref 0–6)
GFR/BSA.PRED SERPLBLD CYS-BASED-ARV: 33 ML/MIN — LOW
GLUCOSE SERPL-MCNC: 106 MG/DL — HIGH (ref 70–99)
HCT VFR BLD CALC: 26.7 % — LOW (ref 34.5–45)
HGB BLD-MCNC: 7.7 G/DL — LOW (ref 11.5–15.5)
IMM GRANULOCYTES NFR BLD AUTO: 1 % — SIGNIFICANT CHANGE UP (ref 0–1.5)
LYMPHOCYTES # BLD AUTO: 1.31 K/UL — SIGNIFICANT CHANGE UP (ref 1–3.3)
LYMPHOCYTES # BLD AUTO: 15.9 % — SIGNIFICANT CHANGE UP (ref 13–44)
MAGNESIUM SERPL-MCNC: 2 MG/DL — SIGNIFICANT CHANGE UP (ref 1.6–2.6)
MCHC RBC-ENTMCNC: 25.9 PG — LOW (ref 27–34)
MCHC RBC-ENTMCNC: 28.8 GM/DL — LOW (ref 32–36)
MCV RBC AUTO: 89.9 FL — SIGNIFICANT CHANGE UP (ref 80–100)
MONOCYTES # BLD AUTO: 0.79 K/UL — SIGNIFICANT CHANGE UP (ref 0–0.9)
MONOCYTES NFR BLD AUTO: 9.6 % — SIGNIFICANT CHANGE UP (ref 2–14)
NEUTROPHILS # BLD AUTO: 5.68 K/UL — SIGNIFICANT CHANGE UP (ref 1.8–7.4)
NEUTROPHILS NFR BLD AUTO: 68.7 % — SIGNIFICANT CHANGE UP (ref 43–77)
NRBC # BLD: 0 /100 WBCS — SIGNIFICANT CHANGE UP (ref 0–0)
PHOSPHATE SERPL-MCNC: 3.8 MG/DL — SIGNIFICANT CHANGE UP (ref 2.5–4.5)
PLATELET # BLD AUTO: 200 K/UL — SIGNIFICANT CHANGE UP (ref 150–400)
POTASSIUM SERPL-MCNC: 4.4 MMOL/L — SIGNIFICANT CHANGE UP (ref 3.5–5.3)
POTASSIUM SERPL-SCNC: 4.4 MMOL/L — SIGNIFICANT CHANGE UP (ref 3.5–5.3)
PROT SERPL-MCNC: 6.6 G/DL — SIGNIFICANT CHANGE UP (ref 6–8.3)
RBC # BLD: 2.97 M/UL — LOW (ref 3.8–5.2)
RBC # FLD: 18.2 % — HIGH (ref 10.3–14.5)
SODIUM SERPL-SCNC: 144 MMOL/L — SIGNIFICANT CHANGE UP (ref 135–145)
WBC # BLD: 8.25 K/UL — SIGNIFICANT CHANGE UP (ref 3.8–10.5)
WBC # FLD AUTO: 8.25 K/UL — SIGNIFICANT CHANGE UP (ref 3.8–10.5)

## 2020-09-13 PROCEDURE — 71045 X-RAY EXAM CHEST 1 VIEW: CPT | Mod: 26

## 2020-09-13 RX ORDER — HYDROXYZINE HCL 10 MG
25 TABLET ORAL ONCE
Refills: 0 | Status: COMPLETED | OUTPATIENT
Start: 2020-09-13 | End: 2020-09-13

## 2020-09-13 RX ORDER — FUROSEMIDE 40 MG
40 TABLET ORAL EVERY 24 HOURS
Refills: 0 | Status: DISCONTINUED | OUTPATIENT
Start: 2020-09-13 | End: 2020-09-15

## 2020-09-13 RX ADMIN — APIXABAN 5 MILLIGRAM(S): 2.5 TABLET, FILM COATED ORAL at 22:34

## 2020-09-13 RX ADMIN — Medication 0.25 MILLIGRAM(S): at 18:14

## 2020-09-13 RX ADMIN — PRAMIPEXOLE DIHYDROCHLORIDE 1.5 MILLIGRAM(S): 0.12 TABLET ORAL at 22:34

## 2020-09-13 RX ADMIN — Medication 0.25 MILLIGRAM(S): at 06:09

## 2020-09-13 RX ADMIN — Medication 3 MILLILITER(S): at 09:53

## 2020-09-13 RX ADMIN — Medication 3 MILLILITER(S): at 14:22

## 2020-09-13 RX ADMIN — Medication 100 MILLIGRAM(S): at 14:22

## 2020-09-13 RX ADMIN — Medication 40 MILLIGRAM(S): at 12:03

## 2020-09-13 RX ADMIN — Medication 180 MILLIGRAM(S): at 06:09

## 2020-09-13 RX ADMIN — Medication 100 MILLIGRAM(S): at 06:09

## 2020-09-13 RX ADMIN — HYDROMORPHONE HYDROCHLORIDE 4 MILLIGRAM(S): 2 INJECTION INTRAMUSCULAR; INTRAVENOUS; SUBCUTANEOUS at 07:15

## 2020-09-13 RX ADMIN — Medication 25 MILLIGRAM(S): at 09:53

## 2020-09-13 RX ADMIN — Medication 3 MILLILITER(S): at 22:40

## 2020-09-13 RX ADMIN — BUPROPION HYDROCHLORIDE 300 MILLIGRAM(S): 150 TABLET, EXTENDED RELEASE ORAL at 12:03

## 2020-09-13 RX ADMIN — ATORVASTATIN CALCIUM 40 MILLIGRAM(S): 80 TABLET, FILM COATED ORAL at 22:36

## 2020-09-13 RX ADMIN — Medication 100 MILLIGRAM(S): at 22:42

## 2020-09-13 RX ADMIN — SODIUM ZIRCONIUM CYCLOSILICATE 10 GRAM(S): 10 POWDER, FOR SUSPENSION ORAL at 12:03

## 2020-09-13 RX ADMIN — HYDROMORPHONE HYDROCHLORIDE 4 MILLIGRAM(S): 2 INJECTION INTRAMUSCULAR; INTRAVENOUS; SUBCUTANEOUS at 06:09

## 2020-09-13 RX ADMIN — APIXABAN 5 MILLIGRAM(S): 2.5 TABLET, FILM COATED ORAL at 09:53

## 2020-09-13 NOTE — PROGRESS NOTE ADULT - SUBJECTIVE AND OBJECTIVE BOX
INTERVAL HPI/OVERNIGHT EVENTS: FARIBA O/N    SUBJECTIVE: Patient seen and examined at bedside.   Pt states her breathing is okay and feels about the same as yesterday. No fever, chest pain. Eating wo N/V/Abd pain.     Pt patient, since discharge she has been residing alone wo any home services. Uses RW at home but depends on wheelchair/scooter when outside. Able to name a few of her medications including advair and albuterol but does not remember other respiratory medications or diuretics she was discharged with 1wk prior to admission.     Noted - pt admitted approx 1wk ago and discharge w myriad of respiratory medications  Advair, Albuterol, but also perforomist (LABA nebulizer) and yuperi (LAMA - daily nebulizer)      OBJECTIVE:    VITAL SIGNS:  ICU Vital Signs Last 24 Hrs  T(C): 37 (13 Sep 2020 21:24), Max: 37 (13 Sep 2020 21:24)  T(F): 98.6 (13 Sep 2020 21:24), Max: 98.6 (13 Sep 2020 21:24)  HR: 85 (13 Sep 2020 21:24) (78 - 91)  BP: 145/75 (13 Sep 2020 21:24) (118/62 - 145/75)  BP(mean): --  ABP: --  ABP(mean): --  RR: 18 (13 Sep 2020 21:24) (18 - 20)  SpO2: 95% (13 Sep 2020 21:24) (93% - 100%)        09-12 @ 07:01  -  09-13 @ 07:00  --------------------------------------------------------  IN: 450 mL / OUT: 3200 mL / NET: -2750 mL      CAPILLARY BLOOD GLUCOSE          PHYSICAL EXAM:  GEN: female in NAD on 4L NC  HEENT: NC/AT, MMM  CV: RRR, no murmurs, nml S1S2, venostasis changes in b/l calves. No increased warmth or tenderness  PULM: Nml effort, decreased BS w minimal expiratory wheezing. No rales  ABD: Soft, NABS, non-tender to palpation  NEURO: Alert, oriented, moving all extremities  PSYCH: Appropriate, conversant    MEDICATIONS:  MEDICATIONS  (STANDING):  albuterol/ipratropium for Nebulization 3 milliLiter(s) Nebulizer every 6 hours  apixaban 5 milliGRAM(s) Oral every 12 hours  atorvastatin 40 milliGRAM(s) Oral at bedtime  buDESOnide    Inhalation Suspension 0.25 milliGRAM(s) Inhalation two times a day  buPROPion XL . 300 milliGRAM(s) Oral daily  ceFAZolin   IVPB 1000 milliGRAM(s) IV Intermittent every 8 hours  diltiazem    milliGRAM(s) Oral daily  furosemide    Tablet 40 milliGRAM(s) Oral every 24 hours  pramipexole 1.5 milliGRAM(s) Oral at bedtime    MEDICATIONS  (PRN):  acetaminophen   Tablet .. 650 milliGRAM(s) Oral every 6 hours PRN Mild Pain (1 - 3)  HYDROmorphone   Tablet 4 milliGRAM(s) Oral four times a day PRN Severe Pain (7 - 10)      ALLERGIES:  Allergies    Altabax (Unknown)  Augmentin (Unknown)  clindamycin (Unknown)  Vaseline (Swelling)    Intolerances        LABS:                        7.7    8.25  )-----------( 200      ( 13 Sep 2020 06:17 )             26.7     09-13    144  |  105  |  46<H>  ----------------------------<  106<H>  4.4   |  28  |  1.68<H>    Ca    8.9      13 Sep 2020 06:17  Phos  3.8     09-13  Mg     2.0     09-13    TPro  6.6  /  Alb  2.8<L>  /  TBili  <0.2  /  DBili  x   /  AST  10  /  ALT  20  /  AlkPhos  92  09-13          RADIOLOGY & ADDITIONAL TESTS: Reviewed.      Impression: 1. Since 9/1/2020, there is new interlobular septal thickening in both upper lobes, consistent with pulmonary edema.    2. Nodular opacities in the dependent portion of the right upper lobe could be due to dependent atelectasis versus infection or edema.    3. There is again severe emphysema.    4. A left seventh rib fracture is better visualized on this exam. Other rib fractures are unchanged.

## 2020-09-13 NOTE — PROGRESS NOTE ADULT - ASSESSMENT
79F w COPD (intermittent home O2 - 2L PRN), Obesity, DALIA?, HFpEF (55-60%), PVD s/p stents, Multiple DVTs on eliquis 5 BID, CKD4, recent admission in Feb 2020 (Cellulitis, CHF exacerbation) and September 2020 (COPD exacerbation) w non-displaced L lateral 6th rib fx, now readmitted w dyspnea and fatigue requiring intubation at [facility] - c/f COPD exacerbation vs CHF exacerbation -- receiving IV lasix , self extubated and started on ancef for suspected cellulitis in setting of LLE pain    #Normocytic anemia - 7.7 from 7.3. Appears asymptomatic.   --Labs from 9/2 c/w RADHA (Tsat 8)  #CKD IV - Cr 1.68 from 1.6. range 1.4-1.8 from prior admission  #Leg cellulitis - no drainage, no tenderness or increased warmth - started on Ancef in ED.   #HFpEF - appears euvolemic vs overloaded. Evidence of pulm edema on admission CT. was d/c on bumex at last admission. On diltiazem 180  #DALIA - pt states she does not use CPAP  #Obesity - 38  #Scabies - dx at last admission 9/1-9/3 - denies itching.  #HLD - on statin    Plan  Overall, pt appears improved. However, exact etiology leading to admission unknown. Suspect possible issues w medical adherence due to health literacy and possible lack of support w medication/inhaler/nebulizer regimen  --Complete med rec  --Leg pain improved after initiating abx. Would defer BLE Doppler at this time  --Start PO iron therapy daily in setting of RADHA    --Titrate NC to sat > 88%  --Restart diuretics - lasix 40mg PO Daily  --Need to make sure pt and home aide (last H/P states pt had that but since dc'd w scabies unclear if still has home attendant) has functioning nebulizer machine and confirm pt able to fill scripts of nebulized LAMA+LABA vs simple q6h duoneb. At the least, will need to ensure pt able to use advair  --Pt will benefit from close pulmonary f/u (Dr. howe - [facility]) as outpatient in setting of frequent re-admissions  --Of note --- pt's PMD (Dr. Eulogio Rothman) recently moved to [place] and pt in transition of obtaining new PMD.     DISPO: TBMILDRED

## 2020-09-14 LAB
ANION GAP SERPL CALC-SCNC: 10 MMOL/L — SIGNIFICANT CHANGE UP (ref 5–17)
BLD GP AB SCN SERPL QL: NEGATIVE — SIGNIFICANT CHANGE UP
BUN SERPL-MCNC: 40 MG/DL — HIGH (ref 7–23)
CALCIUM SERPL-MCNC: 8.9 MG/DL — SIGNIFICANT CHANGE UP (ref 8.4–10.5)
CHLORIDE SERPL-SCNC: 104 MMOL/L — SIGNIFICANT CHANGE UP (ref 96–108)
CO2 SERPL-SCNC: 30 MMOL/L — SIGNIFICANT CHANGE UP (ref 22–31)
CREAT SERPL-MCNC: 1.32 MG/DL — HIGH (ref 0.5–1.3)
CYSTATIN C SERPL-MCNC: 1.74 MG/L — HIGH (ref 0.68–1.36)
GLUCOSE SERPL-MCNC: 101 MG/DL — HIGH (ref 70–99)
HCT VFR BLD CALC: 27.8 % — LOW (ref 34.5–45)
HGB BLD-MCNC: 7.9 G/DL — LOW (ref 11.5–15.5)
MAGNESIUM SERPL-MCNC: 1.8 MG/DL — SIGNIFICANT CHANGE UP (ref 1.6–2.6)
MCHC RBC-ENTMCNC: 25.6 PG — LOW (ref 27–34)
MCHC RBC-ENTMCNC: 28.4 GM/DL — LOW (ref 32–36)
MCV RBC AUTO: 90 FL — SIGNIFICANT CHANGE UP (ref 80–100)
NRBC # BLD: 0 /100 WBCS — SIGNIFICANT CHANGE UP (ref 0–0)
PLATELET # BLD AUTO: 222 K/UL — SIGNIFICANT CHANGE UP (ref 150–400)
POTASSIUM SERPL-MCNC: 4.2 MMOL/L — SIGNIFICANT CHANGE UP (ref 3.5–5.3)
POTASSIUM SERPL-SCNC: 4.2 MMOL/L — SIGNIFICANT CHANGE UP (ref 3.5–5.3)
RBC # BLD: 3.09 M/UL — LOW (ref 3.8–5.2)
RBC # FLD: 17.9 % — HIGH (ref 10.3–14.5)
RH IG SCN BLD-IMP: POSITIVE — SIGNIFICANT CHANGE UP
SODIUM SERPL-SCNC: 144 MMOL/L — SIGNIFICANT CHANGE UP (ref 135–145)
WBC # BLD: 7.48 K/UL — SIGNIFICANT CHANGE UP (ref 3.8–10.5)
WBC # FLD AUTO: 7.48 K/UL — SIGNIFICANT CHANGE UP (ref 3.8–10.5)

## 2020-09-14 PROCEDURE — 71045 X-RAY EXAM CHEST 1 VIEW: CPT | Mod: 26

## 2020-09-14 PROCEDURE — 99233 SBSQ HOSP IP/OBS HIGH 50: CPT | Mod: GC

## 2020-09-14 PROCEDURE — 93970 EXTREMITY STUDY: CPT | Mod: 26

## 2020-09-14 PROCEDURE — 93306 TTE W/DOPPLER COMPLETE: CPT | Mod: 26

## 2020-09-14 RX ORDER — MORPHINE SULFATE 50 MG/1
2 CAPSULE, EXTENDED RELEASE ORAL ONCE
Refills: 0 | Status: DISCONTINUED | OUTPATIENT
Start: 2020-09-14 | End: 2020-09-14

## 2020-09-14 RX ORDER — CALAMINE AND ZINC OXIDE AND PHENOL 160; 10 MG/ML; MG/ML
1 LOTION TOPICAL
Refills: 0 | Status: DISCONTINUED | OUTPATIENT
Start: 2020-09-14 | End: 2020-09-15

## 2020-09-14 RX ORDER — HYDROMORPHONE HYDROCHLORIDE 2 MG/ML
4 INJECTION INTRAMUSCULAR; INTRAVENOUS; SUBCUTANEOUS
Refills: 0 | Status: DISCONTINUED | OUTPATIENT
Start: 2020-09-14 | End: 2020-09-15

## 2020-09-14 RX ORDER — CEPHALEXIN 500 MG
500 CAPSULE ORAL EVERY 6 HOURS
Refills: 0 | Status: DISCONTINUED | OUTPATIENT
Start: 2020-09-14 | End: 2020-09-15

## 2020-09-14 RX ORDER — SENNA PLUS 8.6 MG/1
2 TABLET ORAL AT BEDTIME
Refills: 0 | Status: DISCONTINUED | OUTPATIENT
Start: 2020-09-14 | End: 2020-09-15

## 2020-09-14 RX ORDER — CALAMINE AND ZINC OXIDE AND PHENOL 160; 10 MG/ML; MG/ML
1 LOTION TOPICAL DAILY
Refills: 0 | Status: DISCONTINUED | OUTPATIENT
Start: 2020-09-14 | End: 2020-09-14

## 2020-09-14 RX ORDER — POLYETHYLENE GLYCOL 3350 17 G/17G
17 POWDER, FOR SOLUTION ORAL DAILY
Refills: 0 | Status: DISCONTINUED | OUTPATIENT
Start: 2020-09-14 | End: 2020-09-15

## 2020-09-14 RX ADMIN — Medication 3 MILLILITER(S): at 21:56

## 2020-09-14 RX ADMIN — POLYETHYLENE GLYCOL 3350 17 GRAM(S): 17 POWDER, FOR SOLUTION ORAL at 12:14

## 2020-09-14 RX ADMIN — Medication 500 MILLIGRAM(S): at 12:13

## 2020-09-14 RX ADMIN — HYDROMORPHONE HYDROCHLORIDE 4 MILLIGRAM(S): 2 INJECTION INTRAMUSCULAR; INTRAVENOUS; SUBCUTANEOUS at 14:40

## 2020-09-14 RX ADMIN — CALAMINE AND ZINC OXIDE AND PHENOL 1 APPLICATION(S): 160; 10 LOTION TOPICAL at 23:02

## 2020-09-14 RX ADMIN — SENNA PLUS 2 TABLET(S): 8.6 TABLET ORAL at 21:56

## 2020-09-14 RX ADMIN — PRAMIPEXOLE DIHYDROCHLORIDE 1.5 MILLIGRAM(S): 0.12 TABLET ORAL at 21:56

## 2020-09-14 RX ADMIN — Medication 3 MILLILITER(S): at 14:40

## 2020-09-14 RX ADMIN — Medication 40 MILLIGRAM(S): at 12:14

## 2020-09-14 RX ADMIN — MORPHINE SULFATE 2 MILLIGRAM(S): 50 CAPSULE, EXTENDED RELEASE ORAL at 03:41

## 2020-09-14 RX ADMIN — ATORVASTATIN CALCIUM 40 MILLIGRAM(S): 80 TABLET, FILM COATED ORAL at 21:56

## 2020-09-14 RX ADMIN — SODIUM ZIRCONIUM CYCLOSILICATE 10 GRAM(S): 10 POWDER, FOR SUSPENSION ORAL at 12:14

## 2020-09-14 RX ADMIN — APIXABAN 5 MILLIGRAM(S): 2.5 TABLET, FILM COATED ORAL at 21:56

## 2020-09-14 RX ADMIN — Medication 0.25 MILLIGRAM(S): at 18:26

## 2020-09-14 RX ADMIN — Medication 3 MILLILITER(S): at 02:56

## 2020-09-14 RX ADMIN — Medication 180 MILLIGRAM(S): at 05:46

## 2020-09-14 RX ADMIN — Medication 500 MILLIGRAM(S): at 18:26

## 2020-09-14 RX ADMIN — Medication 100 MILLIGRAM(S): at 05:46

## 2020-09-14 RX ADMIN — Medication 0.25 MILLIGRAM(S): at 05:55

## 2020-09-14 RX ADMIN — Medication 3 MILLILITER(S): at 09:26

## 2020-09-14 RX ADMIN — APIXABAN 5 MILLIGRAM(S): 2.5 TABLET, FILM COATED ORAL at 09:26

## 2020-09-14 RX ADMIN — BUPROPION HYDROCHLORIDE 300 MILLIGRAM(S): 150 TABLET, EXTENDED RELEASE ORAL at 12:14

## 2020-09-14 RX ADMIN — HYDROMORPHONE HYDROCHLORIDE 4 MILLIGRAM(S): 2 INJECTION INTRAMUSCULAR; INTRAVENOUS; SUBCUTANEOUS at 15:00

## 2020-09-14 RX ADMIN — MORPHINE SULFATE 2 MILLIGRAM(S): 50 CAPSULE, EXTENDED RELEASE ORAL at 04:20

## 2020-09-14 NOTE — PHYSICAL THERAPY INITIAL EVALUATION ADULT - PERTINENT HX OF CURRENT PROBLEM, REHAB EVAL
Pt. is a 79 y.o. female presenting with increased fatigue and SOB , required intubation on admission due to hypoxic respiratory failure, self-extubated, now stepped down to F.

## 2020-09-14 NOTE — PHYSICAL THERAPY INITIAL EVALUATION ADULT - ADDITIONAL COMMENTS
Pt. reports living in an elevator building, uses motorized WC for outdoors mobility, uses rollator for indoors ambulation. Pt. has a private hire aide that assists several x /week as needed. Pt. uses home O2 at 2L/min PRN.

## 2020-09-14 NOTE — PROGRESS NOTE ADULT - SUBJECTIVE AND OBJECTIVE BOX
O/N Events: Patient woke up at 3Am with difficulty breathing, her lung exam was clear to auscultations, CXR was ordered showing no acute pathology. Patient stated she had pain with inspiration she was given morphine as the difficulty breathing was thought to be due to recent rib fracture.  Patient felt better and went to sleep.   Subjective/ROS: Patient stating that her shortness of breath has improved considerably since last night.  She denies cough, chest tightness, wheezing, fever or chills.     Denies HA, CP, SOB, n/v, changes in bowel/urinary habits.  12pt ROS otherwise negative.    VITALS  Vital Signs Last 24 Hrs  T(C): 37.1 (14 Sep 2020 08:31), Max: 37.1 (14 Sep 2020 08:31)  T(F): 98.7 (14 Sep 2020 08:31), Max: 98.7 (14 Sep 2020 08:31)  HR: 94 (14 Sep 2020 08:31) (78 - 94)  BP: 167/73 (14 Sep 2020 08:31) (118/62 - 167/73)  BP(mean): --  RR: 18 (14 Sep 2020 08:31) (18 - 20)  SpO2: 95% (14 Sep 2020 08:31) (93% - 97%)    CAPILLARY BLOOD GLUCOSE        PHYSICAL EXAM  General: A&Ox3; NAD  Head: NC/AT; MMM; PERRL; EOMI;  Neck: Supple; no JVD  Respiratory: Wheezing in the upper mid lung fields bilaterally with crackles.    Cardiovascular: Regular rhythm/rate; S1/S2   Gastrointestinal: Soft; NTND; normoactive BS  Extremities: WWP; 1+ edema in lower extremity with chronic venous statis changes, tender to palpation bilaterally    Neurological:  CNII-XII grossly intact; no obvious focal deficits    MEDICATIONS  (STANDING):  albuterol/ipratropium for Nebulization 3 milliLiter(s) Nebulizer every 6 hours  apixaban 5 milliGRAM(s) Oral every 12 hours  atorvastatin 40 milliGRAM(s) Oral at bedtime  buDESOnide    Inhalation Suspension 0.25 milliGRAM(s) Inhalation two times a day  buPROPion XL . 300 milliGRAM(s) Oral daily  cephalexin 500 milliGRAM(s) Oral every 6 hours  diltiazem    milliGRAM(s) Oral daily  furosemide    Tablet 40 milliGRAM(s) Oral every 24 hours  polyethylene glycol 3350 17 Gram(s) Oral daily  pramipexole 1.5 milliGRAM(s) Oral at bedtime  senna 2 Tablet(s) Oral at bedtime  sodium zirconium cyclosilicate 10 Gram(s) Oral daily    MEDICATIONS  (PRN):  acetaminophen   Tablet .. 650 milliGRAM(s) Oral every 6 hours PRN Mild Pain (1 - 3)  calamine/zinc oxide Lotion 1 Application(s) Topical two times a day PRN Itching      Altabax (Unknown)  Augmentin (Unknown)  clindamycin (Unknown)  Vaseline (Swelling)      LABS                        7.9    7.48  )-----------( 222      ( 14 Sep 2020 07:15 )             27.8     09-14    144  |  104  |  40<H>  ----------------------------<  101<H>  4.2   |  30  |  1.32<H>    Ca    8.9      14 Sep 2020 07:15  Phos  3.8     09-13  Mg     1.8     09-14    TPro  6.6  /  Alb  2.8<L>  /  TBili  <0.2  /  DBili  x   /  AST  10  /  ALT  20  /  AlkPhos  92  09-13              IMAGING/EKG/ETC  EKG:  Xray:  CT:  MRI:

## 2020-09-14 NOTE — PHYSICAL THERAPY INITIAL EVALUATION ADULT - PHYSICAL ASSIST/NONPHYSICAL ASSIST: STAND/SIT, REHAB EVAL
Epidural    Patient location during procedure: OB   Reason for block: primary anesthetic   Diagnosis:  section   Start time: 3/21/2018 5:25 PM  Timeout: 3/21/2018 5:25 PM  End time: 3/21/2018 5:33 PM  Staffing  Anesthesiologist: [facility]  Resident/CRNA: ALEJANDRO DURAN  Performed: resident/CRNA   Preanesthetic Checklist  Completed: patient identified, surgical consent, pre-op evaluation, timeout performed, IV checked, risks and benefits discussed, monitors and equipment checked, anesthesia consent given, hand hygiene performed and patient being monitored  Preparation  Patient position: sitting  Prep: ChloraPrep  Patient monitoring: Pulse Ox and Blood Pressure  Epidural  Skin Anesthetic: lidocaine 1%  Skin Wheal: 3 mL  Administration type: continuous  Approach: midline  Interspace: L4-5  Injection technique: ALEXA saline  Needle and Epidural Catheter  Needle type: Tuohy   Needle gauge: 17  Needle length: 3.5 inches  Needle insertion depth: 6 cm  Catheter type: springwound and multi-orifice  Catheter size: 19 G  Catheter at skin depth: 10 cm  Test dose: 3 mL of lidocaine 1.5% with Epi 1-to-200,000  Additional Documentation: incremental injection, negative aspiration for heme and CSF, no paresthesia on injection, no signs/symptoms of IV or SA injection, no significant pain on injection and no significant complaints from patient  Needle localization: anatomical landmarks  Medications:  Bolus administered: 10 mL of 0.1% bupivacaine  Opioid administered: 20 mcg of   fentanyl  Assessment  Ease of block: easy  Patient's tolerance of the procedure: comfortable throughout block and no complaints           1 person assist/verbal cues

## 2020-09-14 NOTE — PROGRESS NOTE ADULT - ATTENDING COMMENTS
Patient was seen and examined with the resident team today.  I agree with Dr. 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  Also noted to have a mild cellulitis.     -- wean supplemental O2 to goal >88%  -- COPD, RE: COPD bundle  -- needs PT evaluation   -- c/w Keflex  -- c/w Lasix and Diltiazem  -- c/w home Dilaudid and bowel regimen   -- c/w home Eliquis   -- c/w home statin   -- c/w home Pramipexole  -- c/w home psych meds  -- DVT PPx - therapeutic A/C  -- Dispo - TBD    Tricia Schwarz  [phone] Patient was seen and examined with the resident team today.  I agree with Dr. 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  Also noted to have a mild cellulitis.     -- wean supplemental O2 to goal >88%  -- COPD, RE: COPD bundle  -- needs PT evaluation   -- c/w Keflex  -- c/w Lasix and Diltiazem  -- c/w home Dilaudid and bowel regimen   -- c/w home Eliquis   -- c/w home statin   -- c/w home Pramipexole  -- c/w home psych meds  -- DVT PPx - therapeutic A/C  -- Dispo - TBD    Tricia Schwarz  [phone]

## 2020-09-14 NOTE — PROGRESS NOTE ADULT - PROBLEM SELECTOR PLAN 9
F: none   E: replete as necessary   N: renal diet   GI ppx: Protonix 40mg  DVT Ppx: Eliquis 5 BID   Dispo: Stepped down from MICU to F
F: none   E: replete as necessary   N: renal diet   GI ppx: Protonix 40mg  DVT Ppx: Eliquis 5 BID   Dispo: Stepped down from MICU to F

## 2020-09-14 NOTE — PROGRESS NOTE ADULT - PROBLEM SELECTOR PLAN 5
Patient with history of HFpEF (EF 55-60% last echo 2/20). On initial exam, patient did not appear to be volume overloaded (crackles not noted on exam, minimal B lines on bedside ultrasound of lungs). Low suspicion at first for CHF exacerbation as cause of patient's acute hypoxia.  CXR this morning showing worsening b/l pleural effusions.   -Lasix 40mg PO daily   -Continue to monitor fluid status and clinical presentation  -Consider repeat Echo     #Hyperlipidemia. Plan: Patient with HLD, takes Atorvastatin 40 mg at home   -C/w Atorvastatin 40 mg    #HTN: Patient with HTN, takes Diltiazem 180 mg daily   -Continue diltiazem 180 mg daily  -Continue to monitor Patient with history of HFpEF (EF 55-60% last echo 2/20). On exam, patient with crackles on exam and 1+peripheral edema.  Low suspicion at first for CHF exacerbation as cause of patient's acute hypoxia.  CXR this morning showing fluffy infiltrates.  -Lasix 40mg PO daily   -Continue to monitor fluid status and clinical presentation  -Consider repeat Echo     #Hyperlipidemia. Plan: Patient with HLD, takes Atorvastatin 40 mg at home   -C/w Atorvastatin 40 mg    #HTN: Patient with HTN, takes Diltiazem 180 mg daily   -Continue diltiazem 180 mg daily  -Continue to monitor

## 2020-09-14 NOTE — CHART NOTE - NSCHARTNOTEFT_GEN_A_CORE
At ~3am, pt woke up feeling SOB w/  difficulty breathing.  Vitals: T 98.1, HR 80, /83, RR 20, spO2 95% on 4L NC  Lungs were clear to auscultation bilaterally, pt appeared dry on exam, reported good urine output during day; duonebs given w/ minimal improvement; on further questioning pt endorsed pain w/ deep breathing, potentially secondary to recent rib fracture; morphine 2mg IVP given; pt then w/ symptomatic improvement; initial read of STAT CXR did not appear c/f for fluid overload compared to prior CXR, official read with "prominent bronchovascular markings. No acute infiltrates."   Of note, BiPAP was at bedside but pt reports having significant pain from using it, saying she avoids it since a neurosurgery procedure which made her head (but not face) very sensitive to pressure.

## 2020-09-14 NOTE — PROGRESS NOTE ADULT - PROBLEM SELECTOR PLAN 7
Patient with history of COPD. At home, takes Advair, Albuterol 2.5, Yupelri inhaler, and Perforomist inhaler. She was recently discharged from the hospital for a COPD exacerbation on 9/3. No wheezing is noted on current exam, though the patient has decreased breath sounds bilaterally.   -C/w standing duonebs q6  -c/w home budesonide

## 2020-09-14 NOTE — PROGRESS NOTE ADULT - PROBLEM SELECTOR PLAN 6
Patient with history of multiple DVTs, on Eliquis 5 mg bid at home.  -C/w Eliquis 5 mg BID in hospital
Patient with history of multiple DVTs, on Eliquis 5 mg bid at home.  -C/w Eliquis 5 mg BID in hospital

## 2020-09-14 NOTE — PHYSICAL THERAPY INITIAL EVALUATION ADULT - GAIT DEVIATIONS NOTED, PT EVAL
decreased step length/decreased weight-shifting ability/increased time in double stance/decreased bonny

## 2020-09-14 NOTE — PROGRESS NOTE ADULT - PROBLEM SELECTOR PLAN 4
Patient discharged on previous admission with bilateral rib fractures and an acute fracture of the left sixth rib. The fracture of the right sixth rib was noted to be subacute, as there is adjacent callus formation. Patient denies recent trauma, falls, or coughing.   -Patient has increasing pain in ribs today; restarted Dilaudid for pain control   -Bed to chair Patient discharged on previous admission with bilateral rib fractures and an acute fracture of the left sixth rib. The fracture of the right sixth rib was noted to be subacute, as there is adjacent callus formation. Patient denies recent trauma, falls, or coughing. Patient with complaints of worsening SOB at night, which in part may be caused by respiratory depression due to opoid usage.   -Discontinue dilaudid  -Tylenol for pain control  -Will consider opioids if pain is severe   -Bed to chair Patient discharged on previous admission with bilateral rib fractures and an acute fracture of the left sixth rib. The fracture of the right sixth rib was noted to be subacute, as there is adjacent callus formation. Patient denies recent trauma, falls, or coughing. Patient with complaints of worsening SOB at night, which in part may be caused by respiratory depression due to opoid usage.   -Continue Dilaudid  -Tylenol for pain control    -Bed to chair

## 2020-09-14 NOTE — PROGRESS NOTE ADULT - PROBLEM SELECTOR PLAN 3
Patient with reported CKD stage 4 and baseline Cr ~1.7-2.0 On this admission, BUN 37 and Cr 1.83, consistent with that of her last admission - BUN 35 and Cr 1.69   -Renal consulted, following   -trend BMP  -f/u Cystatin-C eGFR   -f/u urine K and Cr    #Hyperkalemia:  -Lasix 40mg daily in evening for K excretion   -s/p lokelma 10g q8 for six doses; now on daily Lokelma per renal Patient with reported CKD stage 4 and baseline Cr ~1.7-2.0 On this admission, BUN 40 and Cr 1.32, along with hyperkalemia.  Renal consulted and will appreciate recommendations.  -trend BMP  -f/u urine K and Cr  #Hyperkalemia:  -Lasix 40mg daily in evening for K excretion   -s/p lokelma 10g q8 for six doses; now on daily Lokelma per renal

## 2020-09-14 NOTE — PHYSICAL THERAPY INITIAL EVALUATION ADULT - GENERAL OBSERVATIONS, REHAB EVAL
Pt. was received supine, on RA - took off NC at 4L/min to eat with resting SpO2=94% with/without O2, +PrimaFit, + hep-lock.

## 2020-09-14 NOTE — PROGRESS NOTE ADULT - PROBLEM SELECTOR PLAN 1
The etiology of patient's current respiratory failure can be secondary to the patients COPD compounded by difficulty breathing due to her rib fracture or due to the patient's history of CHF exacerbation. Currently patient has crackles on lung exam along with wheezing.  There are no other signs of hypervolemia on PE. CXR showing worsening fluffy infiltrates. Currently satting well on 4L NC w/ mild tachypnea  -c/w standing duonebs q6  -c/w home budesonide  -c/w furosemide   -will refrain from opoid use The etiology of patient's current respiratory failure can be secondary to the patients COPD compounded by difficulty breathing due to her rib fracture or due to the patient's history of CHF exacerbation. Currently patient has crackles on lung exam along with wheezing.  There are no other signs of hypervolemia on PE. CXR showing worsening fluffy infiltrates. Currently saturating well on 4L NC w/ mild tachypnea  -c/w standing duonebs q6  -c/w home budesonide  -c/w furosemide   -will refrain from opoid use The etiology of patient's current respiratory failure can be secondary to the patients COPD compounded by difficulty breathing due to her rib fracture or due to the patient's history of CHF exacerbation. Currently patient has crackles on lung exam along with wheezing.  There are no other signs of hypervolemia on PE. Decreased respiratory effort may also be due to opiod use. CXR showing worsening fluffy infiltrates. Currently saturating well on 4L NC w/ mild tachypnea  -c/w standing duonebs q6  -c/w home budesonide  -c/w furosemide   -will refrain from opoid use The etiology of patient's current respiratory failure can be secondary to the patients COPD compounded by difficulty breathing due to her rib fracture or due to the patient's history of CHF exacerbation. Currently patient has crackles on lung exam along with wheezing.  There are no other signs of hypervolemia on PE. Decreased respiratory effort may also be due to opiod use. CXR showing worsening fluffy infiltrates. Currently saturating well on 4L NC w/ mild tachypnea  -c/w standing duonebs q6  -c/w home budesonide  -c/w furosemide

## 2020-09-14 NOTE — PHYSICAL THERAPY INITIAL EVALUATION ADULT - MODALITIES TREATMENT COMMENTS
Of note pt. was noted to desaturate to 86% on rom air with supine to sit, maintained SpO2 at 90% with short distance ambulation on 4L/min via NC.

## 2020-09-14 NOTE — PROGRESS NOTE ADULT - PROBLEM SELECTOR PLAN 2
Patient complaining of pain in LLE, warmth noted on exam. Patient started on Ancef on 9/11. Per home health aide, pt has recurrent cellulitis multiple times a month.   -c/w ancef 1g q8 (9/11-)  -pt has a history of DVT but is on Eliquis; consider doppler if pain/warmth do not resolve with antibiotic treatment Patient complaining of pain in LLE, warmth noted on exam. Patient started on Ancef on 9/11. Per home health aide, pt has recurrent cellulitis multiple times a month.   -Discontinued ancef 1g q8   -Started Cephalexin 500 mg for four day course

## 2020-09-14 NOTE — PROGRESS NOTE ADULT - PROBLEM SELECTOR PLAN 8
Patient with Hgb 8.1 on admission, now downtrended to 7.3, down from her baseline of 9-10s on prior admissions. No signs of active bleeding. Patient was also noted to be anemic on last admission and did not require transfusion.   -continue to trend CBC   -Active T&S, most recently 9/11  -F/u iron studies   -Transfuse if Hgb <7   -f/u haptoglobin, LDH, retic count   -Continue to monitor    #Restless leg syndrome: patient reports hx of restless leg syndrome and neuropathy 2/2 prior Lyme Disease infection   -C/w Pramipexole 1.5 mg at bedtime Patient with Hgb 8.1 on admission, now 7.9, down from her baseline of 9-10s on prior admissions. No signs of active bleeding. Patient was also noted to be anemic on last admission and did not require transfusion.   -continue to trend CBC   -Active T&S, most recently 9/11  -F/u iron studies   -Transfuse if Hgb <7   -Continue to monitor    #Restless leg syndrome: patient reports hx of restless leg syndrome and neuropathy 2/2 prior Lyme Disease infection   -C/w Pramipexole 1.5 mg at bedtime

## 2020-09-14 NOTE — PROGRESS NOTE ADULT - ASSESSMENT
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

## 2020-09-15 ENCOUNTER — TRANSCRIPTION ENCOUNTER (OUTPATIENT)
Age: 80
End: 2020-09-15

## 2020-09-15 VITALS
HEART RATE: 81 BPM | DIASTOLIC BLOOD PRESSURE: 73 MMHG | TEMPERATURE: 98 F | RESPIRATION RATE: 18 BRPM | SYSTOLIC BLOOD PRESSURE: 146 MMHG | OXYGEN SATURATION: 92 %

## 2020-09-15 DIAGNOSIS — R53.81 OTHER MALAISE: ICD-10-CM

## 2020-09-15 DIAGNOSIS — Z51.5 ENCOUNTER FOR PALLIATIVE CARE: ICD-10-CM

## 2020-09-15 DIAGNOSIS — J96.90 RESPIRATORY FAILURE, UNSPECIFIED, UNSPECIFIED WHETHER WITH HYPOXIA OR HYPERCAPNIA: ICD-10-CM

## 2020-09-15 LAB
ANION GAP SERPL CALC-SCNC: 12 MMOL/L — SIGNIFICANT CHANGE UP (ref 5–17)
BUN SERPL-MCNC: 31 MG/DL — HIGH (ref 7–23)
CALCIUM SERPL-MCNC: 9.1 MG/DL — SIGNIFICANT CHANGE UP (ref 8.4–10.5)
CHLORIDE SERPL-SCNC: 98 MMOL/L — SIGNIFICANT CHANGE UP (ref 96–108)
CO2 SERPL-SCNC: 31 MMOL/L — SIGNIFICANT CHANGE UP (ref 22–31)
CREAT SERPL-MCNC: 1.13 MG/DL — SIGNIFICANT CHANGE UP (ref 0.5–1.3)
CULTURE RESULTS: SIGNIFICANT CHANGE UP
CULTURE RESULTS: SIGNIFICANT CHANGE UP
GLUCOSE SERPL-MCNC: 100 MG/DL — HIGH (ref 70–99)
HCT VFR BLD CALC: 27.1 % — LOW (ref 34.5–45)
HGB BLD-MCNC: 7.8 G/DL — LOW (ref 11.5–15.5)
MAGNESIUM SERPL-MCNC: 1.8 MG/DL — SIGNIFICANT CHANGE UP (ref 1.6–2.6)
MCHC RBC-ENTMCNC: 25.2 PG — LOW (ref 27–34)
MCHC RBC-ENTMCNC: 28.8 GM/DL — LOW (ref 32–36)
MCV RBC AUTO: 87.4 FL — SIGNIFICANT CHANGE UP (ref 80–100)
NRBC # BLD: 0 /100 WBCS — SIGNIFICANT CHANGE UP (ref 0–0)
PHOSPHATE SERPL-MCNC: 3 MG/DL — SIGNIFICANT CHANGE UP (ref 2.5–4.5)
PLATELET # BLD AUTO: 217 K/UL — SIGNIFICANT CHANGE UP (ref 150–400)
POTASSIUM SERPL-MCNC: 3.7 MMOL/L — SIGNIFICANT CHANGE UP (ref 3.5–5.3)
POTASSIUM SERPL-SCNC: 3.7 MMOL/L — SIGNIFICANT CHANGE UP (ref 3.5–5.3)
RBC # BLD: 3.1 M/UL — LOW (ref 3.8–5.2)
RBC # FLD: 17.8 % — HIGH (ref 10.3–14.5)
SODIUM SERPL-SCNC: 141 MMOL/L — SIGNIFICANT CHANGE UP (ref 135–145)
SPECIMEN SOURCE: SIGNIFICANT CHANGE UP
SPECIMEN SOURCE: SIGNIFICANT CHANGE UP
WBC # BLD: 7.45 K/UL — SIGNIFICANT CHANGE UP (ref 3.8–10.5)
WBC # FLD AUTO: 7.45 K/UL — SIGNIFICANT CHANGE UP (ref 3.8–10.5)

## 2020-09-15 PROCEDURE — 99239 HOSP IP/OBS DSCHRG MGMT >30: CPT

## 2020-09-15 RX ORDER — APIXABAN 2.5 MG/1
1 TABLET, FILM COATED ORAL
Qty: 0 | Refills: 0 | DISCHARGE

## 2020-09-15 RX ORDER — PRAMIPEXOLE DIHYDROCHLORIDE 0.12 MG/1
1 TABLET ORAL
Qty: 0 | Refills: 0 | DISCHARGE
Start: 2020-09-15

## 2020-09-15 RX ORDER — MAGNESIUM SULFATE 500 MG/ML
1 VIAL (ML) INJECTION ONCE
Refills: 0 | Status: COMPLETED | OUTPATIENT
Start: 2020-09-15 | End: 2020-09-15

## 2020-09-15 RX ORDER — PRAMIPEXOLE DIHYDROCHLORIDE 0.12 MG/1
1 TABLET ORAL
Qty: 0 | Refills: 0 | DISCHARGE

## 2020-09-15 RX ORDER — HYDROMORPHONE HYDROCHLORIDE 2 MG/ML
2 INJECTION INTRAMUSCULAR; INTRAVENOUS; SUBCUTANEOUS
Qty: 0 | Refills: 0 | DISCHARGE

## 2020-09-15 RX ORDER — HYDROMORPHONE HYDROCHLORIDE 2 MG/ML
1 INJECTION INTRAMUSCULAR; INTRAVENOUS; SUBCUTANEOUS
Qty: 0 | Refills: 0 | DISCHARGE
Start: 2020-09-15

## 2020-09-15 RX ORDER — DILTIAZEM HCL 120 MG
1 CAPSULE, EXT RELEASE 24 HR ORAL
Qty: 0 | Refills: 0 | DISCHARGE
Start: 2020-09-15

## 2020-09-15 RX ORDER — REVEFENACIN 175 UG/3ML
175 SOLUTION RESPIRATORY (INHALATION)
Qty: 0 | Refills: 0 | DISCHARGE

## 2020-09-15 RX ORDER — IPRATROPIUM/ALBUTEROL SULFATE 18-103MCG
3 AEROSOL WITH ADAPTER (GRAM) INHALATION
Qty: 0 | Refills: 0 | DISCHARGE
Start: 2020-09-15

## 2020-09-15 RX ORDER — BUPROPION HYDROCHLORIDE 150 MG/1
1 TABLET, EXTENDED RELEASE ORAL
Qty: 0 | Refills: 0 | DISCHARGE
Start: 2020-09-15

## 2020-09-15 RX ORDER — BUDESONIDE AND FORMOTEROL FUMARATE DIHYDRATE 160; 4.5 UG/1; UG/1
1 AEROSOL RESPIRATORY (INHALATION)
Qty: 30 | Refills: 0
Start: 2020-09-15 | End: 2020-10-14

## 2020-09-15 RX ORDER — BUDESONIDE, MICRONIZED 100 %
2 POWDER (GRAM) MISCELLANEOUS
Qty: 0 | Refills: 0 | DISCHARGE

## 2020-09-15 RX ORDER — PERMETHRIN CREAM 5% W/W 50 MG/G
1 CREAM TOPICAL
Qty: 0 | Refills: 0 | DISCHARGE

## 2020-09-15 RX ORDER — APIXABAN 2.5 MG/1
1 TABLET, FILM COATED ORAL
Qty: 0 | Refills: 0 | DISCHARGE
Start: 2020-09-15

## 2020-09-15 RX ORDER — TIOTROPIUM BROMIDE 18 UG/1
1 CAPSULE ORAL; RESPIRATORY (INHALATION)
Qty: 30 | Refills: 0
Start: 2020-09-15 | End: 2020-10-14

## 2020-09-15 RX ORDER — PERMETHRIN CREAM 5% W/W 50 MG/G
1 CREAM TOPICAL ONCE
Refills: 0 | Status: COMPLETED | OUTPATIENT
Start: 2020-09-15 | End: 2020-09-15

## 2020-09-15 RX ORDER — ATORVASTATIN CALCIUM 80 MG/1
1 TABLET, FILM COATED ORAL
Qty: 0 | Refills: 0 | DISCHARGE
Start: 2020-09-15

## 2020-09-15 RX ORDER — DILTIAZEM HCL 120 MG
1 CAPSULE, EXT RELEASE 24 HR ORAL
Qty: 0 | Refills: 0 | DISCHARGE

## 2020-09-15 RX ORDER — CEPHALEXIN 500 MG
1 CAPSULE ORAL
Qty: 0 | Refills: 0 | DISCHARGE
Start: 2020-09-15

## 2020-09-15 RX ORDER — ATORVASTATIN CALCIUM 80 MG/1
1 TABLET, FILM COATED ORAL
Qty: 0 | Refills: 0 | DISCHARGE

## 2020-09-15 RX ORDER — BUDESONIDE, MICRONIZED 100 %
0 POWDER (GRAM) MISCELLANEOUS
Qty: 0 | Refills: 0 | DISCHARGE
Start: 2020-09-15

## 2020-09-15 RX ORDER — PERMETHRIN CREAM 5% W/W 50 MG/G
1 CREAM TOPICAL
Qty: 0 | Refills: 0 | DISCHARGE
Start: 2020-09-15

## 2020-09-15 RX ORDER — FORMOTEROL FUMARATE 12 MCG
2 CAPSULE, WITH INHALATION DEVICE INHALATION
Qty: 0 | Refills: 0 | DISCHARGE

## 2020-09-15 RX ORDER — CALAMINE AND ZINC OXIDE AND PHENOL 160; 10 MG/ML; MG/ML
1 LOTION TOPICAL
Qty: 0 | Refills: 0 | DISCHARGE
Start: 2020-09-15

## 2020-09-15 RX ADMIN — Medication 3 MILLILITER(S): at 15:17

## 2020-09-15 RX ADMIN — Medication 500 MILLIGRAM(S): at 06:13

## 2020-09-15 RX ADMIN — Medication 40 MILLIGRAM(S): at 12:08

## 2020-09-15 RX ADMIN — Medication 500 MILLIGRAM(S): at 12:08

## 2020-09-15 RX ADMIN — Medication 0.25 MILLIGRAM(S): at 17:14

## 2020-09-15 RX ADMIN — Medication 3 MILLILITER(S): at 09:37

## 2020-09-15 RX ADMIN — Medication 180 MILLIGRAM(S): at 06:12

## 2020-09-15 RX ADMIN — Medication 500 MILLIGRAM(S): at 17:14

## 2020-09-15 RX ADMIN — Medication 3 MILLILITER(S): at 03:08

## 2020-09-15 RX ADMIN — PERMETHRIN CREAM 5% W/W 1 APPLICATION(S): 50 CREAM TOPICAL at 14:47

## 2020-09-15 RX ADMIN — Medication 0.25 MILLIGRAM(S): at 06:13

## 2020-09-15 RX ADMIN — Medication 500 MILLIGRAM(S): at 01:09

## 2020-09-15 RX ADMIN — BUPROPION HYDROCHLORIDE 300 MILLIGRAM(S): 150 TABLET, EXTENDED RELEASE ORAL at 12:08

## 2020-09-15 RX ADMIN — Medication 100 GRAM(S): at 09:37

## 2020-09-15 RX ADMIN — APIXABAN 5 MILLIGRAM(S): 2.5 TABLET, FILM COATED ORAL at 09:37

## 2020-09-15 NOTE — DISCHARGE NOTE PROVIDER - NSDCCPCAREPLAN_GEN_ALL_CORE_FT
PRINCIPAL DISCHARGE DIAGNOSIS  Diagnosis: SOB (shortness of breath)  Assessment and Plan of Treatment: The shortness of breath you experinced was due to a COPD exacerbation. COPD is a lung disease that makes it hard to breathe. In people with COPD, the airways (the branching tubes that carry air within the lungs) become narrow and damaged. This makes people feel out of breath and tired. COPD can be a serious illness. It cannot be cured and it usually gets worse over time. But there are treatments that can help. The most common cause of COPD is smoking. Smoke can damage the lungs forever and cause COPD. Common symptoms include shortness of breath, wheezing, and worsening cough and mucus production. COPD can put you at an increased risk for lung infections, lung cancer and heart problems. If you smoke, the most important thing you can do for your COPD is to stop smoking. There are a lot of medicines to treat COPD. Most people use inhalers that help open up their airways or decrease swelling in the airways. Often people need more than one inhaler at a time. You might need to take a steroid medicine in a pill for a flare of COPD. If the disease gets worse, you might need to use oxygen. Pulmonary rehabilitation may be recommended and can teach you how to improve your symptoms through exercises and different ways to breathe. RARELY, people with severe COPD will have surgery to remove the most damaged parts of their lung. This surgery can reduce symptoms, but it does not always work. In order to prevent COPD exacerbations it is important that you take your prescribed medications and follow the recommendations of your primary care and pulmonary doctors. If your shortness of breath worsens or your experience an increase in or change of your sputum production you should seek evaluation by your primary care doctor, or, if severe, an emergency room.         PRINCIPAL DISCHARGE DIAGNOSIS  Diagnosis: SOB (shortness of breath)  Assessment and Plan of Treatment: The shortness of breath you experinced was due to a COPD exacerbation. COPD is a lung disease that makes it hard to breathe. In people with COPD, the airways (the branching tubes that carry air within the lungs) become narrow and damaged. This makes people feel out of breath and tired. COPD can be a serious illness. It cannot be cured and it usually gets worse over time. But there are treatments that can help. The most common cause of COPD is smoking. Smoke can damage the lungs forever and cause COPD. Common symptoms include shortness of breath, wheezing, and worsening cough and mucus production. COPD can put you at an increased risk for lung infections, lung cancer and heart problems. If you smoke, the most important thing you can do for your COPD is to stop smoking. There are a lot of medicines to treat COPD. Most people use inhalers that help open up their airways or decrease swelling in the airways. Often people need more than one inhaler at a time.   When you arrived to the emergency department you were complaining of shortness of breath.  The oxygen levels in your blood were low so you were given oxygen.  Then while in the ED you became very short of breath and started to turn blue and could not protect your airway.  As a result you were intubated to protect your airway.  You were then brought up to ICU for further care.  While in the ICU you were given breathing treatments to help you breath better.  As your breathing improved the tube that was inserted in your airway was removed.  You were then moved to a regular medical floor where you were conitued on breathing treatments.  When you are discharged from the hospital please continue with your home medication: Duoneb, Yupelri, Perforomist.  Please inhale Yupelri once a day by mouth.  Please inhale Performoist by mouth two times a day.  Please use Duoneb every 6 hours by mouth if needed.         SECONDARY DISCHARGE DIAGNOSES  Diagnosis: Cellulitis  Assessment and Plan of Treatment: Cellulitis  is a common, potentially serious bacterial skin infection. The affected skin appears swollen and red and is typically painful and warm to the touch. Cellulitis usually affects the skin on the lower legs, but it can occur in the face, arms and other areas. It occurs when a crack or break in your skin allows bacteria to enter.       PRINCIPAL DISCHARGE DIAGNOSIS  Diagnosis: SOB (shortness of breath)  Assessment and Plan of Treatment: The shortness of breath you experinced was due to a COPD exacerbation. COPD is a lung disease that makes it hard to breathe. In people with COPD, the airways (the branching tubes that carry air within the lungs) become narrow and damaged. This makes people feel out of breath and tired. COPD can be a serious illness. It cannot be cured and it usually gets worse over time. But there are treatments that can help. The most common cause of COPD is smoking. Smoke can damage the lungs forever and cause COPD. Common symptoms include shortness of breath, wheezing, and worsening cough and mucus production. COPD can put you at an increased risk for lung infections, lung cancer and heart problems. If you smoke, the most important thing you can do for your COPD is to stop smoking. There are a lot of medicines to treat COPD. Most people use inhalers that help open up their airways or decrease swelling in the airways. Often people need more than one inhaler at a time.   When you arrived to the emergency department you were complaining of shortness of breath.  The oxygen levels in your blood were low so you were given oxygen.  Then while in the ED you became very short of breath and started to turn blue and could not protect your airway.  As a result you were intubated to protect your airway.  You were then brought up to ICU for further care.  While in the ICU you were given breathing treatments to help you breath better.  As your breathing improved the tube that was inserted in your airway was removed.  You were then moved to a regular medical floor where you were conitued on breathing treatments.  When you are discharged from the hospital please continue with your home medication: Duoneb, Yupelri, Perforomist.  Please inhale Yupelri once a day by mouth.  Please inhale Performoist by mouth two times a day.  Please use Duoneb every 6 hours by mouth if needed.         SECONDARY DISCHARGE DIAGNOSES  Diagnosis: Cellulitis  Assessment and Plan of Treatment: Cellulitis  is a common, potentially serious bacterial skin infection. The affected skin appears swollen and red and is typically painful and warm to the touch. Cellulitis usually affects the skin on the lower legs, but it can occur in the face, arms and other areas. It occurs when a crack or break in your skin allows bacteria to enter.  While you were in the hospital you had signs and symptoms of celluliits.  Given your history of previous infections you were started on a treatment for this skin infection.  You now will be discharged on a medication called Cephalexin.  Please take one tablet of Cephalexin by mouth once a day for three days.        PRINCIPAL DISCHARGE DIAGNOSIS  Diagnosis: SOB (shortness of breath)  Assessment and Plan of Treatment: The shortness of breath you experinced was due to a COPD exacerbation. COPD is a lung disease that makes it hard to breathe. In people with COPD, the airways (the branching tubes that carry air within the lungs) become narrow and damaged. This makes people feel out of breath and tired. COPD can be a serious illness. It cannot be cured and it usually gets worse over time. But there are treatments that can help. The most common cause of COPD is smoking. Smoke can damage the lungs forever and cause COPD. Common symptoms include shortness of breath, wheezing, and worsening cough and mucus production. COPD can put you at an increased risk for lung infections, lung cancer and heart problems. If you smoke, the most important thing you can do for your COPD is to stop smoking. There are a lot of medicines to treat COPD. Most people use inhalers that help open up their airways or decrease swelling in the airways. Often people need more than one inhaler at a time.   When you arrived to the emergency department you were complaining of shortness of breath.  The oxygen levels in your blood were low so you were given oxygen.  Then while in the ED you became very short of breath and started to turn blue and could not protect your airway.  As a result you were intubated to protect your airway.  You were then brought up to ICU for further care.  While in the ICU you were given breathing treatments to help you breath better.  As your breathing improved the tube that was inserted in your airway was removed.  You were then moved to a regular medical floor where you were conitued on breathing treatments.  When you are discharged from the hospital please continue with your home medication: spiriva and symbicort . Please take these inhalers as instructed. Please contact the provider of your CPAP machine to arrange the nasal pillows instead of the mask as discussed to help with your DALIA. Please follow up with your pulmonologist.         SECONDARY DISCHARGE DIAGNOSES  Diagnosis: Cellulitis  Assessment and Plan of Treatment: Cellulitis  is a common, potentially serious bacterial skin infection. The affected skin appears swollen and red and is typically painful and warm to the touch. Cellulitis usually affects the skin on the lower legs, but it can occur in the face, arms and other areas. It occurs when a crack or break in your skin allows bacteria to enter.  While you were in the hospital you had signs and symptoms of celluliits.  Given your history of previous infections you were started on a treatment for this skin infection.  You now will be discharged on a medication called Cephalexin.  Please take one tablet of Cephalexin by mouth once a day for three days.

## 2020-09-15 NOTE — DISCHARGE NOTE PROVIDER - NSDCMRMEDTOKEN_GEN_ALL_CORE_FT
budesonide 0.25 mg/2 mL inhalation suspension: 2 milliliter(s) inhaled 2 times a day  bumetanide 2 mg oral tablet: 1 tab(s) orally once a day  buPROPion 150 mg/12 hours (SR) oral tablet, extended release: 1 tab(s) orally 2 times a day  Dilaudid 4 mg oral tablet: 2 tab(s) orally 4 times a day, As Needed  dilTIAZem 180 mg/24 hours oral capsule, extended release: 1 cap(s) orally once a day  Eliquis 5 mg oral tablet: 1 tab(s) orally 2 times a day  hydrOXYzine hydrochloride 10 mg oral tablet: 1 tab(s) orally once a day (at bedtime), As Needed  ivermectin 3 mg oral tablet: 7 tab(s) orally once  Lipitor 40 mg oral tablet: 1 tab(s) orally once a day  Mirapex ER 1.5 mg oral tablet, extended release: 1 tab(s) orally once a day  Perforomist 20 mcg/2 mL inhalation solution: 2 milliliter(s) inhaled 2 times a day  permethrin 5% topical cream: Apply topically to affected area once  Valium 5 mg oral tablet: orally 2 times a day, As Needed  Yupelri 175 mcg/3 mL inhalation solution: 175 microgram(s) inhaled once a day   apixaban 5 mg oral tablet: 1 tab(s) orally every 12 hours  atorvastatin 40 mg oral tablet: 1 tab(s) orally once a day (at bedtime)  budesonide 0.25 mg/2 mL inhalation suspension: 2 milliliter(s) inhaled 2 times a day  bumetanide 2 mg oral tablet: 1 tab(s) orally once a day  Dilaudid 4 mg oral tablet: 1 tab(s) orally 4 times a day, As needed, Severe Pain (7 - 10)  dilTIAZem 180 mg/24 hours oral capsule, extended release: 1 cap(s) orally once a day  hydrOXYzine hydrochloride 10 mg oral tablet: 1 tab(s) orally once a day (at bedtime), As Needed  ipratropium-albuterol 0.5 mg-2.5 mg/3 mLinhalation solution: 3 milliliter(s) inhaled every 6 hours  ivermectin 3 mg oral tablet: 7 tab(s) orally once  Perforomist 20 mcg/2 mL inhalation solution: 2 milliliter(s) inhaled 2 times a day  permethrin 5% topical cream: Apply topically to affected area once  pramipexole 1.5 mg oral tablet: 1 tab(s) orally once a day (at bedtime)  Valium 5 mg oral tablet: orally 2 times a day, As Needed  Wellbutrin  mg/24 hours oral tablet, extended release: 1 tab(s) orally once a day  Yupelri 175 mcg/3 mL inhalation solution: 175 microgram(s) inhaled once a day   Acticin 5% topical cream: 1 application topically once  apixaban 5 mg oral tablet: 1 tab(s) orally every 12 hours  atorvastatin 40 mg oral tablet: 1 tab(s) orally once a day (at bedtime)  budesonide:   bumetanide 2 mg oral tablet: 1 tab(s) orally once a day  calamine topical lotion: 1 application topically 2 times a day, As needed, Itching  cephalexin 500 mg oral capsule: 1 cap(s) orally every 6 hours  Dilaudid 4 mg oral tablet: 1 tab(s) orally 4 times a day, As needed, Severe Pain (7 - 10)  dilTIAZem 180 mg/24 hours oral capsule, extended release: 1 cap(s) orally once a day  hydrOXYzine hydrochloride 10 mg oral tablet: 1 tab(s) orally once a day (at bedtime), As Needed  ipratropium-albuterol 0.5 mg-2.5 mg/3 mLinhalation solution: 3 milliliter(s) inhaled every 6 hours  ivermectin 3 mg oral tablet: 7 tab(s) orally once  Perforomist 20 mcg/2 mL inhalation solution: 2 milliliter(s) inhaled 2 times a day  pramipexole 1.5 mg oral tablet: 1 tab(s) orally once a day (at bedtime)  Valium 5 mg oral tablet: orally 2 times a day, As Needed  Wellbutrin  mg/24 hours oral tablet, extended release: 1 tab(s) orally once a day  Yupelri 175 mcg/3 mL inhalation solution: 175 microgram(s) inhaled once a day   Acticin 5% topical cream: 1 application topically once  apixaban 5 mg oral tablet: 1 tab(s) orally every 12 hours  atorvastatin 40 mg oral tablet: 1 tab(s) orally once a day (at bedtime)  budesonide:   bumetanide 2 mg oral tablet: 1 tab(s) orally once a day  calamine topical lotion: 1 application topically 2 times a day, As needed, Itching  cephalexin 500 mg oral capsule: 1 cap(s) orally every 6 hours  Dilaudid 4 mg oral tablet: 1 tab(s) orally 4 times a day, As needed, Severe Pain (7 - 10)  dilTIAZem 180 mg/24 hours oral capsule, extended release: 1 cap(s) orally once a day  hydrOXYzine hydrochloride 10 mg oral tablet: 1 tab(s) orally once a day (at bedtime), As Needed  ivermectin 3 mg oral tablet: 7 tab(s) orally once  pramipexole 1.5 mg oral tablet: 1 tab(s) orally once a day (at bedtime)  Valium 5 mg oral tablet: orally 2 times a day, As Needed  Wellbutrin  mg/24 hours oral tablet, extended release: 1 tab(s) orally once a day

## 2020-09-15 NOTE — DISCHARGE NOTE PROVIDER - HOSPITAL COURSE
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   Problem List/Main Diagnoses (system-based):   1. COPD Exacerbation   2. CKD Stage IV  3. Heart Failure with preserved ejection fraction  4. Hyperlipidemia   5. Restless Leg Syndrome  Inpatient treatment course:     1. 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   Problem List/Main Diagnoses (system-based):   1. COPD Exacerbation   2. CKD Stage IV  3. Heart Failure with preserved ejection fraction  4. Hyperlipidemia   5. Restless Leg Syndrome  Inpatient treatment course:     1. COPD Exacerbation: While the patient presented to the ED with complaints of shorntess of breath, her O2 saturation was in the mid 80s, she was then placed on 2L NC and her oxygen saturation improved to 100%.  Patient the acutely lost airway and reportdly becam cyanotic.  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   Problem List/Main Diagnoses (system-based):   1. COPD Exacerbation   2. CKD Stage IV  3. Heart Failure with preserved ejection fraction  4. Hyperlipidemia   5. Restless Leg Syndrome  Inpatient treatment course:     1. COPD Exacerbation: While the patient presented to the ED with complaints of shorntess of breath, her O2 saturation was in the mid 80s, she was then placed on 2L NC and her oxygen saturation improved to 100%.  Patient the acutely lost airway and reportdly becam cyanotic.  The patient was then intubated and placed on mechanical ventilation.  CT was performed showing increased pulmonary edema and the patient was given Lasix given history of heart failure and was moved to the ICU.   While in the ICU the patient was placed on duoneb q6, prednisone 40mg q12, and started on azithromycin 500mg daily. Patient was extubated to NC the day after admission and continued on her breathing treatments and prednisone with resolution of her symptoms.      2. CKD Stage IV: Patient with history of CKD Stage IV on admission to hospital renal function appeared baseline, however electrolyte abnormalities were noted leading to nephrology consult. Upon review of prior admissions patients potassium level was elevated .  Patient was placed on a renal restriction diet and placed on IV Lasix 40 mg daily along with Lokelma 10g q8hr. After treatment patients creatine continued to improve and electrolytes reached normal levels.     3. Heart Failure with Preserved Ejection Fraction: patient with history of HFpEF and presented with symptoms o shortness of breath.  Patient remained euvolemic on exam with only trace edema in the lower extremities. Patient was treated with IV Lasix 40mg while in the MICU and then transitioned to 40 mg PO daily after step down.      4. Hyperlipidemia: Patient with a history of hyperlipidemia and was continued on Atorvastatin    5. 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   Problem List/Main Diagnoses (system-based):   1. COPD Exacerbation   2. CKD Stage IV  3. Heart Failure with preserved ejection fraction  4. Hyperlipidemia   5. Restless Leg Syndrome  Inpatient treatment course:     1. COPD Exacerbation: While the patient presented to the ED with complaints of shorntess of breath, her O2 saturation was in the mid 80s, she was then placed on 2L NC and her oxygen saturation improved to 100%.  Patient the acutely lost airway and reportdly becam cyanotic.  The patient was then intubated and placed on mechanical ventilation.  CT was performed showing increased pulmonary edema and the patient was given Lasix given history of heart failure and was moved to the ICU.   While in the ICU the patient was placed on duoneb q6, prednisone 40mg q12, and started on azithromycin 500mg daily. Patient was extubated to NC the day after admission and continued on her breathing treatments and prednisone with resolution of her symptoms.      2. CKD Stage IV: Patient with history of CKD Stage IV on admission to hospital renal function appeared baseline, however electrolyte abnormalities were noted leading to nephrology consult. Upon review of prior admissions patients potassium level was elevated .  Patient was placed on a renal restriction diet and placed on IV Lasix 40 mg daily along with Lokelma 10g q8hr. After treatment patients creatine continued to improve and electrolytes reached normal levels.     3. Heart Failure with Preserved Ejection Fraction: patient with history of HFpEF and presented with symptoms o shortness of breath.  Patient remained euvolemic on exam with only trace edema in the lower extremities. Patient was treated with IV Lasix 40mg while in the MICU and then transitioned to 40 mg PO daily after step down.      4. Hyperlipidemia: Patient with a history of hyperlipidemia and was continued on Atorvastatin.     5. 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   Problem List/Main Diagnoses (system-based):   1. COPD Exacerbation   2. CKD Stage IV  3. Heart Failure with preserved ejection fraction  4. Hyperlipidemia   5. Restless Leg Syndrome  Inpatient treatment course:     1. COPD Exacerbation: While the patient presented to the ED with complaints of shorntess of breath, her O2 saturation was in the mid 80s, she was then placed on 2L NC and her oxygen saturation improved to 100%.  Patient the acutely lost airway and reportdly becam cyanotic.  The patient was then intubated and placed on mechanical ventilation.  CT was performed showing increased pulmonary edema and the patient was given Lasix given history of heart failure and was moved to the ICU.   While in the ICU the patient was placed on duoneb q6, prednisone 40mg q12, and started on azithromycin 500mg daily. Patient was extubated to NC the day after admission and continued on her breathing treatments and prednisone with resolution of her symptoms.      2. CKD Stage IV: Patient with history of CKD Stage IV on admission to hospital renal function appeared baseline, however electrolyte abnormalities were noted leading to nephrology consult. Upon review of prior admissions patients potassium level was elevated .  Patient was placed on a renal restriction diet and placed on IV Lasix 40 mg daily along with Lokelma 10g q8hr. After treatment patients creatine continued to improve and electrolytes reached normal levels.     3. Heart Failure with Preserved Ejection Fraction: patient with history of HFpEF and presented with symptoms o shortness of breath.  Patient remained euvolemic on exam with only trace edema in the lower extremities. Patient was treated with IV Lasix 40mg while in the MICU and then transitioned to 40 mg PO daily after step down.      4. Hyperlipidemia: Patient with a history of hyperlipidemia and was continued on Atorvastatin.     5. 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   Problem List/Main Diagnoses (system-based):   1. COPD Exacerbation   2. CKD Stage IV  3. Heart Failure with preserved ejection fraction  4. Cellulitis   5. Hyperlipidemia    Inpatient treatment course:     1. COPD Exacerbation: While the patient presented to the ED with complaints of shorntess of breath, her O2 saturation was in the mid 80s, she was then placed on 2L NC and her oxygen saturation improved to 100%.  Patient the acutely lost airway and reportdly becam cyanotic.  The patient was then intubated and placed on mechanical ventilation.  CT was performed showing increased pulmonary edema and the patient was given Lasix given history of heart failure and was moved to the ICU.   While in the ICU the patient was placed on duoneb q6, prednisone 40mg q12, and started on azithromycin 500mg daily. Patient was extubated to NC the day after admission and continued on her breathing treatments and prednisone with resolution of her symptoms.      2. CKD Stage IV: Patient with history of CKD Stage IV on admission to hospital renal function appeared baseline, however electrolyte abnormalities were noted leading to nephrology consult. Upon review of prior admissions patients potassium level was elevated .  Patient was placed on a renal restriction diet and placed on IV Lasix 40 mg daily along with Lokelma 10g q8hr. After treatment patients creatine continued to improve and electrolytes reached normal levels.     3. Heart Failure with Preserved Ejection Fraction: patient with history of HFpEF and presented with symptoms o shortness of breath.  Patient remained euvolemic on exam with only trace edema in the lower extremities. Patient was treated with IV Lasix 40mg while in the MICU and then transitioned to 40 mg PO daily after step down.      4. Cellulitis: Patient complaining of pain in LLE, warmth noted on exam. Patient with a recurrent history of cellulitis.  Patient started on Ancef on 9/11 but then switched to Cephalexin.      5. Hyperlipidemia: Patient with a history of hyperlipidemia and was continued on Atorvastatin.     New medications:   Labs to be followed outpatient: None   Exam to be followed outpatient: None

## 2020-09-15 NOTE — DISCHARGE NOTE NURSING/CASE MANAGEMENT/SOCIAL WORK - PATIENT PORTAL LINK FT
You can access the FollowMyHealth Patient Portal offered by [facility] by registering at the following website: [email or web address]. By joining [facility]’s FollowMyHealth portal, you will also be able to view your health information using other applications (apps) compatible with our system.

## 2020-09-15 NOTE — DISCHARGE NOTE PROVIDER - CARE PROVIDER_API CALL
TRISTON LADD  [id number]  [address] [place] [address] 4  [place] [address]  Phone: [phone]  Fax: [phone]  Follow Up Time:

## 2020-09-16 ENCOUNTER — NON-APPOINTMENT (OUTPATIENT)
Age: 80
End: 2020-09-16

## 2020-09-21 DIAGNOSIS — J96.01 ACUTE RESPIRATORY FAILURE WITH HYPOXIA: ICD-10-CM

## 2020-09-21 DIAGNOSIS — N17.9 ACUTE KIDNEY FAILURE, UNSPECIFIED: ICD-10-CM

## 2020-09-21 DIAGNOSIS — Z85.828 PERSONAL HISTORY OF OTHER MALIGNANT NEOPLASM OF SKIN: ICD-10-CM

## 2020-09-21 DIAGNOSIS — G62.9 POLYNEUROPATHY, UNSPECIFIED: ICD-10-CM

## 2020-09-21 DIAGNOSIS — J96.02 ACUTE RESPIRATORY FAILURE WITH HYPERCAPNIA: ICD-10-CM

## 2020-09-21 DIAGNOSIS — F17.200 NICOTINE DEPENDENCE, UNSPECIFIED, UNCOMPLICATED: ICD-10-CM

## 2020-09-21 DIAGNOSIS — M54.9 DORSALGIA, UNSPECIFIED: ICD-10-CM

## 2020-09-21 DIAGNOSIS — Z86.718 PERSONAL HISTORY OF OTHER VENOUS THROMBOSIS AND EMBOLISM: ICD-10-CM

## 2020-09-21 DIAGNOSIS — Z87.81 PERSONAL HISTORY OF (HEALED) TRAUMATIC FRACTURE: ICD-10-CM

## 2020-09-21 DIAGNOSIS — I13.0 HYPERTENSIVE HEART AND CHRONIC KIDNEY DISEASE WITH HEART FAILURE AND STAGE 1 THROUGH STAGE 4 CHRONIC KIDNEY DISEASE, OR UNSPECIFIED CHRONIC KIDNEY DISEASE: ICD-10-CM

## 2020-09-21 DIAGNOSIS — G89.29 OTHER CHRONIC PAIN: ICD-10-CM

## 2020-09-21 DIAGNOSIS — D64.9 ANEMIA, UNSPECIFIED: ICD-10-CM

## 2020-09-21 DIAGNOSIS — Z51.5 ENCOUNTER FOR PALLIATIVE CARE: ICD-10-CM

## 2020-09-21 DIAGNOSIS — R06.02 SHORTNESS OF BREATH: ICD-10-CM

## 2020-09-21 DIAGNOSIS — K56.609 UNSPECIFIED INTESTINAL OBSTRUCTION, UNSPECIFIED AS TO PARTIAL VERSUS COMPLETE OBSTRUCTION: ICD-10-CM

## 2020-09-21 DIAGNOSIS — E87.5 HYPERKALEMIA: ICD-10-CM

## 2020-09-21 DIAGNOSIS — J44.1 CHRONIC OBSTRUCTIVE PULMONARY DISEASE WITH (ACUTE) EXACERBATION: ICD-10-CM

## 2020-09-21 DIAGNOSIS — L03.119 CELLULITIS OF UNSPECIFIED PART OF LIMB: ICD-10-CM

## 2020-09-21 DIAGNOSIS — G25.81 RESTLESS LEGS SYNDROME: ICD-10-CM

## 2020-09-21 DIAGNOSIS — E66.01 MORBID (SEVERE) OBESITY DUE TO EXCESS CALORIES: ICD-10-CM

## 2020-09-21 DIAGNOSIS — G47.33 OBSTRUCTIVE SLEEP APNEA (ADULT) (PEDIATRIC): ICD-10-CM

## 2020-09-21 DIAGNOSIS — I87.2 VENOUS INSUFFICIENCY (CHRONIC) (PERIPHERAL): ICD-10-CM

## 2020-09-21 DIAGNOSIS — N18.4 CHRONIC KIDNEY DISEASE, STAGE 4 (SEVERE): ICD-10-CM

## 2020-09-21 DIAGNOSIS — Z78.1 PHYSICAL RESTRAINT STATUS: ICD-10-CM

## 2020-09-21 DIAGNOSIS — Z86.19 PERSONAL HISTORY OF OTHER INFECTIOUS AND PARASITIC DISEASES: ICD-10-CM

## 2020-09-21 DIAGNOSIS — E78.5 HYPERLIPIDEMIA, UNSPECIFIED: ICD-10-CM

## 2020-09-21 DIAGNOSIS — I50.30 UNSPECIFIED DIASTOLIC (CONGESTIVE) HEART FAILURE: ICD-10-CM

## 2020-11-16 NOTE — ED ADULT NURSE NOTE - HOW OFTEN DO YOU HAVE A DRINK CONTAINING ALCOHOL?
Protocol For Photochemotherapy: Tar And Broad Band Uvb (Goeckerman Treatment): The patient received Photochemotherapy: Tar and Broad Band UVB (Goeckerman treatment). Protocol For Nb Uva: The patient received NB UVA. Protocol For Puva: The patient received PUVA. Protocol For Photochemotherapy: Mineral Oil And Nbuvb: The patient received Photochemotherapy: Mineral Oil and NBUVB (mineral oil applied to all lesions prior to phototherapy). Total Body Energy: 300 Protocol For Nbuvb: The patient received NBUVB. Protocol For Photochemotherapy: Petrolatum And Broad Band Uvb: The patient received Photochemotherapy: Petrolatum and Broad Band UVB. Protocol For Uva: The patient received UVA. Render Post-Care In The Note: no Protocol For Photochemotherapy: Triamcinolone Ointment And Nbuvb: The patient received Photochemotherapy: Triamcinolone and NBUVB (triamcinolone ointment applied to all lesions prior to phototherapy). Total Body Time: 2:47 Protocol For Photochemotherapy For Severe Photoresponsive Dermatoses: Puva: The patient received Photochemotherapy for severe photoresponsive dermatoses: PUVA requiring at least 4 to 8 hours of care under direct physician supervision. Protocol For Photochemotherapy: Mineral Oil And Broad Band Uvb: The patient received Photochemotherapy: Mineral Oil and Broad Band UVB. Protocol For Photochemotherapy: Tar And Nbuvb (Goeckerman Treatment): The patient received Photochemotherapy: Tar and NBUVB (Goeckerman treatment). Protocol: Photochemotherapy: Petrolatum and NBUVB Protocol For Uva1: The patient received UVA1. Protocol For Photochemotherapy For Severe Photoresponsive Dermatoses: Tar And Nbuvb (Goeckerman Treatment): The patient received Photochemotherapy for severe photoresponsive dermatoses: Tar and NBUVB (Goeckerman treatment) requiring at least 4 to 8 hours of care under direct physician supervision. Protocol For Bath Puva: The patient received Bath PUVA. Protocol For Photochemotherapy: Petrolatum And Nbuvb: The patient received Photochemotherapy: Petrolatum and NBUVB (Petrolatum was made available to the patient and offered to help with application, the patient elected to apply the petrolatum to his/her own affected areas). Detail Level: Generalized Post-Care Instructions: I reviewed with the patient in detail post-care instructions. Patient is to wear sun protection. Patients may expect sunburn like redness, discomfort and scabbing. Protocol For Photochemotherapy For Severe Photoresponsive Dermatoses: Tar And Broad Band Uvb (Goeckerman Treatment): The patient received Photochemotherapy for severe photoresponsive dermatoses: Tar and Broad Band UVB (Goeckerman treatment) requiring at least 4 to 8 hours of care under direct physician supervision. Protocol For Broad Band Uvb: The patient received Broad Band UVB. Protocol For Protocol For Photochemotherapy For Severe Photoresponsive Dermatoses: Bath Puva: The patient received Photochemotherapy for severe photoresponsive dermatoses: Bath PUVA requiring at least 4 to 8 hours of care under direct physician supervision. Protocol For Photochemotherapy For Severe Photoresponsive Dermatoses: Petrolatum And Nbuvb: The patient received Photochemotherapy for severe photoresponsive dermatoses: Petrolatum and NBUVB requiring at least 4 to 8 hours of care under direct physician supervision. Never Protocol For Photochemotherapy: Baby Oil And Nbuvb: The patient received Photochemotherapy: Baby Oil and NBUVB (baby oil applied to all lesions prior to phototherapy). Protocol For Photochemotherapy For Severe Photoresponsive Dermatoses: Petrolatum And Broad Band Uvb: The patient received Photochemotherapyfor severe photoresponsive dermatoses: Petrolatum and Broad Band UVB requiring at least 4 to 8 hours of care under direct physician supervision. Treatment Number: 1

## 2020-11-26 NOTE — ED ADULT NURSE NOTE - NSHISCREENINGQ1_ED_A_ED
Hematology/Oncology Progress Note    Subjective    V. fib reported on the monitor 11/25 -> Completed with DC shock  Ongoing left lung infection/ pus drainage despite antibiotics and antifungal    Review of Systems     Unable to obtain due to patient condition    Objective  Vital Signs  Temp:  [96.3 °F (35.7 °C)-97.7 °F (36.5 °C)] 96.4 °F (35.8 °C)  Heart Rate:  [54-80] 78  Resp:  [14-30] 30  Arterial Line BP: ()/(33-54) 138/48  FiO2 (%):  [60 %-100 %] 60 %     On physical examination  Sleeping but arousable,    no bloody secretions around tracheostomy tube or through it    Decreased breath sounds anteriorly LT >RT. purulent discharges LT chest wall (dressed)    Heart: irregular rhythm,  Abdomen soft, no organomegaly   Extremities no acrocyanosis or edema  CNS move RT upper and lower extremity spontaneously     Lab:    WBC 33.6, hemoglobin 9.4, platelet 256    Impression/ Plan  # CAD, CHF, sever AI      # Cardiogenic shock, status post MI----> [facility]- ECMO since  9/24/2020-->decanulation Nov 13        # Afib with RVR off AC due to various bleeding sites, on amiodarone     # TULIO due cardiogenic shock and ATN:  CRRT--> HD     # LT lung infection on meropenem & micafungin.       s/p VATS/ evacuated hemothorax      Repeat CT, loculated hydropneumothorax on the LT        # Pancreatic lesion 2 cm undeterminate ? Fluid collection vs lesion , ant aspect of spleen ? Fluid collection.      Will hold off MRI abdomen, given the active medical issues including active infection        # Coagulopathy with high PT and PTT, secondary to DIC and shock liver. 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 PLT normalized after ECMO decanulation      #  History of testicular  cancer, unknown if patient received any chemotherapy in the past.  AFP < 2 , BhCG normal     # elevated PSA,  likely elevated due to diaz cath placement, seen by urology svc       # Lt occipital and Lt temporal strokes not hemorrhagic (noted 9/27)  ? due to cardiogenic shock , arrhythmia,  no clear correlation with HIT     #  B-cell lymphoproliferative disorder, Leukocytosis, uptrending  Marginal  zone lymphoma is in the differential diagnosis   FISH for BCR -ABL:  Negative-> Not CML      flowcytometry: very small  population (11% of total lymphocytes     monoclonal B cells expressing CD45, CD19, CD20 with lambda light chain        restriction; negative for CD23, CD5 and CD10         Not CLL/SLL,  follicular lymphoma, mantle cell lymphoma or hairy cell leukemia.       No lymphadenopathy on CT chest abdomen pelvis      Recommend bone marrow biopsy if his condition stabilizes  Close monitoring, most likely would not require active treatment  IgG level 1240 ( normal)      # Anemia due to multiple sites of bleeding ( blood loss), anemia of chronic illness, baseline  Hgb 13 in 9/20. No indication for nutritional deficiency based on iron profile, B12 and folic  Acid. Doubt lymphoproliferative disorder as a contributing factor          # V, fib on 11/24 converted to A. fib after DC shock    Case discussed with RN in round today     Santhosh Gauthier M.D.  Hematology/Oncology  [facility]  11/25/2020 6:53 PM   No

## 2021-02-18 NOTE — PATIENT PROFILE ADULT - FOOD INSECURITY
History and Physical -  [facility]  Evan Olvera 63 y o  male MRN: [id number]    HPI: Evan Olvera is a 63y o  year old male who presents with hx of adenomatous polyp, suboptimal prep 2017  Review of Systems    Historical Information   Past Medical History:   Diagnosis Date    Colon polyp     Hypertension     Irregular heartbeat     afib     Past Surgical History:   Procedure Laterality Date    INGUINAL HERNIA REPAIR Left     TONSILLECTOMY      TOOTH EXTRACTION       Social History   Social History     Substance and Sexual Activity   Alcohol Use Yes    Frequency: 2-3 times a week    Drinks per session: 1 or 2     Social History     Substance and Sexual Activity   Drug Use No     Social History     Tobacco Use   Smoking Status Never Smoker   Smokeless Tobacco Never Used     Family History   Problem Relation Age of Onset    Heart disease Father        Meds/Allergies     (Not in a hospital admission)      No Known Allergies    Objective     BP (!) 173/91   Pulse 64   Temp 97 5 °F (36 4 °C) (Temporal)   Resp 18   Ht 5' 11" (1 803 m)   Wt 88 9 kg (196 lb)   SpO2 98%   BMI 27 34 kg/m²       PHYSICAL EXAM    Gen: NAD  CV: RRR  CHEST: Clear  ABD: soft, NT/ND  EXT: no edema  Neuro: AAO      ASSESSMENT/PLAN:  This is a 63y o  year old male here for  hx of adenomatous polyp, suboptimal prep 2017        PLAN:   Procedure: colonoscopy no

## 2021-03-20 NOTE — H&P ADULT - PROBLEM SELECTOR PLAN 10
75yr old female w/ pmhx of HTN, HLD, depression, anxiety, presents to ED c/o decreased appetite, and cough. Pt states for the past few weeks she has been feeling weak, and has a decrease in appetite, along with, upset stomach, loss of taste and cough, as well as "cold fingers". Pt went to  last week, had a covid test performed that came back negative. Pt states her symptoms have not subsided. Pt denies CP, NVD, GI,  symptoms, fevers, SOB, syncope. Pt assessed by NP, Bed lowered and locked, call bell within reach. will reassess
F: none  E: replete as needed   N: DASH/TLC diet   GI: none  DVT ppx: patient on Eliquis 5 mg bid for prior DVTs     Dispo: [facility] for closer monitoring

## 2021-04-10 VITALS
HEART RATE: 97 BPM | TEMPERATURE: 98 F | WEIGHT: 179.9 LBS | SYSTOLIC BLOOD PRESSURE: 125 MMHG | DIASTOLIC BLOOD PRESSURE: 62 MMHG | RESPIRATION RATE: 24 BRPM | OXYGEN SATURATION: 91 % | HEIGHT: 68 IN

## 2021-04-10 PROCEDURE — 93010 ELECTROCARDIOGRAM REPORT: CPT

## 2021-04-10 PROCEDURE — 99285 EMERGENCY DEPT VISIT HI MDM: CPT | Mod: CS,25

## 2021-04-10 PROCEDURE — 71045 X-RAY EXAM CHEST 1 VIEW: CPT | Mod: 26

## 2021-04-10 RX ORDER — AZITHROMYCIN 500 MG/1
500 TABLET, FILM COATED ORAL ONCE
Refills: 0 | Status: COMPLETED | OUTPATIENT
Start: 2021-04-10 | End: 2021-04-10

## 2021-04-10 RX ORDER — MAGNESIUM SULFATE 500 MG/ML
2 VIAL (ML) INJECTION ONCE
Refills: 0 | Status: COMPLETED | OUTPATIENT
Start: 2021-04-10 | End: 2021-04-10

## 2021-04-10 NOTE — ED ADULT TRIAGE NOTE - CHIEF COMPLAINT QUOTE
PT with complaint of sudden difficulty breathing tonight for approximately the past 45 minutes. REports history of COPD and CHF.  Denies feeling sick recently or any sick contacts.

## 2021-04-11 ENCOUNTER — INPATIENT (INPATIENT)
Facility: HOSPITAL | Age: 81
LOS: 0 days | Discharge: LEFT BEFORE DISCHARGE | DRG: 190 | End: 2021-04-12
Attending: STUDENT IN AN ORGANIZED HEALTH CARE EDUCATION/TRAINING PROGRAM | Admitting: INTERNAL MEDICINE
Payer: MEDICARE

## 2021-04-11 DIAGNOSIS — Z98.89 OTHER SPECIFIED POSTPROCEDURAL STATES: Chronic | ICD-10-CM

## 2021-04-11 DIAGNOSIS — E78.5 HYPERLIPIDEMIA, UNSPECIFIED: ICD-10-CM

## 2021-04-11 DIAGNOSIS — Z90.49 ACQUIRED ABSENCE OF OTHER SPECIFIED PARTS OF DIGESTIVE TRACT: Chronic | ICD-10-CM

## 2021-04-11 DIAGNOSIS — I50.32 CHRONIC DIASTOLIC (CONGESTIVE) HEART FAILURE: ICD-10-CM

## 2021-04-11 DIAGNOSIS — I10 ESSENTIAL (PRIMARY) HYPERTENSION: ICD-10-CM

## 2021-04-11 DIAGNOSIS — I82.409 ACUTE EMBOLISM AND THROMBOSIS OF UNSPECIFIED DEEP VEINS OF UNSPECIFIED LOWER EXTREMITY: ICD-10-CM

## 2021-04-11 DIAGNOSIS — Z98.62 PERIPHERAL VASCULAR ANGIOPLASTY STATUS: Chronic | ICD-10-CM

## 2021-04-11 DIAGNOSIS — J44.1 CHRONIC OBSTRUCTIVE PULMONARY DISEASE WITH (ACUTE) EXACERBATION: ICD-10-CM

## 2021-04-11 DIAGNOSIS — R63.8 OTHER SYMPTOMS AND SIGNS CONCERNING FOOD AND FLUID INTAKE: ICD-10-CM

## 2021-04-11 DIAGNOSIS — N17.9 ACUTE KIDNEY FAILURE, UNSPECIFIED: ICD-10-CM

## 2021-04-11 DIAGNOSIS — D64.9 ANEMIA, UNSPECIFIED: ICD-10-CM

## 2021-04-11 DIAGNOSIS — S22.39XA FRACTURE OF ONE RIB, UNSPECIFIED SIDE, INITIAL ENCOUNTER FOR CLOSED FRACTURE: ICD-10-CM

## 2021-04-11 LAB
ALBUMIN SERPL ELPH-MCNC: 2.8 G/DL — LOW (ref 3.3–5)
ALBUMIN SERPL ELPH-MCNC: 2.9 G/DL — LOW (ref 3.4–5)
ALP SERPL-CCNC: 114 U/L — SIGNIFICANT CHANGE UP (ref 40–120)
ALP SERPL-CCNC: 115 U/L — SIGNIFICANT CHANGE UP (ref 40–120)
ALT FLD-CCNC: 15 U/L — SIGNIFICANT CHANGE UP (ref 12–42)
ALT FLD-CCNC: 8 U/L — LOW (ref 10–45)
ANION GAP SERPL CALC-SCNC: 5 MMOL/L — LOW (ref 9–16)
ANION GAP SERPL CALC-SCNC: 9 MMOL/L — SIGNIFICANT CHANGE UP (ref 5–17)
ANISOCYTOSIS BLD QL: SLIGHT — SIGNIFICANT CHANGE UP
ANISOCYTOSIS BLD QL: SLIGHT — SIGNIFICANT CHANGE UP
APTT BLD: 30.2 SEC — SIGNIFICANT CHANGE UP (ref 27.5–35.5)
AST SERPL-CCNC: 13 U/L — SIGNIFICANT CHANGE UP (ref 10–40)
AST SERPL-CCNC: 22 U/L — SIGNIFICANT CHANGE UP (ref 15–37)
BASO STIPL BLD QL SMEAR: PRESENT — SIGNIFICANT CHANGE UP
BASOPHILS # BLD AUTO: 0 K/UL — SIGNIFICANT CHANGE UP (ref 0–0.2)
BASOPHILS # BLD AUTO: 0.04 K/UL — SIGNIFICANT CHANGE UP (ref 0–0.2)
BASOPHILS NFR BLD AUTO: 0 % — SIGNIFICANT CHANGE UP (ref 0–2)
BASOPHILS NFR BLD AUTO: 0.6 % — SIGNIFICANT CHANGE UP (ref 0–2)
BILIRUB SERPL-MCNC: 0.2 MG/DL — SIGNIFICANT CHANGE UP (ref 0.2–1.2)
BILIRUB SERPL-MCNC: <0.2 MG/DL — SIGNIFICANT CHANGE UP (ref 0.2–1.2)
BUN SERPL-MCNC: 46 MG/DL — HIGH (ref 7–23)
BUN SERPL-MCNC: 61 MG/DL — HIGH (ref 7–23)
CALCIUM SERPL-MCNC: 9.2 MG/DL — SIGNIFICANT CHANGE UP (ref 8.4–10.5)
CALCIUM SERPL-MCNC: 9.5 MG/DL — SIGNIFICANT CHANGE UP (ref 8.5–10.5)
CHLORIDE SERPL-SCNC: 97 MMOL/L — SIGNIFICANT CHANGE UP (ref 96–108)
CHLORIDE SERPL-SCNC: 99 MMOL/L — SIGNIFICANT CHANGE UP (ref 96–108)
CO2 SERPL-SCNC: 32 MMOL/L — HIGH (ref 22–31)
CO2 SERPL-SCNC: 35 MMOL/L — HIGH (ref 22–31)
CREAT SERPL-MCNC: 2.26 MG/DL — HIGH (ref 0.5–1.3)
CREAT SERPL-MCNC: 2.73 MG/DL — HIGH (ref 0.5–1.3)
DACRYOCYTES BLD QL SMEAR: SLIGHT — SIGNIFICANT CHANGE UP
EOSINOPHIL # BLD AUTO: 0 K/UL — SIGNIFICANT CHANGE UP (ref 0–0.5)
EOSINOPHIL # BLD AUTO: 0.28 K/UL — SIGNIFICANT CHANGE UP (ref 0–0.5)
EOSINOPHIL NFR BLD AUTO: 0 % — SIGNIFICANT CHANGE UP (ref 0–6)
EOSINOPHIL NFR BLD AUTO: 3.9 % — SIGNIFICANT CHANGE UP (ref 0–6)
FERRITIN SERPL-MCNC: 23 NG/ML — SIGNIFICANT CHANGE UP (ref 15–150)
FOLATE SERPL-MCNC: 16.5 NG/ML — SIGNIFICANT CHANGE UP
GIANT PLATELETS BLD QL SMEAR: PRESENT — SIGNIFICANT CHANGE UP
GLUCOSE SERPL-MCNC: 107 MG/DL — HIGH (ref 70–99)
GLUCOSE SERPL-MCNC: 198 MG/DL — HIGH (ref 70–99)
HCT VFR BLD CALC: 23.7 % — LOW (ref 34.5–45)
HCT VFR BLD CALC: 24.5 % — LOW (ref 34.5–45)
HGB BLD-MCNC: 7.1 G/DL — LOW (ref 11.5–15.5)
HGB BLD-MCNC: 7.1 G/DL — LOW (ref 11.5–15.5)
HYPOCHROMIA BLD QL: SIGNIFICANT CHANGE UP
HYPOCHROMIA BLD QL: SLIGHT — SIGNIFICANT CHANGE UP
IMM GRANULOCYTES NFR BLD AUTO: 0.7 % — SIGNIFICANT CHANGE UP (ref 0–1.5)
INR BLD: 1.21 — HIGH (ref 0.88–1.16)
IRON SATN MFR SERPL: 21 UG/DL — LOW (ref 30–160)
IRON SATN MFR SERPL: 7 % — LOW (ref 14–50)
LYMPHOCYTES # BLD AUTO: 0.33 K/UL — LOW (ref 1–3.3)
LYMPHOCYTES # BLD AUTO: 0.49 K/UL — LOW (ref 1–3.3)
LYMPHOCYTES # BLD AUTO: 5.3 % — LOW (ref 13–44)
LYMPHOCYTES # BLD AUTO: 6.9 % — LOW (ref 13–44)
MACROCYTES BLD QL: SLIGHT — SIGNIFICANT CHANGE UP
MACROCYTES BLD QL: SLIGHT — SIGNIFICANT CHANGE UP
MAGNESIUM SERPL-MCNC: 2.3 MG/DL — SIGNIFICANT CHANGE UP (ref 1.6–2.6)
MAGNESIUM SERPL-MCNC: 2.8 MG/DL — HIGH (ref 1.6–2.6)
MANUAL SMEAR VERIFICATION: SIGNIFICANT CHANGE UP
MANUAL SMEAR VERIFICATION: SIGNIFICANT CHANGE UP
MCHC RBC-ENTMCNC: 24.7 PG — LOW (ref 27–34)
MCHC RBC-ENTMCNC: 25.3 PG — LOW (ref 27–34)
MCHC RBC-ENTMCNC: 29 GM/DL — LOW (ref 32–36)
MCHC RBC-ENTMCNC: 30 GM/DL — LOW (ref 32–36)
MCV RBC AUTO: 84.3 FL — SIGNIFICANT CHANGE UP (ref 80–100)
MCV RBC AUTO: 85.1 FL — SIGNIFICANT CHANGE UP (ref 80–100)
MICROCYTES BLD QL: SLIGHT — SIGNIFICANT CHANGE UP
MICROCYTES BLD QL: SLIGHT — SIGNIFICANT CHANGE UP
MONOCYTES # BLD AUTO: 0 K/UL — SIGNIFICANT CHANGE UP (ref 0–0.9)
MONOCYTES # BLD AUTO: 0.54 K/UL — SIGNIFICANT CHANGE UP (ref 0–0.9)
MONOCYTES NFR BLD AUTO: 0 % — LOW (ref 2–14)
MONOCYTES NFR BLD AUTO: 7.6 % — SIGNIFICANT CHANGE UP (ref 2–14)
NEUTROPHILS # BLD AUTO: 5.75 K/UL — SIGNIFICANT CHANGE UP (ref 1.8–7.4)
NEUTROPHILS # BLD AUTO: 5.83 K/UL — SIGNIFICANT CHANGE UP (ref 1.8–7.4)
NEUTROPHILS NFR BLD AUTO: 80.3 % — HIGH (ref 43–77)
NEUTROPHILS NFR BLD AUTO: 93.8 % — HIGH (ref 43–77)
NEUTS BAND # BLD: 0.9 % — SIGNIFICANT CHANGE UP (ref 0–8)
NRBC # BLD: 0 /100 WBCS — SIGNIFICANT CHANGE UP (ref 0–0)
OVALOCYTES BLD QL SMEAR: SLIGHT — SIGNIFICANT CHANGE UP
PCO2 BLDV: 63 MMHG — HIGH (ref 39–42)
PH BLDV: 7.36 — SIGNIFICANT CHANGE UP (ref 7.32–7.43)
PHOSPHATE SERPL-MCNC: 4.3 MG/DL — SIGNIFICANT CHANGE UP (ref 2.5–4.5)
PLAT MORPH BLD: ABNORMAL
PLAT MORPH BLD: NORMAL — SIGNIFICANT CHANGE UP
PLATELET # BLD AUTO: 202 K/UL — SIGNIFICANT CHANGE UP (ref 150–400)
PLATELET # BLD AUTO: 207 K/UL — SIGNIFICANT CHANGE UP (ref 150–400)
PO2 BLDV: 27 MMHG — LOW (ref 35–40)
POLYCHROMASIA BLD QL SMEAR: SLIGHT — SIGNIFICANT CHANGE UP
POTASSIUM SERPL-MCNC: 4.9 MMOL/L — SIGNIFICANT CHANGE UP (ref 3.5–5.3)
POTASSIUM SERPL-MCNC: 5.3 MMOL/L — SIGNIFICANT CHANGE UP (ref 3.5–5.3)
POTASSIUM SERPL-SCNC: 4.9 MMOL/L — SIGNIFICANT CHANGE UP (ref 3.5–5.3)
POTASSIUM SERPL-SCNC: 5.3 MMOL/L — SIGNIFICANT CHANGE UP (ref 3.5–5.3)
PROT SERPL-MCNC: 7 G/DL — SIGNIFICANT CHANGE UP (ref 6–8.3)
PROT SERPL-MCNC: 7.8 G/DL — SIGNIFICANT CHANGE UP (ref 6.4–8.2)
PROTHROM AB SERPL-ACNC: 14.4 SEC — HIGH (ref 10.6–13.6)
RBC # BLD: 2.81 M/UL — LOW (ref 3.8–5.2)
RBC # BLD: 2.88 M/UL — LOW (ref 3.8–5.2)
RBC # FLD: 17.8 % — HIGH (ref 10.3–14.5)
RBC # FLD: 17.8 % — HIGH (ref 10.3–14.5)
RBC BLD AUTO: ABNORMAL
RBC BLD AUTO: ABNORMAL
SAO2 % BLDV: 42 % — SIGNIFICANT CHANGE UP
SARS-COV-2 RNA SPEC QL NAA+PROBE: SIGNIFICANT CHANGE UP
SODIUM SERPL-SCNC: 138 MMOL/L — SIGNIFICANT CHANGE UP (ref 135–145)
SODIUM SERPL-SCNC: 139 MMOL/L — SIGNIFICANT CHANGE UP (ref 132–145)
TIBC SERPL-MCNC: 315 UG/DL — SIGNIFICANT CHANGE UP (ref 220–430)
UIBC SERPL-MCNC: 294 UG/DL — SIGNIFICANT CHANGE UP (ref 110–370)
VIT B12 SERPL-MCNC: 502 PG/ML — SIGNIFICANT CHANGE UP (ref 232–1245)
WBC # BLD: 6.16 K/UL — SIGNIFICANT CHANGE UP (ref 3.8–10.5)
WBC # BLD: 7.15 K/UL — SIGNIFICANT CHANGE UP (ref 3.8–10.5)
WBC # FLD AUTO: 6.16 K/UL — SIGNIFICANT CHANGE UP (ref 3.8–10.5)
WBC # FLD AUTO: 7.15 K/UL — SIGNIFICANT CHANGE UP (ref 3.8–10.5)

## 2021-04-11 PROCEDURE — 71250 CT THORAX DX C-: CPT

## 2021-04-11 PROCEDURE — C1751: CPT

## 2021-04-11 PROCEDURE — 96372 THER/PROPH/DIAG INJ SC/IM: CPT | Mod: XU

## 2021-04-11 PROCEDURE — 31500 INSERT EMERGENCY AIRWAY: CPT

## 2021-04-11 PROCEDURE — 85045 AUTOMATED RETICULOCYTE COUNT: CPT

## 2021-04-11 PROCEDURE — 94640 AIRWAY INHALATION TREATMENT: CPT

## 2021-04-11 PROCEDURE — 87040 BLOOD CULTURE FOR BACTERIA: CPT

## 2021-04-11 PROCEDURE — 96367 TX/PROPH/DG ADDL SEQ IV INF: CPT

## 2021-04-11 PROCEDURE — 36415 COLL VENOUS BLD VENIPUNCTURE: CPT

## 2021-04-11 PROCEDURE — 83880 ASSAY OF NATRIURETIC PEPTIDE: CPT

## 2021-04-11 PROCEDURE — 85379 FIBRIN DEGRADATION QUANT: CPT

## 2021-04-11 PROCEDURE — 86850 RBC ANTIBODY SCREEN: CPT

## 2021-04-11 PROCEDURE — 87635 SARS-COV-2 COVID-19 AMP PRB: CPT

## 2021-04-11 PROCEDURE — 84484 ASSAY OF TROPONIN QUANT: CPT

## 2021-04-11 PROCEDURE — 71045 X-RAY EXAM CHEST 1 VIEW: CPT | Mod: 26

## 2021-04-11 PROCEDURE — 93005 ELECTROCARDIOGRAM TRACING: CPT | Mod: 76

## 2021-04-11 PROCEDURE — 96366 THER/PROPH/DIAG IV INF ADDON: CPT

## 2021-04-11 PROCEDURE — 96375 TX/PRO/DX INJ NEW DRUG ADDON: CPT | Mod: XU

## 2021-04-11 PROCEDURE — 85025 COMPLETE CBC W/AUTO DIFF WBC: CPT

## 2021-04-11 PROCEDURE — 83605 ASSAY OF LACTIC ACID: CPT

## 2021-04-11 PROCEDURE — 86901 BLOOD TYPING SEROLOGIC RH(D): CPT

## 2021-04-11 PROCEDURE — 94002 VENT MGMT INPAT INIT DAY: CPT

## 2021-04-11 PROCEDURE — 96365 THER/PROPH/DIAG IV INF INIT: CPT | Mod: XU

## 2021-04-11 PROCEDURE — 85610 PROTHROMBIN TIME: CPT

## 2021-04-11 PROCEDURE — 86900 BLOOD TYPING SEROLOGIC ABO: CPT

## 2021-04-11 PROCEDURE — 80048 BASIC METABOLIC PNL TOTAL CA: CPT

## 2021-04-11 PROCEDURE — 87086 URINE CULTURE/COLONY COUNT: CPT

## 2021-04-11 PROCEDURE — 97161 PT EVAL LOW COMPLEX 20 MIN: CPT

## 2021-04-11 PROCEDURE — 85027 COMPLETE CBC AUTOMATED: CPT

## 2021-04-11 PROCEDURE — 85730 THROMBOPLASTIN TIME PARTIAL: CPT

## 2021-04-11 PROCEDURE — 93970 EXTREMITY STUDY: CPT

## 2021-04-11 PROCEDURE — 80053 COMPREHEN METABOLIC PANEL: CPT

## 2021-04-11 PROCEDURE — 82803 BLOOD GASES ANY COMBINATION: CPT

## 2021-04-11 PROCEDURE — 99291 CRITICAL CARE FIRST HOUR: CPT | Mod: 25

## 2021-04-11 PROCEDURE — 84100 ASSAY OF PHOSPHORUS: CPT

## 2021-04-11 PROCEDURE — 99223 1ST HOSP IP/OBS HIGH 75: CPT | Mod: GC,AI

## 2021-04-11 PROCEDURE — 83010 ASSAY OF HAPTOGLOBIN QUANT: CPT

## 2021-04-11 PROCEDURE — 83615 LACTATE (LD) (LDH) ENZYME: CPT

## 2021-04-11 PROCEDURE — 83735 ASSAY OF MAGNESIUM: CPT

## 2021-04-11 PROCEDURE — 93306 TTE W/DOPPLER COMPLETE: CPT

## 2021-04-11 PROCEDURE — 36556 INSERT NON-TUNNEL CV CATH: CPT

## 2021-04-11 PROCEDURE — 71045 X-RAY EXAM CHEST 1 VIEW: CPT

## 2021-04-11 PROCEDURE — 81001 URINALYSIS AUTO W/SCOPE: CPT

## 2021-04-11 PROCEDURE — 84133 ASSAY OF URINE POTASSIUM: CPT

## 2021-04-11 PROCEDURE — 82570 ASSAY OF URINE CREATININE: CPT

## 2021-04-11 RX ORDER — DILTIAZEM HCL 120 MG
180 CAPSULE, EXT RELEASE 24 HR ORAL DAILY
Refills: 0 | Status: DISCONTINUED | OUTPATIENT
Start: 2021-04-11 | End: 2021-04-12

## 2021-04-11 RX ORDER — BUPROPION HYDROCHLORIDE 150 MG/1
300 TABLET, EXTENDED RELEASE ORAL EVERY 24 HOURS
Refills: 0 | Status: DISCONTINUED | OUTPATIENT
Start: 2021-04-11 | End: 2021-04-12

## 2021-04-11 RX ORDER — BUPROPION HYDROCHLORIDE 150 MG/1
150 TABLET, EXTENDED RELEASE ORAL
Refills: 0 | Status: DISCONTINUED | OUTPATIENT
Start: 2021-04-11 | End: 2021-04-11

## 2021-04-11 RX ORDER — HEPARIN SODIUM 5000 [USP'U]/ML
5000 INJECTION INTRAVENOUS; SUBCUTANEOUS EVERY 8 HOURS
Refills: 0 | Status: DISCONTINUED | OUTPATIENT
Start: 2021-04-11 | End: 2021-04-12

## 2021-04-11 RX ORDER — ATORVASTATIN CALCIUM 80 MG/1
40 TABLET, FILM COATED ORAL AT BEDTIME
Refills: 0 | Status: DISCONTINUED | OUTPATIENT
Start: 2021-04-11 | End: 2021-04-12

## 2021-04-11 RX ORDER — AZITHROMYCIN 500 MG/1
250 TABLET, FILM COATED ORAL EVERY 24 HOURS
Refills: 0 | Status: DISCONTINUED | OUTPATIENT
Start: 2021-04-11 | End: 2021-04-12

## 2021-04-11 RX ORDER — PRAMIPEXOLE DIHYDROCHLORIDE 0.12 MG/1
1.5 TABLET ORAL AT BEDTIME
Refills: 0 | Status: DISCONTINUED | OUTPATIENT
Start: 2021-04-11 | End: 2021-04-12

## 2021-04-11 RX ORDER — ACETAMINOPHEN 500 MG
650 TABLET ORAL EVERY 6 HOURS
Refills: 0 | Status: DISCONTINUED | OUTPATIENT
Start: 2021-04-11 | End: 2021-04-12

## 2021-04-11 RX ORDER — IPRATROPIUM/ALBUTEROL SULFATE 18-103MCG
3 AEROSOL WITH ADAPTER (GRAM) INHALATION EVERY 6 HOURS
Refills: 0 | Status: DISCONTINUED | OUTPATIENT
Start: 2021-04-11 | End: 2021-04-12

## 2021-04-11 RX ADMIN — AZITHROMYCIN 255 MILLIGRAM(S): 500 TABLET, FILM COATED ORAL at 00:38

## 2021-04-11 RX ADMIN — PRAMIPEXOLE DIHYDROCHLORIDE 1.5 MILLIGRAM(S): 0.12 TABLET ORAL at 21:42

## 2021-04-11 RX ADMIN — BUPROPION HYDROCHLORIDE 300 MILLIGRAM(S): 150 TABLET, EXTENDED RELEASE ORAL at 09:40

## 2021-04-11 RX ADMIN — HEPARIN SODIUM 5000 UNIT(S): 5000 INJECTION INTRAVENOUS; SUBCUTANEOUS at 13:53

## 2021-04-11 RX ADMIN — AZITHROMYCIN 250 MILLIGRAM(S): 500 TABLET, FILM COATED ORAL at 21:40

## 2021-04-11 RX ADMIN — HEPARIN SODIUM 5000 UNIT(S): 5000 INJECTION INTRAVENOUS; SUBCUTANEOUS at 06:27

## 2021-04-11 RX ADMIN — Medication 50 GRAM(S): at 00:39

## 2021-04-11 RX ADMIN — Medication 180 MILLIGRAM(S): at 09:40

## 2021-04-11 RX ADMIN — Medication 3 MILLILITER(S): at 11:31

## 2021-04-11 RX ADMIN — Medication 3 MILLILITER(S): at 18:04

## 2021-04-11 RX ADMIN — HEPARIN SODIUM 5000 UNIT(S): 5000 INJECTION INTRAVENOUS; SUBCUTANEOUS at 21:40

## 2021-04-11 RX ADMIN — Medication 650 MILLIGRAM(S): at 11:31

## 2021-04-11 RX ADMIN — Medication 40 MILLIGRAM(S): at 06:27

## 2021-04-11 RX ADMIN — ATORVASTATIN CALCIUM 40 MILLIGRAM(S): 80 TABLET, FILM COATED ORAL at 21:40

## 2021-04-11 RX ADMIN — Medication 3 MILLILITER(S): at 06:27

## 2021-04-11 RX ADMIN — Medication 40 MILLIGRAM(S): at 00:38

## 2021-04-11 NOTE — H&P ADULT - PROBLEM SELECTOR PLAN 8
ON last admission noted to have b/l rib fractures and an acute fracture of the left sixth rib.  - Dilaudid PRN  - Tylenol for pain control  - OOBTC

## 2021-04-11 NOTE — H&P ADULT - HISTORY OF PRESENT ILLNESS
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     In the ED VS T 98 HR 89 /64 R 18 99% on 3L NC  Labs: WBC 7 | Hg/Hct 23.7 | Plt 207 | Na 139 K 4.9 | BUN/Cr 61/2.73 |  AST/ALT ALP 22/15 115 | VBG 7.36/CO2 63/ HCo3 35 |    ED Course: Solumedrol 40mg, Azithromycin and Mg 2g.  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 Patient reports that she occasionally has episodes of dyspnea that improve after she uses her inhalers, but this time the SOB persisted and prompted her to come to the ED. She also reports that over the last week she has noticed that the skin lesions on her buttocks have been bleeding. Denies fever, nausea, vomiting, chest pain, abdominal pain, changes in mental status.     In the ED VS T 98 HR 89 /64 R 18 99% on 3L NC  Labs: WBC 7 | Hg/Hct 23.7 | Plt 207 | Na 139 K 4.9 | BUN/Cr 61/2.73 |  AST/ALT ALP 22/15 115 | VBG 7.36/CO2 63/ HCo3 35 |    ED Course: Solumedrol 40mg, Azithromycin and Mg 2g.  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 Patient reports that she occasionally has episodes of dyspnea that improve after she uses her inhalers, but this time the SOB persisted and prompted her to come to the ED. She also reports that over the last week she has noticed that the skin lesions on her buttocks have been bleeding. Denies fever, nausea, vomiting, chest pain, abdominal pain, changes in mental status.     In the ED VS T 98 HR 89 /64 R 18 99% on 3L NC  Labs: WBC 7 | Hg/Hct 23.7 | Plt 207 | Na 139 K 4.9 | BUN/Cr 61/2.73 |  AST/ALT ALP 22/15 115 | VBG 7.36/CO2 63/ HCo3 35 |    ED Course: Solumedrol 40mg, Azithromycin and Mg 2g.

## 2021-04-11 NOTE — ED ADULT NURSE NOTE - OBJECTIVE STATEMENT
Received pt to RM 10 by EMS for complaints of difficulty breathing and shortness of breath. Patient placed on cardiac monitor and placed on biPap as per md order. Ipap 10, ePap 5, FiO2 40%. Within 10-15 min patient could not tolerate bipap anymore and removed it. Placed on nc 3L. Patient was no longer tachypneic and was now saturating at 99% with 3L nc. MD aware.     20G placed at left ac. patent and intact free of redness swelling or abnormalities. Medicated as per order. Patient noted at have diffuse rash with open sores over arms, back, legs, abdomen. States she has had the rash for months. Some areas are open - appearing as skin tears. Safety precautions in place and maintained. Received pt to RM 10 by EMS for complaints of difficulty breathing and shortness of breath. Patient placed on cardiac monitor and placed on biPap as per md order. Ipap 10, ePap 5, FiO2 40%. Within 10-15 min patient could not tolerate bipap anymore and removed it. Placed on nc 3L. Patient was no longer tachypneic and was now saturating at 99% with 3L nc. MD aware.     20G placed at left ac. patent and intact free of redness swelling or abnormalities. Medicated as per order. Patient noted at have diffuse rash with open sores over arms, back, legs, abdomen. Excoriation noted to sacral area and between buttocks. States she has had the rash for months. Some areas are open - appearing as skin tears. Bilateral lower extremities from knee to toes are darkly discolored. Hx PVD with stents. Safety precautions in place and maintained.

## 2021-04-11 NOTE — CHART NOTE - NSCHARTNOTEFT_GEN_A_CORE
COPD RESPONSE TEAM ASSESSMENT    BMI:   Patient a 30-day re-admission?:   # Exacerbations in Past Year:  Admission SpO2:         Mode of oxygen:         FiO2:             Smoking Status:   Pack Years:  Vaccines:   Home Oxygen:  Triage:  Home COPD Medications:   Participating in Pulmonary Rehab?:   Outpatient Pulmonologist:  Isaak:  CAT:   Cough:   Phlegm:   Chest tightness:   Walking 1 flight of stairs:    Activity at home:    Confidence leaving home:    Sleep:   Energy:   Total CAT:  Peak Inspiratory Flow: COPD RESPONSE TEAM ASSESSMENT    BMI: 27?  Patient a 30-day re-admission?: No  # Exacerbations in Past Year: 0  Admission SpO2: 91        Mode of oxygen: 3L NC             Smoking Status: Former (40-pack-year, quit 2016)    Vaccines: Unknown    Home Oxygen: 2-3L NC (not all the time)    Home COPD Medications: Pt did not recall, but per H&P -- Advair, Albuterol 2.5, Yupelri inhaler, and Perforomist inhaler  Participating in Pulmonary Rehab?: No  Outpatient Pulmonologist: Dr. Zambrano ([facility])     CAT  Cough: 0  Phlegm: 1   Chest tightness: 1     Walking 1 flight of stairs: 5 (however pt ambulates with wheelchair)       Activity at home: 0  Confidence leaving home: 0     Sleep: 2  Energy: 4    Total CAT: 13

## 2021-04-11 NOTE — ED PROVIDER NOTE - CLINICAL SUMMARY MEDICAL DECISION MAKING FREE TEXT BOX
pt w/ sob, hx of copd, end exp wheezing upon arrival, some tachypnea, but no significant resp distress, dec air entry, steroids/mag/azithromycin given w/ bipap, pt felt improved after 10-15 min of bipap, vbg c/w mild co2 retention, bl le swelling symmetrical, more c/w chronic venous stasis, afebrile, no leukocytosis to suspect acute cellulitis, noted yara, denies any overt volume loss, will attempt to get UA w/ urine lytes before judicious fluid administration, ongoing skin lesions for "months", no particular dermatome distribution, no classic of scabies distribution,    pt now has felt improved on bipap, requesting bipap to be discontinued

## 2021-04-11 NOTE — H&P ADULT - NSHPPHYSICALEXAM_GEN_ALL_CORE
GENERAL: Middle aged female in NAD, speaks in full sentences, no signs of respiratory distress  HEAD:  Atraumatic, Normocephalic  EYES: EOMI, PERRLA, conjunctiva and sclera clear  NECK: Supple, No JVD  CHEST/LUNG: Clear to auscultation bilaterally; No wheeze; No crackles; No accessory muscles used  HEART: Regular rate and rhythm; No murmurs;   ABDOMEN: Soft, Nontender, Nondistended; Bowel sounds present; No guarding  EXTREMITIES:  2+ Peripheral Pulses, No cyanosis or edema  PSYCH: AAOx3  NEUROLOGY: non-focal  SKIN: Full body lesions, worse on the buttocks area +excoriations

## 2021-04-11 NOTE — ED ADULT NURSE NOTE - NSINTERVENTIONOPT_GEN_ALL_ED
Stretcher Alarms/Chair Alarms/Hourly Rounding/Enhanced Supervision/Move Toilet (commode/urinal) to patient using "arms reach"

## 2021-04-11 NOTE — PHYSICAL THERAPY INITIAL EVALUATION ADULT - DIAGNOSIS, PT EVAL
6B: Impaired Aerobic Capacity/Endurance Associated with Deconditioning. 5A: Primary Prevention/Risk Reduction for Loss of Balance and Falling

## 2021-04-11 NOTE — H&P ADULT - PROBLEM SELECTOR PLAN 2
At home, takes Advair, Albuterol 2.5, Yupelri inhaler, and Perforomist inhaler. Presented with SOB that improved after solumedrol in the ED. No audible wheezing on physical exam.   - Will treat for COPD exacerbation  - Azithromycin 250mg  - Prednisone 40mg QD  - Duonebs q4-6h

## 2021-04-11 NOTE — H&P ADULT - PROBLEM SELECTOR PLAN 9
F: none   E: replete as necessary   N: renal diet     GI ppx: none    DVT PPx: Eliquis 2.5 BID   Dispo: [facility]

## 2021-04-11 NOTE — H&P ADULT - PROBLEM SELECTOR PLAN 1
Cr 2.7 - baseline 1.7-2   - f/u Urine lytes   - Encourage PO intake and caution with IVF in setting of HF

## 2021-04-11 NOTE — PHYSICAL THERAPY INITIAL EVALUATION ADULT - IMPAIRMENTS FOUND, PT EVAL
aerobic capacity/endurance/ergonomics and body mechanics/gait, locomotion, and balance/integumentary integrity/muscle strength/poor safety awareness/posture/ROM

## 2021-04-11 NOTE — H&P ADULT - PROBLEM SELECTOR PLAN 4
Reports bleeding from skin lesions over the last 3 days. Hg 7.1, down from her baseline of 9-10s on prior admissions.   - Trend CBC   - Active T&S  - Transfuse Hg<7

## 2021-04-11 NOTE — H&P ADULT - PROBLEM SELECTOR PLAN 6
Patient with history of multiple DVTs, on Eliquis 2.5 mg bid at home.  - Holding Eliquis in setting of low Hg and bleed.  - Restart once Hg stabilized

## 2021-04-11 NOTE — ED PROVIDER NOTE - OBJECTIVE STATEMENT
80 yof pw sob tonight.  hx of htn, hl, HFpEF 55% 2/20, COPD (no home O2) 80 yof pw sob tonight.  hx of htn, hl, HFpEF 55% 2/20, COPD (3L NC home dose), DALIA, PVD sp LE stents, LE DVT on eliquis, cerebral aneurysm sp coil, CKD 4 (last known Cr 1.3s).  upon arrival pt sat 91% but reports sob.  no nv, no fc, no cough.  also reports ongoing pruritic skin lesions for "months", unable to clarify how long.

## 2021-04-11 NOTE — H&P ADULT - PROBLEM SELECTOR PLAN 3
HFpEF (EF 55-60% last echo 2/20). Low suspicion at first for CHF exacerbation as cause of patient's acute hypoxia.  CXR pulmonary vascular attenuation.  - c/w Bumex 2mg BID   - Monitor urine output HFpEF (EF 55-60% last echo 2/20). Low suspicion at first for CHF exacerbation as cause of patient's acute hypoxia.  CXR pulmonary vascular attenuation.  - Hold Bumex 2mg in setting of BRANT  - Restart as Cr improves and  BP tolerates   - Monitor urine output

## 2021-04-11 NOTE — ED PROVIDER NOTE - PHYSICAL EXAMINATION
Physical Exam  GEN: Awake, alert, non-toxic appearing, NCAT  EYES: full EOMI,  ENT: External inspection normal, normal voice,   HEAD: atraumatic  NECK: FROM neck, supple,   CV: rrr, bl pedal edema 1+  RESP: end exp wheezing, dec air entry, speaking in full sentences but tachypnea, no resp distress,  MSK: bl LE symmetrical swelling, no deformity  SKIN: brawny induration w/ erythema to bl LE appears c/w chronic venous insufficiency Physical Exam  GEN: Awake, alert, non-toxic appearing, NCAT  EYES: full EOMI,  ENT: External inspection normal, normal voice,   HEAD: atraumatic  NECK: FROM neck, supple,   CV: rrr, bl pedal edema 1+  RESP: end exp wheezing, dec air entry, speaking in full sentences but tachypnea, no resp distress,  MSK: bl LE symmetrical swelling, no deformity  SKIN: brawny induration w/ erythema to bl LE appears c/w chronic venous insufficiency, noted skin lesions w/ erythematous base w/o surrounding erythema/fluctuance/induration to arms/chest/back/abd/legs, but does not appear in hand webspaces/wrists/skin folds, no burrows noted, no weeping discharge,

## 2021-04-11 NOTE — PHYSICAL THERAPY INITIAL EVALUATION ADULT - ADDITIONAL COMMENTS
Pt reports living alone, denied NADIA, and uses a RW to ambulate. She also reports owning a wheelchair.

## 2021-04-11 NOTE — H&P ADULT - ATTENDING COMMENTS
Admitted for acute on chronic hypoxic/hypercapnic respiratory failure due to COPD exacerbation, already improving, c/w zithromax/prednisone and nebs ATC, back on 3 lts NC which is what she usually uses at home  COPD bundle to see patient at some point  BRANT on CKD stage 3-4, agree with holding bumex for now, already improving today, encourage increased PO intake, f/up urine lytes  Also, with some bleeding skin excoriations, apparently recently treated for scabies, pt states she was seen by Derm as outpatient on 4/9, patients denies itching or scratching but during my visit saw patient scratching her R hand  Pt states that she has a "blood worm", if these ideas persists will consider getting Psych on board  Monitor H/H closely and transfuse if less than 7, ok to hold eliquis for now  Wound care to see patient in AM  PT evaluation --> AUBRIE and d/w them  BMI listed as only 27.4 but she definitely seems to be more than that, seems obese on exam  Rest as above

## 2021-04-11 NOTE — PHYSICAL THERAPY INITIAL EVALUATION ADULT - PERTINENT HX OF CURRENT PROBLEM, REHAB EVAL
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

## 2021-04-11 NOTE — ED ADULT NURSE REASSESSMENT NOTE - NS ED NURSE REASSESS COMMENT FT1
Patient states she feels much improved. Informed that a urine sample is needed, attempted to provide via commode however patient unable to at this time.

## 2021-04-11 NOTE — PATIENT PROFILE ADULT - VISION (WITH CORRECTIVE LENSES IF THE PATIENT USUALLY WEARS THEM):
lost vision in right eye. Doesn't wear glasses./Partially impaired: cannot see medication labels or newsprint, but can see obstacles in path, and the surrounding layout; can count fingers at arm's length

## 2021-04-11 NOTE — H&P ADULT - PROBLEM SELECTOR PLAN 7
- Lipitor 40mg QHS    #Restless leg syndrome  - Patient reports hx of restless leg syndrome and neuropathy 2/2 prior Lyme Disease infection   -C/w Pramipexole 1.5 mg at bedtime.

## 2021-04-11 NOTE — H&P ADULT - ASSESSMENT
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    SOB (shortness of breath).  Plan: The etiology of patient's current respiratory failure can be secondary to the patients COPD compounded by difficulty breathing due to her rib fracture or due to the patient's history of CHF exacerbation. Currently patient has crackles on lung exam along with wheezing.  There are no other signs of hypervolemia on PE. Decreased respiratory effort may also be due to opiod use. CXR showing worsening fluffy infiltrates. Currently saturating well on 4L NC w/ mild tachypnea  -c/w standing duonebs q6  -c/w home budesonide  -c/w furosemide.      Problem/Plan - 2:  ·  Problem: Cellulitis.  Plan: Patient complaining of pain in LLE, warmth noted on exam. Patient started on Ancef on 9/11. Per home health aide, pt has recurrent cellulitis multiple times a month.   -Discontinued ancef 1g q8   -Started Cephalexin 500 mg for four day course.      Problem/Plan - 3:  ·  Problem: CKD (chronic kidney disease).  Plan: Patient with reported CKD stage 4 and baseline Cr ~1.7-2.0 On this admission, BUN 40 and Cr 1.32, along with hyperkalemia.  Renal consulted and will appreciate recommendations.  -trend BMP  -f/u urine K and Cr  #Hyperkalemia:  -Lasix 40mg daily in evening for K excretion   -s/p lokelma 10g q8 for six doses; now on daily Lokelma per renal.      Problem/Plan - 4:  ·  Problem: Rib fracture.  Plan: Patient discharged on previous admission with bilateral rib fractures and an acute fracture of the left sixth rib. The fracture of the right sixth rib was noted to be subacute, as there is adjacent callus formation. Patient denies recent trauma, falls, or coughing. Patient with complaints of worsening SOB at night, which in part may be caused by respiratory depression due to opoid usage.   -Continue Dilaudid  -Tylenol for pain control    -Bed to chair.      Problem/Plan - 5:  ·  Problem: Heart failure with preserved ejection fraction.  Plan: Patient with history of HFpEF (EF 55-60% last echo 2/20). On exam, patient with crackles on exam and 1+peripheral edema.  Low suspicion at first for CHF exacerbation as cause of patient's acute hypoxia.  CXR this morning showing fluffy infiltrates.  -Lasix 40mg PO daily   -Continue to monitor fluid status and clinical presentation  -Consider repeat Echo     #Hyperlipidemia. Plan: Patient with HLD, takes Atorvastatin 40 mg at home   -C/w Atorvastatin 40 mg    #HTN: Patient with HTN, takes Diltiazem 180 mg daily   -Continue diltiazem 180 mg daily  -Continue to monitor.      Problem/Plan - 6:  Problem: DVT (deep venous thrombosis). Plan: Patient with history of multiple DVTs, on Eliquis 5 mg bid at home.  -C/w Eliquis 5 mg BID in hospital.     Problem/Plan - 7:  ·  Problem: COPD (chronic obstructive pulmonary disease).  Plan: Patient with history of COPD. At home, takes Advair, Albuterol 2.5, Yupelri inhaler, and Perforomist inhaler. She was recently discharged from the hospital for a COPD exacerbation on 9/3. No wheezing is noted on current exam, though the patient has decreased breath sounds bilaterally.   -C/w standing duonebs q6  -c/w home budesonide.      Problem/Plan - 8:  ·  Problem: Anemia.  Plan: Patient with Hgb 8.1 on admission, now 7.9, down from her baseline of 9-10s on prior admissions. No signs of active bleeding. Patient was also noted to be anemic on last admission and did not require transfusion.   -continue to trend CBC   -Active T&S, most recently 9/11  -F/u iron studies   -Transfuse if Hgb <7   -Continue to monitor    #Restless leg syndrome: patient reports hx of restless leg syndrome and neuropathy 2/2 prior Lyme Disease infection   -C/w Pramipexole 1.5 mg at bedtime.      Problem/Plan - 9:  ·  Problem: Nutrition, metabolism, and development symptoms.  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, stage 4 CKD, SCC of LLE s/p resection with skin graft (~2010) c/b MRSA infection, diverticulitis s/p sigmoidectomy (2010), LE cellulitis (2/2020), and recent admission to [facility] on 9/1 for a nondisplaced left lateral rib fracture and COPD exacerbation, who p/w one day of SOB and fatigue, found to have acute hypoxic and hypercapnic respiratory failure likely 2/2 a COPD exacerbation in the setting of BRANT on CKD       79F PMH of morbid obesity, HTN, HLD, chronic HFpEF, COPD (on home O2-3lts NC), DALIA (non-adherence with BiPAP), chronic back pain, PVD s/p LE stents, multiple LE DVTs (on Eliquis), cerebral aneurysm s/p coil, 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

## 2021-04-11 NOTE — H&P ADULT - NSHPLABSRESULTS_GEN_ALL_CORE
Spouse LABS:                        7.1    7.15  )-----------( 207      ( 11 Apr 2021 00:21 )             23.7     11 Apr 2021 00:21    139    |  99     |  61     ----------------------------<  107    4.9     |  35     |  2.73     Ca    9.5        11 Apr 2021 00:21  Mg     2.3       11 Apr 2021 00:21    TPro  7.8    /  Alb  2.9    /  TBili  0.2    /  DBili  x      /  AST  22     /  ALT  15     /  AlkPhos  115    11 Apr 2021 00:21

## 2021-04-12 VITALS
HEART RATE: 84 BPM | SYSTOLIC BLOOD PRESSURE: 133 MMHG | OXYGEN SATURATION: 94 % | RESPIRATION RATE: 17 BRPM | DIASTOLIC BLOOD PRESSURE: 72 MMHG | TEMPERATURE: 98 F

## 2021-04-12 LAB
ANION GAP SERPL CALC-SCNC: 12 MMOL/L — SIGNIFICANT CHANGE UP (ref 5–17)
ANISOCYTOSIS BLD QL: SLIGHT — SIGNIFICANT CHANGE UP
BASOPHILS # BLD AUTO: 0.02 K/UL — SIGNIFICANT CHANGE UP (ref 0–0.2)
BASOPHILS NFR BLD AUTO: 0.2 % — SIGNIFICANT CHANGE UP (ref 0–2)
BUN SERPL-MCNC: 44 MG/DL — HIGH (ref 7–23)
CALCIUM SERPL-MCNC: 9.6 MG/DL — SIGNIFICANT CHANGE UP (ref 8.4–10.5)
CHLORIDE SERPL-SCNC: 99 MMOL/L — SIGNIFICANT CHANGE UP (ref 96–108)
CO2 SERPL-SCNC: 26 MMOL/L — SIGNIFICANT CHANGE UP (ref 22–31)
CREAT SERPL-MCNC: 1.8 MG/DL — HIGH (ref 0.5–1.3)
DACRYOCYTES BLD QL SMEAR: SLIGHT — SIGNIFICANT CHANGE UP
EOSINOPHIL # BLD AUTO: 0 K/UL — SIGNIFICANT CHANGE UP (ref 0–0.5)
EOSINOPHIL NFR BLD AUTO: 0 % — SIGNIFICANT CHANGE UP (ref 0–6)
GLUCOSE SERPL-MCNC: 207 MG/DL — HIGH (ref 70–99)
HCT VFR BLD CALC: 26.3 % — LOW (ref 34.5–45)
HGB BLD-MCNC: 7.5 G/DL — LOW (ref 11.5–15.5)
HYPOCHROMIA BLD QL: SIGNIFICANT CHANGE UP
IMM GRANULOCYTES NFR BLD AUTO: 0.7 % — SIGNIFICANT CHANGE UP (ref 0–1.5)
LYMPHOCYTES # BLD AUTO: 0.4 K/UL — LOW (ref 1–3.3)
LYMPHOCYTES # BLD AUTO: 3.9 % — LOW (ref 13–44)
MAGNESIUM SERPL-MCNC: 2.6 MG/DL — SIGNIFICANT CHANGE UP (ref 1.6–2.6)
MANUAL SMEAR VERIFICATION: SIGNIFICANT CHANGE UP
MCHC RBC-ENTMCNC: 24.4 PG — LOW (ref 27–34)
MCHC RBC-ENTMCNC: 28.5 GM/DL — LOW (ref 32–36)
MCV RBC AUTO: 85.7 FL — SIGNIFICANT CHANGE UP (ref 80–100)
MONOCYTES # BLD AUTO: 0.11 K/UL — SIGNIFICANT CHANGE UP (ref 0–0.9)
MONOCYTES NFR BLD AUTO: 1.1 % — LOW (ref 2–14)
NEUTROPHILS # BLD AUTO: 9.71 K/UL — HIGH (ref 1.8–7.4)
NEUTROPHILS NFR BLD AUTO: 94.1 % — HIGH (ref 43–77)
NRBC # BLD: 0 /100 WBCS — SIGNIFICANT CHANGE UP (ref 0–0)
OVALOCYTES BLD QL SMEAR: SLIGHT — SIGNIFICANT CHANGE UP
PHOSPHATE SERPL-MCNC: 2.9 MG/DL — SIGNIFICANT CHANGE UP (ref 2.5–4.5)
PLAT MORPH BLD: ABNORMAL
PLATELET # BLD AUTO: 236 K/UL — SIGNIFICANT CHANGE UP (ref 150–400)
PLATELET CLUMP BLD QL SMEAR: SLIGHT
POLYCHROMASIA BLD QL SMEAR: SLIGHT — SIGNIFICANT CHANGE UP
POTASSIUM SERPL-MCNC: 5.9 MMOL/L — HIGH (ref 3.5–5.3)
POTASSIUM SERPL-SCNC: 5.9 MMOL/L — HIGH (ref 3.5–5.3)
RBC # BLD: 3.07 M/UL — LOW (ref 3.8–5.2)
RBC # FLD: 18.4 % — HIGH (ref 10.3–14.5)
RBC BLD AUTO: ABNORMAL
SCHISTOCYTES BLD QL AUTO: SLIGHT — SIGNIFICANT CHANGE UP
SODIUM SERPL-SCNC: 137 MMOL/L — SIGNIFICANT CHANGE UP (ref 135–145)
WBC # BLD: 10.31 K/UL — SIGNIFICANT CHANGE UP (ref 3.8–10.5)
WBC # FLD AUTO: 10.31 K/UL — SIGNIFICANT CHANGE UP (ref 3.8–10.5)

## 2021-04-12 PROCEDURE — 96374 THER/PROPH/DIAG INJ IV PUSH: CPT

## 2021-04-12 PROCEDURE — 85610 PROTHROMBIN TIME: CPT

## 2021-04-12 PROCEDURE — 82728 ASSAY OF FERRITIN: CPT

## 2021-04-12 PROCEDURE — 96375 TX/PRO/DX INJ NEW DRUG ADDON: CPT

## 2021-04-12 PROCEDURE — 82607 VITAMIN B-12: CPT

## 2021-04-12 PROCEDURE — 85025 COMPLETE CBC W/AUTO DIFF WBC: CPT

## 2021-04-12 PROCEDURE — 71045 X-RAY EXAM CHEST 1 VIEW: CPT

## 2021-04-12 PROCEDURE — 36415 COLL VENOUS BLD VENIPUNCTURE: CPT

## 2021-04-12 PROCEDURE — 86769 SARS-COV-2 COVID-19 ANTIBODY: CPT

## 2021-04-12 PROCEDURE — 82803 BLOOD GASES ANY COMBINATION: CPT

## 2021-04-12 PROCEDURE — 87635 SARS-COV-2 COVID-19 AMP PRB: CPT

## 2021-04-12 PROCEDURE — 97161 PT EVAL LOW COMPLEX 20 MIN: CPT

## 2021-04-12 PROCEDURE — 80048 BASIC METABOLIC PNL TOTAL CA: CPT

## 2021-04-12 PROCEDURE — 80053 COMPREHEN METABOLIC PANEL: CPT

## 2021-04-12 PROCEDURE — 83540 ASSAY OF IRON: CPT

## 2021-04-12 PROCEDURE — 99285 EMERGENCY DEPT VISIT HI MDM: CPT | Mod: 25

## 2021-04-12 PROCEDURE — 84100 ASSAY OF PHOSPHORUS: CPT

## 2021-04-12 PROCEDURE — 94660 CPAP INITIATION&MGMT: CPT

## 2021-04-12 PROCEDURE — 82746 ASSAY OF FOLIC ACID SERUM: CPT

## 2021-04-12 PROCEDURE — 83550 IRON BINDING TEST: CPT

## 2021-04-12 PROCEDURE — 83735 ASSAY OF MAGNESIUM: CPT

## 2021-04-12 PROCEDURE — 85730 THROMBOPLASTIN TIME PARTIAL: CPT

## 2021-04-12 PROCEDURE — 94640 AIRWAY INHALATION TREATMENT: CPT

## 2021-04-12 PROCEDURE — 99239 HOSP IP/OBS DSCHRG MGMT >30: CPT

## 2021-04-12 RX ADMIN — Medication 40 MILLIGRAM(S): at 05:42

## 2021-04-12 RX ADMIN — Medication 3 MILLILITER(S): at 00:45

## 2021-04-12 RX ADMIN — Medication 180 MILLIGRAM(S): at 05:42

## 2021-04-12 RX ADMIN — Medication 650 MILLIGRAM(S): at 00:45

## 2021-04-12 RX ADMIN — HEPARIN SODIUM 5000 UNIT(S): 5000 INJECTION INTRAVENOUS; SUBCUTANEOUS at 05:42

## 2021-04-12 RX ADMIN — Medication 650 MILLIGRAM(S): at 01:45

## 2021-04-12 NOTE — DISCHARGE NOTE PROVIDER - PROVIDER TOKENS
FREE:[LAST:[Rothman],FIRST:[Eulogio],PHONE:[phone]],FAX:[(   )    -],ADDRESS:[[address], [place] [address]],SCHEDULEDAPPT:[04/19/2021],SCHEDULEDAPPTTIME:[08:30 AM],ESTABLISHEDPATIENT:[T]]

## 2021-04-12 NOTE — DISCHARGE NOTE PROVIDER - NSDCMRMEDTOKEN_GEN_ALL_CORE_FT
Acticin 5% topical cream: 1 application topically once  apixaban 5 mg oral tablet: 1 tab(s) orally every 12 hours  atorvastatin 40 mg oral tablet: 1 tab(s) orally once a day (at bedtime)  budesonide:   bumetanide 2 mg oral tablet: 1 tab(s) orally once a day  calamine topical lotion: 1 application topically 2 times a day, As needed, Itching  cephalexin 500 mg oral capsule: 1 cap(s) orally every 6 hours  Dilaudid 4 mg oral tablet: 1 tab(s) orally 4 times a day, As needed, Severe Pain (7 - 10)  dilTIAZem 180 mg/24 hours oral capsule, extended release: 1 cap(s) orally once a day  hydrOXYzine hydrochloride 10 mg oral tablet: 1 tab(s) orally once a day (at bedtime), As Needed  ivermectin 3 mg oral tablet: 7 tab(s) orally once  pramipexole 1.5 mg oral tablet: 1 tab(s) orally once a day (at bedtime)  Valium 5 mg oral tablet: orally 2 times a day, As Needed  Wellbutrin  mg/24 hours oral tablet, extended release: 1 tab(s) orally once a day

## 2021-04-12 NOTE — DISCHARGE NOTE PROVIDER - CARE PROVIDER_API CALL
Eulogio [place] [address] [place] [id number]  Phone: [phone]  Fax: (   )    -  Established Patient  Scheduled Appointment: 04/19/2021 08:30 AM

## 2021-04-12 NOTE — DISCHARGE NOTE PROVIDER - NSDCCPCAREPLAN_GEN_ALL_CORE_FT
PRINCIPAL DISCHARGE DIAGNOSIS  Diagnosis: BRANT (acute kidney injury)  Assessment and Plan of Treatment: During this hospital admission you were found to have acute kidney injury superimposed on Chronic Kidney Disease. Acute kidney injury (BRANT) is a sudden episode of kidney failure or kidney damage that happens within a few hours or a few days. BRANT causes a build-up of waste products in your blood and makes it hard for your kidneys to keep the right balance of fluid in your body. Your kidney numbers have been improving. Please follow up with your primary care doctor for further evaluation and testing.      SECONDARY DISCHARGE DIAGNOSES  Diagnosis: COPD exacerbation  Assessment and Plan of Treatment: COPD is a lung disease that makes it hard to breathe. In people with COPD, the airways (the branching tubes that carry air within the lungs) become narrow and damaged. This makes people feel out of breath and tired. COPD can be a serious illness. It cannot be cured and it usually gets worse over time. But there are treatments that can help. The most common cause of COPD is smoking. Smoke can damage the lungs forever and cause COPD. Common symptoms include shortness of breath, wheezing, and worsening cough and mucus production. COPD can put you at an increased risk for lung infections, lung cancer and heart problems. If you smoke, the most important thing you can do for your COPD is to stop smoking. There are a lot of medicines to treat COPD. Most people use inhalers that help open up their airways or decrease swelling in the airways. Often people need more than one inhaler at a time. You might need to take a steroid medicine in a pill for a flare of COPD. If the disease gets worse, you might need to use oxygen. Pulmonary rehabilitation may be recommended and can teach you how to improve your symptoms through exercises and different ways to breathe. RARELY, people with severe COPD will have surgery to remove the most damaged parts of their lung. This surgery can reduce symptoms, but it does not always work. In order to prevent COPD exacerbations it is important that you take your prescribed medications and follow the recommendations of your primary care and pulmonary doctors. If your shortness of breath worsens or your experience an increase in or change of your sputum production you should seek evaluation by your primary care doctor, or, if severe, an emergency room.

## 2021-04-12 NOTE — PROGRESS NOTE ADULT - PROBLEM SELECTOR PLAN 3
HFpEF (EF 55-60% last echo 2/20). Low suspicion at first for CHF exacerbation as cause of patient's acute hypoxia.  CXR pulmonary vascular attenuation.  - Hold Bumex 2mg in setting of BRANT  - Restart as Cr improves and  BP tolerates   - Monitor urine output

## 2021-04-12 NOTE — PROGRESS NOTE ADULT - ASSESSMENT
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

## 2021-04-12 NOTE — DISCHARGE NOTE PROVIDER - NSDCFUADDAPPT_GEN_ALL_CORE_FT
Please follow up with your PCP Dr. Rothman on 4/19 at 8:30am, the appointment has already been made for you.

## 2021-04-12 NOTE — PROGRESS NOTE ADULT - SUBJECTIVE AND OBJECTIVE BOX
INCOMPLETE    O/N Events: FARIBA    Subjective/ROS: Patient seen and examined at bedside. Says that she is not having any difficulty breathing. That she initially came in just for bleeding from scratching and not for breathing problems.     Denies Fever/Chills, HA, CP, SOB, n/v, changes in bowel/urinary habits.  12pt ROS otherwise negative.    VITALS  Vital Signs Last 24 Hrs  T(C): 37.2 (12 Apr 2021 05:25), Max: 37.2 (12 Apr 2021 05:25)  T(F): 98.9 (12 Apr 2021 05:25), Max: 98.9 (12 Apr 2021 05:25)  HR: 87 (12 Apr 2021 06:14) (78 - 91)  BP: 128/63 (12 Apr 2021 05:25) (102/60 - 131/74)  BP(mean): --  RR: 18 (12 Apr 2021 06:14) (17 - 20)  SpO2: 90% (12 Apr 2021 06:14) (88% - 93%)    CAPILLARY BLOOD GLUCOSE    PHYSICAL EXAM  GENERAL: Middle aged female in NAD,   HEAD:  Atraumatic, Normocephalic  EYES: EOMI, PERRLA, conjunctiva and sclera clear  NECK: Supple, No JVD  CHEST/LUNG: Clear to auscultation bilaterally; No wheeze; No crackles; No accessory muscles used  HEART: Regular rate and rhythm; No murmurs;   ABDOMEN: Soft, Nontender, Nondistended; Bowel sounds present; No guarding  EXTREMITIES:  2+ Peripheral Pulses, No cyanosis or edema  PSYCH: AAOx3  NEUROLOGY: non-focal  SKIN: Full body lesions, worse on the buttocks area +excoriations    MEDICATIONS  (STANDING):  albuterol/ipratropium for Nebulization 3 milliLiter(s) Nebulizer every 6 hours  atorvastatin 40 milliGRAM(s) Oral at bedtime  azithromycin   Tablet 250 milliGRAM(s) Oral every 24 hours  buPROPion XL . 300 milliGRAM(s) Oral every 24 hours  diltiazem    milliGRAM(s) Oral daily  heparin   Injectable 5000 Unit(s) SubCutaneous every 8 hours  pramipexole 1.5 milliGRAM(s) Oral at bedtime  predniSONE   Tablet 40 milliGRAM(s) Oral every 24 hours    MEDICATIONS  (PRN):  acetaminophen   Tablet .. 650 milliGRAM(s) Oral every 6 hours PRN Mild Pain (1 - 3)      Altabax (Unknown)  Augmentin (Unknown)  clindamycin (Unknown)  Vaseline (Swelling)      LABS                        7.1    6.16  )-----------( 202      ( 11 Apr 2021 08:18 )             24.5     04-11    138  |  97  |  46<H>  ----------------------------<  198<H>  5.3   |  32<H>  |  2.26<H>    Ca    9.2      11 Apr 2021 08:18  Phos  4.3     04-11  Mg     2.8     04-11    TPro  7.0  /  Alb  2.8<L>  /  TBili  <0.2  /  DBili  x   /  AST  13  /  ALT  8<L>  /  AlkPhos  114  04-11    PT/INR - ( 11 Apr 2021 08:18 )   PT: 14.4 sec;   INR: 1.21          PTT - ( 11 Apr 2021 08:18 )  PTT:30.2 sec            IMAGING/EKG/ETC   O/N Events: FARIBA    Subjective/ROS: Patient seen and examined at bedside. Says that she is not having any difficulty breathing. That she initially came in just for bleeding from scratching and not for breathing problems.     Denies Fever/Chills, HA, CP, SOB, n/v, changes in bowel/urinary habits.  12pt ROS otherwise negative.    VITALS  Vital Signs Last 24 Hrs  T(C): 37.2 (12 Apr 2021 05:25), Max: 37.2 (12 Apr 2021 05:25)  T(F): 98.9 (12 Apr 2021 05:25), Max: 98.9 (12 Apr 2021 05:25)  HR: 87 (12 Apr 2021 06:14) (78 - 91)  BP: 128/63 (12 Apr 2021 05:25) (102/60 - 131/74)  BP(mean): --  RR: 18 (12 Apr 2021 06:14) (17 - 20)  SpO2: 90% (12 Apr 2021 06:14) (88% - 93%)    CAPILLARY BLOOD GLUCOSE    PHYSICAL EXAM  GENERAL: Middle aged female in NAD,   HEAD:  Atraumatic, Normocephalic  EYES: EOMI, PERRLA, conjunctiva and sclera clear  NECK: Supple, No JVD  CHEST/LUNG: Clear to auscultation bilaterally; No wheeze; No crackles; No accessory muscles used  HEART: Regular rate and rhythm; No murmurs;   ABDOMEN: Soft, Nontender, Nondistended; Bowel sounds present; No guarding  EXTREMITIES:  2+ Peripheral Pulses, No cyanosis or edema  PSYCH: AAOx3  NEUROLOGY: non-focal  SKIN: Full body lesions, worse on the buttocks area +excoriations    MEDICATIONS  (STANDING):  albuterol/ipratropium for Nebulization 3 milliLiter(s) Nebulizer every 6 hours  atorvastatin 40 milliGRAM(s) Oral at bedtime  azithromycin   Tablet 250 milliGRAM(s) Oral every 24 hours  buPROPion XL . 300 milliGRAM(s) Oral every 24 hours  diltiazem    milliGRAM(s) Oral daily  heparin   Injectable 5000 Unit(s) SubCutaneous every 8 hours  pramipexole 1.5 milliGRAM(s) Oral at bedtime  predniSONE   Tablet 40 milliGRAM(s) Oral every 24 hours    MEDICATIONS  (PRN):  acetaminophen   Tablet .. 650 milliGRAM(s) Oral every 6 hours PRN Mild Pain (1 - 3)      Altabax (Unknown)  Augmentin (Unknown)  clindamycin (Unknown)  Vaseline (Swelling)      LABS                        7.1    6.16  )-----------( 202      ( 11 Apr 2021 08:18 )             24.5     04-11    138  |  97  |  46<H>  ----------------------------<  198<H>  5.3   |  32<H>  |  2.26<H>    Ca    9.2      11 Apr 2021 08:18  Phos  4.3     04-11  Mg     2.8     04-11    TPro  7.0  /  Alb  2.8<L>  /  TBili  <0.2  /  DBili  x   /  AST  13  /  ALT  8<L>  /  AlkPhos  114  04-11    PT/INR - ( 11 Apr 2021 08:18 )   PT: 14.4 sec;   INR: 1.21          PTT - ( 11 Apr 2021 08:18 )  PTT:30.2 sec            IMAGING/EKG/ETC

## 2021-04-12 NOTE — DISCHARGE NOTE PROVIDER - HOSPITAL COURSE
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    Problem List/Main Diagnoses  #BRANT (acute kidney injury).  Plan: On CKD. On presentation Cr 2.7 - baseline 1.7-2     #COPD exacerbation.  Plan: At home, takes Advair, Albuterol 2.5, Yupelri inhaler, and Perforomist inhaler. Presented with SOB that improved after solumedrol in the ED. No audible wheezing on physical exam.   - Treated for COPD exacerbation  - Azithromycin 250mg, Prednisone 40mg QD    #Chronic diastolic heart failure: HFpEF (EF 55-60% last echo 2/20). No JVD, Low suspicion for CHF exacerbation as cause of patient's acute hypoxia.  CXR pulmonary vascular attenuation.  - Held Bumex 2mg in setting of BRANT, will continue to hold. Can re-start after following up with PCP    #Anemia: Reports bleeding from skin lesions over the last 3 days. Hg 7.1, down from her baseline of 7s-9s on prior admissions.     #Hypertension  -Continue diltiazem 180 mg daily.     #DVT (deep venous thrombosis). Patient with history of multiple DVTs, on Eliquis 2.5 mg bid at home.  - Continue home eliquis    #Hyperlipidemia.  Plan: - Lipitor 40mg QHS    #Restless leg syndrome  - Patient reports hx of restless leg syndrome and neuropathy 2/2 prior Lyme Disease infection   - C/w Pramipexole 1.5 mg at bedtime.     #Rib fracture.  Plan: ON last admission noted to have b/l rib fractures and an acute fracture of the left sixth rib.    Inpatient treatment course: Patient was admitted for further monitoring and work-up. In the ED VS T 98 HR 89 /64 R 18 99% on 3L NC (home O2). Labs: hgb 7.1/Hct 23.7. BUN/Cr 61/2.73. VBG 7.36/CO2 63/ HCo3 35. ED Course: Solumedrol 40mg, Azithromycin and Mg 2g. Continued on prednisone and azithro. Given tylenol for pain.     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    Problem List/Main Diagnoses  #BRANT (acute kidney injury).  Plan: On CKD. On presentation Cr 2.7 - baseline 1.7-2     #COPD exacerbation.  Plan: At home, takes Advair, Albuterol 2.5, Yupelri inhaler, and Perforomist inhaler. Presented with SOB that improved after solumedrol in the ED. No audible wheezing on physical exam.   - Treated for COPD exacerbation  - Azithromycin 250mg, Prednisone 40mg QD    #Chronic diastolic heart failure: HFpEF (EF 55-60% last echo 2/20). No JVD, Low suspicion for CHF exacerbation as cause of patient's acute hypoxia.  CXR pulmonary vascular attenuation.  - Held Bumex 2mg in setting of BRANT, will continue to hold. Can re-start after following up with PCP    #Anemia: Reports bleeding from skin lesions over the last 3 days. Hg 7.1, down from her baseline of 7s-9s on prior admissions.     #Hypertension  -Continue diltiazem 180 mg daily.     #DVT (deep venous thrombosis). Patient with history of multiple DVTs, on Eliquis 2.5 mg bid at home.  - Continue home eliquis    #Hyperlipidemia.  Plan: - Lipitor 40mg QHS    #Restless leg syndrome  - Patient reports hx of restless leg syndrome and neuropathy 2/2 prior Lyme Disease infection   - C/w Pramipexole 1.5 mg at bedtime.     #Rib fracture.  Plan: ON last admission noted to have b/l rib fractures and an acute fracture of the left sixth rib.    Inpatient treatment course: Patient was admitted for further monitoring and work-up. In the ED VS T 98 HR 89 /64 R 18 99% on 3L NC (home O2). Labs: hgb 7.1/Hct 23.7. BUN/Cr 61/2.73. VBG 7.36/CO2 63/ HCo3 35. ED Course: Solumedrol 40mg, Azithromycin and Mg 2g. Continued on prednisone and azithro. Given tylenol for pain. Held bumex due to elevated Cr.     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    Problem List/Main Diagnoses  #BRANT (acute kidney injury).  Plan: On CKD. On presentation Cr 2.7 - baseline 1.7-2. Possibly 2/2 bumex vs decrease intake. Downtrended   - Patient refused labs    #COPD exacerbation.  Plan: At home, takes Advair, Albuterol 2.5, Yupelri inhaler, and Perforomist inhaler. Presented with SOB that improved after solumedrol in the ED. No audible wheezing on physical exam.   - Treated for COPD exacerbation  - Azithromycin 250mg, Prednisone 40mg QD    #Chronic diastolic heart failure: HFpEF (EF 55-60% last echo 2/20). No JVD, Low suspicion for CHF exacerbation as cause of patient's acute hypoxia.  CXR pulmonary vascular attenuation.  - Held Bumex 2mg in setting of BRANT, will continue to hold. Can re-start after following up with PCP    #Anemia: Reports bleeding from skin lesions over the last 3 days. Hg 7.1, down from her baseline of 7s-9s on prior admissions.     #Hypertension  -Continue diltiazem 180 mg daily.     #DVT (deep venous thrombosis). Patient with history of multiple DVTs, on Eliquis 2.5 mg bid at home.  - Continue home eliquis    #Hyperlipidemia.  Plan: - Lipitor 40mg QHS    #Restless leg syndrome  - Patient reports hx of restless leg syndrome and neuropathy 2/2 prior Lyme Disease infection   - C/w Pramipexole 1.5 mg at bedtime.     #Rib fracture.  Plan: ON last admission noted to have b/l rib fractures and an acute fracture of the left sixth rib.    Inpatient treatment course: Patient was admitted for further monitoring and work-up. In the ED VS T 98 HR 89 /64 R 18 99% on 3L NC (home O2). Labs: hgb 7.1/Hct 23.7. BUN/Cr 61/2.73. VBG 7.36/CO2 63/ HCo3 35. ED Course: Solumedrol 40mg, Azithromycin and Mg 2g. Continued on prednisone and azithro. Given tylenol for pain. Held bumex due to elevated Cr.     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 on 9/1 for a nondisplaced left lateral rib fracture and COPD exacerbation, who p/w one day of SOB and fatigue, found to have acute hypoxic and hypercapnic respiratory failure likely 2/2 a COPD exacerbation in the setting of BRANT on CKD.    Problem List/Main Diagnoses  #BRANT (acute kidney injury).  Plan: On CKD. On presentation Cr 2.7 - baseline 1.7-2. Possibly 2/2 bumex vs decrease intake. Downtrended once bumex was stopped  - Patient refused labs 4/12  - Follow up with outpatient provider    #COPD exacerbation.  Plan: At home, takes Advair, Albuterol 2.5, Yupelri inhaler, and Perforomist inhaler. Presented with SOB that improved after solumedrol in the ED. No audible wheezing on physical exam.   - Treated for COPD exacerbation  - Azithromycin 250mg, Prednisone 40mg QD    #Chronic diastolic heart failure: HFpEF (EF 55-60% last echo 2/20). No JVD, Low suspicion for CHF exacerbation as cause of patient's acute hypoxia.  CXR pulmonary vascular attenuation.  - Held Bumex 2mg in setting of BRANT, will continue to hold. Can re-start after following up with PCP    #Anemia: Reports bleeding from skin lesions over the last 3 days. Hg 7.1, down from her baseline of 7s-9s on prior admissions.     #Hypertension  -Continue diltiazem 180 mg daily.     #DVT (deep venous thrombosis). Patient with history of multiple DVTs, on Eliquis 2.5 mg bid at home.  - Continue home eliquis    #Hyperlipidemia.  Plan: - Lipitor 40mg QHS    #Restless leg syndrome  - Patient reports hx of restless leg syndrome and neuropathy 2/2 prior Lyme Disease infection   - C/w Pramipexole 1.5 mg at bedtime.     #Rib fracture.  Plan: ON last admission noted to have b/l rib fractures and an acute fracture of the left sixth rib.    Inpatient treatment course: Patient was admitted for further monitoring and work-up. In the ED VS T 98 HR 89 /64 R 18 99% on 3L NC (home O2). Labs: hgb 7.1/Hct 23.7. BUN/Cr 61/2.73. VBG 7.36/CO2 63/ HCo3 35. ED Course: Solumedrol 40mg, Azithromycin and Mg 2g. Continued on prednisone and azithro. Given tylenol for pain. Held bumex due to elevated Cr.     New medications:   Labs to be followed outpatient: BMP (Cr), CBC (Hgb)  Exam to be followed outpatient:          Patient left AMA. Medical team explained the risks of leaving, including worsening renal injury including rising Cr which can lead to altered mental status, electrolyte abnormalities, increased blood pressure, and organ damage. Informed the medical team that she understood these risks and would like to leave the hospital. Stated that she will follow up with outpatient providers regarding bumex treatment and eliquis.    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    Problem List/Main Diagnoses  #BRANT (acute kidney injury).  Plan: On CKD. On presentation Cr 2.7 - baseline 1.7-2. Possibly 2/2 bumex vs decrease intake. Downtrended once bumex was stopped  - Patient refused labs 4/12  - Follow up with outpatient provider    #COPD exacerbation.  Plan: At home, takes Advair, Albuterol 2.5, Yupelri inhaler, and Perforomist inhaler. Presented with SOB that improved after solumedrol in the ED. No audible wheezing on physical exam.   - Treated for COPD exacerbation  - Azithromycin 250mg, Prednisone 40mg QD    #Chronic diastolic heart failure: HFpEF (EF 55-60% last echo 2/20). No JVD, Low suspicion for CHF exacerbation as cause of patient's acute hypoxia.  CXR pulmonary vascular attenuation.  - Held Bumex 2mg in setting of BRANT, will continue to hold. Can re-start after following up with PCP    #Anemia: Reports bleeding from skin lesions over the last 3 days. Hg 7.1, down from her baseline of 7s-9s on prior admissions.     #Hypertension  -Continue diltiazem 180 mg daily.     #DVT (deep venous thrombosis). Patient with history of multiple DVTs, on Eliquis 2.5 mg bid at home.  - Continue home eliquis    #Hyperlipidemia.  Plan: - Lipitor 40mg QHS    #Restless leg syndrome  - Patient reports hx of restless leg syndrome and neuropathy 2/2 prior Lyme Disease infection   - C/w Pramipexole 1.5 mg at bedtime.     #Rib fracture.  Plan: ON last admission noted to have b/l rib fractures and an acute fracture of the left sixth rib.    Inpatient treatment course: Patient was admitted for further monitoring and work-up. In the ED VS T 98 HR 89 /64 R 18 99% on 3L NC (home O2). Labs: hgb 7.1/Hct 23.7. BUN/Cr 61/2.73. VBG 7.36/CO2 63/ HCo3 35. ED Course: Solumedrol 40mg, Azithromycin and Mg 2g. Continued on prednisone and azithro. Given tylenol for pain. Held bumex due to elevated Cr. Left AMA, stated she had plans to follow up with outpatient providers    New medications:   Labs to be followed outpatient: BMP (Cr), CBC (Hgb)  Exam to be followed outpatient:          Patient left AMA. Medical team explained the risks of leaving, including worsening renal injury including rising Cr which can lead to altered mental status, electrolyte abnormalities, increased blood pressure, and organ damage. Patient informed the medical team that she understood these risks and would like to leave the hospital. Stated that she will follow up with outpatient providers regarding bumex treatment and eliquis.    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    Problem List/Main Diagnoses  #BRANT (acute kidney injury).  Plan: On CKD. On presentation Cr 2.7 - baseline 1.7-2. Possibly 2/2 bumex vs decrease intake. Downtrended once bumex was stopped  - Patient refused labs 4/12  - Follow up with outpatient provider    #COPD exacerbation.  Plan: At home, takes Advair, Albuterol 2.5, Yupelri inhaler, and Perforomist inhaler. Presented with SOB that improved after solumedrol in the ED. No audible wheezing on physical exam.   - Treated for COPD exacerbation  - Azithromycin 250mg, Prednisone 40mg QD    #Chronic diastolic heart failure: HFpEF (EF 55-60% last echo 2/20). No JVD, Low suspicion for CHF exacerbation as cause of patient's acute hypoxia.  CXR pulmonary vascular attenuation.  - Held Bumex 2mg in setting of BRANT, will continue to hold. Can re-start after following up with PCP    #Anemia: Reports bleeding from skin lesions over the last 3 days. Hg 7.1, down from her baseline of 7s-9s on prior admissions.     #Hypertension  -Continue diltiazem 180 mg daily.     #DVT (deep venous thrombosis). Patient with history of multiple DVTs, on Eliquis 2.5 mg bid at home.  - Continue home eliquis    #Hyperlipidemia.  Plan: - Lipitor 40mg QHS    #Restless leg syndrome  - Patient reports hx of restless leg syndrome and neuropathy 2/2 prior Lyme Disease infection   - C/w Pramipexole 1.5 mg at bedtime.     #Rib fracture.  Plan: ON last admission noted to have b/l rib fractures and an acute fracture of the left sixth rib.    Inpatient treatment course: Patient was admitted for further monitoring and work-up. In the ED VS T 98 HR 89 /64 R 18 99% on 3L NC (home O2). Labs: hgb 7.1/Hct 23.7. BUN/Cr 61/2.73. VBG 7.36/CO2 63/ HCo3 35. ED Course: Solumedrol 40mg, Azithromycin and Mg 2g. Continued on prednisone and azithro. Given tylenol for pain. Held bumex due to elevated Cr. Left AMA, stated she had plans to follow up with outpatient providers    New medications:   Labs to be followed outpatient: BMP (Cr), CBC (Hgb)  Exam to be followed outpatient:

## 2021-04-13 LAB
COVID-19 SPIKE DOMAIN AB INTERP: POSITIVE
COVID-19 SPIKE DOMAIN ANTIBODY RESULT: >250 U/ML — HIGH
SARS-COV-2 IGG+IGM SERPL QL IA: >250 U/ML — HIGH
SARS-COV-2 IGG+IGM SERPL QL IA: POSITIVE

## 2021-04-15 DIAGNOSIS — Z90.49 ACQUIRED ABSENCE OF OTHER SPECIFIED PARTS OF DIGESTIVE TRACT: ICD-10-CM

## 2021-04-15 DIAGNOSIS — Z88.1 ALLERGY STATUS TO OTHER ANTIBIOTIC AGENTS STATUS: ICD-10-CM

## 2021-04-15 DIAGNOSIS — I73.9 PERIPHERAL VASCULAR DISEASE, UNSPECIFIED: ICD-10-CM

## 2021-04-15 DIAGNOSIS — E78.5 HYPERLIPIDEMIA, UNSPECIFIED: ICD-10-CM

## 2021-04-15 DIAGNOSIS — Z79.01 LONG TERM (CURRENT) USE OF ANTICOAGULANTS: ICD-10-CM

## 2021-04-15 DIAGNOSIS — J44.1 CHRONIC OBSTRUCTIVE PULMONARY DISEASE WITH (ACUTE) EXACERBATION: ICD-10-CM

## 2021-04-15 DIAGNOSIS — N17.9 ACUTE KIDNEY FAILURE, UNSPECIFIED: ICD-10-CM

## 2021-04-15 DIAGNOSIS — J96.21 ACUTE AND CHRONIC RESPIRATORY FAILURE WITH HYPOXIA: ICD-10-CM

## 2021-04-15 DIAGNOSIS — G62.9 POLYNEUROPATHY, UNSPECIFIED: ICD-10-CM

## 2021-04-15 DIAGNOSIS — Z79.899 OTHER LONG TERM (CURRENT) DRUG THERAPY: ICD-10-CM

## 2021-04-15 DIAGNOSIS — B94.8 SEQUELAE OF OTHER SPECIFIED INFECTIOUS AND PARASITIC DISEASES: ICD-10-CM

## 2021-04-15 DIAGNOSIS — G25.81 RESTLESS LEGS SYNDROME: ICD-10-CM

## 2021-04-15 DIAGNOSIS — D64.9 ANEMIA, UNSPECIFIED: ICD-10-CM

## 2021-04-15 DIAGNOSIS — I13.0 HYPERTENSIVE HEART AND CHRONIC KIDNEY DISEASE WITH HEART FAILURE AND STAGE 1 THROUGH STAGE 4 CHRONIC KIDNEY DISEASE, OR UNSPECIFIED CHRONIC KIDNEY DISEASE: ICD-10-CM

## 2021-04-15 DIAGNOSIS — J96.22 ACUTE AND CHRONIC RESPIRATORY FAILURE WITH HYPERCAPNIA: ICD-10-CM

## 2021-04-15 DIAGNOSIS — Z85.828 PERSONAL HISTORY OF OTHER MALIGNANT NEOPLASM OF SKIN: ICD-10-CM

## 2021-04-15 DIAGNOSIS — Z99.81 DEPENDENCE ON SUPPLEMENTAL OXYGEN: ICD-10-CM

## 2021-04-15 DIAGNOSIS — N18.4 CHRONIC KIDNEY DISEASE, STAGE 4 (SEVERE): ICD-10-CM

## 2021-04-15 DIAGNOSIS — I50.32 CHRONIC DIASTOLIC (CONGESTIVE) HEART FAILURE: ICD-10-CM

## 2021-04-15 DIAGNOSIS — G47.33 OBSTRUCTIVE SLEEP APNEA (ADULT) (PEDIATRIC): ICD-10-CM

## 2021-04-15 DIAGNOSIS — Z95.828 PRESENCE OF OTHER VASCULAR IMPLANTS AND GRAFTS: ICD-10-CM

## 2021-04-15 DIAGNOSIS — Z88.8 ALLERGY STATUS TO OTHER DRUGS, MEDICAMENTS AND BIOLOGICAL SUBSTANCES: ICD-10-CM

## 2021-04-15 DIAGNOSIS — M54.9 DORSALGIA, UNSPECIFIED: ICD-10-CM

## 2021-04-15 DIAGNOSIS — G89.29 OTHER CHRONIC PAIN: ICD-10-CM

## 2021-04-15 DIAGNOSIS — Z86.73 PERSONAL HISTORY OF TRANSIENT ISCHEMIC ATTACK (TIA), AND CEREBRAL INFARCTION WITHOUT RESIDUAL DEFICITS: ICD-10-CM

## 2021-04-15 DIAGNOSIS — Z87.891 PERSONAL HISTORY OF NICOTINE DEPENDENCE: ICD-10-CM

## 2021-11-07 NOTE — CONSULT NOTE ADULT - CONSULT REQUESTED DATE/TIME
03-May-2020 06:45 [FreeTextEntry1] : She has been taking Midodrine for the past month without side effects. She plans on following up with endocrinology and neurosurgery. \par \par Yesterday she woke up with a generalized rash that spread throughout her body. Endorses pruritus. No fever. No shortness of breath.  Denies any new medications or detergents. She did not take any medication for relief. \par \par She continues to have headaches due to construction next to her window causing sleeping difficulties. She had three to four episodes of her "legs giving out" with no loss of consciousness and shaking. Ms. Magallon is able to recall the events. She did not schedule the EEG previously ordered. Currently, ambulating without a cane. \par \par Ms. Magallon has generalized right sided pain and took her baclofen at 9am this morning. \par \par The remaining neurological review of systems is negative. \par \par 9/22/21 \par Kavita Magallon is a 56 year old woman with cerebral palsy presenting for a follow up appointment for frequent falls.\par \par Ms. Magallon endorses she has had multiple falls without injury due to her "legs collapsing." She feels her cerebral palsy is worse and is currently taking Baclofen 20mg three times daily. She endorses her right leg drags and "feels like a block of wood." No tongue biting noted during any of these falls. Ms. Magallon did not have the EEG done that was previously ordered. She is also seeing the cardiologist. \par \par She endorses an increased amount of stress at home due to her living situation. Plans on having an evaluation for a home health aide on October 26th. Currently, she lives in a building with stairs no elevator. Ms. Magallon plans on following up with her  for alternate arrangements. She has difficulty sleeping and is seeing psychiatry and was given Trazodone.  She also reports her short term recall is worse.\par \par The remaining neurological review of systems is negative.  \par \par 9/1/21\par Kaivta Magallon is a 56 year old woman with cerebral palsy presenting for a follow up appointment for frequent falls.\par \par Ms. Magallon endorses she had two episodes of falling due to her "legs collapsing." On 8/16 she had a fall backwards, no head injury where her knees were buckling. On 8/23 she fell again while getting food out of the fridge. She was incontinent of urine at that time. Her boyfriend witnessed jerking of the extremities. She denies loss of consciousness. Was alert and aware of event and felt fatigued afterwards. Ms. Magallon had a seizure in 2013 that manifested as word finding difficulties and falling onto the sofa.  FHx - mother - epilepsy. Denies any head injuries or MVA. Denies visual changes, weakness or numbness on one side of the body, change in speech or facial droop. \par \par Prior to these episodes she reports sleeping for three days continuously and increased fatigue. She reports a feeling of dizziness described as lightheadedness that is intermittent and does not occur with position change as previously reported. She is seeing cardiology tomorrow. She continues to have headaches and is taking Frova as needed for relief. She cannot recall if she tried the Ketorolac. Ms. Magallon reports an increased pressure sensation in her head when bending downward. \par \par Ms. Magallon endorses a worsening of her short term memory with delayed recall. She can manage her own medications and bills at home. She can also manage her own appointments. \par \par No change in medications.\par \edison Has straining with urination for 2-3 weeks and hesitancy with constipation (following up with urogynecologist scheduled by patient). \par \par She reports 5-6 falls in total and is seeing Dr. Lopez as well. She does not have physical therapy at home. \par \par The remaining neurological review of systems is negative. \par \par 7/16/21\par Kavita Magallon is a 55 year old woman with cerebral palsy presenting for a follow up appointment for headaches and gait difficulties.\par \par She endorses an increased amount of stress due to possible eviction and relationship difficulties. The increased stress has caused her to have daily headaches for two weeks radiating up from her neck to her forehead and bilateral temples. Positive photophobia and nausea. Denies vomiting. She endorses taking the Frova with Advil and no relief. \par \par Her walker is heavy and she has difficulty ambulating up the stairs with it. She did not do the MRIs or follow up with the physiatrist yet but plans on doing it. \par \par She is currently taking Baclofen 20mg three times daily without side effects. \par \par The remaining neurological review of systems is negative. \par \par 6/3/21\par Kavita Magallon is a 55 year old woman with cerebral palsy presenting for a follow up appointment for headaches and gait difficulties.\par \par She endorses her walker is heavy and she is falling while ambulating up and down the stairs. She did not complete the MRIs previously ordered. She is planning on moving in January to [place]. Her headaches are much improved. \par \par The remaining neurological review of systems is negative. \par \par 4/19/21\par Kavita Magallon is a 55 year old woman with cerebral palsy presenting for a follow up appointment for worsening headaches. \par \par She endorses she has had worsening anxiety and depression since her last visit. Her psychiatrist discontinued Paxil (stopped one month ago) and she is now taking Trazodone. She has migraines most of the day and Rizatriptan did help but she feels it has worsened her anxiety and depression. \par She tried Topamax, Butabitol,  and Imitrex in the past. \par \par Ms. Magallon had physical therapy and has a right leg brace. \par \par She had her COVID-19 vaccine 1st dose of Moderna two weeks ago and second dose due on 5/7. \par \par The remaining neurological review of systems is negative. \par \par 1/27/21\par Kavita Magallon is a 55 year old woman with cerebral palsy presenting for a follow up appointment for worsening balance, headaches, and dizziness. \par \par She endorses she feels dizzy and off balance with changing positions and this has worsened over the past three to four days. She feels an increase in lightheadedness when getting out of bed in the morning. She has not had a syncopal episode but feels as if she "may pass out."  Ms. Magallon has not had any recent falls. \par \par Her last seizure was in 2013 and she was unable to get the words out then 3.5 hours later she is back to baseline.\par \par She endorses her headaches are under control with Rizatriptan.\par \par The remaining neurological review of systems is negative. \par \par 10/27/20\par Kavita Magallon is a 55 year old woman with cerebral palsy presenting for a follow up for worsening balance and headaches. \par \par She has had headaches five times per week that last all day. She reports the Imitrex helps but it makes her feel fatigued and sleepy.   Ms. Magallon endorses that the pain starts in the occipital and neck area and radiating to the frontal and bitemporal. It is 10/10 on a pain scale and feels as if  a "knife is through her temple."\par \par She also reports her balance is worse. Physical therapy was ordered for her previously and she starts it on September 9th.  She feels dizzy and lightheaded at times. There have been no falls or injuries. She has 36 stairs in her apartment and plans on seeing rehab medicine on September 10th.  \par She does not go outside of the house much for a fear of falling.  \par \par Ms. Magallon has had nausea for 4-5 days constant with no change in medication. She has not followed up with her PCP yet for this. \par \par Ms. Magallon has not followed up with the behavioral health center yet for increased stress and anxiety.\par \par Currently, she is on baclofen 15mg three times per day. Her spasms have improved on the right side. She denies any side effects with the baclofen. \par \par She reports ongoing difficulty with her memory and has a planned neuropsychological evaluation. She did not get her B12 level done yet. \par \par The remaining neurological review of systems is negative. \par \par 7/27/20\par Kavita Magallon is a 55 year old woman with cerebral palsy. She presents today for worsening balance and stiffness in the right leg, as well as headaches.\par \par She currently lives on the third floor with no elevator.  She does not use a cane or assistive device to help her walk. Over the past four months, Ms. Magallon reports a change in balance, increase in difficulty walking and stiffness to her right leg.  She is currently taking Baclofen 10mg three times per day with no side effects. Baclofen 15mg three times per day was recommended on her last visit.  There is pain in her right leg from the hip throughout the whole leg. She reports the pain is difficult to describe but it feels as "if somebody is hitting her leg."  \par \par Ms. Magallon also reports a worsening of her preexisting headaches. She was on Topamax 100mg nightly that was tapered down by her primary doctor - currently, she reports taking 12.5mg of Topamax nightly.  She is not certain of the increased in the amount of frequency but is aware that the headaches increased overall.  The pain starts in the occipital area and travels to the bilateral frontal area of her head.  She has sound sensitivity and describes it as a "device" on her head.  She denies photophobia, nausea, vomiting.  Ms. Magallon has tried Ibuprofen and Naproxen in the past with no relief. She was given butalbital by another physician.  This has not provided her with any relief.  \par \par She reports intermittent urinary and fecal incontinence for at least one year. \par \par Ms. Magallon also reports worsening memory over 4-5 years.  She was unable to do neuropsychological testing due to insurance issues.  \par \par The remaining neurological review of systems is negative. \par \par 3/6/20: This is a followup visit for Ms. Magallon. She continues to have lightheadedness. She is worsening stiffness in her right side with multiple falls. She used to have a power chair. Now she is not using a cane or walker. No change in any of her other symptoms\par \par Initial consultation:\par Ms. Magallon is a 54 year old woman with a history of cerebral palsy with baseline mild cognitive impairment and spastic right hemiparesis. She has noticed gradually worsening cognitive and memory problems for the past 4-5 years. At the current time she forgets things that she was told 5 minutes before. She works as a  at “[employer]" and is able to perform her duties there although her manager has noticed the memory difficulty.  She has always had right-sided weakness and stiffness but there has been an increased amount of stiffness in the right side more recently. She takes baclofen 10 mg t.i.d. Her right foot is dragging and she sometimes trips on her toes on the right. She has had multiple falls. She has no history of epilepsy. She has a history of headache which was very well controlled on Topamax 75 mg at night. More recently the headaches have increased in frequency again.  Her mother has a history of cerebral aneurysm.

## 2022-01-01 ENCOUNTER — EMERGENCY (EMERGENCY)
Facility: HOSPITAL | Age: 82
LOS: 1 days | Discharge: HOME | End: 2022-01-01
Attending: EMERGENCY MEDICINE | Admitting: EMERGENCY MEDICINE
Payer: MEDICARE

## 2022-01-01 ENCOUNTER — INPATIENT (INPATIENT)
Facility: HOSPITAL | Age: 82
LOS: 32 days | DRG: 4 | End: 2022-10-09
Attending: INTERNAL MEDICINE | Admitting: INTERNAL MEDICINE
Payer: MEDICARE

## 2022-01-01 ENCOUNTER — INPATIENT (INPATIENT)
Facility: HOSPITAL | Age: 82
LOS: 0 days | Discharge: LEFT BEFORE DISCHARGE | DRG: 812 | End: 2022-07-03
Attending: HOSPITALIST | Admitting: HOSPITALIST
Payer: COMMERCIAL

## 2022-01-01 ENCOUNTER — TRANSCRIPTION ENCOUNTER (OUTPATIENT)
Age: 82
End: 2022-01-01

## 2022-01-01 VITALS
HEIGHT: 64 IN | TEMPERATURE: 98 F | RESPIRATION RATE: 18 BRPM | WEIGHT: 259.93 LBS | HEART RATE: 101 BPM | SYSTOLIC BLOOD PRESSURE: 165 MMHG | DIASTOLIC BLOOD PRESSURE: 89 MMHG | OXYGEN SATURATION: 96 %

## 2022-01-01 VITALS
HEART RATE: 96 BPM | DIASTOLIC BLOOD PRESSURE: 71 MMHG | SYSTOLIC BLOOD PRESSURE: 151 MMHG | OXYGEN SATURATION: 94 % | RESPIRATION RATE: 24 BRPM | TEMPERATURE: 98 F

## 2022-01-01 VITALS
TEMPERATURE: 98 F | DIASTOLIC BLOOD PRESSURE: 67 MMHG | SYSTOLIC BLOOD PRESSURE: 129 MMHG | RESPIRATION RATE: 18 BRPM | OXYGEN SATURATION: 93 % | HEART RATE: 95 BPM

## 2022-01-01 VITALS
RESPIRATION RATE: 28 BRPM | HEIGHT: 68 IN | TEMPERATURE: 99 F | DIASTOLIC BLOOD PRESSURE: 68 MMHG | SYSTOLIC BLOOD PRESSURE: 161 MMHG | WEIGHT: 199.96 LBS | OXYGEN SATURATION: 93 % | HEART RATE: 108 BPM

## 2022-01-01 VITALS
HEART RATE: 95 BPM | HEIGHT: 64 IN | RESPIRATION RATE: 20 BRPM | SYSTOLIC BLOOD PRESSURE: 121 MMHG | WEIGHT: 259.93 LBS | OXYGEN SATURATION: 96 % | DIASTOLIC BLOOD PRESSURE: 63 MMHG | TEMPERATURE: 98 F

## 2022-01-01 VITALS
HEART RATE: 100 BPM | OXYGEN SATURATION: 95 % | SYSTOLIC BLOOD PRESSURE: 166 MMHG | DIASTOLIC BLOOD PRESSURE: 70 MMHG | RESPIRATION RATE: 22 BRPM

## 2022-01-01 VITALS — HEART RATE: 145 BPM

## 2022-01-01 VITALS — TEMPERATURE: 101 F

## 2022-01-01 DIAGNOSIS — I73.9 PERIPHERAL VASCULAR DISEASE, UNSPECIFIED: ICD-10-CM

## 2022-01-01 DIAGNOSIS — J96.21 ACUTE AND CHRONIC RESPIRATORY FAILURE WITH HYPOXIA: ICD-10-CM

## 2022-01-01 DIAGNOSIS — Z88.8 ALLERGY STATUS TO OTHER DRUGS, MEDICAMENTS AND BIOLOGICAL SUBSTANCES: ICD-10-CM

## 2022-01-01 DIAGNOSIS — R53.2 FUNCTIONAL QUADRIPLEGIA: ICD-10-CM

## 2022-01-01 DIAGNOSIS — Z79.01 LONG TERM (CURRENT) USE OF ANTICOAGULANTS: ICD-10-CM

## 2022-01-01 DIAGNOSIS — S22.5XXA FLAIL CHEST, INITIAL ENCOUNTER FOR CLOSED FRACTURE: ICD-10-CM

## 2022-01-01 DIAGNOSIS — I87.8 OTHER SPECIFIED DISORDERS OF VEINS: ICD-10-CM

## 2022-01-01 DIAGNOSIS — Z88.1 ALLERGY STATUS TO OTHER ANTIBIOTIC AGENTS STATUS: ICD-10-CM

## 2022-01-01 DIAGNOSIS — Z98.89 OTHER SPECIFIED POSTPROCEDURAL STATES: Chronic | ICD-10-CM

## 2022-01-01 DIAGNOSIS — Z86.718 PERSONAL HISTORY OF OTHER VENOUS THROMBOSIS AND EMBOLISM: ICD-10-CM

## 2022-01-01 DIAGNOSIS — N17.9 ACUTE KIDNEY FAILURE, UNSPECIFIED: ICD-10-CM

## 2022-01-01 DIAGNOSIS — G62.9 POLYNEUROPATHY, UNSPECIFIED: ICD-10-CM

## 2022-01-01 DIAGNOSIS — R60.0 LOCALIZED EDEMA: ICD-10-CM

## 2022-01-01 DIAGNOSIS — Z51.5 ENCOUNTER FOR PALLIATIVE CARE: ICD-10-CM

## 2022-01-01 DIAGNOSIS — Z90.49 ACQUIRED ABSENCE OF OTHER SPECIFIED PARTS OF DIGESTIVE TRACT: Chronic | ICD-10-CM

## 2022-01-01 DIAGNOSIS — S80.812A ABRASION, LEFT LOWER LEG, INITIAL ENCOUNTER: ICD-10-CM

## 2022-01-01 DIAGNOSIS — I87.2 VENOUS INSUFFICIENCY (CHRONIC) (PERIPHERAL): ICD-10-CM

## 2022-01-01 DIAGNOSIS — D64.9 ANEMIA, UNSPECIFIED: ICD-10-CM

## 2022-01-01 DIAGNOSIS — Z20.822 CONTACT WITH AND (SUSPECTED) EXPOSURE TO COVID-19: ICD-10-CM

## 2022-01-01 DIAGNOSIS — Z95.820 PERIPHERAL VASCULAR ANGIOPLASTY STATUS WITH IMPLANTS AND GRAFTS: ICD-10-CM

## 2022-01-01 DIAGNOSIS — I48.91 UNSPECIFIED ATRIAL FIBRILLATION: ICD-10-CM

## 2022-01-01 DIAGNOSIS — D50.9 IRON DEFICIENCY ANEMIA, UNSPECIFIED: ICD-10-CM

## 2022-01-01 DIAGNOSIS — G89.29 OTHER CHRONIC PAIN: ICD-10-CM

## 2022-01-01 DIAGNOSIS — I27.20 PULMONARY HYPERTENSION, UNSPECIFIED: ICD-10-CM

## 2022-01-01 DIAGNOSIS — J44.9 CHRONIC OBSTRUCTIVE PULMONARY DISEASE, UNSPECIFIED: ICD-10-CM

## 2022-01-01 DIAGNOSIS — Z87.891 PERSONAL HISTORY OF NICOTINE DEPENDENCE: ICD-10-CM

## 2022-01-01 DIAGNOSIS — R06.02 SHORTNESS OF BREATH: ICD-10-CM

## 2022-01-01 DIAGNOSIS — I50.9 HEART FAILURE, UNSPECIFIED: ICD-10-CM

## 2022-01-01 DIAGNOSIS — I13.0 HYPERTENSIVE HEART AND CHRONIC KIDNEY DISEASE WITH HEART FAILURE AND STAGE 1 THROUGH STAGE 4 CHRONIC KIDNEY DISEASE, OR UNSPECIFIED CHRONIC KIDNEY DISEASE: ICD-10-CM

## 2022-01-01 DIAGNOSIS — Z99.81 DEPENDENCE ON SUPPLEMENTAL OXYGEN: ICD-10-CM

## 2022-01-01 DIAGNOSIS — N18.9 CHRONIC KIDNEY DISEASE, UNSPECIFIED: ICD-10-CM

## 2022-01-01 DIAGNOSIS — Z98.62 PERIPHERAL VASCULAR ANGIOPLASTY STATUS: Chronic | ICD-10-CM

## 2022-01-01 DIAGNOSIS — B94.8 SEQUELAE OF OTHER SPECIFIED INFECTIOUS AND PARASITIC DISEASES: ICD-10-CM

## 2022-01-01 DIAGNOSIS — R41.82 ALTERED MENTAL STATUS, UNSPECIFIED: ICD-10-CM

## 2022-01-01 DIAGNOSIS — Z29.9 ENCOUNTER FOR PROPHYLACTIC MEASURES, UNSPECIFIED: ICD-10-CM

## 2022-01-01 DIAGNOSIS — Z85.828 PERSONAL HISTORY OF OTHER MALIGNANT NEOPLASM OF SKIN: ICD-10-CM

## 2022-01-01 DIAGNOSIS — I50.32 CHRONIC DIASTOLIC (CONGESTIVE) HEART FAILURE: ICD-10-CM

## 2022-01-01 DIAGNOSIS — R31.29 OTHER MICROSCOPIC HEMATURIA: ICD-10-CM

## 2022-01-01 DIAGNOSIS — Z88.0 ALLERGY STATUS TO PENICILLIN: ICD-10-CM

## 2022-01-01 DIAGNOSIS — Z88.8 ALLERGY STATUS TO OTHER DRUGS, MEDICAMENTS AND BIOLOGICAL SUBSTANCES STATUS: ICD-10-CM

## 2022-01-01 DIAGNOSIS — J44.1 CHRONIC OBSTRUCTIVE PULMONARY DISEASE WITH (ACUTE) EXACERBATION: ICD-10-CM

## 2022-01-01 DIAGNOSIS — I10 ESSENTIAL (PRIMARY) HYPERTENSION: ICD-10-CM

## 2022-01-01 DIAGNOSIS — Z91.048 OTHER NONMEDICINAL SUBSTANCE ALLERGY STATUS: ICD-10-CM

## 2022-01-01 DIAGNOSIS — I89.0 LYMPHEDEMA, NOT ELSEWHERE CLASSIFIED: ICD-10-CM

## 2022-01-01 DIAGNOSIS — E78.5 HYPERLIPIDEMIA, UNSPECIFIED: ICD-10-CM

## 2022-01-01 DIAGNOSIS — G47.33 OBSTRUCTIVE SLEEP APNEA (ADULT) (PEDIATRIC): ICD-10-CM

## 2022-01-01 DIAGNOSIS — Z90.49 ACQUIRED ABSENCE OF OTHER SPECIFIED PARTS OF DIGESTIVE TRACT: ICD-10-CM

## 2022-01-01 DIAGNOSIS — R50.9 FEVER, UNSPECIFIED: ICD-10-CM

## 2022-01-01 DIAGNOSIS — Z71.89 OTHER SPECIFIED COUNSELING: ICD-10-CM
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
## 2022-01-01 DIAGNOSIS — A41.9 SEPSIS, UNSPECIFIED ORGANISM: ICD-10-CM

## 2022-01-01 DIAGNOSIS — Z88.6 ALLERGY STATUS TO ANALGESIC AGENT: ICD-10-CM

## 2022-01-01 DIAGNOSIS — N18.4 CHRONIC KIDNEY DISEASE, STAGE 4 (SEVERE): ICD-10-CM

## 2022-01-01 DIAGNOSIS — Z88.7 ALLERGY STATUS TO SERUM AND VACCINE: ICD-10-CM

## 2022-01-01 DIAGNOSIS — J96.01 ACUTE RESPIRATORY FAILURE WITH HYPOXIA: ICD-10-CM
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
## 2022-01-01 DIAGNOSIS — E66.01 MORBID (SEVERE) OBESITY DUE TO EXCESS CALORIES: ICD-10-CM

## 2022-01-01 DIAGNOSIS — I82.409 ACUTE EMBOLISM AND THROMBOSIS OF UNSPECIFIED DEEP VEINS OF UNSPECIFIED LOWER EXTREMITY: ICD-10-CM
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
## 2022-01-01 DIAGNOSIS — N28.89 OTHER SPECIFIED DISORDERS OF KIDNEY AND URETER: ICD-10-CM

## 2022-01-01 DIAGNOSIS — L03.90 CELLULITIS, UNSPECIFIED: ICD-10-CM

## 2022-01-01 DIAGNOSIS — Z79.899 OTHER LONG TERM (CURRENT) DRUG THERAPY: ICD-10-CM

## 2022-01-01 DIAGNOSIS — J44.0 CHRONIC OBSTRUCTIVE PULMONARY DISEASE WITH (ACUTE) LOWER RESPIRATORY INFECTION: ICD-10-CM

## 2022-01-01 LAB
-  CEFTRIAXONE: SIGNIFICANT CHANGE UP
-  CLINDAMYCIN: SIGNIFICANT CHANGE UP
-  LEVOFLOXACIN: SIGNIFICANT CHANGE UP
-  PENICILLIN: SIGNIFICANT CHANGE UP
-  VANCOMYCIN: SIGNIFICANT CHANGE UP
A1C WITH ESTIMATED AVERAGE GLUCOSE RESULT: 5.6 % — SIGNIFICANT CHANGE UP (ref 4–5.6)
ACANTHOCYTES BLD QL SMEAR: SLIGHT — SIGNIFICANT CHANGE UP
ALBUMIN SERPL ELPH-MCNC: 2 G/DL — LOW (ref 3.3–5)
ALBUMIN SERPL ELPH-MCNC: 2 G/DL — LOW (ref 3.3–5)
ALBUMIN SERPL ELPH-MCNC: 2.1 G/DL — LOW (ref 3.3–5)
ALBUMIN SERPL ELPH-MCNC: 2.2 G/DL — LOW (ref 3.3–5)
ALBUMIN SERPL ELPH-MCNC: 2.2 G/DL — LOW (ref 3.3–5)
ALBUMIN SERPL ELPH-MCNC: 2.3 G/DL — LOW (ref 3.3–5)
ALBUMIN SERPL ELPH-MCNC: 2.4 G/DL — LOW (ref 3.3–5)
ALBUMIN SERPL ELPH-MCNC: 2.4 G/DL — LOW (ref 3.4–5)
ALBUMIN SERPL ELPH-MCNC: 2.5 G/DL — LOW (ref 3.3–5)
ALBUMIN SERPL ELPH-MCNC: 2.5 G/DL — LOW (ref 3.4–5)
ALBUMIN SERPL ELPH-MCNC: 2.6 G/DL — LOW (ref 3.3–5)
ALBUMIN SERPL ELPH-MCNC: 2.6 G/DL — LOW (ref 3.4–5)
ALBUMIN SERPL ELPH-MCNC: 2.7 G/DL — LOW (ref 3.3–5)
ALBUMIN SERPL ELPH-MCNC: 2.7 G/DL — LOW (ref 3.4–5)
ALBUMIN SERPL ELPH-MCNC: 2.8 G/DL — LOW (ref 3.3–5)
ALBUMIN SERPL ELPH-MCNC: 2.8 G/DL — LOW (ref 3.3–5)
ALBUMIN SERPL ELPH-MCNC: 3.1 G/DL — LOW (ref 3.3–5)
ALP SERPL-CCNC: 116 U/L — SIGNIFICANT CHANGE UP (ref 40–120)
ALP SERPL-CCNC: 118 U/L — SIGNIFICANT CHANGE UP (ref 40–120)
ALP SERPL-CCNC: 119 U/L — SIGNIFICANT CHANGE UP (ref 40–120)
ALP SERPL-CCNC: 122 U/L — HIGH (ref 40–120)
ALP SERPL-CCNC: 124 U/L — HIGH (ref 40–120)
ALP SERPL-CCNC: 130 U/L — HIGH (ref 40–120)
ALP SERPL-CCNC: 140 U/L — HIGH (ref 40–120)
ALP SERPL-CCNC: 141 U/L — HIGH (ref 40–120)
ALP SERPL-CCNC: 154 U/L — HIGH (ref 40–120)
ALP SERPL-CCNC: 154 U/L — HIGH (ref 40–120)
ALP SERPL-CCNC: 166 U/L — HIGH (ref 40–120)
ALP SERPL-CCNC: 166 U/L — HIGH (ref 40–120)
ALP SERPL-CCNC: 167 U/L — HIGH (ref 40–120)
ALP SERPL-CCNC: 171 U/L — HIGH (ref 40–120)
ALP SERPL-CCNC: 179 U/L — HIGH (ref 40–120)
ALP SERPL-CCNC: 187 U/L — HIGH (ref 40–120)
ALP SERPL-CCNC: 188 U/L — HIGH (ref 40–120)
ALP SERPL-CCNC: 188 U/L — HIGH (ref 40–120)
ALP SERPL-CCNC: 197 U/L — HIGH (ref 40–120)
ALP SERPL-CCNC: 198 U/L — HIGH (ref 40–120)
ALP SERPL-CCNC: 202 U/L — HIGH (ref 40–120)
ALP SERPL-CCNC: 206 U/L — HIGH (ref 40–120)
ALP SERPL-CCNC: 206 U/L — HIGH (ref 40–120)
ALP SERPL-CCNC: 209 U/L — HIGH (ref 40–120)
ALP SERPL-CCNC: 212 U/L — HIGH (ref 40–120)
ALP SERPL-CCNC: 216 U/L — HIGH (ref 40–120)
ALP SERPL-CCNC: 221 U/L — HIGH (ref 40–120)
ALP SERPL-CCNC: 226 U/L — HIGH (ref 40–120)
ALP SERPL-CCNC: 236 U/L — HIGH (ref 40–120)
ALP SERPL-CCNC: 253 U/L — HIGH (ref 40–120)
ALP SERPL-CCNC: 273 U/L — HIGH (ref 40–120)
ALP SERPL-CCNC: 286 U/L — HIGH (ref 40–120)
ALP SERPL-CCNC: 318 U/L — HIGH (ref 40–120)
ALP SERPL-CCNC: 325 U/L — HIGH (ref 40–120)
ALT FLD-CCNC: 11 U/L — SIGNIFICANT CHANGE UP (ref 10–45)
ALT FLD-CCNC: 11 U/L — SIGNIFICANT CHANGE UP (ref 10–45)
ALT FLD-CCNC: 118 U/L — HIGH (ref 10–45)
ALT FLD-CCNC: 12 U/L — SIGNIFICANT CHANGE UP (ref 12–42)
ALT FLD-CCNC: 123 U/L — HIGH (ref 10–45)
ALT FLD-CCNC: 14 U/L — SIGNIFICANT CHANGE UP (ref 10–45)
ALT FLD-CCNC: 15 U/L — SIGNIFICANT CHANGE UP (ref 12–42)
ALT FLD-CCNC: 16 U/L — SIGNIFICANT CHANGE UP (ref 10–45)
ALT FLD-CCNC: 16 U/L — SIGNIFICANT CHANGE UP (ref 10–45)
ALT FLD-CCNC: 17 U/L — SIGNIFICANT CHANGE UP (ref 10–45)
ALT FLD-CCNC: 18 U/L — SIGNIFICANT CHANGE UP (ref 10–45)
ALT FLD-CCNC: 18 U/L — SIGNIFICANT CHANGE UP (ref 10–45)
ALT FLD-CCNC: 19 U/L — SIGNIFICANT CHANGE UP (ref 10–45)
ALT FLD-CCNC: 20 U/L — SIGNIFICANT CHANGE UP (ref 10–45)
ALT FLD-CCNC: 20 U/L — SIGNIFICANT CHANGE UP (ref 12–42)
ALT FLD-CCNC: 21 U/L — SIGNIFICANT CHANGE UP (ref 10–45)
ALT FLD-CCNC: 21 U/L — SIGNIFICANT CHANGE UP (ref 10–45)
ALT FLD-CCNC: 21 U/L — SIGNIFICANT CHANGE UP (ref 12–42)
ALT FLD-CCNC: 22 U/L — SIGNIFICANT CHANGE UP (ref 10–45)
ALT FLD-CCNC: 23 U/L — SIGNIFICANT CHANGE UP (ref 10–45)
ALT FLD-CCNC: 25 U/L — SIGNIFICANT CHANGE UP (ref 10–45)
ALT FLD-CCNC: 26 U/L — SIGNIFICANT CHANGE UP (ref 10–45)
ALT FLD-CCNC: 27 U/L — SIGNIFICANT CHANGE UP (ref 10–45)
ALT FLD-CCNC: 30 U/L — SIGNIFICANT CHANGE UP (ref 10–45)
ALT FLD-CCNC: 33 U/L — SIGNIFICANT CHANGE UP (ref 10–45)
ALT FLD-CCNC: 34 U/L — SIGNIFICANT CHANGE UP (ref 10–45)
ALT FLD-CCNC: 40 U/L — SIGNIFICANT CHANGE UP (ref 10–45)
ALT FLD-CCNC: 50 U/L — HIGH (ref 10–45)
ALT FLD-CCNC: 54 U/L — HIGH (ref 10–45)
ALT FLD-CCNC: 62 U/L — HIGH (ref 10–45)
ALT FLD-CCNC: 72 U/L — HIGH (ref 10–45)
ALT FLD-CCNC: 9 U/L — LOW (ref 10–45)
ALT FLD-CCNC: 9 U/L — LOW (ref 10–45)
ALT FLD-CCNC: SIGNIFICANT CHANGE UP (ref 10–45)
ANION GAP SERPL CALC-SCNC: 10 MMOL/L — SIGNIFICANT CHANGE UP (ref 5–17)
ANION GAP SERPL CALC-SCNC: 11 MMOL/L — SIGNIFICANT CHANGE UP (ref 5–17)
ANION GAP SERPL CALC-SCNC: 11 MMOL/L — SIGNIFICANT CHANGE UP (ref 9–16)
ANION GAP SERPL CALC-SCNC: 12 MMOL/L — SIGNIFICANT CHANGE UP (ref 5–17)
ANION GAP SERPL CALC-SCNC: 13 MMOL/L — SIGNIFICANT CHANGE UP (ref 5–17)
ANION GAP SERPL CALC-SCNC: 14 MMOL/L — SIGNIFICANT CHANGE UP (ref 5–17)
ANION GAP SERPL CALC-SCNC: 14 MMOL/L — SIGNIFICANT CHANGE UP (ref 5–17)
ANION GAP SERPL CALC-SCNC: 15 MMOL/L — SIGNIFICANT CHANGE UP (ref 5–17)
ANION GAP SERPL CALC-SCNC: 3 MMOL/L — LOW (ref 9–16)
ANION GAP SERPL CALC-SCNC: 4 MMOL/L — LOW (ref 5–17)
ANION GAP SERPL CALC-SCNC: 6 MMOL/L — LOW (ref 9–16)
ANION GAP SERPL CALC-SCNC: 6 MMOL/L — SIGNIFICANT CHANGE UP (ref 5–17)
ANION GAP SERPL CALC-SCNC: 6 MMOL/L — SIGNIFICANT CHANGE UP (ref 5–17)
ANION GAP SERPL CALC-SCNC: 7 MMOL/L — LOW (ref 9–16)
ANION GAP SERPL CALC-SCNC: 7 MMOL/L — SIGNIFICANT CHANGE UP (ref 5–17)
ANION GAP SERPL CALC-SCNC: 8 MMOL/L — SIGNIFICANT CHANGE UP (ref 5–17)
ANION GAP SERPL CALC-SCNC: 9 MMOL/L — SIGNIFICANT CHANGE UP (ref 5–17)
ANISOCYTOSIS BLD QL: SIGNIFICANT CHANGE UP
ANISOCYTOSIS BLD QL: SLIGHT — SIGNIFICANT CHANGE UP
APPEARANCE UR: ABNORMAL
APPEARANCE UR: ABNORMAL
APPEARANCE UR: CLEAR — SIGNIFICANT CHANGE UP
APTT BLD: 105 SEC — HIGH (ref 27.5–35.5)
APTT BLD: 131.2 SEC — CRITICAL HIGH (ref 27.5–35.5)
APTT BLD: 22.5 SEC — LOW (ref 27.5–35.5)
APTT BLD: 23.8 SEC — LOW (ref 27.5–35.5)
APTT BLD: 27.6 SEC — SIGNIFICANT CHANGE UP (ref 27.5–35.5)
APTT BLD: 28.3 SEC — SIGNIFICANT CHANGE UP (ref 27.5–35.5)
APTT BLD: 29.3 SEC — SIGNIFICANT CHANGE UP (ref 27.5–35.5)
APTT BLD: 30.4 SEC — SIGNIFICANT CHANGE UP (ref 27.5–35.5)
APTT BLD: 30.6 SEC — SIGNIFICANT CHANGE UP (ref 27.5–35.5)
APTT BLD: 31.4 SEC — SIGNIFICANT CHANGE UP (ref 27.5–35.5)
APTT BLD: 32.1 SEC — SIGNIFICANT CHANGE UP (ref 27.5–35.5)
APTT BLD: 37.7 SEC — HIGH (ref 27.5–35.5)
APTT BLD: 38.4 SEC — HIGH (ref 27.5–35.5)
APTT BLD: 44.5 SEC — HIGH (ref 27.5–35.5)
APTT BLD: 48.1 SEC — HIGH (ref 27.5–35.5)
APTT BLD: 50.9 SEC — HIGH (ref 27.5–35.5)
APTT BLD: 51.3 SEC — HIGH (ref 27.5–35.5)
APTT BLD: 53.2 SEC — HIGH (ref 27.5–35.5)
APTT BLD: 53.7 SEC — HIGH (ref 27.5–35.5)
APTT BLD: 57.1 SEC — HIGH (ref 27.5–35.5)
APTT BLD: 60.5 SEC — HIGH (ref 27.5–35.5)
APTT BLD: 60.8 SEC — HIGH (ref 27.5–35.5)
APTT BLD: 62.7 SEC — HIGH (ref 27.5–35.5)
APTT BLD: 63 SEC — HIGH (ref 27.5–35.5)
APTT BLD: 63.3 SEC — HIGH (ref 27.5–35.5)
APTT BLD: 65.4 SEC — HIGH (ref 27.5–35.5)
APTT BLD: 66 SEC — HIGH (ref 27.5–35.5)
APTT BLD: 68.5 SEC — HIGH (ref 27.5–35.5)
APTT BLD: 69.6 SEC — HIGH (ref 27.5–35.5)
APTT BLD: 71.4 SEC — HIGH (ref 27.5–35.5)
APTT BLD: 71.6 SEC — HIGH (ref 27.5–35.5)
APTT BLD: 72.9 SEC — HIGH (ref 27.5–35.5)
APTT BLD: 73.2 SEC — HIGH (ref 27.5–35.5)
APTT BLD: 73.3 SEC — HIGH (ref 27.5–35.5)
APTT BLD: 78.9 SEC — HIGH (ref 27.5–35.5)
APTT BLD: 79.8 SEC — HIGH (ref 27.5–35.5)
APTT BLD: 81.3 SEC — HIGH (ref 27.5–35.5)
APTT BLD: 81.7 SEC — HIGH (ref 27.5–35.5)
APTT BLD: 86.9 SEC — HIGH (ref 27.5–35.5)
APTT BLD: 88 SEC — HIGH (ref 27.5–35.5)
APTT BLD: 95.4 SEC — HIGH (ref 27.5–35.5)
APTT BLD: 95.9 SEC — HIGH (ref 27.5–35.5)
AST SERPL-CCNC: 108 U/L — HIGH (ref 10–40)
AST SERPL-CCNC: 11 U/L — SIGNIFICANT CHANGE UP (ref 10–40)
AST SERPL-CCNC: 13 U/L — SIGNIFICANT CHANGE UP (ref 10–40)
AST SERPL-CCNC: 14 U/L — SIGNIFICANT CHANGE UP (ref 10–40)
AST SERPL-CCNC: 145 U/L — HIGH (ref 10–40)
AST SERPL-CCNC: 17 U/L — SIGNIFICANT CHANGE UP (ref 10–40)
AST SERPL-CCNC: 171 U/L — HIGH (ref 10–40)
AST SERPL-CCNC: 18 U/L — SIGNIFICANT CHANGE UP (ref 10–40)
AST SERPL-CCNC: 18 U/L — SIGNIFICANT CHANGE UP (ref 10–40)
AST SERPL-CCNC: 19 U/L — SIGNIFICANT CHANGE UP (ref 10–40)
AST SERPL-CCNC: 19 U/L — SIGNIFICANT CHANGE UP (ref 15–37)
AST SERPL-CCNC: 19 U/L — SIGNIFICANT CHANGE UP (ref 15–37)
AST SERPL-CCNC: 21 U/L — SIGNIFICANT CHANGE UP (ref 10–40)
AST SERPL-CCNC: 22 U/L — SIGNIFICANT CHANGE UP (ref 10–40)
AST SERPL-CCNC: 24 U/L — SIGNIFICANT CHANGE UP (ref 10–40)
AST SERPL-CCNC: 24 U/L — SIGNIFICANT CHANGE UP (ref 10–40)
AST SERPL-CCNC: 25 U/L — SIGNIFICANT CHANGE UP (ref 10–40)
AST SERPL-CCNC: 26 U/L — SIGNIFICANT CHANGE UP (ref 10–40)
AST SERPL-CCNC: 26 U/L — SIGNIFICANT CHANGE UP (ref 10–40)
AST SERPL-CCNC: 27 U/L — SIGNIFICANT CHANGE UP (ref 10–40)
AST SERPL-CCNC: 27 U/L — SIGNIFICANT CHANGE UP (ref 15–37)
AST SERPL-CCNC: 29 U/L — SIGNIFICANT CHANGE UP (ref 15–37)
AST SERPL-CCNC: 31 U/L — SIGNIFICANT CHANGE UP (ref 10–40)
AST SERPL-CCNC: 32 U/L — SIGNIFICANT CHANGE UP (ref 10–40)
AST SERPL-CCNC: 41 U/L — HIGH (ref 10–40)
AST SERPL-CCNC: 42 U/L — HIGH (ref 10–40)
AST SERPL-CCNC: 49 U/L — HIGH (ref 10–40)
AST SERPL-CCNC: 53 U/L — HIGH (ref 10–40)
AST SERPL-CCNC: SIGNIFICANT CHANGE UP (ref 10–40)
BACTERIA # UR AUTO: ABNORMAL /HPF
BACTERIA # UR AUTO: ABNORMAL /HPF
BACTERIA # UR AUTO: PRESENT /HPF
BASE EXCESS BLDA CALC-SCNC: -0.2 MMOL/L — SIGNIFICANT CHANGE UP (ref -2–3)
BASE EXCESS BLDA CALC-SCNC: -1.5 MMOL/L — SIGNIFICANT CHANGE UP (ref -2–3)
BASE EXCESS BLDA CALC-SCNC: -2.6 MMOL/L — LOW (ref -2–3)
BASE EXCESS BLDA CALC-SCNC: -3 MMOL/L — LOW (ref -2–3)
BASE EXCESS BLDA CALC-SCNC: -3.3 MMOL/L — LOW (ref -2–3)
BASE EXCESS BLDA CALC-SCNC: -3.4 MMOL/L — LOW (ref -2–3)
BASE EXCESS BLDA CALC-SCNC: -3.6 MMOL/L — LOW (ref -2–3)
BASE EXCESS BLDA CALC-SCNC: -4.8 MMOL/L — LOW (ref -2–3)
BASE EXCESS BLDA CALC-SCNC: -4.8 MMOL/L — LOW (ref -2–3)
BASE EXCESS BLDA CALC-SCNC: -5.2 MMOL/L — LOW (ref -2–3)
BASE EXCESS BLDA CALC-SCNC: -5.2 MMOL/L — LOW (ref -2–3)
BASE EXCESS BLDA CALC-SCNC: -5.8 MMOL/L — LOW (ref -2–3)
BASE EXCESS BLDA CALC-SCNC: -5.9 MMOL/L — LOW (ref -2–3)
BASE EXCESS BLDA CALC-SCNC: -6.5 MMOL/L — LOW (ref -2–3)
BASE EXCESS BLDA CALC-SCNC: -7 MMOL/L — LOW (ref -2–3)
BASE EXCESS BLDA CALC-SCNC: -7.4 MMOL/L — LOW (ref -2–3)
BASE EXCESS BLDA CALC-SCNC: 0.1 MMOL/L — SIGNIFICANT CHANGE UP (ref -2–3)
BASE EXCESS BLDA CALC-SCNC: 3.9 MMOL/L — HIGH (ref -2–3)
BASO STIPL BLD QL SMEAR: PRESENT — SIGNIFICANT CHANGE UP
BASOPHILS # BLD AUTO: 0 K/UL — SIGNIFICANT CHANGE UP (ref 0–0.2)
BASOPHILS # BLD AUTO: 0.01 K/UL — SIGNIFICANT CHANGE UP (ref 0–0.2)
BASOPHILS # BLD AUTO: 0.02 K/UL — SIGNIFICANT CHANGE UP (ref 0–0.2)
BASOPHILS # BLD AUTO: 0.03 K/UL — SIGNIFICANT CHANGE UP (ref 0–0.2)
BASOPHILS # BLD AUTO: 0.05 K/UL — SIGNIFICANT CHANGE UP (ref 0–0.2)
BASOPHILS # BLD AUTO: 0.05 K/UL — SIGNIFICANT CHANGE UP (ref 0–0.2)
BASOPHILS # BLD AUTO: 0.06 K/UL — SIGNIFICANT CHANGE UP (ref 0–0.2)
BASOPHILS # BLD AUTO: 0.06 K/UL — SIGNIFICANT CHANGE UP (ref 0–0.2)
BASOPHILS # BLD AUTO: 0.07 K/UL — SIGNIFICANT CHANGE UP (ref 0–0.2)
BASOPHILS # BLD AUTO: 0.07 K/UL — SIGNIFICANT CHANGE UP (ref 0–0.2)
BASOPHILS # BLD AUTO: 0.08 K/UL — SIGNIFICANT CHANGE UP (ref 0–0.2)
BASOPHILS # BLD AUTO: 0.09 K/UL — SIGNIFICANT CHANGE UP (ref 0–0.2)
BASOPHILS # BLD AUTO: 0.1 K/UL — SIGNIFICANT CHANGE UP (ref 0–0.2)
BASOPHILS # BLD AUTO: 0.14 K/UL — SIGNIFICANT CHANGE UP (ref 0–0.2)
BASOPHILS # BLD AUTO: 0.16 K/UL — SIGNIFICANT CHANGE UP (ref 0–0.2)
BASOPHILS # BLD AUTO: 0.17 K/UL — SIGNIFICANT CHANGE UP (ref 0–0.2)
BASOPHILS # BLD AUTO: 0.26 K/UL — HIGH (ref 0–0.2)
BASOPHILS # BLD AUTO: 0.27 K/UL — HIGH (ref 0–0.2)
BASOPHILS NFR BLD AUTO: 0 % — SIGNIFICANT CHANGE UP (ref 0–2)
BASOPHILS NFR BLD AUTO: 0.1 % — SIGNIFICANT CHANGE UP (ref 0–2)
BASOPHILS NFR BLD AUTO: 0.2 % — SIGNIFICANT CHANGE UP (ref 0–2)
BASOPHILS NFR BLD AUTO: 0.3 % — SIGNIFICANT CHANGE UP (ref 0–2)
BASOPHILS NFR BLD AUTO: 0.7 % — SIGNIFICANT CHANGE UP (ref 0–2)
BASOPHILS NFR BLD AUTO: 0.8 % — SIGNIFICANT CHANGE UP (ref 0–2)
BASOPHILS NFR BLD AUTO: 0.9 % — SIGNIFICANT CHANGE UP (ref 0–2)
BASOPHILS NFR BLD AUTO: 1 % — SIGNIFICANT CHANGE UP (ref 0–2)
BASOPHILS NFR BLD AUTO: 1.7 % — SIGNIFICANT CHANGE UP (ref 0–2)
BASOPHILS NFR BLD AUTO: 1.8 % — SIGNIFICANT CHANGE UP (ref 0–2)
BASOPHILS NFR BLD AUTO: 1.9 % — SIGNIFICANT CHANGE UP (ref 0–2)
BASOPHILS NFR BLD AUTO: 2.7 % — HIGH (ref 0–2)
BILIRUB DIRECT SERPL-MCNC: 0.6 MG/DL — HIGH (ref 0–0.3)
BILIRUB INDIRECT FLD-MCNC: 0.2 MG/DL — SIGNIFICANT CHANGE UP (ref 0.2–1)
BILIRUB SERPL-MCNC: 0.1 MG/DL — LOW (ref 0.2–1.2)
BILIRUB SERPL-MCNC: 0.1 MG/DL — LOW (ref 0.2–1.2)
BILIRUB SERPL-MCNC: 0.2 MG/DL — SIGNIFICANT CHANGE UP (ref 0.2–1.2)
BILIRUB SERPL-MCNC: 0.3 MG/DL — SIGNIFICANT CHANGE UP (ref 0.2–1.2)
BILIRUB SERPL-MCNC: 0.4 MG/DL — SIGNIFICANT CHANGE UP (ref 0.2–1.2)
BILIRUB SERPL-MCNC: 0.5 MG/DL — SIGNIFICANT CHANGE UP (ref 0.2–1.2)
BILIRUB SERPL-MCNC: 0.6 MG/DL — SIGNIFICANT CHANGE UP (ref 0.2–1.2)
BILIRUB SERPL-MCNC: 0.7 MG/DL — SIGNIFICANT CHANGE UP (ref 0.2–1.2)
BILIRUB SERPL-MCNC: 0.8 MG/DL — SIGNIFICANT CHANGE UP (ref 0.2–1.2)
BILIRUB SERPL-MCNC: 0.9 MG/DL — SIGNIFICANT CHANGE UP (ref 0.2–1.2)
BILIRUB SERPL-MCNC: 0.9 MG/DL — SIGNIFICANT CHANGE UP (ref 0.2–1.2)
BILIRUB SERPL-MCNC: 1 MG/DL — SIGNIFICANT CHANGE UP (ref 0.2–1.2)
BILIRUB SERPL-MCNC: 1 MG/DL — SIGNIFICANT CHANGE UP (ref 0.2–1.2)
BILIRUB SERPL-MCNC: 1.1 MG/DL — SIGNIFICANT CHANGE UP (ref 0.2–1.2)
BILIRUB SERPL-MCNC: 1.3 MG/DL — HIGH (ref 0.2–1.2)
BILIRUB UR-MCNC: ABNORMAL
BILIRUB UR-MCNC: NEGATIVE — SIGNIFICANT CHANGE UP
BLASTS # FLD: 0.9 % — HIGH (ref 0–0)
BLD GP AB SCN SERPL QL: NEGATIVE — SIGNIFICANT CHANGE UP
BUN SERPL-MCNC: 11 MG/DL — SIGNIFICANT CHANGE UP (ref 7–23)
BUN SERPL-MCNC: 12 MG/DL — SIGNIFICANT CHANGE UP (ref 7–23)
BUN SERPL-MCNC: 13 MG/DL — SIGNIFICANT CHANGE UP (ref 7–23)
BUN SERPL-MCNC: 14 MG/DL — SIGNIFICANT CHANGE UP (ref 7–23)
BUN SERPL-MCNC: 15 MG/DL — SIGNIFICANT CHANGE UP (ref 7–23)
BUN SERPL-MCNC: 17 MG/DL — SIGNIFICANT CHANGE UP (ref 7–23)
BUN SERPL-MCNC: 18 MG/DL — SIGNIFICANT CHANGE UP (ref 7–23)
BUN SERPL-MCNC: 18 MG/DL — SIGNIFICANT CHANGE UP (ref 7–23)
BUN SERPL-MCNC: 21 MG/DL — SIGNIFICANT CHANGE UP (ref 7–23)
BUN SERPL-MCNC: 22 MG/DL — SIGNIFICANT CHANGE UP (ref 7–23)
BUN SERPL-MCNC: 24 MG/DL — HIGH (ref 7–23)
BUN SERPL-MCNC: 24 MG/DL — HIGH (ref 7–23)
BUN SERPL-MCNC: 25 MG/DL — HIGH (ref 7–23)
BUN SERPL-MCNC: 27 MG/DL — HIGH (ref 7–23)
BUN SERPL-MCNC: 28 MG/DL — HIGH (ref 7–23)
BUN SERPL-MCNC: 30 MG/DL — HIGH (ref 7–23)
BUN SERPL-MCNC: 33 MG/DL — HIGH (ref 7–23)
BUN SERPL-MCNC: 34 MG/DL — HIGH (ref 7–23)
BUN SERPL-MCNC: 37 MG/DL — HIGH (ref 7–23)
BUN SERPL-MCNC: 40 MG/DL — HIGH (ref 7–23)
BUN SERPL-MCNC: 42 MG/DL — HIGH (ref 7–23)
BUN SERPL-MCNC: 43 MG/DL — HIGH (ref 7–23)
BUN SERPL-MCNC: 43 MG/DL — HIGH (ref 7–23)
BUN SERPL-MCNC: 44 MG/DL — HIGH (ref 7–23)
BUN SERPL-MCNC: 46 MG/DL — HIGH (ref 7–23)
BUN SERPL-MCNC: 51 MG/DL — HIGH (ref 7–23)
BUN SERPL-MCNC: 51 MG/DL — HIGH (ref 7–23)
BUN SERPL-MCNC: 53 MG/DL — HIGH (ref 7–23)
BUN SERPL-MCNC: 54 MG/DL — HIGH (ref 7–23)
BUN SERPL-MCNC: 55 MG/DL — HIGH (ref 7–23)
BUN SERPL-MCNC: 56 MG/DL — HIGH (ref 7–23)
BUN SERPL-MCNC: 57 MG/DL — HIGH (ref 7–23)
BUN SERPL-MCNC: 58 MG/DL — HIGH (ref 7–23)
BUN SERPL-MCNC: 58 MG/DL — HIGH (ref 7–23)
BUN SERPL-MCNC: 59 MG/DL — HIGH (ref 7–23)
BUN SERPL-MCNC: 59 MG/DL — HIGH (ref 7–23)
BUN SERPL-MCNC: 60 MG/DL — HIGH (ref 7–23)
BUN SERPL-MCNC: 62 MG/DL — HIGH (ref 7–23)
BUN SERPL-MCNC: 63 MG/DL — HIGH (ref 7–23)
BUN SERPL-MCNC: 63 MG/DL — HIGH (ref 7–23)
BUN SERPL-MCNC: 70 MG/DL — HIGH (ref 7–23)
BURR CELLS BLD QL SMEAR: PRESENT — SIGNIFICANT CHANGE UP
CALCIUM SERPL-MCNC: 10 MG/DL — SIGNIFICANT CHANGE UP (ref 8.4–10.5)
CALCIUM SERPL-MCNC: 10.1 MG/DL — SIGNIFICANT CHANGE UP (ref 8.4–10.5)
CALCIUM SERPL-MCNC: 7.7 MG/DL — LOW (ref 8.4–10.5)
CALCIUM SERPL-MCNC: 7.8 MG/DL — LOW (ref 8.4–10.5)
CALCIUM SERPL-MCNC: 7.9 MG/DL — LOW (ref 8.4–10.5)
CALCIUM SERPL-MCNC: 8 MG/DL — LOW (ref 8.4–10.5)
CALCIUM SERPL-MCNC: 8.1 MG/DL — LOW (ref 8.4–10.5)
CALCIUM SERPL-MCNC: 8.2 MG/DL — LOW (ref 8.4–10.5)
CALCIUM SERPL-MCNC: 8.3 MG/DL — LOW (ref 8.4–10.5)
CALCIUM SERPL-MCNC: 8.4 MG/DL — SIGNIFICANT CHANGE UP (ref 8.4–10.5)
CALCIUM SERPL-MCNC: 8.5 MG/DL — SIGNIFICANT CHANGE UP (ref 8.4–10.5)
CALCIUM SERPL-MCNC: 8.5 MG/DL — SIGNIFICANT CHANGE UP (ref 8.4–10.5)
CALCIUM SERPL-MCNC: 8.6 MG/DL — SIGNIFICANT CHANGE UP (ref 8.4–10.5)
CALCIUM SERPL-MCNC: 8.7 MG/DL — SIGNIFICANT CHANGE UP (ref 8.4–10.5)
CALCIUM SERPL-MCNC: 8.9 MG/DL — SIGNIFICANT CHANGE UP (ref 8.4–10.5)
CALCIUM SERPL-MCNC: 8.9 MG/DL — SIGNIFICANT CHANGE UP (ref 8.5–10.5)
CALCIUM SERPL-MCNC: 9 MG/DL — SIGNIFICANT CHANGE UP (ref 8.5–10.5)
CALCIUM SERPL-MCNC: 9.1 MG/DL — SIGNIFICANT CHANGE UP (ref 8.5–10.5)
CALCIUM SERPL-MCNC: 9.1 MG/DL — SIGNIFICANT CHANGE UP (ref 8.5–10.5)
CALCIUM SERPL-MCNC: 9.3 MG/DL — SIGNIFICANT CHANGE UP (ref 8.4–10.5)
CALCIUM SERPL-MCNC: 9.4 MG/DL — SIGNIFICANT CHANGE UP (ref 8.4–10.5)
CALCIUM SERPL-MCNC: 9.5 MG/DL — SIGNIFICANT CHANGE UP (ref 8.4–10.5)
CALCIUM SERPL-MCNC: 9.7 MG/DL — SIGNIFICANT CHANGE UP (ref 8.4–10.5)
CALCIUM SERPL-MCNC: 9.9 MG/DL — SIGNIFICANT CHANGE UP (ref 8.4–10.5)
CHLORIDE SERPL-SCNC: 100 MMOL/L — SIGNIFICANT CHANGE UP (ref 96–108)
CHLORIDE SERPL-SCNC: 100 MMOL/L — SIGNIFICANT CHANGE UP (ref 96–108)
CHLORIDE SERPL-SCNC: 101 MMOL/L — SIGNIFICANT CHANGE UP (ref 96–108)
CHLORIDE SERPL-SCNC: 102 MMOL/L — SIGNIFICANT CHANGE UP (ref 96–108)
CHLORIDE SERPL-SCNC: 103 MMOL/L — SIGNIFICANT CHANGE UP (ref 96–108)
CHLORIDE SERPL-SCNC: 104 MMOL/L — SIGNIFICANT CHANGE UP (ref 96–108)
CHLORIDE SERPL-SCNC: 105 MMOL/L — SIGNIFICANT CHANGE UP (ref 96–108)
CHLORIDE SERPL-SCNC: 106 MMOL/L — SIGNIFICANT CHANGE UP (ref 96–108)
CHLORIDE SERPL-SCNC: 92 MMOL/L — LOW (ref 96–108)
CHLORIDE SERPL-SCNC: 92 MMOL/L — LOW (ref 96–108)
CHLORIDE SERPL-SCNC: 93 MMOL/L — LOW (ref 96–108)
CHLORIDE SERPL-SCNC: 94 MMOL/L — LOW (ref 96–108)
CHLORIDE SERPL-SCNC: 95 MMOL/L — LOW (ref 96–108)
CHLORIDE SERPL-SCNC: 96 MMOL/L — SIGNIFICANT CHANGE UP (ref 96–108)
CHLORIDE SERPL-SCNC: 97 MMOL/L — SIGNIFICANT CHANGE UP (ref 96–108)
CHLORIDE SERPL-SCNC: 97 MMOL/L — SIGNIFICANT CHANGE UP (ref 96–108)
CHLORIDE SERPL-SCNC: 98 MMOL/L — SIGNIFICANT CHANGE UP (ref 96–108)
CHLORIDE SERPL-SCNC: 99 MMOL/L — SIGNIFICANT CHANGE UP (ref 96–108)
CK MB CFR SERPL CALC: 11.9 NG/ML — HIGH (ref 0–6.7)
CK MB CFR SERPL CALC: 12.4 NG/ML — HIGH (ref 0–6.7)
CK MB CFR SERPL CALC: 5.9 NG/ML — SIGNIFICANT CHANGE UP (ref 0–6.7)
CK SERPL-CCNC: 415 U/L — HIGH (ref 25–170)
CK SERPL-CCNC: 517 U/L — HIGH (ref 25–170)
CK SERPL-CCNC: 521 U/L — HIGH (ref 25–170)
CK SERPL-CCNC: 68 U/L — SIGNIFICANT CHANGE UP (ref 26–192)
CK SERPL-CCNC: 680 U/L — HIGH (ref 25–170)
CO2 BLDA-SCNC: 22 MMOL/L — SIGNIFICANT CHANGE UP (ref 19–24)
CO2 BLDA-SCNC: 22 MMOL/L — SIGNIFICANT CHANGE UP (ref 19–24)
CO2 BLDA-SCNC: 23 MMOL/L — SIGNIFICANT CHANGE UP (ref 19–24)
CO2 BLDA-SCNC: 24 MMOL/L — SIGNIFICANT CHANGE UP (ref 19–24)
CO2 BLDA-SCNC: 25 MMOL/L — HIGH (ref 19–24)
CO2 BLDA-SCNC: 26 MMOL/L — HIGH (ref 19–24)
CO2 BLDA-SCNC: 29 MMOL/L — HIGH (ref 19–24)
CO2 SERPL-SCNC: 18 MMOL/L — LOW (ref 22–31)
CO2 SERPL-SCNC: 18 MMOL/L — LOW (ref 22–31)
CO2 SERPL-SCNC: 19 MMOL/L — LOW (ref 22–31)
CO2 SERPL-SCNC: 20 MMOL/L — LOW (ref 22–31)
CO2 SERPL-SCNC: 21 MMOL/L — LOW (ref 22–31)
CO2 SERPL-SCNC: 22 MMOL/L — SIGNIFICANT CHANGE UP (ref 22–31)
CO2 SERPL-SCNC: 23 MMOL/L — SIGNIFICANT CHANGE UP (ref 22–31)
CO2 SERPL-SCNC: 24 MMOL/L — SIGNIFICANT CHANGE UP (ref 22–31)
CO2 SERPL-SCNC: 26 MMOL/L — SIGNIFICANT CHANGE UP (ref 22–31)
CO2 SERPL-SCNC: 27 MMOL/L — SIGNIFICANT CHANGE UP (ref 22–31)
CO2 SERPL-SCNC: 28 MMOL/L — SIGNIFICANT CHANGE UP (ref 22–31)
CO2 SERPL-SCNC: 28 MMOL/L — SIGNIFICANT CHANGE UP (ref 22–31)
CO2 SERPL-SCNC: 30 MMOL/L — SIGNIFICANT CHANGE UP (ref 22–31)
CO2 SERPL-SCNC: 31 MMOL/L — SIGNIFICANT CHANGE UP (ref 22–31)
CO2 SERPL-SCNC: 34 MMOL/L — HIGH (ref 22–31)
COLOR SPEC: SIGNIFICANT CHANGE UP
COLOR SPEC: YELLOW — SIGNIFICANT CHANGE UP
COMMENT - URINE: SIGNIFICANT CHANGE UP
CREAT ?TM UR-MCNC: 159 MG/DL — SIGNIFICANT CHANGE UP
CREAT ?TM UR-MCNC: 36 MG/DL — SIGNIFICANT CHANGE UP
CREAT ?TM UR-MCNC: 87 MG/DL — SIGNIFICANT CHANGE UP
CREAT SERPL-MCNC: 0.94 MG/DL — SIGNIFICANT CHANGE UP (ref 0.5–1.3)
CREAT SERPL-MCNC: 0.98 MG/DL — SIGNIFICANT CHANGE UP (ref 0.5–1.3)
CREAT SERPL-MCNC: 0.99 MG/DL — SIGNIFICANT CHANGE UP (ref 0.5–1.3)
CREAT SERPL-MCNC: 0.99 MG/DL — SIGNIFICANT CHANGE UP (ref 0.5–1.3)
CREAT SERPL-MCNC: 1.01 MG/DL — SIGNIFICANT CHANGE UP (ref 0.5–1.3)
CREAT SERPL-MCNC: 1.08 MG/DL — SIGNIFICANT CHANGE UP (ref 0.5–1.3)
CREAT SERPL-MCNC: 1.13 MG/DL — SIGNIFICANT CHANGE UP (ref 0.5–1.3)
CREAT SERPL-MCNC: 1.14 MG/DL — SIGNIFICANT CHANGE UP (ref 0.5–1.3)
CREAT SERPL-MCNC: 1.17 MG/DL — SIGNIFICANT CHANGE UP (ref 0.5–1.3)
CREAT SERPL-MCNC: 1.19 MG/DL — SIGNIFICANT CHANGE UP (ref 0.5–1.3)
CREAT SERPL-MCNC: 1.19 MG/DL — SIGNIFICANT CHANGE UP (ref 0.5–1.3)
CREAT SERPL-MCNC: 1.2 MG/DL — SIGNIFICANT CHANGE UP (ref 0.5–1.3)
CREAT SERPL-MCNC: 1.21 MG/DL — SIGNIFICANT CHANGE UP (ref 0.5–1.3)
CREAT SERPL-MCNC: 1.21 MG/DL — SIGNIFICANT CHANGE UP (ref 0.5–1.3)
CREAT SERPL-MCNC: 1.23 MG/DL — SIGNIFICANT CHANGE UP (ref 0.5–1.3)
CREAT SERPL-MCNC: 1.24 MG/DL — SIGNIFICANT CHANGE UP (ref 0.5–1.3)
CREAT SERPL-MCNC: 1.27 MG/DL — SIGNIFICANT CHANGE UP (ref 0.5–1.3)
CREAT SERPL-MCNC: 1.28 MG/DL — SIGNIFICANT CHANGE UP (ref 0.5–1.3)
CREAT SERPL-MCNC: 1.33 MG/DL — HIGH (ref 0.5–1.3)
CREAT SERPL-MCNC: 1.37 MG/DL — HIGH (ref 0.5–1.3)
CREAT SERPL-MCNC: 1.39 MG/DL — HIGH (ref 0.5–1.3)
CREAT SERPL-MCNC: 1.41 MG/DL — HIGH (ref 0.5–1.3)
CREAT SERPL-MCNC: 1.42 MG/DL — HIGH (ref 0.5–1.3)
CREAT SERPL-MCNC: 1.42 MG/DL — HIGH (ref 0.5–1.3)
CREAT SERPL-MCNC: 1.44 MG/DL — HIGH (ref 0.5–1.3)
CREAT SERPL-MCNC: 1.59 MG/DL — HIGH (ref 0.5–1.3)
CREAT SERPL-MCNC: 1.59 MG/DL — HIGH (ref 0.5–1.3)
CREAT SERPL-MCNC: 1.6 MG/DL — HIGH (ref 0.5–1.3)
CREAT SERPL-MCNC: 1.79 MG/DL — HIGH (ref 0.5–1.3)
CREAT SERPL-MCNC: 1.86 MG/DL — HIGH (ref 0.5–1.3)
CREAT SERPL-MCNC: 2 MG/DL — HIGH (ref 0.5–1.3)
CREAT SERPL-MCNC: 2.04 MG/DL — HIGH (ref 0.5–1.3)
CREAT SERPL-MCNC: 2.09 MG/DL — HIGH (ref 0.5–1.3)
CREAT SERPL-MCNC: 2.18 MG/DL — HIGH (ref 0.5–1.3)
CREAT SERPL-MCNC: 2.22 MG/DL — HIGH (ref 0.5–1.3)
CREAT SERPL-MCNC: 2.33 MG/DL — HIGH (ref 0.5–1.3)
CREAT SERPL-MCNC: 2.36 MG/DL — HIGH (ref 0.5–1.3)
CREAT SERPL-MCNC: 2.37 MG/DL — HIGH (ref 0.5–1.3)
CREAT SERPL-MCNC: 2.4 MG/DL — HIGH (ref 0.5–1.3)
CREAT SERPL-MCNC: 2.46 MG/DL — HIGH (ref 0.5–1.3)
CREAT SERPL-MCNC: 2.48 MG/DL — HIGH (ref 0.5–1.3)
CREAT SERPL-MCNC: 2.5 MG/DL — HIGH (ref 0.5–1.3)
CREAT SERPL-MCNC: 2.57 MG/DL — HIGH (ref 0.5–1.3)
CREAT SERPL-MCNC: 2.59 MG/DL — HIGH (ref 0.5–1.3)
CREAT SERPL-MCNC: 2.62 MG/DL — HIGH (ref 0.5–1.3)
CREAT SERPL-MCNC: 2.62 MG/DL — HIGH (ref 0.5–1.3)
CREAT SERPL-MCNC: 2.64 MG/DL — HIGH (ref 0.5–1.3)
CREAT SERPL-MCNC: 3 MG/DL — HIGH (ref 0.5–1.3)
CREAT SERPL-MCNC: 3 MG/DL — HIGH (ref 0.5–1.3)
CREAT SERPL-MCNC: 3.02 MG/DL — HIGH (ref 0.5–1.3)
CREAT SERPL-MCNC: 3.04 MG/DL — HIGH (ref 0.5–1.3)
CREAT SERPL-MCNC: 3.06 MG/DL — HIGH (ref 0.5–1.3)
CREAT SERPL-MCNC: 3.13 MG/DL — HIGH (ref 0.5–1.3)
CREAT SERPL-MCNC: 3.3 MG/DL — HIGH (ref 0.5–1.3)
CREAT SERPL-MCNC: 3.35 MG/DL — HIGH (ref 0.5–1.3)
CREAT SERPL-MCNC: 3.41 MG/DL — HIGH (ref 0.5–1.3)
CREAT SERPL-MCNC: 3.5 MG/DL — HIGH (ref 0.5–1.3)
CREAT SERPL-MCNC: 3.59 MG/DL — HIGH (ref 0.5–1.3)
CREAT SERPL-MCNC: 3.62 MG/DL — HIGH (ref 0.5–1.3)
CREAT SERPL-MCNC: 3.63 MG/DL — HIGH (ref 0.5–1.3)
CREAT SERPL-MCNC: 3.69 MG/DL — HIGH (ref 0.5–1.3)
CREAT SERPL-MCNC: 3.72 MG/DL — HIGH (ref 0.5–1.3)
CREAT SERPL-MCNC: 3.75 MG/DL — HIGH (ref 0.5–1.3)
CREAT SERPL-MCNC: 3.93 MG/DL — HIGH (ref 0.5–1.3)
CREAT SERPL-MCNC: 4.34 MG/DL — HIGH (ref 0.5–1.3)
CREAT SERPL-MCNC: 4.66 MG/DL — HIGH (ref 0.5–1.3)
CULTURE RESULTS: SIGNIFICANT CHANGE UP
D DIMER BLD IA.RAPID-MCNC: HIGH NG/ML DDU
DACRYOCYTES BLD QL SMEAR: SLIGHT — SIGNIFICANT CHANGE UP
DIFF PNL FLD: ABNORMAL
DIFF PNL FLD: NEGATIVE — SIGNIFICANT CHANGE UP
EGFR: 10 ML/MIN/1.73M2 — LOW
EGFR: 11 ML/MIN/1.73M2 — LOW
EGFR: 12 ML/MIN/1.73M2 — LOW
EGFR: 13 ML/MIN/1.73M2 — LOW
EGFR: 14 ML/MIN/1.73M2 — LOW
EGFR: 14 ML/MIN/1.73M2 — LOW
EGFR: 15 ML/MIN/1.73M2 — LOW
EGFR: 18 ML/MIN/1.73M2 — LOW
EGFR: 19 ML/MIN/1.73M2 — LOW
EGFR: 20 ML/MIN/1.73M2 — LOW
EGFR: 21 ML/MIN/1.73M2 — LOW
EGFR: 22 ML/MIN/1.73M2 — LOW
EGFR: 22 ML/MIN/1.73M2 — LOW
EGFR: 24 ML/MIN/1.73M2 — LOW
EGFR: 24 ML/MIN/1.73M2 — LOW
EGFR: 25 ML/MIN/1.73M2 — LOW
EGFR: 27 ML/MIN/1.73M2 — LOW
EGFR: 28 ML/MIN/1.73M2 — LOW
EGFR: 33 ML/MIN/1.73M2 — LOW
EGFR: 33 ML/MIN/1.73M2 — LOW
EGFR: 37 ML/MIN/1.73M2 — LOW
EGFR: 38 ML/MIN/1.73M2 — LOW
EGFR: 39 ML/MIN/1.73M2 — LOW
EGFR: 41 ML/MIN/1.73M2 — LOW
EGFR: 43 ML/MIN/1.73M2 — LOW
EGFR: 43 ML/MIN/1.73M2 — LOW
EGFR: 44 ML/MIN/1.73M2 — LOW
EGFR: 45 ML/MIN/1.73M2 — LOW
EGFR: 46 ML/MIN/1.73M2 — LOW
EGFR: 47 ML/MIN/1.73M2 — LOW
EGFR: 49 ML/MIN/1.73M2 — LOW
EGFR: 49 ML/MIN/1.73M2 — LOW
EGFR: 52 ML/MIN/1.73M2 — LOW
EGFR: 57 ML/MIN/1.73M2 — LOW
EGFR: 58 ML/MIN/1.73M2 — LOW
EGFR: 58 ML/MIN/1.73M2 — LOW
EGFR: 59 ML/MIN/1.73M2 — LOW
EGFR: 62 ML/MIN/1.73M2 — SIGNIFICANT CHANGE UP
EGFR: 9 ML/MIN/1.73M2 — LOW
EOSINOPHIL # BLD AUTO: 0 K/UL — SIGNIFICANT CHANGE UP (ref 0–0.5)
EOSINOPHIL # BLD AUTO: 0.01 K/UL — SIGNIFICANT CHANGE UP (ref 0–0.5)
EOSINOPHIL # BLD AUTO: 0.08 K/UL — SIGNIFICANT CHANGE UP (ref 0–0.5)
EOSINOPHIL # BLD AUTO: 0.21 K/UL — SIGNIFICANT CHANGE UP (ref 0–0.5)
EOSINOPHIL # BLD AUTO: 0.22 K/UL — SIGNIFICANT CHANGE UP (ref 0–0.5)
EOSINOPHIL # BLD AUTO: 0.23 K/UL — SIGNIFICANT CHANGE UP (ref 0–0.5)
EOSINOPHIL # BLD AUTO: 0.24 K/UL — SIGNIFICANT CHANGE UP (ref 0–0.5)
EOSINOPHIL # BLD AUTO: 0.24 K/UL — SIGNIFICANT CHANGE UP (ref 0–0.5)
EOSINOPHIL # BLD AUTO: 0.28 K/UL — SIGNIFICANT CHANGE UP (ref 0–0.5)
EOSINOPHIL # BLD AUTO: 0.3 K/UL — SIGNIFICANT CHANGE UP (ref 0–0.5)
EOSINOPHIL # BLD AUTO: 0.33 K/UL — SIGNIFICANT CHANGE UP (ref 0–0.5)
EOSINOPHIL # BLD AUTO: 0.35 K/UL — SIGNIFICANT CHANGE UP (ref 0–0.5)
EOSINOPHIL # BLD AUTO: 0.35 K/UL — SIGNIFICANT CHANGE UP (ref 0–0.5)
EOSINOPHIL # BLD AUTO: 0.37 K/UL — SIGNIFICANT CHANGE UP (ref 0–0.5)
EOSINOPHIL # BLD AUTO: 0.39 K/UL — SIGNIFICANT CHANGE UP (ref 0–0.5)
EOSINOPHIL # BLD AUTO: 0.4 K/UL — SIGNIFICANT CHANGE UP (ref 0–0.5)
EOSINOPHIL # BLD AUTO: 0.43 K/UL — SIGNIFICANT CHANGE UP (ref 0–0.5)
EOSINOPHIL # BLD AUTO: 0.44 K/UL — SIGNIFICANT CHANGE UP (ref 0–0.5)
EOSINOPHIL # BLD AUTO: 0.45 K/UL — SIGNIFICANT CHANGE UP (ref 0–0.5)
EOSINOPHIL # BLD AUTO: 0.5 K/UL — SIGNIFICANT CHANGE UP (ref 0–0.5)
EOSINOPHIL # BLD AUTO: 0.5 K/UL — SIGNIFICANT CHANGE UP (ref 0–0.5)
EOSINOPHIL # BLD AUTO: 0.56 K/UL — HIGH (ref 0–0.5)
EOSINOPHIL # BLD AUTO: 0.57 K/UL — HIGH (ref 0–0.5)
EOSINOPHIL # BLD AUTO: 0.61 K/UL — HIGH (ref 0–0.5)
EOSINOPHIL # BLD AUTO: 0.69 K/UL — HIGH (ref 0–0.5)
EOSINOPHIL # BLD AUTO: 0.75 K/UL — HIGH (ref 0–0.5)
EOSINOPHIL # BLD AUTO: 0.81 K/UL — HIGH (ref 0–0.5)
EOSINOPHIL # BLD AUTO: 0.83 K/UL — HIGH (ref 0–0.5)
EOSINOPHIL # BLD AUTO: 1.06 K/UL — HIGH (ref 0–0.5)
EOSINOPHIL NFR BLD AUTO: 0 % — SIGNIFICANT CHANGE UP (ref 0–6)
EOSINOPHIL NFR BLD AUTO: 0.1 % — SIGNIFICANT CHANGE UP (ref 0–6)
EOSINOPHIL NFR BLD AUTO: 0.9 % — SIGNIFICANT CHANGE UP (ref 0–6)
EOSINOPHIL NFR BLD AUTO: 2.4 % — SIGNIFICANT CHANGE UP (ref 0–6)
EOSINOPHIL NFR BLD AUTO: 2.6 % — SIGNIFICANT CHANGE UP (ref 0–6)
EOSINOPHIL NFR BLD AUTO: 2.7 % — SIGNIFICANT CHANGE UP (ref 0–6)
EOSINOPHIL NFR BLD AUTO: 2.8 % — SIGNIFICANT CHANGE UP (ref 0–6)
EOSINOPHIL NFR BLD AUTO: 2.8 % — SIGNIFICANT CHANGE UP (ref 0–6)
EOSINOPHIL NFR BLD AUTO: 3 % — SIGNIFICANT CHANGE UP (ref 0–6)
EOSINOPHIL NFR BLD AUTO: 3.2 % — SIGNIFICANT CHANGE UP (ref 0–6)
EOSINOPHIL NFR BLD AUTO: 3.5 % — SIGNIFICANT CHANGE UP (ref 0–6)
EOSINOPHIL NFR BLD AUTO: 3.7 % — SIGNIFICANT CHANGE UP (ref 0–6)
EOSINOPHIL NFR BLD AUTO: 3.8 % — SIGNIFICANT CHANGE UP (ref 0–6)
EOSINOPHIL NFR BLD AUTO: 3.8 % — SIGNIFICANT CHANGE UP (ref 0–6)
EOSINOPHIL NFR BLD AUTO: 4.2 % — SIGNIFICANT CHANGE UP (ref 0–6)
EOSINOPHIL NFR BLD AUTO: 4.4 % — SIGNIFICANT CHANGE UP (ref 0–6)
EOSINOPHIL NFR BLD AUTO: 4.4 % — SIGNIFICANT CHANGE UP (ref 0–6)
EOSINOPHIL NFR BLD AUTO: 4.6 % — SIGNIFICANT CHANGE UP (ref 0–6)
EOSINOPHIL NFR BLD AUTO: 4.9 % — SIGNIFICANT CHANGE UP (ref 0–6)
EOSINOPHIL NFR BLD AUTO: 5.2 % — SIGNIFICANT CHANGE UP (ref 0–6)
EOSINOPHIL NFR BLD AUTO: 5.3 % — SIGNIFICANT CHANGE UP (ref 0–6)
EOSINOPHIL NFR BLD AUTO: 5.4 % — SIGNIFICANT CHANGE UP (ref 0–6)
EOSINOPHIL NFR BLD AUTO: 5.4 % — SIGNIFICANT CHANGE UP (ref 0–6)
EOSINOPHIL NFR BLD AUTO: 6.1 % — HIGH (ref 0–6)
EOSINOPHIL NFR BLD AUTO: 6.3 % — HIGH (ref 0–6)
EOSINOPHIL NFR BLD AUTO: 6.4 % — HIGH (ref 0–6)
EOSINOPHIL NFR BLD AUTO: 6.7 % — HIGH (ref 0–6)
EOSINOPHIL NFR BLD AUTO: 7 % — HIGH (ref 0–6)
EOSINOPHIL NFR BLD AUTO: 7.3 % — HIGH (ref 0–6)
EOSINOPHIL NFR BLD AUTO: 7.4 % — HIGH (ref 0–6)
EOSINOPHIL NFR BLD AUTO: 8 % — HIGH (ref 0–6)
EPI CELLS # UR: ABNORMAL /HPF (ref 0–5)
EPI CELLS # UR: ABNORMAL /HPF (ref 0–5)
EPI CELLS # UR: SIGNIFICANT CHANGE UP /HPF (ref 0–5)
ESTIMATED AVERAGE GLUCOSE: 114 MG/DL — SIGNIFICANT CHANGE UP (ref 68–114)
FERRITIN SERPL-MCNC: 28 NG/ML — SIGNIFICANT CHANGE UP (ref 15–150)
FIBRINOGEN PPP-MCNC: 820 MG/DL — HIGH (ref 258–438)
FLUAV H1 2009 PAND RNA SPEC QL NAA+PROBE: SIGNIFICANT CHANGE UP
FLUAV H1 RNA SPEC QL NAA+PROBE: SIGNIFICANT CHANGE UP
FLUAV H3 RNA SPEC QL NAA+PROBE: SIGNIFICANT CHANGE UP
FLUAV SUBTYP SPEC NAA+PROBE: SIGNIFICANT CHANGE UP
FLUBV RNA SPEC QL NAA+PROBE: SIGNIFICANT CHANGE UP
GAS PNL BLDA: SIGNIFICANT CHANGE UP
GGT SERPL-CCNC: 164 U/L — HIGH (ref 8–40)
GIANT PLATELETS BLD QL SMEAR: PRESENT — SIGNIFICANT CHANGE UP
GLUCOSE BLDC GLUCOMTR-MCNC: 100 MG/DL — HIGH (ref 70–99)
GLUCOSE BLDC GLUCOMTR-MCNC: 101 MG/DL — HIGH (ref 70–99)
GLUCOSE BLDC GLUCOMTR-MCNC: 101 MG/DL — HIGH (ref 70–99)
GLUCOSE BLDC GLUCOMTR-MCNC: 102 MG/DL — HIGH (ref 70–99)
GLUCOSE BLDC GLUCOMTR-MCNC: 103 MG/DL — HIGH (ref 70–99)
GLUCOSE BLDC GLUCOMTR-MCNC: 104 MG/DL — HIGH (ref 70–99)
GLUCOSE BLDC GLUCOMTR-MCNC: 104 MG/DL — HIGH (ref 70–99)
GLUCOSE BLDC GLUCOMTR-MCNC: 106 MG/DL — HIGH (ref 70–99)
GLUCOSE BLDC GLUCOMTR-MCNC: 107 MG/DL — HIGH (ref 70–99)
GLUCOSE BLDC GLUCOMTR-MCNC: 107 MG/DL — HIGH (ref 70–99)
GLUCOSE BLDC GLUCOMTR-MCNC: 108 MG/DL — HIGH (ref 70–99)
GLUCOSE BLDC GLUCOMTR-MCNC: 108 MG/DL — HIGH (ref 70–99)
GLUCOSE BLDC GLUCOMTR-MCNC: 109 MG/DL — HIGH (ref 70–99)
GLUCOSE BLDC GLUCOMTR-MCNC: 110 MG/DL — HIGH (ref 70–99)
GLUCOSE BLDC GLUCOMTR-MCNC: 110 MG/DL — HIGH (ref 70–99)
GLUCOSE BLDC GLUCOMTR-MCNC: 111 MG/DL — HIGH (ref 70–99)
GLUCOSE BLDC GLUCOMTR-MCNC: 112 MG/DL — HIGH (ref 70–99)
GLUCOSE BLDC GLUCOMTR-MCNC: 112 MG/DL — HIGH (ref 70–99)
GLUCOSE BLDC GLUCOMTR-MCNC: 113 MG/DL — HIGH (ref 70–99)
GLUCOSE BLDC GLUCOMTR-MCNC: 114 MG/DL — HIGH (ref 70–99)
GLUCOSE BLDC GLUCOMTR-MCNC: 114 MG/DL — HIGH (ref 70–99)
GLUCOSE BLDC GLUCOMTR-MCNC: 115 MG/DL — HIGH (ref 70–99)
GLUCOSE BLDC GLUCOMTR-MCNC: 116 MG/DL — HIGH (ref 70–99)
GLUCOSE BLDC GLUCOMTR-MCNC: 117 MG/DL — HIGH (ref 70–99)
GLUCOSE BLDC GLUCOMTR-MCNC: 118 MG/DL — HIGH (ref 70–99)
GLUCOSE BLDC GLUCOMTR-MCNC: 119 MG/DL — HIGH (ref 70–99)
GLUCOSE BLDC GLUCOMTR-MCNC: 120 MG/DL — HIGH (ref 70–99)
GLUCOSE BLDC GLUCOMTR-MCNC: 120 MG/DL — HIGH (ref 70–99)
GLUCOSE BLDC GLUCOMTR-MCNC: 121 MG/DL — HIGH (ref 70–99)
GLUCOSE BLDC GLUCOMTR-MCNC: 121 MG/DL — HIGH (ref 70–99)
GLUCOSE BLDC GLUCOMTR-MCNC: 122 MG/DL — HIGH (ref 70–99)
GLUCOSE BLDC GLUCOMTR-MCNC: 123 MG/DL — HIGH (ref 70–99)
GLUCOSE BLDC GLUCOMTR-MCNC: 124 MG/DL — HIGH (ref 70–99)
GLUCOSE BLDC GLUCOMTR-MCNC: 125 MG/DL — HIGH (ref 70–99)
GLUCOSE BLDC GLUCOMTR-MCNC: 126 MG/DL — HIGH (ref 70–99)
GLUCOSE BLDC GLUCOMTR-MCNC: 127 MG/DL — HIGH (ref 70–99)
GLUCOSE BLDC GLUCOMTR-MCNC: 128 MG/DL — HIGH (ref 70–99)
GLUCOSE BLDC GLUCOMTR-MCNC: 129 MG/DL — HIGH (ref 70–99)
GLUCOSE BLDC GLUCOMTR-MCNC: 130 MG/DL — HIGH (ref 70–99)
GLUCOSE BLDC GLUCOMTR-MCNC: 131 MG/DL — HIGH (ref 70–99)
GLUCOSE BLDC GLUCOMTR-MCNC: 131 MG/DL — HIGH (ref 70–99)
GLUCOSE BLDC GLUCOMTR-MCNC: 132 MG/DL — HIGH (ref 70–99)
GLUCOSE BLDC GLUCOMTR-MCNC: 132 MG/DL — HIGH (ref 70–99)
GLUCOSE BLDC GLUCOMTR-MCNC: 133 MG/DL — HIGH (ref 70–99)
GLUCOSE BLDC GLUCOMTR-MCNC: 134 MG/DL — HIGH (ref 70–99)
GLUCOSE BLDC GLUCOMTR-MCNC: 135 MG/DL — HIGH (ref 70–99)
GLUCOSE BLDC GLUCOMTR-MCNC: 136 MG/DL — HIGH (ref 70–99)
GLUCOSE BLDC GLUCOMTR-MCNC: 137 MG/DL — HIGH (ref 70–99)
GLUCOSE BLDC GLUCOMTR-MCNC: 137 MG/DL — HIGH (ref 70–99)
GLUCOSE BLDC GLUCOMTR-MCNC: 138 MG/DL — HIGH (ref 70–99)
GLUCOSE BLDC GLUCOMTR-MCNC: 138 MG/DL — HIGH (ref 70–99)
GLUCOSE BLDC GLUCOMTR-MCNC: 139 MG/DL — HIGH (ref 70–99)
GLUCOSE BLDC GLUCOMTR-MCNC: 140 MG/DL — HIGH (ref 70–99)
GLUCOSE BLDC GLUCOMTR-MCNC: 141 MG/DL — HIGH (ref 70–99)
GLUCOSE BLDC GLUCOMTR-MCNC: 145 MG/DL — HIGH (ref 70–99)
GLUCOSE BLDC GLUCOMTR-MCNC: 146 MG/DL — HIGH (ref 70–99)
GLUCOSE BLDC GLUCOMTR-MCNC: 147 MG/DL — HIGH (ref 70–99)
GLUCOSE BLDC GLUCOMTR-MCNC: 148 MG/DL — HIGH (ref 70–99)
GLUCOSE BLDC GLUCOMTR-MCNC: 149 MG/DL — HIGH (ref 70–99)
GLUCOSE BLDC GLUCOMTR-MCNC: 149 MG/DL — HIGH (ref 70–99)
GLUCOSE BLDC GLUCOMTR-MCNC: 150 MG/DL — HIGH (ref 70–99)
GLUCOSE BLDC GLUCOMTR-MCNC: 153 MG/DL — HIGH (ref 70–99)
GLUCOSE BLDC GLUCOMTR-MCNC: 154 MG/DL — HIGH (ref 70–99)
GLUCOSE BLDC GLUCOMTR-MCNC: 157 MG/DL — HIGH (ref 70–99)
GLUCOSE BLDC GLUCOMTR-MCNC: 159 MG/DL — HIGH (ref 70–99)
GLUCOSE BLDC GLUCOMTR-MCNC: 159 MG/DL — HIGH (ref 70–99)
GLUCOSE BLDC GLUCOMTR-MCNC: 161 MG/DL — HIGH (ref 70–99)
GLUCOSE BLDC GLUCOMTR-MCNC: 161 MG/DL — HIGH (ref 70–99)
GLUCOSE BLDC GLUCOMTR-MCNC: 165 MG/DL — HIGH (ref 70–99)
GLUCOSE BLDC GLUCOMTR-MCNC: 168 MG/DL — HIGH (ref 70–99)
GLUCOSE BLDC GLUCOMTR-MCNC: 168 MG/DL — HIGH (ref 70–99)
GLUCOSE BLDC GLUCOMTR-MCNC: 170 MG/DL — HIGH (ref 70–99)
GLUCOSE BLDC GLUCOMTR-MCNC: 172 MG/DL — HIGH (ref 70–99)
GLUCOSE BLDC GLUCOMTR-MCNC: 172 MG/DL — HIGH (ref 70–99)
GLUCOSE BLDC GLUCOMTR-MCNC: 188 MG/DL — HIGH (ref 70–99)
GLUCOSE BLDC GLUCOMTR-MCNC: 193 MG/DL — HIGH (ref 70–99)
GLUCOSE BLDC GLUCOMTR-MCNC: 93 MG/DL — SIGNIFICANT CHANGE UP (ref 70–99)
GLUCOSE BLDC GLUCOMTR-MCNC: 93 MG/DL — SIGNIFICANT CHANGE UP (ref 70–99)
GLUCOSE BLDC GLUCOMTR-MCNC: 96 MG/DL — SIGNIFICANT CHANGE UP (ref 70–99)
GLUCOSE BLDC GLUCOMTR-MCNC: 97 MG/DL — SIGNIFICANT CHANGE UP (ref 70–99)
GLUCOSE BLDC GLUCOMTR-MCNC: 98 MG/DL — SIGNIFICANT CHANGE UP (ref 70–99)
GLUCOSE SERPL-MCNC: 101 MG/DL — HIGH (ref 70–99)
GLUCOSE SERPL-MCNC: 101 MG/DL — HIGH (ref 70–99)
GLUCOSE SERPL-MCNC: 104 MG/DL — HIGH (ref 70–99)
GLUCOSE SERPL-MCNC: 107 MG/DL — HIGH (ref 70–99)
GLUCOSE SERPL-MCNC: 107 MG/DL — HIGH (ref 70–99)
GLUCOSE SERPL-MCNC: 109 MG/DL — HIGH (ref 70–99)
GLUCOSE SERPL-MCNC: 111 MG/DL — HIGH (ref 70–99)
GLUCOSE SERPL-MCNC: 111 MG/DL — HIGH (ref 70–99)
GLUCOSE SERPL-MCNC: 112 MG/DL — HIGH (ref 70–99)
GLUCOSE SERPL-MCNC: 113 MG/DL — HIGH (ref 70–99)
GLUCOSE SERPL-MCNC: 114 MG/DL — HIGH (ref 70–99)
GLUCOSE SERPL-MCNC: 114 MG/DL — HIGH (ref 70–99)
GLUCOSE SERPL-MCNC: 115 MG/DL — HIGH (ref 70–99)
GLUCOSE SERPL-MCNC: 115 MG/DL — HIGH (ref 70–99)
GLUCOSE SERPL-MCNC: 116 MG/DL — HIGH (ref 70–99)
GLUCOSE SERPL-MCNC: 118 MG/DL — HIGH (ref 70–99)
GLUCOSE SERPL-MCNC: 118 MG/DL — HIGH (ref 70–99)
GLUCOSE SERPL-MCNC: 119 MG/DL — HIGH (ref 70–99)
GLUCOSE SERPL-MCNC: 120 MG/DL — HIGH (ref 70–99)
GLUCOSE SERPL-MCNC: 120 MG/DL — HIGH (ref 70–99)
GLUCOSE SERPL-MCNC: 121 MG/DL — HIGH (ref 70–99)
GLUCOSE SERPL-MCNC: 125 MG/DL — HIGH (ref 70–99)
GLUCOSE SERPL-MCNC: 127 MG/DL — HIGH (ref 70–99)
GLUCOSE SERPL-MCNC: 127 MG/DL — HIGH (ref 70–99)
GLUCOSE SERPL-MCNC: 128 MG/DL — HIGH (ref 70–99)
GLUCOSE SERPL-MCNC: 128 MG/DL — HIGH (ref 70–99)
GLUCOSE SERPL-MCNC: 130 MG/DL — HIGH (ref 70–99)
GLUCOSE SERPL-MCNC: 131 MG/DL — HIGH (ref 70–99)
GLUCOSE SERPL-MCNC: 133 MG/DL — HIGH (ref 70–99)
GLUCOSE SERPL-MCNC: 134 MG/DL — HIGH (ref 70–99)
GLUCOSE SERPL-MCNC: 135 MG/DL — HIGH (ref 70–99)
GLUCOSE SERPL-MCNC: 136 MG/DL — HIGH (ref 70–99)
GLUCOSE SERPL-MCNC: 140 MG/DL — HIGH (ref 70–99)
GLUCOSE SERPL-MCNC: 141 MG/DL — HIGH (ref 70–99)
GLUCOSE SERPL-MCNC: 141 MG/DL — HIGH (ref 70–99)
GLUCOSE SERPL-MCNC: 143 MG/DL — HIGH (ref 70–99)
GLUCOSE SERPL-MCNC: 145 MG/DL — HIGH (ref 70–99)
GLUCOSE SERPL-MCNC: 146 MG/DL — HIGH (ref 70–99)
GLUCOSE SERPL-MCNC: 148 MG/DL — HIGH (ref 70–99)
GLUCOSE SERPL-MCNC: 149 MG/DL — HIGH (ref 70–99)
GLUCOSE SERPL-MCNC: 149 MG/DL — HIGH (ref 70–99)
GLUCOSE SERPL-MCNC: 151 MG/DL — HIGH (ref 70–99)
GLUCOSE SERPL-MCNC: 157 MG/DL — HIGH (ref 70–99)
GLUCOSE SERPL-MCNC: 158 MG/DL — HIGH (ref 70–99)
GLUCOSE SERPL-MCNC: 159 MG/DL — HIGH (ref 70–99)
GLUCOSE SERPL-MCNC: 164 MG/DL — HIGH (ref 70–99)
GLUCOSE SERPL-MCNC: 170 MG/DL — HIGH (ref 70–99)
GLUCOSE SERPL-MCNC: 171 MG/DL — HIGH (ref 70–99)
GLUCOSE SERPL-MCNC: 172 MG/DL — HIGH (ref 70–99)
GLUCOSE SERPL-MCNC: 173 MG/DL — HIGH (ref 70–99)
GLUCOSE SERPL-MCNC: 173 MG/DL — HIGH (ref 70–99)
GLUCOSE SERPL-MCNC: 176 MG/DL — HIGH (ref 70–99)
GLUCOSE SERPL-MCNC: 190 MG/DL — HIGH (ref 70–99)
GLUCOSE SERPL-MCNC: 205 MG/DL — HIGH (ref 70–99)
GLUCOSE UR QL: 100
GLUCOSE UR QL: NEGATIVE — SIGNIFICANT CHANGE UP
GP B STREP DNA BLD POS QL NAA+NON-PROBE: SIGNIFICANT CHANGE UP
GRAM STN FLD: SIGNIFICANT CHANGE UP
GRAN CASTS # UR COMP ASSIST: ABNORMAL /LPF
HAPTOGLOB SERPL-MCNC: 118 MG/DL — SIGNIFICANT CHANGE UP (ref 34–200)
HAPTOGLOB SERPL-MCNC: 141 MG/DL — SIGNIFICANT CHANGE UP (ref 34–200)
HAPTOGLOB SERPL-MCNC: 303 MG/DL — HIGH (ref 34–200)
HAPTOGLOB SERPL-MCNC: 313 MG/DL — HIGH (ref 34–200)
HAPTOGLOB SERPL-MCNC: SIGNIFICANT CHANGE UP MG/DL (ref 34–200)
HBV SURFACE AB SER-ACNC: SIGNIFICANT CHANGE UP
HBV SURFACE AG SER-ACNC: SIGNIFICANT CHANGE UP
HCO3 BLDA-SCNC: 20 MMOL/L — LOW (ref 21–28)
HCO3 BLDA-SCNC: 21 MMOL/L — SIGNIFICANT CHANGE UP (ref 21–28)
HCO3 BLDA-SCNC: 21 MMOL/L — SIGNIFICANT CHANGE UP (ref 21–28)
HCO3 BLDA-SCNC: 22 MMOL/L — SIGNIFICANT CHANGE UP (ref 21–28)
HCO3 BLDA-SCNC: 23 MMOL/L — SIGNIFICANT CHANGE UP (ref 21–28)
HCO3 BLDA-SCNC: 24 MMOL/L — SIGNIFICANT CHANGE UP (ref 21–28)
HCO3 BLDA-SCNC: 25 MMOL/L — SIGNIFICANT CHANGE UP (ref 21–28)
HCO3 BLDA-SCNC: 28 MMOL/L — SIGNIFICANT CHANGE UP (ref 21–28)
HCT VFR BLD CALC: 21.5 % — LOW (ref 34.5–45)
HCT VFR BLD CALC: 21.8 % — LOW (ref 34.5–45)
HCT VFR BLD CALC: 22.2 % — LOW (ref 34.5–45)
HCT VFR BLD CALC: 22.4 % — LOW (ref 34.5–45)
HCT VFR BLD CALC: 22.5 % — LOW (ref 34.5–45)
HCT VFR BLD CALC: 22.6 % — LOW (ref 34.5–45)
HCT VFR BLD CALC: 22.7 % — LOW (ref 34.5–45)
HCT VFR BLD CALC: 23.1 % — LOW (ref 34.5–45)
HCT VFR BLD CALC: 23.3 % — LOW (ref 34.5–45)
HCT VFR BLD CALC: 23.4 % — LOW (ref 34.5–45)
HCT VFR BLD CALC: 23.5 % — LOW (ref 34.5–45)
HCT VFR BLD CALC: 23.5 % — LOW (ref 34.5–45)
HCT VFR BLD CALC: 23.6 % — LOW (ref 34.5–45)
HCT VFR BLD CALC: 23.7 % — LOW (ref 34.5–45)
HCT VFR BLD CALC: 23.9 % — LOW (ref 34.5–45)
HCT VFR BLD CALC: 23.9 % — LOW (ref 34.5–45)
HCT VFR BLD CALC: 24.1 % — LOW (ref 34.5–45)
HCT VFR BLD CALC: 24.1 % — LOW (ref 34.5–45)
HCT VFR BLD CALC: 24.3 % — LOW (ref 34.5–45)
HCT VFR BLD CALC: 24.4 % — LOW (ref 34.5–45)
HCT VFR BLD CALC: 24.5 % — LOW (ref 34.5–45)
HCT VFR BLD CALC: 24.6 % — LOW (ref 34.5–45)
HCT VFR BLD CALC: 24.7 % — LOW (ref 34.5–45)
HCT VFR BLD CALC: 24.7 % — LOW (ref 34.5–45)
HCT VFR BLD CALC: 24.8 % — LOW (ref 34.5–45)
HCT VFR BLD CALC: 25 % — LOW (ref 34.5–45)
HCT VFR BLD CALC: 25.2 % — LOW (ref 34.5–45)
HCT VFR BLD CALC: 25.2 % — LOW (ref 34.5–45)
HCT VFR BLD CALC: 25.4 % — LOW (ref 34.5–45)
HCT VFR BLD CALC: 25.4 % — LOW (ref 34.5–45)
HCT VFR BLD CALC: 25.5 % — LOW (ref 34.5–45)
HCT VFR BLD CALC: 25.5 % — LOW (ref 34.5–45)
HCT VFR BLD CALC: 25.6 % — LOW (ref 34.5–45)
HCT VFR BLD CALC: 25.9 % — LOW (ref 34.5–45)
HCT VFR BLD CALC: 26 % — LOW (ref 34.5–45)
HCT VFR BLD CALC: 26 % — LOW (ref 34.5–45)
HCT VFR BLD CALC: 26.1 % — LOW (ref 34.5–45)
HCT VFR BLD CALC: 26.4 % — LOW (ref 34.5–45)
HCT VFR BLD CALC: 26.5 % — LOW (ref 34.5–45)
HCT VFR BLD CALC: 26.5 % — LOW (ref 34.5–45)
HCT VFR BLD CALC: 26.6 % — LOW (ref 34.5–45)
HCT VFR BLD CALC: 26.7 % — LOW (ref 34.5–45)
HCT VFR BLD CALC: 27 % — LOW (ref 34.5–45)
HCT VFR BLD CALC: 27.2 % — LOW (ref 34.5–45)
HCT VFR BLD CALC: 27.2 % — LOW (ref 34.5–45)
HCT VFR BLD CALC: 27.4 % — LOW (ref 34.5–45)
HCT VFR BLD CALC: 27.5 % — LOW (ref 34.5–45)
HCT VFR BLD CALC: 27.8 % — LOW (ref 34.5–45)
HCT VFR BLD CALC: 28 % — LOW (ref 34.5–45)
HCT VFR BLD CALC: 28.1 % — LOW (ref 34.5–45)
HCT VFR BLD CALC: 28.2 % — LOW (ref 34.5–45)
HCT VFR BLD CALC: 28.3 % — LOW (ref 34.5–45)
HCT VFR BLD CALC: 28.5 % — LOW (ref 34.5–45)
HCV AB S/CO SERPL IA: 0.03 S/CO — SIGNIFICANT CHANGE UP
HCV AB SERPL-IMP: SIGNIFICANT CHANGE UP
HEPARIN-PF4 AB RESULT: 1.8 U/ML — HIGH (ref 0–0.9)
HGB BLD-MCNC: 6.3 G/DL — CRITICAL LOW (ref 11.5–15.5)
HGB BLD-MCNC: 6.7 G/DL — CRITICAL LOW (ref 11.5–15.5)
HGB BLD-MCNC: 6.8 G/DL — CRITICAL LOW (ref 11.5–15.5)
HGB BLD-MCNC: 6.9 G/DL — CRITICAL LOW (ref 11.5–15.5)
HGB BLD-MCNC: 7 G/DL — CRITICAL LOW (ref 11.5–15.5)
HGB BLD-MCNC: 7.1 G/DL — LOW (ref 11.5–15.5)
HGB BLD-MCNC: 7.2 G/DL — LOW (ref 11.5–15.5)
HGB BLD-MCNC: 7.3 G/DL — LOW (ref 11.5–15.5)
HGB BLD-MCNC: 7.4 G/DL — LOW (ref 11.5–15.5)
HGB BLD-MCNC: 7.5 G/DL — LOW (ref 11.5–15.5)
HGB BLD-MCNC: 7.6 G/DL — LOW (ref 11.5–15.5)
HGB BLD-MCNC: 7.7 G/DL — LOW (ref 11.5–15.5)
HGB BLD-MCNC: 7.8 G/DL — LOW (ref 11.5–15.5)
HGB BLD-MCNC: 7.8 G/DL — LOW (ref 11.5–15.5)
HGB BLD-MCNC: 7.9 G/DL — LOW (ref 11.5–15.5)
HGB BLD-MCNC: 8 G/DL — LOW (ref 11.5–15.5)
HGB BLD-MCNC: 8 G/DL — LOW (ref 11.5–15.5)
HGB BLD-MCNC: 8.1 G/DL — LOW (ref 11.5–15.5)
HGB BLD-MCNC: 8.1 G/DL — LOW (ref 11.5–15.5)
HGB BLD-MCNC: 8.2 G/DL — LOW (ref 11.5–15.5)
HGB BLD-MCNC: 8.3 G/DL — LOW (ref 11.5–15.5)
HGB BLD-MCNC: 8.3 G/DL — LOW (ref 11.5–15.5)
HGB BLD-MCNC: 8.4 G/DL — LOW (ref 11.5–15.5)
HYPOCHROMIA BLD QL: SIGNIFICANT CHANGE UP
HYPOCHROMIA BLD QL: SLIGHT — SIGNIFICANT CHANGE UP
IMM GRANULOCYTES NFR BLD AUTO: 0.4 % — SIGNIFICANT CHANGE UP (ref 0–1.5)
IMM GRANULOCYTES NFR BLD AUTO: 0.6 % — SIGNIFICANT CHANGE UP (ref 0–1.5)
IMM GRANULOCYTES NFR BLD AUTO: 0.7 % — SIGNIFICANT CHANGE UP (ref 0–1.5)
IMM GRANULOCYTES NFR BLD AUTO: 0.8 % — SIGNIFICANT CHANGE UP (ref 0–1.5)
IMM GRANULOCYTES NFR BLD AUTO: 1 % — SIGNIFICANT CHANGE UP (ref 0–1.5)
IMM GRANULOCYTES NFR BLD AUTO: 1.1 % — SIGNIFICANT CHANGE UP (ref 0–1.5)
IMM GRANULOCYTES NFR BLD AUTO: 1.2 % — HIGH (ref 0–0.9)
IMM GRANULOCYTES NFR BLD AUTO: 1.4 % — HIGH (ref 0–0.9)
IMM GRANULOCYTES NFR BLD AUTO: 1.8 % — HIGH (ref 0–1.5)
IMM GRANULOCYTES NFR BLD AUTO: 4.5 % — HIGH (ref 0–0.9)
IMM GRANULOCYTES NFR BLD AUTO: 5.7 % — HIGH (ref 0–1.5)
IMM GRANULOCYTES NFR BLD AUTO: 6.9 % — HIGH (ref 0–0.9)
INR BLD: 0.93 — SIGNIFICANT CHANGE UP (ref 0.88–1.16)
INR BLD: 0.98 — SIGNIFICANT CHANGE UP (ref 0.88–1.16)
INR BLD: 1.02 — SIGNIFICANT CHANGE UP (ref 0.88–1.16)
INR BLD: 1.05 — SIGNIFICANT CHANGE UP (ref 0.88–1.16)
INR BLD: 1.09 — SIGNIFICANT CHANGE UP (ref 0.88–1.16)
INR BLD: 1.1 — SIGNIFICANT CHANGE UP (ref 0.88–1.16)
INR BLD: 1.14 — SIGNIFICANT CHANGE UP (ref 0.88–1.16)
INR BLD: 1.18 — HIGH (ref 0.88–1.16)
INR BLD: 1.3 — HIGH (ref 0.88–1.16)
INR BLD: 1.41 — HIGH (ref 0.88–1.16)
INR BLD: 1.44 — HIGH (ref 0.88–1.16)
INR BLD: 1.56 — HIGH (ref 0.88–1.16)
INR BLD: 1.71 — HIGH (ref 0.88–1.16)
INR BLD: 1.8 — HIGH (ref 0.88–1.16)
IRON SATN MFR SERPL: 15 UG/DL — LOW (ref 30–160)
IRON SATN MFR SERPL: 6 % — LOW (ref 14–50)
KETONES UR-MCNC: ABNORMAL MG/DL
KETONES UR-MCNC: NEGATIVE — SIGNIFICANT CHANGE UP
LACTATE SERPL-SCNC: 0.5 MMOL/L — SIGNIFICANT CHANGE UP (ref 0.5–2)
LACTATE SERPL-SCNC: 0.6 MMOL/L — SIGNIFICANT CHANGE UP (ref 0.5–2)
LACTATE SERPL-SCNC: 0.7 MMOL/L — SIGNIFICANT CHANGE UP (ref 0.5–2)
LACTATE SERPL-SCNC: 0.9 MMOL/L — SIGNIFICANT CHANGE UP (ref 0.5–2)
LACTATE SERPL-SCNC: 1.3 MMOL/L — SIGNIFICANT CHANGE UP (ref 0.4–2)
LACTATE SERPL-SCNC: 1.3 MMOL/L — SIGNIFICANT CHANGE UP (ref 0.5–2)
LACTATE SERPL-SCNC: 1.8 MMOL/L — SIGNIFICANT CHANGE UP (ref 0.4–2)
LACTATE SERPL-SCNC: 1.9 MMOL/L — SIGNIFICANT CHANGE UP (ref 0.5–2)
LACTATE SERPL-SCNC: 1.9 MMOL/L — SIGNIFICANT CHANGE UP (ref 0.5–2)
LACTATE SERPL-SCNC: 2 MMOL/L — SIGNIFICANT CHANGE UP (ref 0.5–2)
LACTATE SERPL-SCNC: 3.2 MMOL/L — HIGH (ref 0.5–2)
LACTATE SERPL-SCNC: 5.1 MMOL/L — CRITICAL HIGH (ref 0.4–2)
LDH SERPL L TO P-CCNC: 391 U/L — HIGH (ref 50–242)
LDH SERPL L TO P-CCNC: 393 U/L — HIGH (ref 50–242)
LDH SERPL L TO P-CCNC: 397 U/L — HIGH (ref 50–242)
LDH SERPL L TO P-CCNC: 421 U/L — HIGH (ref 50–242)
LDH SERPL L TO P-CCNC: 656 U/L — HIGH (ref 50–242)
LEUKOCYTE ESTERASE UR-ACNC: ABNORMAL
LEUKOCYTE ESTERASE UR-ACNC: NEGATIVE — SIGNIFICANT CHANGE UP
LYMPHOCYTES # BLD AUTO: 0 % — LOW (ref 13–44)
LYMPHOCYTES # BLD AUTO: 0 % — LOW (ref 13–44)
LYMPHOCYTES # BLD AUTO: 0 K/UL — LOW (ref 1–3.3)
LYMPHOCYTES # BLD AUTO: 0 K/UL — LOW (ref 1–3.3)
LYMPHOCYTES # BLD AUTO: 0.29 K/UL — LOW (ref 1–3.3)
LYMPHOCYTES # BLD AUTO: 0.35 K/UL — LOW (ref 1–3.3)
LYMPHOCYTES # BLD AUTO: 0.44 K/UL — LOW (ref 1–3.3)
LYMPHOCYTES # BLD AUTO: 0.57 K/UL — LOW (ref 1–3.3)
LYMPHOCYTES # BLD AUTO: 0.58 K/UL — LOW (ref 1–3.3)
LYMPHOCYTES # BLD AUTO: 0.63 K/UL — LOW (ref 1–3.3)
LYMPHOCYTES # BLD AUTO: 0.64 K/UL — LOW (ref 1–3.3)
LYMPHOCYTES # BLD AUTO: 0.69 K/UL — LOW (ref 1–3.3)
LYMPHOCYTES # BLD AUTO: 0.69 K/UL — LOW (ref 1–3.3)
LYMPHOCYTES # BLD AUTO: 0.73 K/UL — LOW (ref 1–3.3)
LYMPHOCYTES # BLD AUTO: 0.76 K/UL — LOW (ref 1–3.3)
LYMPHOCYTES # BLD AUTO: 0.78 K/UL — LOW (ref 1–3.3)
LYMPHOCYTES # BLD AUTO: 0.81 K/UL — LOW (ref 1–3.3)
LYMPHOCYTES # BLD AUTO: 0.83 K/UL — LOW (ref 1–3.3)
LYMPHOCYTES # BLD AUTO: 0.84 K/UL — LOW (ref 1–3.3)
LYMPHOCYTES # BLD AUTO: 0.86 K/UL — LOW (ref 1–3.3)
LYMPHOCYTES # BLD AUTO: 0.86 K/UL — LOW (ref 1–3.3)
LYMPHOCYTES # BLD AUTO: 0.89 K/UL — LOW (ref 1–3.3)
LYMPHOCYTES # BLD AUTO: 0.89 K/UL — LOW (ref 1–3.3)
LYMPHOCYTES # BLD AUTO: 0.9 K/UL — LOW (ref 1–3.3)
LYMPHOCYTES # BLD AUTO: 0.96 K/UL — LOW (ref 1–3.3)
LYMPHOCYTES # BLD AUTO: 0.96 K/UL — LOW (ref 1–3.3)
LYMPHOCYTES # BLD AUTO: 1.12 K/UL — SIGNIFICANT CHANGE UP (ref 1–3.3)
LYMPHOCYTES # BLD AUTO: 1.12 K/UL — SIGNIFICANT CHANGE UP (ref 1–3.3)
LYMPHOCYTES # BLD AUTO: 1.13 K/UL — SIGNIFICANT CHANGE UP (ref 1–3.3)
LYMPHOCYTES # BLD AUTO: 1.16 K/UL — SIGNIFICANT CHANGE UP (ref 1–3.3)
LYMPHOCYTES # BLD AUTO: 1.16 K/UL — SIGNIFICANT CHANGE UP (ref 1–3.3)
LYMPHOCYTES # BLD AUTO: 1.18 K/UL — SIGNIFICANT CHANGE UP (ref 1–3.3)
LYMPHOCYTES # BLD AUTO: 1.31 K/UL — SIGNIFICANT CHANGE UP (ref 1–3.3)
LYMPHOCYTES # BLD AUTO: 1.33 K/UL — SIGNIFICANT CHANGE UP (ref 1–3.3)
LYMPHOCYTES # BLD AUTO: 1.6 K/UL — SIGNIFICANT CHANGE UP (ref 1–3.3)
LYMPHOCYTES # BLD AUTO: 10.2 % — LOW (ref 13–44)
LYMPHOCYTES # BLD AUTO: 11.3 % — LOW (ref 13–44)
LYMPHOCYTES # BLD AUTO: 11.3 % — LOW (ref 13–44)
LYMPHOCYTES # BLD AUTO: 11.6 % — LOW (ref 13–44)
LYMPHOCYTES # BLD AUTO: 11.7 % — LOW (ref 13–44)
LYMPHOCYTES # BLD AUTO: 12 % — LOW (ref 13–44)
LYMPHOCYTES # BLD AUTO: 13.4 % — SIGNIFICANT CHANGE UP (ref 13–44)
LYMPHOCYTES # BLD AUTO: 13.9 % — SIGNIFICANT CHANGE UP (ref 13–44)
LYMPHOCYTES # BLD AUTO: 14.5 % — SIGNIFICANT CHANGE UP (ref 13–44)
LYMPHOCYTES # BLD AUTO: 14.8 % — SIGNIFICANT CHANGE UP (ref 13–44)
LYMPHOCYTES # BLD AUTO: 16.7 % — SIGNIFICANT CHANGE UP (ref 13–44)
LYMPHOCYTES # BLD AUTO: 17.6 % — SIGNIFICANT CHANGE UP (ref 13–44)
LYMPHOCYTES # BLD AUTO: 19.6 % — SIGNIFICANT CHANGE UP (ref 13–44)
LYMPHOCYTES # BLD AUTO: 2.8 % — LOW (ref 13–44)
LYMPHOCYTES # BLD AUTO: 20.9 % — SIGNIFICANT CHANGE UP (ref 13–44)
LYMPHOCYTES # BLD AUTO: 22.3 % — SIGNIFICANT CHANGE UP (ref 13–44)
LYMPHOCYTES # BLD AUTO: 3 % — LOW (ref 13–44)
LYMPHOCYTES # BLD AUTO: 3 % — LOW (ref 13–44)
LYMPHOCYTES # BLD AUTO: 3.6 % — LOW (ref 13–44)
LYMPHOCYTES # BLD AUTO: 4.4 % — LOW (ref 13–44)
LYMPHOCYTES # BLD AUTO: 4.5 % — LOW (ref 13–44)
LYMPHOCYTES # BLD AUTO: 5.2 % — LOW (ref 13–44)
LYMPHOCYTES # BLD AUTO: 5.2 % — LOW (ref 13–44)
LYMPHOCYTES # BLD AUTO: 5.3 % — LOW (ref 13–44)
LYMPHOCYTES # BLD AUTO: 5.5 % — LOW (ref 13–44)
LYMPHOCYTES # BLD AUTO: 6.8 % — LOW (ref 13–44)
LYMPHOCYTES # BLD AUTO: 7.6 % — LOW (ref 13–44)
LYMPHOCYTES # BLD AUTO: 8.8 % — LOW (ref 13–44)
LYMPHOCYTES # BLD AUTO: 9.1 % — LOW (ref 13–44)
LYMPHOCYTES # BLD AUTO: 9.2 % — LOW (ref 13–44)
LYMPHOCYTES # BLD AUTO: 9.4 % — LOW (ref 13–44)
MACROCYTES BLD QL: SIGNIFICANT CHANGE UP
MACROCYTES BLD QL: SIGNIFICANT CHANGE UP
MACROCYTES BLD QL: SLIGHT — SIGNIFICANT CHANGE UP
MAGNESIUM SERPL-MCNC: 1.8 MG/DL — SIGNIFICANT CHANGE UP (ref 1.6–2.6)
MAGNESIUM SERPL-MCNC: 2 MG/DL — SIGNIFICANT CHANGE UP (ref 1.6–2.6)
MAGNESIUM SERPL-MCNC: 2.1 MG/DL — SIGNIFICANT CHANGE UP (ref 1.6–2.6)
MAGNESIUM SERPL-MCNC: 2.2 MG/DL — SIGNIFICANT CHANGE UP (ref 1.6–2.6)
MAGNESIUM SERPL-MCNC: 2.3 MG/DL — SIGNIFICANT CHANGE UP (ref 1.6–2.6)
MAGNESIUM SERPL-MCNC: 2.4 MG/DL — SIGNIFICANT CHANGE UP (ref 1.6–2.6)
MAGNESIUM SERPL-MCNC: 2.5 MG/DL — SIGNIFICANT CHANGE UP (ref 1.6–2.6)
MAGNESIUM SERPL-MCNC: 2.6 MG/DL — SIGNIFICANT CHANGE UP (ref 1.6–2.6)
MAGNESIUM SERPL-MCNC: 2.7 MG/DL — HIGH (ref 1.6–2.6)
MANUAL SMEAR VERIFICATION: SIGNIFICANT CHANGE UP
MCHC RBC-ENTMCNC: 24.3 PG — LOW (ref 27–34)
MCHC RBC-ENTMCNC: 24.5 PG — LOW (ref 27–34)
MCHC RBC-ENTMCNC: 24.5 PG — LOW (ref 27–34)
MCHC RBC-ENTMCNC: 24.6 PG — LOW (ref 27–34)
MCHC RBC-ENTMCNC: 24.8 PG — LOW (ref 27–34)
MCHC RBC-ENTMCNC: 24.9 PG — LOW (ref 27–34)
MCHC RBC-ENTMCNC: 25.1 PG — LOW (ref 27–34)
MCHC RBC-ENTMCNC: 25.1 PG — LOW (ref 27–34)
MCHC RBC-ENTMCNC: 25.2 PG — LOW (ref 27–34)
MCHC RBC-ENTMCNC: 25.3 PG — LOW (ref 27–34)
MCHC RBC-ENTMCNC: 25.3 PG — LOW (ref 27–34)
MCHC RBC-ENTMCNC: 25.4 PG — LOW (ref 27–34)
MCHC RBC-ENTMCNC: 25.4 PG — LOW (ref 27–34)
MCHC RBC-ENTMCNC: 25.5 PG — LOW (ref 27–34)
MCHC RBC-ENTMCNC: 25.5 PG — LOW (ref 27–34)
MCHC RBC-ENTMCNC: 25.8 PG — LOW (ref 27–34)
MCHC RBC-ENTMCNC: 25.8 PG — LOW (ref 27–34)
MCHC RBC-ENTMCNC: 26 PG — LOW (ref 27–34)
MCHC RBC-ENTMCNC: 26.1 PG — LOW (ref 27–34)
MCHC RBC-ENTMCNC: 26.1 PG — LOW (ref 27–34)
MCHC RBC-ENTMCNC: 26.2 PG — LOW (ref 27–34)
MCHC RBC-ENTMCNC: 26.5 PG — LOW (ref 27–34)
MCHC RBC-ENTMCNC: 26.6 PG — LOW (ref 27–34)
MCHC RBC-ENTMCNC: 26.7 PG — LOW (ref 27–34)
MCHC RBC-ENTMCNC: 26.8 PG — LOW (ref 27–34)
MCHC RBC-ENTMCNC: 26.9 PG — LOW (ref 27–34)
MCHC RBC-ENTMCNC: 27 PG — SIGNIFICANT CHANGE UP (ref 27–34)
MCHC RBC-ENTMCNC: 27.8 PG — SIGNIFICANT CHANGE UP (ref 27–34)
MCHC RBC-ENTMCNC: 27.9 GM/DL — LOW (ref 32–36)
MCHC RBC-ENTMCNC: 28 PG — SIGNIFICANT CHANGE UP (ref 27–34)
MCHC RBC-ENTMCNC: 28 PG — SIGNIFICANT CHANGE UP (ref 27–34)
MCHC RBC-ENTMCNC: 28.1 GM/DL — LOW (ref 32–36)
MCHC RBC-ENTMCNC: 28.1 GM/DL — LOW (ref 32–36)
MCHC RBC-ENTMCNC: 28.3 GM/DL — LOW (ref 32–36)
MCHC RBC-ENTMCNC: 28.3 GM/DL — LOW (ref 32–36)
MCHC RBC-ENTMCNC: 28.3 PG — SIGNIFICANT CHANGE UP (ref 27–34)
MCHC RBC-ENTMCNC: 28.5 GM/DL — LOW (ref 32–36)
MCHC RBC-ENTMCNC: 28.5 PG — SIGNIFICANT CHANGE UP (ref 27–34)
MCHC RBC-ENTMCNC: 28.5 PG — SIGNIFICANT CHANGE UP (ref 27–34)
MCHC RBC-ENTMCNC: 28.6 GM/DL — LOW (ref 32–36)
MCHC RBC-ENTMCNC: 28.6 PG — SIGNIFICANT CHANGE UP (ref 27–34)
MCHC RBC-ENTMCNC: 28.7 GM/DL — LOW (ref 32–36)
MCHC RBC-ENTMCNC: 28.7 PG — SIGNIFICANT CHANGE UP (ref 27–34)
MCHC RBC-ENTMCNC: 28.8 GM/DL — LOW (ref 32–36)
MCHC RBC-ENTMCNC: 28.9 GM/DL — LOW (ref 32–36)
MCHC RBC-ENTMCNC: 28.9 PG — SIGNIFICANT CHANGE UP (ref 27–34)
MCHC RBC-ENTMCNC: 28.9 PG — SIGNIFICANT CHANGE UP (ref 27–34)
MCHC RBC-ENTMCNC: 29 GM/DL — LOW (ref 32–36)
MCHC RBC-ENTMCNC: 29 GM/DL — LOW (ref 32–36)
MCHC RBC-ENTMCNC: 29 PG — SIGNIFICANT CHANGE UP (ref 27–34)
MCHC RBC-ENTMCNC: 29.1 GM/DL — LOW (ref 32–36)
MCHC RBC-ENTMCNC: 29.1 GM/DL — LOW (ref 32–36)
MCHC RBC-ENTMCNC: 29.1 PG — SIGNIFICANT CHANGE UP (ref 27–34)
MCHC RBC-ENTMCNC: 29.2 GM/DL — LOW (ref 32–36)
MCHC RBC-ENTMCNC: 29.2 PG — SIGNIFICANT CHANGE UP (ref 27–34)
MCHC RBC-ENTMCNC: 29.2 PG — SIGNIFICANT CHANGE UP (ref 27–34)
MCHC RBC-ENTMCNC: 29.3 PG — SIGNIFICANT CHANGE UP (ref 27–34)
MCHC RBC-ENTMCNC: 29.3 PG — SIGNIFICANT CHANGE UP (ref 27–34)
MCHC RBC-ENTMCNC: 29.4 GM/DL — LOW (ref 32–36)
MCHC RBC-ENTMCNC: 29.4 PG — SIGNIFICANT CHANGE UP (ref 27–34)
MCHC RBC-ENTMCNC: 29.4 PG — SIGNIFICANT CHANGE UP (ref 27–34)
MCHC RBC-ENTMCNC: 29.5 GM/DL — LOW (ref 32–36)
MCHC RBC-ENTMCNC: 29.5 PG — SIGNIFICANT CHANGE UP (ref 27–34)
MCHC RBC-ENTMCNC: 29.5 PG — SIGNIFICANT CHANGE UP (ref 27–34)
MCHC RBC-ENTMCNC: 29.6 GM/DL — LOW (ref 32–36)
MCHC RBC-ENTMCNC: 29.6 GM/DL — LOW (ref 32–36)
MCHC RBC-ENTMCNC: 29.7 GM/DL — LOW (ref 32–36)
MCHC RBC-ENTMCNC: 29.7 PG — SIGNIFICANT CHANGE UP (ref 27–34)
MCHC RBC-ENTMCNC: 29.8 GM/DL — LOW (ref 32–36)
MCHC RBC-ENTMCNC: 29.9 GM/DL — LOW (ref 32–36)
MCHC RBC-ENTMCNC: 30 GM/DL — LOW (ref 32–36)
MCHC RBC-ENTMCNC: 30 GM/DL — LOW (ref 32–36)
MCHC RBC-ENTMCNC: 30.1 GM/DL — LOW (ref 32–36)
MCHC RBC-ENTMCNC: 30.2 GM/DL — LOW (ref 32–36)
MCHC RBC-ENTMCNC: 30.2 GM/DL — LOW (ref 32–36)
MCHC RBC-ENTMCNC: 30.4 GM/DL — LOW (ref 32–36)
MCHC RBC-ENTMCNC: 30.5 GM/DL — LOW (ref 32–36)
MCHC RBC-ENTMCNC: 30.5 GM/DL — LOW (ref 32–36)
MCHC RBC-ENTMCNC: 30.7 GM/DL — LOW (ref 32–36)
MCHC RBC-ENTMCNC: 31 GM/DL — LOW (ref 32–36)
MCHC RBC-ENTMCNC: 31.3 GM/DL — LOW (ref 32–36)
MCHC RBC-ENTMCNC: 31.5 GM/DL — LOW (ref 32–36)
MCHC RBC-ENTMCNC: 31.6 GM/DL — LOW (ref 32–36)
MCHC RBC-ENTMCNC: 31.6 GM/DL — LOW (ref 32–36)
MCHC RBC-ENTMCNC: 31.7 GM/DL — LOW (ref 32–36)
MCHC RBC-ENTMCNC: 32 GM/DL — SIGNIFICANT CHANGE UP (ref 32–36)
MCV RBC AUTO: 100.4 FL — HIGH (ref 80–100)
MCV RBC AUTO: 83.7 FL — SIGNIFICANT CHANGE UP (ref 80–100)
MCV RBC AUTO: 83.7 FL — SIGNIFICANT CHANGE UP (ref 80–100)
MCV RBC AUTO: 83.8 FL — SIGNIFICANT CHANGE UP (ref 80–100)
MCV RBC AUTO: 83.9 FL — SIGNIFICANT CHANGE UP (ref 80–100)
MCV RBC AUTO: 84.3 FL — SIGNIFICANT CHANGE UP (ref 80–100)
MCV RBC AUTO: 84.3 FL — SIGNIFICANT CHANGE UP (ref 80–100)
MCV RBC AUTO: 84.4 FL — SIGNIFICANT CHANGE UP (ref 80–100)
MCV RBC AUTO: 84.5 FL — SIGNIFICANT CHANGE UP (ref 80–100)
MCV RBC AUTO: 84.8 FL — SIGNIFICANT CHANGE UP (ref 80–100)
MCV RBC AUTO: 84.8 FL — SIGNIFICANT CHANGE UP (ref 80–100)
MCV RBC AUTO: 84.9 FL — SIGNIFICANT CHANGE UP (ref 80–100)
MCV RBC AUTO: 84.9 FL — SIGNIFICANT CHANGE UP (ref 80–100)
MCV RBC AUTO: 85.2 FL — SIGNIFICANT CHANGE UP (ref 80–100)
MCV RBC AUTO: 85.4 FL — SIGNIFICANT CHANGE UP (ref 80–100)
MCV RBC AUTO: 85.5 FL — SIGNIFICANT CHANGE UP (ref 80–100)
MCV RBC AUTO: 86.2 FL — SIGNIFICANT CHANGE UP (ref 80–100)
MCV RBC AUTO: 86.8 FL — SIGNIFICANT CHANGE UP (ref 80–100)
MCV RBC AUTO: 86.8 FL — SIGNIFICANT CHANGE UP (ref 80–100)
MCV RBC AUTO: 87.1 FL — SIGNIFICANT CHANGE UP (ref 80–100)
MCV RBC AUTO: 87.3 FL — SIGNIFICANT CHANGE UP (ref 80–100)
MCV RBC AUTO: 87.3 FL — SIGNIFICANT CHANGE UP (ref 80–100)
MCV RBC AUTO: 88 FL — SIGNIFICANT CHANGE UP (ref 80–100)
MCV RBC AUTO: 88 FL — SIGNIFICANT CHANGE UP (ref 80–100)
MCV RBC AUTO: 88.5 FL — SIGNIFICANT CHANGE UP (ref 80–100)
MCV RBC AUTO: 88.5 FL — SIGNIFICANT CHANGE UP (ref 80–100)
MCV RBC AUTO: 88.7 FL — SIGNIFICANT CHANGE UP (ref 80–100)
MCV RBC AUTO: 88.8 FL — SIGNIFICANT CHANGE UP (ref 80–100)
MCV RBC AUTO: 89 FL — SIGNIFICANT CHANGE UP (ref 80–100)
MCV RBC AUTO: 89.2 FL — SIGNIFICANT CHANGE UP (ref 80–100)
MCV RBC AUTO: 89.3 FL — SIGNIFICANT CHANGE UP (ref 80–100)
MCV RBC AUTO: 89.8 FL — SIGNIFICANT CHANGE UP (ref 80–100)
MCV RBC AUTO: 90.2 FL — SIGNIFICANT CHANGE UP (ref 80–100)
MCV RBC AUTO: 90.4 FL — SIGNIFICANT CHANGE UP (ref 80–100)
MCV RBC AUTO: 90.9 FL — SIGNIFICANT CHANGE UP (ref 80–100)
MCV RBC AUTO: 91.3 FL — SIGNIFICANT CHANGE UP (ref 80–100)
MCV RBC AUTO: 91.4 FL — SIGNIFICANT CHANGE UP (ref 80–100)
MCV RBC AUTO: 93.4 FL — SIGNIFICANT CHANGE UP (ref 80–100)
MCV RBC AUTO: 93.8 FL — SIGNIFICANT CHANGE UP (ref 80–100)
MCV RBC AUTO: 94 FL — SIGNIFICANT CHANGE UP (ref 80–100)
MCV RBC AUTO: 94.3 FL — SIGNIFICANT CHANGE UP (ref 80–100)
MCV RBC AUTO: 94.4 FL — SIGNIFICANT CHANGE UP (ref 80–100)
MCV RBC AUTO: 94.6 FL — SIGNIFICANT CHANGE UP (ref 80–100)
MCV RBC AUTO: 94.7 FL — SIGNIFICANT CHANGE UP (ref 80–100)
MCV RBC AUTO: 95.7 FL — SIGNIFICANT CHANGE UP (ref 80–100)
MCV RBC AUTO: 95.9 FL — SIGNIFICANT CHANGE UP (ref 80–100)
MCV RBC AUTO: 96.5 FL — SIGNIFICANT CHANGE UP (ref 80–100)
MCV RBC AUTO: 97.1 FL — SIGNIFICANT CHANGE UP (ref 80–100)
MCV RBC AUTO: 97.2 FL — SIGNIFICANT CHANGE UP (ref 80–100)
MCV RBC AUTO: 97.2 FL — SIGNIFICANT CHANGE UP (ref 80–100)
MCV RBC AUTO: 97.4 FL — SIGNIFICANT CHANGE UP (ref 80–100)
MCV RBC AUTO: 97.6 FL — SIGNIFICANT CHANGE UP (ref 80–100)
MCV RBC AUTO: 97.9 FL — SIGNIFICANT CHANGE UP (ref 80–100)
MCV RBC AUTO: 98 FL — SIGNIFICANT CHANGE UP (ref 80–100)
MCV RBC AUTO: 98.4 FL — SIGNIFICANT CHANGE UP (ref 80–100)
MCV RBC AUTO: 98.5 FL — SIGNIFICANT CHANGE UP (ref 80–100)
MCV RBC AUTO: 98.8 FL — SIGNIFICANT CHANGE UP (ref 80–100)
MCV RBC AUTO: 99.2 FL — SIGNIFICANT CHANGE UP (ref 80–100)
MCV RBC AUTO: 99.6 FL — SIGNIFICANT CHANGE UP (ref 80–100)
METAMYELOCYTES # FLD: 0.9 % — HIGH (ref 0–0)
METAMYELOCYTES # FLD: 1.7 % — HIGH (ref 0–0)
METAMYELOCYTES # FLD: 1.8 % — HIGH (ref 0–0)
METAMYELOCYTES # FLD: 1.9 % — HIGH (ref 0–0)
METHOD TYPE: SIGNIFICANT CHANGE UP
METHOD TYPE: SIGNIFICANT CHANGE UP
MICROCYTES BLD QL: SIGNIFICANT CHANGE UP
MICROCYTES BLD QL: SLIGHT — SIGNIFICANT CHANGE UP
MONOCYTES # BLD AUTO: 0.22 K/UL — SIGNIFICANT CHANGE UP (ref 0–0.9)
MONOCYTES # BLD AUTO: 0.26 K/UL — SIGNIFICANT CHANGE UP (ref 0–0.9)
MONOCYTES # BLD AUTO: 0.29 K/UL — SIGNIFICANT CHANGE UP (ref 0–0.9)
MONOCYTES # BLD AUTO: 0.3 K/UL — SIGNIFICANT CHANGE UP (ref 0–0.9)
MONOCYTES # BLD AUTO: 0.32 K/UL — SIGNIFICANT CHANGE UP (ref 0–0.9)
MONOCYTES # BLD AUTO: 0.32 K/UL — SIGNIFICANT CHANGE UP (ref 0–0.9)
MONOCYTES # BLD AUTO: 0.41 K/UL — SIGNIFICANT CHANGE UP (ref 0–0.9)
MONOCYTES # BLD AUTO: 0.45 K/UL — SIGNIFICANT CHANGE UP (ref 0–0.9)
MONOCYTES # BLD AUTO: 0.47 K/UL — SIGNIFICANT CHANGE UP (ref 0–0.9)
MONOCYTES # BLD AUTO: 0.54 K/UL — SIGNIFICANT CHANGE UP (ref 0–0.9)
MONOCYTES # BLD AUTO: 0.55 K/UL — SIGNIFICANT CHANGE UP (ref 0–0.9)
MONOCYTES # BLD AUTO: 0.56 K/UL — SIGNIFICANT CHANGE UP (ref 0–0.9)
MONOCYTES # BLD AUTO: 0.57 K/UL — SIGNIFICANT CHANGE UP (ref 0–0.9)
MONOCYTES # BLD AUTO: 0.57 K/UL — SIGNIFICANT CHANGE UP (ref 0–0.9)
MONOCYTES # BLD AUTO: 0.6 K/UL — SIGNIFICANT CHANGE UP (ref 0–0.9)
MONOCYTES # BLD AUTO: 0.6 K/UL — SIGNIFICANT CHANGE UP (ref 0–0.9)
MONOCYTES # BLD AUTO: 0.62 K/UL — SIGNIFICANT CHANGE UP (ref 0–0.9)
MONOCYTES # BLD AUTO: 0.64 K/UL — SIGNIFICANT CHANGE UP (ref 0–0.9)
MONOCYTES # BLD AUTO: 0.66 K/UL — SIGNIFICANT CHANGE UP (ref 0–0.9)
MONOCYTES # BLD AUTO: 0.68 K/UL — SIGNIFICANT CHANGE UP (ref 0–0.9)
MONOCYTES # BLD AUTO: 0.7 K/UL — SIGNIFICANT CHANGE UP (ref 0–0.9)
MONOCYTES # BLD AUTO: 0.7 K/UL — SIGNIFICANT CHANGE UP (ref 0–0.9)
MONOCYTES # BLD AUTO: 0.74 K/UL — SIGNIFICANT CHANGE UP (ref 0–0.9)
MONOCYTES # BLD AUTO: 0.75 K/UL — SIGNIFICANT CHANGE UP (ref 0–0.9)
MONOCYTES # BLD AUTO: 0.76 K/UL — SIGNIFICANT CHANGE UP (ref 0–0.9)
MONOCYTES # BLD AUTO: 0.77 K/UL — SIGNIFICANT CHANGE UP (ref 0–0.9)
MONOCYTES # BLD AUTO: 0.83 K/UL — SIGNIFICANT CHANGE UP (ref 0–0.9)
MONOCYTES # BLD AUTO: 0.87 K/UL — SIGNIFICANT CHANGE UP (ref 0–0.9)
MONOCYTES # BLD AUTO: 0.94 K/UL — HIGH (ref 0–0.9)
MONOCYTES # BLD AUTO: 1.06 K/UL — HIGH (ref 0–0.9)
MONOCYTES # BLD AUTO: 1.12 K/UL — HIGH (ref 0–0.9)
MONOCYTES # BLD AUTO: 1.19 K/UL — HIGH (ref 0–0.9)
MONOCYTES # BLD AUTO: 1.86 K/UL — HIGH (ref 0–0.9)
MONOCYTES NFR BLD AUTO: 1 % — LOW (ref 2–14)
MONOCYTES NFR BLD AUTO: 1.7 % — LOW (ref 2–14)
MONOCYTES NFR BLD AUTO: 1.8 % — LOW (ref 2–14)
MONOCYTES NFR BLD AUTO: 10.3 % — SIGNIFICANT CHANGE UP (ref 2–14)
MONOCYTES NFR BLD AUTO: 12 % — SIGNIFICANT CHANGE UP (ref 2–14)
MONOCYTES NFR BLD AUTO: 13.9 % — SIGNIFICANT CHANGE UP (ref 2–14)
MONOCYTES NFR BLD AUTO: 14.3 % — HIGH (ref 2–14)
MONOCYTES NFR BLD AUTO: 2.6 % — SIGNIFICANT CHANGE UP (ref 2–14)
MONOCYTES NFR BLD AUTO: 2.7 % — SIGNIFICANT CHANGE UP (ref 2–14)
MONOCYTES NFR BLD AUTO: 2.8 % — SIGNIFICANT CHANGE UP (ref 2–14)
MONOCYTES NFR BLD AUTO: 24.3 % — HIGH (ref 2–14)
MONOCYTES NFR BLD AUTO: 4.2 % — SIGNIFICANT CHANGE UP (ref 2–14)
MONOCYTES NFR BLD AUTO: 4.3 % — SIGNIFICANT CHANGE UP (ref 2–14)
MONOCYTES NFR BLD AUTO: 5.3 % — SIGNIFICANT CHANGE UP (ref 2–14)
MONOCYTES NFR BLD AUTO: 5.3 % — SIGNIFICANT CHANGE UP (ref 2–14)
MONOCYTES NFR BLD AUTO: 5.4 % — SIGNIFICANT CHANGE UP (ref 2–14)
MONOCYTES NFR BLD AUTO: 5.4 % — SIGNIFICANT CHANGE UP (ref 2–14)
MONOCYTES NFR BLD AUTO: 6.1 % — SIGNIFICANT CHANGE UP (ref 2–14)
MONOCYTES NFR BLD AUTO: 6.4 % — SIGNIFICANT CHANGE UP (ref 2–14)
MONOCYTES NFR BLD AUTO: 6.8 % — SIGNIFICANT CHANGE UP (ref 2–14)
MONOCYTES NFR BLD AUTO: 6.9 % — SIGNIFICANT CHANGE UP (ref 2–14)
MONOCYTES NFR BLD AUTO: 6.9 % — SIGNIFICANT CHANGE UP (ref 2–14)
MONOCYTES NFR BLD AUTO: 7.3 % — SIGNIFICANT CHANGE UP (ref 2–14)
MONOCYTES NFR BLD AUTO: 7.4 % — SIGNIFICANT CHANGE UP (ref 2–14)
MONOCYTES NFR BLD AUTO: 7.8 % — SIGNIFICANT CHANGE UP (ref 2–14)
MONOCYTES NFR BLD AUTO: 7.8 % — SIGNIFICANT CHANGE UP (ref 2–14)
MONOCYTES NFR BLD AUTO: 7.9 % — SIGNIFICANT CHANGE UP (ref 2–14)
MONOCYTES NFR BLD AUTO: 8 % — SIGNIFICANT CHANGE UP (ref 2–14)
MONOCYTES NFR BLD AUTO: 8.7 % — SIGNIFICANT CHANGE UP (ref 2–14)
MONOCYTES NFR BLD AUTO: 8.7 % — SIGNIFICANT CHANGE UP (ref 2–14)
MONOCYTES NFR BLD AUTO: 8.9 % — SIGNIFICANT CHANGE UP (ref 2–14)
MONOCYTES NFR BLD AUTO: 9 % — SIGNIFICANT CHANGE UP (ref 2–14)
MONOCYTES NFR BLD AUTO: 9.7 % — SIGNIFICANT CHANGE UP (ref 2–14)
MRSA PCR RESULT.: NEGATIVE — SIGNIFICANT CHANGE UP
MYELOCYTES NFR BLD: 0.9 % — HIGH (ref 0–0)
MYELOCYTES NFR BLD: 1.7 % — HIGH (ref 0–0)
MYELOCYTES NFR BLD: 1.7 % — HIGH (ref 0–0)
MYELOCYTES NFR BLD: 1.8 % — HIGH (ref 0–0)
MYELOCYTES NFR BLD: 1.8 % — HIGH (ref 0–0)
MYELOCYTES NFR BLD: 1.9 % — HIGH (ref 0–0)
MYELOCYTES NFR BLD: 3.5 % — HIGH (ref 0–0)
NEUTROPHILS # BLD AUTO: 10.06 K/UL — HIGH (ref 1.8–7.4)
NEUTROPHILS # BLD AUTO: 10.5 K/UL — HIGH (ref 1.8–7.4)
NEUTROPHILS # BLD AUTO: 11.28 K/UL — HIGH (ref 1.8–7.4)
NEUTROPHILS # BLD AUTO: 11.71 K/UL — HIGH (ref 1.8–7.4)
NEUTROPHILS # BLD AUTO: 13.25 K/UL — HIGH (ref 1.8–7.4)
NEUTROPHILS # BLD AUTO: 14.03 K/UL — HIGH (ref 1.8–7.4)
NEUTROPHILS # BLD AUTO: 14.4 K/UL — HIGH (ref 1.8–7.4)
NEUTROPHILS # BLD AUTO: 15.33 K/UL — HIGH (ref 1.8–7.4)
NEUTROPHILS # BLD AUTO: 17.77 K/UL — HIGH (ref 1.8–7.4)
NEUTROPHILS # BLD AUTO: 18.1 K/UL — HIGH (ref 1.8–7.4)
NEUTROPHILS # BLD AUTO: 2.04 K/UL — SIGNIFICANT CHANGE UP (ref 1.8–7.4)
NEUTROPHILS # BLD AUTO: 24.59 K/UL — HIGH (ref 1.8–7.4)
NEUTROPHILS # BLD AUTO: 3.26 K/UL — SIGNIFICANT CHANGE UP (ref 1.8–7.4)
NEUTROPHILS # BLD AUTO: 3.35 K/UL — SIGNIFICANT CHANGE UP (ref 1.8–7.4)
NEUTROPHILS # BLD AUTO: 3.88 K/UL — SIGNIFICANT CHANGE UP (ref 1.8–7.4)
NEUTROPHILS # BLD AUTO: 4.82 K/UL — SIGNIFICANT CHANGE UP (ref 1.8–7.4)
NEUTROPHILS # BLD AUTO: 5.08 K/UL — SIGNIFICANT CHANGE UP (ref 1.8–7.4)
NEUTROPHILS # BLD AUTO: 5.13 K/UL — SIGNIFICANT CHANGE UP (ref 1.8–7.4)
NEUTROPHILS # BLD AUTO: 5.26 K/UL — SIGNIFICANT CHANGE UP (ref 1.8–7.4)
NEUTROPHILS # BLD AUTO: 5.4 K/UL — SIGNIFICANT CHANGE UP (ref 1.8–7.4)
NEUTROPHILS # BLD AUTO: 5.41 K/UL — SIGNIFICANT CHANGE UP (ref 1.8–7.4)
NEUTROPHILS # BLD AUTO: 5.47 K/UL — SIGNIFICANT CHANGE UP (ref 1.8–7.4)
NEUTROPHILS # BLD AUTO: 5.82 K/UL — SIGNIFICANT CHANGE UP (ref 1.8–7.4)
NEUTROPHILS # BLD AUTO: 5.88 K/UL — SIGNIFICANT CHANGE UP (ref 1.8–7.4)
NEUTROPHILS # BLD AUTO: 6.04 K/UL — SIGNIFICANT CHANGE UP (ref 1.8–7.4)
NEUTROPHILS # BLD AUTO: 6.22 K/UL — SIGNIFICANT CHANGE UP (ref 1.8–7.4)
NEUTROPHILS # BLD AUTO: 6.62 K/UL — SIGNIFICANT CHANGE UP (ref 1.8–7.4)
NEUTROPHILS # BLD AUTO: 6.63 K/UL — SIGNIFICANT CHANGE UP (ref 1.8–7.4)
NEUTROPHILS # BLD AUTO: 6.9 K/UL — SIGNIFICANT CHANGE UP (ref 1.8–7.4)
NEUTROPHILS # BLD AUTO: 7.12 K/UL — SIGNIFICANT CHANGE UP (ref 1.8–7.4)
NEUTROPHILS # BLD AUTO: 7.12 K/UL — SIGNIFICANT CHANGE UP (ref 1.8–7.4)
NEUTROPHILS # BLD AUTO: 7.24 K/UL — SIGNIFICANT CHANGE UP (ref 1.8–7.4)
NEUTROPHILS # BLD AUTO: 7.68 K/UL — HIGH (ref 1.8–7.4)
NEUTROPHILS NFR BLD AUTO: 43.9 % — SIGNIFICANT CHANGE UP (ref 43–77)
NEUTROPHILS NFR BLD AUTO: 51.8 % — SIGNIFICANT CHANGE UP (ref 43–77)
NEUTROPHILS NFR BLD AUTO: 56.5 % — SIGNIFICANT CHANGE UP (ref 43–77)
NEUTROPHILS NFR BLD AUTO: 58.9 % — SIGNIFICANT CHANGE UP (ref 43–77)
NEUTROPHILS NFR BLD AUTO: 59.1 % — SIGNIFICANT CHANGE UP (ref 43–77)
NEUTROPHILS NFR BLD AUTO: 66.7 % — SIGNIFICANT CHANGE UP (ref 43–77)
NEUTROPHILS NFR BLD AUTO: 67 % — SIGNIFICANT CHANGE UP (ref 43–77)
NEUTROPHILS NFR BLD AUTO: 69 % — SIGNIFICANT CHANGE UP (ref 43–77)
NEUTROPHILS NFR BLD AUTO: 69.2 % — SIGNIFICANT CHANGE UP (ref 43–77)
NEUTROPHILS NFR BLD AUTO: 69.6 % — SIGNIFICANT CHANGE UP (ref 43–77)
NEUTROPHILS NFR BLD AUTO: 70.7 % — SIGNIFICANT CHANGE UP (ref 43–77)
NEUTROPHILS NFR BLD AUTO: 71 % — SIGNIFICANT CHANGE UP (ref 43–77)
NEUTROPHILS NFR BLD AUTO: 71.3 % — SIGNIFICANT CHANGE UP (ref 43–77)
NEUTROPHILS NFR BLD AUTO: 71.7 % — SIGNIFICANT CHANGE UP (ref 43–77)
NEUTROPHILS NFR BLD AUTO: 72.3 % — SIGNIFICANT CHANGE UP (ref 43–77)
NEUTROPHILS NFR BLD AUTO: 76.1 % — SIGNIFICANT CHANGE UP (ref 43–77)
NEUTROPHILS NFR BLD AUTO: 76.2 % — SIGNIFICANT CHANGE UP (ref 43–77)
NEUTROPHILS NFR BLD AUTO: 76.8 % — SIGNIFICANT CHANGE UP (ref 43–77)
NEUTROPHILS NFR BLD AUTO: 77.6 % — HIGH (ref 43–77)
NEUTROPHILS NFR BLD AUTO: 78.1 % — HIGH (ref 43–77)
NEUTROPHILS NFR BLD AUTO: 78.2 % — HIGH (ref 43–77)
NEUTROPHILS NFR BLD AUTO: 78.9 % — HIGH (ref 43–77)
NEUTROPHILS NFR BLD AUTO: 79.6 % — HIGH (ref 43–77)
NEUTROPHILS NFR BLD AUTO: 80.1 % — HIGH (ref 43–77)
NEUTROPHILS NFR BLD AUTO: 80.4 % — HIGH (ref 43–77)
NEUTROPHILS NFR BLD AUTO: 81.6 % — HIGH (ref 43–77)
NEUTROPHILS NFR BLD AUTO: 81.8 % — HIGH (ref 43–77)
NEUTROPHILS NFR BLD AUTO: 84.7 % — HIGH (ref 43–77)
NEUTROPHILS NFR BLD AUTO: 85.6 % — HIGH (ref 43–77)
NEUTROPHILS NFR BLD AUTO: 87 % — HIGH (ref 43–77)
NEUTROPHILS NFR BLD AUTO: 91.2 % — HIGH (ref 43–77)
NEUTROPHILS NFR BLD AUTO: 93.9 % — HIGH (ref 43–77)
NEUTROPHILS NFR BLD AUTO: 94.8 % — HIGH (ref 43–77)
NEUTS BAND # BLD: 0.9 % — SIGNIFICANT CHANGE UP (ref 0–8)
NEUTS BAND # BLD: 1.7 % — SIGNIFICANT CHANGE UP (ref 0–8)
NEUTS BAND # BLD: 1.8 % — SIGNIFICANT CHANGE UP (ref 0–8)
NEUTS BAND # BLD: 2.6 % — SIGNIFICANT CHANGE UP (ref 0–8)
NEUTS BAND # BLD: 2.6 % — SIGNIFICANT CHANGE UP (ref 0–8)
NEUTS BAND # BLD: 2.7 % — SIGNIFICANT CHANGE UP (ref 0–8)
NEUTS BAND # BLD: 26 % — HIGH (ref 0–8)
NEUTS BAND # BLD: 3.5 % — SIGNIFICANT CHANGE UP (ref 0–8)
NEUTS BAND # BLD: 3.6 % — SIGNIFICANT CHANGE UP (ref 0–8)
NEUTS BAND # BLD: 4.3 % — SIGNIFICANT CHANGE UP (ref 0–8)
NEUTS BAND # BLD: 4.6 % — SIGNIFICANT CHANGE UP (ref 0–8)
NEUTS BAND # BLD: 7.8 % — SIGNIFICANT CHANGE UP (ref 0–8)
NEUTS BAND # BLD: 8.3 % — HIGH (ref 0–8)
NITRITE UR-MCNC: NEGATIVE — SIGNIFICANT CHANGE UP
NITRITE UR-MCNC: POSITIVE
NRBC # BLD: 0 /100 WBCS — SIGNIFICANT CHANGE UP (ref 0–0)
NRBC # BLD: 0 /100 — SIGNIFICANT CHANGE UP (ref 0–0)
NRBC # BLD: 1 /100 — HIGH (ref 0–0)
NRBC # BLD: 10 /100 WBCS — HIGH (ref 0–0)
NRBC # BLD: 10 /100 WBCS — HIGH (ref 0–0)
NRBC # BLD: 11 /100 WBCS — HIGH (ref 0–0)
NRBC # BLD: 11 /100 — HIGH (ref 0–0)
NRBC # BLD: 11 /100 — HIGH (ref 0–0)
NRBC # BLD: 13 /100 — HIGH (ref 0–0)
NRBC # BLD: 18 /100 — HIGH (ref 0–0)
NRBC # BLD: 2 /100 WBCS — HIGH (ref 0–0)
NRBC # BLD: 2 /100 WBCS — HIGH (ref 0–0)
NRBC # BLD: 2 /100 — HIGH (ref 0–0)
NRBC # BLD: 2 /100 — HIGH (ref 0–0)
NRBC # BLD: 3 /100 WBCS — HIGH (ref 0–0)
NRBC # BLD: 3 /100 — HIGH (ref 0–0)
NRBC # BLD: 4 /100 WBCS — HIGH (ref 0–0)
NRBC # BLD: 5 /100 WBCS — HIGH (ref 0–0)
NRBC # BLD: 5 /100 WBCS — HIGH (ref 0–0)
NRBC # BLD: 7 /100 WBCS — HIGH (ref 0–0)
NRBC # BLD: 8 /100 — HIGH (ref 0–0)
NRBC # BLD: 9 /100 WBCS — HIGH (ref 0–0)
NRBC # BLD: SIGNIFICANT CHANGE UP /100 WBCS (ref 0–0)
NT-PROBNP SERPL-SCNC: 1000 PG/ML — HIGH
NT-PROBNP SERPL-SCNC: 406 PG/ML — HIGH
NT-PROBNP SERPL-SCNC: 670 PG/ML — HIGH
NT-PROBNP SERPL-SCNC: HIGH PG/ML
NT-PROBNP SERPL-SCNC: HIGH PG/ML (ref 0–300)
ORGANISM # SPEC MICROSCOPIC CNT: SIGNIFICANT CHANGE UP
OSMOLALITY SERPL: 318 MOSM/KG — HIGH (ref 280–301)
OSMOLALITY UR: 300 MOSM/KG — SIGNIFICANT CHANGE UP (ref 300–900)
OSMOLALITY UR: 316 MOSM/KG — SIGNIFICANT CHANGE UP (ref 300–900)
OVALOCYTES BLD QL SMEAR: SLIGHT — SIGNIFICANT CHANGE UP
PCO2 BLDA: 34 MMHG — SIGNIFICANT CHANGE UP (ref 32–45)
PCO2 BLDA: 37 MMHG — HIGH (ref 32–35)
PCO2 BLDA: 38 MMHG — SIGNIFICANT CHANGE UP (ref 32–45)
PCO2 BLDA: 41 MMHG — SIGNIFICANT CHANGE UP (ref 32–45)
PCO2 BLDA: 41 MMHG — SIGNIFICANT CHANGE UP (ref 32–45)
PCO2 BLDA: 42 MMHG — HIGH (ref 32–35)
PCO2 BLDA: 43 MMHG — HIGH (ref 32–35)
PCO2 BLDA: 44 MMHG — HIGH (ref 32–35)
PCO2 BLDA: 46 MMHG — HIGH (ref 32–35)
PCO2 BLDA: 46 MMHG — HIGH (ref 32–45)
PCO2 BLDA: 47 MMHG — HIGH (ref 32–35)
PCO2 BLDA: 47 MMHG — HIGH (ref 32–35)
PCO2 BLDA: 49 MMHG — HIGH (ref 32–35)
PCO2 BLDA: 51 MMHG — HIGH (ref 32–35)
PCO2 BLDA: 57 MMHG — HIGH (ref 32–35)
PCO2 BLDA: 57 MMHG — HIGH (ref 32–35)
PCO2 BLDA: 59 MMHG — HIGH (ref 32–35)
PCO2 BLDA: 69 MMHG — HIGH (ref 32–35)
PCO2 BLDV: 49 MMHG — HIGH (ref 39–42)
PCO2 BLDV: 68 MMHG — HIGH (ref 39–42)
PCO2 BLDV: 70 MMHG — HIGH (ref 39–42)
PCO2 BLDV: 81 MMHG — HIGH (ref 39–42)
PF4 HEPARIN CMPLX AB SER-ACNC: POSITIVE — SIGNIFICANT CHANGE UP
PH BLDA: 7.13 — CRITICAL LOW (ref 7.35–7.45)
PH BLDA: 7.19 — CRITICAL LOW (ref 7.35–7.45)
PH BLDA: 7.2 — SIGNIFICANT CHANGE UP (ref 7.35–7.45)
PH BLDA: 7.22 — LOW (ref 7.35–7.45)
PH BLDA: 7.23 — LOW (ref 7.35–7.45)
PH BLDA: 7.28 — LOW (ref 7.35–7.45)
PH BLDA: 7.29 — LOW (ref 7.35–7.45)
PH BLDA: 7.3 — LOW (ref 7.35–7.45)
PH BLDA: 7.31 — LOW (ref 7.35–7.45)
PH BLDA: 7.32 — LOW (ref 7.35–7.45)
PH BLDA: 7.33 — LOW (ref 7.35–7.45)
PH BLDA: 7.34 — LOW (ref 7.35–7.45)
PH BLDA: 7.34 — LOW (ref 7.35–7.45)
PH BLDA: 7.42 — SIGNIFICANT CHANGE UP (ref 7.35–7.45)
PH BLDA: 7.45 — SIGNIFICANT CHANGE UP (ref 7.35–7.45)
PH BLDA: 7.47 — HIGH (ref 7.35–7.45)
PH BLDV: 7.31 — LOW (ref 7.32–7.43)
PH BLDV: 7.31 — LOW (ref 7.32–7.43)
PH BLDV: 7.32 — SIGNIFICANT CHANGE UP (ref 7.32–7.43)
PH BLDV: 7.38 — SIGNIFICANT CHANGE UP (ref 7.32–7.43)
PH UR: 5 — SIGNIFICANT CHANGE UP (ref 5–8)
PH UR: 5.5 — SIGNIFICANT CHANGE UP (ref 5–8)
PH UR: 6 — SIGNIFICANT CHANGE UP (ref 5–8)
PHOSPHATE SERPL-MCNC: 2.4 MG/DL — LOW (ref 2.5–4.5)
PHOSPHATE SERPL-MCNC: 2.4 MG/DL — LOW (ref 2.5–4.5)
PHOSPHATE SERPL-MCNC: 2.5 MG/DL — SIGNIFICANT CHANGE UP (ref 2.5–4.5)
PHOSPHATE SERPL-MCNC: 2.7 MG/DL — SIGNIFICANT CHANGE UP (ref 2.5–4.5)
PHOSPHATE SERPL-MCNC: 3 MG/DL — SIGNIFICANT CHANGE UP (ref 2.5–4.5)
PHOSPHATE SERPL-MCNC: 3.1 MG/DL — SIGNIFICANT CHANGE UP (ref 2.5–4.5)
PHOSPHATE SERPL-MCNC: 3.1 MG/DL — SIGNIFICANT CHANGE UP (ref 2.5–4.5)
PHOSPHATE SERPL-MCNC: 3.2 MG/DL — SIGNIFICANT CHANGE UP (ref 2.5–4.5)
PHOSPHATE SERPL-MCNC: 3.3 MG/DL — SIGNIFICANT CHANGE UP (ref 2.5–4.5)
PHOSPHATE SERPL-MCNC: 3.4 MG/DL — SIGNIFICANT CHANGE UP (ref 2.5–4.5)
PHOSPHATE SERPL-MCNC: 3.4 MG/DL — SIGNIFICANT CHANGE UP (ref 2.5–4.5)
PHOSPHATE SERPL-MCNC: 3.5 MG/DL — SIGNIFICANT CHANGE UP (ref 2.5–4.5)
PHOSPHATE SERPL-MCNC: 3.6 MG/DL — SIGNIFICANT CHANGE UP (ref 2.5–4.5)
PHOSPHATE SERPL-MCNC: 3.7 MG/DL — SIGNIFICANT CHANGE UP (ref 2.5–4.5)
PHOSPHATE SERPL-MCNC: 3.8 MG/DL — SIGNIFICANT CHANGE UP (ref 2.5–4.5)
PHOSPHATE SERPL-MCNC: 3.9 MG/DL — SIGNIFICANT CHANGE UP (ref 2.5–4.5)
PHOSPHATE SERPL-MCNC: 4 MG/DL — SIGNIFICANT CHANGE UP (ref 2.5–4.5)
PHOSPHATE SERPL-MCNC: 4.1 MG/DL — SIGNIFICANT CHANGE UP (ref 2.5–4.5)
PHOSPHATE SERPL-MCNC: 4.2 MG/DL — SIGNIFICANT CHANGE UP (ref 2.5–4.5)
PHOSPHATE SERPL-MCNC: 4.2 MG/DL — SIGNIFICANT CHANGE UP (ref 2.5–4.5)
PHOSPHATE SERPL-MCNC: 4.3 MG/DL — SIGNIFICANT CHANGE UP (ref 2.5–4.5)
PHOSPHATE SERPL-MCNC: 4.4 MG/DL — SIGNIFICANT CHANGE UP (ref 2.5–4.5)
PHOSPHATE SERPL-MCNC: 4.7 MG/DL — HIGH (ref 2.5–4.5)
PHOSPHATE SERPL-MCNC: 4.8 MG/DL — HIGH (ref 2.5–4.5)
PHOSPHATE SERPL-MCNC: 4.8 MG/DL — HIGH (ref 2.5–4.5)
PHOSPHATE SERPL-MCNC: 5 MG/DL — HIGH (ref 2.5–4.5)
PHOSPHATE SERPL-MCNC: 5.1 MG/DL — HIGH (ref 2.5–4.5)
PHOSPHATE SERPL-MCNC: 5.2 MG/DL — HIGH (ref 2.5–4.5)
PHOSPHATE SERPL-MCNC: 5.2 MG/DL — HIGH (ref 2.5–4.5)
PHOSPHATE SERPL-MCNC: 5.4 MG/DL — HIGH (ref 2.5–4.5)
PHOSPHATE SERPL-MCNC: 5.6 MG/DL — HIGH (ref 2.5–4.5)
PHOSPHATE SERPL-MCNC: 5.7 MG/DL — HIGH (ref 2.5–4.5)
PHOSPHATE SERPL-MCNC: 5.8 MG/DL — HIGH (ref 2.5–4.5)
PHOSPHATE SERPL-MCNC: 5.9 MG/DL — HIGH (ref 2.5–4.5)
PHOSPHATE SERPL-MCNC: 6.1 MG/DL — HIGH (ref 2.5–4.5)
PHOSPHATE SERPL-MCNC: 6.1 MG/DL — HIGH (ref 2.5–4.5)
PHOSPHATE SERPL-MCNC: 6.6 MG/DL — HIGH (ref 2.5–4.5)
PHOSPHATE SERPL-MCNC: 7.4 MG/DL — HIGH (ref 2.5–4.5)
PHOSPHATE SERPL-MCNC: 8.2 MG/DL — HIGH (ref 2.5–4.5)
PHOSPHATE SERPL-MCNC: 8.4 MG/DL — HIGH (ref 2.5–4.5)
PHOSPHATE SERPL-MCNC: 9.7 MG/DL — HIGH (ref 2.5–4.5)
PLAT MORPH BLD: ABNORMAL
PLAT MORPH BLD: NORMAL — SIGNIFICANT CHANGE UP
PLATELET # BLD AUTO: 100 K/UL — LOW (ref 150–400)
PLATELET # BLD AUTO: 106 K/UL — LOW (ref 150–400)
PLATELET # BLD AUTO: 113 K/UL — LOW (ref 150–400)
PLATELET # BLD AUTO: 117 K/UL — LOW (ref 150–400)
PLATELET # BLD AUTO: 118 K/UL — LOW (ref 150–400)
PLATELET # BLD AUTO: 123 K/UL — LOW (ref 150–400)
PLATELET # BLD AUTO: 132 K/UL — LOW (ref 150–400)
PLATELET # BLD AUTO: 132 K/UL — LOW (ref 150–400)
PLATELET # BLD AUTO: 133 K/UL — LOW (ref 150–400)
PLATELET # BLD AUTO: 136 K/UL — LOW (ref 150–400)
PLATELET # BLD AUTO: 141 K/UL — LOW (ref 150–400)
PLATELET # BLD AUTO: 144 K/UL — LOW (ref 150–400)
PLATELET # BLD AUTO: 148 K/UL — LOW (ref 150–400)
PLATELET # BLD AUTO: 149 K/UL — LOW (ref 150–400)
PLATELET # BLD AUTO: 149 K/UL — LOW (ref 150–400)
PLATELET # BLD AUTO: 151 K/UL — SIGNIFICANT CHANGE UP (ref 150–400)
PLATELET # BLD AUTO: 152 K/UL — SIGNIFICANT CHANGE UP (ref 150–400)
PLATELET # BLD AUTO: 153 K/UL — SIGNIFICANT CHANGE UP (ref 150–400)
PLATELET # BLD AUTO: 154 K/UL — SIGNIFICANT CHANGE UP (ref 150–400)
PLATELET # BLD AUTO: 154 K/UL — SIGNIFICANT CHANGE UP (ref 150–400)
PLATELET # BLD AUTO: 155 K/UL — SIGNIFICANT CHANGE UP (ref 150–400)
PLATELET # BLD AUTO: 155 K/UL — SIGNIFICANT CHANGE UP (ref 150–400)
PLATELET # BLD AUTO: 156 K/UL — SIGNIFICANT CHANGE UP (ref 150–400)
PLATELET # BLD AUTO: 166 K/UL — SIGNIFICANT CHANGE UP (ref 150–400)
PLATELET # BLD AUTO: 171 K/UL — SIGNIFICANT CHANGE UP (ref 150–400)
PLATELET # BLD AUTO: 174 K/UL — SIGNIFICANT CHANGE UP (ref 150–400)
PLATELET # BLD AUTO: 184 K/UL — SIGNIFICANT CHANGE UP (ref 150–400)
PLATELET # BLD AUTO: 189 K/UL — SIGNIFICANT CHANGE UP (ref 150–400)
PLATELET # BLD AUTO: 191 K/UL — SIGNIFICANT CHANGE UP (ref 150–400)
PLATELET # BLD AUTO: 192 K/UL — SIGNIFICANT CHANGE UP (ref 150–400)
PLATELET # BLD AUTO: 196 K/UL — SIGNIFICANT CHANGE UP (ref 150–400)
PLATELET # BLD AUTO: 200 K/UL — SIGNIFICANT CHANGE UP (ref 150–400)
PLATELET # BLD AUTO: 202 K/UL — SIGNIFICANT CHANGE UP (ref 150–400)
PLATELET # BLD AUTO: 207 K/UL — SIGNIFICANT CHANGE UP (ref 150–400)
PLATELET # BLD AUTO: 208 K/UL — SIGNIFICANT CHANGE UP (ref 150–400)
PLATELET # BLD AUTO: 212 K/UL — SIGNIFICANT CHANGE UP (ref 150–400)
PLATELET # BLD AUTO: 215 K/UL — SIGNIFICANT CHANGE UP (ref 150–400)
PLATELET # BLD AUTO: 224 K/UL — SIGNIFICANT CHANGE UP (ref 150–400)
PLATELET # BLD AUTO: 248 K/UL — SIGNIFICANT CHANGE UP (ref 150–400)
PLATELET # BLD AUTO: 256 K/UL — SIGNIFICANT CHANGE UP (ref 150–400)
PLATELET # BLD AUTO: 257 K/UL — SIGNIFICANT CHANGE UP (ref 150–400)
PLATELET # BLD AUTO: 26 K/UL — LOW (ref 150–400)
PLATELET # BLD AUTO: 26 K/UL — LOW (ref 150–400)
PLATELET # BLD AUTO: 263 K/UL — SIGNIFICANT CHANGE UP (ref 150–400)
PLATELET # BLD AUTO: 265 K/UL — SIGNIFICANT CHANGE UP (ref 150–400)
PLATELET # BLD AUTO: 28 K/UL — LOW (ref 150–400)
PLATELET # BLD AUTO: 280 K/UL — SIGNIFICANT CHANGE UP (ref 150–400)
PLATELET # BLD AUTO: 29 K/UL — LOW (ref 150–400)
PLATELET # BLD AUTO: 32 K/UL — LOW (ref 150–400)
PLATELET # BLD AUTO: 34 K/UL — LOW (ref 150–400)
PLATELET # BLD AUTO: 38 K/UL — LOW (ref 150–400)
PLATELET # BLD AUTO: 39 K/UL — LOW (ref 150–400)
PLATELET # BLD AUTO: 40 K/UL — LOW (ref 150–400)
PLATELET # BLD AUTO: 44 K/UL — LOW (ref 150–400)
PLATELET # BLD AUTO: 50 K/UL — LOW (ref 150–400)
PLATELET # BLD AUTO: 68 K/UL — LOW (ref 150–400)
PLATELET # BLD AUTO: 87 K/UL — LOW (ref 150–400)
PLATELET # BLD AUTO: 90 K/UL — LOW (ref 150–400)
PLATELET # BLD AUTO: 91 K/UL — LOW (ref 150–400)
PO2 BLDA: 102 MMHG — SIGNIFICANT CHANGE UP (ref 83–108)
PO2 BLDA: 103 MMHG — SIGNIFICANT CHANGE UP (ref 83–108)
PO2 BLDA: 108 MMHG — SIGNIFICANT CHANGE UP (ref 83–108)
PO2 BLDA: 117 MMHG — HIGH (ref 83–108)
PO2 BLDA: 119 MMHG — HIGH (ref 83–108)
PO2 BLDA: 130 MMHG — HIGH (ref 83–108)
PO2 BLDA: 159 MMHG — HIGH (ref 83–108)
PO2 BLDA: 161 MMHG — HIGH (ref 83–108)
PO2 BLDA: 170 MMHG — HIGH (ref 83–108)
PO2 BLDA: 252 MMHG — HIGH (ref 83–108)
PO2 BLDA: 72 MMHG — LOW (ref 83–108)
PO2 BLDA: 76 MMHG — LOW (ref 83–108)
PO2 BLDA: 84 MMHG — SIGNIFICANT CHANGE UP (ref 83–108)
PO2 BLDA: 84 MMHG — SIGNIFICANT CHANGE UP (ref 83–108)
PO2 BLDA: 85 MMHG — SIGNIFICANT CHANGE UP (ref 83–108)
PO2 BLDA: 86 MMHG — SIGNIFICANT CHANGE UP (ref 83–108)
PO2 BLDA: 89 MMHG — SIGNIFICANT CHANGE UP (ref 83–108)
PO2 BLDA: 91 MMHG — SIGNIFICANT CHANGE UP (ref 83–108)
PO2 BLDV: 46 MMHG — HIGH (ref 25–45)
PO2 BLDV: 50 MMHG — HIGH (ref 25–45)
PO2 BLDV: <35 MMHG — LOW (ref 25–45)
PO2 BLDV: <35 MMHG — SIGNIFICANT CHANGE UP (ref 25–45)
POIKILOCYTOSIS BLD QL AUTO: SIGNIFICANT CHANGE UP
POIKILOCYTOSIS BLD QL AUTO: SLIGHT — SIGNIFICANT CHANGE UP
POLYCHROMASIA BLD QL SMEAR: SIGNIFICANT CHANGE UP
POLYCHROMASIA BLD QL SMEAR: SLIGHT — SIGNIFICANT CHANGE UP
POTASSIUM SERPL-MCNC: 3.5 MMOL/L — SIGNIFICANT CHANGE UP (ref 3.5–5.3)
POTASSIUM SERPL-MCNC: 3.6 MMOL/L — SIGNIFICANT CHANGE UP (ref 3.5–5.3)
POTASSIUM SERPL-MCNC: 3.7 MMOL/L — SIGNIFICANT CHANGE UP (ref 3.5–5.3)
POTASSIUM SERPL-MCNC: 3.8 MMOL/L — SIGNIFICANT CHANGE UP (ref 3.5–5.3)
POTASSIUM SERPL-MCNC: 3.9 MMOL/L — SIGNIFICANT CHANGE UP (ref 3.5–5.3)
POTASSIUM SERPL-MCNC: 4 MMOL/L — SIGNIFICANT CHANGE UP (ref 3.5–5.3)
POTASSIUM SERPL-MCNC: 4.1 MMOL/L — SIGNIFICANT CHANGE UP (ref 3.5–5.3)
POTASSIUM SERPL-MCNC: 4.2 MMOL/L — SIGNIFICANT CHANGE UP (ref 3.5–5.3)
POTASSIUM SERPL-MCNC: 4.3 MMOL/L — SIGNIFICANT CHANGE UP (ref 3.5–5.3)
POTASSIUM SERPL-MCNC: 4.4 MMOL/L — SIGNIFICANT CHANGE UP (ref 3.5–5.3)
POTASSIUM SERPL-MCNC: 4.5 MMOL/L — SIGNIFICANT CHANGE UP (ref 3.5–5.3)
POTASSIUM SERPL-MCNC: 4.6 MMOL/L — SIGNIFICANT CHANGE UP (ref 3.5–5.3)
POTASSIUM SERPL-MCNC: 4.8 MMOL/L — SIGNIFICANT CHANGE UP (ref 3.5–5.3)
POTASSIUM SERPL-MCNC: 4.8 MMOL/L — SIGNIFICANT CHANGE UP (ref 3.5–5.3)
POTASSIUM SERPL-MCNC: 4.9 MMOL/L — SIGNIFICANT CHANGE UP (ref 3.5–5.3)
POTASSIUM SERPL-MCNC: 5 MMOL/L — SIGNIFICANT CHANGE UP (ref 3.5–5.3)
POTASSIUM SERPL-MCNC: 5.1 MMOL/L — SIGNIFICANT CHANGE UP (ref 3.5–5.3)
POTASSIUM SERPL-MCNC: 5.1 MMOL/L — SIGNIFICANT CHANGE UP (ref 3.5–5.3)
POTASSIUM SERPL-MCNC: 5.2 MMOL/L — SIGNIFICANT CHANGE UP (ref 3.5–5.3)
POTASSIUM SERPL-MCNC: 5.3 MMOL/L — SIGNIFICANT CHANGE UP (ref 3.5–5.3)
POTASSIUM SERPL-MCNC: 5.3 MMOL/L — SIGNIFICANT CHANGE UP (ref 3.5–5.3)
POTASSIUM SERPL-MCNC: 5.4 MMOL/L — HIGH (ref 3.5–5.3)
POTASSIUM SERPL-MCNC: SIGNIFICANT CHANGE UP (ref 3.5–5.3)
POTASSIUM SERPL-SCNC: 3.5 MMOL/L — SIGNIFICANT CHANGE UP (ref 3.5–5.3)
POTASSIUM SERPL-SCNC: 3.6 MMOL/L — SIGNIFICANT CHANGE UP (ref 3.5–5.3)
POTASSIUM SERPL-SCNC: 3.7 MMOL/L — SIGNIFICANT CHANGE UP (ref 3.5–5.3)
POTASSIUM SERPL-SCNC: 3.8 MMOL/L — SIGNIFICANT CHANGE UP (ref 3.5–5.3)
POTASSIUM SERPL-SCNC: 3.9 MMOL/L — SIGNIFICANT CHANGE UP (ref 3.5–5.3)
POTASSIUM SERPL-SCNC: 4 MMOL/L — SIGNIFICANT CHANGE UP (ref 3.5–5.3)
POTASSIUM SERPL-SCNC: 4.1 MMOL/L — SIGNIFICANT CHANGE UP (ref 3.5–5.3)
POTASSIUM SERPL-SCNC: 4.2 MMOL/L — SIGNIFICANT CHANGE UP (ref 3.5–5.3)
POTASSIUM SERPL-SCNC: 4.3 MMOL/L — SIGNIFICANT CHANGE UP (ref 3.5–5.3)
POTASSIUM SERPL-SCNC: 4.4 MMOL/L — SIGNIFICANT CHANGE UP (ref 3.5–5.3)
POTASSIUM SERPL-SCNC: 4.5 MMOL/L — SIGNIFICANT CHANGE UP (ref 3.5–5.3)
POTASSIUM SERPL-SCNC: 4.6 MMOL/L — SIGNIFICANT CHANGE UP (ref 3.5–5.3)
POTASSIUM SERPL-SCNC: 4.8 MMOL/L — SIGNIFICANT CHANGE UP (ref 3.5–5.3)
POTASSIUM SERPL-SCNC: 4.8 MMOL/L — SIGNIFICANT CHANGE UP (ref 3.5–5.3)
POTASSIUM SERPL-SCNC: 4.9 MMOL/L — SIGNIFICANT CHANGE UP (ref 3.5–5.3)
POTASSIUM SERPL-SCNC: 5 MMOL/L — SIGNIFICANT CHANGE UP (ref 3.5–5.3)
POTASSIUM SERPL-SCNC: 5.1 MMOL/L — SIGNIFICANT CHANGE UP (ref 3.5–5.3)
POTASSIUM SERPL-SCNC: 5.1 MMOL/L — SIGNIFICANT CHANGE UP (ref 3.5–5.3)
POTASSIUM SERPL-SCNC: 5.2 MMOL/L — SIGNIFICANT CHANGE UP (ref 3.5–5.3)
POTASSIUM SERPL-SCNC: 5.3 MMOL/L — SIGNIFICANT CHANGE UP (ref 3.5–5.3)
POTASSIUM SERPL-SCNC: 5.3 MMOL/L — SIGNIFICANT CHANGE UP (ref 3.5–5.3)
POTASSIUM SERPL-SCNC: 5.4 MMOL/L — HIGH (ref 3.5–5.3)
POTASSIUM SERPL-SCNC: SIGNIFICANT CHANGE UP (ref 3.5–5.3)
POTASSIUM UR-SCNC: 26 MMOL/L — SIGNIFICANT CHANGE UP
POTASSIUM UR-SCNC: 33 MMOL/L — SIGNIFICANT CHANGE UP
PROCALCITONIN SERPL-MCNC: 3.8 NG/ML — HIGH (ref 0.02–0.1)
PROMYELOCYTES # FLD: 0.9 % — HIGH (ref 0–0)
PROMYELOCYTES # FLD: 0.9 % — HIGH (ref 0–0)
PROMYELOCYTES # FLD: 1.8 % — HIGH (ref 0–0)
PROT ?TM UR-MCNC: 112 MG/DL — HIGH (ref 0–12)
PROT ?TM UR-MCNC: 27 MG/DL — HIGH (ref 0–12)
PROT SERPL-MCNC: 6.2 G/DL — SIGNIFICANT CHANGE UP (ref 6–8.3)
PROT SERPL-MCNC: 6.2 G/DL — SIGNIFICANT CHANGE UP (ref 6–8.3)
PROT SERPL-MCNC: 6.3 G/DL — SIGNIFICANT CHANGE UP (ref 6–8.3)
PROT SERPL-MCNC: 6.4 G/DL — SIGNIFICANT CHANGE UP (ref 6–8.3)
PROT SERPL-MCNC: 6.5 G/DL — SIGNIFICANT CHANGE UP (ref 6–8.3)
PROT SERPL-MCNC: 6.6 G/DL — SIGNIFICANT CHANGE UP (ref 6–8.3)
PROT SERPL-MCNC: 6.6 G/DL — SIGNIFICANT CHANGE UP (ref 6–8.3)
PROT SERPL-MCNC: 6.7 G/DL — SIGNIFICANT CHANGE UP (ref 6–8.3)
PROT SERPL-MCNC: 6.7 G/DL — SIGNIFICANT CHANGE UP (ref 6–8.3)
PROT SERPL-MCNC: 6.8 G/DL — SIGNIFICANT CHANGE UP (ref 6–8.3)
PROT SERPL-MCNC: 6.9 G/DL — SIGNIFICANT CHANGE UP (ref 6–8.3)
PROT SERPL-MCNC: 6.9 G/DL — SIGNIFICANT CHANGE UP (ref 6–8.3)
PROT SERPL-MCNC: 7 G/DL — SIGNIFICANT CHANGE UP (ref 6–8.3)
PROT SERPL-MCNC: 7 G/DL — SIGNIFICANT CHANGE UP (ref 6–8.3)
PROT SERPL-MCNC: 7.1 G/DL — SIGNIFICANT CHANGE UP (ref 6–8.3)
PROT SERPL-MCNC: 7.1 G/DL — SIGNIFICANT CHANGE UP (ref 6–8.3)
PROT SERPL-MCNC: 7.2 G/DL — SIGNIFICANT CHANGE UP (ref 6.4–8.2)
PROT SERPL-MCNC: 7.2 G/DL — SIGNIFICANT CHANGE UP (ref 6–8.3)
PROT SERPL-MCNC: 7.4 G/DL — SIGNIFICANT CHANGE UP (ref 6–8.3)
PROT SERPL-MCNC: 7.5 G/DL — SIGNIFICANT CHANGE UP (ref 6.4–8.2)
PROT SERPL-MCNC: 7.5 G/DL — SIGNIFICANT CHANGE UP (ref 6.4–8.2)
PROT SERPL-MCNC: 7.6 G/DL — SIGNIFICANT CHANGE UP (ref 6–8.3)
PROT SERPL-MCNC: 7.9 G/DL — SIGNIFICANT CHANGE UP (ref 6.4–8.2)
PROT SERPL-MCNC: 7.9 G/DL — SIGNIFICANT CHANGE UP (ref 6–8.3)
PROT SERPL-MCNC: 8.8 G/DL — HIGH (ref 6–8.3)
PROT SERPL-MCNC: 9.1 G/DL — HIGH (ref 6–8.3)
PROT UR-MCNC: 100 MG/DL
PROT UR-MCNC: 30 MG/DL
PROT UR-MCNC: >=300 MG/DL
PROT UR-MCNC: NEGATIVE MG/DL — SIGNIFICANT CHANGE UP
PROT/CREAT UR-RTO: 0.3 RATIO — HIGH (ref 0–0.2)
PROT/CREAT UR-RTO: 0.7 RATIO — HIGH (ref 0–0.2)
PROTHROM AB SERPL-ACNC: 11.1 SEC — SIGNIFICANT CHANGE UP (ref 10.5–13.4)
PROTHROM AB SERPL-ACNC: 11.4 SEC — SIGNIFICANT CHANGE UP (ref 10.5–13.4)
PROTHROM AB SERPL-ACNC: 12.2 SEC — SIGNIFICANT CHANGE UP (ref 10.5–13.4)
PROTHROM AB SERPL-ACNC: 12.5 SEC — SIGNIFICANT CHANGE UP (ref 10.5–13.4)
PROTHROM AB SERPL-ACNC: 12.8 SEC — SIGNIFICANT CHANGE UP (ref 10.6–13.6)
PROTHROM AB SERPL-ACNC: 13.1 SEC — SIGNIFICANT CHANGE UP (ref 10.5–13.4)
PROTHROM AB SERPL-ACNC: 13.3 SEC — SIGNIFICANT CHANGE UP (ref 10.5–13.4)
PROTHROM AB SERPL-ACNC: 14.1 SEC — HIGH (ref 10.5–13.4)
PROTHROM AB SERPL-ACNC: 15.3 SEC — HIGH (ref 10.5–13.4)
PROTHROM AB SERPL-ACNC: 16.8 SEC — HIGH (ref 10.5–13.4)
PROTHROM AB SERPL-ACNC: 17.2 SEC — HIGH (ref 10.5–13.4)
PROTHROM AB SERPL-ACNC: 18.7 SEC — HIGH (ref 10.5–13.4)
PROTHROM AB SERPL-ACNC: 20.5 SEC — HIGH (ref 10.5–13.4)
PROTHROM AB SERPL-ACNC: 21.5 SEC — HIGH (ref 10.5–13.4)
RAPID RVP RESULT: SIGNIFICANT CHANGE UP
RBC # BLD: 2.41 M/UL — LOW (ref 3.8–5.2)
RBC # BLD: 2.42 M/UL — LOW (ref 3.8–5.2)
RBC # BLD: 2.44 M/UL — LOW (ref 3.8–5.2)
RBC # BLD: 2.5 M/UL — LOW (ref 3.8–5.2)
RBC # BLD: 2.5 M/UL — LOW (ref 3.8–5.2)
RBC # BLD: 2.51 M/UL — LOW (ref 3.8–5.2)
RBC # BLD: 2.52 M/UL — LOW (ref 3.8–5.2)
RBC # BLD: 2.53 M/UL — LOW (ref 3.8–5.2)
RBC # BLD: 2.54 M/UL — LOW (ref 3.8–5.2)
RBC # BLD: 2.56 M/UL — LOW (ref 3.8–5.2)
RBC # BLD: 2.56 M/UL — LOW (ref 3.8–5.2)
RBC # BLD: 2.57 M/UL — LOW (ref 3.8–5.2)
RBC # BLD: 2.57 M/UL — LOW (ref 3.8–5.2)
RBC # BLD: 2.59 M/UL — LOW (ref 3.8–5.2)
RBC # BLD: 2.6 M/UL — LOW (ref 3.8–5.2)
RBC # BLD: 2.62 M/UL — LOW (ref 3.8–5.2)
RBC # BLD: 2.63 M/UL — LOW (ref 3.8–5.2)
RBC # BLD: 2.63 M/UL — LOW (ref 3.8–5.2)
RBC # BLD: 2.64 M/UL — LOW (ref 3.8–5.2)
RBC # BLD: 2.64 M/UL — LOW (ref 3.8–5.2)
RBC # BLD: 2.65 M/UL — LOW (ref 3.8–5.2)
RBC # BLD: 2.67 M/UL — LOW (ref 3.8–5.2)
RBC # BLD: 2.67 M/UL — LOW (ref 3.8–5.2)
RBC # BLD: 2.68 M/UL — LOW (ref 3.8–5.2)
RBC # BLD: 2.68 M/UL — LOW (ref 3.8–5.2)
RBC # BLD: 2.7 M/UL — LOW (ref 3.8–5.2)
RBC # BLD: 2.7 M/UL — LOW (ref 3.8–5.2)
RBC # BLD: 2.71 M/UL — LOW (ref 3.8–5.2)
RBC # BLD: 2.72 M/UL — LOW (ref 3.8–5.2)
RBC # BLD: 2.73 M/UL — LOW (ref 3.8–5.2)
RBC # BLD: 2.73 M/UL — LOW (ref 3.8–5.2)
RBC # BLD: 2.74 M/UL — LOW (ref 3.8–5.2)
RBC # BLD: 2.75 M/UL — LOW (ref 3.8–5.2)
RBC # BLD: 2.76 M/UL — LOW (ref 3.8–5.2)
RBC # BLD: 2.77 M/UL — LOW (ref 3.8–5.2)
RBC # BLD: 2.79 M/UL — LOW (ref 3.8–5.2)
RBC # BLD: 2.8 M/UL — LOW (ref 3.8–5.2)
RBC # BLD: 2.81 M/UL — LOW (ref 3.8–5.2)
RBC # BLD: 2.82 M/UL — LOW (ref 3.8–5.2)
RBC # BLD: 2.83 M/UL — LOW (ref 3.8–5.2)
RBC # BLD: 2.86 M/UL — LOW (ref 3.8–5.2)
RBC # BLD: 2.9 M/UL — LOW (ref 3.8–5.2)
RBC # BLD: 2.9 M/UL — LOW (ref 3.8–5.2)
RBC # BLD: 2.95 M/UL — LOW (ref 3.8–5.2)
RBC # BLD: 2.95 M/UL — LOW (ref 3.8–5.2)
RBC # BLD: 2.96 M/UL — LOW (ref 3.8–5.2)
RBC # BLD: 2.96 M/UL — LOW (ref 3.8–5.2)
RBC # BLD: 3.05 M/UL — LOW (ref 3.8–5.2)
RBC # BLD: 3.05 M/UL — LOW (ref 3.8–5.2)
RBC # BLD: 3.07 M/UL — LOW (ref 3.8–5.2)
RBC # BLD: 3.09 M/UL — LOW (ref 3.8–5.2)
RBC # BLD: 3.1 M/UL — LOW (ref 3.8–5.2)
RBC # BLD: 3.16 M/UL — LOW (ref 3.8–5.2)
RBC # BLD: 3.22 M/UL — LOW (ref 3.8–5.2)
RBC # BLD: 3.22 M/UL — LOW (ref 3.8–5.2)
RBC # FLD: 16.5 % — HIGH (ref 10.3–14.5)
RBC # FLD: 16.6 % — HIGH (ref 10.3–14.5)
RBC # FLD: 16.9 % — HIGH (ref 10.3–14.5)
RBC # FLD: 16.9 % — HIGH (ref 10.3–14.5)
RBC # FLD: 17.1 % — HIGH (ref 10.3–14.5)
RBC # FLD: 17.2 % — HIGH (ref 10.3–14.5)
RBC # FLD: 17.3 % — HIGH (ref 10.3–14.5)
RBC # FLD: 17.4 % — HIGH (ref 10.3–14.5)
RBC # FLD: 17.5 % — HIGH (ref 10.3–14.5)
RBC # FLD: 17.6 % — HIGH (ref 10.3–14.5)
RBC # FLD: 17.8 % — HIGH (ref 10.3–14.5)
RBC # FLD: 17.9 % — HIGH (ref 10.3–14.5)
RBC # FLD: 18 % — HIGH (ref 10.3–14.5)
RBC # FLD: 18.5 % — HIGH (ref 10.3–14.5)
RBC # FLD: 18.6 % — HIGH (ref 10.3–14.5)
RBC # FLD: 19 % — HIGH (ref 10.3–14.5)
RBC # FLD: 19 % — HIGH (ref 10.3–14.5)
RBC # FLD: 19.2 % — HIGH (ref 10.3–14.5)
RBC # FLD: 19.3 % — HIGH (ref 10.3–14.5)
RBC # FLD: 19.3 % — HIGH (ref 10.3–14.5)
RBC # FLD: 19.8 % — HIGH (ref 10.3–14.5)
RBC # FLD: 19.9 % — HIGH (ref 10.3–14.5)
RBC # FLD: 20 % — HIGH (ref 10.3–14.5)
RBC # FLD: 20.2 % — HIGH (ref 10.3–14.5)
RBC # FLD: 20.5 % — HIGH (ref 10.3–14.5)
RBC # FLD: 20.8 % — HIGH (ref 10.3–14.5)
RBC # FLD: 20.8 % — HIGH (ref 10.3–14.5)
RBC # FLD: 21.4 % — HIGH (ref 10.3–14.5)
RBC # FLD: 21.7 % — HIGH (ref 10.3–14.5)
RBC # FLD: 21.7 % — HIGH (ref 10.3–14.5)
RBC # FLD: 22.4 % — HIGH (ref 10.3–14.5)
RBC # FLD: 22.5 % — HIGH (ref 10.3–14.5)
RBC # FLD: 22.7 % — HIGH (ref 10.3–14.5)
RBC # FLD: 23 % — HIGH (ref 10.3–14.5)
RBC # FLD: 23 % — HIGH (ref 10.3–14.5)
RBC # FLD: 23.2 % — HIGH (ref 10.3–14.5)
RBC # FLD: 23.5 % — HIGH (ref 10.3–14.5)
RBC # FLD: 24.2 % — HIGH (ref 10.3–14.5)
RBC # FLD: 25 % — HIGH (ref 10.3–14.5)
RBC # FLD: 25.2 % — HIGH (ref 10.3–14.5)
RBC # FLD: 25.3 % — HIGH (ref 10.3–14.5)
RBC BLD AUTO: ABNORMAL
RBC CASTS # UR COMP ASSIST: < 5 /HPF — SIGNIFICANT CHANGE UP
RBC CASTS # UR COMP ASSIST: < 5 /HPF — SIGNIFICANT CHANGE UP
RBC CASTS # UR COMP ASSIST: ABNORMAL /HPF
RETICS #: 27.7 K/UL — SIGNIFICANT CHANGE UP (ref 25–125)
RETICS #: 44.3 K/UL — SIGNIFICANT CHANGE UP (ref 25–125)
RETICS/RBC NFR: 0.9 % — SIGNIFICANT CHANGE UP (ref 0.5–2.5)
RETICS/RBC NFR: 1.8 % — SIGNIFICANT CHANGE UP (ref 0.5–2.5)
RH IG SCN BLD-IMP: POSITIVE — SIGNIFICANT CHANGE UP
S AUREUS DNA NOSE QL NAA+PROBE: NEGATIVE — SIGNIFICANT CHANGE UP
SAO2 % BLDA: 100 % — HIGH (ref 94–98)
SAO2 % BLDA: 96.8 % — SIGNIFICANT CHANGE UP (ref 94–98)
SAO2 % BLDA: 97.1 % — SIGNIFICANT CHANGE UP (ref 94–98)
SAO2 % BLDA: 97.3 % — SIGNIFICANT CHANGE UP (ref 94–98)
SAO2 % BLDA: 97.4 % — SIGNIFICANT CHANGE UP (ref 94–98)
SAO2 % BLDA: 97.8 % — SIGNIFICANT CHANGE UP (ref 94–98)
SAO2 % BLDA: 98.2 % — HIGH (ref 94–98)
SAO2 % BLDA: 98.4 % — HIGH (ref 94–98)
SAO2 % BLDA: 98.5 % — HIGH (ref 94–98)
SAO2 % BLDA: 98.5 % — HIGH (ref 94–98)
SAO2 % BLDA: 98.6 % — HIGH (ref 94–98)
SAO2 % BLDA: 99 % — HIGH (ref 94–98)
SAO2 % BLDA: 99.4 % — HIGH (ref 94–98)
SAO2 % BLDA: 99.4 % — HIGH (ref 94–98)
SAO2 % BLDA: 99.7 % — HIGH (ref 94–98)
SAO2 % BLDV: 36.4 % — LOW (ref 67–88)
SAO2 % BLDV: 44.2 % — LOW (ref 67–88)
SAO2 % BLDV: 81.1 % — SIGNIFICANT CHANGE UP (ref 67–88)
SAO2 % BLDV: 82 % — SIGNIFICANT CHANGE UP (ref 67–88)
SARS-COV-2 RNA SPEC QL NAA+PROBE: SIGNIFICANT CHANGE UP
SCHISTOCYTES BLD QL AUTO: SLIGHT — SIGNIFICANT CHANGE UP
SMUDGE CELLS # BLD: PRESENT — SIGNIFICANT CHANGE UP
SODIUM SERPL-SCNC: 128 MMOL/L — LOW (ref 135–145)
SODIUM SERPL-SCNC: 128 MMOL/L — LOW (ref 135–145)
SODIUM SERPL-SCNC: 129 MMOL/L — LOW (ref 135–145)
SODIUM SERPL-SCNC: 130 MMOL/L — LOW (ref 135–145)
SODIUM SERPL-SCNC: 130 MMOL/L — LOW (ref 135–145)
SODIUM SERPL-SCNC: 131 MMOL/L — LOW (ref 135–145)
SODIUM SERPL-SCNC: 132 MMOL/L — LOW (ref 135–145)
SODIUM SERPL-SCNC: 133 MMOL/L — LOW (ref 135–145)
SODIUM SERPL-SCNC: 134 MMOL/L — LOW (ref 135–145)
SODIUM SERPL-SCNC: 135 MMOL/L — SIGNIFICANT CHANGE UP (ref 135–145)
SODIUM SERPL-SCNC: 136 MMOL/L — SIGNIFICANT CHANGE UP (ref 135–145)
SODIUM SERPL-SCNC: 137 MMOL/L — SIGNIFICANT CHANGE UP (ref 132–145)
SODIUM SERPL-SCNC: 137 MMOL/L — SIGNIFICANT CHANGE UP (ref 135–145)
SODIUM SERPL-SCNC: 138 MMOL/L — SIGNIFICANT CHANGE UP (ref 135–145)
SODIUM SERPL-SCNC: 139 MMOL/L — SIGNIFICANT CHANGE UP (ref 132–145)
SODIUM SERPL-SCNC: 139 MMOL/L — SIGNIFICANT CHANGE UP (ref 135–145)
SODIUM SERPL-SCNC: 140 MMOL/L — SIGNIFICANT CHANGE UP (ref 132–145)
SODIUM SERPL-SCNC: 140 MMOL/L — SIGNIFICANT CHANGE UP (ref 135–145)
SODIUM SERPL-SCNC: 141 MMOL/L — SIGNIFICANT CHANGE UP (ref 132–145)
SODIUM UR-SCNC: 25 MMOL/L — SIGNIFICANT CHANGE UP
SODIUM UR-SCNC: 28 MMOL/L — SIGNIFICANT CHANGE UP
SODIUM UR-SCNC: 72 MMOL/L — SIGNIFICANT CHANGE UP
SP GR SPEC: 1.01 — SIGNIFICANT CHANGE UP (ref 1–1.03)
SP GR SPEC: 1.02 — SIGNIFICANT CHANGE UP (ref 1–1.03)
SP GR SPEC: 1.02 — SIGNIFICANT CHANGE UP (ref 1–1.03)
SP GR SPEC: >=1.03 — SIGNIFICANT CHANGE UP (ref 1–1.03)
SPECIMEN SOURCE: SIGNIFICANT CHANGE UP
SPHEROCYTES BLD QL SMEAR: SLIGHT — SIGNIFICANT CHANGE UP
SRA INTERP SER-IMP: SIGNIFICANT CHANGE UP
STOMATOCYTES BLD QL SMEAR: SLIGHT — SIGNIFICANT CHANGE UP
STOMATOCYTES BLD QL SMEAR: SLIGHT — SIGNIFICANT CHANGE UP
TARGETS BLD QL SMEAR: SLIGHT — SIGNIFICANT CHANGE UP
TARGETS BLD QL SMEAR: SLIGHT — SIGNIFICANT CHANGE UP
TIBC SERPL-MCNC: 242 UG/DL — SIGNIFICANT CHANGE UP (ref 220–430)
TRANSFERRIN SERPL-MCNC: 173 MG/DL — LOW (ref 200–360)
TRANSFERRIN SERPL-MCNC: 217 MG/DL — SIGNIFICANT CHANGE UP (ref 200–360)
TRIGL SERPL-MCNC: 281 MG/DL — HIGH
TRIGL SERPL-MCNC: 308 MG/DL — HIGH
TRIGL SERPL-MCNC: 341 MG/DL — HIGH
TRIGL SERPL-MCNC: 395 MG/DL — HIGH
TRIGL SERPL-MCNC: 433 MG/DL — HIGH
TRIGL SERPL-MCNC: 588 MG/DL — HIGH
TROPONIN I, HIGH SENSITIVITY RESULT: 294.2 NG/L — HIGH
TROPONIN I, HIGH SENSITIVITY RESULT: 6.1 NG/L — SIGNIFICANT CHANGE UP
TROPONIN I, HIGH SENSITIVITY RESULT: 7.8 NG/L — SIGNIFICANT CHANGE UP
TROPONIN T SERPL-MCNC: 0.09 NG/ML — CRITICAL HIGH (ref 0–0.01)
TROPONIN T SERPL-MCNC: 0.1 NG/ML — CRITICAL HIGH (ref 0–0.01)
TROPONIN T SERPL-MCNC: 0.16 NG/ML — CRITICAL HIGH (ref 0–0.01)
TROPONIN T SERPL-MCNC: 0.16 NG/ML — CRITICAL HIGH (ref 0–0.01)
TROPONIN T SERPL-MCNC: 0.19 NG/ML — CRITICAL HIGH (ref 0–0.01)
UIBC SERPL-MCNC: 227 UG/DL — SIGNIFICANT CHANGE UP (ref 110–370)
UROBILINOGEN FLD QL: 0.2 E.U./DL — SIGNIFICANT CHANGE UP
UROBILINOGEN FLD QL: 1 E.U./DL — SIGNIFICANT CHANGE UP
UUN UR-MCNC: 280 MG/DL — SIGNIFICANT CHANGE UP
UUN UR-MCNC: 411 MG/DL — SIGNIFICANT CHANGE UP
VANCOMYCIN FLD-MCNC: 22 UG/ML — SIGNIFICANT CHANGE UP
VANCOMYCIN FLD-MCNC: 22.2 UG/ML — SIGNIFICANT CHANGE UP
VANCOMYCIN FLD-MCNC: 23 UG/ML — SIGNIFICANT CHANGE UP
VANCOMYCIN FLD-MCNC: 23.3 UG/ML — SIGNIFICANT CHANGE UP
VANCOMYCIN FLD-MCNC: 23.5 UG/ML — SIGNIFICANT CHANGE UP
VANCOMYCIN FLD-MCNC: 26 UG/ML
VANCOMYCIN FLD-MCNC: 31.4 UG/ML
VANCOMYCIN FLD-MCNC: 52.8 UG/ML
VANCOMYCIN FLD-MCNC: 54.3 UG/ML
VANCOMYCIN TROUGH SERPL-MCNC: 17.3 UG/ML — SIGNIFICANT CHANGE UP (ref 10–20)
VANCOMYCIN TROUGH SERPL-MCNC: 24.1 UG/ML — HIGH (ref 10–20)
VANCOMYCIN TROUGH SERPL-MCNC: 25.8 UG/ML — CRITICAL HIGH (ref 10–20)
VANCOMYCIN TROUGH SERPL-MCNC: 37.7 UG/ML — CRITICAL HIGH (ref 10–20)
VANCOMYCIN TROUGH SERPL-MCNC: 41.5 UG/ML — CRITICAL HIGH (ref 10–20)
VANCOMYCIN TROUGH SERPL-MCNC: 44.5 UG/ML — CRITICAL HIGH (ref 10–20)
VANCOMYCIN TROUGH SERPL-MCNC: 45.5 UG/ML — CRITICAL HIGH (ref 10–20)
VANCOMYCIN TROUGH SERPL-MCNC: 50 UG/ML — CRITICAL HIGH (ref 10–20)
VARIANT LYMPHS # BLD: 0.9 % — SIGNIFICANT CHANGE UP (ref 0–6)
VARIANT LYMPHS # BLD: 0.9 % — SIGNIFICANT CHANGE UP (ref 0–6)
VARIANT LYMPHS # BLD: 3.5 % — SIGNIFICANT CHANGE UP (ref 0–6)
VARIANT LYMPHS # BLD: 3.6 % — SIGNIFICANT CHANGE UP (ref 0–6)
VARIANT LYMPHS # BLD: 8.3 % — HIGH (ref 0–6)
WBC # BLD: 10 K/UL — SIGNIFICANT CHANGE UP (ref 3.8–10.5)
WBC # BLD: 10.03 K/UL — SIGNIFICANT CHANGE UP (ref 3.8–10.5)
WBC # BLD: 10.06 K/UL — SIGNIFICANT CHANGE UP (ref 3.8–10.5)
WBC # BLD: 10.08 K/UL — SIGNIFICANT CHANGE UP (ref 3.8–10.5)
WBC # BLD: 10.55 K/UL — HIGH (ref 3.8–10.5)
WBC # BLD: 11.52 K/UL — HIGH (ref 3.8–10.5)
WBC # BLD: 11.93 K/UL — HIGH (ref 3.8–10.5)
WBC # BLD: 12.91 K/UL — HIGH (ref 3.8–10.5)
WBC # BLD: 13.22 K/UL — HIGH (ref 3.8–10.5)
WBC # BLD: 13.74 K/UL — HIGH (ref 3.8–10.5)
WBC # BLD: 13.92 K/UL — HIGH (ref 3.8–10.5)
WBC # BLD: 15.87 K/UL — HIGH (ref 3.8–10.5)
WBC # BLD: 15.89 K/UL — HIGH (ref 3.8–10.5)
WBC # BLD: 16.35 K/UL — HIGH (ref 3.8–10.5)
WBC # BLD: 16.49 K/UL — HIGH (ref 3.8–10.5)
WBC # BLD: 16.58 K/UL — HIGH (ref 3.8–10.5)
WBC # BLD: 16.61 K/UL — HIGH (ref 3.8–10.5)
WBC # BLD: 16.96 K/UL — HIGH (ref 3.8–10.5)
WBC # BLD: 17.69 K/UL — HIGH (ref 3.8–10.5)
WBC # BLD: 18.44 K/UL — HIGH (ref 3.8–10.5)
WBC # BLD: 18.58 K/UL — HIGH (ref 3.8–10.5)
WBC # BLD: 20.43 K/UL — HIGH (ref 3.8–10.5)
WBC # BLD: 21.71 K/UL — HIGH (ref 3.8–10.5)
WBC # BLD: 22.67 K/UL — HIGH (ref 3.8–10.5)
WBC # BLD: 25.88 K/UL — HIGH (ref 3.8–10.5)
WBC # BLD: 3.87 K/UL — SIGNIFICANT CHANGE UP (ref 3.8–10.5)
WBC # BLD: 4.53 K/UL — SIGNIFICANT CHANGE UP (ref 3.8–10.5)
WBC # BLD: 5.23 K/UL — SIGNIFICANT CHANGE UP (ref 3.8–10.5)
WBC # BLD: 5.29 K/UL — SIGNIFICANT CHANGE UP (ref 3.8–10.5)
WBC # BLD: 5.63 K/UL — SIGNIFICANT CHANGE UP (ref 3.8–10.5)
WBC # BLD: 5.79 K/UL — SIGNIFICANT CHANGE UP (ref 3.8–10.5)
WBC # BLD: 6.3 K/UL — SIGNIFICANT CHANGE UP (ref 3.8–10.5)
WBC # BLD: 6.42 K/UL — SIGNIFICANT CHANGE UP (ref 3.8–10.5)
WBC # BLD: 6.51 K/UL — SIGNIFICANT CHANGE UP (ref 3.8–10.5)
WBC # BLD: 6.78 K/UL — SIGNIFICANT CHANGE UP (ref 3.8–10.5)
WBC # BLD: 7 K/UL — SIGNIFICANT CHANGE UP (ref 3.8–10.5)
WBC # BLD: 7.08 K/UL — SIGNIFICANT CHANGE UP (ref 3.8–10.5)
WBC # BLD: 7.36 K/UL — SIGNIFICANT CHANGE UP (ref 3.8–10.5)
WBC # BLD: 7.41 K/UL — SIGNIFICANT CHANGE UP (ref 3.8–10.5)
WBC # BLD: 7.57 K/UL — SIGNIFICANT CHANGE UP (ref 3.8–10.5)
WBC # BLD: 7.57 K/UL — SIGNIFICANT CHANGE UP (ref 3.8–10.5)
WBC # BLD: 7.58 K/UL — SIGNIFICANT CHANGE UP (ref 3.8–10.5)
WBC # BLD: 7.6 K/UL — SIGNIFICANT CHANGE UP (ref 3.8–10.5)
WBC # BLD: 7.61 K/UL — SIGNIFICANT CHANGE UP (ref 3.8–10.5)
WBC # BLD: 7.62 K/UL — SIGNIFICANT CHANGE UP (ref 3.8–10.5)
WBC # BLD: 7.65 K/UL — SIGNIFICANT CHANGE UP (ref 3.8–10.5)
WBC # BLD: 7.84 K/UL — SIGNIFICANT CHANGE UP (ref 3.8–10.5)
WBC # BLD: 7.88 K/UL — SIGNIFICANT CHANGE UP (ref 3.8–10.5)
WBC # BLD: 8.06 K/UL — SIGNIFICANT CHANGE UP (ref 3.8–10.5)
WBC # BLD: 8.16 K/UL — SIGNIFICANT CHANGE UP (ref 3.8–10.5)
WBC # BLD: 8.25 K/UL — SIGNIFICANT CHANGE UP (ref 3.8–10.5)
WBC # BLD: 8.25 K/UL — SIGNIFICANT CHANGE UP (ref 3.8–10.5)
WBC # BLD: 8.47 K/UL — SIGNIFICANT CHANGE UP (ref 3.8–10.5)
WBC # BLD: 8.67 K/UL — SIGNIFICANT CHANGE UP (ref 3.8–10.5)
WBC # BLD: 8.89 K/UL — SIGNIFICANT CHANGE UP (ref 3.8–10.5)
WBC # BLD: 8.93 K/UL — SIGNIFICANT CHANGE UP (ref 3.8–10.5)
WBC # BLD: 9.11 K/UL — SIGNIFICANT CHANGE UP (ref 3.8–10.5)
WBC # BLD: 9.17 K/UL — SIGNIFICANT CHANGE UP (ref 3.8–10.5)
WBC # BLD: 9.59 K/UL — SIGNIFICANT CHANGE UP (ref 3.8–10.5)
WBC # BLD: 9.75 K/UL — SIGNIFICANT CHANGE UP (ref 3.8–10.5)
WBC # BLD: 9.91 K/UL — SIGNIFICANT CHANGE UP (ref 3.8–10.5)
WBC # FLD AUTO: 10 K/UL — SIGNIFICANT CHANGE UP (ref 3.8–10.5)
WBC # FLD AUTO: 10.03 K/UL — SIGNIFICANT CHANGE UP (ref 3.8–10.5)
WBC # FLD AUTO: 10.06 K/UL — SIGNIFICANT CHANGE UP (ref 3.8–10.5)
WBC # FLD AUTO: 10.08 K/UL — SIGNIFICANT CHANGE UP (ref 3.8–10.5)
WBC # FLD AUTO: 10.55 K/UL — HIGH (ref 3.8–10.5)
WBC # FLD AUTO: 11.52 K/UL — HIGH (ref 3.8–10.5)
WBC # FLD AUTO: 11.93 K/UL — HIGH (ref 3.8–10.5)
WBC # FLD AUTO: 12.91 K/UL — HIGH (ref 3.8–10.5)
WBC # FLD AUTO: 13.22 K/UL — HIGH (ref 3.8–10.5)
WBC # FLD AUTO: 13.74 K/UL — HIGH (ref 3.8–10.5)
WBC # FLD AUTO: 13.92 K/UL — HIGH (ref 3.8–10.5)
WBC # FLD AUTO: 15.87 K/UL — HIGH (ref 3.8–10.5)
WBC # FLD AUTO: 15.89 K/UL — HIGH (ref 3.8–10.5)
WBC # FLD AUTO: 16.35 K/UL — HIGH (ref 3.8–10.5)
WBC # FLD AUTO: 16.49 K/UL — HIGH (ref 3.8–10.5)
WBC # FLD AUTO: 16.58 K/UL — HIGH (ref 3.8–10.5)
WBC # FLD AUTO: 16.61 K/UL — HIGH (ref 3.8–10.5)
WBC # FLD AUTO: 16.96 K/UL — HIGH (ref 3.8–10.5)
WBC # FLD AUTO: 17.69 K/UL — HIGH (ref 3.8–10.5)
WBC # FLD AUTO: 18.44 K/UL — HIGH (ref 3.8–10.5)
WBC # FLD AUTO: 18.58 K/UL — HIGH (ref 3.8–10.5)
WBC # FLD AUTO: 20.43 K/UL — HIGH (ref 3.8–10.5)
WBC # FLD AUTO: 21.71 K/UL — HIGH (ref 3.8–10.5)
WBC # FLD AUTO: 22.67 K/UL — HIGH (ref 3.8–10.5)
WBC # FLD AUTO: 25.88 K/UL — HIGH (ref 3.8–10.5)
WBC # FLD AUTO: 3.87 K/UL — SIGNIFICANT CHANGE UP (ref 3.8–10.5)
WBC # FLD AUTO: 4.53 K/UL — SIGNIFICANT CHANGE UP (ref 3.8–10.5)
WBC # FLD AUTO: 5.23 K/UL — SIGNIFICANT CHANGE UP (ref 3.8–10.5)
WBC # FLD AUTO: 5.29 K/UL — SIGNIFICANT CHANGE UP (ref 3.8–10.5)
WBC # FLD AUTO: 5.63 K/UL — SIGNIFICANT CHANGE UP (ref 3.8–10.5)
WBC # FLD AUTO: 5.79 K/UL — SIGNIFICANT CHANGE UP (ref 3.8–10.5)
WBC # FLD AUTO: 6.3 K/UL — SIGNIFICANT CHANGE UP (ref 3.8–10.5)
WBC # FLD AUTO: 6.42 K/UL — SIGNIFICANT CHANGE UP (ref 3.8–10.5)
WBC # FLD AUTO: 6.51 K/UL — SIGNIFICANT CHANGE UP (ref 3.8–10.5)
WBC # FLD AUTO: 6.78 K/UL — SIGNIFICANT CHANGE UP (ref 3.8–10.5)
WBC # FLD AUTO: 7 K/UL — SIGNIFICANT CHANGE UP (ref 3.8–10.5)
WBC # FLD AUTO: 7.08 K/UL — SIGNIFICANT CHANGE UP (ref 3.8–10.5)
WBC # FLD AUTO: 7.36 K/UL — SIGNIFICANT CHANGE UP (ref 3.8–10.5)
WBC # FLD AUTO: 7.41 K/UL — SIGNIFICANT CHANGE UP (ref 3.8–10.5)
WBC # FLD AUTO: 7.57 K/UL — SIGNIFICANT CHANGE UP (ref 3.8–10.5)
WBC # FLD AUTO: 7.57 K/UL — SIGNIFICANT CHANGE UP (ref 3.8–10.5)
WBC # FLD AUTO: 7.58 K/UL — SIGNIFICANT CHANGE UP (ref 3.8–10.5)
WBC # FLD AUTO: 7.6 K/UL — SIGNIFICANT CHANGE UP (ref 3.8–10.5)
WBC # FLD AUTO: 7.61 K/UL — SIGNIFICANT CHANGE UP (ref 3.8–10.5)
WBC # FLD AUTO: 7.62 K/UL — SIGNIFICANT CHANGE UP (ref 3.8–10.5)
WBC # FLD AUTO: 7.65 K/UL — SIGNIFICANT CHANGE UP (ref 3.8–10.5)
WBC # FLD AUTO: 7.84 K/UL — SIGNIFICANT CHANGE UP (ref 3.8–10.5)
WBC # FLD AUTO: 7.88 K/UL — SIGNIFICANT CHANGE UP (ref 3.8–10.5)
WBC # FLD AUTO: 8.06 K/UL — SIGNIFICANT CHANGE UP (ref 3.8–10.5)
WBC # FLD AUTO: 8.16 K/UL — SIGNIFICANT CHANGE UP (ref 3.8–10.5)
WBC # FLD AUTO: 8.25 K/UL — SIGNIFICANT CHANGE UP (ref 3.8–10.5)
WBC # FLD AUTO: 8.25 K/UL — SIGNIFICANT CHANGE UP (ref 3.8–10.5)
WBC # FLD AUTO: 8.47 K/UL — SIGNIFICANT CHANGE UP (ref 3.8–10.5)
WBC # FLD AUTO: 8.67 K/UL — SIGNIFICANT CHANGE UP (ref 3.8–10.5)
WBC # FLD AUTO: 8.89 K/UL — SIGNIFICANT CHANGE UP (ref 3.8–10.5)
WBC # FLD AUTO: 8.93 K/UL — SIGNIFICANT CHANGE UP (ref 3.8–10.5)
WBC # FLD AUTO: 9.11 K/UL — SIGNIFICANT CHANGE UP (ref 3.8–10.5)
WBC # FLD AUTO: 9.17 K/UL — SIGNIFICANT CHANGE UP (ref 3.8–10.5)
WBC # FLD AUTO: 9.59 K/UL — SIGNIFICANT CHANGE UP (ref 3.8–10.5)
WBC # FLD AUTO: 9.75 K/UL — SIGNIFICANT CHANGE UP (ref 3.8–10.5)
WBC # FLD AUTO: 9.91 K/UL — SIGNIFICANT CHANGE UP (ref 3.8–10.5)
WBC UR QL: < 5 /HPF — SIGNIFICANT CHANGE UP

## 2022-01-01 PROCEDURE — C1750: CPT

## 2022-01-01 PROCEDURE — 99233 SBSQ HOSP IP/OBS HIGH 50: CPT | Mod: GC

## 2022-01-01 PROCEDURE — 99221 1ST HOSP IP/OBS SF/LOW 40: CPT

## 2022-01-01 PROCEDURE — 87150 DNA/RNA AMPLIFIED PROBE: CPT

## 2022-01-01 PROCEDURE — 99291 CRITICAL CARE FIRST HOUR: CPT | Mod: 25

## 2022-01-01 PROCEDURE — 99232 SBSQ HOSP IP/OBS MODERATE 35: CPT

## 2022-01-01 PROCEDURE — 99291 CRITICAL CARE FIRST HOUR: CPT

## 2022-01-01 PROCEDURE — 71045 X-RAY EXAM CHEST 1 VIEW: CPT | Mod: 26

## 2022-01-01 PROCEDURE — P9016: CPT

## 2022-01-01 PROCEDURE — 76604 US EXAM CHEST: CPT | Mod: 26,GC

## 2022-01-01 PROCEDURE — 71260 CT THORAX DX C+: CPT

## 2022-01-01 PROCEDURE — 83935 ASSAY OF URINE OSMOLALITY: CPT

## 2022-01-01 PROCEDURE — 99233 SBSQ HOSP IP/OBS HIGH 50: CPT

## 2022-01-01 PROCEDURE — 76705 ECHO EXAM OF ABDOMEN: CPT | Mod: 26,GC

## 2022-01-01 PROCEDURE — 90935 HEMODIALYSIS ONE EVALUATION: CPT

## 2022-01-01 PROCEDURE — 82962 GLUCOSE BLOOD TEST: CPT

## 2022-01-01 PROCEDURE — 71250 CT THORAX DX C-: CPT | Mod: 26,MH

## 2022-01-01 PROCEDURE — 82570 ASSAY OF URINE CREATININE: CPT

## 2022-01-01 PROCEDURE — 87070 CULTURE OTHR SPECIMN AEROBIC: CPT

## 2022-01-01 PROCEDURE — 86923 COMPATIBILITY TEST ELECTRIC: CPT

## 2022-01-01 PROCEDURE — 96365 THER/PROPH/DIAG IV INF INIT: CPT

## 2022-01-01 PROCEDURE — 36569 INSJ PICC 5 YR+ W/O IMAGING: CPT

## 2022-01-01 PROCEDURE — 84300 ASSAY OF URINE SODIUM: CPT

## 2022-01-01 PROCEDURE — 99222 1ST HOSP IP/OBS MODERATE 55: CPT | Mod: GC

## 2022-01-01 PROCEDURE — 76770 US EXAM ABDO BACK WALL COMP: CPT

## 2022-01-01 PROCEDURE — 93308 TTE F-UP OR LMTD: CPT | Mod: 26,GC

## 2022-01-01 PROCEDURE — 99285 EMERGENCY DEPT VISIT HI MDM: CPT

## 2022-01-01 PROCEDURE — 94003 VENT MGMT INPAT SUBQ DAY: CPT

## 2022-01-01 PROCEDURE — 87086 URINE CULTURE/COLONY COUNT: CPT

## 2022-01-01 PROCEDURE — 84100 ASSAY OF PHOSPHORUS: CPT

## 2022-01-01 PROCEDURE — 71045 X-RAY EXAM CHEST 1 VIEW: CPT | Mod: 26,76

## 2022-01-01 PROCEDURE — 84466 ASSAY OF TRANSFERRIN: CPT

## 2022-01-01 PROCEDURE — 90945 DIALYSIS ONE EVALUATION: CPT

## 2022-01-01 PROCEDURE — 36558 INSERT TUNNELED CV CATH: CPT

## 2022-01-01 PROCEDURE — 86900 BLOOD TYPING SEROLOGIC ABO: CPT

## 2022-01-01 PROCEDURE — 80053 COMPREHEN METABOLIC PANEL: CPT

## 2022-01-01 PROCEDURE — 74176 CT ABD & PELVIS W/O CONTRAST: CPT | Mod: 26,MH

## 2022-01-01 PROCEDURE — 36000 PLACE NEEDLE IN VEIN: CPT

## 2022-01-01 PROCEDURE — 90947 DIALYSIS REPEATED EVAL: CPT

## 2022-01-01 PROCEDURE — 99232 SBSQ HOSP IP/OBS MODERATE 35: CPT | Mod: GC

## 2022-01-01 PROCEDURE — 96375 TX/PRO/DX INJ NEW DRUG ADDON: CPT

## 2022-01-01 PROCEDURE — 85610 PROTHROMBIN TIME: CPT

## 2022-01-01 PROCEDURE — 76770 US EXAM ABDO BACK WALL COMP: CPT | Mod: 26

## 2022-01-01 PROCEDURE — 86901 BLOOD TYPING SEROLOGIC RH(D): CPT

## 2022-01-01 PROCEDURE — 83880 ASSAY OF NATRIURETIC PEPTIDE: CPT

## 2022-01-01 PROCEDURE — 93010 ELECTROCARDIOGRAM REPORT: CPT

## 2022-01-01 PROCEDURE — 36430 TRANSFUSION BLD/BLD COMPNT: CPT

## 2022-01-01 PROCEDURE — 73700 CT LOWER EXTREMITY W/O DYE: CPT | Mod: 26,50,MH

## 2022-01-01 PROCEDURE — 36600 WITHDRAWAL OF ARTERIAL BLOOD: CPT

## 2022-01-01 PROCEDURE — 93306 TTE W/DOPPLER COMPLETE: CPT | Mod: 26

## 2022-01-01 PROCEDURE — 83036 HEMOGLOBIN GLYCOSYLATED A1C: CPT

## 2022-01-01 PROCEDURE — 99223 1ST HOSP IP/OBS HIGH 75: CPT

## 2022-01-01 PROCEDURE — 82803 BLOOD GASES ANY COMBINATION: CPT

## 2022-01-01 PROCEDURE — 87635 SARS-COV-2 COVID-19 AMP PRB: CPT

## 2022-01-01 PROCEDURE — 83615 LACTATE (LD) (LDH) ENZYME: CPT

## 2022-01-01 PROCEDURE — 99233 SBSQ HOSP IP/OBS HIGH 50: CPT | Mod: GC,25

## 2022-01-01 PROCEDURE — U0003: CPT

## 2022-01-01 PROCEDURE — 71045 X-RAY EXAM CHEST 1 VIEW: CPT | Mod: 26,77

## 2022-01-01 PROCEDURE — 87640 STAPH A DNA AMP PROBE: CPT

## 2022-01-01 PROCEDURE — 31645 BRNCHSC W/THER ASPIR 1ST: CPT | Mod: GC

## 2022-01-01 PROCEDURE — 82247 BILIRUBIN TOTAL: CPT

## 2022-01-01 PROCEDURE — 82550 ASSAY OF CK (CPK): CPT

## 2022-01-01 PROCEDURE — 99358 PROLONG SERVICE W/O CONTACT: CPT | Mod: NC

## 2022-01-01 PROCEDURE — 85384 FIBRINOGEN ACTIVITY: CPT

## 2022-01-01 PROCEDURE — 81001 URINALYSIS AUTO W/SCOPE: CPT

## 2022-01-01 PROCEDURE — 84156 ASSAY OF PROTEIN URINE: CPT

## 2022-01-01 PROCEDURE — 85027 COMPLETE CBC AUTOMATED: CPT

## 2022-01-01 PROCEDURE — P9047: CPT

## 2022-01-01 PROCEDURE — P9037: CPT

## 2022-01-01 PROCEDURE — 83930 ASSAY OF BLOOD OSMOLALITY: CPT

## 2022-01-01 PROCEDURE — 93971 EXTREMITY STUDY: CPT | Mod: 26,GC

## 2022-01-01 PROCEDURE — 36556 INSERT NON-TUNNEL CV CATH: CPT | Mod: GC

## 2022-01-01 PROCEDURE — 85025 COMPLETE CBC W/AUTO DIFF WBC: CPT

## 2022-01-01 PROCEDURE — 86850 RBC ANTIBODY SCREEN: CPT

## 2022-01-01 PROCEDURE — 82977 ASSAY OF GGT: CPT

## 2022-01-01 PROCEDURE — 71260 CT THORAX DX C+: CPT | Mod: 26

## 2022-01-01 PROCEDURE — 73700 CT LOWER EXTREMITY W/O DYE: CPT

## 2022-01-01 PROCEDURE — 84133 ASSAY OF URINE POTASSIUM: CPT

## 2022-01-01 PROCEDURE — 71250 CT THORAX DX C-: CPT | Mod: 26

## 2022-01-01 PROCEDURE — 70450 CT HEAD/BRAIN W/O DYE: CPT | Mod: 26

## 2022-01-01 PROCEDURE — 36415 COLL VENOUS BLD VENIPUNCTURE: CPT

## 2022-01-01 PROCEDURE — 99291 CRITICAL CARE FIRST HOUR: CPT | Mod: CS

## 2022-01-01 PROCEDURE — 36010 PLACE CATHETER IN VEIN: CPT

## 2022-01-01 PROCEDURE — U0005: CPT

## 2022-01-01 PROCEDURE — 70450 CT HEAD/BRAIN W/O DYE: CPT

## 2022-01-01 PROCEDURE — 32551 INSERTION OF CHEST TUBE: CPT | Mod: GC

## 2022-01-01 PROCEDURE — 85730 THROMBOPLASTIN TIME PARTIAL: CPT

## 2022-01-01 PROCEDURE — 74176 CT ABD & PELVIS W/O CONTRAST: CPT

## 2022-01-01 PROCEDURE — 83735 ASSAY OF MAGNESIUM: CPT

## 2022-01-01 PROCEDURE — 76937 US GUIDE VASCULAR ACCESS: CPT | Mod: 26,59

## 2022-01-01 PROCEDURE — 84145 PROCALCITONIN (PCT): CPT

## 2022-01-01 PROCEDURE — 86022 PLATELET ANTIBODIES: CPT

## 2022-01-01 PROCEDURE — P9100: CPT

## 2022-01-01 PROCEDURE — 84484 ASSAY OF TROPONIN QUANT: CPT

## 2022-01-01 PROCEDURE — 80202 ASSAY OF VANCOMYCIN: CPT

## 2022-01-01 PROCEDURE — 80048 BASIC METABOLIC PNL TOTAL CA: CPT

## 2022-01-01 PROCEDURE — 74177 CT ABD & PELVIS W/CONTRAST: CPT | Mod: 26

## 2022-01-01 PROCEDURE — 76937 US GUIDE VASCULAR ACCESS: CPT | Mod: 26

## 2022-01-01 PROCEDURE — 82728 ASSAY OF FERRITIN: CPT

## 2022-01-01 PROCEDURE — 31600 PLANNED TRACHEOSTOMY: CPT | Mod: GC

## 2022-01-01 PROCEDURE — 96367 TX/PROPH/DG ADDL SEQ IV INF: CPT

## 2022-01-01 PROCEDURE — 99223 1ST HOSP IP/OBS HIGH 75: CPT | Mod: GC

## 2022-01-01 PROCEDURE — 86706 HEP B SURFACE ANTIBODY: CPT

## 2022-01-01 PROCEDURE — 71045 X-RAY EXAM CHEST 1 VIEW: CPT

## 2022-01-01 PROCEDURE — 87040 BLOOD CULTURE FOR BACTERIA: CPT

## 2022-01-01 PROCEDURE — 36620 INSERTION CATHETER ARTERY: CPT | Mod: 76

## 2022-01-01 PROCEDURE — 96366 THER/PROPH/DIAG IV INF ADDON: CPT

## 2022-01-01 PROCEDURE — 94640 AIRWAY INHALATION TREATMENT: CPT

## 2022-01-01 PROCEDURE — 77001 FLUOROGUIDE FOR VEIN DEVICE: CPT

## 2022-01-01 PROCEDURE — 94002 VENT MGMT INPAT INIT DAY: CPT

## 2022-01-01 PROCEDURE — 85045 AUTOMATED RETICULOCYTE COUNT: CPT

## 2022-01-01 PROCEDURE — 83605 ASSAY OF LACTIC ACID: CPT

## 2022-01-01 PROCEDURE — 74177 CT ABD & PELVIS W/CONTRAST: CPT

## 2022-01-01 PROCEDURE — 93970 EXTREMITY STUDY: CPT

## 2022-01-01 PROCEDURE — C1769: CPT

## 2022-01-01 PROCEDURE — 36620 INSERTION CATHETER ARTERY: CPT | Mod: GC

## 2022-01-01 PROCEDURE — 83010 ASSAY OF HAPTOGLOBIN QUANT: CPT

## 2022-01-01 PROCEDURE — 87186 SC STD MICRODIL/AGAR DIL: CPT

## 2022-01-01 PROCEDURE — 93970 EXTREMITY STUDY: CPT | Mod: 26

## 2022-01-01 PROCEDURE — 99284 EMERGENCY DEPT VISIT MOD MDM: CPT | Mod: 25

## 2022-01-01 PROCEDURE — 99497 ADVNCD CARE PLAN 30 MIN: CPT | Mod: 25

## 2022-01-01 PROCEDURE — 93306 TTE W/DOPPLER COMPLETE: CPT

## 2022-01-01 PROCEDURE — 36000 PLACE NEEDLE IN VEIN: CPT | Mod: 59

## 2022-01-01 PROCEDURE — 99222 1ST HOSP IP/OBS MODERATE 55: CPT

## 2022-01-01 PROCEDURE — 86803 HEPATITIS C AB TEST: CPT

## 2022-01-01 PROCEDURE — 87641 MR-STAPH DNA AMP PROBE: CPT

## 2022-01-01 PROCEDURE — 83540 ASSAY OF IRON: CPT

## 2022-01-01 PROCEDURE — 71250 CT THORAX DX C-: CPT

## 2022-01-01 PROCEDURE — 82553 CREATINE MB FRACTION: CPT

## 2022-01-01 PROCEDURE — 76937 US GUIDE VASCULAR ACCESS: CPT

## 2022-01-01 PROCEDURE — 0225U NFCT DS DNA&RNA 21 SARSCOV2: CPT

## 2022-01-01 PROCEDURE — 82248 BILIRUBIN DIRECT: CPT

## 2022-01-01 PROCEDURE — 87340 HEPATITIS B SURFACE AG IA: CPT

## 2022-01-01 PROCEDURE — 77001 FLUOROGUIDE FOR VEIN DEVICE: CPT | Mod: 26

## 2022-01-01 PROCEDURE — 84540 ASSAY OF URINE/UREA-N: CPT

## 2022-01-01 PROCEDURE — 87077 CULTURE AEROBIC IDENTIFY: CPT

## 2022-01-01 PROCEDURE — 93005 ELECTROCARDIOGRAM TRACING: CPT

## 2022-01-01 PROCEDURE — 84478 ASSAY OF TRIGLYCERIDES: CPT

## 2022-01-01 PROCEDURE — 85379 FIBRIN DEGRADATION QUANT: CPT

## 2022-01-01 PROCEDURE — 83550 IRON BINDING TEST: CPT

## 2022-01-01 RX ORDER — DIPHENHYDRAMINE HCL 50 MG
50 CAPSULE ORAL ONCE
Refills: 0 | Status: COMPLETED | OUTPATIENT
Start: 2022-01-01 | End: 2022-01-01

## 2022-01-01 RX ORDER — VANCOMYCIN HCL 1 G
1000 VIAL (EA) INTRAVENOUS ONCE
Refills: 0 | Status: COMPLETED | OUTPATIENT
Start: 2022-01-01 | End: 2022-01-01

## 2022-01-01 RX ORDER — ACETAMINOPHEN 500 MG
650 TABLET ORAL EVERY 6 HOURS
Refills: 0 | Status: DISCONTINUED | OUTPATIENT
Start: 2022-01-01 | End: 2022-01-01

## 2022-01-01 RX ORDER — NOREPINEPHRINE BITARTRATE/D5W 8 MG/250ML
0.05 PLASTIC BAG, INJECTION (ML) INTRAVENOUS
Qty: 8 | Refills: 0 | Status: DISCONTINUED | OUTPATIENT
Start: 2022-01-01 | End: 2022-01-01

## 2022-01-01 RX ORDER — DIATRIZOATE MEGLUMINE 180 MG/ML
30 INJECTION, SOLUTION INTRAVESICAL ONCE
Refills: 0 | Status: COMPLETED | OUTPATIENT
Start: 2022-01-01 | End: 2022-01-01

## 2022-01-01 RX ORDER — SODIUM CHLORIDE 9 MG/ML
1000 INJECTION INTRAMUSCULAR; INTRAVENOUS; SUBCUTANEOUS ONCE
Refills: 0 | Status: COMPLETED | OUTPATIENT
Start: 2022-01-01 | End: 2022-01-01

## 2022-01-01 RX ORDER — HYDROMORPHONE HYDROCHLORIDE 2 MG/ML
0.5 INJECTION INTRAMUSCULAR; INTRAVENOUS; SUBCUTANEOUS ONCE
Refills: 0 | Status: DISCONTINUED | OUTPATIENT
Start: 2022-01-01 | End: 2022-01-01

## 2022-01-01 RX ORDER — IPRATROPIUM/ALBUTEROL SULFATE 18-103MCG
3 AEROSOL WITH ADAPTER (GRAM) INHALATION ONCE
Refills: 0 | Status: COMPLETED | OUTPATIENT
Start: 2022-01-01 | End: 2022-01-01

## 2022-01-01 RX ORDER — PROPOFOL 10 MG/ML
5 INJECTION, EMULSION INTRAVENOUS
Qty: 1000 | Refills: 0 | Status: DISCONTINUED | OUTPATIENT
Start: 2022-01-01 | End: 2022-01-01

## 2022-01-01 RX ORDER — ARGATROBAN 50 MG/50ML
1.5 INJECTION, SOLUTION INTRAVENOUS
Qty: 50 | Refills: 0 | Status: DISCONTINUED | OUTPATIENT
Start: 2022-01-01 | End: 2022-01-01

## 2022-01-01 RX ORDER — PROPOFOL 10 MG/ML
10 INJECTION, EMULSION INTRAVENOUS
Qty: 1000 | Refills: 0 | Status: DISCONTINUED | OUTPATIENT
Start: 2022-01-01 | End: 2022-01-01

## 2022-01-01 RX ORDER — HYDROMORPHONE HYDROCHLORIDE 2 MG/ML
0.2 INJECTION INTRAMUSCULAR; INTRAVENOUS; SUBCUTANEOUS
Qty: 100 | Refills: 0 | Status: DISCONTINUED | OUTPATIENT
Start: 2022-01-01 | End: 2022-01-01

## 2022-01-01 RX ORDER — MAGNESIUM SULFATE 500 MG/ML
2 VIAL (ML) INJECTION ONCE
Refills: 0 | Status: COMPLETED | OUTPATIENT
Start: 2022-01-01 | End: 2022-01-01

## 2022-01-01 RX ORDER — TOBRAMYCIN 0.3 %
2 DROPS OPHTHALMIC (EYE) EVERY 6 HOURS
Refills: 0 | Status: COMPLETED | OUTPATIENT
Start: 2022-01-01 | End: 2022-01-01

## 2022-01-01 RX ORDER — CEFEPIME 1 G/1
1000 INJECTION, POWDER, FOR SOLUTION INTRAMUSCULAR; INTRAVENOUS EVERY 12 HOURS
Refills: 0 | Status: DISCONTINUED | OUTPATIENT
Start: 2022-01-01 | End: 2022-01-01

## 2022-01-01 RX ORDER — HEPARIN SODIUM 5000 [USP'U]/ML
500 INJECTION INTRAVENOUS; SUBCUTANEOUS
Qty: 25000 | Refills: 0 | Status: DISCONTINUED | OUTPATIENT
Start: 2022-01-01 | End: 2022-01-01

## 2022-01-01 RX ORDER — INSULIN LISPRO 100/ML
VIAL (ML) SUBCUTANEOUS
Refills: 0 | Status: DISCONTINUED | OUTPATIENT
Start: 2022-01-01 | End: 2022-01-01

## 2022-01-01 RX ORDER — MORPHINE SULFATE 50 MG/1
4 CAPSULE, EXTENDED RELEASE ORAL ONCE
Refills: 0 | Status: DISCONTINUED | OUTPATIENT
Start: 2022-01-01 | End: 2022-01-01

## 2022-01-01 RX ORDER — FENTANYL CITRATE 50 UG/ML
50 INJECTION INTRAVENOUS ONCE
Refills: 0 | Status: DISCONTINUED | OUTPATIENT
Start: 2022-01-01 | End: 2022-01-01

## 2022-01-01 RX ORDER — VANCOMYCIN HCL 1 G
1000 VIAL (EA) INTRAVENOUS ONCE
Refills: 0 | Status: DISCONTINUED | OUTPATIENT
Start: 2022-01-01 | End: 2022-01-01

## 2022-01-01 RX ORDER — NOREPINEPHRINE BITARTRATE/D5W 8 MG/250ML
0.05 PLASTIC BAG, INJECTION (ML) INTRAVENOUS
Qty: 16 | Refills: 0 | Status: DISCONTINUED | OUTPATIENT
Start: 2022-01-01 | End: 2022-01-01

## 2022-01-01 RX ORDER — FUROSEMIDE 40 MG
80 TABLET ORAL ONCE
Refills: 0 | Status: DISCONTINUED | OUTPATIENT
Start: 2022-01-01 | End: 2022-01-01

## 2022-01-01 RX ORDER — FUROSEMIDE 40 MG
20 TABLET ORAL ONCE
Refills: 0 | Status: COMPLETED | OUTPATIENT
Start: 2022-01-01 | End: 2022-01-01

## 2022-01-01 RX ORDER — VANCOMYCIN HCL 1 G
1000 VIAL (EA) INTRAVENOUS EVERY 12 HOURS
Refills: 0 | Status: DISCONTINUED | OUTPATIENT
Start: 2022-01-01 | End: 2022-01-01

## 2022-01-01 RX ORDER — DIPHENHYDRAMINE HCL 50 MG
25 CAPSULE ORAL ONCE
Refills: 0 | Status: DISCONTINUED | OUTPATIENT
Start: 2022-01-01 | End: 2022-01-01

## 2022-01-01 RX ORDER — POLYETHYLENE GLYCOL 3350 17 G/17G
17 POWDER, FOR SOLUTION ORAL DAILY
Refills: 0 | Status: DISCONTINUED | OUTPATIENT
Start: 2022-01-01 | End: 2022-01-01

## 2022-01-01 RX ORDER — SODIUM CHLORIDE 9 MG/ML
1000 INJECTION, SOLUTION INTRAVENOUS
Refills: 0 | Status: DISCONTINUED | OUTPATIENT
Start: 2022-01-01 | End: 2022-01-01

## 2022-01-01 RX ORDER — ACETAMINOPHEN 500 MG
1000 TABLET ORAL ONCE
Refills: 0 | Status: DISCONTINUED | OUTPATIENT
Start: 2022-01-01 | End: 2022-01-01

## 2022-01-01 RX ORDER — ACETAMINOPHEN 500 MG
650 TABLET ORAL ONCE
Refills: 0 | Status: COMPLETED | OUTPATIENT
Start: 2022-01-01 | End: 2023-08-05

## 2022-01-01 RX ORDER — CEFTRIAXONE 500 MG/1
2000 INJECTION, POWDER, FOR SOLUTION INTRAMUSCULAR; INTRAVENOUS EVERY 24 HOURS
Refills: 0 | Status: DISCONTINUED | OUTPATIENT
Start: 2022-01-01 | End: 2022-01-01

## 2022-01-01 RX ORDER — LIDOCAINE 4 G/100G
1 CREAM TOPICAL EVERY 24 HOURS
Refills: 0 | Status: DISCONTINUED | OUTPATIENT
Start: 2022-01-01 | End: 2022-01-01

## 2022-01-01 RX ORDER — HYDROMORPHONE HYDROCHLORIDE 2 MG/ML
2 INJECTION INTRAMUSCULAR; INTRAVENOUS; SUBCUTANEOUS ONCE
Refills: 0 | Status: DISCONTINUED | OUTPATIENT
Start: 2022-01-01 | End: 2022-01-01

## 2022-01-01 RX ORDER — HYDROMORPHONE HYDROCHLORIDE 2 MG/ML
0.25 INJECTION INTRAMUSCULAR; INTRAVENOUS; SUBCUTANEOUS ONCE
Refills: 0 | Status: DISCONTINUED | OUTPATIENT
Start: 2022-01-01 | End: 2022-01-01

## 2022-01-01 RX ORDER — NYSTATIN CREAM 100000 [USP'U]/G
1 CREAM TOPICAL THREE TIMES A DAY
Refills: 0 | Status: DISCONTINUED | OUTPATIENT
Start: 2022-01-01 | End: 2022-01-01

## 2022-01-01 RX ORDER — SENNA PLUS 8.6 MG/1
2 TABLET ORAL DAILY
Refills: 0 | Status: DISCONTINUED | OUTPATIENT
Start: 2022-01-01 | End: 2022-01-01

## 2022-01-01 RX ORDER — CEFEPIME 1 G/1
INJECTION, POWDER, FOR SOLUTION INTRAMUSCULAR; INTRAVENOUS
Refills: 0 | Status: DISCONTINUED | OUTPATIENT
Start: 2022-01-01 | End: 2022-01-01

## 2022-01-01 RX ORDER — ACETAMINOPHEN 500 MG
1000 TABLET ORAL ONCE
Refills: 0 | Status: COMPLETED | OUTPATIENT
Start: 2022-01-01 | End: 2022-01-01

## 2022-01-01 RX ORDER — BUMETANIDE 0.25 MG/ML
2 INJECTION INTRAMUSCULAR; INTRAVENOUS EVERY 24 HOURS
Refills: 0 | Status: DISCONTINUED | OUTPATIENT
Start: 2022-01-01 | End: 2022-01-01

## 2022-01-01 RX ORDER — HEPARIN SODIUM 5000 [USP'U]/ML
1500 INJECTION INTRAVENOUS; SUBCUTANEOUS
Qty: 25000 | Refills: 0 | Status: DISCONTINUED | OUTPATIENT
Start: 2022-01-01 | End: 2022-01-01

## 2022-01-01 RX ORDER — IPRATROPIUM/ALBUTEROL SULFATE 18-103MCG
3 AEROSOL WITH ADAPTER (GRAM) INHALATION EVERY 6 HOURS
Refills: 0 | Status: DISCONTINUED | OUTPATIENT
Start: 2022-01-01 | End: 2022-01-01

## 2022-01-01 RX ORDER — VANCOMYCIN HCL 1 G
1500 VIAL (EA) INTRAVENOUS ONCE
Refills: 0 | Status: COMPLETED | OUTPATIENT
Start: 2022-01-01 | End: 2022-01-01

## 2022-01-01 RX ORDER — HEPARIN SODIUM 5000 [USP'U]/ML
5000 INJECTION INTRAVENOUS; SUBCUTANEOUS EVERY 8 HOURS
Refills: 0 | Status: DISCONTINUED | OUTPATIENT
Start: 2022-01-01 | End: 2022-01-01

## 2022-01-01 RX ORDER — SODIUM CHLORIDE 9 MG/ML
500 INJECTION, SOLUTION INTRAVENOUS ONCE
Refills: 0 | Status: COMPLETED | OUTPATIENT
Start: 2022-01-01 | End: 2022-01-01

## 2022-01-01 RX ORDER — HYDROXYZINE HCL 10 MG
1 TABLET ORAL
Qty: 0 | Refills: 0 | DISCHARGE

## 2022-01-01 RX ORDER — BUMETANIDE 0.25 MG/ML
1 INJECTION INTRAMUSCULAR; INTRAVENOUS EVERY 24 HOURS
Refills: 0 | Status: DISCONTINUED | OUTPATIENT
Start: 2022-01-01 | End: 2022-01-01

## 2022-01-01 RX ORDER — BACITRACIN ZINC 500 UNIT/G
1 OINTMENT IN PACKET (EA) TOPICAL EVERY 8 HOURS
Refills: 0 | Status: DISCONTINUED | OUTPATIENT
Start: 2022-01-01 | End: 2022-01-01

## 2022-01-01 RX ORDER — CEFEPIME 1 G/1
1000 INJECTION, POWDER, FOR SOLUTION INTRAMUSCULAR; INTRAVENOUS ONCE
Refills: 0 | Status: COMPLETED | OUTPATIENT
Start: 2022-01-01 | End: 2022-01-01

## 2022-01-01 RX ORDER — NOREPINEPHRINE BITARTRATE/D5W 8 MG/250ML
0.05 PLASTIC BAG, INJECTION (ML) INTRAVENOUS
Qty: 16 | Refills: 0 | Status: ACTIVE | OUTPATIENT
Start: 2022-01-01 | End: 2023-08-11

## 2022-01-01 RX ORDER — ERYTHROPOIETIN 10000 [IU]/ML
10000 INJECTION, SOLUTION INTRAVENOUS; SUBCUTANEOUS ONCE
Refills: 0 | Status: COMPLETED | OUTPATIENT
Start: 2022-01-01 | End: 2022-01-01

## 2022-01-01 RX ORDER — IRON SUCROSE 20 MG/ML
300 INJECTION, SOLUTION INTRAVENOUS EVERY 24 HOURS
Refills: 0 | Status: DISCONTINUED | OUTPATIENT
Start: 2022-01-01 | End: 2022-01-01

## 2022-01-01 RX ORDER — MIDAZOLAM HYDROCHLORIDE 1 MG/ML
0.02 INJECTION, SOLUTION INTRAMUSCULAR; INTRAVENOUS
Qty: 100 | Refills: 0 | Status: DISCONTINUED | OUTPATIENT
Start: 2022-01-01 | End: 2022-01-01

## 2022-01-01 RX ORDER — MAGNESIUM SULFATE 500 MG/ML
2 VIAL (ML) INJECTION ONCE
Refills: 0 | Status: DISCONTINUED | OUTPATIENT
Start: 2022-01-01 | End: 2022-01-01

## 2022-01-01 RX ORDER — HEPARIN SODIUM 5000 [USP'U]/ML
INJECTION INTRAVENOUS; SUBCUTANEOUS
Qty: 25000 | Refills: 0 | Status: DISCONTINUED | OUTPATIENT
Start: 2022-01-01 | End: 2022-01-01

## 2022-01-01 RX ORDER — ALBUMIN HUMAN 25 %
100 VIAL (ML) INTRAVENOUS EVERY 8 HOURS
Refills: 0 | Status: COMPLETED | OUTPATIENT
Start: 2022-01-01 | End: 2022-01-01

## 2022-01-01 RX ORDER — HYDROMORPHONE HYDROCHLORIDE 2 MG/ML
0.5 INJECTION INTRAMUSCULAR; INTRAVENOUS; SUBCUTANEOUS EVERY 4 HOURS
Refills: 0 | Status: DISCONTINUED | OUTPATIENT
Start: 2022-01-01 | End: 2022-01-01

## 2022-01-01 RX ORDER — ARGATROBAN 50 MG/50ML
0.75 INJECTION, SOLUTION INTRAVENOUS
Qty: 50 | Refills: 0 | Status: DISCONTINUED | OUTPATIENT
Start: 2022-01-01 | End: 2022-01-01

## 2022-01-01 RX ORDER — MIDODRINE HYDROCHLORIDE 2.5 MG/1
30 TABLET ORAL EVERY 8 HOURS
Refills: 0 | Status: DISCONTINUED | OUTPATIENT
Start: 2022-01-01 | End: 2022-01-01

## 2022-01-01 RX ORDER — CHLORHEXIDINE GLUCONATE 213 G/1000ML
1 SOLUTION TOPICAL
Refills: 0 | Status: DISCONTINUED | OUTPATIENT
Start: 2022-01-01 | End: 2022-01-01

## 2022-01-01 RX ORDER — VASOPRESSIN 20 [USP'U]/ML
0.03 INJECTION INTRAVENOUS
Qty: 50 | Refills: 0 | Status: DISCONTINUED | OUTPATIENT
Start: 2022-01-01 | End: 2022-01-01

## 2022-01-01 RX ORDER — AMITRIPTYLINE HCL 25 MG
1 TABLET ORAL
Qty: 0 | Refills: 0 | DISCHARGE

## 2022-01-01 RX ORDER — MIDODRINE HYDROCHLORIDE 2.5 MG/1
10 TABLET ORAL EVERY 8 HOURS
Refills: 0 | Status: DISCONTINUED | OUTPATIENT
Start: 2022-01-01 | End: 2022-01-01

## 2022-01-01 RX ORDER — INFLUENZA VIRUS VACCINE 15; 15; 15; 15 UG/.5ML; UG/.5ML; UG/.5ML; UG/.5ML
0.7 SUSPENSION INTRAMUSCULAR ONCE
Refills: 0 | Status: DISCONTINUED | OUTPATIENT
Start: 2022-01-01 | End: 2022-01-01

## 2022-01-01 RX ORDER — DEXTROSE 50 % IN WATER 50 %
15 SYRINGE (ML) INTRAVENOUS ONCE
Refills: 0 | Status: DISCONTINUED | OUTPATIENT
Start: 2022-01-01 | End: 2022-01-01

## 2022-01-01 RX ORDER — ENOXAPARIN SODIUM 100 MG/ML
40 INJECTION SUBCUTANEOUS EVERY 24 HOURS
Refills: 0 | Status: DISCONTINUED | OUTPATIENT
Start: 2022-01-01 | End: 2022-01-01

## 2022-01-01 RX ORDER — ACETAMINOPHEN 500 MG
650 TABLET ORAL ONCE
Refills: 0 | Status: DISCONTINUED | OUTPATIENT
Start: 2022-01-01 | End: 2022-01-01

## 2022-01-01 RX ORDER — ACETAMINOPHEN 500 MG
650 TABLET ORAL ONCE
Refills: 0 | Status: COMPLETED | OUTPATIENT
Start: 2022-01-01 | End: 2022-01-01

## 2022-01-01 RX ORDER — GLUCAGON INJECTION, SOLUTION 0.5 MG/.1ML
1 INJECTION, SOLUTION SUBCUTANEOUS ONCE
Refills: 0 | Status: DISCONTINUED | OUTPATIENT
Start: 2022-01-01 | End: 2022-01-01

## 2022-01-01 RX ORDER — FLUMAZENIL 0.1 MG/ML
0.2 VIAL (ML) INTRAVENOUS ONCE
Refills: 0 | Status: COMPLETED | OUTPATIENT
Start: 2022-01-01 | End: 2022-01-01

## 2022-01-01 RX ORDER — ARGATROBAN 50 MG/50ML
0.85 INJECTION, SOLUTION INTRAVENOUS
Qty: 50 | Refills: 0 | Status: DISCONTINUED | OUTPATIENT
Start: 2022-01-01 | End: 2022-01-01

## 2022-01-01 RX ORDER — MIDAZOLAM HYDROCHLORIDE 1 MG/ML
2 INJECTION, SOLUTION INTRAMUSCULAR; INTRAVENOUS ONCE
Refills: 0 | Status: DISCONTINUED | OUTPATIENT
Start: 2022-01-01 | End: 2022-01-01

## 2022-01-01 RX ORDER — HYDROMORPHONE HYDROCHLORIDE 2 MG/ML
1 INJECTION INTRAMUSCULAR; INTRAVENOUS; SUBCUTANEOUS EVERY 4 HOURS
Refills: 0 | Status: DISCONTINUED | OUTPATIENT
Start: 2022-01-01 | End: 2022-01-01

## 2022-01-01 RX ORDER — SODIUM CHLORIDE 9 MG/ML
10 INJECTION INTRAMUSCULAR; INTRAVENOUS; SUBCUTANEOUS
Refills: 0 | Status: DISCONTINUED | OUTPATIENT
Start: 2022-01-01 | End: 2022-01-01

## 2022-01-01 RX ORDER — SODIUM ZIRCONIUM CYCLOSILICATE 10 G/10G
10 POWDER, FOR SUSPENSION ORAL ONCE
Refills: 0 | Status: COMPLETED | OUTPATIENT
Start: 2022-01-01 | End: 2022-01-01

## 2022-01-01 RX ORDER — FENTANYL CITRATE 50 UG/ML
0.5 INJECTION INTRAVENOUS
Qty: 2500 | Refills: 0 | Status: DISCONTINUED | OUTPATIENT
Start: 2022-01-01 | End: 2022-01-01

## 2022-01-01 RX ORDER — IOHEXOL 300 MG/ML
30 INJECTION, SOLUTION INTRAVENOUS ONCE
Refills: 0 | Status: DISCONTINUED | OUTPATIENT
Start: 2022-01-01 | End: 2022-01-01

## 2022-01-01 RX ORDER — ALTEPLASE 100 MG
2 KIT INTRAVENOUS ONCE
Refills: 0 | Status: COMPLETED | OUTPATIENT
Start: 2022-01-01 | End: 2022-01-01

## 2022-01-01 RX ORDER — BUDESONIDE AND FORMOTEROL FUMARATE DIHYDRATE 160; 4.5 UG/1; UG/1
2 AEROSOL RESPIRATORY (INHALATION) EVERY 12 HOURS
Refills: 0 | Status: DISCONTINUED | OUTPATIENT
Start: 2022-01-01 | End: 2022-01-01

## 2022-01-01 RX ORDER — HYDROMORPHONE HYDROCHLORIDE 2 MG/ML
0.25 INJECTION INTRAMUSCULAR; INTRAVENOUS; SUBCUTANEOUS EVERY 4 HOURS
Refills: 0 | Status: DISCONTINUED | OUTPATIENT
Start: 2022-01-01 | End: 2022-01-01

## 2022-01-01 RX ORDER — DILTIAZEM HCL 120 MG
180 CAPSULE, EXT RELEASE 24 HR ORAL ONCE
Refills: 0 | Status: COMPLETED | OUTPATIENT
Start: 2022-01-01 | End: 2022-01-01

## 2022-01-01 RX ORDER — DULAGLUTIDE 4.5 MG/.5ML
1 INJECTION, SOLUTION SUBCUTANEOUS
Qty: 0 | Refills: 0 | DISCHARGE

## 2022-01-01 RX ORDER — IVERMECTIN 3 MG/1
7 TABLET ORAL
Qty: 0 | Refills: 0 | DISCHARGE

## 2022-01-01 RX ORDER — DEXMEDETOMIDINE HYDROCHLORIDE IN 0.9% SODIUM CHLORIDE 4 UG/ML
0.2 INJECTION INTRAVENOUS
Qty: 400 | Refills: 0 | Status: DISCONTINUED | OUTPATIENT
Start: 2022-01-01 | End: 2022-01-01

## 2022-01-01 RX ORDER — DEXTROSE 50 % IN WATER 50 %
25 SYRINGE (ML) INTRAVENOUS ONCE
Refills: 0 | Status: DISCONTINUED | OUTPATIENT
Start: 2022-01-01 | End: 2022-01-01

## 2022-01-01 RX ORDER — APIXABAN 2.5 MG/1
2.5 TABLET, FILM COATED ORAL EVERY 12 HOURS
Refills: 0 | Status: DISCONTINUED | OUTPATIENT
Start: 2022-01-01 | End: 2022-01-01

## 2022-01-01 RX ORDER — HYDROMORPHONE HYDROCHLORIDE 2 MG/ML
1 INJECTION INTRAMUSCULAR; INTRAVENOUS; SUBCUTANEOUS
Refills: 0 | Status: DISCONTINUED | OUTPATIENT
Start: 2022-01-01 | End: 2022-01-01

## 2022-01-01 RX ORDER — PANTOPRAZOLE SODIUM 20 MG/1
40 TABLET, DELAYED RELEASE ORAL EVERY 24 HOURS
Refills: 0 | Status: DISCONTINUED | OUTPATIENT
Start: 2022-01-01 | End: 2022-01-01

## 2022-01-01 RX ORDER — PIPERACILLIN AND TAZOBACTAM 4; .5 G/20ML; G/20ML
3.38 INJECTION, POWDER, LYOPHILIZED, FOR SOLUTION INTRAVENOUS ONCE
Refills: 0 | Status: DISCONTINUED | OUTPATIENT
Start: 2022-01-01 | End: 2022-01-01

## 2022-01-01 RX ORDER — MIDODRINE HYDROCHLORIDE 2.5 MG/1
5 TABLET ORAL EVERY 8 HOURS
Refills: 0 | Status: DISCONTINUED | OUTPATIENT
Start: 2022-01-01 | End: 2022-01-01

## 2022-01-01 RX ORDER — FUROSEMIDE 40 MG
40 TABLET ORAL ONCE
Refills: 0 | Status: COMPLETED | OUTPATIENT
Start: 2022-01-01 | End: 2022-01-01

## 2022-01-01 RX ORDER — DIAZEPAM 5 MG
0 TABLET ORAL
Qty: 0 | Refills: 0 | DISCHARGE

## 2022-01-01 RX ORDER — FENTANYL CITRATE 50 UG/ML
3.5 INJECTION INTRAVENOUS
Qty: 2500 | Refills: 0 | Status: DISCONTINUED | OUTPATIENT
Start: 2022-01-01 | End: 2022-01-01

## 2022-01-01 RX ORDER — CEFEPIME 1 G/1
2000 INJECTION, POWDER, FOR SOLUTION INTRAMUSCULAR; INTRAVENOUS EVERY 12 HOURS
Refills: 0 | Status: DISCONTINUED | OUTPATIENT
Start: 2022-01-01 | End: 2022-01-01

## 2022-01-01 RX ORDER — CEFEPIME 1 G/1
1000 INJECTION, POWDER, FOR SOLUTION INTRAMUSCULAR; INTRAVENOUS EVERY 12 HOURS
Refills: 0 | Status: COMPLETED | OUTPATIENT
Start: 2022-01-01 | End: 2022-01-01

## 2022-01-01 RX ORDER — OXYCODONE HYDROCHLORIDE 5 MG/1
2.5 TABLET ORAL EVERY 8 HOURS
Refills: 0 | Status: DISCONTINUED | OUTPATIENT
Start: 2022-01-01 | End: 2022-01-01

## 2022-01-01 RX ORDER — SODIUM CHLORIDE 9 MG/ML
500 INJECTION, SOLUTION INTRAVENOUS
Refills: 0 | Status: DISCONTINUED | OUTPATIENT
Start: 2022-01-01 | End: 2022-01-01

## 2022-01-01 RX ORDER — CEFEPIME 1 G/1
1000 INJECTION, POWDER, FOR SOLUTION INTRAMUSCULAR; INTRAVENOUS EVERY 24 HOURS
Refills: 0 | Status: DISCONTINUED | OUTPATIENT
Start: 2022-01-01 | End: 2022-01-01

## 2022-01-01 RX ORDER — IRON SUCROSE 20 MG/ML
200 INJECTION, SOLUTION INTRAVENOUS EVERY 24 HOURS
Refills: 0 | Status: COMPLETED | OUTPATIENT
Start: 2022-01-01 | End: 2022-01-01

## 2022-01-01 RX ORDER — INSULIN LISPRO 100/ML
VIAL (ML) SUBCUTANEOUS EVERY 6 HOURS
Refills: 0 | Status: DISCONTINUED | OUTPATIENT
Start: 2022-01-01 | End: 2022-01-01

## 2022-01-01 RX ORDER — ACETYLCYSTEINE 200 MG/ML
4 VIAL (ML) MISCELLANEOUS EVERY 6 HOURS
Refills: 0 | Status: DISCONTINUED | OUTPATIENT
Start: 2022-01-01 | End: 2022-01-01

## 2022-01-01 RX ORDER — APIXABAN 2.5 MG/1
5 TABLET, FILM COATED ORAL EVERY 12 HOURS
Refills: 0 | Status: DISCONTINUED | OUTPATIENT
Start: 2022-01-01 | End: 2022-01-01

## 2022-01-01 RX ORDER — VANCOMYCIN HCL 1 G
1500 VIAL (EA) INTRAVENOUS
Refills: 0 | Status: DISCONTINUED | OUTPATIENT
Start: 2022-01-01 | End: 2022-01-01

## 2022-01-01 RX ORDER — CISATRACURIUM BESYLATE 2 MG/ML
20 INJECTION INTRAVENOUS ONCE
Refills: 0 | Status: DISCONTINUED | OUTPATIENT
Start: 2022-01-01 | End: 2022-01-01

## 2022-01-01 RX ORDER — PROPOFOL 10 MG/ML
5 INJECTION, EMULSION INTRAVENOUS ONCE
Refills: 0 | Status: COMPLETED | OUTPATIENT
Start: 2022-01-01 | End: 2022-01-01

## 2022-01-01 RX ORDER — FUROSEMIDE 40 MG
80 TABLET ORAL ONCE
Refills: 0 | Status: COMPLETED | OUTPATIENT
Start: 2022-01-01 | End: 2022-01-01

## 2022-01-01 RX ORDER — SEVELAMER CARBONATE 2400 MG/1
800 POWDER, FOR SUSPENSION ORAL EVERY 8 HOURS
Refills: 0 | Status: DISCONTINUED | OUTPATIENT
Start: 2022-01-01 | End: 2022-01-01

## 2022-01-01 RX ORDER — BUMETANIDE 0.25 MG/ML
1 INJECTION INTRAMUSCULAR; INTRAVENOUS
Qty: 0 | Refills: 0 | DISCHARGE

## 2022-01-01 RX ORDER — HYDROMORPHONE HYDROCHLORIDE 2 MG/ML
0.5 INJECTION INTRAMUSCULAR; INTRAVENOUS; SUBCUTANEOUS
Refills: 0 | Status: DISCONTINUED | OUTPATIENT
Start: 2022-01-01 | End: 2022-01-01

## 2022-01-01 RX ORDER — LACTULOSE 10 G/15ML
20 SOLUTION ORAL EVERY 8 HOURS
Refills: 0 | Status: COMPLETED | OUTPATIENT
Start: 2022-01-01 | End: 2022-01-01

## 2022-01-01 RX ORDER — FUROSEMIDE 40 MG
120 TABLET ORAL ONCE
Refills: 0 | Status: COMPLETED | OUTPATIENT
Start: 2022-01-01 | End: 2022-01-01

## 2022-01-01 RX ORDER — VANCOMYCIN HCL 1 G
1500 VIAL (EA) INTRAVENOUS EVERY 24 HOURS
Refills: 0 | Status: DISCONTINUED | OUTPATIENT
Start: 2022-01-01 | End: 2022-01-01

## 2022-01-01 RX ORDER — MUPIROCIN 20 MG/G
1 OINTMENT TOPICAL
Refills: 0 | Status: DISCONTINUED | OUTPATIENT
Start: 2022-01-01 | End: 2022-01-01

## 2022-01-01 RX ORDER — ALBUTEROL 90 UG/1
2.5 AEROSOL, METERED ORAL ONCE
Refills: 0 | Status: COMPLETED | OUTPATIENT
Start: 2022-01-01 | End: 2022-01-01

## 2022-01-01 RX ORDER — MORPHINE SULFATE 50 MG/1
2 CAPSULE, EXTENDED RELEASE ORAL ONCE
Refills: 0 | Status: DISCONTINUED | OUTPATIENT
Start: 2022-01-01 | End: 2022-01-01

## 2022-01-01 RX ORDER — SEVELAMER CARBONATE 2400 MG/1
1600 POWDER, FOR SUSPENSION ORAL EVERY 8 HOURS
Refills: 0 | Status: DISCONTINUED | OUTPATIENT
Start: 2022-01-01 | End: 2022-01-01

## 2022-01-01 RX ORDER — HYDROMORPHONE HYDROCHLORIDE 2 MG/ML
0.5 INJECTION INTRAMUSCULAR; INTRAVENOUS; SUBCUTANEOUS
Qty: 100 | Refills: 0 | Status: DISCONTINUED | OUTPATIENT
Start: 2022-01-01 | End: 2022-01-01

## 2022-01-01 RX ORDER — MAGNESIUM SULFATE 500 MG/ML
1 VIAL (ML) INJECTION ONCE
Refills: 0 | Status: COMPLETED | OUTPATIENT
Start: 2022-01-01 | End: 2022-01-01

## 2022-01-01 RX ORDER — PANTOPRAZOLE SODIUM 20 MG/1
40 TABLET, DELAYED RELEASE ORAL AT BEDTIME
Refills: 0 | Status: DISCONTINUED | OUTPATIENT
Start: 2022-01-01 | End: 2022-01-01

## 2022-01-01 RX ORDER — SODIUM CHLORIDE 9 MG/ML
4 INJECTION INTRAMUSCULAR; INTRAVENOUS; SUBCUTANEOUS EVERY 12 HOURS
Refills: 0 | Status: DISCONTINUED | OUTPATIENT
Start: 2022-01-01 | End: 2022-01-01

## 2022-01-01 RX ORDER — SODIUM CHLORIDE 9 MG/ML
250 INJECTION INTRAMUSCULAR; INTRAVENOUS; SUBCUTANEOUS ONCE
Refills: 0 | Status: COMPLETED | OUTPATIENT
Start: 2022-01-01 | End: 2022-01-01

## 2022-01-01 RX ORDER — CISATRACURIUM BESYLATE 2 MG/ML
20 INJECTION INTRAVENOUS ONCE
Refills: 0 | Status: COMPLETED | OUTPATIENT
Start: 2022-01-01 | End: 2022-01-01

## 2022-01-01 RX ORDER — DEXTROSE 50 % IN WATER 50 %
12.5 SYRINGE (ML) INTRAVENOUS ONCE
Refills: 0 | Status: DISCONTINUED | OUTPATIENT
Start: 2022-01-01 | End: 2022-01-01

## 2022-01-01 RX ORDER — ATORVASTATIN CALCIUM 80 MG/1
40 TABLET, FILM COATED ORAL AT BEDTIME
Refills: 0 | Status: DISCONTINUED | OUTPATIENT
Start: 2022-01-01 | End: 2022-01-01

## 2022-01-01 RX ORDER — CEFEPIME 1 G/1
1000 INJECTION, POWDER, FOR SOLUTION INTRAMUSCULAR; INTRAVENOUS ONCE
Refills: 0 | Status: DISCONTINUED | OUTPATIENT
Start: 2022-01-01 | End: 2022-01-01

## 2022-01-01 RX ORDER — INSULIN LISPRO 100/ML
VIAL (ML) SUBCUTANEOUS AT BEDTIME
Refills: 0 | Status: DISCONTINUED | OUTPATIENT
Start: 2022-01-01 | End: 2022-01-01

## 2022-01-01 RX ORDER — SODIUM CHLORIDE 9 MG/ML
1000 INJECTION INTRAMUSCULAR; INTRAVENOUS; SUBCUTANEOUS
Refills: 0 | Status: DISCONTINUED | OUTPATIENT
Start: 2022-01-01 | End: 2022-01-01

## 2022-01-01 RX ORDER — HYDROMORPHONE HYDROCHLORIDE 2 MG/ML
1 INJECTION INTRAMUSCULAR; INTRAVENOUS; SUBCUTANEOUS ONCE
Refills: 0 | Status: DISCONTINUED | OUTPATIENT
Start: 2022-01-01 | End: 2022-01-01

## 2022-01-01 RX ORDER — CHLORHEXIDINE GLUCONATE 213 G/1000ML
15 SOLUTION TOPICAL EVERY 12 HOURS
Refills: 0 | Status: DISCONTINUED | OUTPATIENT
Start: 2022-01-01 | End: 2022-01-01

## 2022-01-01 RX ORDER — PROPOFOL 10 MG/ML
10 INJECTION, EMULSION INTRAVENOUS
Qty: 500 | Refills: 0 | Status: DISCONTINUED | OUTPATIENT
Start: 2022-01-01 | End: 2022-01-01

## 2022-01-01 RX ORDER — SODIUM CHLORIDE 9 MG/ML
500 INJECTION INTRAMUSCULAR; INTRAVENOUS; SUBCUTANEOUS ONCE
Refills: 0 | Status: COMPLETED | OUTPATIENT
Start: 2022-01-01 | End: 2022-01-01

## 2022-01-01 RX ADMIN — CEFEPIME 100 MILLIGRAM(S): 1 INJECTION, POWDER, FOR SOLUTION INTRAMUSCULAR; INTRAVENOUS at 00:53

## 2022-01-01 RX ADMIN — Medication 1 APPLICATION(S): at 21:53

## 2022-01-01 RX ADMIN — HYDROMORPHONE HYDROCHLORIDE 0.5 MILLIGRAM(S): 2 INJECTION INTRAMUSCULAR; INTRAVENOUS; SUBCUTANEOUS at 11:40

## 2022-01-01 RX ADMIN — HYDROMORPHONE HYDROCHLORIDE 0.25 MILLIGRAM(S): 2 INJECTION INTRAMUSCULAR; INTRAVENOUS; SUBCUTANEOUS at 14:45

## 2022-01-01 RX ADMIN — APIXABAN 2.5 MILLIGRAM(S): 2.5 TABLET, FILM COATED ORAL at 18:08

## 2022-01-01 RX ADMIN — Medication 2 DROP(S): at 11:59

## 2022-01-01 RX ADMIN — FENTANYL CITRATE 5.12 MICROGRAM(S)/KG/HR: 50 INJECTION INTRAVENOUS at 20:27

## 2022-01-01 RX ADMIN — SENNA PLUS 2 TABLET(S): 8.6 TABLET ORAL at 13:27

## 2022-01-01 RX ADMIN — MIDAZOLAM HYDROCHLORIDE 2.05 MG/KG/HR: 1 INJECTION, SOLUTION INTRAMUSCULAR; INTRAVENOUS at 05:26

## 2022-01-01 RX ADMIN — CEFEPIME 100 MILLIGRAM(S): 1 INJECTION, POWDER, FOR SOLUTION INTRAMUSCULAR; INTRAVENOUS at 00:11

## 2022-01-01 RX ADMIN — HYDROMORPHONE HYDROCHLORIDE 0.5 MG/HR: 2 INJECTION INTRAMUSCULAR; INTRAVENOUS; SUBCUTANEOUS at 18:57

## 2022-01-01 RX ADMIN — LIDOCAINE 1 PATCH: 4 CREAM TOPICAL at 21:57

## 2022-01-01 RX ADMIN — HEPARIN SODIUM 15 UNIT(S)/HR: 5000 INJECTION INTRAVENOUS; SUBCUTANEOUS at 16:51

## 2022-01-01 RX ADMIN — CHLORHEXIDINE GLUCONATE 1 APPLICATION(S): 213 SOLUTION TOPICAL at 06:52

## 2022-01-01 RX ADMIN — APIXABAN 2.5 MILLIGRAM(S): 2.5 TABLET, FILM COATED ORAL at 17:15

## 2022-01-01 RX ADMIN — LIDOCAINE 1 PATCH: 4 CREAM TOPICAL at 22:45

## 2022-01-01 RX ADMIN — PANTOPRAZOLE SODIUM 40 MILLIGRAM(S): 20 TABLET, DELAYED RELEASE ORAL at 21:53

## 2022-01-01 RX ADMIN — CEFEPIME 100 MILLIGRAM(S): 1 INJECTION, POWDER, FOR SOLUTION INTRAMUSCULAR; INTRAVENOUS at 03:14

## 2022-01-01 RX ADMIN — SENNA PLUS 2 TABLET(S): 8.6 TABLET ORAL at 12:11

## 2022-01-01 RX ADMIN — OXYCODONE HYDROCHLORIDE 2.5 MILLIGRAM(S): 5 TABLET ORAL at 06:53

## 2022-01-01 RX ADMIN — Medication 250 MILLIGRAM(S): at 00:35

## 2022-01-01 RX ADMIN — Medication 650 MILLIGRAM(S): at 07:41

## 2022-01-01 RX ADMIN — LIDOCAINE 1 PATCH: 4 CREAM TOPICAL at 19:04

## 2022-01-01 RX ADMIN — PROPOFOL 6.14 MICROGRAM(S)/KG/MIN: 10 INJECTION, EMULSION INTRAVENOUS at 22:44

## 2022-01-01 RX ADMIN — Medication 2 DROP(S): at 11:33

## 2022-01-01 RX ADMIN — CHLORHEXIDINE GLUCONATE 15 MILLILITER(S): 213 SOLUTION TOPICAL at 06:52

## 2022-01-01 RX ADMIN — Medication 3 MILLILITER(S): at 10:24

## 2022-01-01 RX ADMIN — APIXABAN 2.5 MILLIGRAM(S): 2.5 TABLET, FILM COATED ORAL at 17:19

## 2022-01-01 RX ADMIN — FENTANYL CITRATE 5.12 MICROGRAM(S)/KG/HR: 50 INJECTION INTRAVENOUS at 23:24

## 2022-01-01 RX ADMIN — NYSTATIN CREAM 1 APPLICATION(S): 100000 CREAM TOPICAL at 13:25

## 2022-01-01 RX ADMIN — POLYETHYLENE GLYCOL 3350 17 GRAM(S): 17 POWDER, FOR SOLUTION ORAL at 13:04

## 2022-01-01 RX ADMIN — HYDROMORPHONE HYDROCHLORIDE 0.25 MILLIGRAM(S): 2 INJECTION INTRAMUSCULAR; INTRAVENOUS; SUBCUTANEOUS at 04:59

## 2022-01-01 RX ADMIN — APIXABAN 2.5 MILLIGRAM(S): 2.5 TABLET, FILM COATED ORAL at 17:05

## 2022-01-01 RX ADMIN — CHLORHEXIDINE GLUCONATE 15 MILLILITER(S): 213 SOLUTION TOPICAL at 05:49

## 2022-01-01 RX ADMIN — Medication 3 MILLILITER(S): at 11:29

## 2022-01-01 RX ADMIN — HYDROMORPHONE HYDROCHLORIDE 0.25 MILLIGRAM(S): 2 INJECTION INTRAMUSCULAR; INTRAVENOUS; SUBCUTANEOUS at 17:57

## 2022-01-01 RX ADMIN — Medication 50 MILLILITER(S): at 06:45

## 2022-01-01 RX ADMIN — Medication 20 MILLIGRAM(S): at 12:29

## 2022-01-01 RX ADMIN — HEPARIN SODIUM 5000 UNIT(S): 5000 INJECTION INTRAVENOUS; SUBCUTANEOUS at 21:34

## 2022-01-01 RX ADMIN — Medication 3 MILLILITER(S): at 05:47

## 2022-01-01 RX ADMIN — MUPIROCIN 1 APPLICATION(S): 20 OINTMENT TOPICAL at 17:37

## 2022-01-01 RX ADMIN — PANTOPRAZOLE SODIUM 40 MILLIGRAM(S): 20 TABLET, DELAYED RELEASE ORAL at 22:45

## 2022-01-01 RX ADMIN — CEFEPIME 100 MILLIGRAM(S): 1 INJECTION, POWDER, FOR SOLUTION INTRAMUSCULAR; INTRAVENOUS at 12:17

## 2022-01-01 RX ADMIN — LIDOCAINE 1 PATCH: 4 CREAM TOPICAL at 00:11

## 2022-01-01 RX ADMIN — Medication 3 MILLILITER(S): at 06:05

## 2022-01-01 RX ADMIN — LIDOCAINE 1 PATCH: 4 CREAM TOPICAL at 09:50

## 2022-01-01 RX ADMIN — Medication 3 MILLILITER(S): at 04:14

## 2022-01-01 RX ADMIN — PANTOPRAZOLE SODIUM 40 MILLIGRAM(S): 20 TABLET, DELAYED RELEASE ORAL at 22:54

## 2022-01-01 RX ADMIN — PANTOPRAZOLE SODIUM 40 MILLIGRAM(S): 20 TABLET, DELAYED RELEASE ORAL at 21:54

## 2022-01-01 RX ADMIN — Medication 300 MILLIGRAM(S): at 15:27

## 2022-01-01 RX ADMIN — SENNA PLUS 2 TABLET(S): 8.6 TABLET ORAL at 13:00

## 2022-01-01 RX ADMIN — SEVELAMER CARBONATE 1600 MILLIGRAM(S): 2400 POWDER, FOR SUSPENSION ORAL at 11:29

## 2022-01-01 RX ADMIN — MUPIROCIN 1 APPLICATION(S): 20 OINTMENT TOPICAL at 17:12

## 2022-01-01 RX ADMIN — ARGATROBAN 5.22 MICROGRAM(S)/KG/MIN: 50 INJECTION, SOLUTION INTRAVENOUS at 10:13

## 2022-01-01 RX ADMIN — Medication 650 MILLIGRAM(S): at 09:07

## 2022-01-01 RX ADMIN — SODIUM CHLORIDE 1000 MILLILITER(S): 9 INJECTION, SOLUTION INTRAVENOUS at 18:16

## 2022-01-01 RX ADMIN — LIDOCAINE 1 PATCH: 4 CREAM TOPICAL at 22:50

## 2022-01-01 RX ADMIN — Medication 1: at 13:29

## 2022-01-01 RX ADMIN — Medication 1: at 12:11

## 2022-01-01 RX ADMIN — OXYCODONE HYDROCHLORIDE 2.5 MILLIGRAM(S): 5 TABLET ORAL at 06:05

## 2022-01-01 RX ADMIN — Medication 400 MILLIGRAM(S): at 18:44

## 2022-01-01 RX ADMIN — SENNA PLUS 2 TABLET(S): 8.6 TABLET ORAL at 11:31

## 2022-01-01 RX ADMIN — NYSTATIN CREAM 1 APPLICATION(S): 100000 CREAM TOPICAL at 05:38

## 2022-01-01 RX ADMIN — HEPARIN SODIUM 5 UNIT(S)/HR: 5000 INJECTION INTRAVENOUS; SUBCUTANEOUS at 11:09

## 2022-01-01 RX ADMIN — LIDOCAINE 1 PATCH: 4 CREAM TOPICAL at 12:03

## 2022-01-01 RX ADMIN — MIDODRINE HYDROCHLORIDE 5 MILLIGRAM(S): 2.5 TABLET ORAL at 05:44

## 2022-01-01 RX ADMIN — Medication 1 APPLICATION(S): at 21:35

## 2022-01-01 RX ADMIN — Medication 4 MILLILITER(S): at 18:36

## 2022-01-01 RX ADMIN — HEPARIN SODIUM 5000 UNIT(S): 5000 INJECTION INTRAVENOUS; SUBCUTANEOUS at 13:03

## 2022-01-01 RX ADMIN — CEFTRIAXONE 100 MILLIGRAM(S): 500 INJECTION, POWDER, FOR SOLUTION INTRAMUSCULAR; INTRAVENOUS at 17:14

## 2022-01-01 RX ADMIN — PANTOPRAZOLE SODIUM 40 MILLIGRAM(S): 20 TABLET, DELAYED RELEASE ORAL at 21:17

## 2022-01-01 RX ADMIN — CHLORHEXIDINE GLUCONATE 1 APPLICATION(S): 213 SOLUTION TOPICAL at 05:45

## 2022-01-01 RX ADMIN — HYDROMORPHONE HYDROCHLORIDE 0.25 MILLIGRAM(S): 2 INJECTION INTRAMUSCULAR; INTRAVENOUS; SUBCUTANEOUS at 18:15

## 2022-01-01 RX ADMIN — Medication 3 MILLILITER(S): at 05:35

## 2022-01-01 RX ADMIN — HYDROMORPHONE HYDROCHLORIDE 0.25 MILLIGRAM(S): 2 INJECTION INTRAMUSCULAR; INTRAVENOUS; SUBCUTANEOUS at 10:06

## 2022-01-01 RX ADMIN — SEVELAMER CARBONATE 800 MILLIGRAM(S): 2400 POWDER, FOR SUSPENSION ORAL at 05:40

## 2022-01-01 RX ADMIN — CEFEPIME 100 MILLIGRAM(S): 1 INJECTION, POWDER, FOR SOLUTION INTRAMUSCULAR; INTRAVENOUS at 13:17

## 2022-01-01 RX ADMIN — PANTOPRAZOLE SODIUM 40 MILLIGRAM(S): 20 TABLET, DELAYED RELEASE ORAL at 22:31

## 2022-01-01 RX ADMIN — LIDOCAINE 1 PATCH: 4 CREAM TOPICAL at 21:15

## 2022-01-01 RX ADMIN — LIDOCAINE 1 PATCH: 4 CREAM TOPICAL at 11:49

## 2022-01-01 RX ADMIN — Medication 1 DROP(S): at 05:19

## 2022-01-01 RX ADMIN — ARGATROBAN 5.22 MICROGRAM(S)/KG/MIN: 50 INJECTION, SOLUTION INTRAVENOUS at 21:01

## 2022-01-01 RX ADMIN — ARGATROBAN 9.21 MICROGRAM(S)/KG/MIN: 50 INJECTION, SOLUTION INTRAVENOUS at 19:28

## 2022-01-01 RX ADMIN — NYSTATIN CREAM 1 APPLICATION(S): 100000 CREAM TOPICAL at 13:17

## 2022-01-01 RX ADMIN — NYSTATIN CREAM 1 APPLICATION(S): 100000 CREAM TOPICAL at 05:28

## 2022-01-01 RX ADMIN — NYSTATIN CREAM 1 APPLICATION(S): 100000 CREAM TOPICAL at 05:44

## 2022-01-01 RX ADMIN — ARGATROBAN 4.6 MICROGRAM(S)/KG/MIN: 50 INJECTION, SOLUTION INTRAVENOUS at 11:39

## 2022-01-01 RX ADMIN — CHLORHEXIDINE GLUCONATE 15 MILLILITER(S): 213 SOLUTION TOPICAL at 09:40

## 2022-01-01 RX ADMIN — HEPARIN SODIUM 5000 UNIT(S): 5000 INJECTION INTRAVENOUS; SUBCUTANEOUS at 13:23

## 2022-01-01 RX ADMIN — Medication 1 APPLICATION(S): at 21:29

## 2022-01-01 RX ADMIN — SODIUM CHLORIDE 4 MILLILITER(S): 9 INJECTION INTRAMUSCULAR; INTRAVENOUS; SUBCUTANEOUS at 05:57

## 2022-01-01 RX ADMIN — Medication 1 DROP(S): at 12:27

## 2022-01-01 RX ADMIN — MUPIROCIN 1 APPLICATION(S): 20 OINTMENT TOPICAL at 19:05

## 2022-01-01 RX ADMIN — SEVELAMER CARBONATE 1600 MILLIGRAM(S): 2400 POWDER, FOR SUSPENSION ORAL at 05:37

## 2022-01-01 RX ADMIN — FENTANYL CITRATE 5.12 MICROGRAM(S)/KG/HR: 50 INJECTION INTRAVENOUS at 14:14

## 2022-01-01 RX ADMIN — Medication 1 APPLICATION(S): at 13:16

## 2022-01-01 RX ADMIN — Medication 4.8 MICROGRAM(S)/KG/MIN: at 02:30

## 2022-01-01 RX ADMIN — Medication 400 MILLIGRAM(S): at 18:17

## 2022-01-01 RX ADMIN — Medication 3 MILLILITER(S): at 17:49

## 2022-01-01 RX ADMIN — APIXABAN 2.5 MILLIGRAM(S): 2.5 TABLET, FILM COATED ORAL at 17:16

## 2022-01-01 RX ADMIN — NYSTATIN CREAM 1 APPLICATION(S): 100000 CREAM TOPICAL at 22:17

## 2022-01-01 RX ADMIN — HYDROMORPHONE HYDROCHLORIDE 0.5 MILLIGRAM(S): 2 INJECTION INTRAMUSCULAR; INTRAVENOUS; SUBCUTANEOUS at 17:40

## 2022-01-01 RX ADMIN — PROPOFOL 0.03 MICROGRAM(S)/KG/MIN: 10 INJECTION, EMULSION INTRAVENOUS at 19:05

## 2022-01-01 RX ADMIN — IRON SUCROSE 110 MILLIGRAM(S): 20 INJECTION, SOLUTION INTRAVENOUS at 11:36

## 2022-01-01 RX ADMIN — Medication 1 DROP(S): at 13:23

## 2022-01-01 RX ADMIN — Medication 3 MILLILITER(S): at 04:15

## 2022-01-01 RX ADMIN — HYDROMORPHONE HYDROCHLORIDE 1 MILLIGRAM(S): 2 INJECTION INTRAMUSCULAR; INTRAVENOUS; SUBCUTANEOUS at 18:15

## 2022-01-01 RX ADMIN — Medication 4.8 MICROGRAM(S)/KG/MIN: at 05:56

## 2022-01-01 RX ADMIN — APIXABAN 2.5 MILLIGRAM(S): 2.5 TABLET, FILM COATED ORAL at 06:04

## 2022-01-01 RX ADMIN — POLYETHYLENE GLYCOL 3350 17 GRAM(S): 17 POWDER, FOR SOLUTION ORAL at 11:35

## 2022-01-01 RX ADMIN — Medication 650 MILLIGRAM(S): at 21:20

## 2022-01-01 RX ADMIN — Medication 3 MILLILITER(S): at 21:35

## 2022-01-01 RX ADMIN — LIDOCAINE 1 PATCH: 4 CREAM TOPICAL at 18:44

## 2022-01-01 RX ADMIN — SEVELAMER CARBONATE 1600 MILLIGRAM(S): 2400 POWDER, FOR SUSPENSION ORAL at 19:06

## 2022-01-01 RX ADMIN — MUPIROCIN 1 APPLICATION(S): 20 OINTMENT TOPICAL at 05:24

## 2022-01-01 RX ADMIN — SENNA PLUS 2 TABLET(S): 8.6 TABLET ORAL at 11:35

## 2022-01-01 RX ADMIN — FENTANYL CITRATE 50 MICROGRAM(S): 50 INJECTION INTRAVENOUS at 11:27

## 2022-01-01 RX ADMIN — SEVELAMER CARBONATE 1600 MILLIGRAM(S): 2400 POWDER, FOR SUSPENSION ORAL at 19:01

## 2022-01-01 RX ADMIN — HEPARIN SODIUM 5000 UNIT(S): 5000 INJECTION INTRAVENOUS; SUBCUTANEOUS at 13:33

## 2022-01-01 RX ADMIN — Medication 125 MILLIGRAM(S): at 23:48

## 2022-01-01 RX ADMIN — Medication 1: at 11:32

## 2022-01-01 RX ADMIN — CEFEPIME 100 MILLIGRAM(S): 1 INJECTION, POWDER, FOR SOLUTION INTRAMUSCULAR; INTRAVENOUS at 13:09

## 2022-01-01 RX ADMIN — LIDOCAINE 1 PATCH: 4 CREAM TOPICAL at 20:00

## 2022-01-01 RX ADMIN — LIDOCAINE 1 PATCH: 4 CREAM TOPICAL at 09:45

## 2022-01-01 RX ADMIN — SENNA PLUS 2 TABLET(S): 8.6 TABLET ORAL at 13:17

## 2022-01-01 RX ADMIN — OXYCODONE HYDROCHLORIDE 2.5 MILLIGRAM(S): 5 TABLET ORAL at 13:23

## 2022-01-01 RX ADMIN — CEFEPIME 100 MILLIGRAM(S): 1 INJECTION, POWDER, FOR SOLUTION INTRAMUSCULAR; INTRAVENOUS at 13:37

## 2022-01-01 RX ADMIN — Medication 1 DROP(S): at 00:42

## 2022-01-01 RX ADMIN — POLYETHYLENE GLYCOL 3350 17 GRAM(S): 17 POWDER, FOR SOLUTION ORAL at 11:19

## 2022-01-01 RX ADMIN — POLYETHYLENE GLYCOL 3350 17 GRAM(S): 17 POWDER, FOR SOLUTION ORAL at 12:11

## 2022-01-01 RX ADMIN — Medication 9.59 MICROGRAM(S)/KG/MIN: at 14:01

## 2022-01-01 RX ADMIN — LIDOCAINE 1 PATCH: 4 CREAM TOPICAL at 01:20

## 2022-01-01 RX ADMIN — LIDOCAINE 1 PATCH: 4 CREAM TOPICAL at 17:37

## 2022-01-01 RX ADMIN — SENNA PLUS 2 TABLET(S): 8.6 TABLET ORAL at 12:03

## 2022-01-01 RX ADMIN — Medication 3 MILLILITER(S): at 23:36

## 2022-01-01 RX ADMIN — SENNA PLUS 2 TABLET(S): 8.6 TABLET ORAL at 11:19

## 2022-01-01 RX ADMIN — Medication 3 MILLILITER(S): at 05:38

## 2022-01-01 RX ADMIN — LIDOCAINE 1 PATCH: 4 CREAM TOPICAL at 10:13

## 2022-01-01 RX ADMIN — Medication 1 DROP(S): at 17:30

## 2022-01-01 RX ADMIN — ARGATROBAN 9.21 MICROGRAM(S)/KG/MIN: 50 INJECTION, SOLUTION INTRAVENOUS at 12:03

## 2022-01-01 RX ADMIN — CHLORHEXIDINE GLUCONATE 1 APPLICATION(S): 213 SOLUTION TOPICAL at 06:02

## 2022-01-01 RX ADMIN — HYDROMORPHONE HYDROCHLORIDE 0.5 MILLIGRAM(S): 2 INJECTION INTRAMUSCULAR; INTRAVENOUS; SUBCUTANEOUS at 13:40

## 2022-01-01 RX ADMIN — MUPIROCIN 1 APPLICATION(S): 20 OINTMENT TOPICAL at 18:46

## 2022-01-01 RX ADMIN — NYSTATIN CREAM 1 APPLICATION(S): 100000 CREAM TOPICAL at 05:46

## 2022-01-01 RX ADMIN — LIDOCAINE 1 PATCH: 4 CREAM TOPICAL at 10:30

## 2022-01-01 RX ADMIN — APIXABAN 2.5 MILLIGRAM(S): 2.5 TABLET, FILM COATED ORAL at 19:06

## 2022-01-01 RX ADMIN — CHLORHEXIDINE GLUCONATE 15 MILLILITER(S): 213 SOLUTION TOPICAL at 10:35

## 2022-01-01 RX ADMIN — Medication 400 MILLIGRAM(S): at 23:03

## 2022-01-01 RX ADMIN — SENNA PLUS 2 TABLET(S): 8.6 TABLET ORAL at 12:17

## 2022-01-01 RX ADMIN — Medication 1 DROP(S): at 23:46

## 2022-01-01 RX ADMIN — FENTANYL CITRATE 5.12 MICROGRAM(S)/KG/HR: 50 INJECTION INTRAVENOUS at 04:13

## 2022-01-01 RX ADMIN — Medication 2 DROP(S): at 13:02

## 2022-01-01 RX ADMIN — Medication 1 DROP(S): at 21:36

## 2022-01-01 RX ADMIN — NYSTATIN CREAM 1 APPLICATION(S): 100000 CREAM TOPICAL at 21:20

## 2022-01-01 RX ADMIN — Medication 1 DROP(S): at 00:01

## 2022-01-01 RX ADMIN — SEVELAMER CARBONATE 1600 MILLIGRAM(S): 2400 POWDER, FOR SUSPENSION ORAL at 02:05

## 2022-01-01 RX ADMIN — Medication 1 DROP(S): at 11:16

## 2022-01-01 RX ADMIN — HYDROMORPHONE HYDROCHLORIDE 0.25 MILLIGRAM(S): 2 INJECTION INTRAMUSCULAR; INTRAVENOUS; SUBCUTANEOUS at 14:32

## 2022-01-01 RX ADMIN — HYDROMORPHONE HYDROCHLORIDE 0.5 MILLIGRAM(S): 2 INJECTION INTRAMUSCULAR; INTRAVENOUS; SUBCUTANEOUS at 17:12

## 2022-01-01 RX ADMIN — NYSTATIN CREAM 1 APPLICATION(S): 100000 CREAM TOPICAL at 05:16

## 2022-01-01 RX ADMIN — PANTOPRAZOLE SODIUM 40 MILLIGRAM(S): 20 TABLET, DELAYED RELEASE ORAL at 22:23

## 2022-01-01 RX ADMIN — SENNA PLUS 2 TABLET(S): 8.6 TABLET ORAL at 11:32

## 2022-01-01 RX ADMIN — Medication 1 APPLICATION(S): at 05:57

## 2022-01-01 RX ADMIN — Medication 2 DROP(S): at 06:08

## 2022-01-01 RX ADMIN — HEPARIN SODIUM 5000 UNIT(S): 5000 INJECTION INTRAVENOUS; SUBCUTANEOUS at 13:41

## 2022-01-01 RX ADMIN — Medication 1 DROP(S): at 11:47

## 2022-01-01 RX ADMIN — FENTANYL CITRATE 50 MICROGRAM(S): 50 INJECTION INTRAVENOUS at 08:30

## 2022-01-01 RX ADMIN — Medication 3 MILLILITER(S): at 04:18

## 2022-01-01 RX ADMIN — OXYCODONE HYDROCHLORIDE 2.5 MILLIGRAM(S): 5 TABLET ORAL at 13:19

## 2022-01-01 RX ADMIN — APIXABAN 2.5 MILLIGRAM(S): 2.5 TABLET, FILM COATED ORAL at 05:45

## 2022-01-01 RX ADMIN — FENTANYL CITRATE 5.12 MICROGRAM(S)/KG/HR: 50 INJECTION INTRAVENOUS at 04:37

## 2022-01-01 RX ADMIN — HYDROMORPHONE HYDROCHLORIDE 0.5 MILLIGRAM(S): 2 INJECTION INTRAMUSCULAR; INTRAVENOUS; SUBCUTANEOUS at 17:58

## 2022-01-01 RX ADMIN — Medication 2 DROP(S): at 11:36

## 2022-01-01 RX ADMIN — SODIUM CHLORIDE 142 MILLILITER(S): 9 INJECTION INTRAMUSCULAR; INTRAVENOUS; SUBCUTANEOUS at 03:52

## 2022-01-01 RX ADMIN — Medication 40 MILLIGRAM(S): at 21:06

## 2022-01-01 RX ADMIN — FENTANYL CITRATE 5.12 MICROGRAM(S)/KG/HR: 50 INJECTION INTRAVENOUS at 18:36

## 2022-01-01 RX ADMIN — LIDOCAINE 1 PATCH: 4 CREAM TOPICAL at 17:28

## 2022-01-01 RX ADMIN — FENTANYL CITRATE 5.12 MICROGRAM(S)/KG/HR: 50 INJECTION INTRAVENOUS at 05:46

## 2022-01-01 RX ADMIN — FENTANYL CITRATE 50 MICROGRAM(S): 50 INJECTION INTRAVENOUS at 17:34

## 2022-01-01 RX ADMIN — HYDROMORPHONE HYDROCHLORIDE 0.5 MILLIGRAM(S): 2 INJECTION INTRAMUSCULAR; INTRAVENOUS; SUBCUTANEOUS at 13:52

## 2022-01-01 RX ADMIN — Medication 1 APPLICATION(S): at 05:19

## 2022-01-01 RX ADMIN — Medication 2 DROP(S): at 17:37

## 2022-01-01 RX ADMIN — LIDOCAINE 1 PATCH: 4 CREAM TOPICAL at 10:07

## 2022-01-01 RX ADMIN — Medication 0.25 MILLIGRAM(S): at 16:51

## 2022-01-01 RX ADMIN — HEPARIN SODIUM 5000 UNIT(S): 5000 INJECTION INTRAVENOUS; SUBCUTANEOUS at 06:40

## 2022-01-01 RX ADMIN — LIDOCAINE 1 PATCH: 4 CREAM TOPICAL at 23:59

## 2022-01-01 RX ADMIN — Medication 1 APPLICATION(S): at 21:13

## 2022-01-01 RX ADMIN — POLYETHYLENE GLYCOL 3350 17 GRAM(S): 17 POWDER, FOR SOLUTION ORAL at 12:17

## 2022-01-01 RX ADMIN — FENTANYL CITRATE 5.12 MICROGRAM(S)/KG/HR: 50 INJECTION INTRAVENOUS at 08:36

## 2022-01-01 RX ADMIN — FENTANYL CITRATE 5.12 MICROGRAM(S)/KG/HR: 50 INJECTION INTRAVENOUS at 02:33

## 2022-01-01 RX ADMIN — HYDROMORPHONE HYDROCHLORIDE 0.5 MILLIGRAM(S): 2 INJECTION INTRAMUSCULAR; INTRAVENOUS; SUBCUTANEOUS at 00:11

## 2022-01-01 RX ADMIN — PANTOPRAZOLE SODIUM 40 MILLIGRAM(S): 20 TABLET, DELAYED RELEASE ORAL at 21:34

## 2022-01-01 RX ADMIN — Medication 1 DROP(S): at 00:37

## 2022-01-01 RX ADMIN — HEPARIN SODIUM 5000 UNIT(S): 5000 INJECTION INTRAVENOUS; SUBCUTANEOUS at 21:46

## 2022-01-01 RX ADMIN — SEVELAMER CARBONATE 800 MILLIGRAM(S): 2400 POWDER, FOR SUSPENSION ORAL at 21:22

## 2022-01-01 RX ADMIN — Medication 3 MILLILITER(S): at 09:51

## 2022-01-01 RX ADMIN — Medication 9.59 MICROGRAM(S)/KG/MIN: at 11:32

## 2022-01-01 RX ADMIN — ARGATROBAN 6.14 MICROGRAM(S)/KG/MIN: 50 INJECTION, SOLUTION INTRAVENOUS at 09:03

## 2022-01-01 RX ADMIN — CHLORHEXIDINE GLUCONATE 15 MILLILITER(S): 213 SOLUTION TOPICAL at 05:25

## 2022-01-01 RX ADMIN — Medication 650 MILLIGRAM(S): at 11:40

## 2022-01-01 RX ADMIN — CHLORHEXIDINE GLUCONATE 15 MILLILITER(S): 213 SOLUTION TOPICAL at 21:38

## 2022-01-01 RX ADMIN — Medication 3 MILLILITER(S): at 13:08

## 2022-01-01 RX ADMIN — CHLORHEXIDINE GLUCONATE 1 APPLICATION(S): 213 SOLUTION TOPICAL at 05:24

## 2022-01-01 RX ADMIN — Medication 1 DROP(S): at 23:57

## 2022-01-01 RX ADMIN — NYSTATIN CREAM 1 APPLICATION(S): 100000 CREAM TOPICAL at 06:04

## 2022-01-01 RX ADMIN — ARGATROBAN 9.21 MICROGRAM(S)/KG/MIN: 50 INJECTION, SOLUTION INTRAVENOUS at 22:41

## 2022-01-01 RX ADMIN — SODIUM CHLORIDE 1000 MILLILITER(S): 9 INJECTION INTRAMUSCULAR; INTRAVENOUS; SUBCUTANEOUS at 17:50

## 2022-01-01 RX ADMIN — FENTANYL CITRATE 5.12 MICROGRAM(S)/KG/HR: 50 INJECTION INTRAVENOUS at 02:08

## 2022-01-01 RX ADMIN — NYSTATIN CREAM 1 APPLICATION(S): 100000 CREAM TOPICAL at 22:00

## 2022-01-01 RX ADMIN — Medication 1: at 05:31

## 2022-01-01 RX ADMIN — CHLORHEXIDINE GLUCONATE 15 MILLILITER(S): 213 SOLUTION TOPICAL at 21:19

## 2022-01-01 RX ADMIN — POLYETHYLENE GLYCOL 3350 17 GRAM(S): 17 POWDER, FOR SOLUTION ORAL at 12:29

## 2022-01-01 RX ADMIN — CHLORHEXIDINE GLUCONATE 15 MILLILITER(S): 213 SOLUTION TOPICAL at 21:39

## 2022-01-01 RX ADMIN — SODIUM CHLORIDE 1000 MILLILITER(S): 9 INJECTION INTRAMUSCULAR; INTRAVENOUS; SUBCUTANEOUS at 11:08

## 2022-01-01 RX ADMIN — Medication 4 MILLILITER(S): at 00:50

## 2022-01-01 RX ADMIN — PROPOFOL 3.07 MICROGRAM(S)/KG/MIN: 10 INJECTION, EMULSION INTRAVENOUS at 21:35

## 2022-01-01 RX ADMIN — Medication 2 DROP(S): at 18:08

## 2022-01-01 RX ADMIN — LIDOCAINE 1 PATCH: 4 CREAM TOPICAL at 10:00

## 2022-01-01 RX ADMIN — CEFTRIAXONE 100 MILLIGRAM(S): 500 INJECTION, POWDER, FOR SOLUTION INTRAMUSCULAR; INTRAVENOUS at 17:29

## 2022-01-01 RX ADMIN — PROPOFOL 6.14 MICROGRAM(S)/KG/MIN: 10 INJECTION, EMULSION INTRAVENOUS at 04:13

## 2022-01-01 RX ADMIN — PANTOPRAZOLE SODIUM 40 MILLIGRAM(S): 20 TABLET, DELAYED RELEASE ORAL at 22:02

## 2022-01-01 RX ADMIN — CHLORHEXIDINE GLUCONATE 1 APPLICATION(S): 213 SOLUTION TOPICAL at 06:40

## 2022-01-01 RX ADMIN — MORPHINE SULFATE 4 MILLIGRAM(S): 50 CAPSULE, EXTENDED RELEASE ORAL at 19:36

## 2022-01-01 RX ADMIN — LIDOCAINE 1 PATCH: 4 CREAM TOPICAL at 10:32

## 2022-01-01 RX ADMIN — HYDROMORPHONE HYDROCHLORIDE 1 MILLIGRAM(S): 2 INJECTION INTRAMUSCULAR; INTRAVENOUS; SUBCUTANEOUS at 18:01

## 2022-01-01 RX ADMIN — CHLORHEXIDINE GLUCONATE 15 MILLILITER(S): 213 SOLUTION TOPICAL at 11:31

## 2022-01-01 RX ADMIN — ARGATROBAN 4.6 MICROGRAM(S)/KG/MIN: 50 INJECTION, SOLUTION INTRAVENOUS at 17:38

## 2022-01-01 RX ADMIN — Medication 9.59 MICROGRAM(S)/KG/MIN: at 04:13

## 2022-01-01 RX ADMIN — HYDROMORPHONE HYDROCHLORIDE 0.5 MILLIGRAM(S): 2 INJECTION INTRAMUSCULAR; INTRAVENOUS; SUBCUTANEOUS at 18:38

## 2022-01-01 RX ADMIN — CHLORHEXIDINE GLUCONATE 15 MILLILITER(S): 213 SOLUTION TOPICAL at 05:46

## 2022-01-01 RX ADMIN — LIDOCAINE 1 PATCH: 4 CREAM TOPICAL at 18:31

## 2022-01-01 RX ADMIN — PROPOFOL 6.14 MICROGRAM(S)/KG/MIN: 10 INJECTION, EMULSION INTRAVENOUS at 05:32

## 2022-01-01 RX ADMIN — HYDROMORPHONE HYDROCHLORIDE 0.5 MILLIGRAM(S): 2 INJECTION INTRAMUSCULAR; INTRAVENOUS; SUBCUTANEOUS at 00:23

## 2022-01-01 RX ADMIN — Medication 3 MILLILITER(S): at 21:26

## 2022-01-01 RX ADMIN — LIDOCAINE 1 PATCH: 4 CREAM TOPICAL at 18:10

## 2022-01-01 RX ADMIN — Medication 1 DROP(S): at 00:45

## 2022-01-01 RX ADMIN — HYDROMORPHONE HYDROCHLORIDE 1 MILLIGRAM(S): 2 INJECTION INTRAMUSCULAR; INTRAVENOUS; SUBCUTANEOUS at 06:00

## 2022-01-01 RX ADMIN — Medication 400 MILLIGRAM(S): at 09:24

## 2022-01-01 RX ADMIN — SEVELAMER CARBONATE 1600 MILLIGRAM(S): 2400 POWDER, FOR SUSPENSION ORAL at 01:15

## 2022-01-01 RX ADMIN — Medication 50 MILLILITER(S): at 20:20

## 2022-01-01 RX ADMIN — Medication 3 MILLILITER(S): at 01:13

## 2022-01-01 RX ADMIN — HYDROMORPHONE HYDROCHLORIDE 2 MILLIGRAM(S): 2 INJECTION INTRAMUSCULAR; INTRAVENOUS; SUBCUTANEOUS at 03:55

## 2022-01-01 RX ADMIN — CHLORHEXIDINE GLUCONATE 15 MILLILITER(S): 213 SOLUTION TOPICAL at 19:32

## 2022-01-01 RX ADMIN — Medication 2 DROP(S): at 06:12

## 2022-01-01 RX ADMIN — PROPOFOL 3.07 MICROGRAM(S)/KG/MIN: 10 INJECTION, EMULSION INTRAVENOUS at 14:48

## 2022-01-01 RX ADMIN — SEVELAMER CARBONATE 800 MILLIGRAM(S): 2400 POWDER, FOR SUSPENSION ORAL at 22:17

## 2022-01-01 RX ADMIN — SODIUM ZIRCONIUM CYCLOSILICATE 10 GRAM(S): 10 POWDER, FOR SUSPENSION ORAL at 12:33

## 2022-01-01 RX ADMIN — Medication 650 MILLIGRAM(S): at 01:21

## 2022-01-01 RX ADMIN — HYDROMORPHONE HYDROCHLORIDE 0.5 MILLIGRAM(S): 2 INJECTION INTRAMUSCULAR; INTRAVENOUS; SUBCUTANEOUS at 10:40

## 2022-01-01 RX ADMIN — CEFEPIME 100 MILLIGRAM(S): 1 INJECTION, POWDER, FOR SOLUTION INTRAMUSCULAR; INTRAVENOUS at 02:01

## 2022-01-01 RX ADMIN — SENNA PLUS 2 TABLET(S): 8.6 TABLET ORAL at 11:29

## 2022-01-01 RX ADMIN — ARGATROBAN 9.21 MICROGRAM(S)/KG/MIN: 50 INJECTION, SOLUTION INTRAVENOUS at 00:32

## 2022-01-01 RX ADMIN — MIDAZOLAM HYDROCHLORIDE 2.05 MG/KG/HR: 1 INJECTION, SOLUTION INTRAMUSCULAR; INTRAVENOUS at 04:07

## 2022-01-01 RX ADMIN — Medication 3 MILLILITER(S): at 11:11

## 2022-01-01 RX ADMIN — LIDOCAINE 1 PATCH: 4 CREAM TOPICAL at 21:03

## 2022-01-01 RX ADMIN — Medication 4.8 MICROGRAM(S)/KG/MIN: at 19:43

## 2022-01-01 RX ADMIN — PROPOFOL 3.07 MICROGRAM(S)/KG/MIN: 10 INJECTION, EMULSION INTRAVENOUS at 04:46

## 2022-01-01 RX ADMIN — MIDAZOLAM HYDROCHLORIDE 2.05 MG/KG/HR: 1 INJECTION, SOLUTION INTRAMUSCULAR; INTRAVENOUS at 20:53

## 2022-01-01 RX ADMIN — PROPOFOL 6.14 MICROGRAM(S)/KG/MIN: 10 INJECTION, EMULSION INTRAVENOUS at 22:50

## 2022-01-01 RX ADMIN — MIDAZOLAM HYDROCHLORIDE 2.05 MG/KG/HR: 1 INJECTION, SOLUTION INTRAMUSCULAR; INTRAVENOUS at 23:53

## 2022-01-01 RX ADMIN — Medication 1 DROP(S): at 13:01

## 2022-01-01 RX ADMIN — CHLORHEXIDINE GLUCONATE 15 MILLILITER(S): 213 SOLUTION TOPICAL at 05:11

## 2022-01-01 RX ADMIN — NYSTATIN CREAM 1 APPLICATION(S): 100000 CREAM TOPICAL at 06:31

## 2022-01-01 RX ADMIN — SEVELAMER CARBONATE 800 MILLIGRAM(S): 2400 POWDER, FOR SUSPENSION ORAL at 06:05

## 2022-01-01 RX ADMIN — Medication 1 DROP(S): at 01:08

## 2022-01-01 RX ADMIN — CHLORHEXIDINE GLUCONATE 15 MILLILITER(S): 213 SOLUTION TOPICAL at 17:42

## 2022-01-01 RX ADMIN — CHLORHEXIDINE GLUCONATE 15 MILLILITER(S): 213 SOLUTION TOPICAL at 06:02

## 2022-01-01 RX ADMIN — CHLORHEXIDINE GLUCONATE 15 MILLILITER(S): 213 SOLUTION TOPICAL at 17:09

## 2022-01-01 RX ADMIN — NYSTATIN CREAM 1 APPLICATION(S): 100000 CREAM TOPICAL at 05:25

## 2022-01-01 RX ADMIN — CHLORHEXIDINE GLUCONATE 15 MILLILITER(S): 213 SOLUTION TOPICAL at 17:31

## 2022-01-01 RX ADMIN — HYDROMORPHONE HYDROCHLORIDE 0.5 MILLIGRAM(S): 2 INJECTION INTRAMUSCULAR; INTRAVENOUS; SUBCUTANEOUS at 03:02

## 2022-01-01 RX ADMIN — LIDOCAINE 1 PATCH: 4 CREAM TOPICAL at 19:19

## 2022-01-01 RX ADMIN — Medication 1: at 11:21

## 2022-01-01 RX ADMIN — SENNA PLUS 2 TABLET(S): 8.6 TABLET ORAL at 12:04

## 2022-01-01 RX ADMIN — NYSTATIN CREAM 1 APPLICATION(S): 100000 CREAM TOPICAL at 17:30

## 2022-01-01 RX ADMIN — CHLORHEXIDINE GLUCONATE 15 MILLILITER(S): 213 SOLUTION TOPICAL at 10:13

## 2022-01-01 RX ADMIN — LIDOCAINE 1 PATCH: 4 CREAM TOPICAL at 22:34

## 2022-01-01 RX ADMIN — HYDROMORPHONE HYDROCHLORIDE 0.25 MILLIGRAM(S): 2 INJECTION INTRAMUSCULAR; INTRAVENOUS; SUBCUTANEOUS at 06:12

## 2022-01-01 RX ADMIN — Medication 1 APPLICATION(S): at 05:45

## 2022-01-01 RX ADMIN — CHLORHEXIDINE GLUCONATE 15 MILLILITER(S): 213 SOLUTION TOPICAL at 22:10

## 2022-01-01 RX ADMIN — LIDOCAINE 1 PATCH: 4 CREAM TOPICAL at 23:00

## 2022-01-01 RX ADMIN — Medication 650 MILLIGRAM(S): at 08:23

## 2022-01-01 RX ADMIN — Medication 4.8 MICROGRAM(S)/KG/MIN: at 11:03

## 2022-01-01 RX ADMIN — HEPARIN SODIUM 1800 UNIT(S)/HR: 5000 INJECTION INTRAVENOUS; SUBCUTANEOUS at 00:35

## 2022-01-01 RX ADMIN — Medication 1: at 05:48

## 2022-01-01 RX ADMIN — FENTANYL CITRATE 5.12 MICROGRAM(S)/KG/HR: 50 INJECTION INTRAVENOUS at 22:33

## 2022-01-01 RX ADMIN — Medication 1: at 11:58

## 2022-01-01 RX ADMIN — Medication 3 MILLILITER(S): at 10:50

## 2022-01-01 RX ADMIN — Medication 3 MILLILITER(S): at 16:11

## 2022-01-01 RX ADMIN — Medication 9.59 MICROGRAM(S)/KG/MIN: at 12:55

## 2022-01-01 RX ADMIN — CEFEPIME 100 MILLIGRAM(S): 1 INJECTION, POWDER, FOR SOLUTION INTRAMUSCULAR; INTRAVENOUS at 02:34

## 2022-01-01 RX ADMIN — Medication 1 DROP(S): at 13:18

## 2022-01-01 RX ADMIN — SEVELAMER CARBONATE 800 MILLIGRAM(S): 2400 POWDER, FOR SUSPENSION ORAL at 14:44

## 2022-01-01 RX ADMIN — Medication 4 MILLILITER(S): at 11:57

## 2022-01-01 RX ADMIN — CEFEPIME 100 MILLIGRAM(S): 1 INJECTION, POWDER, FOR SOLUTION INTRAMUSCULAR; INTRAVENOUS at 05:01

## 2022-01-01 RX ADMIN — PROPOFOL 6.14 MICROGRAM(S)/KG/MIN: 10 INJECTION, EMULSION INTRAVENOUS at 04:11

## 2022-01-01 RX ADMIN — SENNA PLUS 2 TABLET(S): 8.6 TABLET ORAL at 11:07

## 2022-01-01 RX ADMIN — PROPOFOL 3.07 MICROGRAM(S)/KG/MIN: 10 INJECTION, EMULSION INTRAVENOUS at 21:18

## 2022-01-01 RX ADMIN — Medication 4 MILLILITER(S): at 23:40

## 2022-01-01 RX ADMIN — ARGATROBAN 4.6 MICROGRAM(S)/KG/MIN: 50 INJECTION, SOLUTION INTRAVENOUS at 01:00

## 2022-01-01 RX ADMIN — CHLORHEXIDINE GLUCONATE 15 MILLILITER(S): 213 SOLUTION TOPICAL at 05:19

## 2022-01-01 RX ADMIN — Medication 3 MILLILITER(S): at 06:12

## 2022-01-01 RX ADMIN — Medication 40 MILLIGRAM(S): at 14:56

## 2022-01-01 RX ADMIN — CHLORHEXIDINE GLUCONATE 15 MILLILITER(S): 213 SOLUTION TOPICAL at 09:28

## 2022-01-01 RX ADMIN — MIDODRINE HYDROCHLORIDE 30 MILLIGRAM(S): 2.5 TABLET ORAL at 09:43

## 2022-01-01 RX ADMIN — FENTANYL CITRATE 5.12 MICROGRAM(S)/KG/HR: 50 INJECTION INTRAVENOUS at 08:44

## 2022-01-01 RX ADMIN — Medication 3 MILLILITER(S): at 00:11

## 2022-01-01 RX ADMIN — LIDOCAINE 1 PATCH: 4 CREAM TOPICAL at 11:37

## 2022-01-01 RX ADMIN — LIDOCAINE 1 PATCH: 4 CREAM TOPICAL at 19:12

## 2022-01-01 RX ADMIN — SEVELAMER CARBONATE 1600 MILLIGRAM(S): 2400 POWDER, FOR SUSPENSION ORAL at 18:43

## 2022-01-01 RX ADMIN — Medication 3 MILLILITER(S): at 12:24

## 2022-01-01 RX ADMIN — Medication 650 MILLIGRAM(S): at 14:00

## 2022-01-01 RX ADMIN — LIDOCAINE 1 PATCH: 4 CREAM TOPICAL at 22:12

## 2022-01-01 RX ADMIN — CHLORHEXIDINE GLUCONATE 15 MILLILITER(S): 213 SOLUTION TOPICAL at 21:29

## 2022-01-01 RX ADMIN — APIXABAN 2.5 MILLIGRAM(S): 2.5 TABLET, FILM COATED ORAL at 05:23

## 2022-01-01 RX ADMIN — Medication 1 APPLICATION(S): at 21:22

## 2022-01-01 RX ADMIN — PANTOPRAZOLE SODIUM 40 MILLIGRAM(S): 20 TABLET, DELAYED RELEASE ORAL at 22:10

## 2022-01-01 RX ADMIN — CEFEPIME 1000 MILLIGRAM(S): 1 INJECTION, POWDER, FOR SOLUTION INTRAMUSCULAR; INTRAVENOUS at 11:31

## 2022-01-01 RX ADMIN — LIDOCAINE 1 PATCH: 4 CREAM TOPICAL at 10:22

## 2022-01-01 RX ADMIN — APIXABAN 2.5 MILLIGRAM(S): 2.5 TABLET, FILM COATED ORAL at 17:23

## 2022-01-01 RX ADMIN — FENTANYL CITRATE 50 MICROGRAM(S): 50 INJECTION INTRAVENOUS at 17:32

## 2022-01-01 RX ADMIN — MIDAZOLAM HYDROCHLORIDE 2.05 MG/KG/HR: 1 INJECTION, SOLUTION INTRAMUSCULAR; INTRAVENOUS at 14:06

## 2022-01-01 RX ADMIN — CHLORHEXIDINE GLUCONATE 15 MILLILITER(S): 213 SOLUTION TOPICAL at 05:03

## 2022-01-01 RX ADMIN — Medication 3 MILLILITER(S): at 23:28

## 2022-01-01 RX ADMIN — PROPOFOL 3.07 MICROGRAM(S)/KG/MIN: 10 INJECTION, EMULSION INTRAVENOUS at 02:55

## 2022-01-01 RX ADMIN — FENTANYL CITRATE 5.12 MICROGRAM(S)/KG/HR: 50 INJECTION INTRAVENOUS at 20:34

## 2022-01-01 RX ADMIN — Medication 650 MILLIGRAM(S): at 18:15

## 2022-01-01 RX ADMIN — Medication 3 MILLILITER(S): at 17:15

## 2022-01-01 RX ADMIN — OXYCODONE HYDROCHLORIDE 2.5 MILLIGRAM(S): 5 TABLET ORAL at 14:43

## 2022-01-01 RX ADMIN — VASOPRESSIN 1.8 UNIT(S)/MIN: 20 INJECTION INTRAVENOUS at 14:46

## 2022-01-01 RX ADMIN — Medication 3 MILLILITER(S): at 04:25

## 2022-01-01 RX ADMIN — Medication 180 MILLIGRAM(S): at 06:41

## 2022-01-01 RX ADMIN — HYDROMORPHONE HYDROCHLORIDE 1 MILLIGRAM(S): 2 INJECTION INTRAMUSCULAR; INTRAVENOUS; SUBCUTANEOUS at 18:22

## 2022-01-01 RX ADMIN — Medication 124 MILLIGRAM(S): at 15:23

## 2022-01-01 RX ADMIN — HYDROMORPHONE HYDROCHLORIDE 1 MILLIGRAM(S): 2 INJECTION INTRAMUSCULAR; INTRAVENOUS; SUBCUTANEOUS at 01:23

## 2022-01-01 RX ADMIN — HYDROMORPHONE HYDROCHLORIDE 0.5 MILLIGRAM(S): 2 INJECTION INTRAMUSCULAR; INTRAVENOUS; SUBCUTANEOUS at 12:00

## 2022-01-01 RX ADMIN — CHLORHEXIDINE GLUCONATE 15 MILLILITER(S): 213 SOLUTION TOPICAL at 21:54

## 2022-01-01 RX ADMIN — Medication 3 MILLILITER(S): at 11:32

## 2022-01-01 RX ADMIN — OXYCODONE HYDROCHLORIDE 2.5 MILLIGRAM(S): 5 TABLET ORAL at 21:33

## 2022-01-01 RX ADMIN — APIXABAN 2.5 MILLIGRAM(S): 2.5 TABLET, FILM COATED ORAL at 18:03

## 2022-01-01 RX ADMIN — Medication 80 MILLIGRAM(S): at 10:02

## 2022-01-01 RX ADMIN — Medication 1 DROP(S): at 11:33

## 2022-01-01 RX ADMIN — PROPOFOL 6.14 MICROGRAM(S)/KG/MIN: 10 INJECTION, EMULSION INTRAVENOUS at 11:32

## 2022-01-01 RX ADMIN — CHLORHEXIDINE GLUCONATE 15 MILLILITER(S): 213 SOLUTION TOPICAL at 22:11

## 2022-01-01 RX ADMIN — CEFEPIME 100 MILLIGRAM(S): 1 INJECTION, POWDER, FOR SOLUTION INTRAMUSCULAR; INTRAVENOUS at 02:22

## 2022-01-01 RX ADMIN — Medication 1000 MILLIGRAM(S): at 11:27

## 2022-01-01 RX ADMIN — PANTOPRAZOLE SODIUM 40 MILLIGRAM(S): 20 TABLET, DELAYED RELEASE ORAL at 21:19

## 2022-01-01 RX ADMIN — Medication 3 MILLILITER(S): at 19:44

## 2022-01-01 RX ADMIN — Medication 1 APPLICATION(S): at 21:57

## 2022-01-01 RX ADMIN — SEVELAMER CARBONATE 800 MILLIGRAM(S): 2400 POWDER, FOR SUSPENSION ORAL at 11:07

## 2022-01-01 RX ADMIN — NYSTATIN CREAM 1 APPLICATION(S): 100000 CREAM TOPICAL at 21:52

## 2022-01-01 RX ADMIN — PROPOFOL 6.14 MICROGRAM(S)/KG/MIN: 10 INJECTION, EMULSION INTRAVENOUS at 21:46

## 2022-01-01 RX ADMIN — FENTANYL CITRATE 5.12 MICROGRAM(S)/KG/HR: 50 INJECTION INTRAVENOUS at 08:29

## 2022-01-01 RX ADMIN — SENNA PLUS 2 TABLET(S): 8.6 TABLET ORAL at 11:06

## 2022-01-01 RX ADMIN — HEPARIN SODIUM 5000 UNIT(S): 5000 INJECTION INTRAVENOUS; SUBCUTANEOUS at 05:13

## 2022-01-01 RX ADMIN — Medication 1 APPLICATION(S): at 05:24

## 2022-01-01 RX ADMIN — CHLORHEXIDINE GLUCONATE 15 MILLILITER(S): 213 SOLUTION TOPICAL at 17:49

## 2022-01-01 RX ADMIN — MUPIROCIN 1 APPLICATION(S): 20 OINTMENT TOPICAL at 17:23

## 2022-01-01 RX ADMIN — Medication 650 MILLIGRAM(S): at 02:45

## 2022-01-01 RX ADMIN — CHLORHEXIDINE GLUCONATE 1 APPLICATION(S): 213 SOLUTION TOPICAL at 05:11

## 2022-01-01 RX ADMIN — PANTOPRAZOLE SODIUM 40 MILLIGRAM(S): 20 TABLET, DELAYED RELEASE ORAL at 22:16

## 2022-01-01 RX ADMIN — NYSTATIN CREAM 1 APPLICATION(S): 100000 CREAM TOPICAL at 13:40

## 2022-01-01 RX ADMIN — LIDOCAINE 1 PATCH: 4 CREAM TOPICAL at 20:39

## 2022-01-01 RX ADMIN — PROPOFOL 3.07 MICROGRAM(S)/KG/MIN: 10 INJECTION, EMULSION INTRAVENOUS at 21:55

## 2022-01-01 RX ADMIN — FENTANYL CITRATE 5.12 MICROGRAM(S)/KG/HR: 50 INJECTION INTRAVENOUS at 14:51

## 2022-01-01 RX ADMIN — HYDROMORPHONE HYDROCHLORIDE 0.25 MILLIGRAM(S): 2 INJECTION INTRAMUSCULAR; INTRAVENOUS; SUBCUTANEOUS at 23:51

## 2022-01-01 RX ADMIN — CHLORHEXIDINE GLUCONATE 15 MILLILITER(S): 213 SOLUTION TOPICAL at 17:29

## 2022-01-01 RX ADMIN — Medication 300 MILLIGRAM(S): at 15:02

## 2022-01-01 RX ADMIN — PROPOFOL 6.14 MICROGRAM(S)/KG/MIN: 10 INJECTION, EMULSION INTRAVENOUS at 04:43

## 2022-01-01 RX ADMIN — Medication 1 DROP(S): at 13:27

## 2022-01-01 RX ADMIN — PROPOFOL 3.07 MICROGRAM(S)/KG/MIN: 10 INJECTION, EMULSION INTRAVENOUS at 12:59

## 2022-01-01 RX ADMIN — Medication 650 MILLIGRAM(S): at 20:04

## 2022-01-01 RX ADMIN — ARGATROBAN 5.22 MICROGRAM(S)/KG/MIN: 50 INJECTION, SOLUTION INTRAVENOUS at 07:27

## 2022-01-01 RX ADMIN — CHLORHEXIDINE GLUCONATE 1 APPLICATION(S): 213 SOLUTION TOPICAL at 05:41

## 2022-01-01 RX ADMIN — HYDROMORPHONE HYDROCHLORIDE 1 MILLIGRAM(S): 2 INJECTION INTRAMUSCULAR; INTRAVENOUS; SUBCUTANEOUS at 13:39

## 2022-01-01 RX ADMIN — Medication 1 DROP(S): at 11:11

## 2022-01-01 RX ADMIN — LACTULOSE 20 GRAM(S): 10 SOLUTION ORAL at 05:12

## 2022-01-01 RX ADMIN — Medication 3 MILLILITER(S): at 23:43

## 2022-01-01 RX ADMIN — CHLORHEXIDINE GLUCONATE 15 MILLILITER(S): 213 SOLUTION TOPICAL at 09:51

## 2022-01-01 RX ADMIN — HEPARIN SODIUM 5000 UNIT(S): 5000 INJECTION INTRAVENOUS; SUBCUTANEOUS at 22:53

## 2022-01-01 RX ADMIN — DIATRIZOATE MEGLUMINE 30 MILLILITER(S): 180 INJECTION, SOLUTION INTRAVESICAL at 13:27

## 2022-01-01 RX ADMIN — PANTOPRAZOLE SODIUM 40 MILLIGRAM(S): 20 TABLET, DELAYED RELEASE ORAL at 21:22

## 2022-01-01 RX ADMIN — HYDROMORPHONE HYDROCHLORIDE 0.25 MILLIGRAM(S): 2 INJECTION INTRAMUSCULAR; INTRAVENOUS; SUBCUTANEOUS at 05:48

## 2022-01-01 RX ADMIN — HYDROMORPHONE HYDROCHLORIDE 0.5 MILLIGRAM(S): 2 INJECTION INTRAMUSCULAR; INTRAVENOUS; SUBCUTANEOUS at 17:43

## 2022-01-01 RX ADMIN — LIDOCAINE 1 PATCH: 4 CREAM TOPICAL at 18:18

## 2022-01-01 RX ADMIN — Medication 650 MILLIGRAM(S): at 22:39

## 2022-01-01 RX ADMIN — LIDOCAINE 1 PATCH: 4 CREAM TOPICAL at 10:29

## 2022-01-01 RX ADMIN — Medication 2 DROP(S): at 23:58

## 2022-01-01 RX ADMIN — NYSTATIN CREAM 1 APPLICATION(S): 100000 CREAM TOPICAL at 13:01

## 2022-01-01 RX ADMIN — Medication 3 MILLILITER(S): at 05:31

## 2022-01-01 RX ADMIN — LIDOCAINE 1 PATCH: 4 CREAM TOPICAL at 10:50

## 2022-01-01 RX ADMIN — HYDROMORPHONE HYDROCHLORIDE 1 MILLIGRAM(S): 2 INJECTION INTRAMUSCULAR; INTRAVENOUS; SUBCUTANEOUS at 21:21

## 2022-01-01 RX ADMIN — Medication 3 MILLILITER(S): at 05:17

## 2022-01-01 RX ADMIN — Medication 3 MILLILITER(S): at 09:18

## 2022-01-01 RX ADMIN — NYSTATIN CREAM 1 APPLICATION(S): 100000 CREAM TOPICAL at 23:43

## 2022-01-01 RX ADMIN — Medication 1 DROP(S): at 00:35

## 2022-01-01 RX ADMIN — CHLORHEXIDINE GLUCONATE 15 MILLILITER(S): 213 SOLUTION TOPICAL at 22:43

## 2022-01-01 RX ADMIN — LACTULOSE 20 GRAM(S): 10 SOLUTION ORAL at 21:07

## 2022-01-01 RX ADMIN — NYSTATIN CREAM 1 APPLICATION(S): 100000 CREAM TOPICAL at 22:44

## 2022-01-01 RX ADMIN — CHLORHEXIDINE GLUCONATE 15 MILLILITER(S): 213 SOLUTION TOPICAL at 17:15

## 2022-01-01 RX ADMIN — Medication 1 DROP(S): at 18:10

## 2022-01-01 RX ADMIN — HYDROMORPHONE HYDROCHLORIDE 0.5 MILLIGRAM(S): 2 INJECTION INTRAMUSCULAR; INTRAVENOUS; SUBCUTANEOUS at 14:05

## 2022-01-01 RX ADMIN — FENTANYL CITRATE 5.12 MICROGRAM(S)/KG/HR: 50 INJECTION INTRAVENOUS at 21:52

## 2022-01-01 RX ADMIN — FENTANYL CITRATE 5.12 MICROGRAM(S)/KG/HR: 50 INJECTION INTRAVENOUS at 06:51

## 2022-01-01 RX ADMIN — CHLORHEXIDINE GLUCONATE 15 MILLILITER(S): 213 SOLUTION TOPICAL at 11:11

## 2022-01-01 RX ADMIN — CHLORHEXIDINE GLUCONATE 15 MILLILITER(S): 213 SOLUTION TOPICAL at 17:30

## 2022-01-01 RX ADMIN — PROPOFOL 3.07 MICROGRAM(S)/KG/MIN: 10 INJECTION, EMULSION INTRAVENOUS at 09:40

## 2022-01-01 RX ADMIN — LIDOCAINE 1 PATCH: 4 CREAM TOPICAL at 18:29

## 2022-01-01 RX ADMIN — Medication 1 DROP(S): at 22:50

## 2022-01-01 RX ADMIN — FENTANYL CITRATE 5.12 MICROGRAM(S)/KG/HR: 50 INJECTION INTRAVENOUS at 14:01

## 2022-01-01 RX ADMIN — Medication 3 MILLILITER(S): at 17:01

## 2022-01-01 RX ADMIN — POLYETHYLENE GLYCOL 3350 17 GRAM(S): 17 POWDER, FOR SOLUTION ORAL at 12:04

## 2022-01-01 RX ADMIN — FENTANYL CITRATE 5.12 MICROGRAM(S)/KG/HR: 50 INJECTION INTRAVENOUS at 21:18

## 2022-01-01 RX ADMIN — PANTOPRAZOLE SODIUM 40 MILLIGRAM(S): 20 TABLET, DELAYED RELEASE ORAL at 22:35

## 2022-01-01 RX ADMIN — HYDROMORPHONE HYDROCHLORIDE 0.5 MILLIGRAM(S): 2 INJECTION INTRAMUSCULAR; INTRAVENOUS; SUBCUTANEOUS at 01:03

## 2022-01-01 RX ADMIN — LIDOCAINE 1 PATCH: 4 CREAM TOPICAL at 20:52

## 2022-01-01 RX ADMIN — CEFEPIME 100 MILLIGRAM(S): 1 INJECTION, POWDER, FOR SOLUTION INTRAMUSCULAR; INTRAVENOUS at 14:09

## 2022-01-01 RX ADMIN — Medication 3 MILLILITER(S): at 10:07

## 2022-01-01 RX ADMIN — CEFEPIME 100 MILLIGRAM(S): 1 INJECTION, POWDER, FOR SOLUTION INTRAMUSCULAR; INTRAVENOUS at 14:51

## 2022-01-01 RX ADMIN — PROPOFOL 6.14 MICROGRAM(S)/KG/MIN: 10 INJECTION, EMULSION INTRAVENOUS at 10:03

## 2022-01-01 RX ADMIN — Medication 1: at 17:24

## 2022-01-01 RX ADMIN — LIDOCAINE 1 PATCH: 4 CREAM TOPICAL at 11:10

## 2022-01-01 RX ADMIN — LIDOCAINE 1 PATCH: 4 CREAM TOPICAL at 09:49

## 2022-01-01 RX ADMIN — NYSTATIN CREAM 1 APPLICATION(S): 100000 CREAM TOPICAL at 05:24

## 2022-01-01 RX ADMIN — FENTANYL CITRATE 5.12 MICROGRAM(S)/KG/HR: 50 INJECTION INTRAVENOUS at 02:55

## 2022-01-01 RX ADMIN — LIDOCAINE 1 PATCH: 4 CREAM TOPICAL at 19:33

## 2022-01-01 RX ADMIN — ARGATROBAN 9.21 MICROGRAM(S)/KG/MIN: 50 INJECTION, SOLUTION INTRAVENOUS at 14:44

## 2022-01-01 RX ADMIN — Medication 20 MILLIGRAM(S): at 15:54

## 2022-01-01 RX ADMIN — LIDOCAINE 1 PATCH: 4 CREAM TOPICAL at 19:13

## 2022-01-01 RX ADMIN — SEVELAMER CARBONATE 800 MILLIGRAM(S): 2400 POWDER, FOR SUSPENSION ORAL at 05:36

## 2022-01-01 RX ADMIN — POLYETHYLENE GLYCOL 3350 17 GRAM(S): 17 POWDER, FOR SOLUTION ORAL at 11:04

## 2022-01-01 RX ADMIN — HEPARIN SODIUM 1800 UNIT(S)/HR: 5000 INJECTION INTRAVENOUS; SUBCUTANEOUS at 17:39

## 2022-01-01 RX ADMIN — Medication 650 MILLIGRAM(S): at 10:33

## 2022-01-01 RX ADMIN — Medication 4 MILLILITER(S): at 05:06

## 2022-01-01 RX ADMIN — IRON SUCROSE 176.67 MILLIGRAM(S): 20 INJECTION, SOLUTION INTRAVENOUS at 06:54

## 2022-01-01 RX ADMIN — NYSTATIN CREAM 1 APPLICATION(S): 100000 CREAM TOPICAL at 21:38

## 2022-01-01 RX ADMIN — HYDROMORPHONE HYDROCHLORIDE 0.5 MG/HR: 2 INJECTION INTRAMUSCULAR; INTRAVENOUS; SUBCUTANEOUS at 08:00

## 2022-01-01 RX ADMIN — POLYETHYLENE GLYCOL 3350 17 GRAM(S): 17 POWDER, FOR SOLUTION ORAL at 11:42

## 2022-01-01 RX ADMIN — Medication 50 MILLIGRAM(S): at 16:15

## 2022-01-01 RX ADMIN — MIDODRINE HYDROCHLORIDE 5 MILLIGRAM(S): 2.5 TABLET ORAL at 21:51

## 2022-01-01 RX ADMIN — Medication 1 DROP(S): at 17:54

## 2022-01-01 RX ADMIN — HYDROMORPHONE HYDROCHLORIDE 0.5 MILLIGRAM(S): 2 INJECTION INTRAMUSCULAR; INTRAVENOUS; SUBCUTANEOUS at 10:25

## 2022-01-01 RX ADMIN — LIDOCAINE 1 PATCH: 4 CREAM TOPICAL at 22:06

## 2022-01-01 RX ADMIN — NYSTATIN CREAM 1 APPLICATION(S): 100000 CREAM TOPICAL at 21:12

## 2022-01-01 RX ADMIN — CEFEPIME 100 MILLIGRAM(S): 1 INJECTION, POWDER, FOR SOLUTION INTRAMUSCULAR; INTRAVENOUS at 02:08

## 2022-01-01 RX ADMIN — Medication 3 MILLILITER(S): at 23:39

## 2022-01-01 RX ADMIN — CHLORHEXIDINE GLUCONATE 15 MILLILITER(S): 213 SOLUTION TOPICAL at 05:57

## 2022-01-01 RX ADMIN — HYDROMORPHONE HYDROCHLORIDE 0.5 MILLIGRAM(S): 2 INJECTION INTRAMUSCULAR; INTRAVENOUS; SUBCUTANEOUS at 16:33

## 2022-01-01 RX ADMIN — HYDROMORPHONE HYDROCHLORIDE 2 MILLIGRAM(S): 2 INJECTION INTRAMUSCULAR; INTRAVENOUS; SUBCUTANEOUS at 02:55

## 2022-01-01 RX ADMIN — CHLORHEXIDINE GLUCONATE 15 MILLILITER(S): 213 SOLUTION TOPICAL at 06:18

## 2022-01-01 RX ADMIN — ARGATROBAN 5.22 MICROGRAM(S)/KG/MIN: 50 INJECTION, SOLUTION INTRAVENOUS at 02:26

## 2022-01-01 RX ADMIN — FENTANYL CITRATE 5.12 MICROGRAM(S)/KG/HR: 50 INJECTION INTRAVENOUS at 08:35

## 2022-01-01 RX ADMIN — Medication 150 GRAM(S): at 23:46

## 2022-01-01 RX ADMIN — HYDROMORPHONE HYDROCHLORIDE 0.2 MG/HR: 2 INJECTION INTRAMUSCULAR; INTRAVENOUS; SUBCUTANEOUS at 18:49

## 2022-01-01 RX ADMIN — HYDROMORPHONE HYDROCHLORIDE 1 MILLIGRAM(S): 2 INJECTION INTRAMUSCULAR; INTRAVENOUS; SUBCUTANEOUS at 21:46

## 2022-01-01 RX ADMIN — FENTANYL CITRATE 5.12 MICROGRAM(S)/KG/HR: 50 INJECTION INTRAVENOUS at 03:11

## 2022-01-01 RX ADMIN — LIDOCAINE 1 PATCH: 4 CREAM TOPICAL at 21:04

## 2022-01-01 RX ADMIN — Medication 3 MILLILITER(S): at 15:20

## 2022-01-01 RX ADMIN — CISATRACURIUM BESYLATE 20 MILLIGRAM(S): 2 INJECTION INTRAVENOUS at 14:10

## 2022-01-01 RX ADMIN — LIDOCAINE 1 PATCH: 4 CREAM TOPICAL at 10:51

## 2022-01-01 RX ADMIN — LIDOCAINE 1 PATCH: 4 CREAM TOPICAL at 11:03

## 2022-01-01 RX ADMIN — CHLORHEXIDINE GLUCONATE 15 MILLILITER(S): 213 SOLUTION TOPICAL at 05:12

## 2022-01-01 RX ADMIN — Medication 1 DROP(S): at 23:06

## 2022-01-01 RX ADMIN — Medication 50 MILLILITER(S): at 21:37

## 2022-01-01 RX ADMIN — FENTANYL CITRATE 5.12 MICROGRAM(S)/KG/HR: 50 INJECTION INTRAVENOUS at 03:14

## 2022-01-01 RX ADMIN — Medication 650 MILLIGRAM(S): at 01:36

## 2022-01-01 RX ADMIN — LIDOCAINE 1 PATCH: 4 CREAM TOPICAL at 18:46

## 2022-01-01 RX ADMIN — Medication 650 MILLIGRAM(S): at 05:55

## 2022-01-01 RX ADMIN — ARGATROBAN 9.21 MICROGRAM(S)/KG/MIN: 50 INJECTION, SOLUTION INTRAVENOUS at 06:00

## 2022-01-01 RX ADMIN — Medication 3 MILLILITER(S): at 18:01

## 2022-01-01 RX ADMIN — FENTANYL CITRATE 5.12 MICROGRAM(S)/KG/HR: 50 INJECTION INTRAVENOUS at 17:39

## 2022-01-01 RX ADMIN — Medication 0.2 MILLIGRAM(S): at 17:05

## 2022-01-01 RX ADMIN — Medication 2 DROP(S): at 00:32

## 2022-01-01 RX ADMIN — Medication 2 DROP(S): at 19:06

## 2022-01-01 RX ADMIN — Medication 650 MILLIGRAM(S): at 00:41

## 2022-01-01 RX ADMIN — HYDROMORPHONE HYDROCHLORIDE 0.25 MILLIGRAM(S): 2 INJECTION INTRAMUSCULAR; INTRAVENOUS; SUBCUTANEOUS at 17:46

## 2022-01-01 RX ADMIN — Medication 3 MILLILITER(S): at 22:26

## 2022-01-01 RX ADMIN — LIDOCAINE 1 PATCH: 4 CREAM TOPICAL at 22:52

## 2022-01-01 RX ADMIN — SENNA PLUS 2 TABLET(S): 8.6 TABLET ORAL at 12:29

## 2022-01-01 RX ADMIN — Medication 3 MILLILITER(S): at 00:46

## 2022-01-01 RX ADMIN — MIDAZOLAM HYDROCHLORIDE 2 MILLIGRAM(S): 1 INJECTION, SOLUTION INTRAMUSCULAR; INTRAVENOUS at 17:20

## 2022-01-01 RX ADMIN — Medication 3 MILLILITER(S): at 11:26

## 2022-01-01 RX ADMIN — PANTOPRAZOLE SODIUM 40 MILLIGRAM(S): 20 TABLET, DELAYED RELEASE ORAL at 21:38

## 2022-01-01 RX ADMIN — PROPOFOL 3.07 MICROGRAM(S)/KG/MIN: 10 INJECTION, EMULSION INTRAVENOUS at 07:15

## 2022-01-01 RX ADMIN — HYDROMORPHONE HYDROCHLORIDE 2 MILLIGRAM(S): 2 INJECTION INTRAMUSCULAR; INTRAVENOUS; SUBCUTANEOUS at 10:20

## 2022-01-01 RX ADMIN — HYDROMORPHONE HYDROCHLORIDE 1 MILLIGRAM(S): 2 INJECTION INTRAMUSCULAR; INTRAVENOUS; SUBCUTANEOUS at 19:36

## 2022-01-01 RX ADMIN — MUPIROCIN 1 APPLICATION(S): 20 OINTMENT TOPICAL at 17:22

## 2022-01-01 RX ADMIN — Medication 1: at 17:39

## 2022-01-01 RX ADMIN — ARGATROBAN 9.21 MICROGRAM(S)/KG/MIN: 50 INJECTION, SOLUTION INTRAVENOUS at 13:41

## 2022-01-01 RX ADMIN — Medication 650 MILLIGRAM(S): at 20:38

## 2022-01-01 RX ADMIN — MIDODRINE HYDROCHLORIDE 10 MILLIGRAM(S): 2.5 TABLET ORAL at 17:15

## 2022-01-01 RX ADMIN — FENTANYL CITRATE 5.12 MICROGRAM(S)/KG/HR: 50 INJECTION INTRAVENOUS at 17:27

## 2022-01-01 RX ADMIN — Medication 3 MILLILITER(S): at 05:13

## 2022-01-01 RX ADMIN — Medication 1 DROP(S): at 13:15

## 2022-01-01 RX ADMIN — FENTANYL CITRATE 5.12 MICROGRAM(S)/KG/HR: 50 INJECTION INTRAVENOUS at 00:01

## 2022-01-01 RX ADMIN — PROPOFOL 3.07 MICROGRAM(S)/KG/MIN: 10 INJECTION, EMULSION INTRAVENOUS at 17:11

## 2022-01-01 RX ADMIN — LIDOCAINE 1 PATCH: 4 CREAM TOPICAL at 10:02

## 2022-01-01 RX ADMIN — HYDROMORPHONE HYDROCHLORIDE 0.5 MILLIGRAM(S): 2 INJECTION INTRAMUSCULAR; INTRAVENOUS; SUBCUTANEOUS at 16:48

## 2022-01-01 RX ADMIN — Medication 650 MILLIGRAM(S): at 00:27

## 2022-01-01 RX ADMIN — NYSTATIN CREAM 1 APPLICATION(S): 100000 CREAM TOPICAL at 21:23

## 2022-01-01 RX ADMIN — ARGATROBAN 9.21 MICROGRAM(S)/KG/MIN: 50 INJECTION, SOLUTION INTRAVENOUS at 17:32

## 2022-01-01 RX ADMIN — OXYCODONE HYDROCHLORIDE 2.5 MILLIGRAM(S): 5 TABLET ORAL at 14:00

## 2022-01-01 RX ADMIN — VASOPRESSIN 1.8 UNIT(S)/MIN: 20 INJECTION INTRAVENOUS at 19:55

## 2022-01-01 RX ADMIN — Medication 650 MILLIGRAM(S): at 22:15

## 2022-01-01 RX ADMIN — Medication 3 MILLILITER(S): at 18:37

## 2022-01-01 RX ADMIN — NYSTATIN CREAM 1 APPLICATION(S): 100000 CREAM TOPICAL at 15:28

## 2022-01-01 RX ADMIN — Medication 9.59 MICROGRAM(S)/KG/MIN: at 05:37

## 2022-01-01 RX ADMIN — FENTANYL CITRATE 5.12 MICROGRAM(S)/KG/HR: 50 INJECTION INTRAVENOUS at 04:53

## 2022-01-01 RX ADMIN — Medication 650 MILLIGRAM(S): at 18:50

## 2022-01-01 RX ADMIN — SODIUM CHLORIDE 1000 MILLILITER(S): 9 INJECTION INTRAMUSCULAR; INTRAVENOUS; SUBCUTANEOUS at 12:51

## 2022-01-01 RX ADMIN — Medication 1000 MILLIGRAM(S): at 19:20

## 2022-01-01 RX ADMIN — CHLORHEXIDINE GLUCONATE 1 APPLICATION(S): 213 SOLUTION TOPICAL at 06:15

## 2022-01-01 RX ADMIN — Medication 4.8 MICROGRAM(S)/KG/MIN: at 21:36

## 2022-01-01 RX ADMIN — LIDOCAINE 1 PATCH: 4 CREAM TOPICAL at 09:42

## 2022-01-01 RX ADMIN — Medication 3 MILLILITER(S): at 18:19

## 2022-01-01 RX ADMIN — Medication 100 GRAM(S): at 07:19

## 2022-01-01 RX ADMIN — ARGATROBAN 5.22 MICROGRAM(S)/KG/MIN: 50 INJECTION, SOLUTION INTRAVENOUS at 17:00

## 2022-01-01 RX ADMIN — MORPHINE SULFATE 4 MILLIGRAM(S): 50 CAPSULE, EXTENDED RELEASE ORAL at 17:55

## 2022-01-01 RX ADMIN — BUDESONIDE AND FORMOTEROL FUMARATE DIHYDRATE 2 PUFF(S): 160; 4.5 AEROSOL RESPIRATORY (INHALATION) at 02:24

## 2022-01-01 RX ADMIN — POLYETHYLENE GLYCOL 3350 17 GRAM(S): 17 POWDER, FOR SOLUTION ORAL at 11:32

## 2022-01-01 RX ADMIN — FENTANYL CITRATE 5.12 MICROGRAM(S)/KG/HR: 50 INJECTION INTRAVENOUS at 13:38

## 2022-01-01 RX ADMIN — Medication 150 GRAM(S): at 16:50

## 2022-01-01 RX ADMIN — Medication 3 MILLILITER(S): at 11:41

## 2022-01-01 RX ADMIN — Medication 125 MILLIGRAM(S): at 16:51

## 2022-01-01 RX ADMIN — BUMETANIDE 1 MILLIGRAM(S): 0.25 INJECTION INTRAMUSCULAR; INTRAVENOUS at 18:05

## 2022-01-01 RX ADMIN — Medication 3 MILLILITER(S): at 11:14

## 2022-01-01 RX ADMIN — Medication 1: at 05:25

## 2022-01-01 RX ADMIN — LIDOCAINE 1 PATCH: 4 CREAM TOPICAL at 11:21

## 2022-01-01 RX ADMIN — LIDOCAINE 1 PATCH: 4 CREAM TOPICAL at 20:31

## 2022-01-01 RX ADMIN — SEVELAMER CARBONATE 1600 MILLIGRAM(S): 2400 POWDER, FOR SUSPENSION ORAL at 11:10

## 2022-01-01 RX ADMIN — HYDROMORPHONE HYDROCHLORIDE 1 MILLIGRAM(S): 2 INJECTION INTRAMUSCULAR; INTRAVENOUS; SUBCUTANEOUS at 13:08

## 2022-01-01 RX ADMIN — POLYETHYLENE GLYCOL 3350 17 GRAM(S): 17 POWDER, FOR SOLUTION ORAL at 11:07

## 2022-01-01 RX ADMIN — CHLORHEXIDINE GLUCONATE 15 MILLILITER(S): 213 SOLUTION TOPICAL at 06:15

## 2022-01-01 RX ADMIN — SEVELAMER CARBONATE 800 MILLIGRAM(S): 2400 POWDER, FOR SUSPENSION ORAL at 13:17

## 2022-01-01 RX ADMIN — Medication 650 MILLIGRAM(S): at 23:44

## 2022-01-01 RX ADMIN — Medication 3 MILLILITER(S): at 16:51

## 2022-01-01 RX ADMIN — HYDROMORPHONE HYDROCHLORIDE 0.25 MILLIGRAM(S): 2 INJECTION INTRAMUSCULAR; INTRAVENOUS; SUBCUTANEOUS at 11:24

## 2022-01-01 RX ADMIN — Medication 1 DROP(S): at 13:05

## 2022-01-01 RX ADMIN — Medication 4 MILLILITER(S): at 06:12

## 2022-01-01 RX ADMIN — CEFTRIAXONE 100 MILLIGRAM(S): 500 INJECTION, POWDER, FOR SOLUTION INTRAMUSCULAR; INTRAVENOUS at 17:26

## 2022-01-01 RX ADMIN — CEFEPIME 100 MILLIGRAM(S): 1 INJECTION, POWDER, FOR SOLUTION INTRAMUSCULAR; INTRAVENOUS at 01:01

## 2022-01-01 RX ADMIN — CHLORHEXIDINE GLUCONATE 1 APPLICATION(S): 213 SOLUTION TOPICAL at 06:13

## 2022-01-01 RX ADMIN — CHLORHEXIDINE GLUCONATE 1 APPLICATION(S): 213 SOLUTION TOPICAL at 05:17

## 2022-01-01 RX ADMIN — LIDOCAINE 1 PATCH: 4 CREAM TOPICAL at 18:05

## 2022-01-01 RX ADMIN — SEVELAMER CARBONATE 1600 MILLIGRAM(S): 2400 POWDER, FOR SUSPENSION ORAL at 18:38

## 2022-01-01 RX ADMIN — Medication 650 MILLIGRAM(S): at 16:48

## 2022-01-01 RX ADMIN — PANTOPRAZOLE SODIUM 40 MILLIGRAM(S): 20 TABLET, DELAYED RELEASE ORAL at 21:11

## 2022-01-01 RX ADMIN — APIXABAN 2.5 MILLIGRAM(S): 2.5 TABLET, FILM COATED ORAL at 17:11

## 2022-01-01 RX ADMIN — APIXABAN 2.5 MILLIGRAM(S): 2.5 TABLET, FILM COATED ORAL at 17:39

## 2022-01-01 RX ADMIN — PANTOPRAZOLE SODIUM 40 MILLIGRAM(S): 20 TABLET, DELAYED RELEASE ORAL at 21:46

## 2022-01-01 RX ADMIN — CEFEPIME 100 MILLIGRAM(S): 1 INJECTION, POWDER, FOR SOLUTION INTRAMUSCULAR; INTRAVENOUS at 13:30

## 2022-01-01 RX ADMIN — SEVELAMER CARBONATE 1600 MILLIGRAM(S): 2400 POWDER, FOR SUSPENSION ORAL at 03:13

## 2022-01-01 RX ADMIN — SODIUM CHLORIDE 1000 MILLILITER(S): 9 INJECTION INTRAMUSCULAR; INTRAVENOUS; SUBCUTANEOUS at 05:00

## 2022-01-01 RX ADMIN — Medication 1 DROP(S): at 05:24

## 2022-01-01 RX ADMIN — Medication 3 MILLILITER(S): at 23:53

## 2022-01-01 RX ADMIN — Medication 3 MILLILITER(S): at 05:34

## 2022-01-01 RX ADMIN — ALBUTEROL 2.5 MILLIGRAM(S): 90 AEROSOL, METERED ORAL at 19:00

## 2022-01-01 RX ADMIN — FENTANYL CITRATE 5.12 MICROGRAM(S)/KG/HR: 50 INJECTION INTRAVENOUS at 14:34

## 2022-01-01 RX ADMIN — Medication 1 DROP(S): at 11:43

## 2022-01-01 RX ADMIN — CEFEPIME 100 MILLIGRAM(S): 1 INJECTION, POWDER, FOR SOLUTION INTRAMUSCULAR; INTRAVENOUS at 11:08

## 2022-01-01 RX ADMIN — LIDOCAINE 1 PATCH: 4 CREAM TOPICAL at 10:18

## 2022-01-01 RX ADMIN — ARGATROBAN 4.6 MICROGRAM(S)/KG/MIN: 50 INJECTION, SOLUTION INTRAVENOUS at 21:52

## 2022-01-01 RX ADMIN — HEPARIN SODIUM 5000 UNIT(S): 5000 INJECTION INTRAVENOUS; SUBCUTANEOUS at 05:11

## 2022-01-01 RX ADMIN — Medication 2 DROP(S): at 06:05

## 2022-01-01 RX ADMIN — Medication 9.59 MICROGRAM(S)/KG/MIN: at 19:05

## 2022-01-01 RX ADMIN — Medication 4 MILLILITER(S): at 06:05

## 2022-01-01 RX ADMIN — HYDROMORPHONE HYDROCHLORIDE 0.5 MILLIGRAM(S): 2 INJECTION INTRAMUSCULAR; INTRAVENOUS; SUBCUTANEOUS at 06:06

## 2022-01-01 RX ADMIN — APIXABAN 2.5 MILLIGRAM(S): 2.5 TABLET, FILM COATED ORAL at 05:01

## 2022-01-01 RX ADMIN — Medication 1 APPLICATION(S): at 13:22

## 2022-01-01 RX ADMIN — PANTOPRAZOLE SODIUM 40 MILLIGRAM(S): 20 TABLET, DELAYED RELEASE ORAL at 22:21

## 2022-01-01 RX ADMIN — CHLORHEXIDINE GLUCONATE 15 MILLILITER(S): 213 SOLUTION TOPICAL at 21:32

## 2022-01-01 RX ADMIN — PROPOFOL 6.14 MICROGRAM(S)/KG/MIN: 10 INJECTION, EMULSION INTRAVENOUS at 23:56

## 2022-01-01 RX ADMIN — MIDAZOLAM HYDROCHLORIDE 2.05 MG/KG/HR: 1 INJECTION, SOLUTION INTRAMUSCULAR; INTRAVENOUS at 18:39

## 2022-01-01 RX ADMIN — Medication 3 MILLILITER(S): at 05:41

## 2022-01-01 RX ADMIN — MIDAZOLAM HYDROCHLORIDE 2.05 MG/KG/HR: 1 INJECTION, SOLUTION INTRAMUSCULAR; INTRAVENOUS at 18:44

## 2022-01-01 RX ADMIN — LIDOCAINE 1 PATCH: 4 CREAM TOPICAL at 09:47

## 2022-01-01 RX ADMIN — Medication 650 MILLIGRAM(S): at 00:49

## 2022-01-01 RX ADMIN — HYDROMORPHONE HYDROCHLORIDE 0.5 MILLIGRAM(S): 2 INJECTION INTRAMUSCULAR; INTRAVENOUS; SUBCUTANEOUS at 13:22

## 2022-01-01 RX ADMIN — BUMETANIDE 2 MILLIGRAM(S): 0.25 INJECTION INTRAMUSCULAR; INTRAVENOUS at 06:04

## 2022-01-01 RX ADMIN — NYSTATIN CREAM 1 APPLICATION(S): 100000 CREAM TOPICAL at 14:19

## 2022-01-01 RX ADMIN — Medication 2 DROP(S): at 01:07

## 2022-01-01 RX ADMIN — SEVELAMER CARBONATE 800 MILLIGRAM(S): 2400 POWDER, FOR SUSPENSION ORAL at 22:44

## 2022-01-01 RX ADMIN — CEFTRIAXONE 100 MILLIGRAM(S): 500 INJECTION, POWDER, FOR SOLUTION INTRAMUSCULAR; INTRAVENOUS at 17:46

## 2022-01-01 RX ADMIN — CHLORHEXIDINE GLUCONATE 15 MILLILITER(S): 213 SOLUTION TOPICAL at 06:30

## 2022-01-01 RX ADMIN — LIDOCAINE 1 PATCH: 4 CREAM TOPICAL at 19:35

## 2022-01-01 RX ADMIN — ARGATROBAN 9.21 MICROGRAM(S)/KG/MIN: 50 INJECTION, SOLUTION INTRAVENOUS at 13:01

## 2022-01-01 RX ADMIN — LIDOCAINE 1 PATCH: 4 CREAM TOPICAL at 18:42

## 2022-01-01 RX ADMIN — CEFEPIME 100 MILLIGRAM(S): 1 INJECTION, POWDER, FOR SOLUTION INTRAMUSCULAR; INTRAVENOUS at 13:57

## 2022-01-01 RX ADMIN — Medication 3 MILLILITER(S): at 05:51

## 2022-01-01 RX ADMIN — MORPHINE SULFATE 2 MILLIGRAM(S): 50 CAPSULE, EXTENDED RELEASE ORAL at 19:50

## 2022-01-01 RX ADMIN — MUPIROCIN 1 APPLICATION(S): 20 OINTMENT TOPICAL at 06:06

## 2022-01-01 RX ADMIN — CHLORHEXIDINE GLUCONATE 1 APPLICATION(S): 213 SOLUTION TOPICAL at 06:30

## 2022-01-01 RX ADMIN — LIDOCAINE 1 PATCH: 4 CREAM TOPICAL at 12:39

## 2022-01-01 RX ADMIN — Medication 650 MILLIGRAM(S): at 09:51

## 2022-01-01 RX ADMIN — SEVELAMER CARBONATE 1600 MILLIGRAM(S): 2400 POWDER, FOR SUSPENSION ORAL at 11:43

## 2022-01-01 RX ADMIN — FENTANYL CITRATE 50 MICROGRAM(S): 50 INJECTION INTRAVENOUS at 10:17

## 2022-01-01 RX ADMIN — Medication 1 DROP(S): at 05:57

## 2022-01-01 RX ADMIN — SEVELAMER CARBONATE 1600 MILLIGRAM(S): 2400 POWDER, FOR SUSPENSION ORAL at 03:50

## 2022-01-01 RX ADMIN — LIDOCAINE 1 PATCH: 4 CREAM TOPICAL at 23:53

## 2022-01-01 RX ADMIN — MUPIROCIN 1 APPLICATION(S): 20 OINTMENT TOPICAL at 05:46

## 2022-01-01 RX ADMIN — LIDOCAINE 1 PATCH: 4 CREAM TOPICAL at 23:03

## 2022-01-01 RX ADMIN — PANTOPRAZOLE SODIUM 40 MILLIGRAM(S): 20 TABLET, DELAYED RELEASE ORAL at 22:09

## 2022-01-01 RX ADMIN — OXYCODONE HYDROCHLORIDE 2.5 MILLIGRAM(S): 5 TABLET ORAL at 14:53

## 2022-01-01 RX ADMIN — ERYTHROPOIETIN 10000 UNIT(S): 10000 INJECTION, SOLUTION INTRAVENOUS; SUBCUTANEOUS at 17:44

## 2022-01-01 RX ADMIN — LIDOCAINE 1 PATCH: 4 CREAM TOPICAL at 23:40

## 2022-01-01 RX ADMIN — Medication 3 MILLILITER(S): at 10:35

## 2022-01-01 RX ADMIN — FENTANYL CITRATE 5.12 MICROGRAM(S)/KG/HR: 50 INJECTION INTRAVENOUS at 12:18

## 2022-01-01 RX ADMIN — ALTEPLASE 2 MILLIGRAM(S): KIT at 14:28

## 2022-01-01 RX ADMIN — Medication 2 DROP(S): at 23:45

## 2022-01-01 RX ADMIN — SEVELAMER CARBONATE 1600 MILLIGRAM(S): 2400 POWDER, FOR SUSPENSION ORAL at 11:32

## 2022-01-01 RX ADMIN — Medication 1: at 07:18

## 2022-01-01 RX ADMIN — HEPARIN SODIUM 5000 UNIT(S): 5000 INJECTION INTRAVENOUS; SUBCUTANEOUS at 21:06

## 2022-01-01 RX ADMIN — Medication 3 MILLILITER(S): at 03:13

## 2022-01-01 RX ADMIN — HYDROMORPHONE HYDROCHLORIDE 0.5 MILLIGRAM(S): 2 INJECTION INTRAMUSCULAR; INTRAVENOUS; SUBCUTANEOUS at 11:04

## 2022-01-01 RX ADMIN — PROPOFOL 6.14 MICROGRAM(S)/KG/MIN: 10 INJECTION, EMULSION INTRAVENOUS at 10:31

## 2022-01-01 RX ADMIN — Medication 3 MILLILITER(S): at 04:38

## 2022-01-01 RX ADMIN — Medication 1 APPLICATION(S): at 06:51

## 2022-01-01 RX ADMIN — Medication 9.59 MICROGRAM(S)/KG/MIN: at 11:33

## 2022-01-01 RX ADMIN — Medication 1: at 13:26

## 2022-01-01 RX ADMIN — Medication 1 APPLICATION(S): at 13:23

## 2022-01-01 RX ADMIN — Medication 3 MILLILITER(S): at 18:06

## 2022-01-01 RX ADMIN — LIDOCAINE 1 PATCH: 4 CREAM TOPICAL at 19:36

## 2022-01-01 RX ADMIN — PANTOPRAZOLE SODIUM 40 MILLIGRAM(S): 20 TABLET, DELAYED RELEASE ORAL at 21:35

## 2022-01-01 RX ADMIN — LIDOCAINE 1 PATCH: 4 CREAM TOPICAL at 12:17

## 2022-01-01 RX ADMIN — HYDROMORPHONE HYDROCHLORIDE 1 MILLIGRAM(S): 2 INJECTION INTRAMUSCULAR; INTRAVENOUS; SUBCUTANEOUS at 01:05

## 2022-01-01 RX ADMIN — Medication 9.59 MICROGRAM(S)/KG/MIN: at 04:11

## 2022-01-01 RX ADMIN — HYDROMORPHONE HYDROCHLORIDE 1 MILLIGRAM(S): 2 INJECTION INTRAMUSCULAR; INTRAVENOUS; SUBCUTANEOUS at 17:44

## 2022-01-01 RX ADMIN — Medication 1 APPLICATION(S): at 05:16

## 2022-01-01 RX ADMIN — Medication 1000 MILLIGRAM(S): at 13:39

## 2022-01-01 RX ADMIN — SEVELAMER CARBONATE 1600 MILLIGRAM(S): 2400 POWDER, FOR SUSPENSION ORAL at 18:08

## 2022-01-01 RX ADMIN — ARGATROBAN 9.21 MICROGRAM(S)/KG/MIN: 50 INJECTION, SOLUTION INTRAVENOUS at 05:25

## 2022-01-01 RX ADMIN — LIDOCAINE 1 PATCH: 4 CREAM TOPICAL at 10:36

## 2022-01-01 RX ADMIN — ARGATROBAN 9.21 MICROGRAM(S)/KG/MIN: 50 INJECTION, SOLUTION INTRAVENOUS at 00:25

## 2022-01-01 RX ADMIN — HYDROMORPHONE HYDROCHLORIDE 0.25 MILLIGRAM(S): 2 INJECTION INTRAMUSCULAR; INTRAVENOUS; SUBCUTANEOUS at 19:02

## 2022-01-01 RX ADMIN — SODIUM CHLORIDE 4 MILLILITER(S): 9 INJECTION INTRAMUSCULAR; INTRAVENOUS; SUBCUTANEOUS at 19:04

## 2022-01-01 RX ADMIN — HYDROMORPHONE HYDROCHLORIDE 0.5 MILLIGRAM(S): 2 INJECTION INTRAMUSCULAR; INTRAVENOUS; SUBCUTANEOUS at 19:22

## 2022-01-01 RX ADMIN — CHLORHEXIDINE GLUCONATE 15 MILLILITER(S): 213 SOLUTION TOPICAL at 10:50

## 2022-01-01 RX ADMIN — Medication 1 DROP(S): at 11:59

## 2022-01-01 RX ADMIN — LIDOCAINE 1 PATCH: 4 CREAM TOPICAL at 23:48

## 2022-01-01 RX ADMIN — NYSTATIN CREAM 1 APPLICATION(S): 100000 CREAM TOPICAL at 05:39

## 2022-01-01 RX ADMIN — LIDOCAINE 1 PATCH: 4 CREAM TOPICAL at 23:19

## 2022-01-01 RX ADMIN — SEVELAMER CARBONATE 1600 MILLIGRAM(S): 2400 POWDER, FOR SUSPENSION ORAL at 03:15

## 2022-01-01 RX ADMIN — Medication 1 APPLICATION(S): at 17:30

## 2022-01-01 RX ADMIN — CHLORHEXIDINE GLUCONATE 15 MILLILITER(S): 213 SOLUTION TOPICAL at 05:26

## 2022-01-01 RX ADMIN — Medication 1 DROP(S): at 00:28

## 2022-01-01 RX ADMIN — Medication 3 MILLILITER(S): at 01:43

## 2022-01-01 RX ADMIN — Medication 650 MILLIGRAM(S): at 19:38

## 2022-01-01 RX ADMIN — MIDAZOLAM HYDROCHLORIDE 2.05 MG/KG/HR: 1 INJECTION, SOLUTION INTRAMUSCULAR; INTRAVENOUS at 00:42

## 2022-01-01 RX ADMIN — Medication 3 MILLILITER(S): at 00:50

## 2022-01-01 RX ADMIN — CHLORHEXIDINE GLUCONATE 1 APPLICATION(S): 213 SOLUTION TOPICAL at 06:12

## 2022-01-01 RX ADMIN — ARGATROBAN 5.22 MICROGRAM(S)/KG/MIN: 50 INJECTION, SOLUTION INTRAVENOUS at 17:29

## 2022-01-01 RX ADMIN — CHLORHEXIDINE GLUCONATE 1 APPLICATION(S): 213 SOLUTION TOPICAL at 06:14

## 2022-01-01 RX ADMIN — Medication 1000 MILLIGRAM(S): at 12:51

## 2022-01-01 RX ADMIN — Medication 2 DROP(S): at 18:43

## 2022-01-01 RX ADMIN — CHLORHEXIDINE GLUCONATE 1 APPLICATION(S): 213 SOLUTION TOPICAL at 06:18

## 2022-01-01 RX ADMIN — HYDROMORPHONE HYDROCHLORIDE 0.5 MILLIGRAM(S): 2 INJECTION INTRAMUSCULAR; INTRAVENOUS; SUBCUTANEOUS at 05:24

## 2022-01-01 RX ADMIN — Medication 3 MILLILITER(S): at 00:52

## 2022-01-01 RX ADMIN — CHLORHEXIDINE GLUCONATE 15 MILLILITER(S): 213 SOLUTION TOPICAL at 09:50

## 2022-01-01 RX ADMIN — NYSTATIN CREAM 1 APPLICATION(S): 100000 CREAM TOPICAL at 05:26

## 2022-01-01 RX ADMIN — Medication 3 MILLILITER(S): at 13:52

## 2022-01-01 RX ADMIN — CHLORHEXIDINE GLUCONATE 1 APPLICATION(S): 213 SOLUTION TOPICAL at 05:28

## 2022-01-01 RX ADMIN — MUPIROCIN 1 APPLICATION(S): 20 OINTMENT TOPICAL at 05:26

## 2022-01-01 RX ADMIN — Medication 1 DROP(S): at 00:19

## 2022-01-01 RX ADMIN — HYDROMORPHONE HYDROCHLORIDE 1 MILLIGRAM(S): 2 INJECTION INTRAMUSCULAR; INTRAVENOUS; SUBCUTANEOUS at 06:15

## 2022-01-01 RX ADMIN — Medication 400 MILLIGRAM(S): at 06:56

## 2022-01-01 RX ADMIN — Medication 3 MILLILITER(S): at 06:04

## 2022-01-01 RX ADMIN — LIDOCAINE 1 PATCH: 4 CREAM TOPICAL at 18:11

## 2022-01-01 RX ADMIN — Medication 9.59 MICROGRAM(S)/KG/MIN: at 08:43

## 2022-01-01 RX ADMIN — PANTOPRAZOLE SODIUM 40 MILLIGRAM(S): 20 TABLET, DELAYED RELEASE ORAL at 21:29

## 2022-01-01 RX ADMIN — Medication 1 DROP(S): at 12:05

## 2022-01-01 RX ADMIN — MUPIROCIN 1 APPLICATION(S): 20 OINTMENT TOPICAL at 06:05

## 2022-01-01 RX ADMIN — Medication 3 MILLILITER(S): at 21:21

## 2022-01-01 RX ADMIN — Medication 2 DROP(S): at 17:21

## 2022-01-01 RX ADMIN — IRON SUCROSE 110 MILLIGRAM(S): 20 INJECTION, SOLUTION INTRAVENOUS at 11:37

## 2022-01-01 RX ADMIN — Medication 1 DROP(S): at 06:52

## 2022-01-01 RX ADMIN — BUDESONIDE AND FORMOTEROL FUMARATE DIHYDRATE 2 PUFF(S): 160; 4.5 AEROSOL RESPIRATORY (INHALATION) at 06:04

## 2022-01-01 RX ADMIN — POLYETHYLENE GLYCOL 3350 17 GRAM(S): 17 POWDER, FOR SOLUTION ORAL at 11:31

## 2022-01-01 RX ADMIN — PROPOFOL 6.14 MICROGRAM(S)/KG/MIN: 10 INJECTION, EMULSION INTRAVENOUS at 01:25

## 2022-01-01 RX ADMIN — FENTANYL CITRATE 5.12 MICROGRAM(S)/KG/HR: 50 INJECTION INTRAVENOUS at 09:47

## 2022-01-01 RX ADMIN — Medication 2 DROP(S): at 05:24

## 2022-01-01 RX ADMIN — Medication 9.59 MICROGRAM(S)/KG/MIN: at 22:03

## 2022-01-01 RX ADMIN — Medication 4.8 MICROGRAM(S)/KG/MIN: at 16:53

## 2022-01-01 RX ADMIN — APIXABAN 2.5 MILLIGRAM(S): 2.5 TABLET, FILM COATED ORAL at 05:27

## 2022-01-01 RX ADMIN — PROPOFOL 3.07 MICROGRAM(S)/KG/MIN: 10 INJECTION, EMULSION INTRAVENOUS at 04:07

## 2022-01-01 RX ADMIN — Medication 3 MILLILITER(S): at 17:29

## 2022-01-01 RX ADMIN — LIDOCAINE 1 PATCH: 4 CREAM TOPICAL at 22:31

## 2022-01-01 RX ADMIN — SENNA PLUS 2 TABLET(S): 8.6 TABLET ORAL at 11:37

## 2022-01-01 RX ADMIN — Medication 1000 MILLIGRAM(S): at 23:59

## 2022-01-01 RX ADMIN — Medication 1 APPLICATION(S): at 05:48

## 2022-01-01 RX ADMIN — Medication 3 MILLILITER(S): at 05:05

## 2022-01-01 RX ADMIN — OXYCODONE HYDROCHLORIDE 2.5 MILLIGRAM(S): 5 TABLET ORAL at 05:39

## 2022-01-01 RX ADMIN — LIDOCAINE 1 PATCH: 4 CREAM TOPICAL at 12:04

## 2022-01-01 RX ADMIN — NYSTATIN CREAM 1 APPLICATION(S): 100000 CREAM TOPICAL at 22:12

## 2022-01-01 RX ADMIN — LIDOCAINE 1 PATCH: 4 CREAM TOPICAL at 18:17

## 2022-01-01 RX ADMIN — CHLORHEXIDINE GLUCONATE 15 MILLILITER(S): 213 SOLUTION TOPICAL at 11:43

## 2022-01-01 RX ADMIN — VASOPRESSIN 1.8 UNIT(S)/MIN: 20 INJECTION INTRAVENOUS at 10:26

## 2022-01-01 RX ADMIN — Medication 3 MILLILITER(S): at 21:27

## 2022-01-01 RX ADMIN — PANTOPRAZOLE SODIUM 40 MILLIGRAM(S): 20 TABLET, DELAYED RELEASE ORAL at 22:43

## 2022-01-01 RX ADMIN — APIXABAN 2.5 MILLIGRAM(S): 2.5 TABLET, FILM COATED ORAL at 06:05

## 2022-01-01 RX ADMIN — PANTOPRAZOLE SODIUM 40 MILLIGRAM(S): 20 TABLET, DELAYED RELEASE ORAL at 21:41

## 2022-01-01 RX ADMIN — POLYETHYLENE GLYCOL 3350 17 GRAM(S): 17 POWDER, FOR SOLUTION ORAL at 13:27

## 2022-01-01 RX ADMIN — Medication 2 DROP(S): at 17:20

## 2022-01-01 RX ADMIN — MIDAZOLAM HYDROCHLORIDE 2.05 MG/KG/HR: 1 INJECTION, SOLUTION INTRAMUSCULAR; INTRAVENOUS at 21:11

## 2022-01-01 RX ADMIN — HYDROMORPHONE HYDROCHLORIDE 0.2 MG/HR: 2 INJECTION INTRAMUSCULAR; INTRAVENOUS; SUBCUTANEOUS at 19:57

## 2022-01-01 RX ADMIN — HEPARIN SODIUM 1800 UNIT(S)/HR: 5000 INJECTION INTRAVENOUS; SUBCUTANEOUS at 22:09

## 2022-01-01 RX ADMIN — CHLORHEXIDINE GLUCONATE 15 MILLILITER(S): 213 SOLUTION TOPICAL at 18:20

## 2022-01-01 RX ADMIN — Medication 1 APPLICATION(S): at 02:20

## 2022-01-01 RX ADMIN — Medication 2 DROP(S): at 00:26

## 2022-01-01 RX ADMIN — NYSTATIN CREAM 1 APPLICATION(S): 100000 CREAM TOPICAL at 13:22

## 2022-01-01 RX ADMIN — Medication 2 DROP(S): at 05:49

## 2022-01-01 RX ADMIN — Medication 3 MILLILITER(S): at 21:15

## 2022-01-01 RX ADMIN — PROPOFOL 6.14 MICROGRAM(S)/KG/MIN: 10 INJECTION, EMULSION INTRAVENOUS at 17:39

## 2022-01-01 RX ADMIN — NYSTATIN CREAM 1 APPLICATION(S): 100000 CREAM TOPICAL at 13:23

## 2022-01-01 RX ADMIN — PANTOPRAZOLE SODIUM 40 MILLIGRAM(S): 20 TABLET, DELAYED RELEASE ORAL at 22:06

## 2022-01-01 RX ADMIN — HYDROMORPHONE HYDROCHLORIDE 0.5 MG/HR: 2 INJECTION INTRAMUSCULAR; INTRAVENOUS; SUBCUTANEOUS at 19:57

## 2022-01-01 RX ADMIN — Medication 3 MILLILITER(S): at 11:40

## 2022-01-01 RX ADMIN — LIDOCAINE 1 PATCH: 4 CREAM TOPICAL at 08:28

## 2022-01-01 RX ADMIN — Medication 4.8 MICROGRAM(S)/KG/MIN: at 21:50

## 2022-01-01 RX ADMIN — LIDOCAINE 1 PATCH: 4 CREAM TOPICAL at 00:12

## 2022-01-01 RX ADMIN — LIDOCAINE 1 PATCH: 4 CREAM TOPICAL at 18:25

## 2022-01-01 RX ADMIN — Medication 1 APPLICATION(S): at 13:19

## 2022-01-01 RX ADMIN — MIDODRINE HYDROCHLORIDE 30 MILLIGRAM(S): 2.5 TABLET ORAL at 01:16

## 2022-01-01 RX ADMIN — SEVELAMER CARBONATE 1600 MILLIGRAM(S): 2400 POWDER, FOR SUSPENSION ORAL at 17:43

## 2022-01-01 RX ADMIN — CHLORHEXIDINE GLUCONATE 15 MILLILITER(S): 213 SOLUTION TOPICAL at 10:52

## 2022-01-01 RX ADMIN — HYDROMORPHONE HYDROCHLORIDE 0.5 MILLIGRAM(S): 2 INJECTION INTRAMUSCULAR; INTRAVENOUS; SUBCUTANEOUS at 13:11

## 2022-01-01 RX ADMIN — CHLORHEXIDINE GLUCONATE 15 MILLILITER(S): 213 SOLUTION TOPICAL at 18:07

## 2022-01-01 RX ADMIN — CEFTRIAXONE 100 MILLIGRAM(S): 500 INJECTION, POWDER, FOR SOLUTION INTRAMUSCULAR; INTRAVENOUS at 18:04

## 2022-01-01 RX ADMIN — NYSTATIN CREAM 1 APPLICATION(S): 100000 CREAM TOPICAL at 22:11

## 2022-01-01 RX ADMIN — FENTANYL CITRATE 5.12 MICROGRAM(S)/KG/HR: 50 INJECTION INTRAVENOUS at 22:03

## 2022-01-01 RX ADMIN — PROPOFOL 3.07 MICROGRAM(S)/KG/MIN: 10 INJECTION, EMULSION INTRAVENOUS at 21:52

## 2022-01-01 RX ADMIN — PROPOFOL 6.14 MICROGRAM(S)/KG/MIN: 10 INJECTION, EMULSION INTRAVENOUS at 10:24

## 2022-01-01 RX ADMIN — Medication 3 MILLILITER(S): at 09:09

## 2022-01-01 RX ADMIN — Medication 1 APPLICATION(S): at 22:31

## 2022-01-01 RX ADMIN — HEPARIN SODIUM 1800 UNIT(S)/HR: 5000 INJECTION INTRAVENOUS; SUBCUTANEOUS at 08:30

## 2022-01-01 RX ADMIN — Medication 4.8 MICROGRAM(S)/KG/MIN: at 19:52

## 2022-01-01 RX ADMIN — LIDOCAINE 1 PATCH: 4 CREAM TOPICAL at 11:32

## 2022-01-01 RX ADMIN — HEPARIN SODIUM 15 UNIT(S)/HR: 5000 INJECTION INTRAVENOUS; SUBCUTANEOUS at 06:21

## 2022-01-01 RX ADMIN — Medication 1 DROP(S): at 11:31

## 2022-01-01 RX ADMIN — Medication 9.59 MICROGRAM(S)/KG/MIN: at 03:21

## 2022-01-01 RX ADMIN — Medication 3 MILLILITER(S): at 00:04

## 2022-01-01 RX ADMIN — HEPARIN SODIUM 1800 UNIT(S)/HR: 5000 INJECTION INTRAVENOUS; SUBCUTANEOUS at 05:48

## 2022-01-01 RX ADMIN — APIXABAN 2.5 MILLIGRAM(S): 2.5 TABLET, FILM COATED ORAL at 05:16

## 2022-01-01 RX ADMIN — POLYETHYLENE GLYCOL 3350 17 GRAM(S): 17 POWDER, FOR SOLUTION ORAL at 11:41

## 2022-01-01 RX ADMIN — HYDROMORPHONE HYDROCHLORIDE 0.5 MILLIGRAM(S): 2 INJECTION INTRAMUSCULAR; INTRAVENOUS; SUBCUTANEOUS at 22:12

## 2022-01-01 RX ADMIN — CHLORHEXIDINE GLUCONATE 1 APPLICATION(S): 213 SOLUTION TOPICAL at 05:57

## 2022-01-01 RX ADMIN — CHLORHEXIDINE GLUCONATE 1 APPLICATION(S): 213 SOLUTION TOPICAL at 05:37

## 2022-01-01 RX ADMIN — Medication 9.59 MICROGRAM(S)/KG/MIN: at 19:36

## 2022-01-01 RX ADMIN — Medication 3 MILLILITER(S): at 12:25

## 2022-01-01 RX ADMIN — CEFEPIME 100 MILLIGRAM(S): 1 INJECTION, POWDER, FOR SOLUTION INTRAMUSCULAR; INTRAVENOUS at 13:29

## 2022-01-01 RX ADMIN — CHLORHEXIDINE GLUCONATE 1 APPLICATION(S): 213 SOLUTION TOPICAL at 05:19

## 2022-01-01 RX ADMIN — Medication 4.8 MICROGRAM(S)/KG/MIN: at 13:23

## 2022-01-01 RX ADMIN — HYDROMORPHONE HYDROCHLORIDE 0.5 MILLIGRAM(S): 2 INJECTION INTRAMUSCULAR; INTRAVENOUS; SUBCUTANEOUS at 06:41

## 2022-01-01 RX ADMIN — LACTULOSE 20 GRAM(S): 10 SOLUTION ORAL at 12:29

## 2022-01-01 RX ADMIN — Medication 3 MILLILITER(S): at 23:17

## 2022-01-01 RX ADMIN — FENTANYL CITRATE 5.12 MICROGRAM(S)/KG/HR: 50 INJECTION INTRAVENOUS at 00:06

## 2022-01-01 RX ADMIN — FENTANYL CITRATE 5.12 MICROGRAM(S)/KG/HR: 50 INJECTION INTRAVENOUS at 23:18

## 2022-01-01 RX ADMIN — NYSTATIN CREAM 1 APPLICATION(S): 100000 CREAM TOPICAL at 13:21

## 2022-01-01 RX ADMIN — PANTOPRAZOLE SODIUM 40 MILLIGRAM(S): 20 TABLET, DELAYED RELEASE ORAL at 21:15

## 2022-01-01 RX ADMIN — FENTANYL CITRATE 5.12 MICROGRAM(S)/KG/HR: 50 INJECTION INTRAVENOUS at 17:50

## 2022-01-01 RX ADMIN — MIDAZOLAM HYDROCHLORIDE 2.05 MG/KG/HR: 1 INJECTION, SOLUTION INTRAMUSCULAR; INTRAVENOUS at 13:18

## 2022-01-01 RX ADMIN — POLYETHYLENE GLYCOL 3350 17 GRAM(S): 17 POWDER, FOR SOLUTION ORAL at 11:25

## 2022-01-01 RX ADMIN — MIDODRINE HYDROCHLORIDE 10 MILLIGRAM(S): 2.5 TABLET ORAL at 01:02

## 2022-01-01 RX ADMIN — Medication 1 APPLICATION(S): at 14:34

## 2022-01-01 RX ADMIN — PROPOFOL 3.07 MICROGRAM(S)/KG/MIN: 10 INJECTION, EMULSION INTRAVENOUS at 03:50

## 2022-01-01 RX ADMIN — CHLORHEXIDINE GLUCONATE 15 MILLILITER(S): 213 SOLUTION TOPICAL at 21:57

## 2022-01-01 RX ADMIN — HYDROMORPHONE HYDROCHLORIDE 0.25 MILLIGRAM(S): 2 INJECTION INTRAMUSCULAR; INTRAVENOUS; SUBCUTANEOUS at 16:05

## 2022-01-01 RX ADMIN — MIDAZOLAM HYDROCHLORIDE 2.05 MG/KG/HR: 1 INJECTION, SOLUTION INTRAMUSCULAR; INTRAVENOUS at 08:59

## 2022-01-01 RX ADMIN — HEPARIN SODIUM 5000 UNIT(S): 5000 INJECTION INTRAVENOUS; SUBCUTANEOUS at 21:09

## 2022-01-01 RX ADMIN — LIDOCAINE 1 PATCH: 4 CREAM TOPICAL at 23:29

## 2022-01-01 RX ADMIN — APIXABAN 2.5 MILLIGRAM(S): 2.5 TABLET, FILM COATED ORAL at 18:46

## 2022-01-01 RX ADMIN — OXYCODONE HYDROCHLORIDE 2.5 MILLIGRAM(S): 5 TABLET ORAL at 00:37

## 2022-01-01 RX ADMIN — SEVELAMER CARBONATE 1600 MILLIGRAM(S): 2400 POWDER, FOR SUSPENSION ORAL at 17:15

## 2022-01-01 RX ADMIN — NYSTATIN CREAM 1 APPLICATION(S): 100000 CREAM TOPICAL at 14:45

## 2022-01-01 RX ADMIN — Medication 3 MILLILITER(S): at 00:55

## 2022-01-01 RX ADMIN — Medication 1 APPLICATION(S): at 13:00

## 2022-01-01 RX ADMIN — NYSTATIN CREAM 1 APPLICATION(S): 100000 CREAM TOPICAL at 21:31

## 2022-01-01 RX ADMIN — SENNA PLUS 2 TABLET(S): 8.6 TABLET ORAL at 13:16

## 2022-01-01 RX ADMIN — FENTANYL CITRATE 5.12 MICROGRAM(S)/KG/HR: 50 INJECTION INTRAVENOUS at 20:23

## 2022-01-01 RX ADMIN — CHLORHEXIDINE GLUCONATE 15 MILLILITER(S): 213 SOLUTION TOPICAL at 21:51

## 2022-01-01 RX ADMIN — PROPOFOL 3.07 MICROGRAM(S)/KG/MIN: 10 INJECTION, EMULSION INTRAVENOUS at 08:57

## 2022-01-01 RX ADMIN — Medication 3 MILLILITER(S): at 06:08

## 2022-01-01 RX ADMIN — Medication 3 MILLILITER(S): at 17:35

## 2022-01-01 RX ADMIN — Medication 3 MILLILITER(S): at 12:31

## 2022-01-01 RX ADMIN — MUPIROCIN 1 APPLICATION(S): 20 OINTMENT TOPICAL at 05:40

## 2022-01-01 RX ADMIN — Medication 3 MILLILITER(S): at 22:44

## 2022-01-01 RX ADMIN — CHLORHEXIDINE GLUCONATE 15 MILLILITER(S): 213 SOLUTION TOPICAL at 21:22

## 2022-01-01 RX ADMIN — CHLORHEXIDINE GLUCONATE 1 APPLICATION(S): 213 SOLUTION TOPICAL at 05:50

## 2022-01-01 RX ADMIN — CHLORHEXIDINE GLUCONATE 15 MILLILITER(S): 213 SOLUTION TOPICAL at 21:03

## 2022-01-01 RX ADMIN — FENTANYL CITRATE 5.12 MICROGRAM(S)/KG/HR: 50 INJECTION INTRAVENOUS at 11:11

## 2022-01-01 RX ADMIN — Medication 3 MILLILITER(S): at 11:38

## 2022-01-01 RX ADMIN — APIXABAN 2.5 MILLIGRAM(S): 2.5 TABLET, FILM COATED ORAL at 05:44

## 2022-01-01 RX ADMIN — MIDODRINE HYDROCHLORIDE 30 MILLIGRAM(S): 2.5 TABLET ORAL at 17:44

## 2022-01-01 RX ADMIN — Medication 1 DROP(S): at 23:45

## 2022-01-01 RX ADMIN — SEVELAMER CARBONATE 800 MILLIGRAM(S): 2400 POWDER, FOR SUSPENSION ORAL at 21:36

## 2022-01-01 RX ADMIN — IRON SUCROSE 110 MILLIGRAM(S): 20 INJECTION, SOLUTION INTRAVENOUS at 09:53

## 2022-01-01 RX ADMIN — CHLORHEXIDINE GLUCONATE 1 APPLICATION(S): 213 SOLUTION TOPICAL at 06:54

## 2022-01-01 RX ADMIN — IRON SUCROSE 110 MILLIGRAM(S): 20 INJECTION, SOLUTION INTRAVENOUS at 11:10

## 2022-01-01 RX ADMIN — CHLORHEXIDINE GLUCONATE 1 APPLICATION(S): 213 SOLUTION TOPICAL at 05:26

## 2022-01-01 RX ADMIN — CEFEPIME 100 MILLIGRAM(S): 1 INJECTION, POWDER, FOR SOLUTION INTRAMUSCULAR; INTRAVENOUS at 01:07

## 2022-01-01 RX ADMIN — Medication 3 MILLILITER(S): at 17:16

## 2022-01-01 RX ADMIN — Medication 2 DROP(S): at 23:46

## 2022-01-01 RX ADMIN — Medication 3 MILLILITER(S): at 16:09

## 2022-01-01 RX ADMIN — SEVELAMER CARBONATE 1600 MILLIGRAM(S): 2400 POWDER, FOR SUSPENSION ORAL at 09:43

## 2022-01-01 RX ADMIN — APIXABAN 2.5 MILLIGRAM(S): 2.5 TABLET, FILM COATED ORAL at 06:06

## 2022-01-01 RX ADMIN — CHLORHEXIDINE GLUCONATE 15 MILLILITER(S): 213 SOLUTION TOPICAL at 17:37

## 2022-01-01 RX ADMIN — MUPIROCIN 1 APPLICATION(S): 20 OINTMENT TOPICAL at 19:32

## 2022-01-01 RX ADMIN — Medication 3 MILLILITER(S): at 17:12

## 2022-01-01 RX ADMIN — Medication 1000 MILLIGRAM(S): at 19:21

## 2022-01-01 RX ADMIN — LIDOCAINE 1 PATCH: 4 CREAM TOPICAL at 22:36

## 2022-01-01 RX ADMIN — NYSTATIN CREAM 1 APPLICATION(S): 100000 CREAM TOPICAL at 06:26

## 2022-01-01 RX ADMIN — PANTOPRAZOLE SODIUM 40 MILLIGRAM(S): 20 TABLET, DELAYED RELEASE ORAL at 22:11

## 2022-01-01 RX ADMIN — Medication 1: at 17:16

## 2022-01-01 RX ADMIN — Medication 1 APPLICATION(S): at 14:12

## 2022-01-01 RX ADMIN — CHLORHEXIDINE GLUCONATE 15 MILLILITER(S): 213 SOLUTION TOPICAL at 11:04

## 2022-01-01 RX ADMIN — Medication 1 APPLICATION(S): at 06:31

## 2022-01-01 RX ADMIN — HYDROMORPHONE HYDROCHLORIDE 0.5 MILLIGRAM(S): 2 INJECTION INTRAMUSCULAR; INTRAVENOUS; SUBCUTANEOUS at 23:44

## 2022-01-01 RX ADMIN — CEFEPIME 100 MILLIGRAM(S): 1 INJECTION, POWDER, FOR SOLUTION INTRAMUSCULAR; INTRAVENOUS at 11:24

## 2022-01-01 RX ADMIN — Medication 3 MILLILITER(S): at 13:04

## 2022-01-01 RX ADMIN — CHLORHEXIDINE GLUCONATE 1 APPLICATION(S): 213 SOLUTION TOPICAL at 05:09

## 2022-01-01 RX ADMIN — POLYETHYLENE GLYCOL 3350 17 GRAM(S): 17 POWDER, FOR SOLUTION ORAL at 12:03

## 2022-01-01 RX ADMIN — CHLORHEXIDINE GLUCONATE 15 MILLILITER(S): 213 SOLUTION TOPICAL at 21:12

## 2022-01-01 RX ADMIN — CHLORHEXIDINE GLUCONATE 1 APPLICATION(S): 213 SOLUTION TOPICAL at 05:46

## 2022-01-01 RX ADMIN — NYSTATIN CREAM 1 APPLICATION(S): 100000 CREAM TOPICAL at 14:12

## 2022-01-01 RX ADMIN — PANTOPRAZOLE SODIUM 40 MILLIGRAM(S): 20 TABLET, DELAYED RELEASE ORAL at 21:51

## 2022-01-01 RX ADMIN — MUPIROCIN 1 APPLICATION(S): 20 OINTMENT TOPICAL at 06:12

## 2022-01-01 RX ADMIN — PROPOFOL 3.07 MICROGRAM(S)/KG/MIN: 10 INJECTION, EMULSION INTRAVENOUS at 17:38

## 2022-01-01 RX ADMIN — Medication 4 MILLILITER(S): at 19:43

## 2022-01-01 RX ADMIN — OXYCODONE HYDROCHLORIDE 2.5 MILLIGRAM(S): 5 TABLET ORAL at 07:39

## 2022-01-01 RX ADMIN — MUPIROCIN 1 APPLICATION(S): 20 OINTMENT TOPICAL at 05:25

## 2022-01-01 RX ADMIN — Medication 1 APPLICATION(S): at 21:17

## 2022-01-01 RX ADMIN — FENTANYL CITRATE 5.12 MICROGRAM(S)/KG/HR: 50 INJECTION INTRAVENOUS at 04:07

## 2022-01-01 RX ADMIN — SEVELAMER CARBONATE 1600 MILLIGRAM(S): 2400 POWDER, FOR SUSPENSION ORAL at 03:38

## 2022-01-01 RX ADMIN — FENTANYL CITRATE 5.12 MICROGRAM(S)/KG/HR: 50 INJECTION INTRAVENOUS at 09:02

## 2022-01-01 RX ADMIN — Medication 4 MILLILITER(S): at 12:24

## 2022-01-01 RX ADMIN — LIDOCAINE 1 PATCH: 4 CREAM TOPICAL at 23:46

## 2022-01-01 RX ADMIN — HYDROMORPHONE HYDROCHLORIDE 0.5 MILLIGRAM(S): 2 INJECTION INTRAMUSCULAR; INTRAVENOUS; SUBCUTANEOUS at 22:40

## 2022-01-01 RX ADMIN — FENTANYL CITRATE 5.12 MICROGRAM(S)/KG/HR: 50 INJECTION INTRAVENOUS at 00:16

## 2022-01-01 RX ADMIN — POLYETHYLENE GLYCOL 3350 17 GRAM(S): 17 POWDER, FOR SOLUTION ORAL at 13:00

## 2022-01-01 RX ADMIN — APIXABAN 2.5 MILLIGRAM(S): 2.5 TABLET, FILM COATED ORAL at 05:24

## 2022-01-01 RX ADMIN — Medication 2 DROP(S): at 23:57

## 2022-01-01 RX ADMIN — Medication 4 MILLILITER(S): at 22:49

## 2022-01-01 RX ADMIN — Medication 2 DROP(S): at 11:04

## 2022-01-01 RX ADMIN — Medication 4.8 MICROGRAM(S)/KG/MIN: at 22:44

## 2022-01-01 RX ADMIN — ARGATROBAN 5.22 MICROGRAM(S)/KG/MIN: 50 INJECTION, SOLUTION INTRAVENOUS at 20:20

## 2022-01-01 RX ADMIN — HEPARIN SODIUM 1800 UNIT(S)/HR: 5000 INJECTION INTRAVENOUS; SUBCUTANEOUS at 20:28

## 2022-01-01 RX ADMIN — FENTANYL CITRATE 5.12 MICROGRAM(S)/KG/HR: 50 INJECTION INTRAVENOUS at 18:39

## 2022-01-01 RX ADMIN — Medication 650 MILLIGRAM(S): at 23:54

## 2022-01-01 RX ADMIN — Medication 3 MILLILITER(S): at 05:55

## 2022-01-01 RX ADMIN — HYDROMORPHONE HYDROCHLORIDE 0.25 MILLIGRAM(S): 2 INJECTION INTRAMUSCULAR; INTRAVENOUS; SUBCUTANEOUS at 18:41

## 2022-01-01 RX ADMIN — LIDOCAINE 1 PATCH: 4 CREAM TOPICAL at 10:25

## 2022-01-01 RX ADMIN — Medication 4 MILLILITER(S): at 23:14

## 2022-01-01 RX ADMIN — CHLORHEXIDINE GLUCONATE 1 APPLICATION(S): 213 SOLUTION TOPICAL at 05:03

## 2022-01-01 RX ADMIN — Medication 650 MILLIGRAM(S): at 13:23

## 2022-01-01 RX ADMIN — PROPOFOL 6.14 MICROGRAM(S)/KG/MIN: 10 INJECTION, EMULSION INTRAVENOUS at 19:33

## 2022-01-01 RX ADMIN — NYSTATIN CREAM 1 APPLICATION(S): 100000 CREAM TOPICAL at 06:05

## 2022-01-01 RX ADMIN — CHLORHEXIDINE GLUCONATE 15 MILLILITER(S): 213 SOLUTION TOPICAL at 17:25

## 2022-01-01 RX ADMIN — SEVELAMER CARBONATE 800 MILLIGRAM(S): 2400 POWDER, FOR SUSPENSION ORAL at 14:52

## 2022-01-01 RX ADMIN — HEPARIN SODIUM 5000 UNIT(S): 5000 INJECTION INTRAVENOUS; SUBCUTANEOUS at 05:46

## 2022-01-01 RX ADMIN — Medication 300 MILLIGRAM(S): at 13:09

## 2022-01-01 RX ADMIN — CHLORHEXIDINE GLUCONATE 15 MILLILITER(S): 213 SOLUTION TOPICAL at 10:26

## 2022-01-01 RX ADMIN — Medication 650 MILLIGRAM(S): at 01:50

## 2022-01-01 RX ADMIN — Medication 1 APPLICATION(S): at 05:28

## 2022-01-01 RX ADMIN — Medication 3 MILLILITER(S): at 17:33

## 2022-01-01 RX ADMIN — Medication 4 MILLILITER(S): at 11:40

## 2022-01-01 RX ADMIN — Medication 1 DROP(S): at 00:46

## 2022-01-01 RX ADMIN — SENNA PLUS 2 TABLET(S): 8.6 TABLET ORAL at 11:21

## 2022-01-01 RX ADMIN — Medication 9.59 MICROGRAM(S)/KG/MIN: at 17:28

## 2022-01-01 RX ADMIN — Medication 3 MILLILITER(S): at 21:07

## 2022-01-01 RX ADMIN — HEPARIN SODIUM 5000 UNIT(S): 5000 INJECTION INTRAVENOUS; SUBCUTANEOUS at 05:12

## 2022-01-01 RX ADMIN — SENNA PLUS 2 TABLET(S): 8.6 TABLET ORAL at 11:26

## 2022-01-01 RX ADMIN — Medication 3 MILLILITER(S): at 17:37

## 2022-01-01 RX ADMIN — ERYTHROPOIETIN 10000 UNIT(S): 10000 INJECTION, SOLUTION INTRAVENOUS; SUBCUTANEOUS at 13:30

## 2022-01-01 RX ADMIN — HYDROMORPHONE HYDROCHLORIDE 0.5 MILLIGRAM(S): 2 INJECTION INTRAMUSCULAR; INTRAVENOUS; SUBCUTANEOUS at 23:49

## 2022-01-01 RX ADMIN — Medication 400 MILLIGRAM(S): at 10:19

## 2022-01-01 RX ADMIN — Medication 650 MILLIGRAM(S): at 09:00

## 2022-01-01 RX ADMIN — PROPOFOL 6.14 MICROGRAM(S)/KG/MIN: 10 INJECTION, EMULSION INTRAVENOUS at 19:55

## 2022-01-01 RX ADMIN — LIDOCAINE 1 PATCH: 4 CREAM TOPICAL at 19:32

## 2022-01-01 RX ADMIN — Medication 1 DROP(S): at 23:52

## 2022-01-01 RX ADMIN — CHLORHEXIDINE GLUCONATE 1 APPLICATION(S): 213 SOLUTION TOPICAL at 05:47

## 2022-01-01 RX ADMIN — LIDOCAINE 1 PATCH: 4 CREAM TOPICAL at 21:01

## 2022-01-01 RX ADMIN — Medication 9.59 MICROGRAM(S)/KG/MIN: at 09:13

## 2022-01-01 RX ADMIN — Medication 4 MILLILITER(S): at 01:13

## 2022-01-01 RX ADMIN — OXYCODONE HYDROCHLORIDE 2.5 MILLIGRAM(S): 5 TABLET ORAL at 22:43

## 2022-01-01 RX ADMIN — APIXABAN 2.5 MILLIGRAM(S): 2.5 TABLET, FILM COATED ORAL at 05:39

## 2022-01-01 RX ADMIN — CHLORHEXIDINE GLUCONATE 15 MILLILITER(S): 213 SOLUTION TOPICAL at 10:18

## 2022-01-01 RX ADMIN — Medication 3 MILLILITER(S): at 17:23

## 2022-01-01 RX ADMIN — MIDAZOLAM HYDROCHLORIDE 2 MILLIGRAM(S): 1 INJECTION, SOLUTION INTRAMUSCULAR; INTRAVENOUS at 17:50

## 2022-01-01 RX ADMIN — Medication 40 MILLIGRAM(S): at 11:26

## 2022-01-01 RX ADMIN — FENTANYL CITRATE 5.12 MICROGRAM(S)/KG/HR: 50 INJECTION INTRAVENOUS at 11:31

## 2022-01-01 RX ADMIN — DIATRIZOATE MEGLUMINE 30 MILLILITER(S): 180 INJECTION, SOLUTION INTRAVESICAL at 15:18

## 2022-01-01 RX ADMIN — Medication 4.8 MICROGRAM(S)/KG/MIN: at 17:05

## 2022-01-01 RX ADMIN — CHLORHEXIDINE GLUCONATE 15 MILLILITER(S): 213 SOLUTION TOPICAL at 17:14

## 2022-01-01 RX ADMIN — SODIUM CHLORIDE 500 MILLILITER(S): 9 INJECTION, SOLUTION INTRAVENOUS at 07:06

## 2022-01-01 RX ADMIN — CHLORHEXIDINE GLUCONATE 15 MILLILITER(S): 213 SOLUTION TOPICAL at 06:17

## 2022-01-01 RX ADMIN — IRON SUCROSE 110 MILLIGRAM(S): 20 INJECTION, SOLUTION INTRAVENOUS at 11:03

## 2022-01-01 RX ADMIN — Medication 250 MILLIGRAM(S): at 11:09

## 2022-01-01 RX ADMIN — CHLORHEXIDINE GLUCONATE 1 APPLICATION(S): 213 SOLUTION TOPICAL at 05:23

## 2022-01-01 RX ADMIN — FENTANYL CITRATE 5.12 MICROGRAM(S)/KG/HR: 50 INJECTION INTRAVENOUS at 22:45

## 2022-01-01 RX ADMIN — FENTANYL CITRATE 5.12 MICROGRAM(S)/KG/HR: 50 INJECTION INTRAVENOUS at 14:07

## 2022-01-01 RX ADMIN — Medication 1 APPLICATION(S): at 14:44

## 2022-01-01 RX ADMIN — POLYETHYLENE GLYCOL 3350 17 GRAM(S): 17 POWDER, FOR SOLUTION ORAL at 11:29

## 2022-01-01 RX ADMIN — SENNA PLUS 2 TABLET(S): 8.6 TABLET ORAL at 11:43

## 2022-01-01 RX ADMIN — CEFEPIME 100 MILLIGRAM(S): 1 INJECTION, POWDER, FOR SOLUTION INTRAMUSCULAR; INTRAVENOUS at 13:01

## 2022-01-01 RX ADMIN — MUPIROCIN 1 APPLICATION(S): 20 OINTMENT TOPICAL at 17:21

## 2022-01-01 RX ADMIN — DEXMEDETOMIDINE HYDROCHLORIDE IN 0.9% SODIUM CHLORIDE 5.12 MICROGRAM(S)/KG/HR: 4 INJECTION INTRAVENOUS at 19:35

## 2022-01-01 RX ADMIN — CHLORHEXIDINE GLUCONATE 15 MILLILITER(S): 213 SOLUTION TOPICAL at 06:39

## 2022-01-01 RX ADMIN — LIDOCAINE 1 PATCH: 4 CREAM TOPICAL at 18:38

## 2022-01-01 RX ADMIN — APIXABAN 5 MILLIGRAM(S): 2.5 TABLET, FILM COATED ORAL at 06:04

## 2022-01-01 RX ADMIN — PROPOFOL 6.14 MICROGRAM(S)/KG/MIN: 10 INJECTION, EMULSION INTRAVENOUS at 12:50

## 2022-01-01 RX ADMIN — ERYTHROPOIETIN 10000 UNIT(S): 10000 INJECTION, SOLUTION INTRAVENOUS; SUBCUTANEOUS at 13:18

## 2022-01-01 RX ADMIN — HEPARIN SODIUM 5000 UNIT(S): 5000 INJECTION INTRAVENOUS; SUBCUTANEOUS at 13:46

## 2022-01-01 RX ADMIN — HYDROMORPHONE HYDROCHLORIDE 0.5 MILLIGRAM(S): 2 INJECTION INTRAMUSCULAR; INTRAVENOUS; SUBCUTANEOUS at 13:21

## 2022-01-01 RX ADMIN — POLYETHYLENE GLYCOL 3350 17 GRAM(S): 17 POWDER, FOR SOLUTION ORAL at 11:21

## 2022-01-01 RX ADMIN — ARGATROBAN 4.6 MICROGRAM(S)/KG/MIN: 50 INJECTION, SOLUTION INTRAVENOUS at 14:44

## 2022-01-01 RX ADMIN — CHLORHEXIDINE GLUCONATE 15 MILLILITER(S): 213 SOLUTION TOPICAL at 18:05

## 2022-01-01 RX ADMIN — Medication 4 MILLILITER(S): at 19:52

## 2022-01-01 RX ADMIN — SODIUM CHLORIDE 500 MILLILITER(S): 9 INJECTION, SOLUTION INTRAVENOUS at 17:37

## 2022-01-01 RX ADMIN — MIDODRINE HYDROCHLORIDE 5 MILLIGRAM(S): 2.5 TABLET ORAL at 13:16

## 2022-01-01 RX ADMIN — Medication 4 MILLILITER(S): at 04:32

## 2022-01-01 RX ADMIN — Medication 2 DROP(S): at 05:27

## 2022-01-01 RX ADMIN — Medication 3 MILLILITER(S): at 23:14

## 2022-01-01 RX ADMIN — Medication 650 MILLIGRAM(S): at 02:21

## 2022-01-01 RX ADMIN — CEFEPIME 100 MILLIGRAM(S): 1 INJECTION, POWDER, FOR SOLUTION INTRAMUSCULAR; INTRAVENOUS at 12:27

## 2022-01-01 RX ADMIN — Medication 3 MILLILITER(S): at 22:30

## 2022-01-01 RX ADMIN — LIDOCAINE 1 PATCH: 4 CREAM TOPICAL at 11:07

## 2022-01-01 RX ADMIN — Medication 650 MILLIGRAM(S): at 19:32

## 2022-01-01 RX ADMIN — MIDAZOLAM HYDROCHLORIDE 2.05 MG/KG/HR: 1 INJECTION, SOLUTION INTRAMUSCULAR; INTRAVENOUS at 17:32

## 2022-01-01 RX ADMIN — Medication 2 DROP(S): at 17:12

## 2022-01-01 RX ADMIN — POLYETHYLENE GLYCOL 3350 17 GRAM(S): 17 POWDER, FOR SOLUTION ORAL at 13:16

## 2022-01-01 RX ADMIN — Medication 650 MILLIGRAM(S): at 23:41

## 2022-01-01 RX ADMIN — Medication 2 DROP(S): at 11:11

## 2022-01-01 RX ADMIN — HEPARIN SODIUM 1800 UNIT(S)/HR: 5000 INJECTION INTRAVENOUS; SUBCUTANEOUS at 03:13

## 2022-01-01 RX ADMIN — VASOPRESSIN 1.8 UNIT(S)/MIN: 20 INJECTION INTRAVENOUS at 03:18

## 2022-01-01 RX ADMIN — Medication 2 DROP(S): at 13:15

## 2022-01-01 RX ADMIN — CHLORHEXIDINE GLUCONATE 15 MILLILITER(S): 213 SOLUTION TOPICAL at 10:07

## 2022-01-01 RX ADMIN — Medication 1000 MILLIGRAM(S): at 07:46

## 2022-01-01 RX ADMIN — FENTANYL CITRATE 5.12 MICROGRAM(S)/KG/HR: 50 INJECTION INTRAVENOUS at 16:43

## 2022-01-01 RX ADMIN — Medication 3 MILLILITER(S): at 16:52

## 2022-01-01 RX ADMIN — CHLORHEXIDINE GLUCONATE 1 APPLICATION(S): 213 SOLUTION TOPICAL at 05:30

## 2022-01-01 RX ADMIN — SODIUM CHLORIDE 1000 MILLILITER(S): 9 INJECTION, SOLUTION INTRAVENOUS at 02:15

## 2022-01-17 NOTE — ED ADULT NURSE NOTE - OBJECTIVE STATEMENT
pt. with hx. of COPD on O2 NC at home, c/o SOB since this morning and worsening leg swelling. Pt. placed on Bipap and medicated as ordered. Pt. tolerating Bipap well in no acute distress. Remains on continuous cardiac monitoring.

## 2022-01-17 NOTE — ED PROVIDER NOTE - OBJECTIVE STATEMENT
82 y/o female with hx of htn, hl, HFpEF 55% 2/20, COPD (3L NC home dose), DALIA, PVD sp LE stents, LE DVT on eliquis, cerebral aneurysm sp coil, CKD 4 (last known Cr 1.3s) presents with worsening shortness of breath this afternoon after waking from nap at 2 pm. Patient endorsing chest tightness, wheezing, and dry cough. Patient on 3 liters of O2 at home and usually has O2Sat of 95%. Patient did not attempt bronchodilation treatment prior to arrival. Upon arrival, patient is clinically upgraded secondary to hypoxia. Patient vaccinated against COVID with booster. Denies fever, chills, abdominal pain, nausea, vomiting, leg swelling, or difficulty urinating. 82 y/o female with hx of htn, hl, HFpEF 55% 2/20, COPD (3L NC home dose), DALIA, PVD sp LE stents, LE DVT on eliquis, cerebral aneurysm sp coil, CKD 4 (last known Cr 1.3s) presents with worsening shortness of breath this afternoon after waking from nap at 2 pm. Patient endorsing chest tightness, wheezing, and dry cough. Patient on 3 liters of O2 at home and usually has O2Sat of 95%. Patient did not attempt bronchodilation treatment prior to arrival. Upon arrival, patient is clinically upgraded secondary to hypoxia. Patient vaccinated against COVID with booster. Denies fever, chills, abdominal pain, nausea, vomiting, leg swelling, or difficulty urinating.    Patient on medications: Lipitor, Mirapex ER, Eliquis, Bumetanide, Diltiazem (see documentation for doses/frequencies)

## 2022-01-17 NOTE — ED PROVIDER NOTE - PROGRESS NOTE DETAILS
Pt was doing better on BiPAP and after meds were given, pt then removed her mask and threw it against the door. threatened w leaving the ED. HHA is here with her wheelchair. Discontinued BiPAP and weaned her to NC 6L. Will continue to follow. pt feeling much better after tx for copd exacerbation. NOw sat 94-95 with NC 3 L , which is pt's baseline. Pt requesting food and seeking dc since she feels well. she has her own wheelchair and oxygen and lives close by. Offered transport but pt Chose to leave on her own.

## 2022-01-17 NOTE — ED PROVIDER NOTE - CLINICAL SUMMARY MEDICAL DECISION MAKING FREE TEXT BOX
Patient with likely COPD exacerbation, requiring more O2 on arrival (8 liters). Will treat aggressively with bronchodilators, steroids, magnesium, terbutaline and start BIPAP. Will reassess.

## 2022-01-17 NOTE — ED ADULT NURSE REASSESSMENT NOTE - NS ED NURSE REASSESS COMMENT FT1
pt. becoming agitated and refusing to keep Bipap on because she wants a sandwich. Pt. proceeded to removed bipap mask and throw it at the door. MD aware, attempting to deescalate patient.
Received Pt from previous RN sitting up on stretcher awake and alert, breathing without issue on NC, CCM in place. IV patent. Awaiting dispo.
received pt from JOSE Duran. feels better post tx

## 2022-01-17 NOTE — ED PROVIDER NOTE - RESPIRATORY, MLM
+inspiratory and expiratory wheeze with poor ventilation effort, increased WOB and using accessory muscles for breathing.

## 2022-01-17 NOTE — ED PROVIDER NOTE - PATIENT PORTAL LINK FT
You can access the FollowMyHealth Patient Portal offered by [facility] by registering at the following website: [email or web address]. By joining [facility]’s FollowMyHealth portal, you will also be able to view your health information using other applications (apps) compatible with our system. You can access the FollowMyHealth Patient Portal offered by [facility] by registering at the following website: [email or web address]. By joining [employer]’s FollowMyHealth portal, you will also be able to view your health information using other applications (apps) compatible with our system.

## 2022-01-17 NOTE — ED PROVIDER NOTE - CHPI ED SYMPTOMS NEG
no abdominal pain, no nausea, no vomiting, no leg swelling, no difficulty urinating./no fever/no chills

## 2022-01-17 NOTE — ED PROVIDER NOTE - NSICDXPASTMEDICALHX_GEN_ALL_CORE_FT
PAST MEDICAL HISTORY:  Brain embolism and thrombosis     DVT (deep venous thrombosis)     Lyme disease     MRSA (methicillin resistant Staphylococcus aureus)     PNA (pneumonia)     Skin cancer       COPD (chronic obstructive pulmonary disease)

## 2022-01-17 NOTE — ED PROVIDER NOTE - CRITICAL CARE ATTENDING CONTRIBUTION TO CARE
The patient was seen immediately upon arrival due to a high probability of imminent or life-threatening deterioration secondary to hypoxia/copd exacerbation, which required my direct attention, intervention, and personal management at the bedside. I have personally provided critical care time exclusive of time spent on separately billable procedures. Time includes review of laboratory data, radiology results, discussion with consultants, discussion with the patient's family, and monitoring for potential decompensation.    Patient with likely COPD exacerbation, requiring more O2 on arrival (8 liters). Will treat aggressively with bronchodilators, steroids, magnesium, terbutaline and start BIPAP. Will reassess.

## 2022-03-09 NOTE — ED ADULT NURSE NOTE - CHIEF COMPLAINT QUOTE
20:05- Pt BIBA c/o shortness of breath. PMH of COPD and CHF. Pt uses 4L NC at home, baseline SpO2 unknown. Pt has B/L LE edema and ulcerations. Pt also c/o pressure wounds on buttocks. EMS administered 1x duoneb with minimal relief.

## 2022-03-09 NOTE — ED ADULT TRIAGE NOTE - CHIEF COMPLAINT QUOTE
Pt BIBA c/o shortness of breath. PMH of COPD and CHF. Pt uses 4L NC at home, baseline SpO2 unknown. Pt has B/L LE edema and ulcerations. Pt also c/o pressure wounds on buttocks. 20:05- Pt BIBA c/o shortness of breath. PMH of COPD and CHF. Pt uses 4L NC at home, baseline SpO2 unknown. Pt has B/L LE edema and ulcerations. Pt also c/o pressure wounds on buttocks. EMS administered 1x duoneb with minimal relief.

## 2022-03-09 NOTE — ED PROVIDER NOTE - PROGRESS NOTE DETAILS
patient slept comfortably overnight, respiratory status improved, resting comfortably satting well on 3 L, which is her baseline O2 need. patient to be discharged, she does not want admission. has a HHA coming to the home today, and awaiting her neighbor to bring her keys to ED so she can return to her apt. neighbor dropped off keys, patient is requesting dc. feels much better. will dc.

## 2022-03-09 NOTE — ED PROVIDER NOTE - CLINICAL SUMMARY MEDICAL DECISION MAKING FREE TEXT BOX
patient with mild copd symptoms, satting well, given duo neb by ems, with improvement, stable ekg, no signs of infected decubiti, will do steroids, nebs, labs, compliant with medication. will continue to monitor.

## 2022-03-09 NOTE — ED ADULT NURSE NOTE - OBJECTIVE STATEMENT
c/o shortness of breath. PMH of COPD and CHF. Pt has B/L LE edema and ulcerations.  c/o pressure wounds on buttocks.

## 2022-03-09 NOTE — ED PROVIDER NOTE - OBJECTIVE STATEMENT
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 per patient, sob started while at rest at home 3 hours PTA. patient called her neighbor who called 911. notes all day today has not been feeling well, generalized fatigue. notes no fever, chills, cough, N/V/D, or cp, syncope. patient compliant with all medications, denies changes to meds. of note, patient was admitted to [facility] 2 weeks ago for one evening, to manage chronic sores on buttocks, placed on abx, and dc on po abx. also notes mild copd, and managed for that. denies recent falls, denies smoking or etoh. patient is compliant with home meds: lipitor 40, cardizem 180, bumetanide 2, eliquis 2, oxybutynin, iron, valium and dilaudid as needed for pain. last took dialaudid 4 mg yesterday for her chronic pain.

## 2022-03-09 NOTE — ED PROVIDER NOTE - SKIN TEMP
bilateral lower extremity pitting edema, chronic with superficial diffuse discoloration, and skin scale and scab. no streaking, nontender, no crepitus bilaterally.

## 2022-03-09 NOTE — ED PROVIDER NOTE - PATIENT PORTAL LINK FT
You can access the FollowMyHealth Patient Portal offered by [facility] by registering at the following website: [email or web address]. By joining [employer]’s FollowMyHealth portal, you will also be able to view your health information using other applications (apps) compatible with our system.

## 2022-03-10 PROBLEM — J44.9 CHRONIC OBSTRUCTIVE PULMONARY DISEASE, UNSPECIFIED: Chronic | Status: ACTIVE | Noted: 2022-01-01

## 2022-03-10 NOTE — ED ADULT NURSE REASSESSMENT NOTE - NS ED NURSE REASSESS COMMENT FT1
Pt. received AAOx3 semi fowlers in stretcher breathing with NEB at 6lpm, pt. placed on NC at 3lpm SPO2 93%. Pt. noted to have dry and crusty skin to b/l lower extremities. Pt. repositioned for comfort. Pt. in CCM. Awaiting dispo. Monitoring ongoing.
Pt. repositioned and wounds on posterior thighs covered with medi honey and tammy replaced with a flat sheet.
Pt assisted on  bedpan. Noted to have excoriated skin w/ scabs and redness on sacral area.
Pt received from previous shift RN A&Ox4, on 6L of oxygen.   Pending repeat VBG, will complete. Will continue to monitor.
Received patient from JOSE Cruz. Pt resting in stretcher, in no acute distress at this time, respirations even bilaterally. Safety measures maintained, pending transport home.

## 2022-03-10 NOTE — ED ADULT NURSE REASSESSMENT NOTE - CONDITION
Taking suppressive acyclovir. No outbreaks this pregnancy.  Continue during post partum  
unchanged
unchanged

## 2022-07-02 NOTE — H&P ADULT - PROBLEM SELECTOR PLAN 6
- hx of multiple DVTs in Lower extremities, last about 5 years ago (?)  - on eliquis 5mg BID for recurrent DVT   - likely provoked as pt bedbound     Plan   - continue with eliquis 5mg BID (she is 81 with CKD, not yet at 2.5 mg dosing)   - follow up LE dopplers       #PVD  - with b/l LE stents  - pt follows with vascular she states, unclear physician   - she has b/l peripheral pulses in DP/PT, hold off on acute vascular consult - hx of multiple DVTs in Lower extremities, last about 5 years ago (?)  - on eliquis 5mg BID for recurrent DVT   - likely provoked as pt bedbound     Plan   - hold eliquis 5mg BID (she is 81 with CKD, not yet at 2.5 mg dosing),  - follow up LE dopplers       #PVD  - with b/l LE stents  - pt follows with vascular she states, unclear physician   - she has b/l peripheral pulses in DP/PT, hold off on acute vascular consult

## 2022-07-02 NOTE — ED PROVIDER NOTE - CARE PLAN
1 Principal Discharge DX:	Open wound   Principal Discharge DX:	Anemia  Secondary Diagnosis:	Cellulitis

## 2022-07-02 NOTE — H&P ADULT - NSHPPHYSICALEXAM_GEN_ALL_CORE
.  VITAL SIGNS:  T(C): 36.6 (07-02-22 @ 20:49), Max: 36.8 (07-02-22 @ 18:39)  T(F): 97.8 (07-02-22 @ 20:49), Max: 98.3 (07-02-22 @ 18:39)  HR: 98 (07-02-22 @ 20:49) (87 - 98)  BP: 124/53 (07-02-22 @ 20:49) (102/58 - 124/53)  BP(mean): --  RR: 18 (07-02-22 @ 20:49) (17 - 20)  SpO2: 95% (07-02-22 @ 20:49) (94% - 99%)  Wt(kg): --    PHYSICAL EXAM:    Constitutional: NAD   HEENT: NC/AT, PERRL, EOMI, conjunctival pallor, uvula midline, no oropharyngeal erythema or exudates; dry MM  Neck: supple; hard to assess JVD due to body habitus   Respiratory: b/l wheezing and rhonchi throughout lung fields, no retractions, no accessory muscle use   Cardiac: +S1/S2; RRR; no M/R/G  Gastrointestinal: soft, NT/ND; no rebound or guarding; +BSx4  Back:  no CVAT B/L  Extremities: +4 pitting edema to knees b/l LE with b/l erythema and tenderness to palpation and significant skin changes with weeping and abrasion to left lateral lower extremity below left knee,, WWP, no clubbing or cyanosis;  Musculoskeletal: NROM x4; no joint swelling, tenderness or erythema  Vascular: peripheral pulses present   Dermatologic: skin warm, weeping with abrasion as described above   Neurologic: AAOx3; CNII-XII grossly intact; no focal deficits

## 2022-07-02 NOTE — ED PROVIDER NOTE - OBJECTIVE STATEMENT
PMHx COPD on home O2 3L, DVT on Eliquis, MRSA celluitis, PVD s/p stent, aneurysm s/p coil, CHF EF 55% on bumex, anemia h/o tranfusions presents with wound over LLE. states that last night she scrapped her LLE with a metal grabber. denies fall. as per patient and HHA noted weeping over skin BL LE x 2-3 days. Patient had also been told by her cardiologist this week that she was more anemic and would need a transfusion and is currently working with her doctors to arrange for outpatient appointment. denies fever, chills, cough, SOB, palpitations, increased weakness, chest pain PMHx COPD on home O2 3L, DVT on Eliquis, MRSA celluitis, PVD s/p stent, aneurysm s/p coil, CHF EF 55% on bumex, anemia h/o tranfusions presents with wound over LLE. states that last night she scrapped her LLE with a metal grabber, noting "a lot of bleeding" denies fall. as per patient and HHA noted weeping over skin BL LE x 2-3 days. Patient had also been told by her cardiologist this week that she was more anemic and would need a transfusion and is currently working with her doctors to arrange for outpatient appointment. denies fever, chills, cough, SOB, palpitations, increased weakness, chest pain PMHx COPD on home O2 3L, DVT on Eliquis, MRSA celluitis, PVD s/p stent, aneurysm s/p coil, CHF EF 55% on bumex, anemia h/o tranfusions presents with wound over LLE. states that last night she scrapped her LLE with a metal grabber, noting "a lot of bleeding" denies fall. as per patient and HHA noted weeping over skin BL LE x 2-3 days. Patient had also been told by her cardiologist this week that she was more anemic and would need a transfusion and is currently working with her doctors to arrange for outpatient appointment. denies fever, chills, cough, SOB, palpitations, increased weakness, chest pain  blood type A+. last tranfused one year ago

## 2022-07-02 NOTE — H&P ADULT - PROBLEM SELECTOR PLAN 3
- Cr 2.50, last 1.59 in March 2022  - likely BRANT on CKD stage IV   - patient appears hypervolemic, on bumex at home. however she has dry MM- may be intravascularly depleted   - BUN/Cr ratio> 20  - she is s/p 40 lasix IV once in ED, presented with landis in place draining clear urine ruling out post renal etiologies     Plan  - continue with home bumex   - follow up urine Cr, urine urea   - strict I/O's   - remove landis, TOV    #Hematuria  - seen on UA, large blood and RBCs  - no signs of infection, pt on eliquis for DVTs  - repeat UA  - Urology consult vs outpatient follow up for further work up - Cr 2.50, last 1.59 in March 2022  - likely BRANT on CKD stage IV   - patient appears hypervolemic, on bumex at home. however she has dry MM- may be intravascularly depleted   - BUN/Cr ratio> 20  - she is s/p 40 lasix IV once in ED, presented with landis in place draining clear urine ruling out post renal etiologies     Plan  - continue with home bumex   - follow up urine Cr, urine urea   - strict I/O's   - remove landis, TOV  - renally dose meds    #Hematuria  - seen on UA, large blood and RBCs  - no signs of infection, pt on eliquis for DVTs  - repeat UA  - Urology consult vs outpatient follow up for further work up - Cr 2.50, last 1.59 in March 2022  - likely BRANT on CKD stage IV   - patient appears hypervolemic, on bumex at home. however she has dry MM- may be intravascularly depleted   - BUN/Cr ratio> 20  - she is s/p 40 lasix IV once in ED, presented with landis in place draining clear urine ruling out post renal etiologies     Plan  - continue with home bumex   - follow up urine Cr, urine urea   - strict I/O's   - remove landis, TOV  - renally dose meds  - renal consult in AM if Cr worsening     #Hematuria  - seen on UA, large blood and RBCs  - no signs of infection, pt on eliquis for DVTs  - repeat UA  - Urology consult vs outpatient follow up for further work up

## 2022-07-02 NOTE — H&P ADULT - PROBLEM SELECTOR PLAN 10
F: none   E: replete as needed, cautious with CKD  N: DASH diet F: none   E: replete as needed, cautious with CKD  N: DASH diet  DVT ppx: eliquis       Dispo: ART F: none   E: replete as needed, cautious with CKD  N: DASH diet  DVT ppx: scds, restart eliquis if Hb stable       Dispo: RMF

## 2022-07-02 NOTE — H&P ADULT - PROBLEM SELECTOR PLAN 1
Normocytic anemia with iron deficiency in patient with CKD   Patient with hx of anemia, previous transfusions in past- currently on PO iron with dx of RADHA. However pt is not sure why she has RADHA. She said her cardiologist told her she needed transfusion after checking outpatient labs   - last colonoscopy 5-7 years ago, normal , never had EGD. denies any melena or dark stools, no hematuria, she does report blood loss form lower extremity wounds from chronic edema and recent trauma to left lower leg   - Hb 6.8, ferritin 28, iron sat- 6  - pt appears to be asymptomatic however mostly bedbound, but denies chest pain, SOB and palpitations     Plan   - transfuse 1 unit PRBC, careful for volume overload as pt with hx of HF and appears hypervolemic on exam (s/p lasix 40 IV)   - transfuse Hb <7.0 (no signs of active ACS)   - f/u fecal occult   - consider GI consult for scope depending on repeat CBC- will at least need follow up outpatient   - Maintain active Type and screen   - collateral on anemia work up from PCP and her hematologist   - start IV iron, patient takes PO however concern for pt not responding adequate to PO and also with CKD hx (concern pt not absorbing- hx of bowel resection) Normocytic anemia with iron deficiency in patient with CKD   Patient with hx of anemia, previous transfusions in past- currently on PO iron with dx of RADHA. However pt is not sure why she has RADHA. She said her cardiologist told her she needed transfusion after checking outpatient labs   - last colonoscopy 5-7 years ago, normal , never had EGD. denies any melena or dark stools, no hematuria, she does report blood loss form lower extremity wounds from chronic edema and recent trauma to left lower leg   - Hb 6.8, ferritin 28, iron sat- 6  - pt appears to be asymptomatic however mostly bedbound, but denies chest pain, SOB and palpitations   - UA with large blood and many RBCs   - note: pt on eliquis for DVTs     Plan   - transfuse 1 unit PRBC, careful for volume overload as pt with hx of HF and appears hypervolemic on exam (s/p lasix 40 IV)   - transfuse Hb <7.0 (no signs of active ACS)   - f/u fecal occult   - consider GI consult for scope depending on repeat CBC- will at least need follow up outpatient   - Maintain active Type and screen   - collateral on anemia work up from PCP and her hematologist   - start IV iron, patient takes PO however concern for pt not responding adequate to PO and also with CKD hx (concern pt not absorbing- hx of bowel resection) Normocytic anemia with iron deficiency in patient with CKD   Patient with hx of anemia, previous transfusions in past- currently on PO iron with dx of RADHA. However pt is not sure why she has RADHA. She said her cardiologist told her she needed transfusion after checking outpatient labs   - last colonoscopy 5-7 years ago, normal , never had EGD. denies any melena or dark stools, no hematuria, she does report blood loss form lower extremity wounds from chronic edema and recent trauma to left lower leg   - Hb 6.8, ferritin 28, iron sat- 6  - pt appears to be asymptomatic however mostly bedbound, but denies chest pain, SOB and palpitations   - UA with large blood and many RBCs   - note: pt on eliquis for DVTs     Plan   - transfuse 1 unit PRBC, careful for volume overload as pt with hx of HF and appears hypervolemic on exam (s/p lasix 40 IV)   - transfuse Hb <7.0 (no signs of active ACS)   - f/u fecal occult   - consider GI consult for scope depending on repeat CBC- will at least need follow up outpatient   - Maintain active Type and screen   - collateral on anemia work up from PCP and her hematologist   - start IV iron, patient takes PO however concern for pt not responding adequate to PO and also with CKD hx (concern pt not absorbing- hx of bowel resection)  - holding eliquis in setting of anemia

## 2022-07-02 NOTE — PATIENT PROFILE ADULT - FLU SEASON?
----- Message from Marjorie Neil MD sent at 1/20/2022 12:21 PM CST -----  I called pt ak nose, doing well also had cyst neck removed doing well pjb  
Patient notified of results via MD phone call 1/20/2022.  
No

## 2022-07-02 NOTE — H&P ADULT - PROBLEM SELECTOR PLAN 8
-pt denies diagnosis of DALIA or prior sleep study, based off body habitus likely has DALIA  - sleep study outpatient

## 2022-07-02 NOTE — ED ADULT NURSE NOTE - OBJECTIVE STATEMENT
chronically obese lady with 2 yr hx of bilateral lower leg ulcers from knee to toes, banged leg yesterday in house, pt on home 02 for past 2 years, states usually hard stick requiring uss, able to get blood samples but couldn't advance iv line, luis [place] aware, pt gcs 15 , HHA in attendance

## 2022-07-02 NOTE — H&P ADULT - PROBLEM SELECTOR PLAN 4
Patient with chronic HF, per documentation diastolic HF with last echo sept 202, EF 60%  - patient appears with LE edema, rhonchi. wheezing, unable to assess JVD due to body habitus   - suspect degree of pulm HTN   - pro-bnp 1000, CXR with signs of congestion (similar on previous CXRs)   - patient saturating 96 on 3L NC, baseline home O2    Plan   -follow up TTE  - continue with bumex, home dose  - med rec in AM   - collateral from patient's cardiologist     #HLD   - continue with lipitor 40mg qhs

## 2022-07-02 NOTE — H&P ADULT - ASSESSMENT
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

## 2022-07-02 NOTE — H&P ADULT - NSHPOUTPATIENTPROVIDERS_GEN_ALL_CORE
Dr. Eulogio Rothman- [facility]- [phone]  Dr. Rico Bowser [phone] ([facility], [place])- Cardiologist   Heme: Dr. Barrera [facility], [place], [phone]

## 2022-07-02 NOTE — H&P ADULT - PROBLEM SELECTOR PLAN 7
- chronic neuropathy - chronic neuropathy per pt 2/2 lyme disease  - this has resulted in chronic pain per patient   - she takes Dilaudid 4mg q4h prn at home per patient   - official med rec in AM - chronic neuropathy per pt 2/2 lyme disease  - this has resulted in chronic pain per patient   - she takes Dilaudid 4mg q4h prn at home per patient   - official med rec in AM, ISTOP

## 2022-07-02 NOTE — ED PROVIDER NOTE - NSICDXPASTMEDICALHX_GEN_ALL_CORE_FT
PAST MEDICAL HISTORY:  Brain embolism and thrombosis     COPD (chronic obstructive pulmonary disease)     DVT (deep venous thrombosis)     Lyme disease     MRSA (methicillin resistant Staphylococcus aureus)     PNA (pneumonia)     Skin cancer

## 2022-07-02 NOTE — ED ADULT TRIAGE NOTE - CHIEF COMPLAINT QUOTE
Pt came in c/o of wound to left shin x yesterday after hitting with metal pole. +redness, swelling. No active bleeding. Pt reports large amount of bleeding last night. PMH of anemia, copd on 3Lnc, "heart problems" Arrives with motorized wheel chair and HHA.

## 2022-07-02 NOTE — ED PROVIDER NOTE - PHYSICAL EXAMINATION
BL lower extremities: cap refill 2 sec, skin hyperpigmented hypertropic, 2+ edema, non pitting  RLE: moisture noted over medial aspect of ankle, no erythema  LLE: puncutre wound over anterior mid lower leg with surrounding erythema, no active bleeding, skin moist

## 2022-07-02 NOTE — H&P ADULT - NSHPSOCIALHISTORY_GEN_ALL_CORE
Former smoker, quit 6 years ago  Denies alcohol use or recreational drug use  Bedbound, lives alone with [facility] Mon- Sun  9AM-6PM   Retired- worked on [employer]

## 2022-07-02 NOTE — ED PROVIDER NOTE - CLINICAL SUMMARY MEDICAL DECISION MAKING FREE TEXT BOX
PMHX COPD on home O2, PVD s/p stent, DVT on eliquis, with open wound over LLE after scraping leg yesterday. noted to have weeping over BL LE. will check labs, dress wound, continue to monitor PMHX COPD on home O2, PVD s/p stent, DVT on eliquis, CHF EF 55% with open wound over LLE after scraping leg yesterday. noted to have weeping over BL LE. will check labs, dress wound, continue to monitor

## 2022-07-02 NOTE — ED ADULT NURSE REASSESSMENT NOTE - NS ED NURSE REASSESS COMMENT FT1
pt needed to use bedpan, myself and pct Leyla went into help , I asked present HHA how to best assist, HHA ignored me, I thought she hadnt heard so I asked again, "how best to assist Katie", again HHA ignored me, but stated "you ignored me before" I explained I was documenting on another patient, myself and Leyla  positioned pt on bedpan, I stated to HHA 'don't assume you know what im doing" HHA replied "are you still talking, I stopped listening ages ago, BITCH" I asked if she just called me bitch, security called to removed HHA explained to patient why, she understood as she heard what HHA said , [place] aware

## 2022-07-02 NOTE — H&P ADULT - PROBLEM SELECTOR PLAN 5
- hx of COPD on 3L NC at home   - on exam pt with wheezing throughout  - without accessory muscle use or tachypnea, AAOX3  - previous ABG/ VBG with hypercapnia and supporting metabolic alkalosis, bicarb 30 this admission  - home inhaler: fluticasone/ salmeterol    Plan   - given duoneb x1, started on q6h prn   - continue home inhaler, converting fluticasone/ salmeterol to Symbicort   - official med rec in AM

## 2022-07-02 NOTE — H&P ADULT - HISTORY OF PRESENT ILLNESS
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, CKD stage 4, SCC L leg s/p resection with skin graft (~2010) c/b MRSA infection, Diverticulitis s/p sigmoidectomy (2010), LE cellulitis (2/2020), Nondisplaced left lateral rib  fracture (9/2020) presents with             ED Course:   Vitals: T 97.6, HR 95, /63, RR 20, 96 O2 on 3L NC   Labs s/f: Hb 6.8 (baseline ___), BUN 59, Cr 2.50,   EKG: NSR, no signs of acute ischemia   Imaging: CXR: prominent broncho   Interventions:     Patient admitted for  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, CKD stage 4, SCC L leg s/p resection with skin graft (~2010) c/b MRSA infection, Diverticulitis s/p sigmoidectomy (2010), LE cellulitis (2/2020), Nondisplaced left lateral rib  fracture (9/2020) presents with             ED Course:   Vitals: T 97.6, HR 95, /63, RR 20, 96 O2 on 3L NC   Labs s/f: Hb 6.8 (baseline ___), BUN 59, Cr 2.50,   EKG: NSR, no signs of acute ischemia   Imaging: CXR: prominent bronchovascular markings, cardiomegaly, congestion, LLL ?consolidation vs atelectasis   Interventions: benadryl 50 IV once, lasix 40 IV once, Vancomycin 1500mg IV once      Patient admitted for cellulitis and anemia.     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, CKD stage 4, SCC L leg s/p resection with skin graft (~2010) c/b MRSA infection, Diverticulitis s/p sigmoidectomy (2010), LE cellulitis (2/2020), Nondisplaced left lateral rib  fracture (9/2020) presents with             ED Course:   Vitals: T 97.6, HR 95, /63, RR 20, 96 O2 on 3L NC   Labs s/f: Hb 6.8 (baseline ___), BUN 59, Cr 2.50,   EKG: NSR, no signs of acute ischemia   Imaging: CXR: prominent bronchovascular markings, cardiomegaly, congestion, LLL ?consolidation vs atelectasis   Interventions: benadryl 50 IV once, lasix 40 IV once, Vancomycin 1500mg IV once      Patient admitted for cellulitis and anemia.     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 Patient states she has anemia, she is not sure why but she has iron deficiency and she takes OTC PO iron daily. She reports she got labs done recently at lab [facility] and was told she needed blood transfusion. She has needed blood transfusions before, last transfusion was one year ago. Her PCP and cardiologist manage her anemia, but again she is not sure why she has RADHA. She denies any dark stools, melena, bright blood per rectum. No hematuria, denies any other active blood loss except bleeding at times from her lower extremity wounds. She states she             ED Course:   Vitals: T 97.6, HR 95, /63, RR 20, 96 O2 on 3L NC   Labs s/f: Hb 6.8 (baseline ___), BUN 59, Cr 2.50,   EKG: NSR, no signs of acute ischemia   Imaging: CXR: prominent bronchovascular markings, cardiomegaly, congestion, LLL ?consolidation vs atelectasis   Interventions: benadryl 50 IV once, lasix 40 IV once, Vancomycin 1500mg IV once      Patient admitted for cellulitis and anemia.     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 Patient states she has anemia, she is not sure why but she has iron deficiency and she takes OTC PO iron daily. She reports she got labs done recently at [facility] and was told she needed blood transfusion. She has needed blood transfusions before, last transfusion was one year ago. Her PCP and cardiologist manage her anemia, but again she is not sure why she has RADHA. She denies any dark stools, melena, bright blood per rectum. No hematuria, no hematemesis- denies any other active blood loss except bleeding at times from her lower extremity wounds. She states she injured her left leg this morning and saw a pool of blood- took about three urinary pads to stop the bleeding. Her last colonoscopy was 6-7 years ago and was normal per patient. She has never had an EGD. She denies alcohol use. She follows with a hematologist as well but has not seen them in 2 years.     Patient             ED Course:   Vitals: T 97.6, HR 95, /63, RR 20, 96 O2 on 3L NC   Labs s/f: Hb 6.8 (baseline ___), BUN 59, Cr 2.50,   EKG: NSR, no signs of acute ischemia   Imaging: CXR: prominent bronchovascular markings, cardiomegaly, congestion, LLL ?consolidation vs atelectasis   Interventions: benadryl 50 IV once, lasix 40 IV once, Vancomycin 1500mg IV once      Patient admitted for cellulitis and anemia.     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 Patient states she has anemia, she is not sure why but she has iron deficiency and she takes OTC PO iron daily. She reports she got labs done recently at [facility] and was told she needed blood transfusion. She has needed blood transfusions before, last transfusion was one year ago. Her PCP and cardiologist manage her anemia, but again she is not sure why she has RADHA. She denies any dark stools, melena, bright blood per rectum. No hematuria, no hematemesis- denies any other active blood loss except bleeding at times from her lower extremity wounds. She states she injured her left leg this morning and saw a pool of blood- took about three urinary pads to stop the bleeding. Her last colonoscopy was 6-7 years ago and was normal per patient. She has never had an EGD. She denies alcohol use. She follows with a hematologist as well but has not seen them in 2 years.     Patient also reports chronic lower extremity edema for several years. She has PVD- has b/l LE stents, she also has hx of previous DVTs- about 3 in total for which she is on eliquis.             ED Course:   Vitals: T 97.6, HR 95, /63, RR 20, 96 O2 on 3L NC   Labs s/f: Hb 6.8 (baseline ___), BUN 59, Cr 2.50,   EKG: NSR, no signs of acute ischemia   Imaging: CXR: prominent bronchovascular markings, cardiomegaly, congestion, LLL ?consolidation vs atelectasis   Interventions: benadryl 50 IV once, lasix 40 IV once, Vancomycin 1500mg IV once      Patient admitted for cellulitis and anemia.     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 Patient states she has anemia, she is not sure why but she has iron deficiency and she takes OTC PO iron daily. She reports she got labs done recently at [facility] and was told she needed blood transfusion. She has needed blood transfusions before, last transfusion was one year ago. Her PCP and cardiologist manage her anemia, but again she is not sure why she has RADHA. She denies any dark stools, melena, bright blood per rectum. No hematuria, no hematemesis- denies any other active blood loss except bleeding at times from her lower extremity wounds. She states she injured her left leg this morning and saw a pool of blood- took about three urinary pads to stop the bleeding. Her last colonoscopy was 6-7 years ago and was normal per patient. She has never had an EGD. She denies alcohol use. She follows with a hematologist as well but has not seen them in 2 years.     Patient also reports chronic lower extremity edema for several years. She has PVD- has b/l LE stents, she also has hx of previous DVTs- about 3 in total for which she is on eliquis. She             ED Course:   Vitals: T 97.6, HR 95, /63, RR 20, 96 O2 on 3L NC   Labs s/f: Hb 6.8 (baseline 7-8), BUN 59, Cr 2.50 (prev 1.59 03/2022)  EKG: NSR, no signs of acute ischemia   Imaging: CXR: prominent bronchovascular markings, cardiomegaly, congestion, LLL ?consolidation vs atelectasis   Interventions: benadryl 50 IV once, lasix 40 IV once, Vancomycin 1500mg IV once      Patient admitted for cellulitis and anemia.     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 Patient states she has anemia, she is not sure why but she has iron deficiency and she takes OTC PO iron daily. She reports she got labs done recently at lab [facility] and was told she needed blood transfusion. She has needed blood transfusions before, last transfusion was one year ago. Her PCP and cardiologist manage her anemia, but again she is not sure why she has RADHA. She denies any dark stools, melena, bright blood per rectum. No hematuria, no hematemesis- denies any other active blood loss except bleeding at times from her lower extremity wounds. She states she injured her left leg this morning and saw a pool of blood- took about three urinary pads to stop the bleeding. Her last colonoscopy was 6-7 years ago and was normal per patient. She has never had an EGD. She denies alcohol use. She follows with a hematologist as well but has not seen them in 2 years. She was last admitted to [facility] one year ago for one week for anemia and this is where she got a transfusion.     Patient reports LLE injury earlier today, She is mostly bedbound and gets around by holding onto objects around her home, she has a grabber to  objects and reports while using this, hit her leg on the side of her table     Patient also reports chronic lower extremity edema for several years. She has PVD- has b/l LE stents, she also has hx of previous DVTs- about 3 in total for which she is on eliquis. She reports hx of MRSA cellulitis back in 2010, not sure where it was though. She reports heart failure, unknown last echo but saw her cardiologist 4 weeks ago, she currently takes bumex 2mg in AM and 1mg in PM, she reports good urine output with this. She denies orthopnea or dyspnea at rest. She is mostly bedbound     ED Course:   Vitals: T 97.6, HR 95, /63, RR 20, 96 O2 on 3L NC   Labs s/f: Hb 6.8 (baseline 7-8), BUN 59, Cr 2.50 (prev 1.59 03/2022)  EKG: NSR, no signs of acute ischemia   Imaging: CXR: prominent bronchovascular markings, cardiomegaly, congestion, LLL ?consolidation vs atelectasis   Interventions: benadryl 50 IV once, lasix 40 IV once, Vancomycin 1500mg IV once      Patient admitted for cellulitis and anemia.     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 Patient states she has anemia, she is not sure why but she has iron deficiency and she takes OTC PO iron daily. She reports she got labs done recently at [facility] and was told she needed blood transfusion. She has needed blood transfusions before, last transfusion was one year ago. Her PCP and cardiologist manage her anemia, but again she is not sure why she has RADHA. She denies any dark stools, melena, bright blood per rectum. No hematuria, no hematemesis- denies any other active blood loss except bleeding at times from her lower extremity wounds. She states she injured her left leg this morning and saw a pool of blood- took about three urinary pads to stop the bleeding. Her last colonoscopy was 6-7 years ago and was normal per patient. She has never had an EGD. She denies alcohol use. She follows with a hematologist as well but has not seen them in 2 years. She was last admitted to [facility] one year ago for one week for anemia and this is where she got a transfusion.     Patient reports LLE injury earlier today, She is mostly bedbound and gets around by holding onto objects around her home, she has a grabber to  objects and reports while using this, hit her leg on the side of her table, She noticed a pool of blood and to stop the bleeding from her LLE it required two -3 urinary pads patient states. So she came to the ED for her injury and a transfusion. She denies any fever, chills, abd pain, diarrhea, constipation, dysuria.     Patient also reports chronic lower extremity edema for several years. She has PVD- has b/l LE stents, she also has hx of previous DVTs- about 3 in total for which she is on eliquis. She reports hx of MRSA cellulitis back in 2010, not sure where it was though. She reports heart failure, unknown last echo but saw her cardiologist 4 weeks ago, she currently takes bumex 2mg in AM and 1mg in PM, she reports good urine output with this. She denies orthopnea or dyspnea at rest. She is mostly bedbound and lives alone. She denies lightheadedness     ED Course:   Vitals: T 97.6, HR 95, /63, RR 20, 96 O2 on 3L NC   Labs s/f: Hb 6.8 (baseline 7-8), BUN 59, Cr 2.50 (prev 1.59 03/2022)  EKG: NSR, no signs of acute ischemia   Imaging: CXR: prominent bronchovascular markings, cardiomegaly, congestion, LLL ?consolidation vs atelectasis   Interventions: benadryl 50 IV once, lasix 40 IV once, Vancomycin 1500mg IV once--> benadryl given after vancomycin as patient started getting itchy during Abx administration       Patient admitted for anemia.  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 Patient states she has anemia, she is not sure why but she has iron deficiency and she takes OTC PO iron daily. She reports she got labs done recently at lab [facility] and was told she needed blood transfusion. She has needed blood transfusions before, last transfusion was one year ago. Her PCP and cardiologist manage her anemia, but again she is not sure why she has RADHA. She denies any dark stools, melena, bright blood per rectum. No hematuria, no hematemesis- denies any other active blood loss except bleeding at times from her lower extremity wounds. She states she injured her left leg this morning and saw a pool of blood- took about three urinary pads to stop the bleeding. Her last colonoscopy was 6-7 years ago and was normal per patient. She has never had an EGD. She denies alcohol use. She follows with a hematologist as well but has not seen them in 2 years. She was last admitted to [facility] one year ago for one week for anemia and this is where she got a transfusion.     Patient reports LLE injury earlier today, She is mostly bedbound and gets around by holding onto objects around her home, she has a grabber to  objects and reports while using this, hit her leg on the side of her table, She noticed a pool of blood and to stop the bleeding from her LLE it required two -3 urinary pads patient states. So she came to the ED for her injury and a transfusion. She denies any fever, chills, abd pain, diarrhea, constipation, dysuria.     Patient also reports chronic lower extremity edema for several years. She has PVD- has b/l LE stents, she also has hx of previous DVTs- about 3 in total for which she is on eliquis. She reports hx of MRSA cellulitis back in 2010, not sure where it was though. She reports heart failure, unknown last echo but saw her cardiologist 4 weeks ago, she currently takes bumex 2mg in AM and 1mg in PM, she reports good urine output with this. She denies orthopnea or dyspnea at rest. She is mostly bedbound and lives alone. She denies lightheadedness, palpitations, or chest pain.,    ED Course:   Vitals: T 97.6, HR 95, /63, RR 20, 96 O2 on 3L NC   Labs s/f: Hb 6.8 (baseline 7-8), BUN 59, Cr 2.50 (prev 1.59 03/2022)  EKG: NSR, no signs of acute ischemia   Imaging: CXR: prominent bronchovascular markings, cardiomegaly, congestion, LLL ?consolidation vs atelectasis   Interventions: benadryl 50 IV once, lasix 40 IV once, Vancomycin 1500mg IV once--> benadryl given after vancomycin as patient started getting itchy during Abx administration       Patient admitted for anemia.

## 2022-07-02 NOTE — H&P ADULT - NSHPLABSRESULTS_GEN_ALL_CORE
.  LABS:                         6.8    9.59  )-----------( 256      ( 2022 11:38 )             23.3     07    139  |  102  |  59<H>  ----------------------------<  136<H>  4.2   |  30  |  2.50<H>    Ca    8.9      2022 11:38    TPro  7.5  /  Alb  2.4<L>  /  TBili  0.1<L>  /  DBili  x   /  AST  19  /  ALT  15  /  AlkPhos  122<H>  07-    PT/INR - ( 2022 11:48 )   PT: 13.3 sec;   INR: 1.14          PTT - ( 2022 11:48 )  PTT:23.8 sec  Urinalysis Basic - ( 2022 11:38 )    Color: Yellow / Appearance: Clear / S.010 / pH: x  Gluc: x / Ketone: NEGATIVE  / Bili: NEGATIVE / Urobili: 0.2 E.U./dL   Blood: x / Protein: NEGATIVE mg/dL / Nitrite: NEGATIVE   Leuk Esterase: NEGATIVE / RBC: Many /HPF / WBC < 5 /HPF   Sq Epi: x / Non Sq Epi: x / Bacteria: Present /HPF                RADIOLOGY, EKG & ADDITIONAL TESTS: Reviewed.

## 2022-07-02 NOTE — ED PROVIDER NOTE - PROGRESS NOTE DETAILS
patient started complaining of generalized pruritis. denies SOB, throat, mouth swelling. CTA BL, airway patent, no appreciable rash on exam

## 2022-07-02 NOTE — PATIENT PROFILE ADULT - FALL HARM RISK - HARM RISK INTERVENTIONS

## 2022-07-02 NOTE — H&P ADULT - PROBLEM SELECTOR PLAN 2
chronic b/l LE edema with skin chnages suggestive of lymphade chronic b/l LE edema with skin changes suggestive of lymphedema and chronic venous stasis, also concerned for HF component contributing   - patient is currently on bumex 2mg in AM and 1mg in PM for diuresis   - legs are tender bilaterally which is chronic for pt due to her neuropathy, also has b/l edema and b/l warmth, lower concern for superimposed cellulitis, believe this is all chronic due to edema (also pt without leukocytosis or fevers)    Plan   - continue with her home diuresis, bumex 2mg in AM and 1mg in PM - confirm med rec in AM, monitor renal function closely   - hold off on abx for now   - compression stockings, elevate legs  - wound care consult  - follow up LE dopplers

## 2022-07-03 NOTE — CHART NOTE - NSCHARTNOTEFT_GEN_A_CORE
I was called by the nurse to the bedside because the patient was threatening to leave AMA. I came to the bedside and spoke with the patient at length about why she felt the need to leave and how we suggest that she stay. She reported that she has not been getting timely enough care, for instance that we did not give her 50mg diphenhydramine when she requested it. For this reason she was dead-set on leaving the hospital. I explained the risks of leaving the hospital and that we strongly advise against it due to the potential detrimental effects it might have on her health, but she remained insistent on leaving. The patient refused to sign an AMA form, stating that we were "two hours too late" and that she " would not sign a single thing". The patient claimed that she had safe transportation back home and refused my attempts to ensure that she had 1. safe transport and 2. a safe place to go. The patient used her motorized wheelchair to wheel down the [place]rera and out of the hospital.

## 2022-07-03 NOTE — DISCHARGE NOTE NURSING/CASE MANAGEMENT/SOCIAL WORK - NSDCPEFALRISK_GEN_ALL_CORE
For information on Fall & Injury Prevention, visit: [email or web address] OR  [email or web address] OR  https://www.cdc.gov/steadi/patient.html

## 2022-07-03 NOTE — PROGRESS NOTE ADULT - PROBLEM SELECTOR PLAN 7
- chronic neuropathy per pt 2/2 lyme disease  - this has resulted in chronic pain per patient   - she takes Dilaudid 4mg q4h prn at home per patient   - Istop her name does not come up. Cannot get collaterals from her PCP on Sunday/holiday weekend

## 2022-07-03 NOTE — PROGRESS NOTE ADULT - SUBJECTIVE AND OBJECTIVE BOX
SUBJECTIVE:  Patient seen and examined at bedside. Having a lot of discomfort in hospital bed, is frustrated with it. Has a lot of MSK pain at home, takes dilaudid 4mg 2-4 times daily. Unable to Istop as she does not come up when searched.     Vital Signs Last 12 Hrs  T(F): 98 (22 @ 12:44), Max: 98.3 (22 @ 03:45)  HR: 90 (22 @ 12:44) (88 - 100)  BP: 130/67 (22 @ 12:44) (102/60 - 130/67)  BP(mean): --  RR: 18 (22 @ 12:44) (18 - 18)  SpO2: 96% (22 @ 12:44) (96% - 96%)  I&O's Summary    2022 07:01  -  2022 07:00  --------------------------------------------------------  IN: 0 mL / OUT: 400 mL / NET: -400 mL        PHYSICAL EXAM:  Constitutional: NAD, comfortable in bed.  HEENT: EOMI  Neck: Supple, no JVD  Respiratory: CTA B/L.   Cardiovascular: RRR.   Gastrointestinal: +BS, soft NTND   Extremities: b/l stasis dermatitis, no bleed.   Neurological: AAOx3           LABS:                        7.2    8.89  )-----------( 265      ( 2022 11:56 )             24.4     -    140  |  104  |  56<H>  ----------------------------<  131<H>  5.0   |  27  |  2.04<H>    Ca    8.7      2022 07:43  Phos  4.1     07-  Mg     2.4         TPro  7.5  /  Alb  2.4<L>  /  TBili  0.1<L>  /  DBili  x   /  AST  19  /  ALT  15  /  AlkPhos  122<H>  07-02    PT/INR - ( 2022 11:48 )   PT: 13.3 sec;   INR: 1.14          PTT - ( 2022 11:48 )  PTT:23.8 sec  Urinalysis Basic - ( 2022 11:38 )    Color: Yellow / Appearance: Clear / S.010 / pH: x  Gluc: x / Ketone: NEGATIVE  / Bili: NEGATIVE / Urobili: 0.2 E.U./dL   Blood: x / Protein: NEGATIVE mg/dL / Nitrite: NEGATIVE   Leuk Esterase: NEGATIVE / RBC: Many /HPF / WBC < 5 /HPF   Sq Epi: x / Non Sq Epi: x / Bacteria: Present /HPF

## 2022-07-03 NOTE — PROGRESS NOTE ADULT - PROBLEM SELECTOR PLAN 10
F: none   E: replete as needed, cautious with CKD  N: DASH diet  DVT ppx: scds, restart eliquis if Hb stable       Dispo: PT. Follow hemoglobin. She has a lot of chronic conditions but will need a lot of outpatient follow up. She is a good historian and is of sound decision making.

## 2022-07-03 NOTE — DISCHARGE NOTE NURSING/CASE MANAGEMENT/SOCIAL WORK - PATIENT PORTAL LINK FT
You can access the FollowMyHealth Patient Portal offered by [facility] by registering at the following website: [email or web address]. By joining [facility]’s FollowMyHealth portal, you will also be able to view your health information using other applications (apps) compatible with our system.

## 2022-07-03 NOTE — PROGRESS NOTE ADULT - PROBLEM SELECTOR PLAN 3
- Cr 2.50, last 1.59 in March 2022  - likely BRANT on CKD stage IV   - patient appears hypervolemic, on bumex at home. however she has dry MM- may be intravascularly depleted   - BUN/Cr ratio> 20  - she is s/p 40 lasix IV once in ED, presented with landis in place draining clear urine ruling out post renal etiologies     Plan  - continue with home bumex   - Cr improved this AM    #Hematuria  - Will need urology outpatient

## 2022-07-03 NOTE — PROGRESS NOTE ADULT - PROBLEM SELECTOR PLAN 2
chronic b/l LE edema with skin changes suggestive of lymphedema and chronic venous stasis, also concerned for HF component contributing   - patient is currently on bumex 2mg in AM and 1mg in PM for diuresis   - legs are tender bilaterally which is chronic for pt due to her neuropathy, also has b/l edema and b/l warmth, lower concern for superimposed cellulitis, believe this is all chronic due to edema (also pt without leukocytosis or fevers)    Plan   - continue with her home diuresis, bumex 2mg in AM and 1mg in PM - confirm med rec in AM, monitor renal function closely    - compression stockings, elevate legs  - wound care consult

## 2022-07-03 NOTE — PROGRESS NOTE ADULT - PROBLEM SELECTOR PLAN 6
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

## 2022-07-03 NOTE — PROGRESS NOTE ADULT - PROBLEM SELECTOR PLAN 1
Normocytic anemia with iron deficiency in patient with CKD   Patient with hx of anemia, previous transfusions in past- currently on PO iron with dx of RADHA. However pt is not sure why she has RADHA. She said her cardiologist told her she needed transfusion after checking outpatient labs   - No signs of bleed, hgb stable after 1 unit  - UA with large blood and many RBCs   - note: pt on eliquis for DVTs     Plan   - Repeat CBC this evening, transfuse <7.0  - Will make GI outpatient referral at this time   - c/w IV iron, will likely need to start outpatient as she isn't tolerating PO/it isn't effective for her  - PT consult

## 2022-07-16 NOTE — ED ADULT NURSE NOTE - NSFALLRSKASSESSTYPE_ED_ALL_ED
Obstetrical Discharge Form    Gestational Age:  37w0d    Antepartum complications: none    Date of Delivery:   2022    Type of Delivery:   vaginal, spontaneous    Delivered By:                 Skylar Velasco:       Information for the patient's :  Kervin Shelley [[id number]]      Anesthesia:    Epidural    Intrapartum complications: None    Feeding method:   Unknown    Postpartum complications: none    Discharge Date:       Plan:   Follow up    in 6 week(s)  Good condition at discharge
Initial (On Arrival)

## 2022-09-06 NOTE — ED PROVIDER NOTE - PROGRESS NOTE DETAILS
pt AA0x3, improved respiratory states, bipap on standby if worsens currently sating 93% on home 02 of 3L case estela ROONEY, DR. maldonado via transfer center, will accept to step down under Dr. Delgado, discussed pending CTs pt alert, verbal, pending transfer, sating 100%

## 2022-09-06 NOTE — ED ADULT NURSE REASSESSMENT NOTE - NS ED NURSE REASSESS COMMENT FT1
pt currently having bedside chest xray , x3 rn's attempted blood draw, only got a few labs, requiring uss guided line for cultures and ivabs , Dr benton currently with pt doing uss, , pt has [facility] in attendance, states usually gcs 15 , at present pt shira saying one words "yes or NO"

## 2022-09-06 NOTE — H&P ADULT - PROBLEM SELECTOR PLAN 1
patient presenting with 4/4 SIRS criteria (T 105.2, , RR 22, WBC 25.88) with lactate 5.1 ->1.3 and source likely cellulitis. CT LE did not reveal and fluid collection or OM.     - pt. s/p Vancomycin 1g  - s/p cefepime 1g  - f/u pt. weight and redose vancomycin based on weight  - patient presenting with 4/4 SIRS criteria (T 105.2, , RR 22, WBC 25.88) with lactate 5.1 ->1.3 and source likely cellulitis. CT LE did not reveal and fluid collection or OM. CTa/p did not reveal infectious etiology.    - pt. s/p Vancomycin 1g  - s/p cefepime 1g  - increase vancomycin to 1500mg and dose based on levels  - will cont. cefepime for now -> consider switching to zosyn in AM or getting ID approval for cefepime

## 2022-09-06 NOTE — PATIENT PROFILE ADULT - PRO INTERPRETER NEED 2
Continue Toujeo 44 units nightly    Take short acting insulin: Humalog per the insulin table below:        Blood Sugar      Breakfast       Lunch         Dinner    Bedtime   Less than 90 Subtract 4 units Subtract 4 units Subtract 4 units none     (scheduled dose) 14 units 14 units 14 units none   151-200 Add 2 units Add 2 units Add 2 units none   201-250 Add 4 units Add 4 units Add 4 units none   251-300 Add 6 units Add 6 units Add 6 units none   Greater than 300 Add 8 units Add 8 units Add 8 units none     Recommended Blood Glucose Targets    · Fasting and before meals   · Bedtime 100-130      Continue Actos 15 mg daily    Continue Trulicity 0.75 mg weekly    Continue on Lipitor 10 mg daily    Continue on fenofibrate 160 mg daily    Omega 3s with flax seed - 1 gram twice a day    Call in blood sugars in 1 week for review    Continue on Synthroid    Return to clinic in  3-4 months    
English

## 2022-09-06 NOTE — ED ADULT TRIAGE NOTE - CHIEF COMPLAINT QUOTE
pt BIBA for increased shortness of breath and lethargy per caregiver. unable to obtain vitals in triage.

## 2022-09-06 NOTE — ED ADULT NURSE REASSESSMENT NOTE - NS ED NURSE REASSESS COMMENT FT1
Report given to JOSE Zaidi on 5LA. Pt aox3, no acute distress. Pt denies pain/complaints at this time. Pending transport to [facility].

## 2022-09-06 NOTE — ED PROVIDER NOTE - NSICDXPASTMEDICALHX_GEN_ALL_CORE_FT
Done, I sent the prescription to his Pharmacy.  RC PAST MEDICAL HISTORY:  Brain embolism and thrombosis     COPD (chronic obstructive pulmonary disease)     DVT (deep venous thrombosis)     Lyme disease     MRSA (methicillin resistant Staphylococcus aureus)     PNA (pneumonia)     Skin cancer

## 2022-09-06 NOTE — H&P ADULT - PROBLEM SELECTOR PLAN 3
Pt. with history of hypertension on home diltiazem 180mg daily    - will hold in the setting of sepsis

## 2022-09-06 NOTE — ED ADULT NURSE REASSESSMENT NOTE - NS ED NURSE REASSESS COMMENT FT1
pt appears less confused, able to obey commands and states she is in hospital , vital signs as charted

## 2022-09-06 NOTE — ED ADULT NURSE REASSESSMENT NOTE - NS ED NURSE REASSESS COMMENT FT1
pt c/o right buttock pain moved with x4 nurses up the bed and pillow placed under right buttock/hip, pt admits helps with pain , side rails x2 remain in upright position

## 2022-09-06 NOTE — ED ADULT NURSE REASSESSMENT NOTE - NS ED NURSE REASSESS COMMENT FT1
Break coverage for brandon canales, pt has bed on 5 lachman called to give report, phone rang out then hung up on

## 2022-09-06 NOTE — H&P ADULT - NSHPPHYSICALEXAM_GEN_ALL_CORE
PHYSICAL EXAM:  General: Pt. lethargic often falling asleep during exam but easily arousable, AAOx3  HEENT: atraumatic, normocephalic  Pulmonary: clear to auscultation bilaterally; No wheeze + prolonged expiration  Cardiovascular: Regular rate and rhythm; no murmurs, rubs or gallops. Normal S1S2  Gastrointestinal: Soft, tender to deep palpation primarily in lower quandrants b/l, nondistended; bowel sounds present + obese habitus  Musculoskeletal: 2+ peripheral pulses, + erythema and crusty skin in b/l lower extremities extending to the knee.   Neurology: Pt. alert and oriented, fluent speech, able to move all extremities  Skin: + crusting lesions on b/l lower legs as noted above.

## 2022-09-06 NOTE — ED ADULT NURSE NOTE - OBJECTIVE STATEMENT
pt lives alone, has neighbour checking in, today a that sees pt tues thursday and sat , and found her to be confused and shaking (febrile) usually gcs 15, pt bed bound has chronic lower leg ulcers , morbidly obese, on home o2 3 litres , sacrum red a few spotted areas broken down

## 2022-09-06 NOTE — ED ADULT NURSE REASSESSMENT NOTE - NS ED NURSE REASSESS COMMENT FT1
pt care handed back to brandon canales aware no handover given yet to floor as they arent answering phone

## 2022-09-06 NOTE — H&P ADULT - HISTORY OF PRESENT ILLNESS
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 pt normally gets around with walker, AA0x3, uses nebs on occasion but doesn't help, found by HHA today, somnolent but arousable in usual state of health yesterday.    In the ED:  VITAL SIGNS: Last 24 Hours  T(F): 99.6 (06 Sep 2022 20:40), Max: 105.2 (06 Sep 2022 10:41)  HR: 105 (06 Sep 2022 20:40) (101 - 145)  BP: 134/67 (06 Sep 2022 20:40) (108/53 - 148/49)  RR: 22 (06 Sep 2022 20:40) (18 - 22)  SpO2: 96% (06 Sep 2022 20:40) (95% - 100%)    Labs: WBC 25.88, Hgb 7.9, lactate 5.1 -> 1.3, Cr. 2.18, BNP 67185  UA positive for bacteria and epithelial cells    Imaging:     < from: CT Lower Extremity No Cont, Bilateral (09.06.22 @ 16:06) >  IMPRESSION:    Severe bilateral lower extremity cellulitis without drainable fluid   collection.    No CT evidence of osteomyelitis.    < from: CT Abdomen and Pelvis No Cont (09.06.22 @ 16:05) >  IMPRESSION:  Study performed without intravenous or oral contrast.  Centrilobular emphysema.  No evidence of pneumonia.  No hydronephrosis.   Retroperitoneal adenopathy. Slightly increased as compared to CT   3/4/2018. Differential diagnosis includes lymphoproliferative disorder.  Status post sigmoid resection.  3.0 cm slightly hyperdense solid cortical mass upper pole right kidney.   Is indeterminate. Differential diagnosis includes proteinaceous cyst,   tumor. Favor the former diagnosis. Correlate with targeted renal   sonography and MR. Has increased in size as compared to CT 9/10/2020.    < end of copied text >      Interventions: Duoneb x1, solumedrol 40, Cefepime 1g, Vancomycin 1g, 1L NS bolus

## 2022-09-06 NOTE — ED PROVIDER NOTE - OBJECTIVE STATEMENT
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 pt normally gets around with walker, AA0x3, uses nebs on occasion but doesn't help, found by HHA today, somnolent but arousable. in usual state of health yesterday.

## 2022-09-06 NOTE — ED ADULT NURSE REASSESSMENT NOTE - NS ED NURSE REASSESS COMMENT FT1
Pt transported to [facility] in no acute distress. VSS, pt given Tylenol prior to transport, tolerated PO with no difficulty.

## 2022-09-06 NOTE — H&P ADULT - ATTENDING COMMENTS
patient with hx of chronic LE dvt on eliquis presetned with red swollen crusty wheeping LE lesions. fevers and tachycardia. given broad spectrum ABX in GV. admitted to medical teletry for Sepsis wwithout shock secondary to LE cellulitis. imaging without evidence of osteo or gas tracking. will continue broad spectrum coverage and follow pan cultures. dose abx per renal function. continue eliquis. hold anti-htn meds for now unless patient is hypertensive. get lower extremity duplexes.  cont home oxygen. keep SPO2 >89%

## 2022-09-06 NOTE — H&P ADULT - PROBLEM SELECTOR PLAN 2
Pt. with COPD on home 3L. Patient initially presented with NRB, but now on NC    - pt. now 92% on 3L which is baseline.   - cont. duonebs PRN  - Pt. with COPD on home 3L. Patient initially presented with NRB, but now on NC    - pt. now 92% on 3L which is baseline.   - cont. duonebs q6 PRN  - Pt. with COPD on home 3L. Patient initially presented with NRB, but now on NC    - pt. now 92% on 3L which is baseline.   - cont. duonebs q6 PRN Itraconazole Counseling:  I discussed with the patient the risks of itraconazole including but not limited to liver damage, nausea/vomiting, neuropathy, and severe allergy.  The patient understands that this medication is best absorbed when taken with acidic beverages such as non-diet cola or ginger ale.  The patient understands that monitoring is required including baseline LFTs and repeat LFTs at intervals.  The patient understands that they are to contact us or the primary physician if concerning signs are noted.

## 2022-09-06 NOTE — PATIENT PROFILE ADULT - FALL HARM RISK - HARM RISK INTERVENTIONS

## 2022-09-06 NOTE — H&P ADULT - NSHPLABSRESULTS_GEN_ALL_CORE
LABS:                         7.9    25.88 )-----------( 191      ( 06 Sep 2022 10:25 )             27.5         137  |  102  |  42<H>  ----------------------------<  140<H>  4.5   |  24  |  2.18<H>    Ca    9.1      06 Sep 2022 10:25    TPro  7.5  /  Alb  2.5<L>  /  TBili  0.4  /  DBili  x   /  AST  29  /  ALT  21  /  AlkPhos  130<H>      PT/INR - ( 06 Sep 2022 10:25 )   PT: 15.3 sec;   INR: 1.30          PTT - ( 06 Sep 2022 10:25 )  PTT:29.3 sec  Urinalysis Basic - ( 06 Sep 2022 10:25 )    Color: Yellow / Appearance: Clear / S.025 / pH: x  Gluc: x / Ketone: NEGATIVE  / Bili: NEGATIVE / Urobili: 0.2 E.U./dL   Blood: x / Protein: >=300 mg/dL / Nitrite: NEGATIVE   Leuk Esterase: NEGATIVE / RBC: 5-10 /HPF / WBC < 5 /HPF   Sq Epi: x / Non Sq Epi: Many /HPF / Bacteria: Present /HPF          Serum Pro-Brain Natriuretic Peptide: 72217 pg/mL ( @ 10:25)    Lactate, Blood: 1.3 mmoL/L ( @ 12:53)  Lactate, Blood: 5.1 mmoL/L ( @ 10:25)      RADIOLOGY, EKG & ADDITIONAL TESTS:

## 2022-09-06 NOTE — H&P ADULT - ASSESSMENT
81F with complex medical history including obesity, HTN, HLD, HFpEF (EF 55-60% last echo 2/20), COPD (on home O2-3L), chronic LE edema with neuropathic pain and cellulitis, DALIA, PVD p/w respiratory distress x 1 day found to have cellulitis on lower extremity.

## 2022-09-06 NOTE — ED PROVIDER NOTE - PHYSICAL EXAMINATION
mild respiratory distress  EOMI  airway patent  +S1S2 no mrg  course breath sounds b/l, pursed lips  soft nt nd  2+ le edema b/l - chronic appearing leg wounds  somnolent but arousable mild respiratory distress  EOMI  airway patent  +S1S2 no mrg  course breath sounds b/l, pursed lips  soft nt nd  2+ le edema b/l - chronic appearing leg wounds, r >l, no crepitus  somnolent but arousable

## 2022-09-06 NOTE — ED PROVIDER NOTE - CLINICAL SUMMARY MEDICAL DECISION MAKING FREE TEXT BOX
respiratory distress, rigors, likely sepsis, low suspicion pe as pt compliant on eliquis, r/o flu, r/o rx for b/l le cellulitis, r/o covid, gental hydration given cardiac history, admit respiratory distress, rigors, likely sepsis, low suspicion pe as pt compliant on eliquis, r/o flu, r/o rx for b/l le cellulitis, r/o covid, gental hydration given cardiac history, admit;    leg wounds appear acute on chronic, no crepitus, however given white count and lactate, will get ct of legs to check for gas, low suspicion nec fasc at this time

## 2022-09-07 NOTE — PROGRESS NOTE ADULT - PROBLEM SELECTOR PLAN 7
DVT ppx: eliquis 2.5mg BID  PPI ppx: None indicated  Diet: carb consistent  Dispo: telemetry to [facility]  CODE STATUS: FULL CODE Pt with history of CKD, baseline Cr 2.04 in July 2022, currently 2.22.  - continue to trend BUN, Cr  - renal dosing  - avoid nephrotoxic agents  - strict I&Os  - monitor UOP

## 2022-09-07 NOTE — CONSULT NOTE ADULT - ASSESSMENT
per Internal Medicine    81 y o F with complex medical history including obesity, HTN, HLD, HFpEF (EF 55-60% last echo 2/20), COPD (on home O2-3L), chronic LE edema with neuropathic pain and cellulitis, DALIA, PVD p/w respiratory distress x 1 day found to have cellulitis on lower extremity.    Problem/Plan - 1:  ·  Problem: Severe sepsis.   ·  Plan: Patient presented with 4/4 SIRS criteria (T 105.2, , RR 22, WBC 25.88) with lactate 5.1 ->1.3 and source likely cellulitis. CT LE s/f cellulitis with no fluid collection or OM. CTa/p did not reveal infectious etiology. S/p Vancomycin 1g and cefepime 1g in ED, received one more dose of cefepime 1g IV this AM. BLE edema, erythema, scaly skin c/w cellulitis.  - d/c vancomycin and cefepime  - per ID, switched to CTX 2g IV QD  - c/w tylenol 650 mg PO q6h PRN for pain/fever  - f/u further ID recs  - pt w/ history of PVD with multiple LE stents, vascular consulted, f/u recs  - blood cultures growing gram positive cocci in pairs --> f/u identification & sensitivity  - surveillance blood cultures pending  - PT consulted, will evaluate today.    Problem/Plan - 2:  ·  Problem: Cellulitis.  ·  Plan: BLE edema, erythema, scaly skin c/w cellulitis. CT LE s/f cellulitis with no fluid collection or OM.  - plan as above.    Problem/Plan - 3:  ·  Problem: Chronic obstructive pulmonary disease (COPD).  ·  Plan: Pt with COPD on home 3L. Patient initially presented with NRB, but now on NC.  - breathing comfortably on 3L NC, same as home O2. no increased SOB compared to baseline  - c/w duonebs q6h PRN.    Problem/Plan - 4:  ·  Problem: Hypertension.  ·  Plan: Pt with history of hypertension on home diltiazem 180mg daily.  - holding diltiazem in the setting of sepsis  - restart as appropriate.    Problem/Plan - 5:  ·  Problem: HLD (hyperlipidemia).  ·  Plan: Pt on atorvastatin 40mg daily.  - c/w lipitor 40 mg QHS.    Problem/Plan - 6:  ·  Problem: Chronic heart failure with preserved ejection fraction (HFpEF).  ·  Plan: Pt on home bumex 2mg. Most recent TTE in Feb 2020 showing EF 55-60%.  - holding bumex in the setting of sepsis  - TTE pending.    Problem/Plan - 7:  ·  Problem: CKD (chronic kidney disease).  ·  Plan: Pt with history of CKD, baseline Cr 2.04 in July 2022, currently 2.22.  - continue to trend BUN, Cr  - renal dosing  - avoid nephrotoxic agents  - strict I&Os  - monitor UOP.    Problem/Plan - 8:  ·  Problem: Right kidney mass.   ·  Plan: CTAP s/f slightly hyperdense solid cortical mass in upper pole of R kidney.  - consider additional renal imaging.    Problem/Plan - 9:  ·  Problem: DVT prophylaxis.   ·  Plan: Pt on home eliquis for history of DVTs.  - c/w eliquis 2.5 mg PO BID.    Problem/Plan - 10:  ·  Problem: Prophylactic measure.   ·  Plan; DVT ppx: eliquis 2.5mg BID  PPI ppx: None indicated  Diet: carb consistent  [facility]  CODE STATUS: FULL CODE.

## 2022-09-07 NOTE — PROGRESS NOTE ADULT - ATTENDING COMMENTS
pt seen w night ICU team.  chart reviewed.  Likely respiratory arrest--precipitated  by sepsis, w multiple pulmonary comorbidities  Pt on anticoagulation, low dose vasopressor sw sedation.  D dimer noted.  CTA problematic w renal function,  Dopplers LE negative  will rx for sepsis, continue heparin. obtain CTA should hemodynamics or gas exchange dramatically worsen pt seen w night ICU team.  chart reviewed.  Likely respiratory arrest--precipitated  by sepsis, w multiple pulmonary comorbidities  Pt on anticoagulation, low dose vasopressor sw sedation.  D dimer noted.  CTA problematic w renal function,  Dopplers LE negative  will rx for sepsis, continue heparin. obtain CTA should hemodynamics or gas exchange dramatically worsen  critical care 1 h

## 2022-09-07 NOTE — PROGRESS NOTE ADULT - PROBLEM SELECTOR PLAN 5
Pt on home bumex 2mg.  - holding bumex in the setting of sepsis Pt on atorvastatin 40mg daily.  - c/w lipitor 40 mg QHS

## 2022-09-07 NOTE — CHART NOTE - NSCHARTNOTEFT_GEN_A_CORE
Rapid response called at ~6:04 pm after noted by nurse to be unresponsive and pale. Had last been seen by PCA ~1-2 mns prior when was eating and had some SOB "couldn't breath", no coughing.  No pulse felt, CPR initiated. BP 200s systolic, Received 1 dose of epinephrine, 20mg of etomidate, and ~800 cc bolus of NS. Intubated at bedside. Lungs sounds present bilaterally. Received ~6 mns of compressions and bag mask ventilation before ROSC was achieved. Labs and ABG sent. BP downtrended to systolic 90s. Transferred to MICU. Event Summary:  Rapid response called at ~6:04 pm after noted by nurse to be unresponsive and pale. Pt had last been seen by PCA ~1-2 mns prior when was eating and had some SOB "couldn't breath", but was reportedly not coughing.  On exam, no pulse felt, CPR initiated. BP 200s systolic (downtrended during course of code back to systolic 90s), HR 90s (on compressions), O2 sat labile ~70s-90s. Received 1 dose of epinephrine, 20mg of etomidate, and ~800 cc bolus of NS. Intubated at bedside. Lungs sounds present bilaterally. Cardiac RRR but distant. Received ~6 mns of CPR before ROSC was achieved. Labs including CMP, CBC, lactate, coags, d-dimer, ABG, LDH, hapto  sent. BP downtrended to systolic 90s by end of code. Transferred to MICU where levophed, propofol, and precedex were initiated.     Of note, TTE performed earlier in day showed:  CONCLUSIONS:   1. Normal left and right ventricular size and systolic function.   2. Indeterminate left ventricular diastolic function.   3. Normal atria.   4. No significant valvular disease.   5. Pulmonary hypertension present, pulmonary artery systolic pressure is   48 mmHg.   6. No pericardial effusion.   7. Compared to the previous TTE performed on 9/14/2020, PASP is higher.  **EF 56%    Assesment:  81F PMH obesity, HTN, HLD, HFpEF (EF 55-60% last echo 2/20), PVD, chronic LE edema with neuropathic pain and cellulitis, COPD (on home O2-3L), DALIA, DVT/PE (s/p IVC, on eliquis), anemia, p/w respiratory distress x 1 day, admitted 9/6/22 for severe sepsis 2/2 suspected cellulitis of lower extremities. Cardiac arrest in PM 8/7/22, ROSC achieved and pt transferred to MICU for further management.    Etiology of cardiac arrest unclear as of time of transfer to MICU, but in this patient with multiple comorbidities, differential includes:  - Hypoxia/PE (hx of PE/DVT, had SOB, d-dimer returned very elevated, though has IVC filter in place)  - MI (hx of HFpEF, PVD, and had troponin leak on admission)  - Hypovolemia (anemia with partial Hgb response to 1u pRBC today, possibly with bleeding though none noted by patient)  - Acidosis (pH 7.2 on ABG ~1 hr after code initiated, though cause/effect confounded by poor perfusion that likely occurred prior to CPR)   - Other: glucose 153 shortly after code rules out hypoglcyemia, K 4.9 rules out hypo/hyperKalemia, no hypothermia noted ~1 hr prior to arrest, no pericardial effusion on TTE same day, no absence of breath sounds to suggest pneumothorax. Possible stroke given noted encephalopathy prior to admission, headache during day today, and elevated BPs around time of cardiac arrest.    Initial plan to f/u labwork, EKG, CXR, bedside U/S, CTH. Case discussed with MICU and critical care consult teams. Rest of plan per MICU acceptance note.

## 2022-09-07 NOTE — CONSULT NOTE ADULT - ASSESSMENT
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 Vascular consulted for LE cellulitis and history of PAD.     Plan:   Patient has palpable pulses, no acute vascular intervention at this time.  Compression and elevation of bilateral lower extremities  Nursing wound care instructions: Please wrap kerlix gauze and ace bandage around both legs. Please wrap from toes to just below the knee  Rest of care per primary team

## 2022-09-07 NOTE — CONSULT NOTE ADULT - SUBJECTIVE AND OBJECTIVE BOX
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 pt normally gets around with walker, AA0x3, uses nebs on occasion but doesn't help, found by HHA today, somnolent but arousable in usual state of health yesterday.     As described above patient states that she does not remember the events of being found. She was at her USOH day prior. Patient states that she has a history of "leg problems and they will not get better." She has a history of recurrent cellulitis to the lower extremities stating she is own antibiotics a few times a year but can not clarify how many times per year and which antibiotics she usually takes. She denies chest pain, dyspnea, diarrhea, headache, dysuria prior to the event yesterday. She lives at home alone with intermittent HHA.     In the ED:  VITAL SIGNS: Last 24 Hours  T(F): 99.6 (06 Sep 2022 20:40), Max: 105.2 (06 Sep 2022 10:41)  HR: 105 (06 Sep 2022 20:40) (101 - 145)  BP: 134/67 (06 Sep 2022 20:40) (108/53 - 148/49)  RR: 22 (06 Sep 2022 20:40) (18 - 22)  SpO2: 96% (06 Sep 2022 20:40) (95% - 100%)    Labs: WBC 25.88, Hgb 7.9, lactate 5.1 -> 1.3, Cr. 2.18, BNP 61216  UA positive for bacteria and epithelial cells    Imaging:     < from: CT Lower Extremity No Cont, Bilateral (22 @ 16:06) >  IMPRESSION:    Severe bilateral lower extremity cellulitis without drainable fluid   collection.    No CT evidence of osteomyelitis.    < from: CT Abdomen and Pelvis No Cont (22 @ 16:05) >  IMPRESSION:  Study performed without intravenous or oral contrast.  Centrilobular emphysema.  No evidence of pneumonia.  No hydronephrosis.   Retroperitoneal adenopathy. Slightly increased as compared to CT   3/4/2018. Differential diagnosis includes lymphoproliferative disorder.  Status post sigmoid resection.  3.0 cm slightly hyperdense solid cortical mass upper pole right kidney.   Is indeterminate. Differential diagnosis includes proteinaceous cyst,   tumor. Favor the former diagnosis. Correlate with targeted renal   sonography and MR. Has increased in size as compared to CT 9/10/2020.    < end of copied text >      Interventions: Duoneb x1, solumedrol 40, Cefepime 1g, Vancomycin 1g, 1L NS bolus (06 Sep 2022 21:12)    REVIEW OF SYSTEMS  All review of systems negative, except for those marked:  [X] All other review of systems negative  [] Unable to obtain (explain):    Recent Ill Contacts:	[x] No	[] Yes:  Recent Travel History:	[x] No	[] Yes:    Allergies    Altabax (Unknown)  Augmentin (Unknown)  clindamycin (Unknown)  Vaseline (Swelling)    Intolerances      Antimicrobials:  cefTRIAXone   IVPB 2000 milliGRAM(s) IV Intermittent every 24 hours      Other Medications:  acetaminophen     Tablet .. 650 milliGRAM(s) Oral every 6 hours PRN  albuterol/ipratropium for Nebulization 3 milliLiter(s) Nebulizer every 6 hours PRN  apixaban 2.5 milliGRAM(s) Oral every 12 hours  influenza  Vaccine (HIGH DOSE) 0.7 milliLiter(s) IntraMuscular once      FAMILY HISTORY:  FH: heart failure  mother    PAST MEDICAL & SURGICAL HISTORY:  Lyme disease    DVT (deep venous thrombosis)    Skin cancer    Brain embolism and thrombosis    MRSA (methicillin resistant Staphylococcus aureus)    PNA (pneumonia)    COPD (chronic obstructive pulmonary disease)    H/O angioplasty    Status post laparoscopic-assisted sigmoidectomy    H/O shoulder surgery      SOCIAL HISTORY:    IMMUNIZATIONS  [x] Up to Date		[] Not Up to Date:      Vital Signs Last 24 Hrs  T(C): 37.1 (07 Sep 2022 13:44), Max: 38.6 (06 Sep 2022 21:17)  T(F): 98.7 (07 Sep 2022 13:44), Max: 101.4 (06 Sep 2022 21:17)  HR: 93 (07 Sep 2022 13:44) (93 - 109)  BP: 94/59 (07 Sep 2022 13:44) (92/42 - 148/49)  BP(mean): 66 (07 Sep 2022 10:47) (61 - 76)  RR: 20 (07 Sep 2022 13:44) (17 - 23)  SpO2: 93% (07 Sep 2022 13:44) (92% - 97%)    Parameters below as of 07 Sep 2022 13:44  Patient On (Oxygen Delivery Method): nasal cannula  O2 Flow (L/min): 3      PHYSICAL EXAM  Constitutional: NAD, comfortable in bed, on home 3L NC. Chronically ill in appearance, speaking full sentences   HEENT: NC/AT, EOMI, no conjunctival pallor or scleral icterus, MMM  Neck: Supple  Respiratory: CTA B/L. No w/r/r.   Cardiovascular: RRR, normal S1 and S2, no m/r/g.   Gastrointestinal: soft, morbid obese abdomen, NTND, no palpable masses   Extremities: wwp; severe BLE pitting edema with erythema, warmth, and dry and scaly skin, c/w cellulitis. Sensation intact in b/l LE extremities. Once small .5cm wound to right lateral shin w/o discharge.   Vascular: intact distal pulses  Neurological: AAOx3, no CN deficits, no focal deficits.     Lab Results:                        7.3    13.74 )-----------( 155      ( 07 Sep 2022 15:16 )             24.8     09-07    139  |  104  |  51<H>  ----------------------------<  116<H>  4.4   |  28  |  2.22<H>    Ca    8.4      07 Sep 2022 05:57  Phos  3.8       Mg     1.8         TPro  6.4  /  Alb  2.7<L>  /  TBili  0.2  /  DBili  x   /  AST  31  /  ALT  19  /  AlkPhos  118  07    LIVER FUNCTIONS - ( 07 Sep 2022 05:57 )  Alb: 2.7 g/dL / Pro: 6.4 g/dL / ALK PHOS: 118 U/L / ALT: 19 U/L / AST: 31 U/L / GGT: x           PT/INR - ( 06 Sep 2022 10:25 )   PT: 15.3 sec;   INR: 1.30          PTT - ( 06 Sep 2022 10:25 )  PTT:29.3 sec  Urinalysis Basic - ( 06 Sep 2022 10:25 )    Color: Yellow / Appearance: Clear / S.025 / pH: x  Gluc: x / Ketone: NEGATIVE  / Bili: NEGATIVE / Urobili: 0.2 E.U./dL   Blood: x / Protein: >=300 mg/dL / Nitrite: NEGATIVE   Leuk Esterase: NEGATIVE / RBC: 5-10 /HPF / WBC < 5 /HPF   Sq Epi: x / Non Sq Epi: Many /HPF / Bacteria: Present /HPF

## 2022-09-07 NOTE — PROGRESS NOTE ADULT - PROBLEM SELECTOR PLAN 1
Patient presented with 4/4 SIRS criteria (T 105.2, , RR 22, WBC 25.88) with lactate 5.1 ->1.3 and source likely cellulitis. CT LE s/f cellulitis with no fluid collection or OM. CTa/p did not reveal infectious etiology. S/p Vancomycin 1g and cefepime 1g in ED, received one more dose of cefepime 1g IV this AM. BLE edema, erythema, scaly skin c/w cellulitis.  - d/c vancomycin and cefepime  - per ID, switched to CTX 2g IV QD  - c/w tylenol 650 mg PO q6h PRN for pain/fever  - f/u further ID recs  - pt w/ history of PVD with multiple LE stents, vascular consulted, f/u recs  - blood cultures growing gram positive cocci in pairs --> f/u identification & sensitivity  - surveillance blood cultures pending  - PT consulted, will evaluate today

## 2022-09-07 NOTE — PROGRESS NOTE ADULT - PROBLEM SELECTOR PLAN 8
CTAP s/f slightly hyperdense solid cortical mass in upper pole of R kidney.  - consider additional renal imaging

## 2022-09-07 NOTE — PROGRESS NOTE ADULT - PROBLEM SELECTOR PLAN 9
Pt on home eliquis for history of DVTs.  - c/w eliquis 2.5 mg PO BID Pt on home eliquis for history of DVTs.  - hold home eliquis

## 2022-09-07 NOTE — CONSULT NOTE ADULT - SUBJECTIVE AND OBJECTIVE BOX
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 pt normally gets around with walker, AA0x3, uses nebs on occasion but doesn't help, found by HHA today, somnolent but arousable in usual state of health yesterday.    In the ED:  VITAL SIGNS: Last 24 Hours  T(F): 99.6 (06 Sep 2022 20:40), Max: 105.2 (06 Sep 2022 10:41)  HR: 105 (06 Sep 2022 20:40) (101 - 145)  BP: 134/67 (06 Sep 2022 20:40) (108/53 - 148/49)  RR: 22 (06 Sep 2022 20:40) (18 - 22)  SpO2: 96% (06 Sep 2022 20:40) (95% - 100%)    Labs: WBC 25.88, Hgb 7.9, lactate 5.1 -> 1.3, Cr. 2.18, BNP 41731  UA positive for bacteria and epithelial cells    Imaging:     < from: CT Lower Extremity No Cont, Bilateral (22 @ 16:06) >  IMPRESSION:    Severe bilateral lower extremity cellulitis without drainable fluid   collection.    No CT evidence of osteomyelitis.    < from: CT Abdomen and Pelvis No Cont (22 @ 16:05) >  IMPRESSION:  Study performed without intravenous or oral contrast.  Centrilobular emphysema.  No evidence of pneumonia.  No hydronephrosis.   Retroperitoneal adenopathy. Slightly increased as compared to CT   3/4/2018. Differential diagnosis includes lymphoproliferative disorder.  Status post sigmoid resection.  3.0 cm slightly hyperdense solid cortical mass upper pole right kidney.   Is indeterminate. Differential diagnosis includes proteinaceous cyst,   tumor. Favor the former diagnosis. Correlate with targeted renal   sonography and MR. Has increased in size as compared to CT 9/10/2020.    < end of copied text >      Interventions: Duoneb x1, solumedrol 40, Cefepime 1g, Vancomycin 1g, 1L NS bolus (06 Sep 2022 21:12)      PAST MEDICAL & SURGICAL HISTORY:  Lyme disease      DVT (deep venous thrombosis)      Skin cancer      Brain embolism and thrombosis      MRSA (methicillin resistant Staphylococcus aureus)      PNA (pneumonia)      COPD (chronic obstructive pulmonary disease)      H/O angioplasty      Status post laparoscopic-assisted sigmoidectomy      H/O shoulder surgery          MEDICATIONS  (STANDING):  apixaban 2.5 milliGRAM(s) Oral every 12 hours  cefTRIAXone   IVPB 2000 milliGRAM(s) IV Intermittent every 24 hours  influenza  Vaccine (HIGH DOSE) 0.7 milliLiter(s) IntraMuscular once    MEDICATIONS  (PRN):  acetaminophen     Tablet .. 650 milliGRAM(s) Oral every 6 hours PRN Mild Pain (1 - 3)  albuterol/ipratropium for Nebulization 3 milliLiter(s) Nebulizer every 6 hours PRN Shortness of Breath and/or Wheezing             FAMILY HISTORY:  FH: heart failure  mother      CBC Full  -  ( 07 Sep 2022 07:13 )  WBC Count : 16.58 K/uL  RBC Count : 2.76 M/uL  Hemoglobin : 6.7 g/dL  Hematocrit : 23.1 %  Platelet Count - Automated : 156 K/uL  Mean Cell Volume : 83.7 fl  Mean Cell Hemoglobin : 24.3 pg  Mean Cell Hemoglobin Concentration : 29.0 gm/dL  Auto Neutrophil # : x  Auto Lymphocyte # : x  Auto Monocyte # : x  Auto Eosinophil # : x  Auto Basophil # : x  Auto Neutrophil % : x  Auto Lymphocyte % : x  Auto Monocyte % : x  Auto Eosinophil % : x  Auto Basophil % : x          139  |  104  |  51<H>  ----------------------------<  116<H>  4.4   |  28  |  2.22<H>    Ca    8.4      07 Sep 2022 05:57  Phos  3.8       Mg     1.8         TPro  6.4  /  Alb  2.7<L>  /  TBili  0.2  /  DBili  x   /  AST  31  /  ALT  19  /  AlkPhos  118  -      Urinalysis Basic - ( 06 Sep 2022 10:25 )    Color: Yellow / Appearance: Clear / S.025 / pH: x  Gluc: x / Ketone: NEGATIVE  / Bili: NEGATIVE / Urobili: 0.2 E.U./dL   Blood: x / Protein: >=300 mg/dL / Nitrite: NEGATIVE   Leuk Esterase: NEGATIVE / RBC: 5-10 /HPF / WBC < 5 /HPF   Sq Epi: x / Non Sq Epi: Many /HPF / Bacteria: Present /HPF          Radiology :    < from: Xray Chest 1 View AP/PA (22 @ 10:49) >    ACC: [id number] EXAM:  XR CHEST AP OR PA 1V                          PROCEDURE DATE:  2022          INTERPRETATION:  Clinical history/reason for exam: Shortness of breath.    Frontal chest.    COMPARISON: 2022.    Findings/  impression:Left basilar focal atelectasis, slight elevation of the left   hemidiaphragm. Heart and mediastinum are unremarkable. Stable bony   structures. Left rib old fractures.      < from: CT Chest No Cont (22 @ 16:05) >  ACC: [id number] EXAM:  CT CHEST                        ACC: [id number] EXAM:  CT ABDOMEN AND PELVIS                          PROCEDURE DATE:  2022          INTERPRETATION:  CT of the CHEST, ABDOMEN and PELVIS without intravenous   contrast dated 2022 4:05 PM    INDICATION: sepsis hunt    TECHNIQUE: CT of the chest, abdomen and pelvis was performed .   Intravenous contrast was not used as requested. No oral contrast   administered. Axial and coronal and sagittal images were produced and   reviewed.    PRIOR STUDIES: CT abdomen and pelvis 3/4/2018. CT chest 9/10/2020.    FINDINGS: The heart is normal in size. Low density of cardiac chambers   relative to the myocardium suggesting anemia. No pericardial effusion is   seen. Evaluation foradenopathy is limited without intravenous contrast.   Within that limitation, no mediastinal, hilar or axillary lymphadenopathy   is seen. Evaluation of the vasculature is limited without IV contrast.   Within that limitation, no gross vascular abnormalities are noted. The   pulmonary trunk measures 2.9 cm diameter.    No pleural effusions are seen.   Evaluation of the pulmonary parenchyma demonstrates centrilobular   emphysema. Probable linear atelectasis versus scarring in the right upper   lobe image 75 series 3. The latter is however new as compared to CT chest   9/10/2020. Dependency changes bilateral lower lobes basilar segments.    Images of the upper abdomen demonstrate no focal hepatic abnormalities.       No radiopaque stones are seen in the gallbladder.      The pancreas is normal in appearance.     No splenic abnormalities are seen.    The adrenal glands are unremarkable.    The kidneys show no evidence of hydronephrosis. Bilateral renal sinus   lipomatosis. There is a 3.0 x 2.6 x 2.5 cm solid appearing slightly   hyperdense cortical mass in the upper pole right kidney medially.   Internal density measurement is 59 cm units. Is indeterminate. On   noncontrast CT chest dated 9/10/2020 lesion was seen but was smaller.   Internal density measurement was 78 Hounsfield units on that CT.   Questionable 1.0 cm slightly hyperdense cortical focus in the upper pole   left kidney.    No abdominal aortic aneurysm is seen. Infrarenal IVC filter in situ.   Bilateral proximal common iliac artery stents. Dense calcification of the   abdominal aorta and visceral branches. Dense calcification of the   bilateral common iliac arteries, bilateral internal iliac arteries.    Enlarged retroperitoneal nodes particularly left paraaortic measuring up   to 1.5 x 1.5 cm. Prominent bilateral external iliac nodes up to 1.1 cm   diameter. Somewhat prominent bilateral particularly right-sided groin   nodes.    Moderate fat-containing umbilical hernia.    Evaluation of the bowel is limited lack of oral contrast material. No   bowel obstruction.. Small hiatal hernia. Status post sigmoid resection.    No ascites is seen.    Images of the pelvis demonstrate under distended bladder which is   otherwise grossly normal. Unremarkable anteverted uterus. Ovaries poorly   identified. No adnexal masses seen. 0.5 cm calcification seen contiguous   with the urethra-unchanged. Differential diagnosis includes phlebolith.    Evaluation of the osseous structures demonstrates degenerative changes.  Multiple old left rib fractures. Laminectomies at L4 and L5. Grade 1   degenerative anterolisthesis of L4 on L5.    IMPRESSION:  Study performed without intravenous or oral contrast.  Centrilobular emphysema.  No evidence of pneumonia.  No hydronephrosis.   Retroperitoneal adenopathy. Slightly increased as compared to CT   3/4/2018. Differential diagnosis includes lymphoproliferative disorder.  Status post sigmoid resection.  3.0 cm slightly hyperdense solid cortical mass upper pole right kidney.   Isindeterminate. Differential diagnosis includes proteinaceous cyst,   tumor. Favor the former diagnosis. Correlate with targeted renal   sonography and MR. Has increased in size as compared to CT 9/10/2020.                Vital Signs Last 24 Hrs  T(C): 37.1 (07 Sep 2022 13:18), Max: 38.6 (06 Sep 2022 21:17)  T(F): 98.7 (07 Sep 2022 13:18), Max: 101.4 (06 Sep 2022 21:17)  HR: 94 (07 Sep 2022 13:18) (94 - 109)  BP: 97/58 (07 Sep 2022 13:18) (92/42 - 148/49)  BP(mean): 66 (07 Sep 2022 10:47) (61 - 76)  RR: 17 (07 Sep 2022 13:18) (17 - 23)  SpO2: 97% (07 Sep 2022 13:18) (92% - 97%)    Parameters below as of 07 Sep 2022 13:18  Patient On (Oxygen Delivery Method): nasal cannula  O2 Flow (L/min): 3          REVIEW OF SYSTEMS:   per HPI            Physical Exam:  obese 81 y o woman lying in semi Brewster's position , awake , alert , no acute complaints       Neurologic Exam:    Alert and oriented  x 3       Motor Exam:    Right UE:               no focal weakness ,  > 3+/5 throughout                                  Left UE:                 no focal weakness,  > 3+/5 throughout           Right LE:                no focal weakness,  > 3+/5 throughout     Left LE:                  no focal weakness,  > 3+/5 throughout           Sensation:         intact to light touch x 4 extremities                        DTR :                     biceps/brachioradialis : equal                                              patella/ankle : equal           Gait :  not tested              PM&R Impression :     1) deconditioned    2) no focal weakness      Recommendations / Plan :     1) Physical / Occupational therapy focusing on therapeutic exercises , equipment evaluation , bed mobility/transfer out of bed evaluation , progressive ambulation with assistive devices prn .    2) Anticipated Disposition Plan / Recs  :    pending functional progress

## 2022-09-07 NOTE — PROGRESS NOTE ADULT - PROBLEM SELECTOR PLAN 2
Pt with COPD on home 3L. Patient initially presented with NRB, but now on NC.  - breathing comfortably on 3L NC, same as home O2. no increased SOB compared to baseline  - c/w duonebs q6h PRN BLE edema, erythema, scaly skin c/w cellulitis. CT LE s/f cellulitis with no fluid collection or OM.  - plan as above

## 2022-09-07 NOTE — PROGRESS NOTE ADULT - ASSESSMENT
80yo obese female w/ pmhx of HTN, HLD, HFpEF (EF 55-60% last echo 2/20), COPD (on home O2-3L), chronic LE edema with neuropathic pain and cellulitis, DALIA, PVD p/w respiratory distress x 1 day found to have cellulitis on lower extremity.    NEURO      PULM      CARDIO      GI        RENAL        ENDO        HEME        ID             80yo obese female w/ pmhx of HTN, HLD, HFpEF (EF 55-60% last echo 2/20), COPD (on home O2-3L), chronic LE edema with neuropathic pain and cellulitis, DALIA, PVD p/w respiratory distress x 1 day found to have cellulitis on lower extremity.    NEURO  #Intubated  s/p cardiac arrest w/ intubation 2/2 hypoxia due to aspiration vs PE vs flash pulmonary edema. Pt originally hypertensive on floor, found to have no pulse with blue complexion by nurse during meal. Intubation on PM 09/07. ABG done showing mild hypercapnea - 25.  Vent settings -   Plan:  - CT head-r/o stroke as pt was complaining of head pain  - f/u EKG  - f/u chest x-ray r/o aspiration - Diffuse patchy airspace opacities throughout the lungs bilaterally, suggestive of pulmonary alveolar edema.  - d/c propofol and c/w precedex in AM with goal of extubation in AM if tolerated    PULM  #Chronic obstructive pulmonary disease (COPD).   Pt with COPD on home 3L. Patient initially presented with NRB, now intubated s/p cardia arrest.   Plan:  - c/w duonebs q6h PRN.    #Pulm HTN  possibly in the setting of new clots. Evidence of new pulm HTN on echo w/ pap 48 compared to TTE from 2022. Unclear history of previous clots, last LE doppler done on 09/2020 negative. Started on Heparin infusion 1800u/hr for full anticoagulation  Plan:  - c/w full anticoagulation  - f/u CT angio PE protocol  - f/u b/l dopplers       CARDIO  #Cardiac Arrest  possibly 2/2 hypoxia in setting of aspiration vs PE vs flash pulmonary edema. Pt orignally hypertensive on floor, complained of difficulty breathing during meal, found unresponsive, hypotensive, w/ blue complexion by nurse. CPR initiated with 1 dose of Epi given, s/p intubation. Currently on propofol with goal of transition to precedex for possible extubation in AM if tolerated. Levo drip 8mcg/kg/min. Full anticoagulation  Plan:  - c/w levo, low BP threshold for pressors  - c/w heparin infusion  - f/u EKG  - f/u cardiac labs    #Chronic heart failure with preserved ejection fraction (HFpEF).   Pt on home bumex 2mg. Most recent TTE in Feb 2020 showing EF 55-60%. TTE from current visit showing EF of 56% with Pulmonary HTN - PAP 48. Holding bumex in the setting of sepsis  Plan:  - GDMT therapy as tolerated    #Hypertension.   Pt with history of hypertension on home diltiazem 180mg daily. Pt originally hypertensive of floor, currently hypotensive s/p cardiac arrest and intubation. BP low threshold for pressors  Plan:  - monitor BP  - holding home antihypertensive meds    GI  #HLD (hyperlipidemia).   Pt on atorvastatin 40mg daily.  Plan:  - c/w lipitor 40 mg QHS.      RENAL  #BRANT on CKD  Baseline Cr 2.04 in July 2022, currently 2.48 up from 2.18 on entry. Possibly 2/2 to hypoperfusion/ischemia in setting of sepsis and cardiac arrest  Plan:  - continue to trend BUN, Cr  - renal dosing  - avoid nephrotoxic agents  - strict I&Os  - monitor UOP.    Right kidney mass.   CTAP s/f slightly hyperdense solid cortical mass in upper pole of R kidney.  Plan:  - consider additional renal imaging.    ENDO  No active issues      HEME  #Anemia  Pt noted to have chronic anemia, presented with HgB of 6.8 w/ drop to 6.7 during course of stay. s/p 1 unit RBC on ____, pt remains stable w/ HgB of 8 most recently.  Plan:  - f/u H+H  - active type and screen   - f/u iron panel      ID  #Severe sepsis.   possibly 2/2 to LE Cellulitis. 4/4 SIRS criteria (T 105.2, , RR 22, WBC 25.88) with lactate 5.1 ->1.3. CT LE s/f cellulitis with no fluid collection or OM. CTa/p did not reveal infectious etiology. S/p Vancomycin 1g and cefepime 1g in ED, received one more dose of cefepime 1g IV AM 09/07. BLE edema, erythema, scaly skin c/w cellulitis.  - d/c vancomycin and cefepime, switched to CTX 2g IV QD per ID  - c/w tylenol 650 mg PO q6h PRN for pain/fever  - f/u further ID recs  - blood cultures growing gram positive cocci in pairs --> f/u identification & sensitivity  - surveillance blood cultures pending               82yo morbidly obese female w/ pmhx of HTN, HLD, HFpEF (EF 56%), COPD (on home O2-3L), chronic LE edema with neuropathic pain and cellulitis, DALIA, PVD p/w respiratory distress x 1 day found to have cellulitis on lower extremity.    NEURO  #Intubated  s/p cardiac arrest w/ intubation 2/2 hypoxia due to aspiration vs PE vs flash pulmonary edema. Pt originally hypertensive on floor, found to have no pulse with blue complexion by nurse during meal. Intubation on PM 09/07. ABG done showing mild hypercapnea - 25.  Vent settings -   Plan:  - CT head-r/o stroke as pt was complaining of head pain  - f/u EKG  - f/u chest x-ray r/o aspiration - Diffuse patchy airspace opacities throughout the lungs bilaterally, suggestive of pulmonary alveolar edema.  - d/c propofol and c/w precedex in AM with goal of extubation in AM if tolerated    PULM  #Chronic obstructive pulmonary disease (COPD).   Pt with COPD on home 3L. Patient initially presented with NRB, now intubated s/p cardia arrest.   Plan:  - c/w duonebs q6h PRN.    #Pulm HTN  possibly in the setting of new clots. Evidence of new pulm HTN on echo w/ pap 48 compared to TTE from 2022. Unclear history of previous clots, last LE doppler done on 09/2020 negative. Started on Heparin infusion 1800u/hr for full anticoagulation  Plan:  - c/w full anticoagulation  - f/u CT angio PE protocol  - f/u b/l dopplers       CARDIO  #Cardiac Arrest  possibly 2/2 hypoxia in setting of aspiration vs PE vs flash pulmonary edema. Pt orignally hypertensive on floor, complained of difficulty breathing during meal, found unresponsive, hypotensive, w/ blue complexion by nurse. CPR initiated with 1 dose of Epi given, s/p intubation. Currently on propofol with goal of transition to precedex for possible extubation in AM if tolerated. Levo drip 8mcg/kg/min. Full anticoagulation  Plan:  - c/w levo, low BP threshold for pressors  - c/w heparin infusion  - f/u EKG  - f/u cardiac labs    #Chronic heart failure with preserved ejection fraction (HFpEF).   Pt on home bumex 2mg. Most recent TTE in Feb 2020 showing EF 55-60%. TTE from current visit showing EF of 56% with Pulmonary HTN - PAP 48. Holding bumex in the setting of sepsis  Plan:  - GDMT therapy as tolerated    #Hypertension.   Pt with history of hypertension on home diltiazem 180mg daily. Pt originally hypertensive of floor, currently hypotensive s/p cardiac arrest and intubation. BP low threshold for pressors  Plan:  - monitor BP  - holding home antihypertensive meds    GI  #HLD (hyperlipidemia).   Pt on atorvastatin 40mg daily.  Plan:  - c/w lipitor 40 mg QHS.      RENAL  #BRANT on CKD  Baseline Cr 2.04 in July 2022, currently 2.48 up from 2.18 on entry. Possibly 2/2 to hypoperfusion/ischemia in setting of sepsis and cardiac arrest  Plan:  - continue to trend BUN, Cr  - renal dosing  - avoid nephrotoxic agents  - strict I&Os  - monitor UOP.    Right kidney mass.   CTAP s/f slightly hyperdense solid cortical mass in upper pole of R kidney.  Plan:  - consider additional renal imaging.    ENDO  No active issues      HEME  #Anemia  Pt noted to have chronic anemia, presented with HgB of 6.8 w/ drop to 6.7 during course of stay. s/p 1 unit RBC on ____, pt remains stable w/ HgB of 8 most recently.  Plan:  - f/u H+H  - active type and screen   - f/u iron panel      ID  #Severe sepsis.   possibly 2/2 to LE Cellulitis. 4/4 SIRS criteria (T 105.2, , RR 22, WBC 25.88) with lactate 5.1 ->1.3. CT LE s/f cellulitis with no fluid collection or OM. CTa/p did not reveal infectious etiology. S/p Vancomycin 1g and cefepime 1g in ED, received one more dose of cefepime 1g IV AM 09/07. BLE edema, erythema, scaly skin c/w cellulitis.  - d/c vancomycin and cefepime, switched to CTX 2g IV QD per ID  - c/w tylenol 650 mg PO q6h PRN for pain/fever  - f/u further ID recs  - blood cultures growing gram positive cocci in pairs --> f/u identification & sensitivity  - surveillance blood cultures pending      F:  E: K>4, Phos>3, Mg>2  N: NPO intubated   DVT ppx: full anticoag - Heparin  GI ppx: Protonix 40 BID  Access:     Full Code         80yo morbidly obese female w/ pmhx of HTN, HLD, HFpEF (EF 56%), COPD (on home O2-3L), chronic LE edema with neuropathic pain and cellulitis, DALIA, PVD p/w respiratory distress x 1 day found to have cellulitis on lower extremity.    NEURO  #Intubated  s/p cardiac arrest w/ intubation 2/2 hypoxia due to aspiration vs PE vs flash pulmonary edema. Pt relatively hypotensive throughout the day with episodes of hypertension to 130/140 systolic. On floor, found to have no pulse with blue complexion by nurse during meal. Intubation on PM 09/07. ABG done showing mild hypercapnea - 25.  Vent settings -   Plan:  - CT head-r/o stroke as pt was complaining of head pain  - f/u EKG  - f/u chest x-ray r/o aspiration - Diffuse patchy airspace opacities throughout the lungs bilaterally, suggestive of pulmonary alveolar edema.  - d/c propofol and c/w precedex in AM with goal of extubation in AM if tolerated    PULM  #Chronic obstructive pulmonary disease (COPD).   Pt with COPD on home 3L. Patient initially presented with NRB, now intubated s/p cardia arrest.   Plan:  - c/w duonebs q6h PRN.    #Pulm HTN  possibly in the setting of new clots. Evidence of new pulm HTN on echo w/ pap 48 compared to TTE from 2022. Unclear history of previous clots, last LE doppler done on 09/2020 negative. Started on Heparin infusion 1800u/hr for full anticoagulation  Plan:  - c/w full anticoagulation  - f/u CT angio PE protocol  - f/u b/l dopplers       CARDIO  #Cardiac Arrest  possibly 2/2 hypoxia in setting of aspiration vs PE vs flash pulmonary edema. Pt relatively hypotensive throughout the day with episodes of hypertension to 130/140 systolic. recieved fluids last night and a unit of blood on 09/07 for anemia. In PM on 09/07, complained of difficulty breathing during meal, found unresponsive, hypotensive, w/ blue complexion by nurse. CPR initiated with 1 dose of Epi given, s/p intubation. Currently on propofol with goal of transition to precedex for possible extubation in AM if tolerated. Levo drip 8mcg/kg/min. Full anticoagulation  Plan:  - c/w levo, low BP threshold for pressors  - c/w heparin infusion  - f/u EKG  - f/u cardiac labs    #Chronic heart failure with preserved ejection fraction (HFpEF).   Pt on home bumex 2mg. Most recent TTE in Feb 2020 showing EF 55-60%. TTE from current visit showing EF of 56% with Pulmonary HTN - PAP 48. Holding bumex in the setting of sepsis  Plan:  - GDMT therapy as tolerated    #Hypertension.   Pt with history of hypertension on home diltiazem 180mg daily. Pt relatively hypotensive throughout the day, currently hypotensive s/p cardiac arrest and intubation. BP low threshold for pressors  Plan:  - monitor BP  - holding home antihypertensive meds    GI  #HLD (hyperlipidemia).   Pt on atorvastatin 40mg daily.  Plan:  - c/w lipitor 40 mg QHS.      RENAL  #BRANT on CKD  Baseline Cr 2.04 in July 2022, currently 2.48 up from 2.18 on entry. Possibly 2/2 to hypoperfusion/ischemia in setting of sepsis and cardiac arrest  Plan:  - continue to trend BUN, Cr  - renal dosing  - avoid nephrotoxic agents  - strict I&Os  - monitor UOP.    Right kidney mass.   CTAP s/f slightly hyperdense solid cortical mass in upper pole of R kidney.  Plan:  - consider additional renal imaging.    ENDO  No active issues      HEME  #Anemia  Pt noted to have chronic anemia, presented with HgB of 6.8 w/ drop to 6.7 during course of stay. s/p 1 unit RBC on 09/07, pt remains stable w/ HgB of 8 most recently.  Plan:  - f/u H+H  - active type and screen   - f/u iron panel      ID  #Severe sepsis.   possibly 2/2 to LE Cellulitis. 4/4 SIRS criteria (T 105.2, , RR 22, WBC 25.88) with lactate 5.1 ->1.3. CT LE s/f cellulitis with no fluid collection or OM. CTa/p did not reveal infectious etiology. S/p Vancomycin 1g and cefepime 1g in ED, received one more dose of cefepime 1g IV AM 09/07. BLE edema, erythema, scaly skin c/w cellulitis.  - d/c vancomycin and cefepime, switched to CTX 2g IV QD per ID  - c/w tylenol 650 mg PO q6h PRN for pain/fever  - f/u further ID recs  - blood cultures growing gram positive cocci in pairs --> f/u identification & sensitivity  - surveillance blood cultures pending        E: K>4, Phos>3, Mg>2  N: NPO intubated   DVT ppx: full anticoag - Heparin  GI ppx: Protonix 40 BID  Access: 2 peripheral lines    Full Code

## 2022-09-07 NOTE — PROGRESS NOTE ADULT - PROBLEM SELECTOR PLAN 10
DVT ppx: eliquis 2.5mg BID  PPI ppx: None indicated  Diet: carb consistent  Dispo: telemetry to [facility]  CODE STATUS: FULL CODE

## 2022-09-07 NOTE — PROGRESS NOTE ADULT - SUBJECTIVE AND OBJECTIVE BOX
CECE GABBY ROONEY, 81y, Female  MRN-[id number]  Patient is a 81y old  Female who presents with a chief complaint of Acute hypoxic respiratory failure (07 Sep 2022 16:59)      OVERNIGHT EVENTS:     SUBJECTIVE:    12 Point ROS Negative unless noted otherwise above.  -------------------------------------------------------------------------------  VITAL SIGNS:  Vital Signs Last 24 Hrs  T(C): 37.1 (07 Sep 2022 17:01), Max: 38.6 (06 Sep 2022 21:17)  T(F): 98.8 (07 Sep 2022 17:01), Max: 101.4 (06 Sep 2022 21:17)  HR: 104 (07 Sep 2022 18:55) (93 - 120)  BP: 143/59 (07 Sep 2022 18:41) (92/42 - 148/49)  BP(mean): 66 (07 Sep 2022 10:47) (61 - 76)  RR: 20 (07 Sep 2022 18:55) (17 - 23)  SpO2: 98% (07 Sep 2022 18:55) (92% - 98%)    Parameters below as of 07 Sep 2022 18:55  Patient On (Oxygen Delivery Method): ventilator    O2 Concentration (%): 100  I&O's Summary    06 Sep 2022 07:  -  07 Sep 2022 07:00  --------------------------------------------------------  IN: 526 mL / OUT: 500 mL / NET: 26 mL    07 Sep 2022 07:01  -  07 Sep 2022 19:22  --------------------------------------------------------  IN: 369 mL / OUT: 200 mL / NET: 169 mL        PHYSICAL EXAM:    General: NAD, well developed  HEENT: NC/AT; EOMI, PERRLA, anicteric sclera; moist mucosal membranes.  Neck: supple, trachea midline  Cardiovascular: RRR, +S1/S2; NO M/R/G  Respiratory: CTA B/L; no W/R/R  Gastrointestinal: soft, NT/ND; +BSx4  Extremities: WWP; no edema or cyanosis  Vascular: 2+ radial, DP/PT pulses B/L  Neurological: AAOx3; no focal deficits    ALLERGIES:  Allergies    Altabax (Unknown)  Augmentin (Unknown)  clindamycin (Unknown)  Vaseline (Swelling)    Intolerances        MEDICATIONS:  MEDICATIONS  (STANDING):  cefTRIAXone   IVPB 2000 milliGRAM(s) IV Intermittent every 24 hours  dexMEDEtomidine Infusion 0.2 MICROgram(s)/kG/Hr (5.12 mL/Hr) IV Continuous <Continuous>  heparin  Infusion.  Unit(s)/Hr (18 mL/Hr) IV Continuous <Continuous>  influenza  Vaccine (HIGH DOSE) 0.7 milliLiter(s) IntraMuscular once  norepinephrine Infusion 0.05 MICROgram(s)/kG/Min (9.59 mL/Hr) IV Continuous <Continuous>  propofol Infusion 10 MICROgram(s)/kG/Min (6.14 mL/Hr) IV Continuous <Continuous>    MEDICATIONS  (PRN):  acetaminophen     Tablet .. 650 milliGRAM(s) Oral every 6 hours PRN Mild Pain (1 - 3)  albuterol/ipratropium for Nebulization 3 milliLiter(s) Nebulizer every 6 hours PRN Shortness of Breath and/or Wheezing      -------------------------------------------------------------------------------  LABS:                        8.0    7.57  )-----------( 192      ( 07 Sep 2022 18:48 )             28.1         135  |  99  |  53<H>  ----------------------------<  205<H>  4.9   |  21<L>  |  2.48<H>    Ca    8.4      07 Sep 2022 18:48  Phos  5.2     09-  Mg     2.4     09-    TPro  6.8  /  Alb  2.6<L>  /  TBili  0.3  /  DBili  x   /  AST  108<H>  /  ALT  62<H>  /  AlkPhos  166<H>  09-07    LIVER FUNCTIONS - ( 07 Sep 2022 18:48 )  Alb: 2.6 g/dL / Pro: 6.8 g/dL / ALK PHOS: 166 U/L / ALT: 62 U/L / AST: 108 U/L / GGT: x           PT/INR - ( 06 Sep 2022 10:25 )   PT: 15.3 sec;   INR: 1.30          PTT - ( 06 Sep 2022 10:25 )  PTT:29.3 sec  Urinalysis Basic - ( 06 Sep 2022 10:25 )    Color: Yellow / Appearance: Clear / S.025 / pH: x  Gluc: x / Ketone: NEGATIVE  / Bili: NEGATIVE / Urobili: 0.2 E.U./dL   Blood: x / Protein: >=300 mg/dL / Nitrite: NEGATIVE   Leuk Esterase: NEGATIVE / RBC: 5-10 /HPF / WBC < 5 /HPF   Sq Epi: x / Non Sq Epi: Many /HPF / Bacteria: Present /HPF      CAPILLARY BLOOD GLUCOSE      POCT Blood Glucose.: 153 mg/dL (07 Sep 2022 18:17)      Culture - Urine (collected 06 Sep 2022 10:25)  Source: Clean Catch Clean Catch (Midstream)  Final Report (07 Sep 2022 15:31):    <10,000 CFU/mL Normal Urogenital Jana    Culture - Blood (collected 06 Sep 2022 10:25)  Source: .Blood Blood-Venous  Preliminary Report (07 Sep 2022 19:01):    No growth to date.    Culture - Blood (collected 06 Sep 2022 10:25)  Source: .Blood Blood-Peripheral  Gram Stain (07 Sep 2022 05:48):    Growth in aerobic and anaerobic bottles: Gram positive cocci in pairs  Preliminary Report (07 Sep 2022 05:48):    Growth in aerobic and anaerobic bottles: Gram positive cocci in pairs    ***Blood Panel PCR results on this specimen are available    approximately 3 hours after the Gram stain result.***    Gram stain, PCR, and/or culture results may not always    correspond due to difference in methodologies.    ************************************************************    This PCR assay was performed by multiplex PCR. This    Assay tests for 66 bacterial and resistance gene targets.    Please refer to the [facility] test directory    at [email or web address] for details.  Organism: Blood Culture PCR (07 Sep 2022 06:44)  Organism: Blood Culture PCR (07 Sep 2022 06:44)      SARS-CoV-2: NotDetec (06 Sep 2022 10:32)  COVID-19 PCR: NotDetec (06 Sep 2022 10:25)  COVID-19 PCR: NotDetec (2022 13:33)      RADIOLOGY & ADDITIONAL TESTS: Reviewed.       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 Pt normally ambulates with walker and has a wheelchair. VS in ED s/f fever with Tmax of 105.2, HDS, fever resolved with tylenol suppository. Labs in ED s/f WBC of 25.88, Hgb 7.9, lactate 5.1 (down to 1.3 after receiving 1L NS bolus), Cr 2.18 (baseline 2.04 in 2022), BNP 01102, and troponin of 0.10. EKG with no acute ischemic changes, and troponin peaked. BLE CT s/f severe BLE cellulitis w/o abscess or OM. CT chest/abd/pelvis s/f slightly hyperdense solid cortical mass in upper pole of R kidney, retroperitoneal adenopathy, and centrilobular emphysema. CXR showing L basilar focal atelectasis. In ED, pt was given duonebs x1, solumedrol 40, cefepime 1g IV, vancomycin 1g IV, 1L NS bolus, and tylenol 650 mg suppository. Pt has been afebrile since  9pm. Hgb down to 6.7, pt given 1 unit pRBCs, awaiting post-transfusion CBC at 12pm. Pt states she has history of chronic anemia and multiple blood transfusions in the past, no transfusion reactions. Following stepdown to [facility], pt coded at approximately 6-7PM. Nurse reported pt was experiencing headache and shortness of breath during meal, became hypoxic and blue in color, then went into cardiac arrest. CPR initiated, pt intubated, and one dose of EPI with fluids given and pt was resuscitated. Admitted to MICU      SUBJECTIVE:    12 Point ROS Negative unless noted otherwise above.  -------------------------------------------------------------------------------  VITAL SIGNS:  Vital Signs Last 24 Hrs  T(C): 37.1 (07 Sep 2022 17:01), Max: 38.6 (06 Sep 2022 21:17)  T(F): 98.8 (07 Sep 2022 17:01), Max: 101.4 (06 Sep 2022 21:17)  HR: 104 (07 Sep 2022 18:55) (93 - 120)  BP: 143/59 (07 Sep 2022 18:41) (92/42 - 148/49)  BP(mean): 66 (07 Sep 2022 10:47) (61 - 76)  RR: 20 (07 Sep 2022 18:55) (17 - 23)  SpO2: 98% (07 Sep 2022 18:55) (92% - 98%)    Parameters below as of 07 Sep 2022 18:55  Patient On (Oxygen Delivery Method): ventilator    O2 Concentration (%): 100  I&O's Summary    06 Sep 2022 07:  -  07 Sep 2022 07:00  --------------------------------------------------------  IN: 526 mL / OUT: 500 mL / NET: 26 mL    07 Sep 2022 07:01  -  07 Sep 2022 19:22  --------------------------------------------------------  IN: 369 mL / OUT: 200 mL / NET: 169 mL        PHYSICAL EXAM:    General: NAD, well developed  HEENT: NC/AT; EOMI, PERRLA, anicteric sclera; moist mucosal membranes.  Neck: supple, trachea midline  Cardiovascular: RRR, +S1/S2; NO M/R/G  Respiratory: CTA B/L; no W/R/R  Gastrointestinal: soft, NT/ND; +BSx4  Extremities: WWP; no edema or cyanosis  Vascular: 2+ radial, DP/PT pulses B/L  Neurological: AAOx3; no focal deficits    ALLERGIES:  Allergies    Altabax (Unknown)  Augmentin (Unknown)  clindamycin (Unknown)  Vaseline (Swelling)    Intolerances        MEDICATIONS:  MEDICATIONS  (STANDING):  cefTRIAXone   IVPB 2000 milliGRAM(s) IV Intermittent every 24 hours  dexMEDEtomidine Infusion 0.2 MICROgram(s)/kG/Hr (5.12 mL/Hr) IV Continuous <Continuous>  heparin  Infusion.  Unit(s)/Hr (18 mL/Hr) IV Continuous <Continuous>  influenza  Vaccine (HIGH DOSE) 0.7 milliLiter(s) IntraMuscular once  norepinephrine Infusion 0.05 MICROgram(s)/kG/Min (9.59 mL/Hr) IV Continuous <Continuous>  propofol Infusion 10 MICROgram(s)/kG/Min (6.14 mL/Hr) IV Continuous <Continuous>    MEDICATIONS  (PRN):  acetaminophen     Tablet .. 650 milliGRAM(s) Oral every 6 hours PRN Mild Pain (1 - 3)  albuterol/ipratropium for Nebulization 3 milliLiter(s) Nebulizer every 6 hours PRN Shortness of Breath and/or Wheezing      -------------------------------------------------------------------------------  LABS:                        8.0    7.57  )-----------( 192      ( 07 Sep 2022 18:48 )             28.1         135  |  99  |  53<H>  ----------------------------<  205<H>  4.9   |  21<L>  |  2.48<H>    Ca    8.4      07 Sep 2022 18:48  Phos  5.2     09-  Mg     2.4     09-07    TPro  6.8  /  Alb  2.6<L>  /  TBili  0.3  /  DBili  x   /  AST  108<H>  /  ALT  62<H>  /  AlkPhos  166<H>  09-07    LIVER FUNCTIONS - ( 07 Sep 2022 18:48 )  Alb: 2.6 g/dL / Pro: 6.8 g/dL / ALK PHOS: 166 U/L / ALT: 62 U/L / AST: 108 U/L / GGT: x           PT/INR - ( 06 Sep 2022 10:25 )   PT: 15.3 sec;   INR: 1.30          PTT - ( 06 Sep 2022 10:25 )  PTT:29.3 sec  Urinalysis Basic - ( 06 Sep 2022 10:25 )    Color: Yellow / Appearance: Clear / S.025 / pH: x  Gluc: x / Ketone: NEGATIVE  / Bili: NEGATIVE / Urobili: 0.2 E.U./dL   Blood: x / Protein: >=300 mg/dL / Nitrite: NEGATIVE   Leuk Esterase: NEGATIVE / RBC: 5-10 /HPF / WBC < 5 /HPF   Sq Epi: x / Non Sq Epi: Many /HPF / Bacteria: Present /HPF      CAPILLARY BLOOD GLUCOSE      POCT Blood Glucose.: 153 mg/dL (07 Sep 2022 18:17)      Culture - Urine (collected 06 Sep 2022 10:25)  Source: Clean Catch Clean Catch (Midstream)  Final Report (07 Sep 2022 15:31):    <10,000 CFU/mL Normal Urogenital Jana    Culture - Blood (collected 06 Sep 2022 10:25)  Source: .Blood Blood-Venous  Preliminary Report (07 Sep 2022 19:01):    No growth to date.    Culture - Blood (collected 06 Sep 2022 10:25)  Source: .Blood Blood-Peripheral  Gram Stain (07 Sep 2022 05:48):    Growth in aerobic and anaerobic bottles: Gram positive cocci in pairs  Preliminary Report (07 Sep 2022 05:48):    Growth in aerobic and anaerobic bottles: Gram positive cocci in pairs    ***Blood Panel PCR results on this specimen are available    approximately 3 hours after the Gram stain result.***    Gram stain, PCR, and/or culture results may not always    correspond due to difference in methodologies.    ************************************************************    This PCR assay was performed by multiplex PCR. This    Assay tests for 66 bacterial and resistance gene targets.    Please refer to the [facility] test directory    at [email or web address] for details.  Organism: Blood Culture PCR (07 Sep 2022 06:44)  Organism: Blood Culture PCR (07 Sep 2022 06:44)      SARS-CoV-2: NotDetec (06 Sep 2022 10:32)  COVID-19 PCR: NotDetec (06 Sep 2022 10:25)  COVID-19 PCR: NotDetec (2022 13:33)      RADIOLOGY & ADDITIONAL TESTS: Reviewed.       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 Pt normally ambulates with walker and has a wheelchair. VS in ED s/f fever with Tmax of 105.2, HDS, fever resolved with tylenol suppository. Labs in ED s/f WBC of 25.88, Hgb 7.9, lactate 5.1 (down to 1.3 after receiving 1L NS bolus), Cr 2.18 (baseline 2.04 in 2022), BNP 16456, and troponin of 0.10. EKG with no acute ischemic changes, and troponin peaked. BLE CT s/f severe BLE cellulitis w/o abscess or OM. CT chest/abd/pelvis s/f slightly hyperdense solid cortical mass in upper pole of R kidney, retroperitoneal adenopathy, and centrilobular emphysema. CXR showing L basilar focal atelectasis. In ED, pt was given duonebs x1, solumedrol 40, cefepime 1g IV, vancomycin 1g IV, 1L NS bolus, and tylenol 650 mg suppository. Pt has been afebrile since  9pm. Hgb down to 6.7, pt given 1 unit pRBCs, awaiting post-transfusion CBC at 12pm. Pt states she has history of chronic anemia and multiple blood transfusions in the past, no transfusion reactions. Following stepdown to [facility], pt coded at approximately 6-7PM. Nurse reported pt was experiencing headache and shortness of breath during meal, became hypoxic and blue in color, then went into cardiac arrest. CPR initiated, pt intubated, and one dose of EPI with fluids given and pt was resuscitated. Admitted to MICU      SUBJECTIVE: Pt intubated and sedated.    12 Point ROS Negative unless noted otherwise above.  -------------------------------------------------------------------------------  VITAL SIGNS:  Vital Signs Last 24 Hrs  T(C): 37.1 (07 Sep 2022 17:01), Max: 38.6 (06 Sep 2022 21:17)  T(F): 98.8 (07 Sep 2022 17:01), Max: 101.4 (06 Sep 2022 21:17)  HR: 104 (07 Sep 2022 18:55) (93 - 120)  BP: 143/59 (07 Sep 2022 18:41) (92/42 - 148/49)  BP(mean): 66 (07 Sep 2022 10:47) (61 - 76)  RR: 20 (07 Sep 2022 18:55) (17 - 23)  SpO2: 98% (07 Sep 2022 18:55) (92% - 98%)    Parameters below as of 07 Sep 2022 18:55  Patient On (Oxygen Delivery Method): ventilator    O2 Concentration (%): 100  I&O's Summary    06 Sep 2022 07:  -  07 Sep 2022 07:00  --------------------------------------------------------  IN: 526 mL / OUT: 500 mL / NET: 26 mL    07 Sep 2022 07:01  -  07 Sep 2022 19:22  --------------------------------------------------------  IN: 369 mL / OUT: 200 mL / NET: 169 mL        PHYSICAL EXAM:    General: NAD, well developed  HEENT: NC/AT; EOMI, PERRLA, anicteric sclera; moist mucosal membranes.  Neck: supple, trachea midline  Cardiovascular: RRR, +S1/S2; NO M/R/G  Respiratory: CTA B/L; no W/R/R  Gastrointestinal: soft, NT/ND; +BSx4  Extremities: WWP; no edema or cyanosis  Vascular: 2+ radial, DP/PT pulses B/L  Neurological: AAOx3; no focal deficits    ALLERGIES:  Allergies    Altabax (Unknown)  Augmentin (Unknown)  clindamycin (Unknown)  Vaseline (Swelling)    Intolerances        MEDICATIONS:  MEDICATIONS  (STANDING):  cefTRIAXone   IVPB 2000 milliGRAM(s) IV Intermittent every 24 hours  dexMEDEtomidine Infusion 0.2 MICROgram(s)/kG/Hr (5.12 mL/Hr) IV Continuous <Continuous>  heparin  Infusion.  Unit(s)/Hr (18 mL/Hr) IV Continuous <Continuous>  influenza  Vaccine (HIGH DOSE) 0.7 milliLiter(s) IntraMuscular once  norepinephrine Infusion 0.05 MICROgram(s)/kG/Min (9.59 mL/Hr) IV Continuous <Continuous>  propofol Infusion 10 MICROgram(s)/kG/Min (6.14 mL/Hr) IV Continuous <Continuous>    MEDICATIONS  (PRN):  acetaminophen     Tablet .. 650 milliGRAM(s) Oral every 6 hours PRN Mild Pain (1 - 3)  albuterol/ipratropium for Nebulization 3 milliLiter(s) Nebulizer every 6 hours PRN Shortness of Breath and/or Wheezing      -------------------------------------------------------------------------------  LABS:                        8.0    7.57  )-----------( 192      ( 07 Sep 2022 18:48 )             28.1         135  |  99  |  53<H>  ----------------------------<  205<H>  4.9   |  21<L>  |  2.48<H>    Ca    8.4      07 Sep 2022 18:48  Phos  5.2     09-  Mg     2.4     09-07    TPro  6.8  /  Alb  2.6<L>  /  TBili  0.3  /  DBili  x   /  AST  108<H>  /  ALT  62<H>  /  AlkPhos  166<H>  09-07    LIVER FUNCTIONS - ( 07 Sep 2022 18:48 )  Alb: 2.6 g/dL / Pro: 6.8 g/dL / ALK PHOS: 166 U/L / ALT: 62 U/L / AST: 108 U/L / GGT: x           PT/INR - ( 06 Sep 2022 10:25 )   PT: 15.3 sec;   INR: 1.30          PTT - ( 06 Sep 2022 10:25 )  PTT:29.3 sec  Urinalysis Basic - ( 06 Sep 2022 10:25 )    Color: Yellow / Appearance: Clear / S.025 / pH: x  Gluc: x / Ketone: NEGATIVE  / Bili: NEGATIVE / Urobili: 0.2 E.U./dL   Blood: x / Protein: >=300 mg/dL / Nitrite: NEGATIVE   Leuk Esterase: NEGATIVE / RBC: 5-10 /HPF / WBC < 5 /HPF   Sq Epi: x / Non Sq Epi: Many /HPF / Bacteria: Present /HPF      CAPILLARY BLOOD GLUCOSE      POCT Blood Glucose.: 153 mg/dL (07 Sep 2022 18:17)      Culture - Urine (collected 06 Sep 2022 10:25)  Source: Clean Catch Clean Catch (Midstream)  Final Report (07 Sep 2022 15:31):    <10,000 CFU/mL Normal Urogenital Jana    Culture - Blood (collected 06 Sep 2022 10:25)  Source: .Blood Blood-Venous  Preliminary Report (07 Sep 2022 19:01):    No growth to date.    Culture - Blood (collected 06 Sep 2022 10:25)  Source: .Blood Blood-Peripheral  Gram Stain (07 Sep 2022 05:48):    Growth in aerobic and anaerobic bottles: Gram positive cocci in pairs  Preliminary Report (07 Sep 2022 05:48):    Growth in aerobic and anaerobic bottles: Gram positive cocci in pairs    ***Blood Panel PCR results on this specimen are available    approximately 3 hours after the Gram stain result.***    Gram stain, PCR, and/or culture results may not always    correspond due to difference in methodologies.    ************************************************************    This PCR assay was performed by multiplex PCR. This    Assay tests for 66 bacterial and resistance gene targets.    Please refer to the [facility] test directory    at [email or web address] for details.  Organism: Blood Culture PCR (07 Sep 2022 06:44)  Organism: Blood Culture PCR (07 Sep 2022 06:44)      SARS-CoV-2: NotDetec (06 Sep 2022 10:32)  COVID-19 PCR: NotDetec (06 Sep 2022 10:25)  COVID-19 PCR: NotDetec (2022 13:33)      RADIOLOGY & ADDITIONAL TESTS: Reviewed.       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 Pt normally ambulates with walker and has a wheelchair. VS in ED s/f fever with Tmax of 105.2, HDS, fever resolved with tylenol suppository. Labs in ED s/f WBC of 25.88, Hgb 7.9, lactate 5.1 (down to 1.3 after receiving 1L NS bolus), Cr 2.18 (baseline 2.04 in 2022), BNP 31229, and troponin of 0.10. EKG with no acute ischemic changes, and troponin peaked. BLE CT s/f severe BLE cellulitis w/o abscess or OM. CT chest/abd/pelvis s/f slightly hyperdense solid cortical mass in upper pole of R kidney, retroperitoneal adenopathy, and centrilobular emphysema. CXR showing L basilar focal atelectasis. In ED, pt was given duonebs x1, solumedrol 40, cefepime 1g IV, vancomycin 1g IV, 1L NS bolus, and tylenol 650 mg suppository. Pt has been afebrile since  9pm. Hgb down to 6.7, pt given 1 unit pRBCs, awaiting post-transfusion CBC at 12pm. Pt states she has history of chronic anemia and multiple blood transfusions in the past, no transfusion reactions. Following stepdown to [facility], pt coded at approximately 6-7PM. Nurse reported pt was experiencing headache and shortness of breath during meal, became hypoxic and blue in color, then went into cardiac arrest. CPR initiated, pt intubated, and one dose of EPI with fluids given and pt was resuscitated. Admitted to MICU      SUBJECTIVE: Pt intubated and sedated.    12 Point ROS Negative unless noted otherwise above.  -------------------------------------------------------------------------------  VITAL SIGNS:  Vital Signs Last 24 Hrs  T(C): 37.1 (07 Sep 2022 17:01), Max: 38.6 (06 Sep 2022 21:17)  T(F): 98.8 (07 Sep 2022 17:01), Max: 101.4 (06 Sep 2022 21:17)  HR: 104 (07 Sep 2022 18:55) (93 - 120)  BP: 143/59 (07 Sep 2022 18:41) (92/42 - 148/49)  BP(mean): 66 (07 Sep 2022 10:47) (61 - 76)  RR: 20 (07 Sep 2022 18:55) (17 - 23)  SpO2: 98% (07 Sep 2022 18:55) (92% - 98%)    Parameters below as of 07 Sep 2022 18:55  Patient On (Oxygen Delivery Method): ventilator    O2 Concentration (%): 100  I&O's Summary    06 Sep 2022 07:  -  07 Sep 2022 07:00  --------------------------------------------------------  IN: 526 mL / OUT: 500 mL / NET: 26 mL    07 Sep 2022 07:01  -  07 Sep 2022 19:22  --------------------------------------------------------  IN: 369 mL / OUT: 200 mL / NET: 169 mL        PHYSICAL EXAM:    General: sedated and intubated  HEENT: NC/AT; EOMI, PERRLA, anicteric sclera; moist mucosal membranes.  Neck: supple, trachea midline  Cardiovascular: distant heart sounds  Respiratory: CTA B/L; no W/R/R  Gastrointestinal: soft, NT/ND; +BSx4  Extremities: wrapped RLE w/ cellulitis and skin changes on LLE  Neurological: AAOx0, sedated    ALLERGIES:  Allergies    Altabax (Unknown)  Augmentin (Unknown)  clindamycin (Unknown)  Vaseline (Swelling)    Intolerances        MEDICATIONS:  MEDICATIONS  (STANDING):  cefTRIAXone   IVPB 2000 milliGRAM(s) IV Intermittent every 24 hours  dexMEDEtomidine Infusion 0.2 MICROgram(s)/kG/Hr (5.12 mL/Hr) IV Continuous <Continuous>  heparin  Infusion.  Unit(s)/Hr (18 mL/Hr) IV Continuous <Continuous>  influenza  Vaccine (HIGH DOSE) 0.7 milliLiter(s) IntraMuscular once  norepinephrine Infusion 0.05 MICROgram(s)/kG/Min (9.59 mL/Hr) IV Continuous <Continuous>  propofol Infusion 10 MICROgram(s)/kG/Min (6.14 mL/Hr) IV Continuous <Continuous>    MEDICATIONS  (PRN):  acetaminophen     Tablet .. 650 milliGRAM(s) Oral every 6 hours PRN Mild Pain (1 - 3)  albuterol/ipratropium for Nebulization 3 milliLiter(s) Nebulizer every 6 hours PRN Shortness of Breath and/or Wheezing      -------------------------------------------------------------------------------  LABS:                        8.0    7.57  )-----------( 192      ( 07 Sep 2022 18:48 )             28.1         135  |  99  |  53<H>  ----------------------------<  205<H>  4.9   |  21<L>  |  2.48<H>    Ca    8.4      07 Sep 2022 18:48  Phos  5.2     09-  Mg     2.4     09-    TPro  6.8  /  Alb  2.6<L>  /  TBili  0.3  /  DBili  x   /  AST  108<H>  /  ALT  62<H>  /  AlkPhos  166<H>  09-    LIVER FUNCTIONS - ( 07 Sep 2022 18:48 )  Alb: 2.6 g/dL / Pro: 6.8 g/dL / ALK PHOS: 166 U/L / ALT: 62 U/L / AST: 108 U/L / GGT: x           PT/INR - ( 06 Sep 2022 10:25 )   PT: 15.3 sec;   INR: 1.30          PTT - ( 06 Sep 2022 10:25 )  PTT:29.3 sec  Urinalysis Basic - ( 06 Sep 2022 10:25 )    Color: Yellow / Appearance: Clear / S.025 / pH: x  Gluc: x / Ketone: NEGATIVE  / Bili: NEGATIVE / Urobili: 0.2 E.U./dL   Blood: x / Protein: >=300 mg/dL / Nitrite: NEGATIVE   Leuk Esterase: NEGATIVE / RBC: 5-10 /HPF / WBC < 5 /HPF   Sq Epi: x / Non Sq Epi: Many /HPF / Bacteria: Present /HPF      CAPILLARY BLOOD GLUCOSE      POCT Blood Glucose.: 153 mg/dL (07 Sep 2022 18:17)      Culture - Urine (collected 06 Sep 2022 10:25)  Source: Clean Catch Clean Catch (Midstream)  Final Report (07 Sep 2022 15:31):    <10,000 CFU/mL Normal Urogenital Jana    Culture - Blood (collected 06 Sep 2022 10:25)  Source: .Blood Blood-Venous  Preliminary Report (07 Sep 2022 19:01):    No growth to date.    Culture - Blood (collected 06 Sep 2022 10:25)  Source: .Blood Blood-Peripheral  Gram Stain (07 Sep 2022 05:48):    Growth in aerobic and anaerobic bottles: Gram positive cocci in pairs  Preliminary Report (07 Sep 2022 05:48):    Growth in aerobic and anaerobic bottles: Gram positive cocci in pairs    ***Blood Panel PCR results on this specimen are available    approximately 3 hours after the Gram stain result.***    Gram stain, PCR, and/or culture results may not always    correspond due to difference in methodologies.    ************************************************************    This PCR assay was performed by multiplex PCR. This    Assay tests for 66 bacterial and resistance gene targets.    Please refer to the [facility] test directory    at [email or web address] for details.  Organism: Blood Culture PCR (07 Sep 2022 06:44)  Organism: Blood Culture PCR (07 Sep 2022 06:44)      SARS-CoV-2: NotDetec (06 Sep 2022 10:32)  COVID-19 PCR: NotDetec (06 Sep 2022 10:25)  COVID-19 PCR: NotDetec (2022 13:33)      RADIOLOGY & ADDITIONAL TESTS: Reviewed.

## 2022-09-07 NOTE — PROGRESS NOTE ADULT - ATTENDING COMMENTS
Hemodynamically stable. Agree with transfusion and f/u work up of iron deficiency in past. no bleeding at this time. Vascular to see for cellulits and to wrap legs.. Antibiotics to continue with Ceftriaxone and f/u sensitivities and repeat blood cultures. Pt and OOB.

## 2022-09-07 NOTE — PROGRESS NOTE ADULT - SUBJECTIVE AND OBJECTIVE BOX
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 Pt normally ambulates with walker and has a wheelchair. VS in ED s/f fever with Tmax of 105.2, HDS, fever resolved with tylenol suppository. Labs in ED s/f WBC of 25.88, Hgb 7.9, lactate 5.1 (down to 1.3 after receiving 1L NS bolus), Cr 2.18 (baseline 2.04 in 2022), BNP 31500, and troponin of 0.10. EKG with no acute ischemic changes, and troponin peaked. BLE CT s/f severe BLE cellulitis w/o abscess or OM. CT chest/abd/pelvis s/f slightly hyperdense solid cortical mass in upper pole of R kidney, retroperitoneal adenopathy, and centrilobular emphysema. CXR showing L basilar focal atelectasis. In ED, pt was given duonebs x1, solumedrol 40, cefepime 1g IV, vancomycin 1g IV, 1L NS bolus, and tylenol 650 mg suppository. Pt has been afebrile since  9pm. Hgb down to 6.7, pt given 1 unit pRBCs, awaiting post-transfusion CBC at 12pm. Pt states she has history of chronic anemia and multiple blood transfusions in the past, no transfusion reactions. Pt's WBC is downtrending (25.88 -> 15.89), Cr up to 2.22, blood cultures growing gram positive cocci in pairs, pending repeat blood cultures at 12pm. Pt's last TTE was 2020 with EF 55-60%, repeat TTE pending. Pt is hemodynamically stable and stepped down to [facility].      OVERNIGHT EVENTS: No acute overnight events. Troponin peaked, lactate cleared, vanc and cefepime continued.    SUBJECTIVE:  Patient seen and examined at bedside. States she is on home O2, 3L NC. Denies any increased SOB from baseline. She reports BLE edema x 2 years, is able to ambulate at home with walker and uses wheelchair as needed. No other complaints at this time. Denies fever, CP, SOB, abd pain, n/v/d, dizziness.  Medicine team on [facility] inspected her legs, she explained they have been this way for the past 4 years, gradually worsening but not acutely changed in the past few months.    Vital Signs Last 12 Hrs  T(F): 98.8 (22 @ 10:02), Max: 99 (22 @ 00:29)  HR: 100 (22 @ 10:47) (94 - 100)  BP: 98/46 (22 @ 10:47) (98/46 - 106/53)  BP(mean): 66 (22 @ 10:47) (65 - 76)  RR: 17 (22 @ 10:47) (17 - 22)  SpO2: 95% (22 @ 10:47) (95% - 97%)  I&O's Summary    06 Sep 2022 07:  -  07 Sep 2022 07:00  --------------------------------------------------------  IN: 526 mL / OUT: 500 mL / NET: 26 mL    07 Sep 2022 07:  -  07 Sep 2022 11:30  --------------------------------------------------------  IN: 142 mL / OUT: 0 mL / NET: 142 mL        PHYSICAL EXAM:  Constitutional: NAD, comfortable in bed.  HEENT: NC/AT, EOMI, no conjunctival pallor or scleral icterus, MMM  Neck: Supple  Respiratory: CTA B/L. No w/r/r.   Cardiovascular: RRR, normal S1 and S2, no m/r/g.   Gastrointestinal: soft NTND, no palpable masses   Extremities: wwp; severe BLE pitting edema with erythema, warmth, and dry and scaly skin, c/w cellulitis  Vascular: intact distal pulses  Neurological: AAOx3, no CN deficits, no focal deficits.        LABS:                        6.7    16.58 )-----------( 156      ( 07 Sep 2022 07:13 )             23.1         139  |  104  |  51<H>  ----------------------------<  116<H>  4.4   |  28  |  2.22<H>    Ca    8.4      07 Sep 2022 05:57  Phos  3.8       Mg     1.8         TPro  6.4  /  Alb  2.7<L>  /  TBili  0.2  /  DBili  x   /  AST  31  /  ALT  19  /  AlkPhos  118      PT/INR - ( 06 Sep 2022 10:25 )   PT: 15.3 sec;   INR: 1.30          PTT - ( 06 Sep 2022 10:25 )  PTT:29.3 sec  Urinalysis Basic - ( 06 Sep 2022 10:25 )    Color: Yellow / Appearance: Clear / S.025 / pH: x  Gluc: x / Ketone: NEGATIVE  / Bili: NEGATIVE / Urobili: 0.2 E.U./dL   Blood: x / Protein: >=300 mg/dL / Nitrite: NEGATIVE   Leuk Esterase: NEGATIVE / RBC: 5-10 /HPF / WBC < 5 /HPF   Sq Epi: x / Non Sq Epi: Many /HPF / Bacteria: Present /HPF          RADIOLOGY & ADDITIONAL TESTS:    MEDICATIONS  (STANDING):  apixaban 2.5 milliGRAM(s) Oral every 12 hours  cefTRIAXone   IVPB 2000 milliGRAM(s) IV Intermittent every 24 hours  influenza  Vaccine (HIGH DOSE) 0.7 milliLiter(s) IntraMuscular once  insulin lispro (ADMELOG) corrective regimen sliding scale   SubCutaneous Before meals and at bedtime    MEDICATIONS  (PRN):  acetaminophen     Tablet .. 650 milliGRAM(s) Oral every 6 hours PRN Mild Pain (1 - 3)  albuterol/ipratropium for Nebulization 3 milliLiter(s) Nebulizer every 6 hours PRN Shortness of Breath and/or Wheezing

## 2022-09-07 NOTE — PROGRESS NOTE ADULT - PROBLEM SELECTOR PLAN 7
Pt with history of CKD, baseline Cr 2.04 in July 2022, currently 2.22.  - continue to trend BUN, Cr  - renal dosing  - avoid nephrotoxic agents  - strict I&Os  - monitor UOP

## 2022-09-07 NOTE — PROGRESS NOTE ADULT - PROBLEM SELECTOR PLAN 3
Pt with history of hypertension on home diltiazem 180mg daily.  - holding diltiazem in the setting of sepsis  - restart as appropriate Pt with COPD on home 3L. Patient initially presented with NRB, but now on NC.  - breathing comfortably on 3L NC, same as home O2. no increased SOB compared to baseline  - c/w duonebs q6h PRN

## 2022-09-07 NOTE — PROGRESS NOTE ADULT - SUBJECTIVE AND OBJECTIVE BOX
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 Pt normally ambulates with walker and has a wheelchair. VS in ED s/f fever with Tmax of 105.2, HDS, fever resolved with tylenol suppository. Labs in ED s/f WBC of 25.88, Hgb 7.9, lactate 5.1 (down to 1.3 after receiving 1L NS bolus), Cr 2.18 (baseline 2.04 in 2022), and BNP 21162. BLE CT s/f severe BLE cellulitis w/o abscess or OM. CT chest/abd/pelvis s/f slightly hyperdense solid cortical mass in upper pole of R kidney, retroperitoneal adenopathy, and centrilobular emphysema. CXR showing L basilar focal atelectasis. In ED, pt was given duonebs x1, solumedrol 40, cefepime 1g IV, vancomycin 1g IV, 1L NS bolus, and tylenol 650 mg suppository. Pt has been afebrile since  9pm. WBC downtrending, Cr up      OVERNIGHT EVENTS:    SUBJECTIVE:  Patient seen and examined at bedside.    Vital Signs Last 12 Hrs  T(F): 98.8 (22 @ 10:02), Max: 99 (22 @ 00:29)  HR: 100 (22 @ 10:47) (94 - 100)  BP: 98/46 (22 @ 10:47) (98/46 - 106/53)  BP(mean): 66 (22 @ 10:47) (65 - 76)  RR: 17 (22 @ 10:47) (17 - 22)  SpO2: 95% (22 @ 10:47) (95% - 97%)  I&O's Summary    06 Sep 2022 07:01  -  07 Sep 2022 07:00  --------------------------------------------------------  IN: 526 mL / OUT: 500 mL / NET: 26 mL    07 Sep 2022 07:  -  07 Sep 2022 11:30  --------------------------------------------------------  IN: 142 mL / OUT: 0 mL / NET: 142 mL        PHYSICAL EXAM:  Constitutional: NAD, comfortable in bed.  HEENT: NC/AT, PERRLA, EOMI, no conjunctival pallor or scleral icterus, MMM  Neck: Supple, no JVD  Respiratory: CTA B/L. No w/r/r.   Cardiovascular: RRR, normal S1 and S2, no m/r/g.   Gastrointestinal: +BS, soft NTND, no guarding or rebound tenderness, no palpable masses   Extremities: wwp; no cyanosis, clubbing or edema.   Vascular: Pulses equal and strong throughout.   Neurological: AAOx3, no CN deficits, strength and sensation intact throughout.   Skin: No gross skin abnormalities or rashes        LABS:                        6.7    16.58 )-----------( 156      ( 07 Sep 2022 07:13 )             23.1         139  |  104  |  51<H>  ----------------------------<  116<H>  4.4   |  28  |  2.22<H>    Ca    8.4      07 Sep 2022 05:57  Phos  3.8       Mg     1.8         TPro  6.4  /  Alb  2.7<L>  /  TBili  0.2  /  DBili  x   /  AST  31  /  ALT  19  /  AlkPhos  118      PT/INR - ( 06 Sep 2022 10:25 )   PT: 15.3 sec;   INR: 1.30          PTT - ( 06 Sep 2022 10:25 )  PTT:29.3 sec  Urinalysis Basic - ( 06 Sep 2022 10:25 )    Color: Yellow / Appearance: Clear / S.025 / pH: x  Gluc: x / Ketone: NEGATIVE  / Bili: NEGATIVE / Urobili: 0.2 E.U./dL   Blood: x / Protein: >=300 mg/dL / Nitrite: NEGATIVE   Leuk Esterase: NEGATIVE / RBC: 5-10 /HPF / WBC < 5 /HPF   Sq Epi: x / Non Sq Epi: Many /HPF / Bacteria: Present /HPF          RADIOLOGY & ADDITIONAL TESTS:    MEDICATIONS  (STANDING):  apixaban 2.5 milliGRAM(s) Oral every 12 hours  cefTRIAXone   IVPB 2000 milliGRAM(s) IV Intermittent every 24 hours  influenza  Vaccine (HIGH DOSE) 0.7 milliLiter(s) IntraMuscular once  insulin lispro (ADMELOG) corrective regimen sliding scale   SubCutaneous Before meals and at bedtime    MEDICATIONS  (PRN):  acetaminophen     Tablet .. 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 Pt normally ambulates with walker and has a wheelchair. VS in ED s/f fever with Tmax of 105.2, HDS, fever resolved with tylenol suppository. Labs in ED s/f WBC of 25.88, Hgb 7.9, lactate 5.1 (down to 1.3 after receiving 1L NS bolus), Cr 2.18 (baseline 2.04 in 2022), BNP 04520, and troponin of 0.10. EKG with no acute ischemic changes, and troponin peaked. BLE CT s/f severe BLE cellulitis w/o abscess or OM. CT chest/abd/pelvis s/f slightly hyperdense solid cortical mass in upper pole of R kidney, retroperitoneal adenopathy, and centrilobular emphysema. CXR showing L basilar focal atelectasis. In ED, pt was given duonebs x1, solumedrol 40, cefepime 1g IV, vancomycin 1g IV, 1L NS bolus, and tylenol 650 mg suppository. Pt has been afebrile since  9pm. Hgb down to 6.7, pt given 1 unit pRBCs, awaiting post-transfusion CBC at 12pm. Pt states she has history of chronic anemia and multiple blood transfusions in the past, no transfusion reactions. Pt's WBC is downtrending (25.88 -> 15.89), Cr up to 2.22, blood cultures growing gram positive cocci in pairs, pending repeat blood cultures at 12pm. Pt's last TTE was 2020 with EF 55-60%, repeat TTE pending. Pt is HDS and stable for stepdown to [facility].      OVERNIGHT EVENTS: No acute overnight events. Troponin peaked, lactate cleared, vanc and cefepime continued.    SUBJECTIVE:  Patient seen and examined at bedside. States she is on home O2, 3L NC. Denies any increased SOB from baseline. She reports BLE edema x 2 years, is able to ambulate at home with walker and uses wheelchair as needed. No other complaints at this time. Denies fever, CP, SOB, abd pain, n/v/d, dizziness.    Vital Signs Last 12 Hrs  T(F): 98.8 (22 @ 10:02), Max: 99 (22 @ 00:29)  HR: 100 (22 @ 10:47) (94 - 100)  BP: 98/46 (22 @ 10:47) (98/46 - 106/53)  BP(mean): 66 (22 @ 10:47) (65 - 76)  RR: 17 (22 @ 10:47) (17 - 22)  SpO2: 95% (22 @ 10:47) (95% - 97%)  I&O's Summary    06 Sep 2022 07:  -  07 Sep 2022 07:00  --------------------------------------------------------  IN: 526 mL / OUT: 500 mL / NET: 26 mL    07 Sep 2022 07:01  -  07 Sep 2022 11:30  --------------------------------------------------------  IN: 142 mL / OUT: 0 mL / NET: 142 mL        PHYSICAL EXAM:  Constitutional: NAD, comfortable in bed.  HEENT: NC/AT, EOMI, no conjunctival pallor or scleral icterus, MMM  Neck: Supple  Respiratory: CTA B/L. No w/r/r.   Cardiovascular: RRR, normal S1 and S2, no m/r/g.   Gastrointestinal: soft NTND, no palpable masses   Extremities: wwp; severe BLE pitting edema with erythema, warmth, and dry and scaly skin, c/w cellulitis  Vascular: intact distal pulses  Neurological: AAOx3, no CN deficits, no focal deficits.        LABS:                        6.7    16.58 )-----------( 156      ( 07 Sep 2022 07:13 )             23.1         139  |  104  |  51<H>  ----------------------------<  116<H>  4.4   |  28  |  2.22<H>    Ca    8.4      07 Sep 2022 05:57  Phos  3.8       Mg     1.8         TPro  6.4  /  Alb  2.7<L>  /  TBili  0.2  /  DBili  x   /  AST  31  /  ALT  19  /  AlkPhos  118      PT/INR - ( 06 Sep 2022 10:25 )   PT: 15.3 sec;   INR: 1.30          PTT - ( 06 Sep 2022 10:25 )  PTT:29.3 sec  Urinalysis Basic - ( 06 Sep 2022 10:25 )    Color: Yellow / Appearance: Clear / S.025 / pH: x  Gluc: x / Ketone: NEGATIVE  / Bili: NEGATIVE / Urobili: 0.2 E.U./dL   Blood: x / Protein: >=300 mg/dL / Nitrite: NEGATIVE   Leuk Esterase: NEGATIVE / RBC: 5-10 /HPF / WBC < 5 /HPF   Sq Epi: x / Non Sq Epi: Many /HPF / Bacteria: Present /HPF          RADIOLOGY & ADDITIONAL TESTS:    MEDICATIONS  (STANDING):  apixaban 2.5 milliGRAM(s) Oral every 12 hours  cefTRIAXone   IVPB 2000 milliGRAM(s) IV Intermittent every 24 hours  influenza  Vaccine (HIGH DOSE) 0.7 milliLiter(s) IntraMuscular once  insulin lispro (ADMELOG) corrective regimen sliding scale   SubCutaneous Before meals and at bedtime    MEDICATIONS  (PRN):  acetaminophen     Tablet .. 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 Pt normally ambulates with walker and has a wheelchair. VS in ED s/f fever with Tmax of 105.2, HDS, fever resolved with tylenol suppository. Labs in ED s/f WBC of 25.88, Hgb 7.9, lactate 5.1 (down to 1.3 after receiving 1L NS bolus), Cr 2.18 (baseline 2.04 in 2022), BNP 11169, and troponin of 0.10. EKG with no acute ischemic changes, and troponin peaked. BLE CT s/f severe BLE cellulitis w/o abscess or OM. CT chest/abd/pelvis s/f slightly hyperdense solid cortical mass in upper pole of R kidney, retroperitoneal adenopathy, and centrilobular emphysema. CXR showing L basilar focal atelectasis. In ED, pt was given duonebs x1, solumedrol 40, cefepime 1g IV, vancomycin 1g IV, 1L NS bolus, and tylenol 650 mg suppository. Pt has been afebrile since  9pm. Hgb down to 6.7, pt given 1 unit pRBCs, awaiting post-transfusion CBC at 12pm. Pt states she has history of chronic anemia and multiple blood transfusions in the past, no transfusion reactions. Pt's WBC is downtrending (25.88 -> 15.89), Cr up to 2.22, blood cultures growing gram positive cocci in pairs, pending repeat blood cultures at 12pm. Pt's last TTE was 2020 with EF 55-60%, repeat TTE pending. Pt is HDS and stable for stepdown to RMF.      OVERNIGHT EVENTS: No acute overnight events. Troponin peaked, lactate cleared, vanc and cefepime continued.    SUBJECTIVE:  Patient seen and examined at bedside. States she is on home O2, 3L NC. Denies any increased SOB from baseline. She reports BLE edema x 2 years, is able to ambulate at home with walker and uses wheelchair as needed. No other complaints at this time. Denies fever, CP, SOB, abd pain, n/v/d, dizziness.    Vital Signs Last 12 Hrs  T(F): 98.8 (22 @ 10:02), Max: 99 (22 @ 00:29)  HR: 100 (22 @ 10:47) (94 - 100)  BP: 98/46 (22 @ 10:47) (98/46 - 106/53)  BP(mean): 66 (22 @ 10:47) (65 - 76)  RR: 17 (22 @ 10:47) (17 - 22)  SpO2: 95% (22 @ 10:47) (95% - 97%)  I&O's Summary    06 Sep 2022 07:  -  07 Sep 2022 07:00  --------------------------------------------------------  IN: 526 mL / OUT: 500 mL / NET: 26 mL    07 Sep 2022 07:01  -  07 Sep 2022 11:30  --------------------------------------------------------  IN: 142 mL / OUT: 0 mL / NET: 142 mL        PHYSICAL EXAM:  Constitutional: NAD, comfortable in bed.  HEENT: NC/AT, EOMI, no conjunctival pallor or scleral icterus, MMM  Neck: Supple  Respiratory: CTA B/L. No w/r/r.   Cardiovascular: RRR, normal S1 and S2, no m/r/g.   Gastrointestinal: soft NTND, no palpable masses   Extremities: wwp; severe BLE pitting edema with erythema, warmth, and dry and scaly skin, c/w cellulitis  Vascular: intact distal pulses  Neurological: AAOx3, no CN deficits, no focal deficits.        LABS:                        6.7    16.58 )-----------( 156      ( 07 Sep 2022 07:13 )             23.1         139  |  104  |  51<H>  ----------------------------<  116<H>  4.4   |  28  |  2.22<H>    Ca    8.4      07 Sep 2022 05:57  Phos  3.8       Mg     1.8         TPro  6.4  /  Alb  2.7<L>  /  TBili  0.2  /  DBili  x   /  AST  31  /  ALT  19  /  AlkPhos  118      PT/INR - ( 06 Sep 2022 10:25 )   PT: 15.3 sec;   INR: 1.30          PTT - ( 06 Sep 2022 10:25 )  PTT:29.3 sec  Urinalysis Basic - ( 06 Sep 2022 10:25 )    Color: Yellow / Appearance: Clear / S.025 / pH: x  Gluc: x / Ketone: NEGATIVE  / Bili: NEGATIVE / Urobili: 0.2 E.U./dL   Blood: x / Protein: >=300 mg/dL / Nitrite: NEGATIVE   Leuk Esterase: NEGATIVE / RBC: 5-10 /HPF / WBC < 5 /HPF   Sq Epi: x / Non Sq Epi: Many /HPF / Bacteria: Present /HPF          RADIOLOGY & ADDITIONAL TESTS:    MEDICATIONS  (STANDING):  apixaban 2.5 milliGRAM(s) Oral every 12 hours  cefTRIAXone   IVPB 2000 milliGRAM(s) IV Intermittent every 24 hours  influenza  Vaccine (HIGH DOSE) 0.7 milliLiter(s) IntraMuscular once  insulin lispro (ADMELOG) corrective regimen sliding scale   SubCutaneous Before meals and at bedtime    MEDICATIONS  (PRN):  acetaminophen     Tablet .. 650 milliGRAM(s) Oral every 6 hours PRN Mild Pain (1 - 3)  albuterol/ipratropium for Nebulization 3 milliLiter(s) Nebulizer every 6 hours PRN Shortness of Breath and/or Wheezing

## 2022-09-07 NOTE — CONSULT NOTE ADULT - SUBJECTIVE AND OBJECTIVE BOX
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 pt normally gets around with walker, AA0x3, uses nebs on occasion but doesn't help, found by HHA today, somnolent but arousable in usual state of health yesterday.    In the ED:  VITAL SIGNS: Last 24 Hours  T(F): 99.6 (06 Sep 2022 20:40), Max: 105.2 (06 Sep 2022 10:41)  HR: 105 (06 Sep 2022 20:40) (101 - 145)  BP: 134/67 (06 Sep 2022 20:40) (108/53 - 148/49)  RR: 22 (06 Sep 2022 20:40) (18 - 22)  SpO2: 96% (06 Sep 2022 20:40) (95% - 100%)    Labs: WBC 25.88, Hgb 7.9, lactate 5.1 -> 1.3, Cr. 2.18, BNP 16684  UA positive for bacteria and epithelial cells    Imaging:     < from: CT Lower Extremity No Cont, Bilateral (22 @ 16:06) >  IMPRESSION:    Severe bilateral lower extremity cellulitis without drainable fluid   collection.    No CT evidence of osteomyelitis.    < from: CT Abdomen and Pelvis No Cont (22 @ 16:05) >  IMPRESSION:  Study performed without intravenous or oral contrast.  Centrilobular emphysema.  No evidence of pneumonia.  No hydronephrosis.   Retroperitoneal adenopathy. Slightly increased as compared to CT   3/4/2018. Differential diagnosis includes lymphoproliferative disorder.  Status post sigmoid resection.  3.0 cm slightly hyperdense solid cortical mass upper pole right kidney.   Is indeterminate. Differential diagnosis includes proteinaceous cyst,   tumor. Favor the former diagnosis. Correlate with targeted renal   sonography and MR. Has increased in size as compared to CT 9/10/2020.    < end of copied text >      Interventions: Duoneb x1, solumedrol 40, Cefepime 1g, Vancomycin 1g, 1L NS bolus (06 Sep 2022 21:12)      PAST MEDICAL & SURGICAL HISTORY:  Lyme disease      DVT (deep venous thrombosis)      Skin cancer      Brain embolism and thrombosis      MRSA (methicillin resistant Staphylococcus aureus)      PNA (pneumonia)      COPD (chronic obstructive pulmonary disease)      H/O angioplasty      Status post laparoscopic-assisted sigmoidectomy      H/O shoulder surgery          REVIEW OF SYSTEMS  Negative except per HPI    MEDICATIONS  (STANDING):  apixaban 2.5 milliGRAM(s) Oral every 12 hours  cefTRIAXone   IVPB 2000 milliGRAM(s) IV Intermittent every 24 hours  influenza  Vaccine (HIGH DOSE) 0.7 milliLiter(s) IntraMuscular once  insulin lispro (ADMELOG) corrective regimen sliding scale   SubCutaneous Before meals and at bedtime    MEDICATIONS  (PRN):  acetaminophen     Tablet .. 650 milliGRAM(s) Oral every 6 hours PRN Mild Pain (1 - 3)  albuterol/ipratropium for Nebulization 3 milliLiter(s) Nebulizer every 6 hours PRN Shortness of Breath and/or Wheezing      Allergies    Altabax (Unknown)  Augmentin (Unknown)  clindamycin (Unknown)  Vaseline (Swelling)    Intolerances        SOCIAL HISTORY:    FAMILY HISTORY:  FH: heart failure  mother        Vital Signs Last 24 Hrs  T(C): 37.1 (07 Sep 2022 10:02), Max: 38.6 (06 Sep 2022 21:17)  T(F): 98.8 (07 Sep 2022 10:02), Max: 101.4 (06 Sep 2022 21:17)  HR: 100 (07 Sep 2022 10:47) (94 - 109)  BP: 98/46 (07 Sep 2022 10:47) (92/42 - 148/49)  BP(mean): 66 (07 Sep 2022 10:47) (61 - 76)  RR: 17 (07 Sep 2022 10:47) (17 - 23)  SpO2: 95% (07 Sep 2022 10:47) (92% - 97%)    Parameters below as of 07 Sep 2022 10:47  Patient On (Oxygen Delivery Method): nasal cannula  O2 Flow (L/min): 3      PHYSICAL EXAM:      Constitutional:    Eyes:    ENMT:    Neck:    Breasts:    Back:    Respiratory:    Cardiovascular:    Gastrointestinal:    Genitourinary:    Rectal:    Extremities:    Vascular:    Neurological:    Skin:    Lymph Nodes:    Musculoskeletal:    Psychiatric:        LABS:                        6.7    16.58 )-----------( 156      ( 07 Sep 2022 07:13 )             23.1     -    139  |  104  |  51<H>  ----------------------------<  116<H>  4.4   |  28  |  2.22<H>    Ca    8.4      07 Sep 2022 05:57  Phos  3.8       Mg     1.8         TPro  6.4  /  Alb  2.7<L>  /  TBili  0.2  /  DBili  x   /  AST  31  /  ALT  19  /  AlkPhos  118  09-07    PT/INR - ( 06 Sep 2022 10:25 )   PT: 15.3 sec;   INR: 1.30          PTT - ( 06 Sep 2022 10:25 )  PTT:29.3 sec  Urinalysis Basic - ( 06 Sep 2022 10:25 )    Color: Yellow / Appearance: Clear / S.025 / pH: x  Gluc: x / Ketone: NEGATIVE  / Bili: NEGATIVE / Urobili: 0.2 E.U./dL   Blood: x / Protein: >=300 mg/dL / Nitrite: NEGATIVE   Leuk Esterase: NEGATIVE / RBC: 5-10 /HPF / WBC < 5 /HPF   Sq Epi: x / Non Sq Epi: Many /HPF / Bacteria: Present /HPF        RADIOLOGY & ADDITIONAL STUDIES Vascular Attending: Ravindra      HPI:  81F PMH morbid obesity, HTN, HLD, HFpEF (EF 55-60% last echo ), RADHA, COPD (on home O2-3L), chronic LE edema with neuropathic pain, DALIA, chronic back pain, PVD s/p LE stents, hx of multiple LE DVT (on Eliquis), Cerebral aneurysm s/p coil, 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 pt normally gets around with walker, AA0x3, uses nebs on occasion but doesn't help, found by HHA today, somnolent but arousable in usual state of health yesterday.    In the ED:  VITAL SIGNS: Last 24 Hours  T(F): 99.6 (06 Sep 2022 20:40), Max: 105.2 (06 Sep 2022 10:41)  HR: 105 (06 Sep 2022 20:40) (101 - 145)  BP: 134/67 (06 Sep 2022 20:40) (108/53 - 148/49)  RR: 22 (06 Sep 2022 20:40) (18 - 22)  SpO2: 96% (06 Sep 2022 20:40) (95% - 100%)    Labs: WBC 25.88, Hgb 7.9, lactate 5.1 -> 1.3, Cr. 2.18, BNP 85189  UA positive for bacteria and epithelial cells    Imaging:     < from: CT Lower Extremity No Cont, Bilateral (22 @ 16:06) >  IMPRESSION:    Severe bilateral lower extremity cellulitis without drainable fluid   collection.    No CT evidence of osteomyelitis.    < from: CT Abdomen and Pelvis No Cont (22 @ 16:05) >  IMPRESSION:  Study performed without intravenous or oral contrast.  Centrilobular emphysema.  No evidence of pneumonia.  No hydronephrosis.   Retroperitoneal adenopathy. Slightly increased as compared to CT   3/4/2018. Differential diagnosis includes lymphoproliferative disorder.  Status post sigmoid resection.  3.0 cm slightly hyperdense solid cortical mass upper pole right kidney.   Is indeterminate. Differential diagnosis includes proteinaceous cyst,   tumor. Favor the former diagnosis. Correlate with targeted renal   sonography and MR. Has increased in size as compared to CT 9/10/2020.    < end of copied text >      Interventions: Duoneb x1, solumedrol 40, Cefepime 1g, Vancomycin 1g, 1L NS bolus (06 Sep 2022 21:12)      PAST MEDICAL & SURGICAL HISTORY:  Lyme disease      DVT (deep venous thrombosis)      Skin cancer      Brain embolism and thrombosis      MRSA (methicillin resistant Staphylococcus aureus)      PNA (pneumonia)      COPD (chronic obstructive pulmonary disease)      H/O angioplasty      Status post laparoscopic-assisted sigmoidectomy      H/O shoulder surgery          REVIEW OF SYSTEMS  Negative except per HPI    MEDICATIONS  (STANDING):  apixaban 2.5 milliGRAM(s) Oral every 12 hours  cefTRIAXone   IVPB 2000 milliGRAM(s) IV Intermittent every 24 hours  influenza  Vaccine (HIGH DOSE) 0.7 milliLiter(s) IntraMuscular once  insulin lispro (ADMELOG) corrective regimen sliding scale   SubCutaneous Before meals and at bedtime    MEDICATIONS  (PRN):  acetaminophen     Tablet .. 650 milliGRAM(s) Oral every 6 hours PRN Mild Pain (1 - 3)  albuterol/ipratropium for Nebulization 3 milliLiter(s) Nebulizer every 6 hours PRN Shortness of Breath and/or Wheezing      Allergies    Altabax (Unknown)  Augmentin (Unknown)  clindamycin (Unknown)  Vaseline (Swelling)    Intolerances        SOCIAL HISTORY:    FAMILY HISTORY:  FH: heart failure  mother        Vital Signs Last 24 Hrs  T(C): 37.1 (07 Sep 2022 10:02), Max: 38.6 (06 Sep 2022 21:17)  T(F): 98.8 (07 Sep 2022 10:02), Max: 101.4 (06 Sep 2022 21:17)  HR: 100 (07 Sep 2022 10:47) (94 - 109)  BP: 98/46 (07 Sep 2022 10:47) (92/42 - 148/49)  BP(mean): 66 (07 Sep 2022 10:47) (61 - 76)  RR: 17 (07 Sep 2022 10:47) (17 - 23)  SpO2: 95% (07 Sep 2022 10:47) (92% - 97%)    Parameters below as of 07 Sep 2022 10:47  Patient On (Oxygen Delivery Method): nasal cannula  O2 Flow (L/min): 3      PHYSICAL EXAM:  General: Pt. lethargic but AAOx3  HEENT: atraumatic, normocephalic  Pulmonary: no respiratory distress; No wheeze + prolonged expiration  Cardiovascular: Regular rate and rhythm; no murmurs, rubs or gallops. Normal S1S2  Gastrointestinal: Soft, tender to deep palpation primarily in lower quandrants b/l, nondistended; bowel sounds present + obese habitus  Musculoskeletal: edematous but dry skin in b/l lower extremities with chronic venous stasis changes extending to the knee. Mild erythema around shins. No open wounds or drainage.   Vascular: Palpable DP pulses bilaterally. Triphasic PT signals bilaterally    Neurology: Pt. alert and oriented, fluent speech, able to move all extremities  Skin: + crusting lesions on b/l lower legs as noted above      LABS:                        6.7    16.58 )-----------( 156      ( 07 Sep 2022 07:13 )             23.1     -    139  |  104  |  51<H>  ----------------------------<  116<H>  4.4   |  28  |  2.22<H>    Ca    8.4      07 Sep 2022 05:57  Phos  3.8     -  Mg     1.8     -    TPro  6.4  /  Alb  2.7<L>  /  TBili  0.2  /  DBili  x   /  AST  31  /  ALT  19  /  AlkPhos  118  09-07    PT/INR - ( 06 Sep 2022 10:25 )   PT: 15.3 sec;   INR: 1.30          PTT - ( 06 Sep 2022 10:25 )  PTT:29.3 sec  Urinalysis Basic - ( 06 Sep 2022 10:25 )    Color: Yellow / Appearance: Clear / S.025 / pH: x  Gluc: x / Ketone: NEGATIVE  / Bili: NEGATIVE / Urobili: 0.2 E.U./dL   Blood: x / Protein: >=300 mg/dL / Nitrite: NEGATIVE   Leuk Esterase: NEGATIVE / RBC: 5-10 /HPF / WBC < 5 /HPF   Sq Epi: x / Non Sq Epi: Many /HPF / Bacteria: Present /HPF        RADIOLOGY & ADDITIONAL STUDIES Vascular Attending: Ravindra      HPI:  81F PMH morbid obesity, HTN, HLD, HFpEF (EF 55-60% last echo ), RADHA, COPD (on home O2-3L), chronic LE edema with neuropathic pain, DALIA, chronic back pain, PVD s/p LE stents, hx of multiple LE DVT (on Eliquis), Cerebral aneurysm s/p coil, 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 pt normally gets around with walker, AA0x3, uses nebs on occasion but doesn't help, found by HHA today, somnolent but arousable in usual state of health yesterday.    In the ED:  VITAL SIGNS: Last 24 Hours  T(F): 99.6 (06 Sep 2022 20:40), Max: 105.2 (06 Sep 2022 10:41)  HR: 105 (06 Sep 2022 20:40) (101 - 145)  BP: 134/67 (06 Sep 2022 20:40) (108/53 - 148/49)  RR: 22 (06 Sep 2022 20:40) (18 - 22)  SpO2: 96% (06 Sep 2022 20:40) (95% - 100%)    Labs: WBC 25.88, Hgb 7.9, lactate 5.1 -> 1.3, Cr. 2.18, BNP 69490  UA positive for bacteria and epithelial cells    Imaging:     < from: CT Lower Extremity No Cont, Bilateral (22 @ 16:06) >  IMPRESSION:    Severe bilateral lower extremity cellulitis without drainable fluid   collection.    No CT evidence of osteomyelitis.    < from: CT Abdomen and Pelvis No Cont (22 @ 16:05) >  IMPRESSION:  Study performed without intravenous or oral contrast.  Centrilobular emphysema.  No evidence of pneumonia.  No hydronephrosis.   Retroperitoneal adenopathy. Slightly increased as compared to CT   3/4/2018. Differential diagnosis includes lymphoproliferative disorder.  Status post sigmoid resection.  3.0 cm slightly hyperdense solid cortical mass upper pole right kidney.   Is indeterminate. Differential diagnosis includes proteinaceous cyst,   tumor. Favor the former diagnosis. Correlate with targeted renal   sonography and MR. Has increased in size as compared to CT 9/10/2020.    < end of copied text >      Interventions: Duoneb x1, solumedrol 40, Cefepime 1g, Vancomycin 1g, 1L NS bolus (06 Sep 2022 21:12)      PAST MEDICAL & SURGICAL HISTORY:  Lyme disease      DVT (deep venous thrombosis)      Skin cancer      Brain embolism and thrombosis      MRSA (methicillin resistant Staphylococcus aureus)      PNA (pneumonia)      COPD (chronic obstructive pulmonary disease)      H/O angioplasty      Status post laparoscopic-assisted sigmoidectomy      H/O shoulder surgery          REVIEW OF SYSTEMS  Negative except per HPI    MEDICATIONS  (STANDING):  apixaban 2.5 milliGRAM(s) Oral every 12 hours  cefTRIAXone   IVPB 2000 milliGRAM(s) IV Intermittent every 24 hours  influenza  Vaccine (HIGH DOSE) 0.7 milliLiter(s) IntraMuscular once  insulin lispro (ADMELOG) corrective regimen sliding scale   SubCutaneous Before meals and at bedtime    MEDICATIONS  (PRN):  acetaminophen     Tablet .. 650 milliGRAM(s) Oral every 6 hours PRN Mild Pain (1 - 3)  albuterol/ipratropium for Nebulization 3 milliLiter(s) Nebulizer every 6 hours PRN Shortness of Breath and/or Wheezing      Allergies    Altabax (Unknown)  Augmentin (Unknown)  clindamycin (Unknown)  Vaseline (Swelling)    Intolerances        SOCIAL HISTORY:    FAMILY HISTORY:  FH: heart failure  mother        Vital Signs Last 24 Hrs  T(C): 37.1 (07 Sep 2022 10:02), Max: 38.6 (06 Sep 2022 21:17)  T(F): 98.8 (07 Sep 2022 10:02), Max: 101.4 (06 Sep 2022 21:17)  HR: 100 (07 Sep 2022 10:47) (94 - 109)  BP: 98/46 (07 Sep 2022 10:47) (92/42 - 148/49)  BP(mean): 66 (07 Sep 2022 10:47) (61 - 76)  RR: 17 (07 Sep 2022 10:47) (17 - 23)  SpO2: 95% (07 Sep 2022 10:47) (92% - 97%)    Parameters below as of 07 Sep 2022 10:47  Patient On (Oxygen Delivery Method): nasal cannula  O2 Flow (L/min): 3      PHYSICAL EXAM:  General: Pt. lethargic but AAOx3  HEENT: atraumatic, normocephalic  Pulmonary: no respiratory distress; No wheeze + prolonged expiration  Cardiovascular: Regular rate and rhythm; no murmurs, rubs or gallops. Normal S1S2  Gastrointestinal: Soft, tender to deep palpation primarily in lower quandrants b/l, nondistended; bowel sounds present + obese habitus  Musculoskeletal: edematous but dry skin in b/l lower extremities with chronic venous stasis changes extending to the knee. Mild erythema around shins. No open wounds or drainage.   Vascular: Palpable DP pulses bilaterally. Triphasic PT signals bilaterally    Neurology: Pt. alert and oriented, fluent speech, able to move all extremities  Skin: + crusting lesions on b/l lower legs as noted above      LABS:                        6.7    16.58 )-----------( 156      ( 07 Sep 2022 07:13 )             23.1         139  |  104  |  51<H>  ----------------------------<  116<H>  4.4   |  28  |  2.22<H>    Ca    8.4      07 Sep 2022 05:57  Phos  3.8     -  Mg     1.8     -    TPro  6.4  /  Alb  2.7<L>  /  TBili  0.2  /  DBili  x   /  AST  31  /  ALT  19  /  AlkPhos  118  09-07    PT/INR - ( 06 Sep 2022 10:25 )   PT: 15.3 sec;   INR: 1.30          PTT - ( 06 Sep 2022 10:25 )  PTT:29.3 sec  Urinalysis Basic - ( 06 Sep 2022 10:25 )    Color: Yellow / Appearance: Clear / S.025 / pH: x  Gluc: x / Ketone: NEGATIVE  / Bili: NEGATIVE / Urobili: 0.2 E.U./dL   Blood: x / Protein: >=300 mg/dL / Nitrite: NEGATIVE   Leuk Esterase: NEGATIVE / RBC: 5-10 /HPF / WBC < 5 /HPF   Sq Epi: x / Non Sq Epi: Many /HPF / Bacteria: Present /HPF        RADIOLOGY & ADDITIONAL STUDIES    FINDINGS: CT of the bilateral lower extremities was performed in the axial plane from above the level of the knee joints to below level of the ankle joints including the feet. Reconstructions were performed in the sagittal and coronal planes. There is no prior study available for comparison.    Evaluation of the right lower extremity demonstrates degenerative change of the patellofemoral joint. There is degenerative change of the medial and lateral compartments of the knee joint. There is severe edematous infiltration of the subcutaneous tissues surrounding the catheter extending to the foot without drainable fluid collection. No focal soft tissue ulceration. No focal osteopenia or cortical destruction to suggest osteomyelitis. There is planar venous stasis calcification.    Evaluation of the left lower extremity demonstrates degenerative change of the patellofemoral joint. There is degenerative change of the medial and lateral compartments of the knee joint. There is severe edematous infiltration of the subcutaneous tissues surrounding the catheter extending to the foot without drainable fluid collection. No focal soft tissue ulceration. No focal osteopenia or cortical destruction to suggest osteomyelitis. There is planar venous stasis calcification.            IMPRESSION:    Severe bilateral lower extremity cellulitis without drainable fluid collection.    No CT evidence of osteomyelitis.

## 2022-09-07 NOTE — PROGRESS NOTE ADULT - PROBLEM SELECTOR PLAN 4
Pt on atorvastatin 40mg daily.  - c/w lipitor 40 mg QHS Pt with history of hypertension on home diltiazem 180mg daily.  - holding diltiazem in the setting of sepsis  - restart as appropriate

## 2022-09-07 NOTE — AIRWAY PLACEMENT NOTE ADULT - AIRWAY COMMENTS:
1st attempt by pulmonary fellow with Alfonso 3; 2nd attempt with Alfonso 3 by CRNA -g1v, but patient coughed and view was lost. Team stated ROSC was established. Patient saturation 99% via ambu bag @ 15L O2. Team requested intubation stating that patient won't be able to protect airway. Medications given to facilitate intubation - primary team stated that patient had no renal impairments. 3rd attempt - MacGrath 3 g1v ETT8.0 23 @ lip. Atraumatic, bilateral breath sounds.

## 2022-09-07 NOTE — PROGRESS NOTE ADULT - PROBLEM SELECTOR PLAN 1
Patient presented with 4/4 SIRS criteria (T 105.2, , RR 22, WBC 25.88) with lactate 5.1 ->1.3 and source likely cellulitis. CT LE s/f cellulitis with no fluid collection or OM. CTa/p did not reveal infectious etiology. S/p Vancomycin 1g and cefepime 1g in ED, received one more dose of cefepime 1g IV this AM. BLE edema, erythema, scaly skin c/w cellulitis.  - d/c vancomycin and cefepime  - per ID, switched to CTX 2g IV QD  - c/w tylenol 650 mg PO q6h PRN for pain/fever  - f/u further ID recs  - vascular consulted, f/u recs  - blood cultures growing gram positive cocci in pairs --> f/u identification  - surveillance blood cultures pending Patient presented with 4/4 SIRS criteria (T 105.2, , RR 22, WBC 25.88) with lactate 5.1 ->1.3 and source likely cellulitis. CT LE s/f cellulitis with no fluid collection or OM. CTa/p did not reveal infectious etiology. S/p Vancomycin 1g and cefepime 1g in ED, received one more dose of cefepime 1g IV this AM. BLE edema, erythema, scaly skin c/w cellulitis.  - d/c vancomycin and cefepime  - per ID, switched to CTX 2g IV QD  - c/w tylenol 650 mg PO q6h PRN for pain/fever  - f/u further ID recs  - pt w/ history of PVD with multiple LE stents, vascular consulted, f/u recs  - blood cultures growing gram positive cocci in pairs --> f/u identification & sensitivity  - surveillance blood cultures pending  - PT consulted, will evaluate today

## 2022-09-07 NOTE — PROGRESS NOTE ADULT - PROBLEM SELECTOR PLAN 6
Pt on home bumex 2mg. Most recent TTE in Feb 2020 showing EF 55-60%.  - holding bumex in the setting of sepsis  - TTE pending

## 2022-09-07 NOTE — PROGRESS NOTE ADULT - PROBLEM SELECTOR PLAN 2
BLE edema, erythema, scaly skin c/w cellulitis. CT LE s/f cellulitis with no fluid collection or OM.  - plan as above

## 2022-09-07 NOTE — PROGRESS NOTE ADULT - PROBLEM SELECTOR PLAN 3
Pt with COPD on home 3L. Patient initially presented with NRB, but now on NC.  - breathing comfortably on 3L NC, same as home O2. no increased SOB compared to baseline  - c/w duonebs q6h PRN

## 2022-09-07 NOTE — PROGRESS NOTE ADULT - PROBLEM SELECTOR PLAN 4
Pt with history of hypertension on home diltiazem 180mg daily.  - holding diltiazem in the setting of sepsis  - restart as appropriate

## 2022-09-07 NOTE — CONSULT NOTE ADULT - ASSESSMENT
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 Patient was found to be severely septic i/s/o GBS bacteremia w/ b/l lower cellulitis noted on exam. HD stable and w/ improving WBC. TTE done i/s/o GBS bacteremia which was unremarkable. Currently patient is on CTX 2g IV QD which should be continued. Will likely need midline once cultures are negative for outpatient IV abx regimen.     Plan:   - c/w CTX 1g QD   - Follow up sensitivities from blood cultures 09/06   - FU surveillance cultures 09/07     Case discussed with Dr. Ordonez and primary team. Team 1 will continue to follow.     Note is finalized with attending attestation completion.    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 Patient was found to be severely septic i/s/o GBS bacteremia w/ b/l lower cellulitis noted on exam. HD stable and w/ improving WBC. TTE done i/s/o GBS bacteremia which was unremarkable. Currently patient is on CTX 2g IV QD which should be continued. Will likely need midline once cultures are negative for outpatient IV abx regimen.     Plan:   - c/w CTX 1g QD   - Follow up sensitivities from blood cultures 09/06   - FU surveillance cultures 09/07     Case discussed with Dr. Ordonez and primary team. Team 1 will continue to follow.     Note is finalized with attending attestation completion.     ATTENDING NOTE:  Chart reviewed, discussed with Resident.  I was on my way to see her when she arrested, exam deferred during code and subsequent transfer to MICU/stabilization.  Will complete consult tomorrow, recommendations as above.

## 2022-09-08 NOTE — PROGRESS NOTE ADULT - SUBJECTIVE AND OBJECTIVE BOX
GABBY LAYTON, 81y, Female  MRN-[id number]  Patient is a 81y old  Female who presents with a chief complaint of SOB     (08 Sep 2022 13:49)      OVERNIGHT EVENTS:     SUBJECTIVE:    12 Point ROS Negative unless noted otherwise above.  -------------------------------------------------------------------------------  VITAL SIGNS:  Vital Signs Last 24 Hrs  T(C): 36.9 (08 Sep 2022 09:53), Max: 37.9 (08 Sep 2022 06:02)  T(F): 98.4 (08 Sep 2022 09:53), Max: 100.3 (08 Sep 2022 06:02)  HR: 68 (08 Sep 2022 14:00) (68 - 120)  BP: 106/53 (08 Sep 2022 14:00) (76/45 - 152/67)  BP(mean): 76 (08 Sep 2022 14:00) (56 - 99)  RR: 21 (08 Sep 2022 14:00) (19 - 35)  SpO2: 100% (08 Sep 2022 14:00) (83% - 100%)    Parameters below as of 08 Sep 2022 14:00  Patient On (Oxygen Delivery Method): ventilator    O2 Concentration (%): 50  I&O's Summary    07 Sep 2022 07:01  -  08 Sep 2022 07:00  --------------------------------------------------------  IN: 885.6 mL / OUT: 336 mL / NET: 549.6 mL    08 Sep 2022 07:01  -  08 Sep 2022 14:13  --------------------------------------------------------  IN: 308.6 mL / OUT: 460 mL / NET: -151.4 mL        PHYSICAL EXAM:    General: NAD, well developed  HEENT: NC/AT; EOMI, PERRLA, anicteric sclera; moist mucosal membranes.  Neck: supple, trachea midline  Cardiovascular: RRR, +S1/S2; NO M/R/G  Respiratory: CTA B/L; no W/R/R  Gastrointestinal: soft, NT/ND; +BSx4  Extremities: WWP; no edema or cyanosis  Vascular: 2+ radial, DP/PT pulses B/L  Neurological: AAOx3; no focal deficits    ALLERGIES:  Allergies    Altabax (Unknown)  Augmentin (Unknown)  clindamycin (Unknown)  Vaseline (Swelling)    Intolerances        MEDICATIONS:  MEDICATIONS  (STANDING):  cefTRIAXone   IVPB 2000 milliGRAM(s) IV Intermittent every 24 hours  chlorhexidine 0.12% Liquid 15 milliLiter(s) Oral Mucosa every 12 hours  chlorhexidine 2% Cloths 1 Application(s) Topical <User Schedule>  dextrose 5%. 1000 milliLiter(s) (50 mL/Hr) IV Continuous <Continuous>  dextrose 5%. 1000 milliLiter(s) (100 mL/Hr) IV Continuous <Continuous>  dextrose 50% Injectable 25 Gram(s) IV Push once  dextrose 50% Injectable 12.5 Gram(s) IV Push once  dextrose 50% Injectable 25 Gram(s) IV Push once  glucagon  Injectable 1 milliGRAM(s) IntraMuscular once  heparin   Injectable 5000 Unit(s) SubCutaneous every 8 hours  influenza  Vaccine (HIGH DOSE) 0.7 milliLiter(s) IntraMuscular once  insulin lispro (ADMELOG) corrective regimen sliding scale   SubCutaneous every 6 hours  norepinephrine Infusion 0.05 MICROgram(s)/kG/Min (9.59 mL/Hr) IV Continuous <Continuous>  pantoprazole  Injectable 40 milliGRAM(s) IV Push every 24 hours  propofol Infusion 10.003 MICROgram(s)/kG/Min (6.14 mL/Hr) IV Continuous <Continuous>    MEDICATIONS  (PRN):  dextrose Oral Gel 15 Gram(s) Oral once PRN Blood Glucose LESS THAN 70 milliGRAM(s)/deciliter      -------------------------------------------------------------------------------  LABS:                        7.5    10.55 )-----------( 148      ( 08 Sep 2022 05:41 )             25.0     09-08    136  |  101  |  62<H>  ----------------------------<  143<H>  4.4   |  24  |  2.62<H>    Ca    8.6      08 Sep 2022 05:41  Phos  4.0     09-08  Mg     2.4     09-08    TPro  6.7  /  Alb  2.6<L>  /  TBili  0.4  /  DBili  x   /  AST  145<H>  /  ALT  118<H>  /  AlkPhos  212<H>  09-08    LIVER FUNCTIONS - ( 08 Sep 2022 05:41 )  Alb: 2.6 g/dL / Pro: 6.7 g/dL / ALK PHOS: 212 U/L / ALT: 118 U/L / AST: 145 U/L / GGT: x           PT/INR - ( 07 Sep 2022 20:08 )   PT: 13.1 sec;   INR: 1.10          PTT - ( 08 Sep 2022 05:41 )  PTT:73.2 sec    CAPILLARY BLOOD GLUCOSE      POCT Blood Glucose.: 128 mg/dL (08 Sep 2022 11:53)      Culture - Blood (collected 07 Sep 2022 20:51)  Source: .Blood Blood-Peripheral  Preliminary Report (08 Sep 2022 10:00):    No growth at 12 hours    Culture - Blood (collected 07 Sep 2022 20:51)  Source: .Blood Blood-Peripheral  Preliminary Report (08 Sep 2022 10:00):    No growth at 12 hours    Culture - Blood (collected 07 Sep 2022 14:28)  Source: .Blood Blood  Preliminary Report (08 Sep 2022 03:00):    No growth at 12 hours    Culture - Urine (collected 06 Sep 2022 10:25)  Source: Clean Catch Clean Catch (Midstream)  Final Report (07 Sep 2022 15:31):    <10,000 CFU/mL Normal Urogenital Jana    Culture - Blood (collected 06 Sep 2022 10:25)  Source: .Blood Blood-Venous  Preliminary Report (07 Sep 2022 19:01):    No growth to date.    Culture - Blood (collected 06 Sep 2022 10:25)  Source: .Blood Blood-Peripheral  Gram Stain (07 Sep 2022 05:48):    Growth in aerobic and anaerobic bottles: Gram positive cocci in pairs  Preliminary Report (08 Sep 2022 10:19):    Growth in aerobic and anaerobic bottles: Streptococcus agalactiae (Group    B)    ***Blood Panel PCR results on this specimen are available    approximately 3 hours after the Gram stain result.***    Gram stain, PCR, and/or culture results may not always    correspond due to difference in methodologies.    ************************************************************    This PCR assay was performed by multiplex PCR. This    Assay tests for 66 bacterial and resistance gene targets.    Please refer to the [facility] test directory    at [email or web address] for details.  Organism: Blood Culture PCR (07 Sep 2022 06:44)  Organism: Blood Culture PCR (07 Sep 2022 06:44)      SARS-CoV-2: NotDetec (06 Sep 2022 10:32)  COVID-19 PCR: NotDetec (06 Sep 2022 10:25)  COVID-19 PCR: NotDetec (02 Jul 2022 13:33)      RADIOLOGY & ADDITIONAL TESTS: Reviewed.       CECE GABBY ROONEY, 81y, Female  [id number]  Patient is a 81y old  Female who presents with a chief complaint of SOB     (08 Sep 2022 13:49)      OVERNIGHT EVENTS: n/a    SUBJECTIVE: Pt intubated and sedated, minimal responsiveness. ROS unattainable    12 Point ROS Negative unless noted otherwise above.  -------------------------------------------------------------------------------  VITAL SIGNS:  Vital Signs Last 24 Hrs  T(C): 36.9 (08 Sep 2022 09:53), Max: 37.9 (08 Sep 2022 06:02)  T(F): 98.4 (08 Sep 2022 09:53), Max: 100.3 (08 Sep 2022 06:02)  HR: 68 (08 Sep 2022 14:00) (68 - 120)  BP: 106/53 (08 Sep 2022 14:00) (76/45 - 152/67)  BP(mean): 76 (08 Sep 2022 14:00) (56 - 99)  RR: 21 (08 Sep 2022 14:00) (19 - 35)  SpO2: 100% (08 Sep 2022 14:00) (83% - 100%)    Parameters below as of 08 Sep 2022 14:00  Patient On (Oxygen Delivery Method): ventilator    O2 Concentration (%): 50  I&O's Summary    07 Sep 2022 07:01  -  08 Sep 2022 07:00  --------------------------------------------------------  IN: 885.6 mL / OUT: 336 mL / NET: 549.6 mL    08 Sep 2022 07:01  -  08 Sep 2022 14:13  --------------------------------------------------------  IN: 308.6 mL / OUT: 460 mL / NET: -151.4 mL        PHYSICAL EXAM:    General: intubated, lying in bed with minimal responsiveness Responds to sternal rub  HEENT: NC/AT; EOMI, constricted pupils with minimal response to light   Neck: supple, trachea midline  Cardiovascular: distan heart sounds, no murmers appreciated  Respiratory: clear breath sounds on right, wheezing on left  Gastrointestinal: soft, NT, obese  Extremities: wrapped LLE and RLE w/ cellulitis and skin changes BL  Neurological: AAOx0, sedated    ALLERGIES:  Allergies    Altabax (Unknown)  Augmentin (Unknown)  clindamycin (Unknown)  Vaseline (Swelling)    Intolerances        MEDICATIONS:  MEDICATIONS  (STANDING):  cefTRIAXone   IVPB 2000 milliGRAM(s) IV Intermittent every 24 hours  chlorhexidine 0.12% Liquid 15 milliLiter(s) Oral Mucosa every 12 hours  chlorhexidine 2% Cloths 1 Application(s) Topical <User Schedule>  dextrose 5%. 1000 milliLiter(s) (50 mL/Hr) IV Continuous <Continuous>  dextrose 5%. 1000 milliLiter(s) (100 mL/Hr) IV Continuous <Continuous>  dextrose 50% Injectable 25 Gram(s) IV Push once  dextrose 50% Injectable 12.5 Gram(s) IV Push once  dextrose 50% Injectable 25 Gram(s) IV Push once  glucagon  Injectable 1 milliGRAM(s) IntraMuscular once  heparin   Injectable 5000 Unit(s) SubCutaneous every 8 hours  influenza  Vaccine (HIGH DOSE) 0.7 milliLiter(s) IntraMuscular once  insulin lispro (ADMELOG) corrective regimen sliding scale   SubCutaneous every 6 hours  norepinephrine Infusion 0.05 MICROgram(s)/kG/Min (9.59 mL/Hr) IV Continuous <Continuous>  pantoprazole  Injectable 40 milliGRAM(s) IV Push every 24 hours  propofol Infusion 10.003 MICROgram(s)/kG/Min (6.14 mL/Hr) IV Continuous <Continuous>    MEDICATIONS  (PRN):  dextrose Oral Gel 15 Gram(s) Oral once PRN Blood Glucose LESS THAN 70 milliGRAM(s)/deciliter      -------------------------------------------------------------------------------  LABS:                        7.5    10.55 )-----------( 148      ( 08 Sep 2022 05:41 )             25.0     09-08    136  |  101  |  62<H>  ----------------------------<  143<H>  4.4   |  24  |  2.62<H>    Ca    8.6      08 Sep 2022 05:41  Phos  4.0     09-08  Mg     2.4     09-08    TPro  6.7  /  Alb  2.6<L>  /  TBili  0.4  /  DBili  x   /  AST  145<H>  /  ALT  118<H>  /  AlkPhos  212<H>  09-08    LIVER FUNCTIONS - ( 08 Sep 2022 05:41 )  Alb: 2.6 g/dL / Pro: 6.7 g/dL / ALK PHOS: 212 U/L / ALT: 118 U/L / AST: 145 U/L / GGT: x           PT/INR - ( 07 Sep 2022 20:08 )   PT: 13.1 sec;   INR: 1.10          PTT - ( 08 Sep 2022 05:41 )  PTT:73.2 sec    CAPILLARY BLOOD GLUCOSE      POCT Blood Glucose.: 128 mg/dL (08 Sep 2022 11:53)      Culture - Blood (collected 07 Sep 2022 20:51)  Source: .Blood Blood-Peripheral  Preliminary Report (08 Sep 2022 10:00):    No growth at 12 hours    Culture - Blood (collected 07 Sep 2022 20:51)  Source: .Blood Blood-Peripheral  Preliminary Report (08 Sep 2022 10:00):    No growth at 12 hours    Culture - Blood (collected 07 Sep 2022 14:28)  Source: .Blood Blood  Preliminary Report (08 Sep 2022 03:00):    No growth at 12 hours    Culture - Urine (collected 06 Sep 2022 10:25)  Source: Clean Catch Clean Catch (Midstream)  Final Report (07 Sep 2022 15:31):    <10,000 CFU/mL Normal Urogenital Jana    Culture - Blood (collected 06 Sep 2022 10:25)  Source: .Blood Blood-Venous  Preliminary Report (07 Sep 2022 19:01):    No growth to date.    Culture - Blood (collected 06 Sep 2022 10:25)  Source: .Blood Blood-Peripheral  Gram Stain (07 Sep 2022 05:48):    Growth in aerobic and anaerobic bottles: Gram positive cocci in pairs  Preliminary Report (08 Sep 2022 10:19):    Growth in aerobic and anaerobic bottles: Streptococcus agalactiae (Group    B)    ***Blood Panel PCR results on this specimen are available    approximately 3 hours after the Gram stain result.***    Gram stain, PCR, and/or culture results may not always    correspond due to difference in methodologies.    ************************************************************    This PCR assay was performed by multiplex PCR. This    Assay tests for 66 bacterial and resistance gene targets.    Please refer to the [facility] test directory    at [email or web address] for details.  Organism: Blood Culture PCR (07 Sep 2022 06:44)  Organism: Blood Culture PCR (07 Sep 2022 06:44)      SARS-CoV-2: NotDetec (06 Sep 2022 10:32)  COVID-19 PCR: NotDetec (06 Sep 2022 10:25)  COVID-19 PCR: NotDetec (02 Jul 2022 13:33)      RADIOLOGY & ADDITIONAL TESTS: Reviewed.

## 2022-09-08 NOTE — DIETITIAN INITIAL EVALUATION ADULT - NUTRITIONGOAL OUTCOME1
Initiate nutrition and pt will meet 75% or more of protein/energy needs via most appropriate route for nutrition

## 2022-09-08 NOTE — DIETITIAN INITIAL EVALUATION ADULT - NSFNSPHYEXAMSKINFT_GEN_A_CORE
Pressure Injury 1: none, Stage II  Pressure Injury 2: none, none  Pressure Injury 3: none, none  Pressure Injury 4: none, none  Pressure Injury 5: none, none  Pressure Injury 6: none, none  Pressure Injury 7: none, none  Pressure Injury 8: none, none  Pressure Injury 9: none, none  Pressure Injury 10: none, none  Pressure Injury 11: none, none, Pressure Injury 1: sacrum, Suspected deep tissue injury  Pressure Injury 2: none, none  Pressure Injury 3: none, none  Pressure Injury 4: none, none  Pressure Injury 5: none, none  Pressure Injury 6: none, none  Pressure Injury 7: none, none  Pressure Injury 8: none, none  Pressure Injury 9: none, none  Pressure Injury 10: none, none  Pressure Injury 11: none, none

## 2022-09-08 NOTE — DIETITIAN INITIAL EVALUATION ADULT - NSFNSGIIOFT_GEN_A_CORE
09-08-22 @ 07:01  -  09-08-22 @ 13:49  --------------------------------------------------------  OUT:    Chest Tube (mL): 10 mL    Nasogastric/Oral tube (mL): 200 mL  Total OUT: 210 mL    Total NET: -210 mL

## 2022-09-08 NOTE — DIETITIAN INITIAL EVALUATION ADULT - ADD RECOMMEND
-Continue NPO per MD   -Recommend initiate EN support by 9/11   *With current propofol rate, recommend Jevity 1.5 @35 ml/hr to provide 840 ml TF, (84%RDI), 1260 kcal (1910 kcal with propofol), 54 gProt., and 638 ml FW   *Once propofol weaned off, recommend Jevity 1.5 @50 ml/hr with LPS x1/day to provide 1200 ml TF (120%RDI), 1900 kcal, 92 gProt., and 912 ml FW. This is 29.9 kcal and 1.45 gProt. per kg IBW 63.6 kg.  -Monitor chemistry, GI fxn, and skin integrity

## 2022-09-08 NOTE — DIETITIAN INITIAL EVALUATION ADULT - PERTINENT MEDS FT
MEDICATIONS  (STANDING):  cefTRIAXone   IVPB 2000 milliGRAM(s) IV Intermittent every 24 hours  chlorhexidine 0.12% Liquid 15 milliLiter(s) Oral Mucosa every 12 hours  chlorhexidine 2% Cloths 1 Application(s) Topical <User Schedule>  dextrose 5%. 1000 milliLiter(s) (50 mL/Hr) IV Continuous <Continuous>  dextrose 5%. 1000 milliLiter(s) (100 mL/Hr) IV Continuous <Continuous>  dextrose 50% Injectable 25 Gram(s) IV Push once  dextrose 50% Injectable 12.5 Gram(s) IV Push once  dextrose 50% Injectable 25 Gram(s) IV Push once  glucagon  Injectable 1 milliGRAM(s) IntraMuscular once  heparin   Injectable 5000 Unit(s) SubCutaneous every 8 hours  influenza  Vaccine (HIGH DOSE) 0.7 milliLiter(s) IntraMuscular once  insulin lispro (ADMELOG) corrective regimen sliding scale   SubCutaneous every 6 hours  norepinephrine Infusion 0.05 MICROgram(s)/kG/Min (9.59 mL/Hr) IV Continuous <Continuous>  pantoprazole  Injectable 40 milliGRAM(s) IV Push every 24 hours  propofol Infusion 10.003 MICROgram(s)/kG/Min (6.14 mL/Hr) IV Continuous <Continuous>    MEDICATIONS  (PRN):  dextrose Oral Gel 15 Gram(s) Oral once PRN Blood Glucose LESS THAN 70 milliGRAM(s)/deciliter

## 2022-09-08 NOTE — CHART NOTE - NSCHARTNOTEFT_GEN_A_CORE
Spoke with patient emergency contact Lisa Copeland (Aunt-Bryn and her father were best friends for 50+ friends)  HCP- Spoke with patient emergency contact and HCP Lisa Copeland (Bryn and her father were best friends for 50+ friends). Bryn does not have any living relatives. Discussed the change in her aunts status requiring ICU level of care including cardiac arrest and pneumothorax requiring emergent chest tube placement which she understood. Per Lisa, The patient has a detailed living will expressing her wishes to be full code. Lisa is recovering from stage 4 colon cancer. She does not think she will be by the hospital to see Bryn due to her immune compromised states.

## 2022-09-08 NOTE — PROGRESS NOTE ADULT - ASSESSMENT
80yo morbidly obese female w/ pmhx of HTN, HLD, HFpEF (EF 56%), COPD (on home O2-3L), chronic LE edema with neuropathic pain and cellulitis, DALIA, PVD p/w respiratory distress x 1 day found to have cellulitis on lower extremity.    NEURO  #Intubated  s/p cardiac arrest w/ intubation 2/2 hypoxia due to aspiration vs PE vs flash pulmonary edema. Pt relatively hypotensive on 09/07 with episodes of hypertension to 130/140 systolic. Pt coded on floor on 09/07, found to have no pulse with blue complexion by nurse during meal. Intubation on PM 09/07. ABG done showing mild hypercapnea - 25. EKG done normal. Chest x-ray to r/o aspiration showed diffuse patchy airspace opacities throughout the lungs bilaterally, suggestive of pulmonary alveolar edema. Currently on propofol 10mcg/kg/min  Plan:  - c/w repository ventilation and propofol  - CT head-r/o stroke as pt was complaining of head pain      PULM  #Pneumothorax  Pt originally presented to MICU with signs of decreased carolann sliding on bedside ultrasound but no pneumothorax on x-ray. Pt developed left sided pneumothorax on AM of 09/08 with drop in sat from high 90s to 70s but no BP changes. Chest tube placed with improvement in sats to high 90s again. Ventilatory settings adjusted to minimize risk of barotrauma to right lung.  Vent settings: FiO2 60%, RR 12, PEEP, 5, Vt 360      #Chronic obstructive pulmonary disease (COPD).   Pt with COPD on home 3L. Patient initially presented with NRB, now intubated s/p cardia arrest.   Plan:  - c/w duonebs q6h PRN.    #Pulm HTN  possibly in the setting of new clots. Evidence of new pulm HTN on echo w/ pap 48 compared to TTE from 2022. Unclear history of previous clots, last LE doppler done on 09/2020 negative. Started on Heparin infusion 1800u/hr for full anticoagulation  Plan:  - c/w full anticoagulation  - f/u CT angio PE protocol        CARDIO  #Cardiac Arrest  possibly 2/2 hypoxia in setting of aspiration vs PE vs flash pulmonary edema. Pt relatively hypotensive throughout the day with episodes of hypertension to 130/140 systolic. recieved fluids last night and a unit of blood on 09/07 for anemia. In PM on 09/07, complained of difficulty breathing during meal, found unresponsive, hypotensive, w/ blue complexion by nurse. CPR initiated with 1 dose of Epi given, s/p intubation. Currently on propofol with goal of transition to precedex for possible extubation in AM if tolerated. Levo drip 8mcg/kg/min. Full anticoagulation  Plan:  - c/w levo, low BP threshold for pressors  - c/w heparin infusion  - f/u EKG  - f/u cardiac labs    #Chronic heart failure with preserved ejection fraction (HFpEF).   Pt on home bumex 2mg. Most recent TTE in Feb 2020 showing EF 55-60%. TTE from current visit showing EF of 56% with Pulmonary HTN - PAP 48. Holding bumex in the setting of sepsis  Plan:  - GDMT therapy as tolerated    #Hypertension.   Pt with history of hypertension on home diltiazem 180mg daily. Pt relatively hypotensive throughout the day, currently hypotensive s/p cardiac arrest and intubation. BP low threshold for pressors  Plan:  - monitor BP  - holding home antihypertensive meds    GI  #HLD (hyperlipidemia).   Pt on atorvastatin 40mg daily.  Plan:  - c/w lipitor 40 mg QHS.      RENAL  #BRANT on CKD  Baseline Cr 2.04 in July 2022, currently 2.48 up from 2.18 on entry. Possibly 2/2 to hypoperfusion/ischemia in setting of sepsis and cardiac arrest  Plan:  - continue to trend BUN, Cr  - renal dosing  - avoid nephrotoxic agents  - strict I&Os  - monitor UOP.    Right kidney mass.   CTAP s/f slightly hyperdense solid cortical mass in upper pole of R kidney.  Plan:  - consider additional renal imaging.    ENDO  No active issues      HEME  #Anemia  Pt noted to have chronic anemia, presented with HgB of 6.8 w/ drop to 6.7 during course of stay. s/p 1 unit RBC on 09/07, pt remains stable w/ HgB of 8 most recently.  Plan:  - f/u H+H  - active type and screen   - f/u iron panel      ID  #Severe sepsis.   possibly 2/2 to LE Cellulitis. 4/4 SIRS criteria (T 105.2, , RR 22, WBC 25.88) with lactate 5.1 ->1.3. CT LE s/f cellulitis with no fluid collection or OM. CTa/p did not reveal infectious etiology. S/p Vancomycin 1g and cefepime 1g in ED, received one more dose of cefepime 1g IV AM 09/07. BLE edema, erythema, scaly skin c/w cellulitis.  - d/c vancomycin and cefepime, switched to CTX 2g IV QD per ID  - c/w tylenol 650 mg PO q6h PRN for pain/fever  - f/u further ID recs  - blood cultures growing gram positive cocci in pairs --> f/u identification & sensitivity  - surveillance blood cultures pending        E: K>4, Phos>3, Mg>2  N: NPO intubated   DVT ppx: full anticoag - Heparin  GI ppx: Protonix 40 BID  Access: 2 peripheral lines    Full Code         80yo morbidly obese female w/ pmhx of HTN, HLD, HFpEF (EF 56%), COPD (on home O2-3L), chronic LE edema with neuropathic pain and cellulitis, DALIA, PVD p/w respiratory distress x 1 day found to have cellulitis on lower extremity.    NEURO  #Intubated  s/p cardiac arrest w/ intubation 2/2 hypoxia due to aspiration vs PE vs flash pulmonary edema. Pt relatively hypotensive on 09/07 with episodes of hypertension to 130/140 systolic. Pt coded on floor on 09/07, found to have no pulse with blue complexion by nurse during meal. Intubation on PM 09/07. ABG done showing mild hypercapnea - 25. EKG done normal. Chest x-ray to r/o aspiration showed diffuse patchy airspace opacities throughout the lungs bilaterally, suggestive of pulmonary alveolar edema. Currently on propofol 10mcg/kg/min  Plan:  - c/w repository ventilation and propofol  - CT head-r/o stroke as pt was complaining of head pain      PULM  #Pneumothorax  Pt originally presented to MICU with signs of decreased carolann sliding on bedside ultrasound but no pneumothorax on x-ray. Pt developed left sided pneumothorax on AM of 09/08 with drop in sat from high 90s to 70s but no BP changes. Chest tube placed with improvement in sats to high 90s again. Ventilatory settings adjusted to minimize risk of barotrauma to right lung.  Vent settings: FiO2 60%, RR 12, PEEP, 5, Vt 360      #Chronic obstructive pulmonary disease (COPD).   Pt with COPD on home 3L. Patient initially presented with NRB, now intubated s/p cardia arrest.       #Pulm HTN  possibly in the setting of new clots. Evidence of new pulm HTN on echo w/ pap 48 compared to TTE from 2022. Unclear history of previous clots, last LE doppler done on 09/2020 negative. Started on Heparin infusion 1800u/hr for full anticoagulation  Plan:  - c/w full anticoagulation  - f/u CT angio PE protocol        CARDIO  #Cardiac Arrest  possibly 2/2 hypoxia in setting of aspiration vs PE vs flash pulmonary edema. Pt relatively hypotensive throughout the day with episodes of hypertension to 130/140 systolic. recieved fluids last night and a unit of blood on 09/07 for anemia. In PM on 09/07, complained of difficulty breathing during meal, found unresponsive, hypotensive, w/ blue complexion by nurse. CPR initiated with 1 dose of Epi given, s/p intubation. Currently on propofol with goal of transition to precedex for possible extubation in AM if tolerated. Levo drip 8mcg/kg/min. Full anticoagulation  Plan:  - c/w levo, low BP threshold for pressors  - c/w heparin infusion  - f/u EKG  - f/u cardiac labs    #Chronic heart failure with preserved ejection fraction (HFpEF).   Pt on home bumex 2mg. Most recent TTE in Feb 2020 showing EF 55-60%. TTE from current visit showing EF of 56% with Pulmonary HTN - PAP 48. Holding bumex in the setting of sepsis  Plan:  - GDMT therapy as tolerated    #Hypertension.   Pt with history of hypertension on home diltiazem 180mg daily. Pt relatively hypotensive throughout the day, currently hypotensive s/p cardiac arrest and intubation. BP low threshold for pressors  Plan:  - monitor BP  - holding home antihypertensive meds    GI  #HLD (hyperlipidemia).   Pt on atorvastatin 40mg daily.  Plan:  - c/w lipitor 40 mg QHS.      RENAL  #BRANT on CKD  Baseline Cr 2.04 in July 2022, currently 2.48 up from 2.18 on entry. Possibly 2/2 to hypoperfusion/ischemia in setting of sepsis and cardiac arrest  Plan:  - continue to trend BUN, Cr  - renal dosing  - avoid nephrotoxic agents  - strict I&Os  - monitor UOP.    Right kidney mass.   CTAP s/f slightly hyperdense solid cortical mass in upper pole of R kidney.  Plan:  - consider additional renal imaging.    ENDO  No active issues      HEME  #Anemia  Pt noted to have chronic anemia, presented with HgB of 6.8 w/ drop to 6.7 during course of stay. s/p 1 unit RBC on 09/07, pt remains stable w/ HgB of 8 most recently.  Plan:  - f/u H+H  - active type and screen   - f/u iron panel      ID  #Severe sepsis.   possibly 2/2 to LE Cellulitis. 4/4 SIRS criteria (T 105.2, , RR 22, WBC 25.88) with lactate 5.1 ->1.3. CT LE s/f cellulitis with no fluid collection or OM. CTa/p did not reveal infectious etiology. S/p Vancomycin 1g and cefepime 1g in ED, received one more dose of cefepime 1g IV AM 09/07. BLE edema, erythema, scaly skin c/w cellulitis.  - d/c vancomycin and cefepime, switched to CTX 2g IV QD per ID  - c/w tylenol 650 mg PO q6h PRN for pain/fever  - f/u further ID recs  - blood cultures growing gram positive cocci in pairs --> f/u identification & sensitivity  - surveillance blood cultures pending        E: K>4, Phos>3, Mg>2  N: NPO intubated   DVT ppx: full anticoag - Heparin  GI ppx: Protonix 40 BID  Access: 2 peripheral lines    Full Code         80yo morbidly obese female w/ pmhx of HTN, HLD, HFpEF (EF 56%), COPD (on home O2-3L), chronic LE edema with neuropathic pain and cellulitis, DALIA, PVD p/w respiratory distress x 1 day found to have cellulitis on lower extremity.    NEURO  #Intubated  s/p cardiac arrest w/ intubation 2/2 hypoxia due to aspiration vs PE vs flash pulmonary edema. Pt relatively hypotensive on 09/07 with episodes of hypertension to 130/140 systolic. Pt coded on floor on 09/07, found to have no pulse with blue complexion by nurse during meal. Intubation on PM 09/07. ABG done showing mild hypercapnea - 25. EKG done normal. Chest x-ray to r/o aspiration showed diffuse patchy airspace opacities throughout the lungs bilaterally, suggestive of pulmonary alveolar edema. Currently on propofol 10mcg/kg/min  Plan:  - c/w repository ventilation and propofol  - CT head-r/o stroke as pt was complaining of head pain - neg for stroke      PULM  #Pneumothorax  Pt originally presented to MICU with signs of decreased carolann sliding on bedside ultrasound but no pneumothorax on x-ray. Pt developed left sided pneumothorax on AM of 09/08 with drop in sat from high 90s to 70s but no BP changes. Chest tube placed with improvement in sats to high 90s again. Ventilatory settings adjusted to minimize risk of barotrauma to right lung.  Vent settings: FiO2 60%, RR 12, PEEP, 5, Vt 360      #Chronic obstructive pulmonary disease (COPD).   Pt with COPD on home 3L. Patient initially presented with NRB, now intubated s/p cardia arrest.       #Pulm HTN  possibly in the setting of new clots. Evidence of new pulm HTN on echo w/ pap 48 compared to TTE from 2022. Unclear history of previous clots, last LE doppler done on 09/2020 negative. Started on Heparin infusion 1800u/hr for full anticoagulation on 09/07 then transitioned to sub q.  Plan:  - c/w full anticoagulation  - f/u CT angio PE protocol        CARDIO  #Cardiac Arrest  possibly 2/2 hypoxia in setting of aspiration vs PE vs flash pulmonary edema. Pt relatively hypotensive throughout the day with episodes of hypertension to 130/140 systolic. recieved fluids last night and a unit of blood on 09/07 for anemia. In PM on 09/07, complained of difficulty breathing during meal, found unresponsive, hypotensive, w/ blue complexion by nurse. CPR initiated with 1 dose of Epi given, s/p intubation. Currently on propofol with goal of transition to precedex for possible extubation in AM if tolerated. Levo drip 8mcg/kg/min. Switched from full anticoag to Subq Heparin  Plan:  - c/w levo, low BP threshold for pressors  - c/w heparin   - f/u EKG  - f/u cardiac labs    #Chronic heart failure with preserved ejection fraction (HFpEF).   Pt on home bumex 2mg. Most recent TTE in Feb 2020 showing EF 55-60%. TTE from current visit showing EF of 56% with Pulmonary HTN - PAP 48. Holding bumex in the setting of sepsis  Plan:  - GDMT therapy as tolerated    #Hypertension.   Pt with history of hypertension on home diltiazem 180mg daily. Pt relatively hypotensive throughout the day, currently hypotensive s/p cardiac arrest and intubation. BP low threshold for pressors  Plan:  - monitor BP  - holding home antihypertensive meds    GI  #HLD (hyperlipidemia).   Pt on atorvastatin 40mg daily.  Plan:  - c/w lipitor 40 mg QHS.      RENAL  #BRANT on CKD  Baseline Cr 2.04 in July 2022, currently 2.48 up from 2.18 on entry. Possibly 2/2 to hypoperfusion/ischemia in setting of sepsis and cardiac arrest  Plan:  - continue to trend BUN, Cr  - renal dosing  - avoid nephrotoxic agents  - strict I&Os  - monitor UOP.    Right kidney mass.   CTAP s/f slightly hyperdense solid cortical mass in upper pole of R kidney.  Plan:  - consider additional renal imaging.    ENDO  No active issues      HEME  #Anemia  Pt noted to have chronic anemia, presented with HgB of 6.8 w/ drop to 6.7 during course of stay. s/p 1 unit RBC on 09/07, pt remains stable w/ HgB of 8 most recently.  Plan:  - f/u H+H  - active type and screen   - f/u iron panel      ID  #Severe sepsis.   possibly 2/2 to LE Cellulitis. 4/4 SIRS criteria (T 105.2, , RR 22, WBC 25.88) with lactate 5.1 ->1.3. CT LE s/f cellulitis with no fluid collection or OM. CTa/p did not reveal infectious etiology. S/p Vancomycin 1g and cefepime 1g in ED, received one more dose of cefepime 1g IV AM 09/07. BLE edema, erythema, scaly skin c/w cellulitis.  - d/c vancomycin and cefepime, switched to CTX 2g IV QD per ID  - c/w tylenol 650 mg PO q6h PRN for pain/fever  - f/u further ID recs  - blood cultures growing gram positive cocci in pairs --> f/u identification & sensitivity  - surveillance blood cultures pending        E: K>4, Phos>3, Mg>2  N: NPO intubated   DVT ppx: full anticoag - Heparin  GI ppx: Protonix 40 BID  Access: 2 peripheral lines    Full Code         82yo morbidly obese female w/ pmhx of HTN, HLD, HFpEF (EF 56%), COPD (on home O2-3L), chronic LE edema with neuropathic pain and cellulitis, DALIA, PVD p/w respiratory distress x 1 day found to have cellulitis on lower extremity.    NEURO  #Intubated  s/p cardiac arrest w/ intubation 2/2 hypoxia due to aspiration vs PE vs flash pulmonary edema. Pt relatively hypotensive on 09/07 with episodes of hypertension to 130/140 systolic. Pt coded on floor on 09/07, found to have no pulse with blue complexion by nurse during meal. Intubation on PM 09/07. ABG done showing mild hypercapnea - 25. EKG done normal. Chest x-ray to r/o aspiration showed diffuse patchy airspace opacities throughout the lungs bilaterally, suggestive of pulmonary alveolar edema. Bedside echo showing no RV dysfunction, PE less likely. Currently on propofol 10mcg/kg/min  Plan:  - c/w repository ventilation and propofol  - CT head-r/o stroke as pt was complaining of head pain - neg for stroke      PULM  #Pneumothorax  Pt originally presented to MICU with signs of decreased carolann sliding on bedside ultrasound but no pneumothorax on x-ray. Pt developed left sided pneumothorax on AM of 09/08 with drop in sat from high 90s to 70s but no BP changes. Chest tube placed with improvement in sats to high 90s again. Ventilatory settings adjusted to minimize risk of barotrauma to right lung.  Vent settings: FiO2 60%, RR 12, PEEP, 5, Vt 360      #Chronic obstructive pulmonary disease (COPD).   Pt with COPD on home 3L. Patient initially presented with NRB, now intubated s/p cardia arrest.       #Pulm HTN  possibly 2/2 longstanding DALIA. Evidence of new pulm HTN on echo w/ pap 48 compared to TTE from 2022. 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 Started on Heparin infusion 1800u/hr for full anticoagulation on 09/07 then transitioned to sub q.  Plan:  - c/w subq heparin as pt unlikely to have PE          CARDIO  #Cardiac Arrest  possibly 2/2 hypoxia in setting of aspiration vs PE vs flash pulmonary edema. Pt relatively hypotensive throughout the day with episodes of hypertension to 130/140 systolic. recieved fluids on PM 09/07 and a unit of blood on 09/07 for anemia. In PM on 09/07, complained of difficulty breathing during meal, found unresponsive, hypotensive, w/ blue complexion by nurse. CPR initiated with 1 dose of Epi given, s/p intubation. Currently on propofol with goal of transition to precedex for possible extubation in AM if tolerated. Levo drip 8mcg/kg/min. Switched from full anticoag to Subq Heparin  Plan:  - c/w levo, low BP threshold for pressors  - c/w heparin   - f/u EKG  - cardiac labs downtrending    #Chronic heart failure with preserved ejection fraction (HFpEF).   Pt on home bumex 2mg. Most recent TTE in Feb 2020 showing EF 55-60%. TTE from current visit showing EF of 56% with Pulmonary HTN - PAP 48. Holding bumex in the setting of sepsis  Plan:  - GDMT therapy as tolerated    #Hypertension.   Pt with history of hypertension on home diltiazem 180mg daily. Pt relatively hypotensive throughout the day, currently hypotensive s/p cardiac arrest and intubation. BP low threshold for pressors  Plan:  - monitor BP  - holding home antihypertensive meds    GI  #HLD (hyperlipidemia).   Pt on atorvastatin 40mg daily.  Plan:  - c/w lipitor 40 mg QHS.      RENAL  #BRANT on CKD  Baseline Cr 2.04 in July 2022, currently 2.48 up from 2.18 on entry. Possibly 2/2 to hypoperfusion/ischemia in setting of sepsis and cardiac arrest  Plan:  - continue to trend BUN, Cr  - renal dosing  - avoid nephrotoxic agents  - strict I&Os  - monitor UOP.    Right kidney mass.   CTAP s/f slightly hyperdense solid cortical mass in upper pole of R kidney.  Plan:  - consider additional renal imaging.    ENDO  No active issues      HEME  #Anemia  Pt noted to have chronic anemia, presented with HgB of 6.8 w/ drop to 6.7 during course of stay. s/p 1 unit RBC on 09/07, pt remains stable w/ HgB of 7.5-8 most recently.  Plan:  - f/u H+H  - active type and screen   - f/u iron panel      ID  #Severe sepsis.   possibly 2/2 to LE Cellulitis. 4/4 SIRS criteria (T 105.2, , RR 22, WBC 25.88) with lactate 5.1 ->1.3. CT LE s/f cellulitis with no fluid collection or OM. CTa/p did not reveal infectious etiology. S/p Vancomycin 1g and cefepime 1g in ED, received one more dose of cefepime 1g IV AM 09/07. BLE edema, erythema, scaly skin c/w cellulitis.  - d/c vancomycin and cefepime, switched to CTX 2g IV QD per ID  - c/w tylenol 650 mg PO q6h PRN for pain/fever  - f/u further ID recs  - blood cultures growing gram positive cocci in pairs --> f/u identification & sensitivity  - surveillance blood cultures pending        E: K>4, Phos>3, Mg>2  N: NPO intubated   DVT ppx: subq Heparin  GI ppx: Protonix 40 BID  Access: 2 peripheral lines    Full Code

## 2022-09-08 NOTE — PROGRESS NOTE ADULT - ATTENDING COMMENTS
Caitlyn Barahona is a 37year old male.   Patient presents with:  Ear Pain: Urgent care f/u  Dizziness: x1 week (little better since ABT tx)      HISTORY OF PRESENT ILLNESS  He presents with a history of dizziness and ear pain that started about 4 days ago heartbeat/palpitations and syncope. GI Negative Abdominal pain and diarrhea. Endocrine Negative Cold intolerance and heat intolerance. Neuro Negative Tremors. Psych Negative Anxiety and depression.    Integumentary Negative Frequent skin infections, Suspension, 1 spray by Nasal route 2 (two) times daily. , Disp: 1 Bottle, Rfl: 3  •  amoxicillin 500 MG Oral Tab, Take 1 tablet (500 mg total) by mouth 3 (three) times daily. , Disp: 30 tablet, Rfl: 0  ASSESSMENT AND PLAN    1.  Dizziness    - Audiology Refer 80 yo obese F presented initially to [facility] for respiratory distress and severe sepsis 2/2 cellulitis from chronic LE edema, hx of COPD on home O2, HFpEF, HTN, HLD, PH (likely group 2/3), who had improvement in sepsis but developed acute SOB and headache while eating complicated by respiratory arrest and code blue. Unclear etiology for arrest - EKG without ischemic changes, bedside POCUS with unchanged EF, mild RV enlargement without Krishnamurthy's sign or RV dysfunction to suggest PE, negative DVT study, no food particles noted by anesthesia during intubation to suggest aspiration though suspect at this time either aspiration event or flash pulmonary edema. Sepsis improving - afebrile on current antibiotics. Minimal pressor requirements and I/O negative 900 cc overnight. This morning complicated by acute worsening of hypoxia, noted to have PTX on AM CXR and no lung sliding on POCUS, significant air leak on ventilator. Stat chest tube placed with resolution of PTX. CT Chest to evaluate for etiology, suspect rib fx 2/2 CPR. R/o flail chest although not apparent on exam. CT head given pt had severe HA prior to arrest, r/o CVA. Pending results of CT will plan for SAT/SBT. BRANT on CKD likely related to cardiac arrest, possible ATN, monitor I/O. Appears euvolemic, no IVF at this time, may consider further diuresis. Also noted to have hyperdense mass in R kidney, may require further imaging pending progress in MICU.

## 2022-09-08 NOTE — DIETITIAN INITIAL EVALUATION ADULT - OTHER INFO
81F with complex medical history including obesity, HTN, HLD, HFpEF (EF 55-60% last echo 2/20), COPD (on home O2-3L), chronic LE edema with neuropathic pain and cellulitis, DALIA, PVD p/w respiratory distress x 1 day found to have cellulitis on lower extremity.    Pt assessed at bedside. Rx and labs reviewed. Pt intubated and sedated at time of assessment; vent to VC-AC, MAP 79, norepi ggt, propofol @24.6 ml/hr (649 kcal/day). With current propofol rate, consider initiate trophic feeds. Wean sedation as able. Monitor TG levels while receiving propofol. No reported GI distress at time of assessment. No plans to initiate EN support today; monitor ability to start nutrition -see recs below. RDN will continue to monitor per protocol and PRN.     Pain: No non-verbal indicators present   GI: Abdomen ND/NT, +BS x4, LBM PTA   Skin: SDTI sacrum, +4 BLE

## 2022-09-08 NOTE — DIETITIAN INITIAL EVALUATION ADULT - PERTINENT LABORATORY DATA
09-08    136  |  101  |  62<H>  ----------------------------<  143<H>  4.4   |  24  |  2.62<H>    Ca    8.6      08 Sep 2022 05:41  Phos  4.0     09-08  Mg     2.4     09-08    TPro  6.7  /  Alb  2.6<L>  /  TBili  0.4  /  DBili  x   /  AST  145<H>  /  ALT  118<H>  /  AlkPhos  212<H>  09-08  POCT Blood Glucose.: 128 mg/dL (09-08-22 @ 11:53)  A1C with Estimated Average Glucose Result: 5.6 % (09-07-22 @ 05:57)

## 2022-09-08 NOTE — PROGRESS NOTE ADULT - SUBJECTIVE AND OBJECTIVE BOX
infectious diseases progress note    INTERVAL HPI/OVERNIGHT EVENTS: Pt was seen on yesterday and stepped down to the floor. at 6pm on the floor patient was eating and became SOB and then went unconsciousness and went into cardiac arrest w/ rosc achieved after 1 round of epi and CPR. Unknown cause of arrest at this time. Patient was intubated sedated and placed on pressors and moved to the ICU. Noted overnight to have a PTX on the Left via US which was confirmed via x-ray now s/p left chest tube.     ROS: Limited per patient clinical status     Allergies    Altabax (Unknown)  Augmentin (Unknown)  clindamycin (Unknown)  Vaseline (Swelling)    Intolerances        ANTIBIOTICS/RELEVANT:  antimicrobials  cefTRIAXone   IVPB 2000 milliGRAM(s) IV Intermittent every 24 hours    immunologic:  influenza  Vaccine (HIGH DOSE) 0.7 milliLiter(s) IntraMuscular once    OTHER:  chlorhexidine 0.12% Liquid 15 milliLiter(s) Oral Mucosa every 12 hours  chlorhexidine 2% Cloths 1 Application(s) Topical <User Schedule>  dextrose 5%. 1000 milliLiter(s) IV Continuous <Continuous>  dextrose 5%. 1000 milliLiter(s) IV Continuous <Continuous>  dextrose 50% Injectable 25 Gram(s) IV Push once  dextrose 50% Injectable 12.5 Gram(s) IV Push once  dextrose 50% Injectable 25 Gram(s) IV Push once  dextrose Oral Gel 15 Gram(s) Oral once PRN  glucagon  Injectable 1 milliGRAM(s) IntraMuscular once  heparin   Injectable 5000 Unit(s) SubCutaneous every 8 hours  insulin lispro (ADMELOG) corrective regimen sliding scale   SubCutaneous every 6 hours  norepinephrine Infusion 0.05 MICROgram(s)/kG/Min IV Continuous <Continuous>  pantoprazole  Injectable 40 milliGRAM(s) IV Push every 24 hours  propofol Infusion 10.003 MICROgram(s)/kG/Min IV Continuous <Continuous>      Objective:  Vital Signs Last 24 Hrs  T(C): 36.8 (08 Sep 2022 17:41), Max: 37.9 (08 Sep 2022 06:02)  T(F): 98.2 (08 Sep 2022 17:41), Max: 100.3 (08 Sep 2022 06:02)  HR: 76 (08 Sep 2022 17:00) (68 - 120)  BP: 105/51 (08 Sep 2022 17:00) (76/45 - 152/67)  BP(mean): 73 (08 Sep 2022 17:00) (56 - 99)  RR: 18 (08 Sep 2022 17:00) (18 - 35)  SpO2: 100% (08 Sep 2022 17:00) (83% - 100%)    Parameters below as of 08 Sep 2022 17:00  Patient On (Oxygen Delivery Method): ventilator    O2 Concentration (%): 60    PHYSICAL EXAM:  General: intubated, lying in bed with minimal responsiveness Responds to sternal rub  HEENT: NC/AT; EOMI, constricted pupils with minimal response to light   Neck: supple, trachea midline  Cardiovascular: distan heart sounds, no murmers appreciated  Respiratory: clear breath sounds on right, wheezing on left  Gastrointestinal: soft, NT, obese  Extremity exam: Legs wrapped b/l up to below the knee    Neurological: AAOx0, sedated          LABS:                        7.5    10.55 )-----------( 148      ( 08 Sep 2022 05:41 )             25.0     09-08    136  |  101  |  62<H>  ----------------------------<  143<H>  4.4   |  24  |  2.62<H>    Ca    8.6      08 Sep 2022 05:41  Phos  4.0     09-08  Mg     2.4     09-08    TPro  6.7  /  Alb  2.6<L>  /  TBili  0.4  /  DBili  x   /  AST  145<H>  /  ALT  118<H>  /  AlkPhos  212<H>  09-08    PT/INR - ( 07 Sep 2022 20:08 )   PT: 13.1 sec;   INR: 1.10          PTT - ( 08 Sep 2022 05:41 )  PTT:73.2 sec        MICROBIOLOGY:  Culture Results:   No growth at 12 hours (09-07 @ 20:51)  Culture Results:   No growth at 12 hours (09-07 @ 20:51)        RADIOLOGY & ADDITIONAL STUDIES:

## 2022-09-08 NOTE — PROGRESS NOTE ADULT - ATTENDING COMMENTS
82 yo F with HTN, HLD, HFpEF, COPD on home O2, cerebral aneurysm s/p coil, CKD4, SCC L leg s/p resection with skin graft, diverticulitis s/p sigmoidectomy, chronic LE edema, PVD with LE stents, h/o multiple DVTs admitted 9/6 with SOB, found to have GBS bacteremia (2/4 bottles, ½ sets), likely from skin source with severe bilateral LE cellulitis with no drainable fluid collection seen on CT.  She had defervesced, WBC trending down.  We recommended starting ceftriaxone, d/cing cefepime.  Shortly after de-escalation of care from SDU to [facility], she became dyspneic and then had cardiac arrest with ROSC after ~6 min of CPR.  She required chest tube placement for L PTX.  She is intubated, unresponsive but when seen by me, on sedation, with L ant chest tube in place, LEs wrapped.  Would f/u susceptibility of GBS from 9/6, f/u surveillance blood cultures pending X 3 from 9/7, continue ceftriaxone for now.  Will follow with you – team 1.

## 2022-09-08 NOTE — PROGRESS NOTE ADULT - ASSESSMENT
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 Patient was found to be severely septic i/s/o GBS bacteremia w/ b/l lower cellulitis noted on exam. TTE done i/s/o GBS bacteremia which was unremarkable. Currently patient is on CTX 2g IV QD which should be continued. Patient is s/p arrest on the medical floors yesterday evening w/ ROSC achieved and now intubated and sedated on pressors in the ICU. Unknown cause of arrest yesterday possible pulmonary source but still unclear picture.  Recommendations:   - c/w CTX 2g QD   - Follow up sensitivities from blood cultures 09/06   - FU surveillance cultures 09/07     Case discussed with Dr. Ordonez and primary team. Team 1 will continue to follow.     Note is finalized with attending attestation completion.

## 2022-09-08 NOTE — CHART NOTE - NSCHARTNOTEFT_GEN_A_CORE
spoke with patient PCP Dr Rothman @ [facility]-provider for 10+ years. Besides PMHx documented only addition is spinal stenosis s/p repair. Patient anemia is known to her PCP. baseline is approx 7. Patient has had a colonoscopy in the past (none recently). Stephan states shes been worked up for anemia in the past. iron studies have not shown RADHA. 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 besides occasional bleeding from her lower ext wounds, he has never appreciated and GI source.     Patient has been complaint with Eliquis for DVT/PE. however IVF filter has been in place for 10+ years  significant smoking hx. quit in 2018

## 2022-09-08 NOTE — DIETITIAN INITIAL EVALUATION ADULT - OTHER CALCULATIONS
Actual body weight used to calculate energy needs due to pt's current body weight within % ideal body weight

## 2022-09-08 NOTE — DIETITIAN INITIAL EVALUATION ADULT - ENTERAL
-With current propofol rate, recommend Jevity 1.5 @35 ml/hr to provide 840 ml TF, (84%RDI), 1260 kcal (1910 kcal with propofol), 54 gProt., and 638 ml FW  -Once propofol weaned off, recommend Jevity 1.5 @50 ml/hr with LPS x1/day to provide 1200 ml TF (120%RDI), 1900 kcal, 92 gProt., and 912 ml FW. This is 29.9 kcal and 1.45 gProt. per kg IBW 63.6 kg.

## 2022-09-09 NOTE — CONSULT NOTE ADULT - SUBJECTIVE AND OBJECTIVE BOX
Surgeon: Anthony    Requesting Physician: Ramakrishna    HISTORY OF PRESENT ILLNESS (Need 4):  80yo morbidly obese female w/ pmhx of HTN, HLD, HFpEF (EF 56%), COPD (on home O2-3L), chronic LE edema with neuropathic pain and cellulitis, DALIA, PVD p/w respiratory distress x 1 day found to have cellulitis on lower extremity.  S/p Cardiac arrest on 9/7/22 w/ intubation 2/2 hypoxia due to aspiration vs PE vs flash pulmonary edema. Pt relatively hypotensive on 09/07 with episodes of hypertension to 130/140 systolic. Pt coded on floor on 09/07, found to have no pulse with blue complexion by nurse during meal. Intubation on PM 09/07. ABG done showing mild hypercapnea - 25. EKG done normal. Chest x-ray to r/o aspiration showed diffuse patchy airspace opacities throughout the lungs bilaterally, suggestive of pulmonary alveolar edema. Bedside echo showing no RV dysfunction.  On 9/8/22 found to have left pneumothorax which left pigtail was placed with resolution of pneumothorax.  Thoracic surgery consulted for concern of frail ribs.      PAST MEDICAL & SURGICAL HISTORY:  Lyme disease      DVT (deep venous thrombosis)      Skin cancer      Brain embolism and thrombosis      MRSA (methicillin resistant Staphylococcus aureus)      PNA (pneumonia)      COPD (chronic obstructive pulmonary disease)      H/O angioplasty      Status post laparoscopic-assisted sigmoidectomy      H/O shoulder surgery          MEDICATIONS  (STANDING):  cefTRIAXone   IVPB 2000 milliGRAM(s) IV Intermittent every 24 hours  chlorhexidine 0.12% Liquid 15 milliLiter(s) Oral Mucosa every 12 hours  chlorhexidine 2% Cloths 1 Application(s) Topical <User Schedule>  dextrose 5%. 1000 milliLiter(s) (100 mL/Hr) IV Continuous <Continuous>  dextrose 5%. 1000 milliLiter(s) (50 mL/Hr) IV Continuous <Continuous>  dextrose 50% Injectable 25 Gram(s) IV Push once  dextrose 50% Injectable 12.5 Gram(s) IV Push once  dextrose 50% Injectable 25 Gram(s) IV Push once  fentaNYL   Infusion 0.5 MICROgram(s)/kG/Hr (5.12 mL/Hr) IV Continuous <Continuous>  glucagon  Injectable 1 milliGRAM(s) IntraMuscular once  heparin   Injectable 5000 Unit(s) SubCutaneous every 8 hours  influenza  Vaccine (HIGH DOSE) 0.7 milliLiter(s) IntraMuscular once  insulin lispro (ADMELOG) corrective regimen sliding scale   SubCutaneous every 6 hours  norepinephrine Infusion 0.05 MICROgram(s)/kG/Min (9.59 mL/Hr) IV Continuous <Continuous>  pantoprazole  Injectable 40 milliGRAM(s) IV Push every 24 hours  propofol Infusion 10.003 MICROgram(s)/kG/Min (6.14 mL/Hr) IV Continuous <Continuous>    MEDICATIONS  (PRN):  dextrose Oral Gel 15 Gram(s) Oral once PRN Blood Glucose LESS THAN 70 milliGRAM(s)/deciliter      Allergies    Altabax (Unknown)  Augmentin (Unknown)  clindamycin (Unknown)  Vaseline (Swelling)    Intolerances        SOCIAL HISTORY:  unable to assess due to clincal status    FAMILY HISTORY:  FH: heart failure  mother        Review of Systems (Need 10):  unable to asseess due due mental status changes  Vital Signs Last 24 Hrs  T(C): 37.3 (09 Sep 2022 10:53), Max: 38 (09 Sep 2022 06:05)  T(F): 99.1 (09 Sep 2022 10:53), Max: 100.4 (09 Sep 2022 06:05)  HR: 86 (09 Sep 2022 12:00) (68 - 92)  BP: 111/52 (09 Sep 2022 12:00) (97/51 - 136/57)  BP(mean): 75 (09 Sep 2022 12:00) (69 - 88)  RR: 21 (09 Sep 2022 12:00) (17 - 24)  SpO2: 100% (09 Sep 2022 12:00) (93% - 100%)    Parameters below as of 09 Sep 2022 12:00  Patient On (Oxygen Delivery Method): ventilator    O2 Concentration (%): 60    Physical Exam (Need 8)  General: intubated, lying in bed with minimal responsiveness Responds to sternal rub  HEENT: NC/AT; EOMI, constricted pupils with minimal response to light   Neck: supple, trachea midline  Cardiovascular: distan heart sounds, no murmers appreciated  Respiratory: clear breath sounds on right, wheezing on left  Gastrointestinal: soft, NT, obese  Extremities: wrapped LLE and RLE w/ cellulitis and skin changes BL  Neurological: AAOx0, sedated                                                          LABS:                        7.0    8.06  )-----------( 154      ( 09 Sep 2022 06:33 )             23.7     09-09    138  |  103  |  60<H>  ----------------------------<  104<H>  4.6   |  26  |  2.33<H>    Ca    8.7      09 Sep 2022 06:33  Phos  4.4     09-09  Mg     2.4     09-09    TPro  6.2  /  Alb  2.4<L>  /  TBili  0.2  /  DBili  x   /  AST  53<H>  /  ALT  72<H>  /  AlkPhos  171<H>  09-09    PT/INR - ( 07 Sep 2022 20:08 )   PT: 13.1 sec;   INR: 1.10          PTT - ( 08 Sep 2022 05:41 )  PTT:73.2 sec    CARDIAC MARKERS ( 08 Sep 2022 05:41 )  x     / 0.16 ng/mL / x     / x     / x      CARDIAC MARKERS ( 08 Sep 2022 01:44 )  x     / 0.19 ng/mL / 521 U/L / x     / 11.9 ng/mL  CARDIAC MARKERS ( 07 Sep 2022 20:08 )  x     / 0.16 ng/mL / 680 U/L / x     / 12.4 ng/mL          RADIOLOGY & ADDITIONAL STUDIES:  < from: CT Chest No Cont (09.08.22 @ 14:58) >  Left pleural pigtail catheter in situ anteriorly. Tiny left   pneumothorax.  Small bilateral pleural effusions-new..  Acute bilateral displaced rib fractures anteriorly first through seventh.  Endotracheal tube in situ.    < end of copied text >  CAROTID U/S:    CXR:    CT Scan:    EKG:    TTE / ELDON:    Cardiac Cath:

## 2022-09-09 NOTE — PROGRESS NOTE ADULT - ATTENDING COMMENTS
As above.  Would treat for GBS sepsis from skin source with ceftriaxone for two week course from first negative blood culture.  Please recall if she is ready for hospital d/c prior to 9/20 or with additional questions/concerns - team 1.

## 2022-09-09 NOTE — PROGRESS NOTE ADULT - ASSESSMENT
82yo morbidly obese female w/ pmhx of HTN, HLD, HFpEF (EF 56%), COPD (on home O2-3L), chronic LE edema with neuropathic pain and cellulitis, DALIA, PVD p/w respiratory distress x 1 day found to have cellulitis on lower extremity.    NEURO  #Intubated  s/p cardiac arrest w/ intubation 2/2 hypoxia due to aspiration vs PE vs flash pulmonary edema. Pt relatively hypotensive on 09/07 with episodes of hypertension to 130/140 systolic. Pt coded on floor on 09/07, found to have no pulse with blue complexion by nurse during meal. Intubation on PM 09/07. ABG done showing mild hypercapnea - 25. EKG normal. Chest x-ray to r/o aspiration showed diffuse patchy airspace opacities throughout the lungs bilaterally, suggestive of pulmonary alveolar edema. Bedside echo showing no RV dysfunction, PE less likely. Currently on propofol 10mcg/kg/min. CT head done as pt was complaining of head pain,  neg for stroke  Plan:  - c/w repository ventilation and propofol + fentanyl  - transitioned to CPAP    PULM  #Pneumothorax  Pt originally presented to MICU with signs of decreased lung sliding on bedside ultrasound but no pneumothorax on x-ray. Pt developed left sided pneumothorax on AM of 09/08 with drop in sat from high 90s to 70s but no BP changes. Chest tube placed with improvement in sats to high 90s again. Ventilatory settings adjusted to minimize risk of barotrauma to right lung. Repeat x-rays showing lung reexpansion   Vent settings:       #Chronic obstructive pulmonary disease (COPD).   Pt with COPD on home 3L. Patient initially presented with NRB, now intubated s/p cardia arrest.       #Pulm HTN  possibly 2/2 longstanding DALIA. Evidence of new pulm HTN on echo w/ pap 48 compared to TTE from 2022. 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 originally started on Heparin infusion 1800u/hr for full anticoagulation on 09/07 then transitioned to sub q once PE ruled unlikely.  Plan:  - c/w subq heparin       CARDIO  #Cardiac Arrest  possibly 2/2 hypoxia in setting of aspiration vs PE vs flash pulmonary edema. Pt relatively hypotensive throughout the day with episodes of hypertension to 130/140 systolic. recieved fluids on PM 09/07 and a unit of blood on 09/07 for anemia. In PM on 09/07, complained of difficulty breathing during meal, found unresponsive, hypotensive, w/ blue complexion by nurse. CPR initiated with 1 dose of Epi given, s/p intubation. Currently on propofol with goal of transition to precedex for possible extubation in AM if tolerated. Levo drip 0.05mcg/kg/min. Switched from full anticoag to Subq Heparin  Plan:  - c/w levo, wean as tolerated  - c/w heparin   - f/u EKG  - cardiac labs downtrending - no longer following    #Chronic heart failure with preserved ejection fraction (HFpEF).   Pt on home bumex 2mg. Most recent TTE in Feb 2020 showing EF 55-60%. TTE from current visit showing EF of 56% with Pulmonary HTN - PAP 48. Holding bumex in the setting of sepsis  Plan:  - GDMT therapy as tolerated    #Hypertension.   Pt with history of hypertension on home diltiazem 180mg daily. Pt relatively hypotensive throughout the day, currently hypotensive s/p cardiac arrest and intubation. BP low threshold for pressors  Plan:  - monitor BP  - holding home antihypertensive meds    GI  #HLD (hyperlipidemia).   Pt on atorvastatin 40mg daily.  Plan:  - c/w lipitor 40 mg QHS.      RENAL  #BRANT on CKD  Baseline Cr 2.04 in July 2022, currently 2.48 up from 2.18 on entry. Possibly 2/2 to hypoperfusion/ischemia in setting of sepsis and cardiac arrest  Plan:  - continue to trend BUN, Cr  - renal dosing  - avoid nephrotoxic agents  - strict I&Os  - monitor UOP.    Right kidney mass.   CTAP s/f slightly hyperdense solid cortical mass in upper pole of R kidney.  Plan:  - consider additional renal imaging.    ENDO  No active issues      HEME  #Anemia  Pt noted to have chronic anemia, presented with HgB of 6.8 w/ drop to 6.7 during course of stay. s/p 1 unit RBC on 09/07, HgB of 7 today down from 7.5. No signs of active bleeding  Plan:  - f/u H+H  - active type and screen   - f/u iron panel      ID  #Severe sepsis.   possibly 2/2 to LE Cellulitis. 4/4 SIRS criteria (T 105.2, , RR 22, WBC 25.88) with lactate 5.1 ->0.9. CT LE s/f cellulitis with no fluid collection or OM. CTa/p did not reveal infectious etiology. S/p Vancomycin 1g and cefepime 1g in ED, received one more dose of cefepime 1g IV AM 09/07. BLE edema, erythema, scaly skin c/w cellulitis.  - c/w CTX 2g IV QD per ID  - c/w tylenol 650 mg PO q6h PRN for pain/fever  - f/u further ID recs  - blood cultures on 09/06 growing gram positive cocci in pairs, surveillance blood cultures neg        E: K>4, Phos>3, Mg>2  N: NPO intubated   DVT ppx: subq Heparin  GI ppx: Protonix 40 BID  Access: 2 peripheral lines    Full Code         82yo morbidly obese female w/ pmhx of HTN, HLD, HFpEF (EF 56%), COPD (on home O2-3L), chronic LE edema with neuropathic pain and cellulitis, DALIA, PVD p/w respiratory distress x 1 day found to have cellulitis on lower extremity.    NEURO  #Intubated  s/p cardiac arrest w/ intubation 2/2 hypoxia due to aspiration vs PE vs flash pulmonary edema. Pt relatively hypotensive on 09/07 with episodes of hypertension to 130/140 systolic. Pt coded on floor on 09/07, found to have no pulse with blue complexion by nurse during meal. Intubation on PM 09/07. ABG done showing mild hypercapnea - 25. EKG normal. Chest x-ray to r/o aspiration showed diffuse patchy airspace opacities throughout the lungs bilaterally, suggestive of pulmonary alveolar edema. Bedside echo showing no RV dysfunction, PE less likely. Currently on propofol 10mcg/kg/min. CT head done as pt was complaining of head pain,  neg for stroke  Plan:  - c/w repository ventilation and propofol + fentanyl  - transitioned to CPAP    PULM  #Pneumothorax  Pt originally presented to MICU with signs of decreased lung sliding on bedside ultrasound but no pneumothorax on x-ray. Pt developed left sided pneumothorax on AM of 09/08 with drop in sat from high 90s to 70s but no BP changes. Chest tube placed with improvement in sats to high 90s again. Ventilatory settings adjusted to minimize risk of barotrauma to right lung. Repeat x-rays showing lung reexpansion   Vent settings:       #Chronic obstructive pulmonary disease (COPD).   Pt with COPD on home 3L. Patient initially presented with NRB, now intubated s/p cardia arrest.       #Pulm HTN  possibly 2/2 longstanding DALIA. Evidence of new pulm HTN on echo w/ pap 48 compared to TTE from 2022. 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 originally started on Heparin infusion 1800u/hr for full anticoagulation on 09/07 then transitioned to sub q once PE ruled unlikely.  Plan:  - c/w subq heparin       CARDIO  #Cardiac Arrest  possibly 2/2 hypoxia in setting of aspiration vs PE vs flash pulmonary edema. Pt relatively hypotensive throughout the day with episodes of hypertension to 130/140 systolic. recieved fluids on PM 09/07 and a unit of blood on 09/07 for anemia. In PM on 09/07, complained of difficulty breathing during meal, found unresponsive, hypotensive, w/ blue complexion by nurse. CPR initiated with 1 dose of Epi given, s/p intubation. Currently on propofol with goal of transition to precedex for possible extubation in AM if tolerated. Levo drip 0.05mcg/kg/min. Switched from full anticoag to Subq Heparin  Plan:  - levophed weaned off  - c/w heparin   - f/u EKG  - cardiac labs downtrending - no longer following    #Chronic heart failure with preserved ejection fraction (HFpEF).   Pt on home bumex 2mg. Most recent TTE in Feb 2020 showing EF 55-60%. TTE from current visit showing EF of 56% with Pulmonary HTN - PAP 48. Holding bumex in the setting of sepsis  Plan:  - GDMT therapy as tolerated    #Hypertension.   Pt with history of hypertension on home diltiazem 180mg daily. Pt relatively hypotensive throughout the day, currently hypotensive s/p cardiac arrest and intubation. BP low threshold for pressors  Plan:  - monitor BP  - holding home antihypertensive meds    GI  #HLD (hyperlipidemia).   Pt on atorvastatin 40mg daily.  Plan:  - c/w lipitor 40 mg QHS.      RENAL  #BRANT on CKD  Baseline Cr 2.04 in July 2022, currently 2.48 up from 2.18 on entry. Possibly 2/2 to hypoperfusion/ischemia in setting of sepsis and cardiac arrest  Plan:  - continue to trend BUN, Cr  - renal dosing  - avoid nephrotoxic agents  - strict I&Os  - monitor UOP.    Right kidney mass.   CTAP s/f slightly hyperdense solid cortical mass in upper pole of R kidney.  Plan:  - consider additional renal imaging.    ENDO  No active issues      HEME  #Anemia  Pt noted to have chronic anemia, presented with HgB of 6.8 w/ drop to 6.7 during course of stay. s/p 1 unit RBC on 09/07, HgB of 7 today down from 7.5. No signs of active bleeding  Plan:  - f/u H+H  - active type and screen   - f/u iron panel      ID  #Severe sepsis.   possibly 2/2 to LE Cellulitis. 4/4 SIRS criteria (T 105.2, , RR 22, WBC 25.88) with lactate 5.1 ->0.9. CT LE s/f cellulitis with no fluid collection or OM. CTa/p did not reveal infectious etiology. S/p Vancomycin 1g and cefepime 1g in ED, received one more dose of cefepime 1g IV AM 09/07. BLE edema, erythema, scaly skin c/w cellulitis.  - c/w CTX 2g IV QD per ID  - c/w tylenol 650 mg PO q6h PRN for pain/fever  - f/u further ID recs  - blood cultures on 09/06 growing gram positive cocci in pairs, surveillance blood cultures neg        E: K>4, Phos>3, Mg>2  N: NPO intubated   DVT ppx: subq Heparin  GI ppx: Protonix 40 BID  Access: 2 peripheral lines    Full Code         80yo morbidly obese female w/ pmhx of HTN, HLD, HFpEF (EF 56%), COPD (on home O2-3L), chronic LE edema with neuropathic pain and cellulitis, DALIA, PVD p/w respiratory distress x 1 day found to have cellulitis on lower extremity.    NEURO  #Intubated  s/p cardiac arrest w/ intubation 2/2 hypoxia due to aspiration vs PE vs flash pulmonary edema. Pt relatively hypotensive on 09/07 with episodes of hypertension to 130/140 systolic. Pt coded on floor on 09/07, found to have no pulse with blue complexion by nurse during meal. Intubation on PM 09/07. ABG done showing mild hypercapnea - 25. EKG normal. Chest x-ray to r/o aspiration showed diffuse patchy airspace opacities throughout the lungs bilaterally, suggestive of pulmonary alveolar edema. Bedside echo showing no RV dysfunction, PE less likely. Currently on propofol 10mcg/kg/min. CT head done as pt was complaining of head pain,  neg for stroke  Plan:  - c/w repository ventilation and propofol + fentanyl  - transitioned to CPAP    PULM  #Pneumothorax  Pt originally presented to MICU with signs of decreased lung sliding on bedside ultrasound but no pneumothorax on x-ray. Pt developed left sided pneumothorax on AM of 09/08 with drop in sat from high 90s to 70s but no BP changes. Chest tube placed with improvement in sats to high 90s again. Ventilatory settings adjusted to minimize risk of barotrauma to right lung. Repeat x-rays showing lung reexpansion   Vent settings:       #Chronic obstructive pulmonary disease (COPD).   Pt with COPD on home 3L. Patient initially presented with NRB, now intubated s/p cardia arrest.       #Pulm HTN  possibly 2/2 longstanding DALIA. Evidence of new pulm HTN on echo w/ pap 48 compared to TTE from 2022. 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 originally started on Heparin infusion 1800u/hr for full anticoagulation on 09/07 then transitioned to sub q once PE ruled unlikely.  Plan:  - c/w subq heparin     #Acute resp failure  2/2 hypoxia due to aspiration vs flash pulmonary edema. Pt endured cardiac arrest secondary to resp failure likely in setting of aspiration while eating vs flash pulmonary edema 2/2 chronic htn and chronic HF (relatively the same EF as previous TTE). New onset pleural effusions likely in the same setting, pt s/p intubation. Plan as above    CARDIO  #Cardiac Arrest  possibly 2/2 hypoxia in setting of aspiration vs PE vs flash pulmonary edema. Pt relatively hypotensive throughout the day with episodes of hypertension to 130/140 systolic. recieved fluids on PM 09/07 and a unit of blood on 09/07 for anemia. In PM on 09/07, complained of difficulty breathing during meal, found unresponsive, hypotensive, w/ blue complexion by nurse. CPR initiated with 1 dose of Epi given, s/p intubation. Currently on propofol with goal of transition to precedex for possible extubation in AM if tolerated. Levo drip 0.05mcg/kg/min. Switched from full anticoag to Subq Heparin  Plan:  - levophed weaned off  - c/w heparin   - f/u EKG  - cardiac labs downtrending - no longer following    #Chronic heart failure with preserved ejection fraction (HFpEF).   Pt on home bumex 2mg. Most recent TTE in Feb 2020 showing EF 55-60%. TTE from current visit showing EF of 56% with Pulmonary HTN - PAP 48. Holding bumex in the setting of sepsis  Plan:  - GDMT therapy as tolerated    #Hypertension.   Pt with history of hypertension on home diltiazem 180mg daily. Pt relatively hypotensive throughout the day, currently hypotensive s/p cardiac arrest and intubation. BP low threshold for pressors  Plan:  - monitor BP  - holding home antihypertensive meds    GI  #HLD (hyperlipidemia).   Pt on atorvastatin 40mg daily.  Plan:  - c/w lipitor 40 mg QHS.      RENAL  #BRANT on CKD  Baseline Cr 2.04 in July 2022, currently 2.48 up from 2.18 on entry. Possibly 2/2 to hypoperfusion/ischemia in setting of sepsis and cardiac arrest  Plan:  - continue to trend BUN, Cr  - renal dosing  - avoid nephrotoxic agents  - strict I&Os  - monitor UOP.    Right kidney mass.   CTAP s/f slightly hyperdense solid cortical mass in upper pole of R kidney.  Plan:  - consider additional renal imaging.    ENDO  No active issues      HEME  #Anemia  Pt noted to have chronic anemia, presented with HgB of 6.8 w/ drop to 6.7 during course of stay. s/p 1 unit RBC on 09/07, HgB of 7 today down from 7.5. No signs of active bleeding  Plan:  - f/u H+H  - active type and screen   - f/u iron panel      ID  #Severe sepsis.   possibly 2/2 to LE Cellulitis. 4/4 SIRS criteria (T 105.2, , RR 22, WBC 25.88) with lactate 5.1 ->0.9. CT LE s/f cellulitis with no fluid collection or OM. CTa/p did not reveal infectious etiology. S/p Vancomycin 1g and cefepime 1g in ED, received one more dose of cefepime 1g IV AM 09/07. BLE edema, erythema, scaly skin c/w cellulitis.  - c/w CTX 2g IV QD per ID  - c/w tylenol 650 mg PO q6h PRN for pain/fever  - f/u further ID recs  - blood cultures on 09/06 growing gram positive cocci in pairs, surveillance blood cultures neg        E: K>4, Phos>3, Mg>2  N: NPO intubated   DVT ppx: subq Heparin  GI ppx: Protonix 40 BID  Access: 2 peripheral lines    Full Code

## 2022-09-09 NOTE — PROGRESS NOTE ADULT - SUBJECTIVE AND OBJECTIVE BOX
CECE GABBY ROONEY, 81y, Female  MRN-[id number]  Patient is a 81y old  Female who presents with a chief complaint of Acute hypoxic respiratory failure (09 Sep 2022 13:29)      OVERNIGHT EVENTS: nothing    SUBJECTIVE: Pt assessed at bedside, intubated and sedated, responsive and able to nod yes/no to ROS. Patient denies fever, chest pain, dyspnea, abdominal pain.      12 Point ROS Negative unless noted otherwise above.  -------------------------------------------------------------------------------  VITAL SIGNS:  Vital Signs Last 24 Hrs  T(C): 37.2 (09 Sep 2022 14:50), Max: 38 (09 Sep 2022 06:05)  T(F): 99 (09 Sep 2022 14:50), Max: 100.4 (09 Sep 2022 06:05)  HR: 89 (09 Sep 2022 13:00) (74 - 92)  BP: 105/51 (09 Sep 2022 13:00) (97/51 - 136/57)  BP(mean): 74 (09 Sep 2022 13:00) (69 - 88)  RR: 16 (09 Sep 2022 13:00) (16 - 24)  SpO2: 97% (09 Sep 2022 13:00) (93% - 100%)    Parameters below as of 09 Sep 2022 13:00  Patient On (Oxygen Delivery Method): ventilator    O2 Concentration (%): 60  I&O's Summary    08 Sep 2022 07:01  -  09 Sep 2022 07:00  --------------------------------------------------------  IN: 838.3 mL / OUT: 1555 mL / NET: -716.7 mL    09 Sep 2022 07:01  -  09 Sep 2022 15:01  --------------------------------------------------------  IN: 437 mL / OUT: 285 mL / NET: 152 mL        PHYSICAL EXAM:    PHYSICAL EXAM:  General: intubated, alert.   HEENT: NC/AT; EOMI, 3mm constricted pupils with minimal response to light   Neck: supple, trachea midline  Cardiovascular: normal s1/s2, no murmers appreciated  Respiratory: clear breath sounds, left chest tube in place   Gastrointestinal: soft, NT, obese   Extremities: Legs wrapped b/l up to below the knee, noticeable skin changes, couldnt appreciate LE pulses. Radial pulses 2+    Neurological: Alert and answering quetions. Hand  equal and 5/5 b/l.     ALLERGIES:  Allergies    Altabax (Unknown)  Augmentin (Unknown)  clindamycin (Unknown)  Vaseline (Swelling)    Intolerances        MEDICATIONS:  MEDICATIONS  (STANDING):  cefTRIAXone   IVPB 2000 milliGRAM(s) IV Intermittent every 24 hours  chlorhexidine 0.12% Liquid 15 milliLiter(s) Oral Mucosa every 12 hours  chlorhexidine 2% Cloths 1 Application(s) Topical <User Schedule>  dextrose 5%. 1000 milliLiter(s) (100 mL/Hr) IV Continuous <Continuous>  dextrose 5%. 1000 milliLiter(s) (50 mL/Hr) IV Continuous <Continuous>  dextrose 50% Injectable 25 Gram(s) IV Push once  dextrose 50% Injectable 12.5 Gram(s) IV Push once  dextrose 50% Injectable 25 Gram(s) IV Push once  fentaNYL   Infusion 0.5 MICROgram(s)/kG/Hr (5.12 mL/Hr) IV Continuous <Continuous>  glucagon  Injectable 1 milliGRAM(s) IntraMuscular once  heparin   Injectable 5000 Unit(s) SubCutaneous every 8 hours  influenza  Vaccine (HIGH DOSE) 0.7 milliLiter(s) IntraMuscular once  insulin lispro (ADMELOG) corrective regimen sliding scale   SubCutaneous every 6 hours  norepinephrine Infusion 0.05 MICROgram(s)/kG/Min (9.59 mL/Hr) IV Continuous <Continuous>  pantoprazole  Injectable 40 milliGRAM(s) IV Push every 24 hours  propofol Infusion 10.003 MICROgram(s)/kG/Min (6.14 mL/Hr) IV Continuous <Continuous>    MEDICATIONS  (PRN):  dextrose Oral Gel 15 Gram(s) Oral once PRN Blood Glucose LESS THAN 70 milliGRAM(s)/deciliter      -------------------------------------------------------------------------------  LABS:                        7.0    8.06  )-----------( 154      ( 09 Sep 2022 06:33 )             23.7     09-09    138  |  103  |  60<H>  ----------------------------<  104<H>  4.6   |  26  |  2.33<H>    Ca    8.7      09 Sep 2022 06:33  Phos  4.4     09-09  Mg     2.4     09-09    TPro  6.2  /  Alb  2.4<L>  /  TBili  0.2  /  DBili  x   /  AST  53<H>  /  ALT  72<H>  /  AlkPhos  171<H>  09-09    LIVER FUNCTIONS - ( 09 Sep 2022 06:33 )  Alb: 2.4 g/dL / Pro: 6.2 g/dL / ALK PHOS: 171 U/L / ALT: 72 U/L / AST: 53 U/L / GGT: x           PT/INR - ( 07 Sep 2022 20:08 )   PT: 13.1 sec;   INR: 1.10          PTT - ( 08 Sep 2022 05:41 )  PTT:73.2 sec    CAPILLARY BLOOD GLUCOSE      POCT Blood Glucose.: 116 mg/dL (09 Sep 2022 11:25)      Culture - Blood (collected 07 Sep 2022 20:51)  Source: .Blood Blood-Peripheral  Preliminary Report (08 Sep 2022 22:00):    No growth at 1 day.    Culture - Blood (collected 07 Sep 2022 20:51)  Source: .Blood Blood-Peripheral  Preliminary Report (08 Sep 2022 22:00):    No growth at 1 day.    Culture - Blood (collected 07 Sep 2022 14:28)  Source: .Blood Blood  Preliminary Report (09 Sep 2022 15:00):    No growth at 2 days.      SARS-CoV-2: NotDetec (06 Sep 2022 10:32)  COVID-19 PCR: NotDetec (06 Sep 2022 10:25)  COVID-19 PCR: NotDetec (02 Jul 2022 13:33)      RADIOLOGY & ADDITIONAL TESTS: Reviewed.

## 2022-09-09 NOTE — PROGRESS NOTE ADULT - SUBJECTIVE AND OBJECTIVE BOX
infectious diseases progress note    INTERVAL HPI/OVERNIGHT EVENTS: Low grade temp of 100.4 this AM. No other overnight complaints. Patient is currently intubated and off pressors. On low sedation with propofol.     ROS: Limited per patient. Intubated.     Allergies    Altabax (Unknown)  Augmentin (Unknown)  clindamycin (Unknown)  Vaseline (Swelling)    Intolerances        ANTIBIOTICS/RELEVANT:  antimicrobials  cefTRIAXone   IVPB 2000 milliGRAM(s) IV Intermittent every 24 hours    immunologic:  influenza  Vaccine (HIGH DOSE) 0.7 milliLiter(s) IntraMuscular once    OTHER:  chlorhexidine 0.12% Liquid 15 milliLiter(s) Oral Mucosa every 12 hours  chlorhexidine 2% Cloths 1 Application(s) Topical <User Schedule>  dextrose 5%. 1000 milliLiter(s) IV Continuous <Continuous>  dextrose 5%. 1000 milliLiter(s) IV Continuous <Continuous>  dextrose 50% Injectable 25 Gram(s) IV Push once  dextrose 50% Injectable 12.5 Gram(s) IV Push once  dextrose 50% Injectable 25 Gram(s) IV Push once  dextrose Oral Gel 15 Gram(s) Oral once PRN  fentaNYL   Infusion 0.5 MICROgram(s)/kG/Hr IV Continuous <Continuous>  glucagon  Injectable 1 milliGRAM(s) IntraMuscular once  heparin   Injectable 5000 Unit(s) SubCutaneous every 8 hours  insulin lispro (ADMELOG) corrective regimen sliding scale   SubCutaneous every 6 hours  norepinephrine Infusion 0.05 MICROgram(s)/kG/Min IV Continuous <Continuous>  pantoprazole  Injectable 40 milliGRAM(s) IV Push every 24 hours  propofol Infusion 10.003 MICROgram(s)/kG/Min IV Continuous <Continuous>      Objective:  Vital Signs Last 24 Hrs  T(C): 37.3 (09 Sep 2022 10:53), Max: 38 (09 Sep 2022 06:05)  T(F): 99.1 (09 Sep 2022 10:53), Max: 100.4 (09 Sep 2022 06:05)  HR: 85 (09 Sep 2022 11:00) (68 - 92)  BP: 103/49 (09 Sep 2022 11:00) (97/51 - 136/57)  BP(mean): 71 (09 Sep 2022 11:00) (69 - 88)  RR: 24 (09 Sep 2022 11:00) (17 - 24)  SpO2: 100% (09 Sep 2022 11:00) (93% - 100%)    Parameters below as of 09 Sep 2022 11:00  Patient On (Oxygen Delivery Method): ventilator    O2 Concentration (%): 60    PHYSICAL EXAM:  General: intubated, alert.   HEENT: NC/AT; EOMI, constricted pupils with minimal response to light   Neck: supple, trachea midline  Cardiovascular: distant heart sounds, no murmurs appreciated  Respiratory: clear breath sounds, left chest tube in place   Gastrointestinal: soft, NT, obese   Extremity exam: Legs wrapped b/l up to below the knee    Neurological: Alert and following commands. Hand  equal and 5/5 b/l.       LABS:                        7.0    8.06  )-----------( 154      ( 09 Sep 2022 06:33 )             23.7     09-09    138  |  103  |  60<H>  ----------------------------<  104<H>  4.6   |  26  |  2.33<H>    Ca    8.7      09 Sep 2022 06:33  Phos  4.4     09-09  Mg     2.4     09-09    TPro  6.2  /  Alb  2.4<L>  /  TBili  0.2  /  DBili  x   /  AST  53<H>  /  ALT  72<H>  /  AlkPhos  171<H>  09-09    PT/INR - ( 07 Sep 2022 20:08 )   PT: 13.1 sec;   INR: 1.10          PTT - ( 08 Sep 2022 05:41 )  PTT:73.2 sec        MICROBIOLOGY:        RADIOLOGY & ADDITIONAL STUDIES:

## 2022-09-09 NOTE — CHART NOTE - NSCHARTNOTEFT_GEN_A_CORE
Pt care discussed in IDT rounds. Rx and labs reviewed. Pt intubated and sedated at time of assessment; vent to VC-AC, MAP 75, fent ggt, propofol @24.6 ml/hr (649 kcal/day). Plan to initiate TF for nutrition support. Recommend:     -With current propofol rate, recommend Jevity 1.5 @35 ml/hr to provide 840 ml TF, (84%RDI), 1260 kcal (1910 kcal with propofol), 54 gProt., and 638 ml FW  -Once propofol weaned off, recommend Jevity 1.5 @50 ml/hr with LPS x1/day to provide 1200 ml TF (120%RDI), 1900 kcal, 92 gProt., and 912 ml FW. This is 29.9 kcal and 1.45 gProt. per kg IBW 63.6 kg.    RDN will continue to monitor per protocol and PRN.

## 2022-09-09 NOTE — PROGRESS NOTE ADULT - ATTENDING COMMENTS
80 yo obese F presented initially to [facility] for respiratory distress and severe sepsis 2/2 cellulitis from chronic LE edema, hx of COPD on home O2, HFpEF, HTN, HLD, PH (likely group 2/3), who had improvement in sepsis but developed acute SOB and headache while eating complicated by respiratory arrest and code blue. Unclear etiology for arrest - EKG without ischemic changes, bedside POCUS with unchanged EF, mild RV enlargement without Krishnamurthy's sign or RV dysfunction to suggest PE, negative DVT study, no food particles noted by anesthesia during intubation to suggest aspiration though suspect at this time either aspiration event or flash pulmonary edema. Sepsis improving - afebrile on current antibiotics. Required L sided chest tube for PTX leading to hypoxia, likely traumatic 2/2 rib fx now confirmed on CT to have multiple rib fx B/L related to CPR, R>L.  Consulted CT surgery as pt failed SBT trial 2/2 flail chest and tachypnea. Will try to optimize pain control but may need to consider rib plating for fx if unable to wean from ventilator.  BRANT on CKD likely related to cardiac arrest, possible ATN, monitor I/O. Appears euvolemic, no IVF at this time, may consider further diuresis. Also noted to have hyperdense mass in R kidney, may require further imaging pending progress in MICU. Start TF as unlikely to be extubated today. Hep SQ for DVT ppx.

## 2022-09-09 NOTE — CONSULT NOTE ADULT - ASSESSMENT
Assesment:  80yo morbidly obese female w/ pmhx of HTN, HLD, HFpEF (EF 56%), COPD (on home O2-3L), chronic LE edema with neuropathic pain and cellulitis, DALIA, PVD p/w respiratory distress x 1 day found to have cellulitis on lower extremity.  S/p Cardiac arrest on 9/7/22 w/ intubation 2/2 hypoxia due to aspiration vs PE vs flash pulmonary edema. Pt relatively hypotensive on 09/07 with episodes of hypertension to 130/140 systolic. Pt coded on floor on 09/07, found to have no pulse with blue complexion by nurse during meal. Intubation on PM 09/07. ABG done showing mild hypercapnea - 25. EKG done normal. Chest x-ray to r/o aspiration showed diffuse patchy airspace opacities throughout the lungs bilaterally, suggestive of pulmonary alveolar edema. Bedside echo showing no RV dysfunction.  On 9/8/22 found to have left pneumothorax which left pigtail was placed with resolution of pneumothorax.  Thoracic surgery consulted for concern of frail ribs.    Plan:  Problem 1:  Fail ribs  Patient synchronise with vent currently   Continue to wean vent as tolerated  Will continue to follow, if fail chest will consider patient for sternal plating.      Problem 2: Chronic heart failure with preserved ejection fraction (HFpEF)  Continue iuresis    Problem 3: COPD  Weant vent as tolerated      Problem 4: DALIA  uses CPAP    I have reviewed clinical labs tests and reports, radiology tests and reports, as well as old patient medical records, and discussed with the referring physician.

## 2022-09-09 NOTE — PROGRESS NOTE ADULT - ASSESSMENT
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 Patient was found to be severely septic i/s/o GBS bacteremia w/ b/l lower cellulitis noted on exam. TTE done i/s/o GBS bacteremia which was unremarkable. Currently patient is on CTX 2g IV QD which should be continued. Patient is s/p arrest on the medical floors yesterday evening w/ ROSC achieved and now intubated and sedated on pressors in the ICU. Unknown cause of arrest yesterday possible pulmonary source but still unclear picture. Patient with downtrending WBC on CTX 2g.   Recommendations:   - c/w CTX 2g QD   - Follow up sensitivities from blood cultures 09/06   - FU surveillance cultures 09/07     Please reconsult with questions or additional concerns. Case discussed with Dr. Ordonez and primary team.     Note is finalized with attending attestation completion.        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 Patient was found to be severely septic i/s/o GBS bacteremia w/ b/l lower cellulitis noted on exam. TTE done i/s/o GBS bacteremia which was unremarkable. Currently patient is on CTX 2g IV QD which should be continued. Patient is s/p arrest on the medical floors yesterday evening w/ ROSC achieved and now intubated and sedated on pressors in the ICU. Unknown cause of arrest yesterday possible pulmonary source but still unclear picture. Patient with downtrending WBC on CTX 2g.   Recommendations:   - c/w CTX 2g QD.  Would plan for two week course from 1st negative blood culture - if culture from 9/7 remains neg will  be 9/7-9/20  - Follow up sensitivities from blood cultures 09/06   - FU surveillance cultures 09/07     Please reconsult with questions or additional concerns. Case discussed with Dr. Ordonez and primary team.     Note is finalized with attending attestation completion.

## 2022-09-10 NOTE — PROGRESS NOTE ADULT - ASSESSMENT
82yo morbidly obese female w/ pmhx of HTN, HLD, HFpEF (EF 56%), COPD (on home O2-3L), chronic LE edema with neuropathic pain and cellulitis, DALIA, PVD p/w respiratory distress x 1 day found to have cellulitis on lower extremity.    NEURO  #Intubated  s/p cardiac arrest w/ intubation 2/2 hypoxia due to aspiration vs PE vs flash pulmonary edema. Pt relatively hypotensive on 09/07 with episodes of hypertension to 130/140 systolic. Pt coded on floor on 09/07, found to have no pulse with blue complexion by nurse during meal. Intubation on PM 09/07. ABG done showing mild hypercapnea - 25. EKG normal. Chest x-ray to r/o aspiration showed diffuse patchy airspace opacities throughout the lungs bilaterally, suggestive of pulmonary alveolar edema. Bedside echo showing no RV dysfunction, PE less likely. Currently on propofol 10mcg/kg/min. CT head done as pt was complaining of head pain,  neg for stroke  Plan:  - c/w repository ventilation and propofol + fentanyl  - transitioned to CPAP??    PULM  #Pneumothorax  Pt originally presented to MICU with signs of decreased lung sliding on bedside ultrasound but no pneumothorax on x-ray. Pt developed left sided pneumothorax on AM of 09/08 with drop in sat from high 90s to 70s but no BP changes. Chest tube placed with improvement in sats to high 90s again. Ventilatory settings adjusted to minimize risk of barotrauma to right lung. Repeat x-rays showing lung reexpansion.  Vent settings: FiO2 50, , RR 12, PEEP 5      #Chronic obstructive pulmonary disease (COPD).   Pt with COPD on home 3L. Patient initially presented with NRB, now intubated s/p cardiac arrest.       #Pulm HTN  possibly 2/2 longstanding DALIA. Evidence of new pulm HTN on echo w/ pap 48 compared to TTE from 2022. 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 originally started on Heparin infusion 1800u/hr for full anticoagulation on 09/07 then transitioned to sub q once PE ruled unlikely.  Plan:  - c/w subq heparin     #Acute resp failure  2/2 hypoxia due to aspiration vs flash pulmonary edema. Pt endured cardiac arrest secondary to resp failure likely in setting of aspiration while eating vs flash pulmonary edema 2/2 chronic htn and chronic HF (relatively the same EF as previous TTE). New onset pleural effusions likely in the same setting, pt s/p intubation. Plan as above    CARDIO  #Cardiac Arrest  possibly 2/2 hypoxia in setting of aspiration vs PE vs flash pulmonary edema. Pt relatively hypotensive throughout the day with episodes of hypertension to 130/140 systolic. recieved fluids on PM 09/07 and a unit of blood on 09/07 for anemia. In PM on 09/07, complained of difficulty breathing during meal, found unresponsive, hypotensive, w/ blue complexion by nurse. CPR initiated with 1 dose of Epi given, s/p intubation. Currently on propofol with goal of transition to precedex for possible extubation in AM if tolerated. Levo drip 0.05mcg/kg/min. Switched from full anticoag to Subq Heparin  Plan:  - levophed weaned off  - c/w heparin   - f/u EKG  - cardiac labs downtrending - no longer following    #Chronic heart failure with preserved ejection fraction (HFpEF).   Pt on home bumex 2mg. Most recent TTE in Feb 2020 showing EF 55-60%. TTE from current visit showing EF of 56% with Pulmonary HTN - PAP 48. Holding bumex in the setting of sepsis  Plan:  - GDMT therapy as tolerated    #Hypertension.   Pt with history of hypertension on home diltiazem 180mg daily. Pt relatively hypotensive throughout the day, currently hypotensive s/p cardiac arrest and intubation. BP low threshold for pressors  Plan:  - monitor BP  - holding home antihypertensive meds    GI  #HLD (hyperlipidemia).   Pt on atorvastatin 40mg daily.  Plan:  - c/w lipitor 40 mg QHS.      RENAL  #BRANT on CKD  Baseline Cr 2.04 in July 2022, currently 2.48 up from 2.18 on entry. Possibly 2/2 to hypoperfusion/ischemia in setting of sepsis and cardiac arrest  Plan:  - continue to trend BUN, Cr  - renal dosing  - avoid nephrotoxic agents  - strict I&Os  - monitor UOP.    Right kidney mass.   CTAP s/f slightly hyperdense solid cortical mass in upper pole of R kidney.  Plan:  - consider additional renal imaging.    ENDO  No active issues      HEME  #Anemia  Pt noted to have chronic anemia, presented with HgB of 6.8 w/ drop to 6.7 during course of stay. s/p 1 unit RBC on 09/07, HgB of 7.6 today.  Plan:  - f/u H+H  - active type and screen   - f/u iron panel      ID  #Severe sepsis.   possibly 2/2 to LE Cellulitis. 4/4 SIRS criteria (T 105.2, , RR 22, WBC 25.88) with lactate 5.1 ->0.9. CT LE s/f cellulitis with no fluid collection or OM. CTa/p did not reveal infectious etiology. S/p Vancomycin 1g and cefepime 1g in ED, received one more dose of cefepime 1g IV AM 09/07. BLE edema, erythema, scaly skin c/w cellulitis.  - c/w CTX 2g IV QD per ID, plan for 2 week course from 1st negative blood cx (if cx frm 9/7 remains negative will be 9/7-9/20)  - c/w tylenol 650 mg PO q6h PRN for pain/fever  - f/u further ID recs  - blood cultures on 09/06 growing gram positive cocci in pairs, surveillance blood cultures neg        E: K>4, Phos>3, Mg>2  N: NPO intubated   DVT ppx: subq Heparin  GI ppx: Protonix 40 BID  Access: 2 peripheral lines    Full Code         82yo morbidly obese female w/ pmhx of HTN, HLD, HFpEF (EF 56%), COPD (on home O2-3L), chronic LE edema with neuropathic pain and cellulitis, DALIA, PVD p/w respiratory distress x 1 day found to have cellulitis on lower extremity.    NEURO  #Intubated  s/p cardiac arrest w/ intubation 2/2 hypoxia due to aspiration vs PE vs flash pulmonary edema. Pt relatively hypotensive on 09/07 with episodes of hypertension to 130/140 systolic. Pt coded on floor on 09/07, found to have no pulse with blue complexion by nurse during meal. Intubation on PM 09/07. ABG done showing mild hypercapnea - 25. EKG normal. Chest x-ray to r/o aspiration showed diffuse patchy airspace opacities throughout the lungs bilaterally, suggestive of pulmonary alveolar edema. Bedside echo showing no RV dysfunction, PE less likely. Currently on propofol 10mcg/kg/min. CT head done as pt was complaining of head pain,  neg for stroke  Plan:  - c/w repository ventilation and propofol + fentanyl    PULM  #Pneumothorax  Pt originally presented to MICU with signs of decreased lung sliding on bedside ultrasound but no pneumothorax on x-ray. Pt developed left sided pneumothorax on AM of 09/08 with drop in sat from high 90s to 70s but no BP changes. Chest tube placed with improvement in sats to high 90s again. Ventilatory settings adjusted to minimize risk of barotrauma to right lung. Repeat x-rays showing lung reexpansion.  Vent settings: FiO2 50, , RR 12, PEEP 5  - c/w ventilation, still concern for flail chest when transitioned to CPAP      #Chronic obstructive pulmonary disease (COPD).   Pt with COPD on home 3L. Patient initially presented with NRB, now intubated s/p cardiac arrest.       #Pulm HTN  possibly 2/2 longstanding DALIA. Evidence of new pulm HTN on echo w/ pap 48 compared to TTE from 2022. 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 originally started on Heparin infusion 1800u/hr for full anticoagulation on 09/07 then transitioned to sub q once PE ruled unlikely.  Plan:  - c/w subq heparin     #Acute resp failure  2/2 hypoxia due to aspiration vs flash pulmonary edema. Pt endured cardiac arrest secondary to resp failure likely in setting of aspiration while eating vs flash pulmonary edema 2/2 chronic htn and chronic HF (relatively the same EF as previous TTE). New onset pleural effusions likely in the same setting, pt s/p intubation. Plan as above    CARDIO  #Cardiac Arrest  possibly 2/2 hypoxia in setting of aspiration vs PE vs flash pulmonary edema. Pt relatively hypotensive throughout the day with episodes of hypertension to 130/140 systolic. recieved fluids on PM 09/07 and a unit of blood on 09/07 for anemia. In PM on 09/07, complained of difficulty breathing during meal, found unresponsive, hypotensive, w/ blue complexion by nurse. CPR initiated with 1 dose of Epi given, s/p intubation. Currently on propofol with goal of transition to precedex for possible extubation in AM if tolerated. Levo drip 0.05mcg/kg/min. Switched from full anticoag to Subq Heparin  Plan:  - levophed weaned off  - c/w heparin   - f/u EKG  - cardiac labs downtrending - no longer following    #Chronic heart failure with preserved ejection fraction (HFpEF).   Pt on home bumex 2mg. Most recent TTE in Feb 2020 showing EF 55-60%. TTE from current visit showing EF of 56% with Pulmonary HTN - PAP 48. Holding bumex in the setting of sepsis  Plan:  - GDMT therapy as tolerated    #Hypertension.   Pt with history of hypertension on home diltiazem 180mg daily. Pt relatively hypotensive throughout the day, currently hypotensive s/p cardiac arrest and intubation. BP low threshold for pressors  Plan:  - monitor BP  - holding home antihypertensive meds    GI  #HLD (hyperlipidemia).   Pt on atorvastatin 40mg daily.  Plan:  - c/w lipitor 40 mg QHS.      RENAL  #BRANT on CKD  Baseline Cr 2.04 in July 2022, currently 2.48 up from 2.18 on entry. Possibly 2/2 to hypoperfusion/ischemia in setting of sepsis and cardiac arrest  Plan:  - continue to trend BUN, Cr  - renal dosing  - avoid nephrotoxic agents  - strict I&Os  - monitor UOP.    Right kidney mass.   CTAP s/f slightly hyperdense solid cortical mass in upper pole of R kidney.  Plan:  - consider additional renal imaging.    ENDO  No active issues      HEME  #Anemia  Pt noted to have chronic anemia, presented with HgB of 6.8 w/ drop to 6.7 during course of stay. s/p 1 unit RBC on 09/07, HgB of 7.6 today.  Plan:  - f/u H+H  - active type and screen   - f/u iron panel      ID  #Severe sepsis.   possibly 2/2 to LE Cellulitis. 4/4 SIRS criteria (T 105.2, , RR 22, WBC 25.88) with lactate 5.1 ->0.9. CT LE s/f cellulitis with no fluid collection or OM. CTa/p did not reveal infectious etiology. S/p Vancomycin 1g and cefepime 1g in ED, received one more dose of cefepime 1g IV AM 09/07. BLE edema, erythema, scaly skin c/w cellulitis.  - c/w CTX 2g IV QD per ID, plan for 2 week course from 1st negative blood cx (if cx frm 9/7 remains negative will be 9/7-9/20)  - c/w tylenol 650 mg PO q6h PRN for pain/fever  - f/u further ID recs  - blood cultures on 09/06 growing gram positive cocci in pairs, surveillance blood cultures neg        E: K>4, Phos>3, Mg>2  N: NPO intubated   DVT ppx: subq Heparin  GI ppx: Protonix 40 BID  Access: 2 peripheral lines    Full Code

## 2022-09-10 NOTE — PROGRESS NOTE ADULT - ATTENDING COMMENTS
82 yo obese F presented initially to [facility] 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 Unclear etiology for arrest - EKG without ischemic changes, bedside POCUS with unchanged EF, mild RV enlargement without Krishnamurthy's sign or RV dysfunction to suggest PE, negative DVT study, no food particles noted by anesthesia during intubation to suggest aspiration though suspect at this time either aspiration event or flash pulmonary edema. Sepsis improving - afebrile on current antibiotics. Required L sided chest tube for PTX leading to hypoxia and flail chest. Air[place]ak improved, no persistent PTX on POCUS, but unable to tolerate CPAP trials 2/2 flail chest. Continue to titrate pain regimen - decrease propofol, increase fentanyl, but CTS on board as she may require rib plating. Lidocaine patch to chest.  BRANT on CKD likely related to cardiac arrest, possible ATN, monitor I/O. Worsening UOP this AM, responded to IVF bolus, but remains net + 2L. No further IVF, consider levophed if BP soft while on sedation. Also noted to have hyperdense mass in R kidney, may require further imaging pending progress in MICU. C/w TF at goal. Hep SQ for DVT ppx.

## 2022-09-10 NOTE — PROGRESS NOTE ADULT - SUBJECTIVE AND OBJECTIVE BOX
INTERVAL HPI/OVERNIGHT EVENTS:  Patient was seen and examined at bedside. As per nurse and patient, no o/n events, patient resting comfortably. No complaints at this time. Patient denies: fever, chills, lightheadedness, weakness, CP, palpitations, SOB, cough, N/V. ROS otherwise negative.    VITAL SIGNS:  T(F): 98.3 (09-10-22 @ 05:00)  HR: 89 (09-10-22 @ 08:00)  BP: 98/45 (09-10-22 @ 08:00)  RR: 11 (09-10-22 @ 08:00)  SpO2: 98% (09-10-22 @ 08:00)  Wt(kg): --      09-09-22 @ 07:01  -  09-10-22 @ 07:00  --------------------------------------------------------  IN: 2297.7 mL / OUT: 795 mL / NET: 1502.7 mL    09-10-22 @ 07:01  -  09-10-22 @ 08:19  --------------------------------------------------------  IN: 79 mL / OUT: 105 mL / NET: -26 mL        PHYSICAL EXAM:    Constitutional: resting comfortably in bed; NAD  HEENT: NC/AT, PER, anicteric sclera, no nasal discharge; MMM  Neck: supple; no JVD or thyromegaly  Respiratory: CTA B/L; no W/R/R, no retractions  Cardiac: +S1/S2; RRR; no M/R/G  Gastrointestinal: soft, NT/ND; no rebound or guarding  Back: spine midline, no bony tenderness or step-offs; no CVAT B/L  Extremities: WWP, no clubbing or cyanosis; no peripheral edema  Musculoskeletal: NROM x4; no joint swelling, tenderness or erythema  Vascular: 2+ radial, DP/PT pulses B/L  Dermatologic: skin warm, dry and intact; no rashes, wounds, or scars  Neurologic: AAOx3; CNII-XII grossly intact; no focal deficits  Psychiatric: affect and characteristics of appearance, verbalizations, behaviors are appropriate    MEDICATIONS  (STANDING):  cefTRIAXone   IVPB 2000 milliGRAM(s) IV Intermittent every 24 hours  chlorhexidine 0.12% Liquid 15 milliLiter(s) Oral Mucosa every 12 hours  chlorhexidine 2% Cloths 1 Application(s) Topical <User Schedule>  dextrose 5%. 1000 milliLiter(s) (100 mL/Hr) IV Continuous <Continuous>  dextrose 5%. 1000 milliLiter(s) (50 mL/Hr) IV Continuous <Continuous>  dextrose 50% Injectable 25 Gram(s) IV Push once  dextrose 50% Injectable 12.5 Gram(s) IV Push once  dextrose 50% Injectable 25 Gram(s) IV Push once  fentaNYL   Infusion 0.5 MICROgram(s)/kG/Hr (5.12 mL/Hr) IV Continuous <Continuous>  glucagon  Injectable 1 milliGRAM(s) IntraMuscular once  heparin   Injectable 5000 Unit(s) SubCutaneous every 8 hours  influenza  Vaccine (HIGH DOSE) 0.7 milliLiter(s) IntraMuscular once  insulin lispro (ADMELOG) corrective regimen sliding scale   SubCutaneous every 6 hours  lactated ringers. 500 milliLiter(s) (500 mL/Hr) IV Continuous <Continuous>  norepinephrine Infusion 0.05 MICROgram(s)/kG/Min (9.59 mL/Hr) IV Continuous <Continuous>  pantoprazole  Injectable 40 milliGRAM(s) IV Push every 24 hours  propofol Infusion 10.003 MICROgram(s)/kG/Min (6.14 mL/Hr) IV Continuous <Continuous>    MEDICATIONS  (PRN):  dextrose Oral Gel 15 Gram(s) Oral once PRN Blood Glucose LESS THAN 70 milliGRAM(s)/deciliter      Allergies    Altabax (Unknown)  Augmentin (Unknown)  clindamycin (Unknown)  Vaseline (Swelling)    Intolerances        LABS:                        7.6    8.25  )-----------( 149      ( 10 Sep 2022 06:14 )             25.6     09-10    137  |  104  |  56<H>  ----------------------------<  127<H>  4.9   |  24  |  2.40<H>    Ca    8.4      10 Sep 2022 06:14  Phos  5.6     09-10  Mg     2.5     09-10    TPro  6.2  /  Alb  2.5<L>  /  TBili  0.3  /  DBili  x   /  AST  32  /  ALT  54<H>  /  AlkPhos  166<H>  09-10    PT/INR - ( 10 Sep 2022 06:14 )   PT: 11.1 sec;   INR: 0.93          PTT - ( 10 Sep 2022 06:14 )  PTT:28.3 sec      RADIOLOGY & ADDITIONAL TESTS:  Reviewed INTERVAL HPI/OVERNIGHT EVENTS:  Patient is a 82 y/o F who presents w/ a chief complaint of acute hypoxic respiratory failure.    FARIBA overnight. This morning, urine output low, gave 500cc LR bolus. Patient assessed at bedside, intubated and sedated, unable to perform ROS.     VITAL SIGNS:  T(F): 98.3 (09-10-22 @ 05:00)  HR: 89 (09-10-22 @ 08:00)  BP: 98/45 (09-10-22 @ 08:00)  RR: 11 (09-10-22 @ 08:00)  SpO2: 98% (09-10-22 @ 08:00)  Wt(kg): --      09-09-22 @ 07:01  -  09-10-22 @ 07:00  --------------------------------------------------------  IN: 2297.7 mL / OUT: 795 mL / NET: 1502.7 mL    09-10-22 @ 07:01  -  09-10-22 @ 08:19  --------------------------------------------------------  IN: 79 mL / OUT: 105 mL / NET: -26 mL        PHYSICAL EXAM:    Constitutional: NAD, intubated and sedated  HEENT: NC/AT, neck supple  Respiratory: clear breath sounds b/l, chest tube in place  Cardiac: +S1/S2; RRR; no M/R/G  Gastrointestinal: soft, NT/ND; no rebound or guarding  Extremities: dermastasis below knees b/l w/ skin changes, significant edema up to knees  Vascular: 2+ radial, DP/PT pulses B/L  Dermatologic: skin warm, dry and intact; no rashes, wounds, or scars  Neurologic: sedated    MEDICATIONS  (STANDING):  cefTRIAXone   IVPB 2000 milliGRAM(s) IV Intermittent every 24 hours  chlorhexidine 0.12% Liquid 15 milliLiter(s) Oral Mucosa every 12 hours  chlorhexidine 2% Cloths 1 Application(s) Topical <User Schedule>  dextrose 5%. 1000 milliLiter(s) (100 mL/Hr) IV Continuous <Continuous>  dextrose 5%. 1000 milliLiter(s) (50 mL/Hr) IV Continuous <Continuous>  dextrose 50% Injectable 25 Gram(s) IV Push once  dextrose 50% Injectable 12.5 Gram(s) IV Push once  dextrose 50% Injectable 25 Gram(s) IV Push once  fentaNYL   Infusion 0.5 MICROgram(s)/kG/Hr (5.12 mL/Hr) IV Continuous <Continuous>  glucagon  Injectable 1 milliGRAM(s) IntraMuscular once  heparin   Injectable 5000 Unit(s) SubCutaneous every 8 hours  influenza  Vaccine (HIGH DOSE) 0.7 milliLiter(s) IntraMuscular once  insulin lispro (ADMELOG) corrective regimen sliding scale   SubCutaneous every 6 hours  lactated ringers. 500 milliLiter(s) (500 mL/Hr) IV Continuous <Continuous>  norepinephrine Infusion 0.05 MICROgram(s)/kG/Min (9.59 mL/Hr) IV Continuous <Continuous>  pantoprazole  Injectable 40 milliGRAM(s) IV Push every 24 hours  propofol Infusion 10.003 MICROgram(s)/kG/Min (6.14 mL/Hr) IV Continuous <Continuous>    MEDICATIONS  (PRN):  dextrose Oral Gel 15 Gram(s) Oral once PRN Blood Glucose LESS THAN 70 milliGRAM(s)/deciliter      Allergies    Altabax (Unknown)  Augmentin (Unknown)  clindamycin (Unknown)  Vaseline (Swelling)    Intolerances        LABS:                        7.6    8.25  )-----------( 149      ( 10 Sep 2022 06:14 )             25.6     09-10    137  |  104  |  56<H>  ----------------------------<  127<H>  4.9   |  24  |  2.40<H>    Ca    8.4      10 Sep 2022 06:14  Phos  5.6     09-10  Mg     2.5     09-10    TPro  6.2  /  Alb  2.5<L>  /  TBili  0.3  /  DBili  x   /  AST  32  /  ALT  54<H>  /  AlkPhos  166<H>  09-10    PT/INR - ( 10 Sep 2022 06:14 )   PT: 11.1 sec;   INR: 0.93          PTT - ( 10 Sep 2022 06:14 )  PTT:28.3 sec      RADIOLOGY & ADDITIONAL TESTS:  Reviewed

## 2022-09-10 NOTE — CHART NOTE - NSCHARTNOTEFT_GEN_A_CORE
Hospital Course:   80yo morbidly obese female w/ pmhx of HTN, HLD, HFpEF (EF 56%), COPD (on home O2-3L), chronic LE edema with neuropathic pain and cellulitis, DALIA, PVD p/w respiratory distress x 1 day found to have cellulitis on lower extremity.  S/p Cardiac arrest on 9/7/22 w/ intubation 2/2 hypoxia due to aspiration vs PE vs flash pulmonary edema. Pt relatively hypotensive on 09/07 with episodes of hypertension to 130/140 systolic. Pt coded on floor on 09/07, found to have no pulse with blue complexion by nurse during meal. Intubation on PM 09/07. ABG done showing mild hypercapnea - 25. EKG done normal. Chest x-ray to r/o aspiration showed diffuse patchy airspace opacities throughout the lungs bilaterally, suggestive of pulmonary alveolar edema. Bedside echo showing no RV dysfunction.  On 9/8/22 found to have left pneumothorax which left pigtail was placed with resolution of pneumothorax.  Thoracic surgery consulted for concern of frail ribs.    Plan:  Problem 1:  Fail ribs  -pt seen and examined with   -Patient synchronised with vent currently   -Continue to wean vent as tolerated  -Will continue to follow, continue to wean off sedation and vent, if fail chest when weaned off sedation, will consider patient for sternal plating.

## 2022-09-11 NOTE — PROGRESS NOTE ADULT - ASSESSMENT
82yo morbidly obese female w/ pmhx of HTN, HLD, HFpEF (EF 56%), COPD (on home O2-3L), chronic LE edema with neuropathic pain and cellulitis, DALIA, PVD p/w respiratory distress x 1 day found to have cellulitis on lower extremity.    NEURO  #Intubated  s/p cardiac arrest w/ intubation 2/2 hypoxia due to aspiration vs PE vs flash pulmonary edema. Pt relatively hypotensive on 09/07 with episodes of hypertension to 130/140 systolic. Pt coded on floor on 09/07, found to have no pulse with blue complexion by nurse during meal. Intubation on PM 09/07. ABG done showing mild hypercapnea - 25. EKG normal. Chest x-ray to r/o aspiration showed diffuse patchy airspace opacities throughout the lungs bilaterally, suggestive of pulmonary alveolar edema. Bedside echo showing no RV dysfunction, PE less likely. Currently on propofol 30mcg/kg/min. CT head done as pt was complaining of head pain,  neg for stroke  Plan:  - c/w repository ventilation and propofol + fentanyl. Goal of decreasing propofol/transition to Precedex and increasing fentanyl as needed.    PULM  #Pneumothorax  Pt originally presented to MICU with signs of decreased lung sliding on bedside ultrasound but no pneumothorax on x-ray. Pt developed left sided pneumothorax on AM of 09/08 with drop in sat from high 90s to 70s but no BP changes. Chest tube placed with improvement in sats to high 90s again. Ventilatory settings adjusted to minimize risk of barotrauma to right lung. Repeat x-rays showing lung reexpansion  Vent settings: FiO2 50, , RR 12, PEEP 5  - c/w ventilation, still concern for flail chest when transitioned to CPAP    #Broken ribs w/ possible flail chest  Pt with several broken ribs and signs of flail chest with difficulty breathing when sedation weaned off. Pt currently intubated and sedated. Thoracic surgery consulted   Plan:  - Anesthesia consulted, possible spinal block tomorrow with goal of sternal plating    #Chronic obstructive pulmonary disease (COPD).   Pt with COPD on home 3L. Patient initially presented with NRB, now intubated s/p cardiac arrest.       #Pulm HTN  possibly 2/2 longstanding DALIA. Evidence of new pulm HTN on echo w/ pap 48 compared to TTE from 2022. 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 originally started on Heparin infusion 1800u/hr for full anticoagulation on 09/07 then transitioned to sub q once PE ruled unlikely.  Plan:  - c/w subq heparin     #Acute resp failure  2/2 hypoxia due to aspiration vs flash pulmonary edema. Pt endured cardiac arrest secondary to resp failure likely in setting of aspiration while eating vs flash pulmonary edema 2/2 chronic htn and chronic HF (relatively the same EF as previous TTE). New onset pleural effusions likely in the same setting, pt s/p intubation. Plan as above    CARDIO  #Cardiac Arrest  possibly 2/2 hypoxia in setting of aspiration vs PE vs flash pulmonary edema. Pt relatively hypotensive throughout the day with episodes of hypertension to 130/140 systolic. received fluids on PM 09/07 and a unit of blood on 09/07 for anemia. In PM on 09/07, complained of difficulty breathing during meal, found unresponsive, hypotensive, w/ blue complexion by nurse. CPR initiated with 1 dose of Epi given, s/p intubation. Currently on propofol with goal of transition to precedex if tolerated. Levo drip 0.05mcg/kg/min. Switched from full anticoag to Subq Heparin  Plan:  - wean levophed as tolerated - MAP goal 70-75  - c/w heparin   - f/u EKG  - cardiac labs downtrending - no longer following    #Chronic heart failure with preserved ejection fraction (HFpEF).   Pt on home bumex 2mg. Most recent TTE in Feb 2020 showing EF 55-60%. TTE from current visit showing EF of 56% with Pulmonary HTN - PAP 48. Holding bumex in the setting of sepsis.   Plan:  - GDMT therapy as tolerated    #Hypertension.   Pt with history of hypertension on home diltiazem 180mg daily. Pt relatively hypotensive throughout the day, currently hypotensive s/p cardiac arrest and intubation. BP low threshold for pressors  Plan:  - monitor BP  - holding home antihypertensive meds    GI  #HLD (hyperlipidemia).   Pt on atorvastatin 40mg daily.  Plan:  - c/w lipitor 40 mg QHS.      RENAL  #Oliguric BRANT on CKD  Baseline Cr 2.04 in July 2022, currently 2.48 up from 2.18 on entry. Possibly 2/2 to hypoperfusion/ischemia in setting of sepsis and cardiac arrest. Cr on 09/11 uptrend to 3 w/ BUN 57, K of 5.4, and Phos 6.6. Pt given lokelma 10g x1.  Plan:  - consult nephro  - f/u urine lytes  - trend BUN, Cr  - renal dosing meds  - avoid nephrotoxic agents  - strict I&Os  - monitor UOP.    Right kidney mass.   CTAP s/f slightly hyperdense solid cortical mass in upper pole of R kidney.  Plan:  - consider additional renal imaging.    ENDO  No active issues      HEME  #Anemia  Pt noted to have chronic anemia, presented with HgB of 6.8 w/ drop to 6.7 during course of stay. s/p 1 unit RBC on 09/07, HgB of 7.6 today.  Plan:  - f/u H+H  - active type and screen   - f/u iron panel      ID  #Severe sepsis.   possibly 2/2 to LE Cellulitis. 4/4 SIRS criteria (T 105.2, , RR 22, WBC 25.88) with lactate 5.1 ->0.9. CT LE s/f cellulitis with no fluid collection or OM. CTa/p did not reveal infectious etiology. S/p Vancomycin 1g and cefepime 1g in ED, received one more dose of cefepime 1g IV AM 09/07. BLE edema, erythema, scaly skin c/w cellulitis.  - c/w CTX 2g IV QD per ID, plan for 2 week course from 1st negative blood cx (if cx frm 9/7 remains negative will be 9/7-9/20)  - c/w tylenol 650 mg PO q6h PRN for pain/fever  - f/u further ID recs  - blood cultures on 09/06 growing gram positive cocci in pairs, surveillance blood cultures neg        E: K>4, Phos>3, Mg>2  N: NPO intubated   DVT ppx: subq Heparin  GI ppx: Protonix 40 BID  Access: 2 peripheral lines    Full Code

## 2022-09-11 NOTE — CHART NOTE - NSCHARTNOTEFT_GEN_A_CORE
81 female history of CKD 4 (baseline around 2.5-2.9) HTN presented initially with resp distress and LE cellulitis 81 female history of CKD 4 (baseline around 2.5-2.9) HTN presented initially with resp distress and LE cellulitis s/p cardiac arrest on 9/7 with subsequent transfer to MICU; uop since AM of 9/11 has been dropping 20-25cc/hour labs notable for K 5.4 81 female history of CKD 4 (baseline around 2.5-2.9) HTN presented initially with resp distress and LE cellulitis s/p cardiac arrest on 9/7 with subsequent transfer to MICU; uop since AM of 9/11 has been dropping 20-25cc/hour labs notable for K 5.4 bicarb BUN/Cr 57/3.00 (up from 2.5 on admission) receieved 120 IV lasix nephrology consulted for oligouric renal failure    olgouric BRANT on CKD  likely iATN in the setting of cardiac arrest  recommend:  UA urine Na urine Cr urine urea  protein-creatinine ratio  pressor support to enhance renal perfusion (mainain MAP >70)  repeat BMP this evening  strict i's and o's  according to AKIKI 2 guidelines HD initiation for oligouric renal failure (without evidence of hyperkalemia, acidosis, volume overload) at no later than 72 hours  anticipate needing HD in the next 24 hours however if this evening BMP shows evidence of worsening hyperkalemia or acidosis will initiate HD    discussed w//MICU and SVC attending  full consult note to come in AM    Franny Trujillo D.O  PGY 5 nephrology fellow  [phone] 81 female history of CKD 4 (baseline around 2.5-2.9) HTN presented initially with resp distress and LE cellulitis s/p cardiac arrest on 9/7 with subsequent transfer to MICU; uop since AM of 9/11 has been dropping 20-25cc/hour labs notable for K 5.4 bicarb BUN/Cr 57/3.00 (up from 2.5 on admission) receieved 120 IV lasix nephrology consulted for oligouric renal failure    olgouric BRANT on CKD  likely iATN in the setting of cardiac arrest  recommend:  UA urine Na urine Cr urine urea  protein-creatinine ratio  pressor support to enhance renal perfusion (mainain MAP >70)  repeat BMP this evening  strict i's and o's  according to AKIKI 2 guidelines HD initiation for oligouric renal failure (without evidence of hyperkalemia, acidosis, overload) at no later than 72 hours  anticipate needing HD in the next 24 hours however if this evening BMP shows evidence of worsening hyperkalemia, volume/resp status or acidosis will initiate HD    discussed w//MICU and SVC attending  full consult note to come in AM    Franny Trujillo D.O  PGY 5 nephrology fellow  [phone]

## 2022-09-11 NOTE — PROGRESS NOTE ADULT - SUBJECTIVE AND OBJECTIVE BOX
CECE GABBY ROONEY, 81y, Female  [id number]  Patient is a 81y old  Female who presents with a chief complaint of Acute hypoxic respiratory failure (11 Sep 2022 15:31)      OVERNIGHT EVENTS: urine output decreased to 5-15cc per hour over night, less responsive to lasix now    SUBJECTIVE: pt assessed at bedside, sedated and intubated. Could not assess ROS.     12 Point ROS Negative unless noted otherwise above.  -------------------------------------------------------------------------------  VITAL SIGNS:  Vital Signs Last 24 Hrs  T(C): 36.9 (11 Sep 2022 14:39), Max: 37.9 (11 Sep 2022 05:17)  T(F): 98.5 (11 Sep 2022 14:39), Max: 100.3 (11 Sep 2022 05:17)  HR: 96 (11 Sep 2022 15:00) (85 - 98)  BP: 113/56 (11 Sep 2022 15:00) (83/46 - 137/61)  BP(mean): 81 (11 Sep 2022 15:00) (59 - 88)  RR: 15 (11 Sep 2022 15:00) (5 - 35)  SpO2: 94% (11 Sep 2022 15:00) (87% - 100%)    Parameters below as of 11 Sep 2022 14:00  Patient On (Oxygen Delivery Method): ventilator      I&O's Summary    10 Sep 2022 07:01  -  11 Sep 2022 07:00  --------------------------------------------------------  IN: 2287.6 mL / OUT: 480 mL / NET: 1807.6 mL    11 Sep 2022 07:01  -  11 Sep 2022 15:48  --------------------------------------------------------  IN: 1038.3 mL / OUT: 155 mL / NET: 883.3 mL        PHYSICAL EXAM:    Constitutional:  intubated and sedated, sings of difficulty breathing  HEENT: NC/AT, neck supple  Respiratory: clear breath sounds b/l, chest tube in place  Cardiac: +S1/S2; RRR; no M/R/G  Gastrointestinal: soft, NT/ND; no rebound or guarding  Extremities: dermastasis below knees b/l w/ skin changes, significant edema up to knees  Vascular: 2+ radial, DP/PT pulses B/L  Neurologic: sedated    ALLERGIES:  Allergies    Altabax (Unknown)  Augmentin (Unknown)  clindamycin (Unknown)  Vaseline (Swelling)    Intolerances        MEDICATIONS:  MEDICATIONS  (STANDING):  cefTRIAXone   IVPB 2000 milliGRAM(s) IV Intermittent every 24 hours  chlorhexidine 0.12% Liquid 15 milliLiter(s) Oral Mucosa every 12 hours  chlorhexidine 2% Cloths 1 Application(s) Topical <User Schedule>  dextrose 5%. 1000 milliLiter(s) (100 mL/Hr) IV Continuous <Continuous>  dextrose 5%. 1000 milliLiter(s) (50 mL/Hr) IV Continuous <Continuous>  dextrose 50% Injectable 25 Gram(s) IV Push once  dextrose 50% Injectable 12.5 Gram(s) IV Push once  dextrose 50% Injectable 25 Gram(s) IV Push once  fentaNYL   Infusion 0.5 MICROgram(s)/kG/Hr (5.12 mL/Hr) IV Continuous <Continuous>  glucagon  Injectable 1 milliGRAM(s) IntraMuscular once  heparin   Injectable 5000 Unit(s) SubCutaneous every 8 hours  influenza  Vaccine (HIGH DOSE) 0.7 milliLiter(s) IntraMuscular once  insulin lispro (ADMELOG) corrective regimen sliding scale   SubCutaneous every 6 hours  lidocaine   4% Patch 1 Patch Transdermal every 24 hours  norepinephrine Infusion 0.05 MICROgram(s)/kG/Min (9.59 mL/Hr) IV Continuous <Continuous>  pantoprazole  Injectable 40 milliGRAM(s) IV Push every 24 hours  polyethylene glycol 3350 17 Gram(s) Oral daily  propofol Infusion 10.003 MICROgram(s)/kG/Min (6.14 mL/Hr) IV Continuous <Continuous>  senna 2 Tablet(s) Oral daily    MEDICATIONS  (PRN):  dextrose Oral Gel 15 Gram(s) Oral once PRN Blood Glucose LESS THAN 70 milliGRAM(s)/deciliter      -------------------------------------------------------------------------------  LABS:                        7.9    13.22 )-----------( 196      ( 11 Sep 2022 09:47 )             28.3     09-11    137  |  104  |  57<H>  ----------------------------<  190<H>  5.4<H>   |  24  |  3.00<H>    Ca    8.5      11 Sep 2022 09:47  Phos  6.6     09-11  Mg     2.7     09-11    TPro  6.8  /  Alb  3.1<L>  /  TBili  0.4  /  DBili  x   /  AST  21  /  ALT  40  /  AlkPhos  179<H>  09-11    LIVER FUNCTIONS - ( 11 Sep 2022 09:47 )  Alb: 3.1 g/dL / Pro: 6.8 g/dL / ALK PHOS: 179 U/L / ALT: 40 U/L / AST: 21 U/L / GGT: x           PT/INR - ( 10 Sep 2022 06:14 )   PT: 11.1 sec;   INR: 0.93          PTT - ( 10 Sep 2022 06:14 )  PTT:28.3 sec  Urinalysis Basic - ( 11 Sep 2022 07:56 )    Color: Yellow / Appearance: Clear / SG: >=1.030 / pH: x  Gluc: x / Ketone: NEGATIVE  / Bili: Negative / Urobili: 0.2 E.U./dL   Blood: x / Protein: 30 mg/dL / Nitrite: NEGATIVE   Leuk Esterase: NEGATIVE / RBC: 5-10 /HPF / WBC < 5 /HPF   Sq Epi: x / Non Sq Epi: 0-5 /HPF / Bacteria: Many /HPF      CAPILLARY BLOOD GLUCOSE      POCT Blood Glucose.: 188 mg/dL (11 Sep 2022 11:38)      SARS-CoV-2: NotDetec (06 Sep 2022 10:32)  COVID-19 PCR: NotDetec (06 Sep 2022 10:25)  COVID-19 PCR: NotDetec (02 Jul 2022 13:33)      RADIOLOGY & ADDITIONAL TESTS: Reviewed.

## 2022-09-11 NOTE — PROGRESS NOTE ADULT - ATTENDING COMMENTS
80 yo obese F presented initially to [facility] for respiratory distress and severe sepsis 2/2 cellulitis from chronic LE edema, hx of COPD on home O2, HFpEF, HTN, HLD, PH (likely group 2/3), who had improvement in sepsis but developed acute SOB and headache while eating complicated by respiratory arrest and code blue, developed PTX due to rib fx requiring L sided chest tube. Now complicated by flail chest limiting ability to liberate patient from ventilator. Also with BRANT on CKD and oliguric renal failure. CTS on board for flail chest, may consider sternal plating. Will d/w anesthesia utility of spinal block for better pain control. Continue to try to wean propofol and obtain SAT/SBT. Oliguric renal failure with mild hyperkalemia, bedside POCUS with dilated IVC, appears hypervolemic on exam, no further IVF. Attempting diuresis w/ lasix, not responding to 40mg, will trial 80mg followed by 120mg if no response. C/w levophed to maintain higher MAP goal of 70-75. Renal consultation as she may ultimately require HD. No urgent indication at this time, will give lokelma for hyperkalemia and adjust tube feeds. 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 Now complicated by flail chest limiting ability to liberate patient from ventilator. Also with BRANT on CKD and oliguric renal failure. CTS on board for flail chest, may consider sternal plating. Will d/w anesthesia utility of spinal block for better pain control tomorrow. Continue to try to wean propofol and obtain SAT/SBT. Oliguric renal failure with mild hyperkalemia, bedside POCUS with dilated IVC, appears hypervolemic on exam, no further IVF. Trial of lasix, will consult renal for consideration of HD if no response w/ UOP. Given underlying HTN will give levophed to maintain higher MAP goal of 70-75. Continue ceftriaxone for cellulitis/bacteremia. C/w TF at goal.

## 2022-09-12 NOTE — CONSULT NOTE ADULT - ASSESSMENT
81F PMH CKD 4 (baseline around 2.5-2.9) HTN who presented with a one day history of respiratory distress and LE cellulitis hospital course c/b cardiac arrest on 9/7  Nephrology consulted for elevated creatinine.     Assessment/Plan:   #oliguric BRANT on CKD  UOP 400cc/2h   UA w/protein  uPCR 0.7  unclear baseline creatinine  etiology of BRANT includes:   likely iATN in the setting of cardiac arrest and shock        Recommend:         Thank you for the opportunity to participate in the care of your patient. The nephrology service remains available to assist with any questions or concerns. Please feel free to reach us by paging the on-call nephrology fellow for urgent issues or as below.     Franny Trujillo D.O  PGY-5, Nephrology Fellow   P: [phone]    81F PMH CKD 4 (baseline around 2.5-2.9) HTN who presented with a one day history of respiratory distress and LE cellulitis hospital course c/b cardiac arrest on 9/7  Nephrology consulted for elevated creatinine.     Assessment/Plan:   #oliguric BRANT on CKD  UOP 400cc/2h   UA w/protein  uPCR 0.7  unclear baseline creatinine  etiology of BRANT includes:   likely iATN in the setting of cardiac arrest and shock        Recommend:   given  oligouric renal failure and evidence of volume overload despite aggressive diuresis will initiate CVVHD   CVVHD rx: qb 300ml/min net negative 50/hour DFR: 2L/hour  awaiting call back from HCP  q6H BMP mag and phos while on CVVHD        Thank you for the opportunity to participate in the care of your patient. The nephrology service remains available to assist with any questions or concerns. Please feel free to reach us by paging the on-call nephrology fellow for urgent issues or as below.     Franny Trujillo D.O  PGY-5, Nephrology Fellow   P: [phone]    81F PMH CKD 4 (baseline around 2.5-2.9) HTN who presented with a one day history of respiratory distress and LE cellulitis hospital course c/b cardiac arrest on 9/7  Nephrology consulted for elevated creatinine.     Assessment/Plan:   #oliguric BRANT on CKD  UOP 400cc/2h   UA w/protein  uPCR 0.7  unclear baseline creatinine  etiology of BRANT includes:   likely iATN in the setting of cardiac arrest and shock        Recommend:   given  oligouric renal failure and evidence of volume overload despite aggressive diuresis will initiate CVVHD   CVVHD rx: qb 300ml/min net negative 50/hour DFR: 2L/hour  awaiting call back from HCP  q6H BMP mag and phos while on CVVHD  maintain MAP >70 for adequate renal perfusion  strict i's and o's          Thank you for the opportunity to participate in the care of your patient. The nephrology service remains available to assist with any questions or concerns. Please feel free to reach us by paging the on-call nephrology fellow for urgent issues or as below.     Franny Trujillo D.O  PGY-5, Nephrology Fellow   P: [phone]    81F PMH CKD 4 (baseline around 2.5-2.9) HTN who presented with a one day history of respiratory distress and LE cellulitis hospital course c/b cardiac arrest on 9/7  Nephrology consulted for elevated creatinine.     Assessment/Plan:   #oliguric BRANT on CKD  UOP 400cc/2h   UA w/protein  uPCR 0.7  unclear baseline creatinine  etiology of BRANT includes:   likely iATN in the setting of cardiac arrest and shock        Recommend:   given  oligouric renal failure and evidence of volume overload despite aggressive diuresis will initiate CVVHD   CVVHD rx: qb 300ml/min net negative 50/hour DFR: 2L/hour  consent obtained from HCP   q6H BMP mag and phos while on CVVHD  maintain MAP >70 for adequate renal perfusion  strict i's and o's          Thank you for the opportunity to participate in the care of your patient. The nephrology service remains available to assist with any questions or concerns. Please feel free to reach us by paging the on-call nephrology fellow for urgent issues or as below.     Franny Trujillo D.O  PGY-5, Nephrology Fellow   P: [phone]    81F PMH CKD 4 (baseline around 2.5-2.9) HTN who presented with a one day history of respiratory distress and LE cellulitis hospital course c/b cardiac arrest on 9/7  Nephrology consulted for elevated creatinine.     Assessment/Plan:   #oliguric BRANT on CKD - worsening  UOP 400cc/2h, overnight and this morning 5-10cc urine h ourly  UA w/protein  uPCR 0.7  unclear baseline creatinine  etiology of BRANT includes:   likely iATN in the setting of cardiac arrest and shock        Recommend:   given oligouric renal failure, hyperkalemia and evidence of volume overload despite aggressive diuresis will initiate CVVHD   CVVHD rx: qb 300ml/min net negative 50/hour DFR: 2L/hour  consent obtained from HCP   q6H BMP mag and phos while on CVVHD  maintain MAP >70 for adequate renal perfusion  strict i's and o's          Thank you for the opportunity to participate in the care of your patient. The nephrology service remains available to assist with any questions or concerns. Please feel free to reach us by paging the on-call nephrology fellow for urgent issues or as below.     Franny Trujillo D.O  PGY-5, Nephrology Fellow   P: [phone]

## 2022-09-12 NOTE — PROGRESS NOTE ADULT - ASSESSMENT
82yo morbidly obese female w/ pmhx of HTN, HLD, HFpEF (EF 56%), COPD (on home O2-3L), chronic LE edema with neuropathic pain and cellulitis, DALIA, PVD p/w respiratory distress 2/2 sepsis due to cellulitis on lower extremity. Hospital course coplicated by cardiac arrest, flail chest, and oliguric BRANT progressing to kidney failure.     NEURO  #Intubated  s/p cardiac arrest w/ intubation 2/2 hypoxia due to aspiration vs PE vs flash pulmonary edema. Pt relatively hypotensive on 09/07 with episodes of hypertension to 130/140 systolic. Pt coded on floor on 09/07, found to have no pulse with blue complexion by nurse during meal. Intubation on PM 09/07. ABG done showing mild hypercapnea - 25. EKG normal. Chest x-ray to r/o aspiration showed diffuse patchy airspace opacities throughout the lungs bilaterally, suggestive of pulmonary alveolar edema. Bedside echo showing no RV dysfunction, PE less likely. Currently on propofol 30mcg/kg/min. CT head done as pt was complaining of head pain, neg for stroke  Plan:  - c/w repository ventilation and propofol + fentanyl. Goal of decreasing propofol/transition to Precedex and increasing fentanyl as needed.    PULM  #Pneumothorax  Pt originally presented to MICU with signs of decreased lung sliding on bedside ultrasound but no pneumothorax on x-ray. Pt developed left sided pneumothorax on AM of 09/08 with drop in sat from high 90s to 70s but no BP changes. Chest tube placed with improvement in sats to high 90s again. Ventilatory settings adjusted to minimize risk of barotrauma to right lung. Repeat x-rays showing lung reexpansion  Vent settings: FiO2 50, , RR 12, PEEP 5  - c/w ventilation, still concern for flail chest when transitioned to CPAP    #Broken ribs w/ possible flail chest  Pt with several broken ribs and signs of flail chest with difficulty breathing when sedation weaned off. Pt currently intubated and sedated. Thoracic surgery consulted. Anesthesia consulted, spinal block done on 09/12 in prep for sternal plating.  Plan:  - F/u surgery for sternal plating    #Chronic obstructive pulmonary disease (COPD).   Pt with COPD on home 3L. Patient initially presented with NRB, now intubated s/p cardiac arrest.       #Pulm HTN  possibly 2/2 longstanding DALIA. Evidence of new pulm HTN on echo w/ pap 48 compared to TTE from 2022. 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 originally started on Heparin infusion 1800u/hr for full anticoagulation on 09/07 then transitioned to sub q once PE ruled unlikely.  Plan:  - c/w subq heparin     #Acute on chronic resp failure  Pt w/ COPD on home O2, currently worsening hypoxia 2/2 to aspiration vs flash pulmonary edema. Pt endured cardiac arrest secondary to resp failure likely in setting of aspiration while eating vs flash pulmonary edema 2/2 chronic htn and chronic HF (relatively the same EF as previous TTE). New onset pleural effusions likely in the same setting, pt s/p intubation. Plan as above    CARDIO  #Cardiac Arrest  possibly 2/2 hypoxia in setting of aspiration vs PE vs flash pulmonary edema. Pt relatively hypotensive throughout the day with episodes of hypertension to 130/140 systolic. received fluids on PM 09/07 and a unit of blood on 09/07 for anemia. In PM on 09/07, complained of difficulty breathing during meal, found unresponsive, hypotensive, w/ blue complexion by nurse. CPR initiated with 1 dose of Epi given, s/p intubation. Currently on propofol with goal of transition to precedex if tolerated. Levo drip continuing to increase, 0.28mcg/kg/min. On Subq Heparin  Plan:  - wean levophed as tolerated - MAP goal 70-75  - c/w heparin     #Chronic heart failure with preserved ejection fraction (HFpEF).   Pt on home bumex 2mg. Most recent TTE in Feb 2020 showing EF 55-60%. TTE from current visit showing EF of 56% with Pulmonary HTN - PAP 48. Holding bumex in the setting of sepsis and worsening kidney function  Plan:  - GDMT therapy as tolerated    #Hypertension.   Pt with history of hypertension on home diltiazem 180mg daily. Pt relatively hypotensive throughout the day, currently hypotensive s/p cardiac arrest and intubation. BP low threshold for pressors  Plan:  - monitor BP  - holding home antihypertensive meds    GI  #HLD (hyperlipidemia).   Pt on atorvastatin 40mg daily.  Plan:  - c/w lipitor 40 mg QHS.      RENAL  #Oliguric BRANT on CKD  Baseline Cr 2.04 in July 2022, currently uptredning to 3.62. Possibly 2/2 to hypoperfusion/ischemia in setting of sepsis and cardiac arrest. Nephro consulted. HD cath placed on 09/12 for CVVHD in setting of worsening electrolytes and increasing fluid status  Plan:  - f/u labs after HD  - trend BUN, Cr  - renal dosing meds  - avoid nephrotoxic agents  - strict I&Os  - monitor UOP.    #Right kidney mass.   CTAP s/f slightly hyperdense solid cortical mass in upper pole of R kidney.  Plan:  - consider additional renal imaging when stable    ENDO  No active issues      HEME  #Anemia  Pt noted to have chronic anemia, presented with HgB of 6.8 w/ drop to 6.7 during course of stay. s/p 1 unit RBC on 09/07, HgB of 8 today.  Plan:  - f/u H+H  - active type and screen   - f/u iron panel      ID  #Severe sepsis.   possibly 2/2 to LE Cellulitis. 4/4 SIRS criteria (T 105.2, , RR 22, WBC 25.88) with lactate 5.1 ->0.9. CT LE s/f cellulitis with no fluid collection or OM. CTa/p did not reveal infectious etiology. S/p Vancomycin 1g and cefepime 1g in ED, received one more dose of cefepime 1g IV AM 09/07. BLE edema, erythema, scaly skin c/w cellulitis. Transitioned to CTX 2g IV QD on 09/7 but new t-max on night of 09/11-12 w/ pos sputum culture w/ gram pos rods and cocci in pairs. Transitioned to cefepime 1g q12 and received 1 dose vanc 1.5g (dose dependent due to kidney status).  Plan:  - c/w cefepime and vanc  - c/w tylenol 650 mg PO q6h PRN for pain/fever  - f/u further ID recs  - blood cultures showing no growth         E: K>4, Phos>3, Mg>2  N: NPO intubated   DVT ppx: subq Heparin  GI ppx: Protonix 40 BID  Access: 2 peripheral lines    Full Code         82yo morbidly obese female w/ pmhx of HTN, HLD, HFpEF (EF 56%), COPD (on home O2-3L), chronic LE edema with neuropathic pain and cellulitis, DALIA, PVD p/w respiratory distress 2/2 sepsis due to cellulitis on lower extremity. Hospital course coplicated by cardiac arrest, flail chest, and oliguric BRANT progressing to kidney failure.     NEURO  #Intubated  s/p cardiac arrest w/ intubation 2/2 hypoxia due to aspiration vs PE vs flash pulmonary edema. Pt relatively hypotensive on 09/07 with episodes of hypertension to 130/140 systolic. Pt coded on floor on 09/07, found to have no pulse with blue complexion by nurse during meal. Intubation on PM 09/07. ABG done showing mild hypercapnea - 25. EKG normal. Chest x-ray to r/o aspiration showed diffuse patchy airspace opacities throughout the lungs bilaterally, suggestive of pulmonary alveolar edema. Bedside echo showing no RV dysfunction, PE less likely. Currently on propofol 30mcg/kg/min. CT head done as pt was complaining of head pain, neg for stroke  Plan:  - c/w repository ventilation and propofol + fentanyl. Goal of decreasing propofol/transition to Precedex and increasing fentanyl as needed.    PULM  #Pneumothorax  Pt originally presented to MICU with signs of decreased lung sliding on bedside ultrasound but no pneumothorax on x-ray. Pt developed left sided pneumothorax on AM of 09/08 with drop in sat from high 90s to 70s but no BP changes. Chest tube placed with improvement in sats to high 90s again. Ventilatory settings adjusted to minimize risk of barotrauma to right lung. Repeat x-rays showing lung reexpansion  Vent settings: FiO2 50, , RR 12, PEEP 5  - c/w ventilation, still concern for flail chest when transitioned to CPAP    #Broken ribs w/ possible flail chest  Pt with several broken ribs and signs of flail chest with difficulty breathing when sedation weaned off. Pt currently intubated and sedated. Thoracic surgery consulted. Anesthesia consulted, spinal block done on 09/12 in prep for sternal plating.  Plan:  - F/u surgery for sternal plating    #Chronic obstructive pulmonary disease (COPD).   Pt with COPD on home 3L. Patient initially presented with NRB, now intubated s/p cardiac arrest.       #Pulm HTN  possibly 2/2 longstanding DALIA. Evidence of new pulm HTN on echo w/ pap 48 compared to TTE from 2022. 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 originally started on Heparin infusion 1800u/hr for full anticoagulation on 09/07 then transitioned to sub q once PE ruled unlikely.  Plan:  - c/w subq heparin     #Acute on chronic resp failure  Pt w/ COPD on home O2, currently worsening hypoxia 2/2 to aspiration vs flash pulmonary edema. Pt endured cardiac arrest secondary to resp failure likely in setting of aspiration while eating vs flash pulmonary edema 2/2 chronic htn and chronic HF (relatively the same EF as previous TTE). New onset pleural effusions likely in the same setting, pt s/p intubation. Plan as above    CARDIO  #Cardiac Arrest  possibly 2/2 hypoxia in setting of aspiration vs PE vs flash pulmonary edema. Pt relatively hypotensive throughout the day with episodes of hypertension to 130/140 systolic. received fluids on PM 09/07 and a unit of blood on 09/07 for anemia. In PM on 09/07, complained of difficulty breathing during meal, found unresponsive, hypotensive, w/ blue complexion by nurse. CPR initiated with 1 dose of Epi given, s/p intubation. Currently on propofol with goal of transition to precedex if tolerated. Levo drip continuing to increase, 0.28mcg/kg/min. On Subq Heparin  Plan:  - wean levophed as tolerated - MAP goal 70-75  - c/w heparin     #Chronic heart failure with preserved ejection fraction (HFpEF).   Pt on home bumex 2mg. Most recent TTE in Feb 2020 showing EF 55-60%. TTE from current visit showing EF of 56% with Pulmonary HTN - PAP 48. Experienced pulmonary edema and effusion postcardiac arrest Holding bumex in the setting of sepsis and worsening kidney function  Plan:  - GDMT therapy as tolerated    #Hypertension.   Pt with history of hypertension on home diltiazem 180mg daily. Pt relatively hypotensive throughout the day, currently hypotensive s/p cardiac arrest and intubation. BP low threshold for pressors  Plan:  - monitor BP  - holding home antihypertensive meds    GI  #HLD (hyperlipidemia).   Pt on atorvastatin 40mg daily.  Plan:  - c/w lipitor 40 mg QHS.      RENAL  #Oliguric BRANT on CKD  Baseline Cr 2.04 in July 2022, currently uptredning to 3.62. Possibly 2/2 to hypoperfusion/ischemia in setting of sepsis and cardiac arrest. Nephro consulted. HD cath placed on 09/12 for CVVHD in setting of worsening electrolytes and increasing fluid status  Plan:  - f/u labs after HD  - trend BUN, Cr  - renal dosing meds  - avoid nephrotoxic agents  - strict I&Os  - monitor UOP.    #Right kidney mass.   CTAP s/f slightly hyperdense solid cortical mass in upper pole of R kidney.  Plan:  - consider additional renal imaging when stable    ENDO  No active issues      HEME  #Anemia  Pt noted to have chronic anemia, presented with HgB of 6.8 w/ drop to 6.7 during course of stay. s/p 1 unit RBC on 09/07, HgB of 8 today.  Plan:  - f/u H+H  - active type and screen   - f/u iron panel      ID  #Severe sepsis.   possibly 2/2 to LE Cellulitis. 4/4 SIRS criteria (T 105.2, , RR 22, WBC 25.88) with lactate 5.1 ->0.9. CT LE s/f cellulitis with no fluid collection or OM. CTa/p did not reveal infectious etiology. S/p Vancomycin 1g and cefepime 1g in ED, received one more dose of cefepime 1g IV AM 09/07. BLE edema, erythema, scaly skin c/w cellulitis. Transitioned to CTX 2g IV QD on 09/7 but new t-max on night of 09/11-12 w/ pos sputum culture w/ gram pos rods and cocci in pairs. Transitioned to cefepime 1g q12 and received 1 dose vanc 1.5g (dose dependent due to kidney status).  Plan:  - c/w cefepime and vanc  - c/w tylenol 650 mg PO q6h PRN for pain/fever  - f/u further ID recs  - blood cultures showing no growth         E: K>4, Phos>3, Mg>2  N: NPO intubated   DVT ppx: subq Heparin  GI ppx: Protonix 40 BID  Access: 2 peripheral lines    Full Code

## 2022-09-12 NOTE — PROGRESS NOTE ADULT - SUBJECTIVE AND OBJECTIVE BOX
CECE GABBY ROONEY, 81y, Female  [id number]  Patient is a 81y old  Female who presents with a chief complaint of Acute hypoxic respiratory failure (12 Sep 2022 16:09)      OVERNIGHT EVENTS:     SUBJECTIVE:    12 Point ROS Negative unless noted otherwise above.  -------------------------------------------------------------------------------  VITAL SIGNS:  Vital Signs Last 24 Hrs  T(C): 36.9 (12 Sep 2022 13:38), Max: 38.2 (12 Sep 2022 06:05)  T(F): 98.4 (12 Sep 2022 13:38), Max: 100.8 (12 Sep 2022 06:05)  HR: 97 (12 Sep 2022 16:43) (92 - 101)  BP: 124/59 (12 Sep 2022 16:00) (90/44 - 136/63)  BP(mean): 85 (12 Sep 2022 16:00) (63 - 91)  RR: 17 (12 Sep 2022 16:00) (14 - 34)  SpO2: 93% (12 Sep 2022 16:43) (90% - 99%)    Parameters below as of 12 Sep 2022 16:00  Patient On (Oxygen Delivery Method): ventilator    O2 Concentration (%): 60  I&O's Summary    11 Sep 2022 07:01  -  12 Sep 2022 07:00  --------------------------------------------------------  IN: 4229.1 mL / OUT: 422 mL / NET: 3807.1 mL    12 Sep 2022 07:01  -  12 Sep 2022 17:13  --------------------------------------------------------  IN: 1639.5 mL / OUT: 117 mL / NET: 1522.5 mL        PHYSICAL EXAM:    General: NAD, well developed  HEENT: NC/AT; EOMI, PERRLA, anicteric sclera; moist mucosal membranes.  Neck: supple, trachea midline  Cardiovascular: RRR, +S1/S2; NO M/R/G  Respiratory: CTA B/L; no W/R/R  Gastrointestinal: soft, NT/ND; +BSx4  Extremities: WWP; no edema or cyanosis  Vascular: 2+ radial, DP/PT pulses B/L  Neurological: AAOx3; no focal deficits    ALLERGIES:  Allergies    Altabax (Unknown)  Augmentin (Unknown)  clindamycin (Unknown)  Vaseline (Swelling)    Intolerances        MEDICATIONS:  MEDICATIONS  (STANDING):  chlorhexidine 0.12% Liquid 15 milliLiter(s) Oral Mucosa every 12 hours  chlorhexidine 2% Cloths 1 Application(s) Topical <User Schedule>  CRRT Treatment    <Continuous>  dextrose 5%. 1000 milliLiter(s) (100 mL/Hr) IV Continuous <Continuous>  dextrose 5%. 1000 milliLiter(s) (50 mL/Hr) IV Continuous <Continuous>  dextrose 50% Injectable 25 Gram(s) IV Push once  dextrose 50% Injectable 12.5 Gram(s) IV Push once  dextrose 50% Injectable 25 Gram(s) IV Push once  fentaNYL   Infusion 0.5 MICROgram(s)/kG/Hr (5.12 mL/Hr) IV Continuous <Continuous>  glucagon  Injectable 1 milliGRAM(s) IntraMuscular once  heparin   Injectable 5000 Unit(s) SubCutaneous every 8 hours  influenza  Vaccine (HIGH DOSE) 0.7 milliLiter(s) IntraMuscular once  insulin lispro (ADMELOG) corrective regimen sliding scale   SubCutaneous every 6 hours  lidocaine   4% Patch 1 Patch Transdermal every 24 hours  norepinephrine Infusion 0.05 MICROgram(s)/kG/Min (4.8 mL/Hr) IV Continuous <Continuous>  pantoprazole  Injectable 40 milliGRAM(s) IV Push every 24 hours  polyethylene glycol 3350 17 Gram(s) Oral daily  propofol Infusion 10.003 MICROgram(s)/kG/Min (6.14 mL/Hr) IV Continuous <Continuous>  PureFlow Dialysate RFP-400 (K 2 / Ca 3) 5000 milliLiter(s) (2000 mL/Hr) CRRT <Continuous>  senna 2 Tablet(s) Oral daily    MEDICATIONS  (PRN):  dextrose Oral Gel 15 Gram(s) Oral once PRN Blood Glucose LESS THAN 70 milliGRAM(s)/deciliter  sodium chloride 0.9% lock flush 10 milliLiter(s) IV Push every 1 hour PRN Pre/post blood products, medications, blood draw, and to maintain line patency      -------------------------------------------------------------------------------  LABS:                        8.0    16.35 )-----------( 248      ( 12 Sep 2022 04:49 )             28.5     09-12    138  |  104  |  58<H>  ----------------------------<  176<H>  5.4<H>   |  22  |  3.62<H>    Ca    8.4      12 Sep 2022 04:49  Phos  5.8     09-12  Mg     2.6     09-12    TPro  6.8  /  Alb  2.7<L>  /  TBili  0.5  /  DBili  x   /  AST  18  /  ALT  30  /  AlkPhos  206<H>  09-12    LIVER FUNCTIONS - ( 12 Sep 2022 04:49 )  Alb: 2.7 g/dL / Pro: 6.8 g/dL / ALK PHOS: 206 U/L / ALT: 30 U/L / AST: 18 U/L / GGT: x             Urinalysis Basic - ( 11 Sep 2022 17:56 )    Color: Yellow / Appearance: SL Cloudy / SG: >=1.030 / pH: x  Gluc: x / Ketone: NEGATIVE  / Bili: Negative / Urobili: 0.2 E.U./dL   Blood: x / Protein: 30 mg/dL / Nitrite: NEGATIVE   Leuk Esterase: NEGATIVE / RBC: < 5 /HPF / WBC < 5 /HPF   Sq Epi: x / Non Sq Epi: 0-5 /HPF / Bacteria: Present /HPF      CAPILLARY BLOOD GLUCOSE      POCT Blood Glucose.: 168 mg/dL (12 Sep 2022 11:17)      Culture - Sputum (collected 12 Sep 2022 10:45)  Source: .Sputum Sputum  Gram Stain (12 Sep 2022 16:59):    Rare epithelial cells    Few WBC's    Rare Gram Positive Rods    Rare Gram positive cocci in pairs      SARS-CoV-2: NotDetec (06 Sep 2022 10:32)  COVID-19 PCR: NotDetec (06 Sep 2022 10:25)  COVID-19 PCR: NotDetec (02 Jul 2022 13:33)      RADIOLOGY & ADDITIONAL TESTS: Reviewed.       CECE GABBY ROONEY, 81y, Female  [id number]  Patient is a 81y old  Female who presents with a chief complaint of Acute hypoxic respiratory failure (12 Sep 2022 16:09)      OVERNIGHT EVENTS: none    SUBJECTIVE: pt assessed at bedside, intubated and sedated. Responsive to sternal rub. ROS could not be attained.    12 Point ROS Negative unless noted otherwise above.  -------------------------------------------------------------------------------  VITAL SIGNS:  Vital Signs Last 24 Hrs  T(C): 36.9 (12 Sep 2022 13:38), Max: 38.2 (12 Sep 2022 06:05)  T(F): 98.4 (12 Sep 2022 13:38), Max: 100.8 (12 Sep 2022 06:05)  HR: 97 (12 Sep 2022 16:43) (92 - 101)  BP: 124/59 (12 Sep 2022 16:00) (90/44 - 136/63)  BP(mean): 85 (12 Sep 2022 16:00) (63 - 91)  RR: 17 (12 Sep 2022 16:00) (14 - 34)  SpO2: 93% (12 Sep 2022 16:43) (90% - 99%)    Parameters below as of 12 Sep 2022 16:00  Patient On (Oxygen Delivery Method): ventilator    O2 Concentration (%): 60  I&O's Summary    11 Sep 2022 07:01  -  12 Sep 2022 07:00  --------------------------------------------------------  IN: 4229.1 mL / OUT: 422 mL / NET: 3807.1 mL    12 Sep 2022 07:01  -  12 Sep 2022 17:13  --------------------------------------------------------  IN: 1639.5 mL / OUT: 117 mL / NET: 1522.5 mL        PHYSICAL EXAM:    Constitutional:  intubated and sedated, responsive to sternal rub  HEENT: NC/AT, neck supple  Respiratory: clear breath sounds b/l, L chest tube in place  Cardiac: +S1/S2; RRR; no M/R/G  Gastrointestinal: soft, NT/ND; no rebound or guarding  Extremities: dermastasis below knees b/l w/ skin changes, significant edema up to knees  Vascular: 2+ radial pulses  Neurologic: AOx0, sedated    ALLERGIES:  Allergies    Altabax (Unknown)  Augmentin (Unknown)  clindamycin (Unknown)  Vaseline (Swelling)    Intolerances        MEDICATIONS:  MEDICATIONS  (STANDING):  chlorhexidine 0.12% Liquid 15 milliLiter(s) Oral Mucosa every 12 hours  chlorhexidine 2% Cloths 1 Application(s) Topical <User Schedule>  CRRT Treatment    <Continuous>  dextrose 5%. 1000 milliLiter(s) (100 mL/Hr) IV Continuous <Continuous>  dextrose 5%. 1000 milliLiter(s) (50 mL/Hr) IV Continuous <Continuous>  dextrose 50% Injectable 25 Gram(s) IV Push once  dextrose 50% Injectable 12.5 Gram(s) IV Push once  dextrose 50% Injectable 25 Gram(s) IV Push once  fentaNYL   Infusion 0.5 MICROgram(s)/kG/Hr (5.12 mL/Hr) IV Continuous <Continuous>  glucagon  Injectable 1 milliGRAM(s) IntraMuscular once  heparin   Injectable 5000 Unit(s) SubCutaneous every 8 hours  influenza  Vaccine (HIGH DOSE) 0.7 milliLiter(s) IntraMuscular once  insulin lispro (ADMELOG) corrective regimen sliding scale   SubCutaneous every 6 hours  lidocaine   4% Patch 1 Patch Transdermal every 24 hours  norepinephrine Infusion 0.05 MICROgram(s)/kG/Min (4.8 mL/Hr) IV Continuous <Continuous>  pantoprazole  Injectable 40 milliGRAM(s) IV Push every 24 hours  polyethylene glycol 3350 17 Gram(s) Oral daily  propofol Infusion 10.003 MICROgram(s)/kG/Min (6.14 mL/Hr) IV Continuous <Continuous>  PureFlow Dialysate RFP-400 (K 2 / Ca 3) 5000 milliLiter(s) (2000 mL/Hr) CRRT <Continuous>  senna 2 Tablet(s) Oral daily    MEDICATIONS  (PRN):  dextrose Oral Gel 15 Gram(s) Oral once PRN Blood Glucose LESS THAN 70 milliGRAM(s)/deciliter  sodium chloride 0.9% lock flush 10 milliLiter(s) IV Push every 1 hour PRN Pre/post blood products, medications, blood draw, and to maintain line patency      -------------------------------------------------------------------------------  LABS:                        8.0    16.35 )-----------( 248      ( 12 Sep 2022 04:49 )             28.5     09-12    138  |  104  |  58<H>  ----------------------------<  176<H>  5.4<H>   |  22  |  3.62<H>    Ca    8.4      12 Sep 2022 04:49  Phos  5.8     09-12  Mg     2.6     09-12    TPro  6.8  /  Alb  2.7<L>  /  TBili  0.5  /  DBili  x   /  AST  18  /  ALT  30  /  AlkPhos  206<H>  09-12    LIVER FUNCTIONS - ( 12 Sep 2022 04:49 )  Alb: 2.7 g/dL / Pro: 6.8 g/dL / ALK PHOS: 206 U/L / ALT: 30 U/L / AST: 18 U/L / GGT: x             Urinalysis Basic - ( 11 Sep 2022 17:56 )    Color: Yellow / Appearance: SL Cloudy / SG: >=1.030 / pH: x  Gluc: x / Ketone: NEGATIVE  / Bili: Negative / Urobili: 0.2 E.U./dL   Blood: x / Protein: 30 mg/dL / Nitrite: NEGATIVE   Leuk Esterase: NEGATIVE / RBC: < 5 /HPF / WBC < 5 /HPF   Sq Epi: x / Non Sq Epi: 0-5 /HPF / Bacteria: Present /HPF      CAPILLARY BLOOD GLUCOSE      POCT Blood Glucose.: 168 mg/dL (12 Sep 2022 11:17)      Culture - Sputum (collected 12 Sep 2022 10:45)  Source: .Sputum Sputum  Gram Stain (12 Sep 2022 16:59):    Rare epithelial cells    Few WBC's    Rare Gram Positive Rods    Rare Gram positive cocci in pairs      SARS-CoV-2: NotDetec (06 Sep 2022 10:32)  COVID-19 PCR: NotDetec (06 Sep 2022 10:25)  COVID-19 PCR: NotDetec (02 Jul 2022 13:33)      RADIOLOGY & ADDITIONAL TESTS: Reviewed.

## 2022-09-12 NOTE — PROGRESS NOTE ADULT - SUBJECTIVE AND OBJECTIVE BOX
ID reconsulted for new temperature with broadening of antibiotics to Vanc and cefepime. ID consulted for further antibiotic management.     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 Patient was found to be severely septic i/s/o GBS bacteremia w/ b/l lower cellulitis noted on exam. TTE done i/s/o GBS bacteremia which was unremarkable. Currently patient is on CTX 2g IV QD which should be continued. Patient is s/p arrest on the medical floors ROSC achieved and now intubated and sedated on pressors in the ICU. Unknown cause of arrest. Patient with downtrending WBC on CTX 2g. Her course was c/b fail chest and left sided PTX which is s/p L chest tube own 09/08. Patient on 09/10 was noted to have worsening BRANT on CKD with possible ATN and minimal UOP so levophed was started to help with MAPs and perfusion and propofol was started. She developed a fever of 100.5 09/11 at 8pm with a Tmax of 100.8F this morning. She was noted to have increased thick secretions from ETT which appeared possibly purulent. She was broaden to cefepime and Vancomycin to cover for VAP. Other patient had central line, HD cath and axillary A line placed today. UA was sent which was negative and CXR showed b/l pleural effusion which per POCU were simple effusions.     ROS:  CONSTITUTIONAL:  Negative fever or chills, feels well, good appetite  EYES:  Negative  blurry vision or double vision  CARDIOVASCULAR:  Negative for chest pain or palpitations  RESPIRATORY:  Negative for cough, wheezing, or SOB   GASTROINTESTINAL:  Negative for nausea, vomiting, diarrhea, constipation, or abdominal pain  GENITOURINARY:  Negative frequency, urgency or dysuria  NEUROLOGIC:  No headache, confusion, dizziness, lightheadedness    Allergies    Altabax (Unknown)  Augmentin (Unknown)  clindamycin (Unknown)  Vaseline (Swelling)    Intolerances        ANTIBIOTICS/RELEVANT:  antimicrobials    immunologic:  influenza  Vaccine (HIGH DOSE) 0.7 milliLiter(s) IntraMuscular once    OTHER:  chlorhexidine 0.12% Liquid 15 milliLiter(s) Oral Mucosa every 12 hours  chlorhexidine 2% Cloths 1 Application(s) Topical <User Schedule>  CRRT Treatment    <Continuous>  dextrose 5%. 1000 milliLiter(s) IV Continuous <Continuous>  dextrose 5%. 1000 milliLiter(s) IV Continuous <Continuous>  dextrose 50% Injectable 25 Gram(s) IV Push once  dextrose 50% Injectable 12.5 Gram(s) IV Push once  dextrose 50% Injectable 25 Gram(s) IV Push once  dextrose Oral Gel 15 Gram(s) Oral once PRN  fentaNYL   Infusion 0.5 MICROgram(s)/kG/Hr IV Continuous <Continuous>  glucagon  Injectable 1 milliGRAM(s) IntraMuscular once  heparin   Injectable 5000 Unit(s) SubCutaneous every 8 hours  insulin lispro (ADMELOG) corrective regimen sliding scale   SubCutaneous every 6 hours  lidocaine   4% Patch 1 Patch Transdermal every 24 hours  norepinephrine Infusion 0.05 MICROgram(s)/kG/Min IV Continuous <Continuous>  pantoprazole  Injectable 40 milliGRAM(s) IV Push every 24 hours  polyethylene glycol 3350 17 Gram(s) Oral daily  propofol Infusion 10.003 MICROgram(s)/kG/Min IV Continuous <Continuous>  PureFlow Dialysate RFP-400 (K 2 / Ca 3) 5000 milliLiter(s) CRRT <Continuous>  senna 2 Tablet(s) Oral daily  sodium chloride 0.9% lock flush 10 milliLiter(s) IV Push every 1 hour PRN      Objective:  Vital Signs Last 24 Hrs  T(C): 37.3 (12 Sep 2022 18:10), Max: 38.2 (12 Sep 2022 06:05)  T(F): 99.2 (12 Sep 2022 18:10), Max: 100.8 (12 Sep 2022 06:05)  HR: 95 (12 Sep 2022 19:00) (92 - 101)  BP: 117/56 (12 Sep 2022 19:00) (90/44 - 136/63)  BP(mean): 80 (12 Sep 2022 19:00) (63 - 91)  RR: 23 (12 Sep 2022 19:00) (14 - 34)  SpO2: 91% (12 Sep 2022 19:00) (90% - 99%)    Parameters below as of 12 Sep 2022 19:00  Patient On (Oxygen Delivery Method): ventilator  O2 Flow (L/min): 60      PHYSICAL EXAM:  Constitutional:  intubated and sedated, mild respiratory distress at time of evaluation. Noted to have increased thick secretions during exam   HEENT: NC/AT, neck supple  Respiratory: clear breath sounds b/l, chest tube in place  Cardiac: +S1/S2; RRR; no M/R/G  Gastrointestinal: soft, NT/ND; no rebound or guarding  Extremities: dermastasis below knees b/l w/ skin changes, significant edema up to knees  Vascular: 2+ radial, DP/PT pulses B/L  Neurologic: sedated         LABS:                        7.5    15.87 )-----------( 280      ( 12 Sep 2022 17:40 )             26.5     09-12    136  |  104  |  63<H>  ----------------------------<  173<H>  5.1   |  23  |  3.72<H>    Ca    8.3<L>      12 Sep 2022 17:40  Phos  5.8     09-12  Mg     2.6     09-12    TPro  6.8  /  Alb  2.7<L>  /  TBili  0.5  /  DBili  x   /  AST  18  /  ALT  30  /  AlkPhos  206<H>  09-12      Urinalysis Basic - ( 11 Sep 2022 17:56 )    Color: Yellow / Appearance: SL Cloudy / SG: >=1.030 / pH: x  Gluc: x / Ketone: NEGATIVE  / Bili: Negative / Urobili: 0.2 E.U./dL   Blood: x / Protein: 30 mg/dL / Nitrite: NEGATIVE   Leuk Esterase: NEGATIVE / RBC: < 5 /HPF / WBC < 5 /HPF   Sq Epi: x / Non Sq Epi: 0-5 /HPF / Bacteria: Present /HPF          MICROBIOLOGY:        RADIOLOGY & ADDITIONAL STUDIES:

## 2022-09-12 NOTE — PROCEDURE NOTE - NSPOSTPRCRAD_GEN_A_CORE
chest tube in correct position
CXr ordered
central line located in the superior vena cava/no pneumothorax/post-procedure radiography performed

## 2022-09-12 NOTE — PROGRESS NOTE ADULT - ATTENDING COMMENTS
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 Now complicated by flail chest limiting ability to liberate patient from ventilator and progressive oliguric renal failure, failed lasix challenge 80mg and 120mg, worsening edema due to hypervolemia. POCUS continues to show diffuse B lines, pleural effusions, plump IVC. Did not improve with levophed targeting higher MAP, plan likely for HD today. Levo requirements slightly increased despite lowering MAP goal, spiked fever to 100.8, and sputum appears purulent. Repeat BCx and Sp Cx, UA neg for infection. Lactate normal, but given increased levo requirements will start cefepime, de-escalate based on culture results. CTS on board for flail chest, may consider sternal plating, but trial spinal block with anesthesia today and reattempt SAT/SBT tomorrow. Switch tube feeds to nephro if starting HD.

## 2022-09-12 NOTE — CONSULT NOTE ADULT - SUBJECTIVE AND OBJECTIVE BOX
Patient is a 81y old  Female who presents with a chief complaint of Acute hypoxic respiratory failure (11 Sep 2022 15:31)      HPI:  81F PMH CKD 4 (baseline around 2.5-2.9) HTN who presented with a one day history of respiratory distress and LE cellulitis hospital course c/b cardiac arrest on 9/7 - with subsequent transfer to MICU and pneumothorax on 9/8 ; Cr on arrival was 2.18 now up to 3.6  this AM- uop 412 cc/24 hours patient is net + 7L since admission (net + 4L over 24 hours) currently intubated; sedated on levophed for pressor support- /85 Cr 3.65 K 5.4 bicarb 22 nephrology consulted for elevated creatinine       In the ED:  VITAL SIGNS: Last 24 Hours  T(F): 99.6 (06 Sep 2022 20:40), Max: 105.2 (06 Sep 2022 10:41)  HR: 105 (06 Sep 2022 20:40) (101 - 145)  BP: 134/67 (06 Sep 2022 20:40) (108/53 - 148/49)  RR: 22 (06 Sep 2022 20:40) (18 - 22)  SpO2: 96% (06 Sep 2022 20:40) (95% - 100%)    Labs: WBC 25.88, Hgb 7.9, lactate 5.1 -> 1.3, Cr. 2.18, BNP 90182  UA positive for bacteria and epithelial cells    Imaging:     < from: CT Lower Extremity No Cont, Bilateral (09.06.22 @ 16:06) >  IMPRESSION:    Severe bilateral lower extremity cellulitis without drainable fluid   collection.    No CT evidence of osteomyelitis.    < from: CT Abdomen and Pelvis No Cont (09.06.22 @ 16:05) >  IMPRESSION:  Study performed without intravenous or oral contrast.  Centrilobular emphysema.  No evidence of pneumonia.  No hydronephrosis.   Retroperitoneal adenopathy. Slightly increased as compared to CT   3/4/2018. Differential diagnosis includes lymphoproliferative disorder.  Status post sigmoid resection.  3.0 cm slightly hyperdense solid cortical mass upper pole right kidney.   Is indeterminate. Differential diagnosis includes proteinaceous cyst,   tumor. Favor the former diagnosis. Correlate with targeted renal   sonography and MR. Has increased in size as compared to CT 9/10/2020.    < end of copied text >      Interventions: Duoneb x1, solumedrol 40, Cefepime 1g, Vancomycin 1g, 1L NS bolus (06 Sep 2022 21:12)   No NSAID use. Nephrology consulted for elevated creatinine.     PAST MEDICAL & SURGICAL HISTORY:  Lyme disease      DVT (deep venous thrombosis)      Skin cancer      Brain embolism and thrombosis      MRSA (methicillin resistant Staphylococcus aureus)      PNA (pneumonia)      COPD (chronic obstructive pulmonary disease)      H/O angioplasty      Status post laparoscopic-assisted sigmoidectomy      H/O shoulder surgery            Allergies:  Altabax (Unknown)  Augmentin (Unknown)  clindamycin (Unknown)  Vaseline (Swelling)      Home Medications:   cefTRIAXone   IVPB 2000 milliGRAM(s) IV Intermittent every 24 hours  chlorhexidine 0.12% Liquid 15 milliLiter(s) Oral Mucosa every 12 hours  chlorhexidine 2% Cloths 1 Application(s) Topical <User Schedule>  dextrose 5%. 1000 milliLiter(s) IV Continuous <Continuous>  dextrose 5%. 1000 milliLiter(s) IV Continuous <Continuous>  dextrose 50% Injectable 25 Gram(s) IV Push once  dextrose 50% Injectable 12.5 Gram(s) IV Push once  dextrose 50% Injectable 25 Gram(s) IV Push once  dextrose Oral Gel 15 Gram(s) Oral once PRN  fentaNYL   Infusion 0.5 MICROgram(s)/kG/Hr IV Continuous <Continuous>  glucagon  Injectable 1 milliGRAM(s) IntraMuscular once  heparin   Injectable 5000 Unit(s) SubCutaneous every 8 hours  influenza  Vaccine (HIGH DOSE) 0.7 milliLiter(s) IntraMuscular once  insulin lispro (ADMELOG) corrective regimen sliding scale   SubCutaneous every 6 hours  lidocaine   4% Patch 1 Patch Transdermal every 24 hours  norepinephrine Infusion 0.05 MICROgram(s)/kG/Min IV Continuous <Continuous>  pantoprazole  Injectable 40 milliGRAM(s) IV Push every 24 hours  polyethylene glycol 3350 17 Gram(s) Oral daily  propofol Infusion 10.003 MICROgram(s)/kG/Min IV Continuous <Continuous>  senna 2 Tablet(s) Oral daily      Hospital Medications:   MEDICATIONS  (STANDING):  cefTRIAXone   IVPB 2000 milliGRAM(s) IV Intermittent every 24 hours  chlorhexidine 0.12% Liquid 15 milliLiter(s) Oral Mucosa every 12 hours  chlorhexidine 2% Cloths 1 Application(s) Topical <User Schedule>  dextrose 5%. 1000 milliLiter(s) (100 mL/Hr) IV Continuous <Continuous>  dextrose 5%. 1000 milliLiter(s) (50 mL/Hr) IV Continuous <Continuous>  dextrose 50% Injectable 25 Gram(s) IV Push once  dextrose 50% Injectable 12.5 Gram(s) IV Push once  dextrose 50% Injectable 25 Gram(s) IV Push once  fentaNYL   Infusion 0.5 MICROgram(s)/kG/Hr (5.12 mL/Hr) IV Continuous <Continuous>  glucagon  Injectable 1 milliGRAM(s) IntraMuscular once  heparin   Injectable 5000 Unit(s) SubCutaneous every 8 hours  influenza  Vaccine (HIGH DOSE) 0.7 milliLiter(s) IntraMuscular once  insulin lispro (ADMELOG) corrective regimen sliding scale   SubCutaneous every 6 hours  lidocaine   4% Patch 1 Patch Transdermal every 24 hours  norepinephrine Infusion 0.05 MICROgram(s)/kG/Min (9.59 mL/Hr) IV Continuous <Continuous>  pantoprazole  Injectable 40 milliGRAM(s) IV Push every 24 hours  polyethylene glycol 3350 17 Gram(s) Oral daily  propofol Infusion 10.003 MICROgram(s)/kG/Min (6.14 mL/Hr) IV Continuous <Continuous>  senna 2 Tablet(s) Oral daily      SOCIAL HISTORY:  Denies ETOh, Smoking,     Family History:  FAMILY HISTORY:  FH: heart failure  mother          VITALS:  T(F): 100.8 (09-12-22 @ 06:05), Max: 100.8 (09-12-22 @ 06:05)  HR: 100 (09-12-22 @ 09:00)  BP: 128/60 (09-12-22 @ 09:00)  RR: 18 (09-12-22 @ 09:00)  SpO2: 94% (09-12-22 @ 09:00)  Wt(kg): --    09-11 @ 07:01  -  09-12 @ 07:00  --------------------------------------------------------  IN: 4229.1 mL / OUT: 422 mL / NET: 3807.1 mL    09-12 @ 07:01  -  09-12 @ 09:10  --------------------------------------------------------  IN: 246.4 mL / OUT: 25 mL / NET: 221.4 mL        CAPILLARY BLOOD GLUCOSE      POCT Blood Glucose.: 161 mg/dL (12 Sep 2022 05:44)  POCT Blood Glucose.: 150 mg/dL (11 Sep 2022 23:42)  POCT Blood Glucose.: 172 mg/dL (11 Sep 2022 17:11)  POCT Blood Glucose.: 188 mg/dL (11 Sep 2022 11:38)      Review of Systems:  unable to participate     PHYSICAL EXAM:  GENERAL: intubated; sedated   CHEST/LUNG: decreased breath sounds at the bases  HEART: Regular rate and rhythm, no murmur, no gallops, no rub   ABDOMEN: Soft, nontender, non distended  EXTREMITIES: + edema BL UE and LE      LABS:  09-12    138  |  104  |  58<H>  ----------------------------<  176<H>  5.4<H>   |  22  |  3.62<H>    Ca    8.4      12 Sep 2022 04:49  Phos  5.8     09-12  Mg     2.6     09-12    TPro  6.8  /  Alb  2.7<L>  /  TBili  0.5  /  DBili      /  AST  18  /  ALT  30  /  AlkPhos  206<H>  09-12    Creatinine Trend: 3.62 <--, 3.41 <--, 3.00 <--, 2.57 <--, 2.40 <--, 2.33 <--, 2.62 <--, 2.59 <--, 2.48 <--, 2.22 <--, 2.09 <--, 2.18 <--                        8.0    16.35 )-----------( 248      ( 12 Sep 2022 04:49 )             28.5     Urine Studies:  Urinalysis Basic - ( 11 Sep 2022 17:56 )    Color: Yellow / Appearance: SL Cloudy / SG: >=1.030 / pH:   Gluc:  / Ketone: NEGATIVE  / Bili: Negative / Urobili: 0.2 E.U./dL   Blood:  / Protein: 30 mg/dL / Nitrite: NEGATIVE   Leuk Esterase: NEGATIVE / RBC: < 5 /HPF / WBC < 5 /HPF   Sq Epi:  / Non Sq Epi: 0-5 /HPF / Bacteria: Present /HPF      Sodium, Random Urine: 25 mmol/L (09-11 @ 17:56)  Creatinine, Random Urine: 159 mg/dL (09-11 @ 17:56)  Protein/Creatinine Ratio Calculation: 0.7 Ratio (09-11 @ 17:56)  Osmolality, Random Urine: 316 mosm/kg (09-11 @ 17:56)  Potassium, Random Urine: 33 mmol/L (09-11 @ 17:56)  Sodium, Random Urine: 28 mmol/L (09-08 @ 18:29)  Creatinine, Random Urine: 87 mg/dL (09-08 @ 18:29)  Protein/Creatinine Ratio Calculation: 0.3 Ratio (09-08 @ 18:29)  Osmolality, Random Urine: 300 mosm/kg (09-08 @ 18:29)  Potassium, Random Urine: 26 mmol/L (09-08 @ 18:29)        55M presented w/elevated BUN/Cr w/o known renal history. Nephrology consulted for elevated creatinine.     Assessment/Plan:   #non-oliguric BRANT on CKD (pending r/o pre-renal vs post-renal)  #hyponatremia, hypovolemic   UOP 500cc/2h   UA w/protein and blood   uPCR pending   unclear baseline creatinine  etiology of BRANT includes:   ischemic ATN unlikely as no hx of hypotensive epsiodes   toxic ATN unlikely as no known offending agents (gentamicin, chemotherapeutics)   contrast induced nephropathy unlikely as no known contrast exposure   rhabdomyolysis unlikely given no hx of trauma though pending CKMB panel   glomerulonephritis likely as UA w/protein and blood   AIN unlikely as no hx of PPI or antibiotics   paraproteinemia pending serum immunoelectropheresis (SPEP, immunofixation, free light chains)   renal infarct unlikely as rare   no NSAID use   most likely pre-renal, will pursue advanced work-up if initial work-up negative     Recommend:   STAT bladder scan r/o obstruction   continue with IVF NS @100cc/h   add-on CPK/CKMB panel  urine and CBC % eosinophils   urine protein-creatinine ratio   daily CXR   daily BMP + urine lytes   renal sono  strict I/Os   renal diet     Thank you for the opportunity to participate in the care of your patient. The nephrology service remains available to assist with any questions or concerns. Please feel free to reach us by paging the on-call nephrology fellow for urgent issues or as below.     Franny Trujillo D.O  PGY-5, Nephrology Fellow   P: [phone]  Patient is a 81y old  Female who presents with a chief complaint of Acute hypoxic respiratory failure (11 Sep 2022 15:31)      HPI:  81F PMH CKD 4 (baseline around 2.5-2.9) HTN who presented with a one day history of respiratory distress and LE cellulitis hospital course c/b cardiac arrest on 9/7 - with subsequent transfer to MICU and pneumothorax on 9/8 ; Cr on arrival was 2.18 now up to 3.6  this AM- uop 412 cc/24 hours patient is net + 7L since admission (net + 4L over 24 hours) currently intubated; sedated on levophed for pressor support- /85 Cr 3.65 K 5.4 bicarb 22 nephrology consulted for elevated creatinine       In the ED:  VITAL SIGNS: Last 24 Hours  T(F): 99.6 (06 Sep 2022 20:40), Max: 105.2 (06 Sep 2022 10:41)  HR: 105 (06 Sep 2022 20:40) (101 - 145)  BP: 134/67 (06 Sep 2022 20:40) (108/53 - 148/49)  RR: 22 (06 Sep 2022 20:40) (18 - 22)  SpO2: 96% (06 Sep 2022 20:40) (95% - 100%)    Labs: WBC 25.88, Hgb 7.9, lactate 5.1 -> 1.3, Cr. 2.18, BNP 89501  UA positive for bacteria and epithelial cells    Imaging:     < from: CT Lower Extremity No Cont, Bilateral (09.06.22 @ 16:06) >  IMPRESSION:    Severe bilateral lower extremity cellulitis without drainable fluid   collection.    No CT evidence of osteomyelitis.    < from: CT Abdomen and Pelvis No Cont (09.06.22 @ 16:05) >  IMPRESSION:  Study performed without intravenous or oral contrast.  Centrilobular emphysema.  No evidence of pneumonia.  No hydronephrosis.   Retroperitoneal adenopathy. Slightly increased as compared to CT   3/4/2018. Differential diagnosis includes lymphoproliferative disorder.  Status post sigmoid resection.  3.0 cm slightly hyperdense solid cortical mass upper pole right kidney.   Is indeterminate. Differential diagnosis includes proteinaceous cyst,   tumor. Favor the former diagnosis. Correlate with targeted renal   sonography and MR. Has increased in size as compared to CT 9/10/2020.    < end of copied text >      Interventions: Duoneb x1, solumedrol 40, Cefepime 1g, Vancomycin 1g, 1L NS bolus (06 Sep 2022 21:12)   No NSAID use. Nephrology consulted for elevated creatinine.     PAST MEDICAL & SURGICAL HISTORY:  Lyme disease      DVT (deep venous thrombosis)      Skin cancer      Brain embolism and thrombosis      MRSA (methicillin resistant Staphylococcus aureus)      PNA (pneumonia)      COPD (chronic obstructive pulmonary disease)      H/O angioplasty      Status post laparoscopic-assisted sigmoidectomy      H/O shoulder surgery            Allergies:  Altabax (Unknown)  Augmentin (Unknown)  clindamycin (Unknown)  Vaseline (Swelling)      Home Medications:   cefTRIAXone   IVPB 2000 milliGRAM(s) IV Intermittent every 24 hours  chlorhexidine 0.12% Liquid 15 milliLiter(s) Oral Mucosa every 12 hours  chlorhexidine 2% Cloths 1 Application(s) Topical <User Schedule>  dextrose 5%. 1000 milliLiter(s) IV Continuous <Continuous>  dextrose 5%. 1000 milliLiter(s) IV Continuous <Continuous>  dextrose 50% Injectable 25 Gram(s) IV Push once  dextrose 50% Injectable 12.5 Gram(s) IV Push once  dextrose 50% Injectable 25 Gram(s) IV Push once  dextrose Oral Gel 15 Gram(s) Oral once PRN  fentaNYL   Infusion 0.5 MICROgram(s)/kG/Hr IV Continuous <Continuous>  glucagon  Injectable 1 milliGRAM(s) IntraMuscular once  heparin   Injectable 5000 Unit(s) SubCutaneous every 8 hours  influenza  Vaccine (HIGH DOSE) 0.7 milliLiter(s) IntraMuscular once  insulin lispro (ADMELOG) corrective regimen sliding scale   SubCutaneous every 6 hours  lidocaine   4% Patch 1 Patch Transdermal every 24 hours  norepinephrine Infusion 0.05 MICROgram(s)/kG/Min IV Continuous <Continuous>  pantoprazole  Injectable 40 milliGRAM(s) IV Push every 24 hours  polyethylene glycol 3350 17 Gram(s) Oral daily  propofol Infusion 10.003 MICROgram(s)/kG/Min IV Continuous <Continuous>  senna 2 Tablet(s) Oral daily      Hospital Medications:   MEDICATIONS  (STANDING):  cefTRIAXone   IVPB 2000 milliGRAM(s) IV Intermittent every 24 hours  chlorhexidine 0.12% Liquid 15 milliLiter(s) Oral Mucosa every 12 hours  chlorhexidine 2% Cloths 1 Application(s) Topical <User Schedule>  dextrose 5%. 1000 milliLiter(s) (100 mL/Hr) IV Continuous <Continuous>  dextrose 5%. 1000 milliLiter(s) (50 mL/Hr) IV Continuous <Continuous>  dextrose 50% Injectable 25 Gram(s) IV Push once  dextrose 50% Injectable 12.5 Gram(s) IV Push once  dextrose 50% Injectable 25 Gram(s) IV Push once  fentaNYL   Infusion 0.5 MICROgram(s)/kG/Hr (5.12 mL/Hr) IV Continuous <Continuous>  glucagon  Injectable 1 milliGRAM(s) IntraMuscular once  heparin   Injectable 5000 Unit(s) SubCutaneous every 8 hours  influenza  Vaccine (HIGH DOSE) 0.7 milliLiter(s) IntraMuscular once  insulin lispro (ADMELOG) corrective regimen sliding scale   SubCutaneous every 6 hours  lidocaine   4% Patch 1 Patch Transdermal every 24 hours  norepinephrine Infusion 0.05 MICROgram(s)/kG/Min (9.59 mL/Hr) IV Continuous <Continuous>  pantoprazole  Injectable 40 milliGRAM(s) IV Push every 24 hours  polyethylene glycol 3350 17 Gram(s) Oral daily  propofol Infusion 10.003 MICROgram(s)/kG/Min (6.14 mL/Hr) IV Continuous <Continuous>  senna 2 Tablet(s) Oral daily      SOCIAL HISTORY:  Denies ETOh, Smoking,     Family History:  FAMILY HISTORY:  FH: heart failure  mother          VITALS:  T(F): 100.8 (09-12-22 @ 06:05), Max: 100.8 (09-12-22 @ 06:05)  HR: 100 (09-12-22 @ 09:00)  BP: 128/60 (09-12-22 @ 09:00)  RR: 18 (09-12-22 @ 09:00)  SpO2: 94% (09-12-22 @ 09:00)  Wt(kg): --    09-11 @ 07:01  -  09-12 @ 07:00  --------------------------------------------------------  IN: 4229.1 mL / OUT: 422 mL / NET: 3807.1 mL    09-12 @ 07:01  -  09-12 @ 09:10  --------------------------------------------------------  IN: 246.4 mL / OUT: 25 mL / NET: 221.4 mL        CAPILLARY BLOOD GLUCOSE      POCT Blood Glucose.: 161 mg/dL (12 Sep 2022 05:44)  POCT Blood Glucose.: 150 mg/dL (11 Sep 2022 23:42)  POCT Blood Glucose.: 172 mg/dL (11 Sep 2022 17:11)  POCT Blood Glucose.: 188 mg/dL (11 Sep 2022 11:38)      Review of Systems:  unable to participate     PHYSICAL EXAM:  GENERAL: intubated; sedated   CHEST/LUNG: decreased breath sounds at the bases  HEART: Regular rate and rhythm, no murmur, no gallops, no rub   ABDOMEN: Soft, nontender, non distended  EXTREMITIES: + edema BL UE and LE      LABS:  09-12    138  |  104  |  58<H>  ----------------------------<  176<H>  5.4<H>   |  22  |  3.62<H>    Ca    8.4      12 Sep 2022 04:49  Phos  5.8     09-12  Mg     2.6     09-12    TPro  6.8  /  Alb  2.7<L>  /  TBili  0.5  /  DBili      /  AST  18  /  ALT  30  /  AlkPhos  206<H>  09-12    Creatinine Trend: 3.62 <--, 3.41 <--, 3.00 <--, 2.57 <--, 2.40 <--, 2.33 <--, 2.62 <--, 2.59 <--, 2.48 <--, 2.22 <--, 2.09 <--, 2.18 <--                        8.0    16.35 )-----------( 248      ( 12 Sep 2022 04:49 )             28.5     Urine Studies:  Urinalysis Basic - ( 11 Sep 2022 17:56 )    Color: Yellow / Appearance: SL Cloudy / SG: >=1.030 / pH:   Gluc:  / Ketone: NEGATIVE  / Bili: Negative / Urobili: 0.2 E.U./dL   Blood:  / Protein: 30 mg/dL / Nitrite: NEGATIVE   Leuk Esterase: NEGATIVE / RBC: < 5 /HPF / WBC < 5 /HPF   Sq Epi:  / Non Sq Epi: 0-5 /HPF / Bacteria: Present /HPF      Sodium, Random Urine: 25 mmol/L (09-11 @ 17:56)  Creatinine, Random Urine: 159 mg/dL (09-11 @ 17:56)  Protein/Creatinine Ratio Calculation: 0.7 Ratio (09-11 @ 17:56)  Osmolality, Random Urine: 316 mosm/kg (09-11 @ 17:56)  Potassium, Random Urine: 33 mmol/L (09-11 @ 17:56)  Sodium, Random Urine: 28 mmol/L (09-08 @ 18:29)  Creatinine, Random Urine: 87 mg/dL (09-08 @ 18:29)  Protein/Creatinine Ratio Calculation: 0.3 Ratio (09-08 @ 18:29)  Osmolality, Random Urine: 300 mosm/kg (09-08 @ 18:29)  Potassium, Random Urine: 26 mmol/L (09-08 @ 18:29)

## 2022-09-12 NOTE — PROGRESS NOTE ADULT - ATTENDING COMMENTS
Admitted 9/6 with Strep agalactiae bacteremia from skin source (severe bilateral LE cellulitis), course c/b cardiac arrest with subsequent flail chest and L pneumothorax requiring chest tube, new fever & leukocytosis from 9/11, Tm 100.8 today, with thick secretions noted, increased FiO2 60 (from 50).  On exam, she is intubated and sedated on NE (restarted today) with coarse BS anteriorly, L ant chest tube in place.  Abd soft, LE erythema markedly improved.  Likely pulmonary source, though CXR read as improved RLL airspace opacities.  She did not have a central line (new line placed today).  Abd benign, UA without pyuria.  Would f/u blood and sputum cultures - pending.  She now has BRANT and is requiring dialysis.  Agree with vancomycin and cefepime, dosing for CVVHD and monitoring as above.  Will follow with you - team 1.

## 2022-09-12 NOTE — PROGRESS NOTE ADULT - ATTENDING COMMENTS
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 Now complicated by flail chest limiting ability to liberate patient from ventilator and progressive oliguric renal failure, failed lasix challenge 80mg and 120mg, worsening edema due to hypervolemia. POCUS continues to show diffuse B lines, pleural effusions, plump IVC. Did not improve with levophed targeting higher MAP, plan likely for HD today. Levo requirements slightly increased despite lowering MAP goal, spiked fever to 100.8, and sputum appears purulent. Repeat BCx and Sp Cx, UA neg for infection. Lactate normal, but given increased levo requirements will start cefepime, de-escalate based on culture results. CTS on board for flail chest, may consider sternal plating, but trial spinal block with anesthesia today and reattempt SAT/SBT tomorrow. Switch tube feeds to nephro if starting HD.

## 2022-09-12 NOTE — PROGRESS NOTE ADULT - ASSESSMENT
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 Patient was found to be severely septic i/s/o GBS bacteremia w/ b/l lower cellulitis noted on exam. TTE done i/s/o GBS bacteremia which was unremarkable. Currently patient is on CTX 2g IV QD which should be continued. Patient is s/p arrest on the medical floors ROSC achieved and now intubated and sedated on pressors in the ICU. Unknown cause of arrest. Patient with downtrending WBC on CTX 2g. Her course was c/b fail chest and left sided PTX which is s/p L chest tube own 09/08. Patient on 09/10 was noted to have worsening BRANT on CKD with possible ATN and minimal UOP so levophed was started to help with MAPs and perfusion and propofol was started. She developed a fever of 100.5 09/11 at 8pm with a Tmax of 100.8F this morning. She was noted to have increased thick secretions from ETT which appeared possibly purulent. She was broaden to cefepime and Vancomycin to cover for VAP. Of note patient with HD cath placed to [facility] today for CVVHD.     Recommendations:   - Continue with cefepime 2g q12 to   - Vancomycin 1g q12, trough prior to 4th dose. Goal trough 13-17.   - Follow sputum culture results from 09/12   - Follow blood cultures x2 from 09/12       Team 1 will continue to follow. Case discussed with Dr. Ordonez and primary team.     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 Patient was found to be severely septic i/s/o GBS bacteremia w/ b/l lower cellulitis noted on exam. TTE done i/s/o GBS bacteremia which was unremarkable. Currently patient is on CTX 2g IV QD which should be continued. Patient is s/p arrest on the medical floors ROSC achieved and now intubated and sedated on pressors in the ICU. Unknown cause of arrest. Patient with downtrending WBC on CTX 2g. Her course was c/b fail chest and left sided PTX which is s/p L chest tube own 09/08. Patient on 09/10 was noted to have worsening BRANT on CKD with possible ATN and minimal UOP so levophed was started to help with MAPs and perfusion and propofol was started. She developed a fever of 100.5 09/11 at 8pm with a Tmax of 100.8F this morning. She was noted to have increased thick secretions from ETT which appeared possibly purulent. She was broaden to cefepime and Vancomycin to cover for VAP. Of note patient with HD cath placed to [facility] today for CVVHD.     Recommendations:   - Continue with cefepime 2g q12H   - Vancomycin 1g q12H, trough prior to 4th dose. Goal trough 13-17.   - Follow sputum culture results from 09/12   - Follow blood cultures x2 from 09/12       Team 1 will continue to follow. Case discussed with Dr. Ordonez and primary team.     Note not finalized until attending attestation is complete

## 2022-09-12 NOTE — CONSULT NOTE ADULT - ATTENDING COMMENTS
Septic shock, cellulitis, s/p cardiac arrest with ROSC now intubated.  iATN oligoanuric with hyperkalemia and volume overload, started CCVHD with goal net negative 50ml/hr Septic shock, cellulitis, s/p cardiac arrest with ROSC now intubated.  iATN oligoanuric with hyperkalemia and volume overload, started CVVHD with goal net negative 50ml/hr Septic shock, cellulitis, s/p cardiac arrest with ROSC now intubated.  iATN oligoanuric with hyperkalemia and volume overload, started CVVHD with goal net negative 50ml/hr  Please adjust antibiotic dosing for CVVHD

## 2022-09-12 NOTE — CHART NOTE - NSCHARTNOTEFT_GEN_A_CORE
Admitting Diagnosis:   Patient is a 81y old  Female who presents with a chief complaint of Acute hypoxic respiratory failure (12 Sep 2022 09:09)      PAST MEDICAL & SURGICAL HISTORY:  Lyme disease      DVT (deep venous thrombosis)      Skin cancer      Brain embolism and thrombosis      MRSA (methicillin resistant Staphylococcus aureus)      PNA (pneumonia)      COPD (chronic obstructive pulmonary disease)      H/O angioplasty      Status post laparoscopic-assisted sigmoidectomy      H/O shoulder surgery          Current Nutrition Order: Jevity 1.5 @35 ml/hr        PO Intake: N/A    GI Issues: Abdomen ND/NT, hypoactive bowel sounds, LBM PTA on miralax and senna     Pain: No non-verbal indicators present     Skin Integrity: Stg II PI sacrum, +3 gen. edema     Labs:   09-12    138  |  104  |  58<H>  ----------------------------<  176<H>  5.4<H>   |  22  |  3.62<H>    Ca    8.4      12 Sep 2022 04:49  Phos  5.8     09-12  Mg     2.6     09-12    TPro  6.8  /  Alb  2.7<L>  /  TBili  0.5  /  DBili  x   /  AST  18  /  ALT  30  /  AlkPhos  206<H>  09-12    CAPILLARY BLOOD GLUCOSE      POCT Blood Glucose.: 168 mg/dL (12 Sep 2022 11:17)  POCT Blood Glucose.: 161 mg/dL (12 Sep 2022 05:44)  POCT Blood Glucose.: 150 mg/dL (11 Sep 2022 23:42)  POCT Blood Glucose.: 172 mg/dL (11 Sep 2022 17:11)      Medications:  MEDICATIONS  (STANDING):  cefepime   IVPB      chlorhexidine 0.12% Liquid 15 milliLiter(s) Oral Mucosa every 12 hours  chlorhexidine 2% Cloths 1 Application(s) Topical <User Schedule>  dextrose 5%. 1000 milliLiter(s) (100 mL/Hr) IV Continuous <Continuous>  dextrose 5%. 1000 milliLiter(s) (50 mL/Hr) IV Continuous <Continuous>  dextrose 50% Injectable 25 Gram(s) IV Push once  dextrose 50% Injectable 12.5 Gram(s) IV Push once  dextrose 50% Injectable 25 Gram(s) IV Push once  fentaNYL   Infusion 0.5 MICROgram(s)/kG/Hr (5.12 mL/Hr) IV Continuous <Continuous>  glucagon  Injectable 1 milliGRAM(s) IntraMuscular once  heparin   Injectable 5000 Unit(s) SubCutaneous every 8 hours  influenza  Vaccine (HIGH DOSE) 0.7 milliLiter(s) IntraMuscular once  insulin lispro (ADMELOG) corrective regimen sliding scale   SubCutaneous every 6 hours  lidocaine   4% Patch 1 Patch Transdermal every 24 hours  norepinephrine Infusion 0.05 MICROgram(s)/kG/Min (9.59 mL/Hr) IV Continuous <Continuous>  pantoprazole  Injectable 40 milliGRAM(s) IV Push every 24 hours  polyethylene glycol 3350 17 Gram(s) Oral daily  propofol Infusion 10.003 MICROgram(s)/kG/Min (6.14 mL/Hr) IV Continuous <Continuous>  senna 2 Tablet(s) Oral daily  vancomycin  IVPB 1500 milliGRAM(s) IV Intermittent once    MEDICATIONS  (PRN):  dextrose Oral Gel 15 Gram(s) Oral once PRN Blood Glucose LESS THAN 70 milliGRAM(s)/deciliter      Height for BMI (CENTIMETERS)	172.7 Centimeter(s)  Weight for BMI (lbs)	225.5 lb  Weight for BMI (kg)	102.3 kg  Body Mass Index	34.2    Weight Change: No new wt since admit.     Estimated energy needs:   Weight used for calculations	IBW  Estimated Energy Needs Weight (lbs)	140.2 lb  Estimated Energy Needs Weight (kg)	63.6 kg  Estimated Energy Needs From (shane/kg)	25  Estimated Energy Needs To (shane/kg)	30  Estimated Energy Needs Calculated From (shane/kg)	1590  Estimated Energy Needs Calculated To (shane/kg)	1908  Weight used for calculations	IBW  Estimated Protein Needs Weight (lbs)	140.2 lb  Estimated Protein Needs Weight (kg)	63.6 kg  Estimated Protein Needs From (g/kg)	1.4  Estimated Protein Needs To (g/kg)	1.6  Estimated Protein Needs Calculated From (g/kg)	89.04  Estimated Protein Needs Calculated To (g/kg)	101.76  Estimated Fluid Needs Weight (lbs)	140.2 lb  Estimated Fluid Needs Weight (kg)	63.6 kg  Estimated Fluid Needs From (ml/kg)	30  Estimated Fluid Needs To (ml/kg)	35  Estimated Fluid Needs Calculated From (ml/kg)	1908  Estimated Fluid Needs Calculated To (ml/kg)	2226  -Needs determined using IBW with consideration for critical condition requiring mechanical ventilation.     Subjective: 81F with complex medical history including obesity, HTN, HLD, HFpEF (EF 55-60% last echo 2/20), COPD (on home O2-3L), chronic LE edema with neuropathic pain and cellulitis, DALIA, PVD p/w respiratory distress x 1 day found to have cellulitis on lower extremity. 9/9: TF started.     Pt care discussed in IDT rounds. Rx and labs reviewed. Pt intubated and sedated at time of assessment; vent to VC-AC, MAP 86, norepi ggt, fent ggt, propofol @9.2 ml/hr (243 kcal/day). Pt with worsening renal fxn; K 5.4, Phos 5.8, BUN 58, Cr 3.62, GFR 12 -nephro following, plan to initiate CVVHD today. Pt fluid overloaded, monitor wt trends when available. TF held for procedures this morning, plan to restart today. RN notes pt tolerating with no concerns. RDN will continue to monitor per protocol and PRN.     Previous Nutrition Diagnosis: Inadequate Oral Intake r/t critical illness requiring mechanical ventilation AEB need for NPO status    Active [ x ]  Resolved [   ]    If resolved, new PES:     Goal: Pt will meet 75% or more of protein/energy needs via most appropriate route for nutrition    Recommendations:  -Continue TF for enteral nutrition support    *When HD initiated, consider transition to Nepro @40 ml/hr with LPS x1/day to provide 960 ml TF (102%RDI), 1828 kcal, 93 gProt., and 698 ml FW. This is 22.9 non-protein kcal and 1.46 gProt. per kg IBW 63.6kg.   -Monitor fluid fluctuation and wt changes   -Monitor renal fxn and neprho interventions   -Monitor GI fxn and tolerance to TF   -Monitor skin integrity     Risk Level: High [ x ] Moderate [   ] Low [   ]

## 2022-09-13 NOTE — PROGRESS NOTE ADULT - ATTENDING COMMENTS
Afebrile, on pressors, ill-appearing on vent with L chest tube, LIJ TLC, RIJ HD catheter, on CVVHD, coarse BS anteriorly, B LE edema with improving erythema, acidotic (pH 7.19 this morning), WBC 18.4 K (<--15.9), procal 3.8, CXR yesterday with improving infiltrates, US c/c volume overload, CXR with worsening opacity today.  Sputum culture from 9/12 with NRF to date.  Vanc trough 31.4 and appropriately timed (though she had received dose of 1500 mg IV q12h prior to 1 1000 mg).   Source for recent fever is unclear.  Would do CT C/A/P if within GOC and she is stable enough to withstand.  Would f/u blood and sputum cultures from 9/12, continue cefepime, hold vancomycin.  Note that negative nasal PCR for MRSA has lower negative predictive value in patients who are intubated.  Present management is also treating GBS bacteremia (skin source).  Will follow with you - team 1.

## 2022-09-13 NOTE — PROGRESS NOTE ADULT - SUBJECTIVE AND OBJECTIVE BOX
Patient is a 81y Female seen and evaluated at bedside.       Meds:    cefepime   IVPB 2000 every 12 hours  chlorhexidine 0.12% Liquid 15 every 12 hours  chlorhexidine 2% Cloths 1 <User Schedule>  CRRT Treatment  <Continuous>  dextrose 5%. 1000 <Continuous>  dextrose 5%. 1000 <Continuous>  dextrose 50% Injectable 25 once  dextrose 50% Injectable 12.5 once  dextrose 50% Injectable 25 once  dextrose Oral Gel 15 once PRN  fentaNYL   Infusion 0.5 <Continuous>  glucagon  Injectable 1 once  heparin   Injectable 5000 every 8 hours  influenza  Vaccine (HIGH DOSE) 0.7 once  insulin lispro (ADMELOG) corrective regimen sliding scale  every 6 hours  lidocaine   4% Patch 1 every 24 hours  norepinephrine Infusion 0.05 <Continuous>  pantoprazole  Injectable 40 every 24 hours  polyethylene glycol 3350 17 daily  propofol Infusion 10.003 <Continuous>  PureFlow Dialysate RFP-400 (K 2 / Ca 3) 5000 <Continuous>  senna 2 daily  sodium chloride 0.9% lock flush 10 every 1 hour PRN  vancomycin  IVPB 1000 every 12 hours      T(C): , Max: 37.3 (09-12-22 @ 18:10)  T(F): , Max: 99.2 (09-12-22 @ 18:10)  HR: 88 (09-13-22 @ 09:13)  BP: 122/55 (09-13-22 @ 08:35)  BP(mean): 79 (09-13-22 @ 08:35)  RR: 24 (09-13-22 @ 09:00)  SpO2: 97% (09-13-22 @ 09:13)  Wt(kg): --    09-12 @ 07:01  -  09-13 @ 07:00  --------------------------------------------------------  IN: 3091.4 mL / OUT: 1180 mL / NET: 1911.4 mL    09-13 @ 07:01  -  09-13 @ 09:17  --------------------------------------------------------  IN: 182.5 mL / OUT: 35 mL / NET: 147.5 mL          Review of Systems:  ROS negative except as per HPI      PHYSICAL EXAM:      LABS:                        7.4    18.44 )-----------( 153      ( 13 Sep 2022 04:34 )             25.2     09-13    136  |  104  |  51<H>  ----------------------------<  164<H>  4.8   |  23  |  3.35<H>    Ca    8.4      13 Sep 2022 04:34  Phos  5.2     09-13  Mg     2.2     09-13    TPro  6.5  /  Alb  2.4<L>  /  TBili  0.8  /  DBili  x   /  AST  25  /  ALT  26  /  AlkPhos  188<H>  09-13        Urinalysis Basic - ( 11 Sep 2022 17:56 )    Color: Yellow / Appearance: SL Cloudy / SG: >=1.030 / pH: x  Gluc: x / Ketone: NEGATIVE  / Bili: Negative / Urobili: 0.2 E.U./dL   Blood: x / Protein: 30 mg/dL / Nitrite: NEGATIVE   Leuk Esterase: NEGATIVE / RBC: < 5 /HPF / WBC < 5 /HPF   Sq Epi: x / Non Sq Epi: 0-5 /HPF / Bacteria: Present /HPF      Sodium, Random Urine: 25 mmol/L (09-11 @ 17:56)  Creatinine, Random Urine: 159 mg/dL (09-11 @ 17:56)  Protein/Creatinine Ratio Calculation: 0.7 Ratio (09-11 @ 17:56)  Osmolality, Random Urine: 316 mosm/kg (09-11 @ 17:56)  Potassium, Random Urine: 33 mmol/L (09-11 @ 17:56)        RADIOLOGY & ADDITIONAL STUDIES:           Patient is a 81y Female seen and evaluated at bedside. No acute distress. Intubated and sedated. Reports of clotted CVVHD lines. Patient tolerating CVHHD, will continue.       Meds:    cefepime   IVPB 2000 every 12 hours  chlorhexidine 0.12% Liquid 15 every 12 hours  chlorhexidine 2% Cloths 1 <User Schedule>  CRRT Treatment  <Continuous>  dextrose 5%. 1000 <Continuous>  dextrose 5%. 1000 <Continuous>  dextrose 50% Injectable 25 once  dextrose 50% Injectable 12.5 once  dextrose 50% Injectable 25 once  dextrose Oral Gel 15 once PRN  fentaNYL   Infusion 0.5 <Continuous>  glucagon  Injectable 1 once  heparin   Injectable 5000 every 8 hours  influenza  Vaccine (HIGH DOSE) 0.7 once  insulin lispro (ADMELOG) corrective regimen sliding scale  every 6 hours  lidocaine   4% Patch 1 every 24 hours  norepinephrine Infusion 0.05 <Continuous>  pantoprazole  Injectable 40 every 24 hours  polyethylene glycol 3350 17 daily  propofol Infusion 10.003 <Continuous>  PureFlow Dialysate RFP-400 (K 2 / Ca 3) 5000 <Continuous>  senna 2 daily  sodium chloride 0.9% lock flush 10 every 1 hour PRN  vancomycin  IVPB 1000 every 12 hours      T(C): , Max: 37.3 (09-12-22 @ 18:10)  T(F): , Max: 99.2 (09-12-22 @ 18:10)  HR: 88 (09-13-22 @ 09:13)  BP: 122/55 (09-13-22 @ 08:35)  BP(mean): 79 (09-13-22 @ 08:35)  RR: 24 (09-13-22 @ 09:00)  SpO2: 97% (09-13-22 @ 09:13)  Wt(kg): --    09-12 @ 07:01  -  09-13 @ 07:00  --------------------------------------------------------  IN: 3091.4 mL / OUT: 1180 mL / NET: 1911.4 mL    09-13 @ 07:01  -  09-13 @ 09:17  --------------------------------------------------------  IN: 182.5 mL / OUT: 35 mL / NET: 147.5 mL          Review of Systems:  ROS negative except as per HPI      PHYSICAL EXAM:  GENERAL: intubated; sedated   CHEST/LUNG: decreased breath sounds at the bases  HEART: Regular rate and rhythm, no murmur, no gallops, no rub   ABDOMEN: Soft, nontender, non distended  EXTREMITIES: Anasarca  Access: Right IJ c/d/i      LABS:                        7.4    18.44 )-----------( 153      ( 13 Sep 2022 04:34 )             25.2     09-13    136  |  104  |  51<H>  ----------------------------<  164<H>  4.8   |  23  |  3.35<H>    Ca    8.4      13 Sep 2022 04:34  Phos  5.2     09-13  Mg     2.2     09-13    TPro  6.5  /  Alb  2.4<L>  /  TBili  0.8  /  DBili  x   /  AST  25  /  ALT  26  /  AlkPhos  188<H>  09-13        Urinalysis Basic - ( 11 Sep 2022 17:56 )    Color: Yellow / Appearance: SL Cloudy / SG: >=1.030 / pH: x  Gluc: x / Ketone: NEGATIVE  / Bili: Negative / Urobili: 0.2 E.U./dL   Blood: x / Protein: 30 mg/dL / Nitrite: NEGATIVE   Leuk Esterase: NEGATIVE / RBC: < 5 /HPF / WBC < 5 /HPF   Sq Epi: x / Non Sq Epi: 0-5 /HPF / Bacteria: Present /HPF      Sodium, Random Urine: 25 mmol/L (09-11 @ 17:56)  Creatinine, Random Urine: 159 mg/dL (09-11 @ 17:56)  Protein/Creatinine Ratio Calculation: 0.7 Ratio (09-11 @ 17:56)  Osmolality, Random Urine: 316 mosm/kg (09-11 @ 17:56)  Potassium, Random Urine: 33 mmol/L (09-11 @ 17:56)        RADIOLOGY & ADDITIONAL STUDIES:

## 2022-09-13 NOTE — PROGRESS NOTE ADULT - SUBJECTIVE AND OBJECTIVE BOX
CECE GABBY ROONEY, 81y, Female  MRN-[id number]  Patient is a 81y old  Female who presents with a chief complaint of Acute hypoxic respiratory failure (13 Sep 2022 09:16)      OVERNIGHT EVENTS: pt was started on CVVHD at 9PM. Clotted between 9-10PM, cleared, and resumed at approximately 11PM. Ran until AM, 5-6AM where clot occurred again. Otherwise no other acute events.     SUBJECTIVE: pt assessed at bedside, intubated and sedated. Responds to sternal rub. ROS could not be obtained.    12 Point ROS Negative unless noted otherwise above.  -------------------------------------------------------------------------------  VITAL SIGNS:  Vital Signs Last 24 Hrs  T(C): 37 (13 Sep 2022 09:49), Max: 37.3 (12 Sep 2022 18:10)  T(F): 98.6 (13 Sep 2022 09:49), Max: 99.2 (12 Sep 2022 18:10)  HR: 134 (13 Sep 2022 13:00) (80 - 137)  BP: 122/55 (13 Sep 2022 08:35) (115/58 - 147/67)  BP(mean): 79 (13 Sep 2022 08:35) (79 - 97)  RR: 22 (13 Sep 2022 13:00) (17 - 27)  SpO2: 98% (13 Sep 2022 13:00) (90% - 99%)    Parameters below as of 13 Sep 2022 13:00      O2 Concentration (%): 70  I&O's Summary    12 Sep 2022 07:01  -  13 Sep 2022 07:00  --------------------------------------------------------  IN: 3091.4 mL / OUT: 1180 mL / NET: 1911.4 mL    13 Sep 2022 07:01  -  13 Sep 2022 14:00  --------------------------------------------------------  IN: 726.2 mL / OUT: 270 mL / NET: 456.2 mL        PHYSICAL EXAM:    General: pt intuabted and sedated, obese in nature, lying in bed  HEENT: pupil 2mm minimally reactive to light; moist mucosal membranes.  Neck: supple, trachea midline  Cardiovascular: RRR, +S1/S2; NO M/R/G  Respiratory: decreased breath sounds in bases of lungs; no W/R/R  Gastrointestinal: soft, NT/ND; +BSx4  Extremities: WWP; no edema or cyanosis  Vascular: 2+ radial, DP/PT pulses B/L  Neurological: AAOx3; no focal deficits    ALLERGIES:  Allergies    Altabax (Unknown)  Augmentin (Unknown)  clindamycin (Unknown)  Vaseline (Swelling)    Intolerances        MEDICATIONS:  MEDICATIONS  (STANDING):  cefepime   IVPB 2000 milliGRAM(s) IV Intermittent every 12 hours  chlorhexidine 0.12% Liquid 15 milliLiter(s) Oral Mucosa every 12 hours  chlorhexidine 2% Cloths 1 Application(s) Topical <User Schedule>  CRRT Treatment    <Continuous>  dextrose 5%. 1000 milliLiter(s) (50 mL/Hr) IV Continuous <Continuous>  dextrose 5%. 1000 milliLiter(s) (100 mL/Hr) IV Continuous <Continuous>  dextrose 50% Injectable 25 Gram(s) IV Push once  dextrose 50% Injectable 12.5 Gram(s) IV Push once  dextrose 50% Injectable 25 Gram(s) IV Push once  fentaNYL   Infusion 0.5 MICROgram(s)/kG/Hr (5.12 mL/Hr) IV Continuous <Continuous>  glucagon  Injectable 1 milliGRAM(s) IntraMuscular once  heparin  Infusion 500 Unit(s)/Hr (5 mL/Hr) IV Continuous <Continuous>  influenza  Vaccine (HIGH DOSE) 0.7 milliLiter(s) IntraMuscular once  insulin lispro (ADMELOG) corrective regimen sliding scale   SubCutaneous every 6 hours  lidocaine   4% Patch 1 Patch Transdermal every 24 hours  norepinephrine Infusion 0.05 MICROgram(s)/kG/Min (4.8 mL/Hr) IV Continuous <Continuous>  pantoprazole  Injectable 40 milliGRAM(s) IV Push every 24 hours  polyethylene glycol 3350 17 Gram(s) Oral daily  propofol Infusion 10.003 MICROgram(s)/kG/Min (6.14 mL/Hr) IV Continuous <Continuous>  PureFlow Dialysate RFP-400 (K 2 / Ca 3) 5000 milliLiter(s) (2000 mL/Hr) CRRT <Continuous>  senna 2 Tablet(s) Oral daily  vancomycin  IVPB 1000 milliGRAM(s) IV Intermittent every 12 hours  vasopressin Infusion 0.03 Unit(s)/Min (1.8 mL/Hr) IV Continuous <Continuous>    MEDICATIONS  (PRN):  dextrose Oral Gel 15 Gram(s) Oral once PRN Blood Glucose LESS THAN 70 milliGRAM(s)/deciliter  sodium chloride 0.9% lock flush 10 milliLiter(s) IV Push every 1 hour PRN Pre/post blood products, medications, blood draw, and to maintain line patency      -------------------------------------------------------------------------------  LABS:                        7.4    18.44 )-----------( 153      ( 13 Sep 2022 04:34 )             25.2     09-13    136  |  104  |  51<H>  ----------------------------<  164<H>  4.8   |  23  |  3.35<H>    Ca    8.4      13 Sep 2022 04:34  Phos  5.2     09-13  Mg     2.2     09-13    TPro  6.5  /  Alb  2.4<L>  /  TBili  0.8  /  DBili  x   /  AST  25  /  ALT  26  /  AlkPhos  188<H>  09-13    LIVER FUNCTIONS - ( 13 Sep 2022 04:34 )  Alb: 2.4 g/dL / Pro: 6.5 g/dL / ALK PHOS: 188 U/L / ALT: 26 U/L / AST: 25 U/L / GGT: x           PT/INR - ( 13 Sep 2022 10:38 )   PT: 12.2 sec;   INR: 1.02          PTT - ( 13 Sep 2022 10:38 )  PTT:31.4 sec  Urinalysis Basic - ( 11 Sep 2022 17:56 )    Color: Yellow / Appearance: SL Cloudy / SG: >=1.030 / pH: x  Gluc: x / Ketone: NEGATIVE  / Bili: Negative / Urobili: 0.2 E.U./dL   Blood: x / Protein: 30 mg/dL / Nitrite: NEGATIVE   Leuk Esterase: NEGATIVE / RBC: < 5 /HPF / WBC < 5 /HPF   Sq Epi: x / Non Sq Epi: 0-5 /HPF / Bacteria: Present /HPF      CAPILLARY BLOOD GLUCOSE      POCT Blood Glucose.: 165 mg/dL (13 Sep 2022 13:24)      Culture - Blood (collected 12 Sep 2022 11:21)  Source: .Blood Blood-Peripheral  Preliminary Report (13 Sep 2022 12:00):    No growth at 1 day.    Culture - Blood (collected 12 Sep 2022 11:21)  Source: .Blood Blood-Peripheral  Preliminary Report (13 Sep 2022 12:00):    No growth at 1 day.    Culture - Sputum (collected 12 Sep 2022 10:45)  Source: .Sputum Sputum  Gram Stain (12 Sep 2022 16:59):    Rare epithelial cells    Few WBC's    Rare Gram Positive Rods    Rare Gram positive cocci in pairs  Preliminary Report (13 Sep 2022 09:37):    Culture in progress      COVID-19 PCR: NotDetec (13 Sep 2022 04:34)  SARS-CoV-2: NotDetec (06 Sep 2022 10:32)  COVID-19 PCR: NotDetec (06 Sep 2022 10:25)  COVID-19 PCR: NotDetec (02 Jul 2022 13:33)      RADIOLOGY & ADDITIONAL TESTS: Reviewed.       CECE GABBY ROONEY, 81y, Female  MRN-[id number]  Patient is a 81y old  Female who presents with a chief complaint of Acute hypoxic respiratory failure (13 Sep 2022 09:16)      OVERNIGHT EVENTS: pt was started on CVVHD at 9PM. Clotted between 9-10PM, cleared, and resumed at approximately 11PM. Ran until AM, 5-6AM where clot occurred again. Otherwise no other acute events.     SUBJECTIVE: pt assessed at bedside, intubated and sedated. Responds to sternal rub. ROS could not be obtained.    12 Point ROS Negative unless noted otherwise above.  -------------------------------------------------------------------------------  VITAL SIGNS:  Vital Signs Last 24 Hrs  T(C): 37 (13 Sep 2022 09:49), Max: 37.3 (12 Sep 2022 18:10)  T(F): 98.6 (13 Sep 2022 09:49), Max: 99.2 (12 Sep 2022 18:10)  HR: 134 (13 Sep 2022 13:00) (80 - 137)  BP: 122/55 (13 Sep 2022 08:35) (115/58 - 147/67)  BP(mean): 79 (13 Sep 2022 08:35) (79 - 97)  RR: 22 (13 Sep 2022 13:00) (17 - 27)  SpO2: 98% (13 Sep 2022 13:00) (90% - 99%)    Parameters below as of 13 Sep 2022 13:00      O2 Concentration (%): 70  I&O's Summary    12 Sep 2022 07:01  -  13 Sep 2022 07:00  --------------------------------------------------------  IN: 3091.4 mL / OUT: 1180 mL / NET: 1911.4 mL    13 Sep 2022 07:01  -  13 Sep 2022 14:00  --------------------------------------------------------  IN: 726.2 mL / OUT: 270 mL / NET: 456.2 mL        PHYSICAL EXAM:    General: pt intuabted and sedated, obese in nature, lying in bed  HEENT: pupil 2mm minimally reactive to light; moist mucosal membranes.  Neck: supple, trachea midline  Cardiovascular: RRR, +S1/S2; NO M/R/G  Respiratory: decreased breath sounds in bases of lungs; no W/R/R  Gastrointestinal: soft, NT/ND; +BSx4  Extremities: dermastasis below knees b/l w/ skin changes, significant edema up to knees  Vascular: 2+ radial pulses  Neurological: AAOx0    ALLERGIES:  Allergies    Altabax (Unknown)  Augmentin (Unknown)  clindamycin (Unknown)  Vaseline (Swelling)    Intolerances        MEDICATIONS:  MEDICATIONS  (STANDING):  cefepime   IVPB 2000 milliGRAM(s) IV Intermittent every 12 hours  chlorhexidine 0.12% Liquid 15 milliLiter(s) Oral Mucosa every 12 hours  chlorhexidine 2% Cloths 1 Application(s) Topical <User Schedule>  CRRT Treatment    <Continuous>  dextrose 5%. 1000 milliLiter(s) (50 mL/Hr) IV Continuous <Continuous>  dextrose 5%. 1000 milliLiter(s) (100 mL/Hr) IV Continuous <Continuous>  dextrose 50% Injectable 25 Gram(s) IV Push once  dextrose 50% Injectable 12.5 Gram(s) IV Push once  dextrose 50% Injectable 25 Gram(s) IV Push once  fentaNYL   Infusion 0.5 MICROgram(s)/kG/Hr (5.12 mL/Hr) IV Continuous <Continuous>  glucagon  Injectable 1 milliGRAM(s) IntraMuscular once  heparin  Infusion 500 Unit(s)/Hr (5 mL/Hr) IV Continuous <Continuous>  influenza  Vaccine (HIGH DOSE) 0.7 milliLiter(s) IntraMuscular once  insulin lispro (ADMELOG) corrective regimen sliding scale   SubCutaneous every 6 hours  lidocaine   4% Patch 1 Patch Transdermal every 24 hours  norepinephrine Infusion 0.05 MICROgram(s)/kG/Min (4.8 mL/Hr) IV Continuous <Continuous>  pantoprazole  Injectable 40 milliGRAM(s) IV Push every 24 hours  polyethylene glycol 3350 17 Gram(s) Oral daily  propofol Infusion 10.003 MICROgram(s)/kG/Min (6.14 mL/Hr) IV Continuous <Continuous>  PureFlow Dialysate RFP-400 (K 2 / Ca 3) 5000 milliLiter(s) (2000 mL/Hr) CRRT <Continuous>  senna 2 Tablet(s) Oral daily  vancomycin  IVPB 1000 milliGRAM(s) IV Intermittent every 12 hours  vasopressin Infusion 0.03 Unit(s)/Min (1.8 mL/Hr) IV Continuous <Continuous>    MEDICATIONS  (PRN):  dextrose Oral Gel 15 Gram(s) Oral once PRN Blood Glucose LESS THAN 70 milliGRAM(s)/deciliter  sodium chloride 0.9% lock flush 10 milliLiter(s) IV Push every 1 hour PRN Pre/post blood products, medications, blood draw, and to maintain line patency      -------------------------------------------------------------------------------  LABS:                        7.4    18.44 )-----------( 153      ( 13 Sep 2022 04:34 )             25.2     09-13    136  |  104  |  51<H>  ----------------------------<  164<H>  4.8   |  23  |  3.35<H>    Ca    8.4      13 Sep 2022 04:34  Phos  5.2     09-13  Mg     2.2     09-13    TPro  6.5  /  Alb  2.4<L>  /  TBili  0.8  /  DBili  x   /  AST  25  /  ALT  26  /  AlkPhos  188<H>  09-13    LIVER FUNCTIONS - ( 13 Sep 2022 04:34 )  Alb: 2.4 g/dL / Pro: 6.5 g/dL / ALK PHOS: 188 U/L / ALT: 26 U/L / AST: 25 U/L / GGT: x           PT/INR - ( 13 Sep 2022 10:38 )   PT: 12.2 sec;   INR: 1.02          PTT - ( 13 Sep 2022 10:38 )  PTT:31.4 sec  Urinalysis Basic - ( 11 Sep 2022 17:56 )    Color: Yellow / Appearance: SL Cloudy / SG: >=1.030 / pH: x  Gluc: x / Ketone: NEGATIVE  / Bili: Negative / Urobili: 0.2 E.U./dL   Blood: x / Protein: 30 mg/dL / Nitrite: NEGATIVE   Leuk Esterase: NEGATIVE / RBC: < 5 /HPF / WBC < 5 /HPF   Sq Epi: x / Non Sq Epi: 0-5 /HPF / Bacteria: Present /HPF      CAPILLARY BLOOD GLUCOSE      POCT Blood Glucose.: 165 mg/dL (13 Sep 2022 13:24)      Culture - Blood (collected 12 Sep 2022 11:21)  Source: .Blood Blood-Peripheral  Preliminary Report (13 Sep 2022 12:00):    No growth at 1 day.    Culture - Blood (collected 12 Sep 2022 11:21)  Source: .Blood Blood-Peripheral  Preliminary Report (13 Sep 2022 12:00):    No growth at 1 day.    Culture - Sputum (collected 12 Sep 2022 10:45)  Source: .Sputum Sputum  Gram Stain (12 Sep 2022 16:59):    Rare epithelial cells    Few WBC's    Rare Gram Positive Rods    Rare Gram positive cocci in pairs  Preliminary Report (13 Sep 2022 09:37):    Culture in progress      COVID-19 PCR: NotDetec (13 Sep 2022 04:34)  SARS-CoV-2: NotDetec (06 Sep 2022 10:32)  COVID-19 PCR: NotDetec (06 Sep 2022 10:25)  COVID-19 PCR: NotDetec (02 Jul 2022 13:33)      RADIOLOGY & ADDITIONAL TESTS: Reviewed.       CECE GABBY ROONEY, 81y, Female  MRN-[id number]  Patient is a 81y old  Female who presents with a chief complaint of Acute hypoxic respiratory failure (13 Sep 2022 09:16)      OVERNIGHT EVENTS: pt was started on CVVHD at 9PM. Clotted between 9-10PM, cleared, and resumed at approximately 11PM. Ran until AM, 5-6AM where clot occurred again. Otherwise no other acute events.     SUBJECTIVE: pt assessed at bedside, intubated and sedated. Responds to sternal rub. ROS could not be obtained.    12 Point ROS Negative unless noted otherwise above.  -------------------------------------------------------------------------------  VITAL SIGNS:  Vital Signs Last 24 Hrs  T(C): 37 (13 Sep 2022 09:49), Max: 37.3 (12 Sep 2022 18:10)  T(F): 98.6 (13 Sep 2022 09:49), Max: 99.2 (12 Sep 2022 18:10)  HR: 134 (13 Sep 2022 13:00) (80 - 137)  BP: 122/55 (13 Sep 2022 08:35) (115/58 - 147/67)  BP(mean): 79 (13 Sep 2022 08:35) (79 - 97)  RR: 22 (13 Sep 2022 13:00) (17 - 27)  SpO2: 98% (13 Sep 2022 13:00) (90% - 99%)    Parameters below as of 13 Sep 2022 13:00      O2 Concentration (%): 70  I&O's Summary    12 Sep 2022 07:01  -  13 Sep 2022 07:00  --------------------------------------------------------  IN: 3091.4 mL / OUT: 1180 mL / NET: 1911.4 mL    13 Sep 2022 07:01  -  13 Sep 2022 14:00  --------------------------------------------------------  IN: 726.2 mL / OUT: 270 mL / NET: 456.2 mL        PHYSICAL EXAM:    General: pt intuabted and sedated, obese, lying in bed  HEENT: pupil 2mm minimally reactive to light; moist mucosal membranes.  Neck: supple, trachea midline  Cardiovascular: RRR, +S1/S2; NO M/R/G  Respiratory: decreased breath sounds in bases of lungs; no W/R/R  Gastrointestinal: soft, NT/ND; +BSx4  Extremities: dermastasis below knees b/l w/ skin changes, significant edema up to knees  Vascular: 2+ radial pulses  Neurological: AAOx0    ALLERGIES:  Allergies    Altabax (Unknown)  Augmentin (Unknown)  clindamycin (Unknown)  Vaseline (Swelling)    Intolerances        MEDICATIONS:  MEDICATIONS  (STANDING):  cefepime   IVPB 2000 milliGRAM(s) IV Intermittent every 12 hours  chlorhexidine 0.12% Liquid 15 milliLiter(s) Oral Mucosa every 12 hours  chlorhexidine 2% Cloths 1 Application(s) Topical <User Schedule>  CRRT Treatment    <Continuous>  dextrose 5%. 1000 milliLiter(s) (50 mL/Hr) IV Continuous <Continuous>  dextrose 5%. 1000 milliLiter(s) (100 mL/Hr) IV Continuous <Continuous>  dextrose 50% Injectable 25 Gram(s) IV Push once  dextrose 50% Injectable 12.5 Gram(s) IV Push once  dextrose 50% Injectable 25 Gram(s) IV Push once  fentaNYL   Infusion 0.5 MICROgram(s)/kG/Hr (5.12 mL/Hr) IV Continuous <Continuous>  glucagon  Injectable 1 milliGRAM(s) IntraMuscular once  heparin  Infusion 500 Unit(s)/Hr (5 mL/Hr) IV Continuous <Continuous>  influenza  Vaccine (HIGH DOSE) 0.7 milliLiter(s) IntraMuscular once  insulin lispro (ADMELOG) corrective regimen sliding scale   SubCutaneous every 6 hours  lidocaine   4% Patch 1 Patch Transdermal every 24 hours  norepinephrine Infusion 0.05 MICROgram(s)/kG/Min (4.8 mL/Hr) IV Continuous <Continuous>  pantoprazole  Injectable 40 milliGRAM(s) IV Push every 24 hours  polyethylene glycol 3350 17 Gram(s) Oral daily  propofol Infusion 10.003 MICROgram(s)/kG/Min (6.14 mL/Hr) IV Continuous <Continuous>  PureFlow Dialysate RFP-400 (K 2 / Ca 3) 5000 milliLiter(s) (2000 mL/Hr) CRRT <Continuous>  senna 2 Tablet(s) Oral daily  vancomycin  IVPB 1000 milliGRAM(s) IV Intermittent every 12 hours  vasopressin Infusion 0.03 Unit(s)/Min (1.8 mL/Hr) IV Continuous <Continuous>    MEDICATIONS  (PRN):  dextrose Oral Gel 15 Gram(s) Oral once PRN Blood Glucose LESS THAN 70 milliGRAM(s)/deciliter  sodium chloride 0.9% lock flush 10 milliLiter(s) IV Push every 1 hour PRN Pre/post blood products, medications, blood draw, and to maintain line patency      -------------------------------------------------------------------------------  LABS:                        7.4    18.44 )-----------( 153      ( 13 Sep 2022 04:34 )             25.2     09-13    136  |  104  |  51<H>  ----------------------------<  164<H>  4.8   |  23  |  3.35<H>    Ca    8.4      13 Sep 2022 04:34  Phos  5.2     09-13  Mg     2.2     09-13    TPro  6.5  /  Alb  2.4<L>  /  TBili  0.8  /  DBili  x   /  AST  25  /  ALT  26  /  AlkPhos  188<H>  09-13    LIVER FUNCTIONS - ( 13 Sep 2022 04:34 )  Alb: 2.4 g/dL / Pro: 6.5 g/dL / ALK PHOS: 188 U/L / ALT: 26 U/L / AST: 25 U/L / GGT: x           PT/INR - ( 13 Sep 2022 10:38 )   PT: 12.2 sec;   INR: 1.02          PTT - ( 13 Sep 2022 10:38 )  PTT:31.4 sec  Urinalysis Basic - ( 11 Sep 2022 17:56 )    Color: Yellow / Appearance: SL Cloudy / SG: >=1.030 / pH: x  Gluc: x / Ketone: NEGATIVE  / Bili: Negative / Urobili: 0.2 E.U./dL   Blood: x / Protein: 30 mg/dL / Nitrite: NEGATIVE   Leuk Esterase: NEGATIVE / RBC: < 5 /HPF / WBC < 5 /HPF   Sq Epi: x / Non Sq Epi: 0-5 /HPF / Bacteria: Present /HPF      CAPILLARY BLOOD GLUCOSE      POCT Blood Glucose.: 165 mg/dL (13 Sep 2022 13:24)      Culture - Blood (collected 12 Sep 2022 11:21)  Source: .Blood Blood-Peripheral  Preliminary Report (13 Sep 2022 12:00):    No growth at 1 day.    Culture - Blood (collected 12 Sep 2022 11:21)  Source: .Blood Blood-Peripheral  Preliminary Report (13 Sep 2022 12:00):    No growth at 1 day.    Culture - Sputum (collected 12 Sep 2022 10:45)  Source: .Sputum Sputum  Gram Stain (12 Sep 2022 16:59):    Rare epithelial cells    Few WBC's    Rare Gram Positive Rods    Rare Gram positive cocci in pairs  Preliminary Report (13 Sep 2022 09:37):    Culture in progress      COVID-19 PCR: NotDetec (13 Sep 2022 04:34)  SARS-CoV-2: NotDetec (06 Sep 2022 10:32)  COVID-19 PCR: NotDetec (06 Sep 2022 10:25)  COVID-19 PCR: NotDetec (02 Jul 2022 13:33)      RADIOLOGY & ADDITIONAL TESTS: Reviewed.       CECE GABBY ROONEY, 81y, Female  MRN-[id number]  Patient is a 81y old  Female who presents with a chief complaint of Acute hypoxic respiratory failure (13 Sep 2022 09:16)      OVERNIGHT EVENTS: pt was started on CVVHD at 9PM. Clotted between 9-10PM, cleared, and resumed at approximately 11PM. Ran until AM, 5-6AM where clot occurred again. Otherwise no other acute events.     SUBJECTIVE: pt assessed at bedside, intubated and sedated. Responds to sternal rub. ROS could not be obtained.    12 Point ROS Negative unless noted otherwise above.  -------------------------------------------------------------------------------  VITAL SIGNS:  Vital Signs Last 24 Hrs  T(C): 37 (13 Sep 2022 09:49), Max: 37.3 (12 Sep 2022 18:10)  T(F): 98.6 (13 Sep 2022 09:49), Max: 99.2 (12 Sep 2022 18:10)  HR: 134 (13 Sep 2022 13:00) (80 - 137)  BP: 122/55 (13 Sep 2022 08:35) (115/58 - 147/67)  BP(mean): 79 (13 Sep 2022 08:35) (79 - 97)  RR: 22 (13 Sep 2022 13:00) (17 - 27)  SpO2: 98% (13 Sep 2022 13:00) (90% - 99%)    Parameters below as of 13 Sep 2022 13:00      O2 Concentration (%): 70  I&O's Summary    12 Sep 2022 07:01  -  13 Sep 2022 07:00  --------------------------------------------------------  IN: 3091.4 mL / OUT: 1180 mL / NET: 1911.4 mL    13 Sep 2022 07:01  -  13 Sep 2022 14:00  --------------------------------------------------------  IN: 726.2 mL / OUT: 270 mL / NET: 456.2 mL        PHYSICAL EXAM:    General: pt intuabted and sedated, obese, lying in bed  HEENT: pupil 2mm minimally reactive to light; moist mucosal membranes.  Neck: supple, trachea midline  Cardiovascular: RRR, +S1/S2; NO M/R/G  Respiratory: decreased breath sounds in bases of lungs; no W/R/R  Gastrointestinal: soft, NT/ND; +BSx4  Extremities: dermastasis below knees b/l w/ skin changes, significant edema up to knees  Vascular: 2+ radial pulses  Neurological: sedated and intubated    ALLERGIES:  Allergies    Altabax (Unknown)  Augmentin (Unknown)  clindamycin (Unknown)  Vaseline (Swelling)    Intolerances        MEDICATIONS:  MEDICATIONS  (STANDING):  cefepime   IVPB 2000 milliGRAM(s) IV Intermittent every 12 hours  chlorhexidine 0.12% Liquid 15 milliLiter(s) Oral Mucosa every 12 hours  chlorhexidine 2% Cloths 1 Application(s) Topical <User Schedule>  CRRT Treatment    <Continuous>  dextrose 5%. 1000 milliLiter(s) (50 mL/Hr) IV Continuous <Continuous>  dextrose 5%. 1000 milliLiter(s) (100 mL/Hr) IV Continuous <Continuous>  dextrose 50% Injectable 25 Gram(s) IV Push once  dextrose 50% Injectable 12.5 Gram(s) IV Push once  dextrose 50% Injectable 25 Gram(s) IV Push once  fentaNYL   Infusion 0.5 MICROgram(s)/kG/Hr (5.12 mL/Hr) IV Continuous <Continuous>  glucagon  Injectable 1 milliGRAM(s) IntraMuscular once  heparin  Infusion 500 Unit(s)/Hr (5 mL/Hr) IV Continuous <Continuous>  influenza  Vaccine (HIGH DOSE) 0.7 milliLiter(s) IntraMuscular once  insulin lispro (ADMELOG) corrective regimen sliding scale   SubCutaneous every 6 hours  lidocaine   4% Patch 1 Patch Transdermal every 24 hours  norepinephrine Infusion 0.05 MICROgram(s)/kG/Min (4.8 mL/Hr) IV Continuous <Continuous>  pantoprazole  Injectable 40 milliGRAM(s) IV Push every 24 hours  polyethylene glycol 3350 17 Gram(s) Oral daily  propofol Infusion 10.003 MICROgram(s)/kG/Min (6.14 mL/Hr) IV Continuous <Continuous>  PureFlow Dialysate RFP-400 (K 2 / Ca 3) 5000 milliLiter(s) (2000 mL/Hr) CRRT <Continuous>  senna 2 Tablet(s) Oral daily  vancomycin  IVPB 1000 milliGRAM(s) IV Intermittent every 12 hours  vasopressin Infusion 0.03 Unit(s)/Min (1.8 mL/Hr) IV Continuous <Continuous>    MEDICATIONS  (PRN):  dextrose Oral Gel 15 Gram(s) Oral once PRN Blood Glucose LESS THAN 70 milliGRAM(s)/deciliter  sodium chloride 0.9% lock flush 10 milliLiter(s) IV Push every 1 hour PRN Pre/post blood products, medications, blood draw, and to maintain line patency      -------------------------------------------------------------------------------  LABS:                        7.4    18.44 )-----------( 153      ( 13 Sep 2022 04:34 )             25.2     09-13    136  |  104  |  51<H>  ----------------------------<  164<H>  4.8   |  23  |  3.35<H>    Ca    8.4      13 Sep 2022 04:34  Phos  5.2     09-13  Mg     2.2     09-13    TPro  6.5  /  Alb  2.4<L>  /  TBili  0.8  /  DBili  x   /  AST  25  /  ALT  26  /  AlkPhos  188<H>  09-13    LIVER FUNCTIONS - ( 13 Sep 2022 04:34 )  Alb: 2.4 g/dL / Pro: 6.5 g/dL / ALK PHOS: 188 U/L / ALT: 26 U/L / AST: 25 U/L / GGT: x           PT/INR - ( 13 Sep 2022 10:38 )   PT: 12.2 sec;   INR: 1.02          PTT - ( 13 Sep 2022 10:38 )  PTT:31.4 sec  Urinalysis Basic - ( 11 Sep 2022 17:56 )    Color: Yellow / Appearance: SL Cloudy / SG: >=1.030 / pH: x  Gluc: x / Ketone: NEGATIVE  / Bili: Negative / Urobili: 0.2 E.U./dL   Blood: x / Protein: 30 mg/dL / Nitrite: NEGATIVE   Leuk Esterase: NEGATIVE / RBC: < 5 /HPF / WBC < 5 /HPF   Sq Epi: x / Non Sq Epi: 0-5 /HPF / Bacteria: Present /HPF      CAPILLARY BLOOD GLUCOSE      POCT Blood Glucose.: 165 mg/dL (13 Sep 2022 13:24)      Culture - Blood (collected 12 Sep 2022 11:21)  Source: .Blood Blood-Peripheral  Preliminary Report (13 Sep 2022 12:00):    No growth at 1 day.    Culture - Blood (collected 12 Sep 2022 11:21)  Source: .Blood Blood-Peripheral  Preliminary Report (13 Sep 2022 12:00):    No growth at 1 day.    Culture - Sputum (collected 12 Sep 2022 10:45)  Source: .Sputum Sputum  Gram Stain (12 Sep 2022 16:59):    Rare epithelial cells    Few WBC's    Rare Gram Positive Rods    Rare Gram positive cocci in pairs  Preliminary Report (13 Sep 2022 09:37):    Culture in progress      COVID-19 PCR: NotDetec (13 Sep 2022 04:34)  SARS-CoV-2: NotDetec (06 Sep 2022 10:32)  COVID-19 PCR: NotDetec (06 Sep 2022 10:25)  COVID-19 PCR: NotDetec (02 Jul 2022 13:33)      RADIOLOGY & ADDITIONAL TESTS: Reviewed.

## 2022-09-13 NOTE — PROGRESS NOTE ADULT - ATTENDING COMMENTS
Morbid obesity, DALIA< COPD, chronic venous stasis with cellulitis, s/p cardiac arrest, rib fractures, now with septic shock and worse RLL pna with ATN, AF   physical as above  vancomycin and cefepime empirically  titrating NE and vasopressin with higher needs  add amiodarone if continues with HR > 120 in AF  CVVH but not tolerating fluid removal  continue ventilation has required higher oxygen levels with the pneumonia  rest as above

## 2022-09-13 NOTE — PROGRESS NOTE ADULT - SUBJECTIVE AND OBJECTIVE BOX
infectious diseases progress note    INTERVAL HPI/OVERNIGHT EVENTS: Patient intubated and sedated. Afebrile o/n. Last Temp 100.8F 09/12 at 6AM.     ROS: Limited ROS per patient as she is intubated and sedated.     Allergies    Altabax (Unknown)  Augmentin (Unknown)  clindamycin (Unknown)  Vaseline (Swelling)    Intolerances        ANTIBIOTICS/RELEVANT:  antimicrobials  cefepime   IVPB 2000 milliGRAM(s) IV Intermittent every 12 hours    immunologic:  influenza  Vaccine (HIGH DOSE) 0.7 milliLiter(s) IntraMuscular once    OTHER:  acetylcysteine 10%  Inhalation 4 milliLiter(s) Inhalation every 6 hours  albuterol/ipratropium for Nebulization 3 milliLiter(s) Nebulizer every 6 hours  chlorhexidine 0.12% Liquid 15 milliLiter(s) Oral Mucosa every 12 hours  chlorhexidine 2% Cloths 1 Application(s) Topical <User Schedule>  CRRT Treatment    <Continuous>  dextrose 5%. 1000 milliLiter(s) IV Continuous <Continuous>  dextrose 5%. 1000 milliLiter(s) IV Continuous <Continuous>  dextrose 50% Injectable 25 Gram(s) IV Push once  dextrose 50% Injectable 12.5 Gram(s) IV Push once  dextrose 50% Injectable 25 Gram(s) IV Push once  dextrose Oral Gel 15 Gram(s) Oral once PRN  fentaNYL   Infusion 0.5 MICROgram(s)/kG/Hr IV Continuous <Continuous>  glucagon  Injectable 1 milliGRAM(s) IntraMuscular once  heparin  Infusion.  Unit(s)/Hr IV Continuous <Continuous>  insulin lispro (ADMELOG) corrective regimen sliding scale   SubCutaneous every 6 hours  lidocaine   4% Patch 1 Patch Transdermal every 24 hours  norepinephrine Infusion 0.05 MICROgram(s)/kG/Min IV Continuous <Continuous>  pantoprazole  Injectable 40 milliGRAM(s) IV Push every 24 hours  polyethylene glycol 3350 17 Gram(s) Oral daily  propofol Infusion 10.003 MICROgram(s)/kG/Min IV Continuous <Continuous>  PureFlow Dialysate RFP-400 (K 2 / Ca 3) 5000 milliLiter(s) CRRT <Continuous>  senna 2 Tablet(s) Oral daily  sodium chloride 0.9% lock flush 10 milliLiter(s) IV Push every 1 hour PRN  vasopressin Infusion 0.03 Unit(s)/Min IV Continuous <Continuous>      Objective:  Vital Signs Last 24 Hrs  T(C): 36.5 (13 Sep 2022 14:35), Max: 37.3 (12 Sep 2022 18:10)  T(F): 97.7 (13 Sep 2022 14:35), Max: 99.2 (12 Sep 2022 18:10)  HR: 119 (13 Sep 2022 17:00) (80 - 145)  BP: 89/40 (13 Sep 2022 17:00) (89/40 - 147/67)  BP(mean): 58 (13 Sep 2022 17:00) (58 - 97)  RR: 26 (13 Sep 2022 17:00) (17 - 27)  SpO2: 90% (13 Sep 2022 17:00) (90% - 99%)    Parameters below as of 13 Sep 2022 17:00  Patient On (Oxygen Delivery Method): ventilator    O2 Concentration (%): 60    PHYSICAL EXAM:  Constitutional:  intubated and sedated, ill appearing   HEENT: NC/AT, neck supple, RIJ HD cath, LIJ TLC   Respiratory: clear breath sounds b/l, chest tube in place on L   Cardiac: +S1/S2; RRR; no M/R/G  Gastrointestinal: soft, NT/ND; no rebound or guarding, rectal tube in place  Extremities: dermastasis below knees b/l w/ skin changes, significant edema up to knees, there is erythema that is extending above the R knee on the lateral thigh   Vascular: 2+ radial, DP/PT pulses B/L  Neurologic: sedated         LABS:                        7.4    18.44 )-----------( 153      ( 13 Sep 2022 04:34 )             25.2     09-13    134<L>  |  103  |  46<H>  ----------------------------<  171<H>  4.6   |  21<L>  |  3.04<H>    Ca    8.3<L>      13 Sep 2022 16:24  Phos  4.3     09-13  Mg     2.2     09-13    TPro  6.5  /  Alb  2.4<L>  /  TBili  0.8  /  DBili  x   /  AST  25  /  ALT  26  /  AlkPhos  188<H>  09-13    PT/INR - ( 13 Sep 2022 10:38 )   PT: 12.2 sec;   INR: 1.02          PTT - ( 13 Sep 2022 16:31 )  PTT:38.4 sec  Urinalysis Basic - ( 11 Sep 2022 17:56 )    Color: Yellow / Appearance: SL Cloudy / SG: >=1.030 / pH: x  Gluc: x / Ketone: NEGATIVE  / Bili: Negative / Urobili: 0.2 E.U./dL   Blood: x / Protein: 30 mg/dL / Nitrite: NEGATIVE   Leuk Esterase: NEGATIVE / RBC: < 5 /HPF / WBC < 5 /HPF   Sq Epi: x / Non Sq Epi: 0-5 /HPF / Bacteria: Present /HPF          MICROBIOLOGY:        RADIOLOGY & ADDITIONAL STUDIES:

## 2022-09-13 NOTE — PROGRESS NOTE ADULT - ASSESSMENT
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 Patient was found to be severely septic i/s/o GBS bacteremia w/ b/l lower cellulitis noted on exam. TTE done i/s/o GBS bacteremia which was unremarkable. Currently patient is on CTX 2g IV QD which should be continued. Patient is s/p arrest on the medical floors ROSC achieved and now intubated and sedated on pressors in the ICU. Unknown cause of arrest. Patient with downtrending WBC on CTX 2g. Her course was c/b fail chest and left sided PTX which is s/p L chest tube own 09/08. Patient on 09/10 was noted to have worsening BRANT on CKD with possible ATN and minimal UOP so levophed was started to help with MAPs and perfusion and propofol was started. She developed a fever of 100.5 09/11 at 8pm with a Tmax of 100.8F 09/12AM . She was noted to have increased thick secretions from ETT which appeared possibly purulent. She was broaden to cefepime and Vancomycin to cover for VAP. Of note patient with HD cath placed to [facility] placed yesterday for CVVHD. Patient had an uptrend in WBC 15.87-->18.44. MRSA swab negative however patient intubated so may not be accurate.     Recommendations:   - Recommend CT Chest/abdomen/plevis non con and oral con - to evaluate for another source of persistent leukocytosis  - Continue with cefepime 2g q12H   - c/w Vancomycin 1g q12H, trough prior to 4th dose. Goal trough 13-17. -  - Follow sputum culture results from 09/12   - Follow blood cultures x2 from 09/12       Team 1 will continue to follow. Case discussed with Dr. Ordonez and primary team.     Note not finalized until attending attestation is complete

## 2022-09-13 NOTE — PROGRESS NOTE ADULT - ASSESSMENT
81F PMH CKD 4 (baseline around 2.5-2.9) HTN who presented with a one day history of respiratory distress and LE cellulitis hospital course c/b cardiac arrest on 9/7  Nephrology consulted for elevated creatinine.     Assessment/Plan:   # anuric  BRANT on CKD   UOP 190cc/24hrs   Volume overloaded on physical exam   UA w/protein  uPCR 0.7  unclear baseline creatinine  etiology of BRANT includes:   likely iATN in the setting of cardiac arrest and shock        Plan   given anuric renal failure, and evidence of volume overload will continue with CVVHD   CVVHD rx: qb 300ml/min net negative 100/hour DFR: 2L/hour  q6H BMP mag and phos while on CVVHD  maintain MAP >70 for adequate renal perfusion  strict i's and o's

## 2022-09-13 NOTE — PROGRESS NOTE ADULT - ASSESSMENT
80yo morbidly obese female w/ pmhx of HTN, HLD, HFpEF (EF 56%), COPD (on home O2-3L), chronic LE edema with neuropathic pain and cellulitis, DALIA, PVD p/w respiratory distress 2/2 sepsis due to cellulitis on lower extremity. Hospital course coplicated by cardiac arrest, flail chest, and oliguric BRANT progressing to kidney failure.     NEURO  #Intubated  s/p cardiac arrest w/ intubation 2/2 hypoxia due to aspiration vs PE vs flash pulmonary edema. Pt relatively hypotensive on 09/07 with episodes of hypertension to 130/140 systolic. Pt coded on floor on 09/07, found to have no pulse with blue complexion by nurse during meal. Intubation on PM 09/07. ABG done showing mild hypercapnea - 25. EKG normal. Chest x-ray to r/o aspiration showed diffuse patchy airspace opacities throughout the lungs bilaterally, suggestive of pulmonary alveolar edema. Bedside echo showing no RV dysfunction, PE less likely. Currently on propofol 30mcg/kg/min. CT head done as pt was complaining of head pain, neg for stroke  Plan:  - c/w repository ventilation and propofol + fentanyl. Goal of decreasing propofol/transition to Precedex and increasing fentanyl as needed.    PULM  #Pneumothorax  Pt originally presented to MICU with signs of decreased lung sliding on bedside ultrasound but no pneumothorax on x-ray. Pt developed left sided pneumothorax on AM of 09/08 with drop in sat from high 90s to 70s but no BP changes. Chest tube placed with improvement in sats to high 90s again. Ventilatory settings adjusted to minimize risk of barotrauma to right lung. Repeat x-rays showing lung reexpansion  Plan:  - c/w ventilation, still concern for flail chest when transitioned to CPAP    #Broken ribs w/ flail chest  Pt with several broken ribs and signs of flail chest with difficulty breathing when sedation weaned off. Pt currently intubated and sedated. Thoracic surgery consulted. Anesthesia consulted, spinal block on 09/12 in prep for sternal plating.  Plan:  - F/u surgery for sternal plating    #Chronic obstructive pulmonary disease (COPD).   Pt with COPD on home 3L. Patient initially presented with NRB, now intubated s/p cardiac arrest.       #Pulm HTN  possibly 2/2 longstanding DALIA. Evidence of new pulm HTN on echo w/ pap 48 compared to TTE from 2022. 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 Originally started on Heparin infusion 1800u/hr for full anticoagulation on 09/07 transitioned to sub q once PE ruled unlikely.  Plan:  - c/w subq heparin     #Acute on chronic resp failure  Pt w/ COPD on home O2, currently worsening hypoxia 2/2 to aspiration vs flash pulmonary edema. Pt endured cardiac arrest secondary to resp failure likely in setting of aspiration while eating vs flash pulmonary edema 2/2 chronic htn and chronic HF (relatively the same EF as previous TTE). New onset pleural effusions likely in the same setting, pt s/p intubation. Plan as above    CARDIO  #Cardiac Arrest  possibly 2/2 hypoxia in setting of aspiration vs PE vs flash pulmonary edema. Pt relatively hypotensive throughout the day with episodes of hypertension to 130/140 systolic. received fluids on PM 09/07 and a unit of blood on 09/07 for anemia. In PM on 09/07, complained of difficulty breathing during meal, found unresponsive, hypotensive, w/ blue complexion by nurse. CPR initiated with 1 dose of Epi given, s/p intubation. Currently on propofol 5. Levo drip continuing to increase, 0.24. On Subq Heparin  Plan:  - wean levophed as tolerated - MAP goal 70-75  - c/w heparin   - c/w sedation until flail chest managed as pt not tolerating     #Chronic heart failure with preserved ejection fraction (HFpEF).   Pt on home bumex 2mg.  TTE from current visit showing EF of 56% with Pulmonary HTN - PAP 48. Experienced pulmonary edema and effusion postcardiac arrest.   Plan:  - c/w CVVHD to reduce fluid overload in the setting of acute kidney failure    #Hypertension.   Pt with history of hypertension on home diltiazem 180mg daily. Pt relatively hypotensive throughout the day, currently on pressors s/p cardiac arrest and intubation. BP low threshold for pressors  Plan:  - monitor BP  - holding home antihypertensive meds    GI  #HLD (hyperlipidemia).   Pt on atorvastatin 40mg daily.  Plan:  - c/w lipitor 40 mg QHS.      RENAL  #Oliguric BRANT on CKD  Baseline Cr 2.04 in July 2022, currently uptredning to 3.3. Likely 2/2 to hypoperfusion/ischemia in setting of sepsis and cardiac arrest. Nephro consulted. HD cath placed on 09/12 for CVVHD in setting of worsening electrolytes and increasing fluid status  Plan:  - c/w CVVHD  - trend BUN, Cr  - renal dosing meds  - avoid nephrotoxic agents  - strict I&Os      #Right kidney mass.   CTAP s/f slightly hyperdense solid cortical mass in upper pole of R kidney.  Plan:  - consider additional renal imaging when stable    ENDO  No active issues      HEME  #Anemia  Pt noted to have chronic anemia, presented with HgB of 6.8 w/ drop to 6.7 during course of stay. s/p 1 unit RBC on 09/07, HgB of 7.4 today.  Plan:  - f/u H+H  - active type and screen   - f/u iron panel      ID  #Severe sepsis w/ shock  possibly 2/2 to LE Cellulitis. 4/4 SIRS criteria (T 105.2, , RR 22, WBC 25.88) with lactate 5.1 ->0.9. CT LE s/f cellulitis with no fluid collection or OM. CTa/p did not reveal infectious etiology. S/p Vancomycin 1g and cefepime 1g in ED, received one more dose of cefepime 1g IV AM 09/07. BLE edema, erythema, scaly skin c/w cellulitis. Transitioned to CTX 2g IV QD on 09/7 but new t-max on night of 09/11-12 w/ pos sputum culture w/ gram pos rods and cocci in pairs. Transitioned to cefepime 1g q12 and received 1 dose vanc 1.5g (dose dependent due to kidney status) due to possible ventilator associated pneumonia. Continuing on Levo for MAP goal 70-75  Plan:  - c/w cefepime and vanc  - Vanc troph prior to next dose at 12AM  - c/w tylenol 650 mg PO q6h PRN for pain/fever  - f/u further ID recs  - blood cultures showing no growth, sputum showing gram pos rods and cocci in pair        E: K>4, Phos>3, Mg>2  N: NPO intubated   DVT ppx: subq Heparin  GI ppx: Protonix 40 BID  Access: 2 peripheral lines, left sided triple lumen, HD cath on right    DNR         80yo morbidly obese female w/ pmhx of HTN, HLD, HFpEF (EF 56%), COPD (on home O2-3L), chronic LE edema with neuropathic pain and cellulitis, DALIA, PVD p/w respiratory distress 2/2 sepsis due to cellulitis on lower extremity. Hospital course coplicated by cardiac arrest, flail chest, and oliguric BRANT progressing to kidney failure.     NEURO  #Anoxic Encephelopathy  s/p cardiac arrest w/ intubation 2/2 hypoxia due to aspiration vs PE vs flash pulmonary edema. Pt relatively hypotensive on 09/07 with episodes of hypertension to 130/140 systolic. Pt coded on floor on 09/07, found to have no pulse with blue complexion by nurse during meal. Intubation on PM 09/07. ABG done showing mild hypercapnea - 25. EKG normal. Chest x-ray to r/o aspiration showed diffuse patchy airspace opacities throughout the lungs bilaterally, suggestive of pulmonary alveolar edema. Bedside echo showing no RV dysfunction, PE less likely. Currently on propofol 30mcg/kg/min. CT head done as pt was complaining of head pain, neg for stroke  Plan:  - c/w repository ventilation and propofol + fentanyl. Goal of decreasing propofol/transition to Precedex and increasing fentanyl as needed.    PULM  #Pneumothorax  Pt originally presented to MICU with signs of decreased lung sliding on bedside ultrasound but no pneumothorax on x-ray. Pt developed left sided pneumothorax on AM of 09/08 with drop in sat from high 90s to 70s but no BP changes. Chest tube placed with improvement in sats to high 90s again. Ventilatory settings adjusted to minimize risk of barotrauma to right lung. Repeat x-rays showing lung reexpansion  Plan:  - c/w ventilation, still concern for flail chest when transitioned to CPAP    #Broken ribs w/ flail chest  Pt with several broken ribs and signs of flail chest with difficulty breathing when sedation weaned off. Pt currently intubated and sedated. Thoracic surgery consulted. Anesthesia consulted, spinal block on 09/12 in prep for sternal plating.  Plan:  - F/u surgery for sternal plating    #Chronic obstructive pulmonary disease (COPD).   Pt with COPD on home 3L. Patient initially presented with NRB, now intubated s/p cardiac arrest.       #Pulm HTN  possibly 2/2 longstanding DALIA. Evidence of new pulm HTN on echo w/ pap 48 compared to TTE from 2022. 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 Originally started on Heparin infusion 1800u/hr for full anticoagulation on 09/07 transitioned to sub q once PE ruled unlikely.  Plan:  - c/w subq heparin     #Acute on chronic resp failure  Pt w/ COPD on home O2, currently worsening hypoxia 2/2 to aspiration vs flash pulmonary edema. Pt endured cardiac arrest secondary to resp failure likely in setting of aspiration while eating vs flash pulmonary edema 2/2 chronic htn and chronic HF (relatively the same EF as previous TTE). New onset pleural effusions likely in the same setting, pt s/p intubation. Plan as above    CARDIO  #Cardiac Arrest  possibly 2/2 hypoxia in setting of aspiration vs PE vs flash pulmonary edema. Pt relatively hypotensive throughout the day with episodes of hypertension to 130/140 systolic. received fluids on PM 09/07 and a unit of blood on 09/07 for anemia. In PM on 09/07, complained of difficulty breathing during meal, found unresponsive, hypotensive, w/ blue complexion by nurse. CPR initiated with 1 dose of Epi given, s/p intubation. Currently on propofol 5. Levo drip continuing to increase, 0.24. On Subq Heparin  Plan:  - wean levophed as tolerated - MAP goal 70-75  - c/w heparin   - c/w sedation until flail chest managed as pt not tolerating     #Chronic heart failure with preserved ejection fraction (HFpEF).   Pt on home bumex 2mg.  TTE from current visit showing EF of 56% with Pulmonary HTN - PAP 48. Experienced pulmonary edema and effusion postcardiac arrest.   Plan:  - c/w CVVHD to reduce fluid overload in the setting of acute kidney failure    #Hypertension.   Pt with history of hypertension on home diltiazem 180mg daily. Pt relatively hypotensive throughout the day, currently on pressors s/p cardiac arrest and intubation. BP low threshold for pressors  Plan:  - monitor BP  - holding home antihypertensive meds    GI  #HLD (hyperlipidemia).   Pt on atorvastatin 40mg daily.  Plan:  - c/w lipitor 40 mg QHS.      RENAL  #Oliguric BRANT on CKD  Baseline Cr 2.04 in July 2022, currently uptredning to 3.3. Likely 2/2 to hypoperfusion/ischemia in setting of sepsis and cardiac arrest. Nephro consulted. HD cath placed on 09/12 for CVVHD in setting of worsening electrolytes and increasing fluid status  Plan:  - c/w CVVHD  - trend BUN, Cr  - renal dosing meds  - avoid nephrotoxic agents  - strict I&Os      #Right kidney mass.   CTAP s/f slightly hyperdense solid cortical mass in upper pole of R kidney.  Plan:  - consider additional renal imaging when stable    ENDO  No active issues      HEME  #Anemia  Pt noted to have chronic anemia, presented with HgB of 6.8 w/ drop to 6.7 during course of stay. s/p 1 unit RBC on 09/07, HgB of 7.4 today.  Plan:  - f/u H+H  - active type and screen   - f/u iron panel      ID  #Severe sepsis w/ shock  possibly 2/2 to LE Cellulitis. 4/4 SIRS criteria (T 105.2, , RR 22, WBC 25.88) with lactate 5.1 ->0.9. CT LE s/f cellulitis with no fluid collection or OM. CTa/p did not reveal infectious etiology. S/p Vancomycin 1g and cefepime 1g in ED, received one more dose of cefepime 1g IV AM 09/07. BLE edema, erythema, scaly skin c/w cellulitis. Transitioned to CTX 2g IV QD on 09/7 but new t-max on night of 09/11-12 w/ pos sputum culture w/ gram pos rods and cocci in pairs. Transitioned to cefepime 1g q12 and received 1 dose vanc 1.5g (dose dependent due to kidney status) due to possible ventilator associated pneumonia. Continuing on Levo for MAP goal 70-75  Plan:  - c/w cefepime and vanc  - Vanc troph prior to next dose at 12AM  - c/w tylenol 650 mg PO q6h PRN for pain/fever  - f/u further ID recs  - blood cultures showing no growth, sputum showing gram pos rods and cocci in pair        E: K>4, Phos>3, Mg>2  N: NPO intubated   DVT ppx: subq Heparin  GI ppx: Protonix 40 BID  Access: 2 peripheral lines, left sided triple lumen, HD cath on right    DNR         80yo morbidly obese female w/ pmhx of HTN, HLD, HFpEF (EF 56%), COPD (on home O2-3L), chronic LE edema and cellulitis, DALIA, PVD p/w respiratory distress 2/2 severe sepsis due to cellulitis on lower extremity which progressed to septic shock in setting of possible VAP. Hospital course complicated by cardiac arrest, flail chest, and oliguric BRANT progressing to kidney failure.     NEURO  #Anoxic Encephelopathy  s/p cardiac arrest 2/2 hypoxia due to aspiration vs PE vs flash pulmonary edema. Prior to arrest, pt was awake and alert on the RMF. Difficult to assess current status due to continued ventilation and sedation requirements in the setting on possible VAP and flail chest.    Plan:  - c/w ventilation and propofol + fentanyl. Goal of decreasing propofol/transition to Precedex and increasing fentanyl as needed.    PULM  #Pneumothorax  Pt presented to MICU with signs of decreased lung sliding on bedside ultrasound s/p cardiac arrest and chest compressions. Drop in sats to low 70s on 09/08 w/ x-ray confirming Left sided pneumothorax, no BP changes. Chest tube placed with improvement in sats to high 90s again. Repeat x-rays showing lung reexpansion  Plan:  - c/w ventilation, still concern for flail chest when transitioned to CPAP    #Broken ribs w/ flail chest  Pt with several broken ribs and signs of flail chest with difficulty breathing when sedation weaned off. Pt currently intubated and sedated. Thoracic surgery consulted for possible sternal plating. Anesthesia consulted, spinal block on 09/12 for pain management  Plan:  - F/u surgery for sternal plating    #Chronic obstructive pulmonary disease (COPD).   Pt with COPD on home 3L. Patient initially presented with NRB, now intubated s/p cardiac arrest.      #Pulm HTN  possibly 2/2 longstanding DALIA. Evidence of new pulm HTN on echo w/ pap 48 compared to TTE from 2022. LE doppler on current visit negative, PE unlikely.     #Acute on chronic resp failure  Pt w/ COPD on home O2, currently worsening hypoxia 2/2 to Pneumonia vs flash pulmonary edema. Pt endured cardiac arrest secondary to resp failure likely in setting of aspiration while eating vs flash pulmonary edema 2/2 chronic htn and chronic HF (relatively the same EF as previous TTE). New onset pleural effusions likely in the same setting, pt s/p intubation. Plan as above    CARDIO  #Cardiac Arrest  possibly 2/2 hypoxia in setting of aspiration vs  flash pulmonary edema. Pt relatively hypotensive throughout the day with episodes of hypertension to 130/140 systolic. received fluids on PM 09/07 and a unit of blood on 09/07 for anemia. In PM on 09/07, complained of difficulty breathing during meal, found unresponsive, hypotensive, w/ blue complexion by nurse.     #Chronic heart failure with preserved ejection fraction (HFpEF).   Pt on home bumex 2mg.  TTE from current visit showing EF of 56% with Pulmonary HTN - PAP 48. Experienced pulmonary edema and effusion postcardiac arrest.   Plan:  - c/w CVVHD to reduce fluid overload in the setting of acute kidney failure    #Hypertension.   Pt with history of hypertension on home diltiazem 180mg daily. Currently on pressors s/p septic shock.   Plan:  - holding home antihypertensive meds    GI  #HLD (hyperlipidemia).   Pt on atorvastatin 40mg daily.  Plan:  - c/w lipitor 40 mg QHS.      RENAL  #Oliguric BRANT on CKD  Baseline Cr 2.04 in July 2022, currently uptredning to 3.3. Likely 2/2 to hypoperfusion/ischemia in setting of sepsis and cardiac arrest. Nephro consulted. HD cath placed on 09/12 for CVVHD in setting of worsening electrolytes and increasing fluid status  Plan:  - c/w CVVHD  - renal dosing meds  - avoid nephrotoxic agents  - strict I&Os      #Right kidney mass.   CTAP s/f slightly hyperdense solid cortical mass in upper pole of R kidney.  Plan:  - consider additional renal imaging when stable    ENDO  No active issues      HEME  #Anemia  Pt noted to have chronic anemia, presented with HgB of 6.8 w/ drop to 6.7 during course of stay. s/p 1 unit RBC on 09/07, HgB of 7.4 today.  Plan:  - f/u H+H  - active type and screen   - f/u iron panel      ID  #Severe sepsis w/ shock  possibly 2/2 to LE Cellulitis, currently exacerbated by possible VAP in the setting of new tmax and worsened pleural effusions on 09/12.  S/p Vancomycin 1g and cefepime 1g in ED, received one more dose of cefepime 1g IV AM 09/07. BLE edema, erythema, scaly skin c/w cellulitis. Transitioned to CTX 2g IV QD on 09/7 but new t-max on night of 09/11-12 w/ pos sputum culture w/ gram pos rods and cocci in pairs. Transitioned to cefepime 1g q12 and received 1 dose vanc 1.5g (dose dependent due to kidney status). Continuing Levo requirement for MAP goal 70-75  Plan:  - c/w cefepime and vanc  - Vanc troph prior to next dose at 12AM  - c/w tylenol 650 mg PO q6h PRN for pain/fever  - f/u further ID recs  - blood cultures showing no growth, sputum showing gram pos rods and cocci in pair        E: K>4, Phos>3, Mg>2  N: Nepro diet - CVVHD   DVT ppx: subq Heparin  GI ppx: Protonix 40 BID  Access: 2 peripheral lines, left sided triple lumen, HD cath on right    DNR

## 2022-09-13 NOTE — CHART NOTE - NSCHARTNOTEFT_GEN_A_CORE
Pt care discussed in IDT rounds. Pt with increased propofol demands; running at 18.4 ml/hr (486 kcal/day) at time of assessment. Recommend adjust TF regimen to compensate for rx supplied kcal; Nepro @24 ml/hr with LPS x3/day to provide 576 ml TF, 1823 kcal (with current propofol rate), 92 gProt., and 419 ml FW. This is 22.8 non-protein kcal and 1.44 gProt. per kg IBW 63.6kg. At time of assessment, MAP of 63 with fentanyl and norepinephrine ggt -recommend holding feeds if MAP remains <65 or additional pressor support initiated. RDN will continue to monitor per protocol and PRN.     Ernie Mei MS, RDN (ex. [id number])

## 2022-09-13 NOTE — PROGRESS NOTE ADULT - ATTENDING COMMENTS
seen on CRRT -- restarted with IV heparin and working well  UF as tolerates as quite overloaded still -- 50 -100 cc/hour hour net neg

## 2022-09-14 NOTE — PROGRESS NOTE ADULT - ASSESSMENT
81F PMH CKD 4 (baseline around 2.5-2.9) HTN who presented with a one day history of respiratory distress and LE cellulitis hospital course c/b cardiac arrest on 9/7  Nephrology consulted for elevated creatinine.     Assessment/Plan:   # anuric  BRANT on CKD   Volume overloaded on physical exam   UA w/protein  uPCR 0.7  unclear baseline creatinine  etiology of BRANT includes:   likely iATN in the setting of cardiac arrest and shock        Plan   given anuric renal failure, and evidence of volume overload will continue with CVVHD will keep net even, will try again for net negative UF and continue if patient tolerates   Given phosphorus levels decreasing will change Dialysate to Phoxillum   CVVHD rx: qb 300ml/min net even  DFR: 2L/hour  q6H BMP mag and phos while on CVVHD  maintain MAP >70 for adequate renal perfusion  strict i's and o's

## 2022-09-14 NOTE — PROGRESS NOTE ADULT - ATTENDING COMMENTS
seen on CVVHD with Dr Parra , agree with above  keeping even  for now as not tolerating UF despite overload   cont clearance as above- use Phoxillum  PRBC

## 2022-09-14 NOTE — PROGRESS NOTE ADULT - ATTENDING COMMENTS
Morbid obesity, HFpEF, chronic cellulitis, COPD, DALIA s/p cardiac arrest, ATN, RLL pna, septic shock  physical as above  continue cefepime empirically  NE needs slighly decreased  continue CVVH, not tolerating fluid removal  transfuse as needed  nepro feeds  RASS -3

## 2022-09-14 NOTE — PROGRESS NOTE ADULT - ASSESSMENT
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 Patient was found to be severely septic i/s/o GBS bacteremia w/ b/l lower cellulitis noted on exam. TTE done i/s/o GBS bacteremia which was unremarkable. Currently patient is on CTX 2g IV QD which should be continued. Patient is s/p arrest on the medical floors ROSC achieved and now intubated and sedated on pressors in the ICU. Unknown cause of arrest. Patient with downtrending WBC on CTX 2g. Her course was c/b fail chest and left sided PTX which is s/p L chest tube own 09/08. Patient on 09/10 was noted to have worsening BRANT on CKD with possible ATN and minimal UOP so levophed was started to help with MAPs and perfusion and propofol was started. She developed a fever of 100.5 09/11 at 8pm with a Tmax of 100.8F 09/12AM . She was noted to have increased thick secretions from ETT which appeared possibly purulent. She was broaden to cefepime and Vancomycin to cover for VAP. Of note patient with HD cath placed to [facility] placed yesterday for CVVHD. Patient had an uptrend in WBC 15.87-->18.44. MRSA swab negative however patient intubated so may not be accurate. She currently still has persistent leukocytosis however had been afebrile. Vanc supratherpautic on trough from 9pm 09/13 - 26.0, and 09/14 @noon which was 23.3. Vanc currently being held. Likely i/s/o recent difficulties with successful CVVHD so not fully clearing.     Recommendations:   - Recommend CT Chest/abdomen/plevis non con and oral con - to evaluate for another source of persistent leukocytosis  - Continue with cefepime 2g q12H   - Repeat Vanc trough this evening, if Trough <20 then can restart Vancomycin 1g q12 hours trough prior to 4th dose. Goal trough 13-17.   - Follow sputum culture results from 09/12   - Follow blood cultures x2 from 09/12       Team 1 will continue to follow. Case discussed with Dr. Ordonez and primary team.     Note not finalized until attending attestation is complete

## 2022-09-14 NOTE — PROGRESS NOTE ADULT - SUBJECTIVE AND OBJECTIVE BOX
GABBY LAYTON, 81y, Female  MRN-[id number]  Patient is a 81y old  Female who presents with a chief complaint of Acute hypoxic respiratory failure (14 Sep 2022 11:18)      OVERNIGHT EVENTS: Vanc level came back higher than therapeutic goal, held dose at 12am. Tube feeds were pasued due to increased residual. Pt was also found to have a Hgb of 6.8, received 1 unit of blood in AM.  Periods of drops in BP while on CVVHD, self resolved.     SUBJECTIVE: pt assessed at bedside, intubated and sedated. Responds to sternal rub. ROS could not be obtained.    12 Point ROS Negative unless noted otherwise above.  -------------------------------------------------------------------------------  VITAL SIGNS:  Vital Signs Last 24 Hrs  T(C): 35.7 (14 Sep 2022 09:00), Max: 37.1 (14 Sep 2022 01:03)  T(F): 96.3 (14 Sep 2022 09:00), Max: 98.7 (14 Sep 2022 01:03)  HR: 62 (14 Sep 2022 14:00) (60 - 145)  BP: 89/40 (13 Sep 2022 17:00) (89/40 - 89/40)  BP(mean): 58 (13 Sep 2022 17:00) (58 - 58)  RR: 26 (14 Sep 2022 14:00) (22 - 26)  SpO2: 98% (14 Sep 2022 14:00) (77% - 100%)    Parameters below as of 14 Sep 2022 14:00  Patient On (Oxygen Delivery Method): ventilator    O2 Concentration (%): 50  I&O's Summary    13 Sep 2022 07:01  -  14 Sep 2022 07:00  --------------------------------------------------------  IN: 3029.3 mL / OUT: 2804 mL / NET: 225.3 mL    14 Sep 2022 07:01  -  14 Sep 2022 14:54  --------------------------------------------------------  IN: 1026.8 mL / OUT: 356 mL / NET: 670.8 mL        PHYSICAL EXAM:    General: pt intubated and sedated, obese, lying in bed  HEENT: pupil 2mm minimally reactive to light; moist mucosal membranes.  Neck: supple, trachea midline  Cardiovascular: RRR, +S1/S2; NO M/R/G  Respiratory: decreased breath sounds in bases of lungs; no W/R/R  Gastrointestinal: soft, NT/ND; dec bowel sounds  Extremities: dermastasis below knees b/l w/ skin changes, significant edema up to knees  Vascular: 2+ radial pulses  Neurological: AOx0, sedated and intubated    ALLERGIES:  Allergies    Altabax (Unknown)  Augmentin (Unknown)  clindamycin (Unknown)  Vaseline (Swelling)    Intolerances        MEDICATIONS:  MEDICATIONS  (STANDING):  acetylcysteine 10%  Inhalation 4 milliLiter(s) Inhalation every 6 hours  albuterol/ipratropium for Nebulization 3 milliLiter(s) Nebulizer every 6 hours  cefepime   IVPB 2000 milliGRAM(s) IV Intermittent every 12 hours  chlorhexidine 0.12% Liquid 15 milliLiter(s) Oral Mucosa every 12 hours  chlorhexidine 2% Cloths 1 Application(s) Topical <User Schedule>  CRRT Treatment    <Continuous>  dextrose 5%. 1000 milliLiter(s) (100 mL/Hr) IV Continuous <Continuous>  dextrose 5%. 1000 milliLiter(s) (50 mL/Hr) IV Continuous <Continuous>  dextrose 50% Injectable 25 Gram(s) IV Push once  dextrose 50% Injectable 12.5 Gram(s) IV Push once  dextrose 50% Injectable 25 Gram(s) IV Push once  fentaNYL   Infusion 0.5 MICROgram(s)/kG/Hr (5.12 mL/Hr) IV Continuous <Continuous>  glucagon  Injectable 1 milliGRAM(s) IntraMuscular once  heparin  Infusion.  Unit(s)/Hr (18 mL/Hr) IV Continuous <Continuous>  influenza  Vaccine (HIGH DOSE) 0.7 milliLiter(s) IntraMuscular once  insulin lispro (ADMELOG) corrective regimen sliding scale   SubCutaneous every 6 hours  lidocaine   4% Patch 1 Patch Transdermal every 24 hours  midazolam Infusion 0.02 mG/kG/Hr (2.05 mL/Hr) IV Continuous <Continuous>  norepinephrine Infusion 0.05 MICROgram(s)/kG/Min (4.8 mL/Hr) IV Continuous <Continuous>  pantoprazole  Injectable 40 milliGRAM(s) IV Push every 24 hours  polyethylene glycol 3350 17 Gram(s) Oral daily  PureFlow Dialysate RFP-400 (K 2 / Ca 3) 5000 milliLiter(s) (2000 mL/Hr) CRRT <Continuous>  senna 2 Tablet(s) Oral daily  vasopressin Infusion 0.03 Unit(s)/Min (1.8 mL/Hr) IV Continuous <Continuous>    MEDICATIONS  (PRN):  dextrose Oral Gel 15 Gram(s) Oral once PRN Blood Glucose LESS THAN 70 milliGRAM(s)/deciliter  sodium chloride 0.9% lock flush 10 milliLiter(s) IV Push every 1 hour PRN Pre/post blood products, medications, blood draw, and to maintain line patency      -------------------------------------------------------------------------------  LABS:                        7.4    16.96 )-----------( 118      ( 14 Sep 2022 13:57 )             23.1     09-14    133<L>  |  101  |  33<H>  ----------------------------<  157<H>  4.0   |  22  |  2.00<H>    Ca    8.4      14 Sep 2022 04:41  Phos  3.3     09-14  Mg     2.0     09-14    TPro  6.7  /  Alb  2.3<L>  /  TBili  1.0  /  DBili  0.6<H>  /  AST  17  /  ALT  21  /  AlkPhos  167<H>  09-14    LIVER FUNCTIONS - ( 14 Sep 2022 04:41 )  Alb: 2.3 g/dL / Pro: 6.7 g/dL / ALK PHOS: 167 U/L / ALT: 21 U/L / AST: 17 U/L / GGT: x           PT/INR - ( 14 Sep 2022 13:57 )   PT: 12.5 sec;   INR: 1.05          PTT - ( 14 Sep 2022 13:57 )  PTT:95.4 sec    CAPILLARY BLOOD GLUCOSE      POCT Blood Glucose.: 170 mg/dL (14 Sep 2022 12:01)      Culture - Blood (collected 12 Sep 2022 11:21)  Source: .Blood Blood-Peripheral  Preliminary Report (14 Sep 2022 12:00):    No growth at 2 days.    Culture - Blood (collected 12 Sep 2022 11:21)  Source: .Blood Blood-Peripheral  Preliminary Report (14 Sep 2022 12:00):    No growth at 2 days.    Culture - Sputum (collected 12 Sep 2022 10:45)  Source: .Sputum Sputum  Gram Stain (12 Sep 2022 16:59):    Rare epithelial cells    Few WBC's    Rare Gram Positive Rods    Rare Gram positive cocci in pairs  Final Report (14 Sep 2022 11:16):    Normal Respiratory Jana present      COVID-19 PCR: NotDetec (13 Sep 2022 04:34)  SARS-CoV-2: NotDetec (06 Sep 2022 10:32)  COVID-19 PCR: NotDetec (06 Sep 2022 10:25)  COVID-19 PCR: NotDetec (02 Jul 2022 13:33)      RADIOLOGY & ADDITIONAL TESTS: Reviewed.

## 2022-09-14 NOTE — PROGRESS NOTE ADULT - ASSESSMENT
80yo morbidly obese female w/ pmhx of HTN, HLD, HFpEF (EF 56%), COPD (on home O2-3L), chronic LE edema and cellulitis, DALIA, PVD p/w respiratory distress 2/2 severe sepsis due to cellulitis on lower extremity which progressed to septic shock in setting of possible VAP. Hospital course complicated by cardiac arrest, flail chest, and oliguric BRANT progressing to kidney failure.     NEURO  #Anoxic Encephelopathy  s/p cardiac arrest 2/2 hypoxia due to aspiration vs pulmonary edema. Prior to arrest, pt was awake and alert on the RMF. Difficult to assess current status due to continued ventilation and sedation requirements in the setting on possible VAP and flail chest. VC AC w/ FiO2 50, Vt 450, Ti 0.8, RR 26, PEEP 5. Improvement of respiratory acidosis w/ Mucomyst    Plan:  - c/w ventilation and versed + fentanyl. Propofol dc'd in setting of increased triglycerides     PULM  #Pneumothorax  Pt presented to MICU with signs of decreased lung sliding on bedside ultrasound s/p cardiac arrest and chest compressions. Drop in sats to low 70s on 09/08 w/ x-ray confirming Left sided pneumothorax, no BP changes. Chest tube placed with improvement in sats to high 90s again. Repeat x-rays showing lung reexpansion  Plan:  - c/w ventilation, still concern for flail chest when transitioned to CPAP    #Broken ribs w/ flail chest  Pt with several broken ribs and signs of flail chest with difficulty breathing when sedation weaned off. Pt currently intubated and sedated. Thoracic surgery consulted for possible sternal plating. Anesthesia consulted, spinal block on 09/12 for pain management  Plan:  - F/u surgery for sternal plating    #Chronic obstructive pulmonary disease (COPD).   Pt with COPD on home 3L. Patient initially presented with NRB, now intubated s/p cardiac arrest.      #Pulm HTN  possibly 2/2 longstanding DALIA. Evidence of new pulm HTN on echo w/ pap 48 compared to TTE from 2022. LE doppler on current visit negative, PE unlikely.     #Acute on chronic resp failure  Pt w/ COPD on home O2, currently worsening hypoxia 2/2 to Pneumonia vs flash pulmonary edema. Pt endured cardiac arrest secondary to resp failure likely in setting of aspiration while eating vs flash pulmonary edema 2/2 chronic htn and chronic HF (relatively the same EF as previous TTE). New onset pleural effusions likely in the same setting, pt s/p intubation. Plan as above    CARDIO  #Cardiac Arrest  possibly 2/2 hypoxia in setting of aspiration vs  flash pulmonary edema. Pt relatively hypotensive throughout the day with episodes of hypertension to 130/140 systolic. received fluids on PM 09/07 and a unit of blood on 09/07 for anemia. In PM on 09/07, complained of difficulty breathing during meal, found unresponsive, hypotensive, w/ blue complexion by nurse.      #Chronic heart failure with preserved ejection fraction (HFpEF).   Pt on home bumex 2mg.  TTE from current visit showing EF of 56% with Pulmonary HTN - PAP 48. Experienced pulmonary edema and effusion postcardiac arrest.   Plan:  - c/w CVVHD to reduce fluid overload in the setting of acute kidney failure    #Hypertension.   Pt with history of hypertension on home diltiazem 180mg daily. Currently on pressors s/p septic shock.   Plan:  - holding home antihypertensive meds    GI  #HLD (hyperlipidemia).   Pt on atorvastatin 40mg daily.  Plan:  - c/w lipitor 40 mg QHS.      RENAL  #Oliguric BRANT on CKD  Baseline Cr 2.04 in July 2022, currently uptredning to 3.3. Likely 2/2 to hypoperfusion/ischemia in setting of sepsis and cardiac arrest. Nephro consulted. HD cath placed on 09/12 for CVVHD in setting of worsening electrolytes and increasing fluid status  Plan:  - c/w CVVHD  - renal dosing meds  - avoid nephrotoxic agents  - strict I&Os      #Right kidney mass.   CTAP s/f slightly hyperdense solid cortical mass in upper pole of R kidney.  Plan:  - consider additional renal imaging when stable    ENDO  No active issues      HEME  #Anemia  Pt noted to have chronic anemia, presented with HgB of 6.8 w/ drop to 6.7 during course of stay. s/p 1 unit RBC on 09/07, HgB of 7.4 today.  Plan:  - f/u H+H  - active type and screen   - f/u iron panel      ID  #Severe sepsis w/ shock  possibly 2/2 to LE Cellulitis, currently exacerbated by possible VAP in the setting of new tmax and worsened pleural effusions on 09/12.  S/p Vancomycin 1g and cefepime 1g in ED, received one more dose of cefepime 1g IV AM 09/07. BLE edema, erythema, scaly skin c/w cellulitis. Transitioned to CTX 2g IV QD on 09/7 but new t-max on night of 09/11-12 w/ pos sputum culture w/ gram pos rods and cocci in pairs. Transitioned to cefepime 1g q12 and received 1 dose vanc 1.5g (dose dependent due to kidney status). Continuing Levo requirement for MAP goal 70-75  Plan:  - c/w cefepime and vanc  - Vanc troph prior to next dose at 12AM  - c/w tylenol 650 mg PO q6h PRN for pain/fever  - f/u further ID recs  - blood cultures showing no growth, sputum showing gram pos rods and cocci in pair        E: K>4, Phos>3, Mg>2  N: Nepro diet - CVVHD   DVT ppx: subq Heparin  GI ppx: Protonix 40 BID  Access: 2 peripheral lines, left sided triple lumen, HD cath on right    DNR       80yo morbidly obese female w/ pmhx of HTN, HLD, HFpEF (EF 56%), COPD (on home O2-3L), chronic LE edema and cellulitis, DALIA, PVD p/w respiratory distress 2/2 severe sepsis due to cellulitis on lower extremity which progressed to septic shock in setting of possible VAP. Hospital course complicated by cardiac arrest, flail chest, and oliguric BRANT progressing to kidney failure.     NEURO  #Anoxic Encephelopathy  s/p cardiac arrest 2/2 hypoxia due to aspiration vs pulmonary edema. Prior to arrest, pt was awake and alert on the RMF. Difficult to assess current status due to continued ventilation and sedation requirements in the setting on possible VAP and flail chest. VC AC w/ FiO2 50, Vt 450, Ti 0.8, RR 26, PEEP 5. Improvement of respiratory acidosis w/ Mucomyst    Plan:  - c/w ventilation and versed + fentanyl. Propofol dc'd in setting of increased triglycerides     PULM  #Pneumothorax  Pt presented to MICU with signs of decreased lung sliding on bedside ultrasound s/p cardiac arrest and chest compressions. Drop in sats to low 70s on 09/08 w/ x-ray confirming Left sided pneumothorax, no BP changes. Chest tube placed with improvement in sats to high 90s again. Repeat x-rays showing lung reexpansion  Plan:  - c/w ventilation, still concern for flail chest when transitioned to CPAP    #Broken ribs w/ flail chest  Pt with several broken ribs and signs of flail chest with difficulty breathing when sedation weaned off. Pt currently intubated and sedated. Thoracic surgery consulted for possible sternal plating. Anesthesia consulted, spinal block on 09/12 for pain management  Plan:  - F/u surgery for sternal plating    #Chronic obstructive pulmonary disease (COPD).   Pt with COPD on home 3L. Patient initially presented with NRB, now intubated s/p cardiac arrest.      #Pulm HTN  possibly 2/2 longstanding DALIA. Evidence of new pulm HTN on echo w/ pap 48 compared to TTE from 2022. LE doppler on current visit negative, PE unlikely.     #Acute on chronic resp failure  Pt w/ COPD on home O2, currently worsening hypoxia 2/2 to Pneumonia vs flash pulmonary edema. Pt endured cardiac arrest secondary to resp failure likely in setting of aspiration while eating vs flash pulmonary edema 2/2 chronic htn and chronic HF (relatively the same EF as previous TTE). New onset pleural effusions likely in the same setting, pt s/p intubation. Plan as above    CARDIO  #Cardiac Arrest  possibly 2/2 hypoxia in setting of aspiration vs  flash pulmonary edema. Pt relatively hypotensive throughout the day with episodes of hypertension to 130/140 systolic. received fluids on PM 09/07 and a unit of blood on 09/07 for anemia. In PM on 09/07, complained of difficulty breathing during meal, found unresponsive, hypotensive, w/ blue complexion by nurse.      #Chronic heart failure with preserved ejection fraction (HFpEF).   Pt on home bumex 2mg.  TTE from current visit showing EF of 56% with Pulmonary HTN - PAP 48. Experienced pulmonary edema and effusion postcardiac arrest.   Plan:  - c/w CVVHD to reduce fluid overload in the setting of acute kidney failure    #Hypertension.   Pt with history of hypertension on home diltiazem 180mg daily. Currently on pressors s/p septic shock.   Plan:  - holding home antihypertensive meds    GI  #HLD (hyperlipidemia).   Pt on atorvastatin 40mg daily.  Plan:  - c/w lipitor 40 mg QHS.      RENAL  #Oliguric BRANT on CKD  Baseline Cr 2.04 in July 2022, currently uptredning to 3.3. Likely 2/2 to hypoperfusion/ischemia in setting of sepsis and cardiac arrest. Nephro consulted. HD cath placed on 09/12 for CVVHD in setting of worsening electrolytes and increasing fluid status  Plan:  - c/w CVVHD cb 300ml/min net even w/ DFR: 2L/hour  - q6H BMP mag and phos while on CVVHD  - maintain MAP >70 for adequate renal perfusion  - strict I/Os    #Right kidney mass.   CTAP s/f slightly hyperdense solid cortical mass in upper pole of R kidney.  Plan:  - consider additional renal imaging when stable    ENDO  No active issues      HEME  #Anemia  Pt noted to have chronic anemia, presented with HgB of 6.8 w/ drop to 6.7 during course of stay. s/p 1 unit RBC on 09/07, remained stable until 09/13 when CVVHD began and drop in Hgb to 6.8. Recieved 1u RBC, back up to 7.4, possibly in setting of hemolysis with HD  Plan:  - f/u H+H  - active type and screen   - f/u hemolysis labs      ID  #Severe sepsis w/ shock  possibly 2/2 to LE Cellulitis, currently exacerbated by possible VAP in the setting of new tmax and worsened pleural effusions on 09/12.  S/p Vancomycin 1g and cefepime 1g in ED, received one more dose of cefepime 1g IV AM 09/07. BLE edema, erythema, scaly skin c/w cellulitis. Transitioned to CTX 2g IV QD on 09/7 but new t-max on night of 09/11-12 w/ pos sputum culture w/ gram pos rods and cocci in pairs. Transitioned to cefepime 1g q12 and received 1 dose vanc 1.5g (dose dependent due to kidney status). Trough goal of 13-17.  Continuing Levo requirement for MAP goal 70-75  Plan:  - c/w cefepime and vanc  - Vanc troph prior to next dose at 12AM  - c/w tylenol 650 mg PO q6h PRN for pain/fever  - f/u further ID recs  - blood cultures showing no growth, sputum showing gram pos rods and cocci in pair        E: K>4, Phos>3, Mg>2  N: Nepro diet - CVVHD   DVT ppx: subq Heparin  GI ppx: Protonix 40 BID  Access: 2 peripheral lines, left sided triple lumen, HD cath on right    DNR

## 2022-09-14 NOTE — PROGRESS NOTE ADULT - SUBJECTIVE AND OBJECTIVE BOX
infectious diseases progress note    INTERVAL HPI/OVERNIGHT EVENTS: Patient with increased residual, TF held. Her vancomycin trough last night was also supratherapeutic so was held and has been held w/ last dose 0030 09/13. Patient is afebrile today. History limited per patients condition, currently intubated and sedated.     ROS: Limited     Allergies    Altabax (Unknown)  Augmentin (Unknown)  clindamycin (Unknown)  Vaseline (Swelling)    Intolerances        ANTIBIOTICS/RELEVANT:  antimicrobials  cefepime   IVPB 2000 milliGRAM(s) IV Intermittent every 12 hours    immunologic:  influenza  Vaccine (HIGH DOSE) 0.7 milliLiter(s) IntraMuscular once    OTHER:  acetylcysteine 10%  Inhalation 4 milliLiter(s) Inhalation every 6 hours  albuterol/ipratropium for Nebulization 3 milliLiter(s) Nebulizer every 6 hours  chlorhexidine 0.12% Liquid 15 milliLiter(s) Oral Mucosa every 12 hours  chlorhexidine 2% Cloths 1 Application(s) Topical <User Schedule>  CRRT Treatment    <Continuous>  dextrose 5%. 1000 milliLiter(s) IV Continuous <Continuous>  dextrose 5%. 1000 milliLiter(s) IV Continuous <Continuous>  dextrose 50% Injectable 25 Gram(s) IV Push once  dextrose 50% Injectable 12.5 Gram(s) IV Push once  dextrose 50% Injectable 25 Gram(s) IV Push once  dextrose Oral Gel 15 Gram(s) Oral once PRN  fentaNYL   Infusion 0.5 MICROgram(s)/kG/Hr IV Continuous <Continuous>  glucagon  Injectable 1 milliGRAM(s) IntraMuscular once  heparin  Infusion.  Unit(s)/Hr IV Continuous <Continuous>  insulin lispro (ADMELOG) corrective regimen sliding scale   SubCutaneous every 6 hours  lidocaine   4% Patch 1 Patch Transdermal every 24 hours  midazolam Infusion 0.02 mG/kG/Hr IV Continuous <Continuous>  norepinephrine Infusion 0.05 MICROgram(s)/kG/Min IV Continuous <Continuous>  pantoprazole  Injectable 40 milliGRAM(s) IV Push every 24 hours  Phoxillum Filtration BK 4 / 2.5 5000 milliLiter(s) CRRT <Continuous>  polyethylene glycol 3350 17 Gram(s) Oral daily  senna 2 Tablet(s) Oral daily  sodium chloride 0.9% lock flush 10 milliLiter(s) IV Push every 1 hour PRN  vasopressin Infusion 0.03 Unit(s)/Min IV Continuous <Continuous>      Objective:  Vital Signs Last 24 Hrs  T(C): 35.9 (14 Sep 2022 17:00), Max: 37.1 (14 Sep 2022 01:03)  T(F): 96.6 (14 Sep 2022 17:00), Max: 98.7 (14 Sep 2022 01:03)  HR: 79 (14 Sep 2022 17:20) (60 - 132)  BP: --  BP(mean): --  RR: 26 (14 Sep 2022 17:00) (22 - 26)  SpO2: 92% (14 Sep 2022 17:20) (77% - 100%)    Parameters below as of 14 Sep 2022 17:00  Patient On (Oxygen Delivery Method): ventilator    O2 Concentration (%): 50    PHYSICAL EXAM:  Constitutional:  intubated and sedated, ill appearing   HEENT: NC/AT, neck supple, RIJ HD cath, LIJ TLC   Respiratory: clear breath sounds b/l, chest tube in place on L   Cardiac: +S1/S2; RRR; no M/R/G  Gastrointestinal: soft, NT/ND; no rebound or guarding, rectal tube in place  Extremities: dermastasis below knees b/l w/ skin changes, significant edema up to knees, there is erythema that is extending above the R knee on the lateral thigh   Vascular: 2+ radial, DP/PT pulses B/L  Neurologic: sedated         LABS:                        7.4    16.96 )-----------( 118      ( 14 Sep 2022 13:57 )             23.1     09-14    133<L>  |  99  |  25<H>  ----------------------------<  170<H>  3.8   |  22  |  1.79<H>    Ca    8.4      14 Sep 2022 11:53  Phos  3.0     09-14  Mg     2.0     09-14    TPro  x   /  Alb  x   /  TBili  1.1  /  DBili  x   /  AST  x   /  ALT  x   /  AlkPhos  x   09-14    PT/INR - ( 14 Sep 2022 13:57 )   PT: 12.5 sec;   INR: 1.05          PTT - ( 14 Sep 2022 13:57 )  PTT:95.4 sec        MICROBIOLOGY:    Culture - Blood (collected 12 Sep 2022 11:21)  Source: .Blood Blood-Peripheral  Preliminary Report (14 Sep 2022 12:00):    No growth at 2 days.    Culture - Blood (collected 12 Sep 2022 11:21)  Source: .Blood Blood-Peripheral  Preliminary Report (14 Sep 2022 12:00):    No growth at 2 days.    Culture - Sputum (collected 12 Sep 2022 10:45)  Source: .Sputum Sputum  Gram Stain (12 Sep 2022 16:59):    Rare epithelial cells    Few WBC's    Rare Gram Positive Rods    Rare Gram positive cocci in pairs  Final Report (14 Sep 2022 11:16):    Normal Respiratory Jana present          RADIOLOGY & ADDITIONAL STUDIES:

## 2022-09-14 NOTE — PROGRESS NOTE ADULT - ATTENDING COMMENTS
T(r) 96.6 this evening, continues on pressors, WBC trending down.  Unclear that pneumonia is source for fever in view of POCUS with volume overload, CXR with improving infiltrates, sputum culture with NRF from time of decompensation.  Would do CT C/A/P if within GOC.  Continue vancomycin and cefepime for now.  Will follow with you – ID Team 1.  Dr. Dominguez will assume care tomorrow.

## 2022-09-14 NOTE — PROGRESS NOTE ADULT - SUBJECTIVE AND OBJECTIVE BOX
Patient is a 81y Female seen and evaluated at bedside. Intubated and sedated. Patient could not tolerate CVVHD with net negative became hypotensive and was changed back to net even. No further clots with line. Hgb noted ot be 6.8 and receiving transfusion. Patient tolerating  CVVHD with net even.       Meds:    acetylcysteine 10%  Inhalation 4 every 6 hours  albuterol/ipratropium for Nebulization 3 every 6 hours  cefepime   IVPB 2000 every 12 hours  chlorhexidine 0.12% Liquid 15 every 12 hours  chlorhexidine 2% Cloths 1 <User Schedule>  CRRT Treatment  <Continuous>  dextrose 5%. 1000 <Continuous>  dextrose 5%. 1000 <Continuous>  dextrose 50% Injectable 25 once  dextrose 50% Injectable 12.5 once  dextrose 50% Injectable 25 once  dextrose Oral Gel 15 once PRN  fentaNYL   Infusion 0.5 <Continuous>  glucagon  Injectable 1 once  heparin  Infusion.  <Continuous>  influenza  Vaccine (HIGH DOSE) 0.7 once  insulin lispro (ADMELOG) corrective regimen sliding scale  every 6 hours  lidocaine   4% Patch 1 every 24 hours  norepinephrine Infusion 0.05 <Continuous>  pantoprazole  Injectable 40 every 24 hours  polyethylene glycol 3350 17 daily  propofol Infusion 10.003 <Continuous>  PureFlow Dialysate RFP-400 (K 2 / Ca 3) 5000 <Continuous>  senna 2 daily  sodium chloride 0.9% lock flush 10 every 1 hour PRN  vasopressin Infusion 0.03 <Continuous>      T(C): , Max: 37.1 (09-14-22 @ 01:03)  T(F): , Max: 98.7 (09-14-22 @ 01:03)  HR: 61 (09-14-22 @ 11:00)  BP: 89/40 (09-13-22 @ 17:00)  BP(mean): 58 (09-13-22 @ 17:00)  RR: 26 (09-14-22 @ 11:00)  SpO2: 96% (09-14-22 @ 11:00)  Wt(kg): --    09-13 @ 07:01  -  09-14 @ 07:00  --------------------------------------------------------  IN: 3029.3 mL / OUT: 2804 mL / NET: 225.3 mL    09-14 @ 07:01  -  09-14 @ 11:18  --------------------------------------------------------  IN: 642.2 mL / OUT: 124 mL / NET: 518.2 mL          Review of Systems:  ROS negative except as per HPI      PHYSICAL EXAM:  GENERAL: intubated; sedated   CHEST/LUNG: decreased breath sounds at the bases  HEART: Regular rate and rhythm, no murmur, no gallops, no rub   ABDOMEN: Soft, nontender, non distended  EXTREMITIES: Anasarca  Access: Right IJ c/d/i        LABS:                        6.8    20.43 )-----------( 132      ( 14 Sep 2022 04:41 )             21.5     09-14    133<L>  |  101  |  33<H>  ----------------------------<  157<H>  4.0   |  22  |  2.00<H>    Ca    8.4      14 Sep 2022 04:41  Phos  3.3     09-14  Mg     2.0     09-14    TPro  6.7  /  Alb  2.3<L>  /  TBili  1.0  /  DBili  x   /  AST  17  /  ALT  21  /  AlkPhos  167<H>  09-14      PT/INR - ( 13 Sep 2022 10:38 )   PT: 12.2 sec;   INR: 1.02          PTT - ( 14 Sep 2022 04:41 )  PTT:78.9 sec          RADIOLOGY & ADDITIONAL STUDIES:

## 2022-09-15 NOTE — CONSULT NOTE ADULT - ASSESSMENT
82 yo M morbidly obese F with HTN, HLD, HFpEF (EF 56%), COPD (on home O2-3L), chronic LE edema and cellulitis, DALIA, PVD p/w respiratory distress 2/2 severe sepsis due to cellulitis on lower extremity which progressed to septic shock in setting of possible VAP. Hospital course complicated by cardiac arrest, flail chest, and oliguric BRANT progressing to kidney failure.

## 2022-09-15 NOTE — PROGRESS NOTE ADULT - SUBJECTIVE AND OBJECTIVE BOX
CECE GABBY ROONEY, 81y, Female  MRN-[id number]  Patient is a 81y old  Female who presents with a chief complaint of Acute hypoxic respiratory failure (15 Sep 2022 12:27)      OVERNIGHT EVENTS: vanc dose held due to elevated level at 12AM - 23    SUBJECTIVE: Pt intubated and sedated, ROS unattainable.     12 Point ROS Negative unless noted otherwise above.  -------------------------------------------------------------------------------  VITAL SIGNS:  Vital Signs Last 24 Hrs  T(C): 36.8 (15 Sep 2022 14:37), Max: 36.8 (15 Sep 2022 14:37)  T(F): 98.2 (15 Sep 2022 14:37), Max: 98.2 (15 Sep 2022 14:37)  HR: 76 (15 Sep 2022 14:00) (66 - 79)  BP: --  BP(mean): --  RR: 26 (15 Sep 2022 14:00) (22 - 26)  SpO2: 99% (15 Sep 2022 14:00) (89% - 100%)    Parameters below as of 15 Sep 2022 14:00      O2 Concentration (%): 50  I&O's Summary    14 Sep 2022 07:01  -  15 Sep 2022 07:00  --------------------------------------------------------  IN: 2776.4 mL / OUT: 1438 mL / NET: 1338.4 mL    15 Sep 2022 07:01  -  15 Sep 2022 15:04  --------------------------------------------------------  IN: 750.2 mL / OUT: 487 mL / NET: 263.2 mL        PHYSICAL EXAM:    General: pt intubated and sedated, obese, lying in bed  HEENT: pupil 2mm minimally reactive to light; moist mucosal membranes.  Neck: supple, trachea midline  Cardiovascular: RRR, +S1/S2; NO M/R/G  Respiratory: decreased breath sounds in bases of lungs; no W/R/R  Gastrointestinal: soft, NT/ND; dec bowel sounds  Extremities: dermastasis below knees b/l w/ skin changes, significant edema up to knees  Vascular: 2+ radial pulses  Neurological: sedated, reacts to sternal rub    ALLERGIES:  Allergies    Altabax (Unknown)  Augmentin (Unknown)  clindamycin (Unknown)  Vaseline (Swelling)    Intolerances        MEDICATIONS:  MEDICATIONS  (STANDING):  acetylcysteine 10%  Inhalation 4 milliLiter(s) Inhalation every 6 hours  albuterol/ipratropium for Nebulization 3 milliLiter(s) Nebulizer every 6 hours  cefepime   IVPB 2000 milliGRAM(s) IV Intermittent every 12 hours  chlorhexidine 0.12% Liquid 15 milliLiter(s) Oral Mucosa every 12 hours  chlorhexidine 2% Cloths 1 Application(s) Topical <User Schedule>  CRRT Treatment    <Continuous>  dextrose 5%. 1000 milliLiter(s) (50 mL/Hr) IV Continuous <Continuous>  dextrose 5%. 1000 milliLiter(s) (100 mL/Hr) IV Continuous <Continuous>  dextrose 50% Injectable 25 Gram(s) IV Push once  dextrose 50% Injectable 12.5 Gram(s) IV Push once  dextrose 50% Injectable 25 Gram(s) IV Push once  fentaNYL   Infusion 0.5 MICROgram(s)/kG/Hr (5.12 mL/Hr) IV Continuous <Continuous>  glucagon  Injectable 1 milliGRAM(s) IntraMuscular once  heparin  Infusion.  Unit(s)/Hr (18 mL/Hr) IV Continuous <Continuous>  influenza  Vaccine (HIGH DOSE) 0.7 milliLiter(s) IntraMuscular once  insulin lispro (ADMELOG) corrective regimen sliding scale   SubCutaneous every 6 hours  lidocaine   4% Patch 1 Patch Transdermal every 24 hours  midazolam Infusion 0.02 mG/kG/Hr (2.05 mL/Hr) IV Continuous <Continuous>  norepinephrine Infusion 0.05 MICROgram(s)/kG/Min (4.8 mL/Hr) IV Continuous <Continuous>  pantoprazole  Injectable 40 milliGRAM(s) IV Push every 24 hours  Phoxillum Filtration BK 4 / 2.5 5000 milliLiter(s) (2000 mL/Hr) CRRT <Continuous>  polyethylene glycol 3350 17 Gram(s) Oral daily  senna 2 Tablet(s) Oral daily  vasopressin Infusion 0.03 Unit(s)/Min (1.8 mL/Hr) IV Continuous <Continuous>    MEDICATIONS  (PRN):  dextrose Oral Gel 15 Gram(s) Oral once PRN Blood Glucose LESS THAN 70 milliGRAM(s)/deciliter  sodium chloride 0.9% lock flush 10 milliLiter(s) IV Push every 1 hour PRN Pre/post blood products, medications, blood draw, and to maintain line patency      -------------------------------------------------------------------------------  LABS:                        7.1    16.49 )-----------( 100      ( 15 Sep 2022 05:57 )             22.5     09-15    133<L>  |  100  |  18  ----------------------------<  141<H>  3.7   |  24  |  1.41<H>    Ca    8.2<L>      15 Sep 2022 05:57  Phos  3.3     09-15  Mg     2.2     09-15    TPro  6.6  /  Alb  2.2<L>  /  TBili  1.3<H>  /  DBili  x   /  AST  31  /  ALT  25  /  AlkPhos  253<H>  09-15    LIVER FUNCTIONS - ( 15 Sep 2022 05:57 )  Alb: 2.2 g/dL / Pro: 6.6 g/dL / ALK PHOS: 253 U/L / ALT: 25 U/L / AST: 31 U/L / GGT: x           PT/INR - ( 14 Sep 2022 13:57 )   PT: 12.5 sec;   INR: 1.05          PTT - ( 15 Sep 2022 05:57 )  PTT:79.8 sec    CAPILLARY BLOOD GLUCOSE      POCT Blood Glucose.: 172 mg/dL (15 Sep 2022 13:22)      COVID-19 PCR: NotDetec (13 Sep 2022 04:34)  SARS-CoV-2: NotDetec (06 Sep 2022 10:32)  COVID-19 PCR: NotDetec (06 Sep 2022 10:25)  COVID-19 PCR: NotDetec (02 Jul 2022 13:33)      RADIOLOGY & ADDITIONAL TESTS: Reviewed.

## 2022-09-15 NOTE — PROGRESS NOTE ADULT - ATTENDING COMMENTS
morbid obesity, COPD, chronic cellulitis s/p ccardiac arrest with rib fractures, pna, ATN  physical as above  better CVVH tolerance today, trying to remove more fluid  continue cefepime  still requires RASS -4 for vent tolerance, will likely need trach  pressor requirements are stable  high complexity decision making

## 2022-09-15 NOTE — PROGRESS NOTE ADULT - SUBJECTIVE AND OBJECTIVE BOX
Patient is a 81y Female seen and evaluated at bedside. Intubated and sedated on pressors. Tolerating CVVHD. No clots reported. Plan to continue with CVVHD with some fluid removal today. CVVHD AP -75,  93.       Meds:    acetylcysteine 10%  Inhalation 4 every 6 hours  albuterol/ipratropium for Nebulization 3 every 6 hours  cefepime   IVPB 2000 every 12 hours  chlorhexidine 0.12% Liquid 15 every 12 hours  chlorhexidine 2% Cloths 1 <User Schedule>  CRRT Treatment  <Continuous>  dextrose 5%. 1000 <Continuous>  dextrose 5%. 1000 <Continuous>  dextrose 50% Injectable 12.5 once  dextrose 50% Injectable 25 once  dextrose 50% Injectable 25 once  dextrose Oral Gel 15 once PRN  fentaNYL   Infusion 0.5 <Continuous>  glucagon  Injectable 1 once  heparin  Infusion.  <Continuous>  influenza  Vaccine (HIGH DOSE) 0.7 once  insulin lispro (ADMELOG) corrective regimen sliding scale  every 6 hours  lidocaine   4% Patch 1 every 24 hours  midazolam Infusion 0.02 <Continuous>  norepinephrine Infusion 0.05 <Continuous>  pantoprazole  Injectable 40 every 24 hours  Phoxillum Filtration BK 4 / 2.5 5000 <Continuous>  polyethylene glycol 3350 17 daily  senna 2 daily  sodium chloride 0.9% lock flush 10 every 1 hour PRN  vasopressin Infusion 0.03 <Continuous>      T(C): , Max: 36.6 (09-15-22 @ 06:04)  T(F): , Max: 97.9 (09-15-22 @ 06:04)  HR: 69 (09-15-22 @ 10:00)  BP: 135/54  BP(mean): --  RR: 26 (09-15-22 @ 05:25)  SpO2: 90% (09-15-22 @ 10:00)  Wt(kg): --    09-14 @ 07:01  -  09-15 @ 07:00  --------------------------------------------------------  IN: 2776.4 mL / OUT: 1438 mL / NET: 1338.4 mL    09-15 @ 07:01  -  09-15 @ 11:10  --------------------------------------------------------  IN: 224.8 mL / OUT: 10 mL / NET: 214.8 mL          Review of Systems:  ROS negative except as per HPI      PHYSICAL EXAM:  GENERAL: intubated; sedated   CHEST/LUNG: decreased breath sounds at the bases  HEART: Regular rate and rhythm, no murmur, no gallops, no rub   ABDOMEN: Soft, nontender, non distended  EXTREMITIES: Anasarca  Access: Right IJ c/d/i        LABS:                        7.1    16.49 )-----------( 100      ( 15 Sep 2022 05:57 )             22.5     09-15    133<L>  |  100  |  18  ----------------------------<  141<H>  3.7   |  24  |  1.41<H>    Ca    8.2<L>      15 Sep 2022 05:57  Phos  3.3     09-15  Mg     2.2     09-15    TPro  6.6  /  Alb  2.2<L>  /  TBili  1.3<H>  /  DBili  x   /  AST  31  /  ALT  25  /  AlkPhos  253<H>  09-15      PT/INR - ( 14 Sep 2022 13:57 )   PT: 12.5 sec;   INR: 1.05          PTT - ( 15 Sep 2022 05:57 )  PTT:79.8 sec          RADIOLOGY & ADDITIONAL STUDIES:

## 2022-09-15 NOTE — PROGRESS NOTE ADULT - SUBJECTIVE AND OBJECTIVE BOX
infectious diseases progress note    INTERVAL HPI/OVERNIGHT EVENTS:    ROS:  CONSTITUTIONAL:  Negative fever or chills, feels well, good appetite  EYES:  Negative  blurry vision or double vision  CARDIOVASCULAR:  Negative for chest pain or palpitations  RESPIRATORY:  Negative for cough, wheezing, or SOB   GASTROINTESTINAL:  Negative for nausea, vomiting, diarrhea, constipation, or abdominal pain  GENITOURINARY:  Negative frequency, urgency or dysuria  NEUROLOGIC:  No headache, confusion, dizziness, lightheadedness    Allergies    Altabax (Unknown)  Augmentin (Unknown)  clindamycin (Unknown)  Vaseline (Swelling)    Intolerances        ANTIBIOTICS/RELEVANT:  antimicrobials  cefepime   IVPB 2000 milliGRAM(s) IV Intermittent every 12 hours    immunologic:  influenza  Vaccine (HIGH DOSE) 0.7 milliLiter(s) IntraMuscular once    OTHER:  acetylcysteine 10%  Inhalation 4 milliLiter(s) Inhalation every 6 hours  albuterol/ipratropium for Nebulization 3 milliLiter(s) Nebulizer every 6 hours  chlorhexidine 0.12% Liquid 15 milliLiter(s) Oral Mucosa every 12 hours  chlorhexidine 2% Cloths 1 Application(s) Topical <User Schedule>  CRRT Treatment    <Continuous>  dextrose 5%. 1000 milliLiter(s) IV Continuous <Continuous>  dextrose 5%. 1000 milliLiter(s) IV Continuous <Continuous>  dextrose 50% Injectable 12.5 Gram(s) IV Push once  dextrose 50% Injectable 25 Gram(s) IV Push once  dextrose 50% Injectable 25 Gram(s) IV Push once  dextrose Oral Gel 15 Gram(s) Oral once PRN  fentaNYL   Infusion 0.5 MICROgram(s)/kG/Hr IV Continuous <Continuous>  glucagon  Injectable 1 milliGRAM(s) IntraMuscular once  heparin  Infusion.  Unit(s)/Hr IV Continuous <Continuous>  insulin lispro (ADMELOG) corrective regimen sliding scale   SubCutaneous every 6 hours  iohexol 300 mG (iodine)/mL Oral Solution 30 milliLiter(s) Oral once  lidocaine   4% Patch 1 Patch Transdermal every 24 hours  midazolam Infusion 0.02 mG/kG/Hr IV Continuous <Continuous>  norepinephrine Infusion 0.05 MICROgram(s)/kG/Min IV Continuous <Continuous>  pantoprazole  Injectable 40 milliGRAM(s) IV Push every 24 hours  Phoxillum Filtration BK 4 / 2.5 5000 milliLiter(s) CRRT <Continuous>  polyethylene glycol 3350 17 Gram(s) Oral daily  senna 2 Tablet(s) Oral daily  sodium chloride 0.9% lock flush 10 milliLiter(s) IV Push every 1 hour PRN  vasopressin Infusion 0.03 Unit(s)/Min IV Continuous <Continuous>      Objective:  Vital Signs Last 24 Hrs  T(C): 36.4 (15 Sep 2022 10:24), Max: 36.6 (15 Sep 2022 06:04)  T(F): 97.5 (15 Sep 2022 10:24), Max: 97.9 (15 Sep 2022 06:04)  HR: 66 (15 Sep 2022 12:00) (60 - 79)  BP: --  BP(mean): --  RR: 26 (15 Sep 2022 05:25) (22 - 26)  SpO2: 100% (15 Sep 2022 12:00) (90% - 100%)    Parameters below as of 15 Sep 2022 12:00  Patient On (Oxygen Delivery Method): ventilator    O2 Concentration (%): 50    PHYSICAL EXAM:  Constitutional:Well-appearing, conversive   Eyes:KIRBY, EOMI  Ear/Nose/Throat: no oral lesion, no sinus tenderness on percussion	  Neck:no JVD, no lymphadenopathy, supple  Respiratory: CTABL, no wheezes, rales, or rhonchi, speaking full sentences, on RA  Cardiovascular: S1S2 RRR, no murmurs, rubs or gallops   Gastrointestinal:soft, (+) BS, no HSM  Extremities:no erythema, tenderness, swelling or ulcerations   Vascular: 2+ peripheral pulses        LABS:                        7.1    16.49 )-----------( 100      ( 15 Sep 2022 05:57 )             22.5     09-15    133<L>  |  100  |  18  ----------------------------<  141<H>  3.7   |  24  |  1.41<H>    Ca    8.2<L>      15 Sep 2022 05:57  Phos  3.3     09-15  Mg     2.2     09-15    TPro  6.6  /  Alb  2.2<L>  /  TBili  1.3<H>  /  DBili  x   /  AST  31  /  ALT  25  /  AlkPhos  253<H>  09-15    PT/INR - ( 14 Sep 2022 13:57 )   PT: 12.5 sec;   INR: 1.05          PTT - ( 15 Sep 2022 05:57 )  PTT:79.8 sec        MICROBIOLOGY:        RADIOLOGY & ADDITIONAL STUDIES: infectious diseases progress note    INTERVAL HPI/OVERNIGHT EVENTS: Vanc trough still elevated at 23.0 yesterday evening. last dose of vanc 09/13 at 0030. Patient unable to participate in ROS, is intubated and sedated.     ROS: Limited patient is intubated and sedated     Allergies    Altabax (Unknown)  Augmentin (Unknown)  clindamycin (Unknown)  Vaseline (Swelling)    Intolerances        ANTIBIOTICS/RELEVANT:  antimicrobials  cefepime   IVPB 2000 milliGRAM(s) IV Intermittent every 12 hours    immunologic:  influenza  Vaccine (HIGH DOSE) 0.7 milliLiter(s) IntraMuscular once    OTHER:  acetylcysteine 10%  Inhalation 4 milliLiter(s) Inhalation every 6 hours  albuterol/ipratropium for Nebulization 3 milliLiter(s) Nebulizer every 6 hours  chlorhexidine 0.12% Liquid 15 milliLiter(s) Oral Mucosa every 12 hours  chlorhexidine 2% Cloths 1 Application(s) Topical <User Schedule>  CRRT Treatment    <Continuous>  dextrose 5%. 1000 milliLiter(s) IV Continuous <Continuous>  dextrose 5%. 1000 milliLiter(s) IV Continuous <Continuous>  dextrose 50% Injectable 12.5 Gram(s) IV Push once  dextrose 50% Injectable 25 Gram(s) IV Push once  dextrose 50% Injectable 25 Gram(s) IV Push once  dextrose Oral Gel 15 Gram(s) Oral once PRN  fentaNYL   Infusion 0.5 MICROgram(s)/kG/Hr IV Continuous <Continuous>  glucagon  Injectable 1 milliGRAM(s) IntraMuscular once  heparin  Infusion.  Unit(s)/Hr IV Continuous <Continuous>  insulin lispro (ADMELOG) corrective regimen sliding scale   SubCutaneous every 6 hours  iohexol 300 mG (iodine)/mL Oral Solution 30 milliLiter(s) Oral once  lidocaine   4% Patch 1 Patch Transdermal every 24 hours  midazolam Infusion 0.02 mG/kG/Hr IV Continuous <Continuous>  norepinephrine Infusion 0.05 MICROgram(s)/kG/Min IV Continuous <Continuous>  pantoprazole  Injectable 40 milliGRAM(s) IV Push every 24 hours  Phoxillum Filtration BK 4 / 2.5 5000 milliLiter(s) CRRT <Continuous>  polyethylene glycol 3350 17 Gram(s) Oral daily  senna 2 Tablet(s) Oral daily  sodium chloride 0.9% lock flush 10 milliLiter(s) IV Push every 1 hour PRN  vasopressin Infusion 0.03 Unit(s)/Min IV Continuous <Continuous>      Objective:  Vital Signs Last 24 Hrs  T(C): 36.4 (15 Sep 2022 10:24), Max: 36.6 (15 Sep 2022 06:04)  T(F): 97.5 (15 Sep 2022 10:24), Max: 97.9 (15 Sep 2022 06:04)  HR: 66 (15 Sep 2022 12:00) (60 - 79)  BP: --  BP(mean): --  RR: 26 (15 Sep 2022 05:25) (22 - 26)  SpO2: 100% (15 Sep 2022 12:00) (90% - 100%)    Parameters below as of 15 Sep 2022 12:00  Patient On (Oxygen Delivery Method): ventilator    O2 Concentration (%): 50    PHYSICAL EXAM:  Constitutional:  intubated and sedated, ill appearing   HEENT: NC/AT, neck supple, RIJ HD cath, LIJ TLC   Respiratory: clear breath sounds b/l, chest tube in place on L   Cardiac: +S1/S2; RRR; no M/R/G  Gastrointestinal: soft, NT/ND; no rebound or guarding, rectal tube in place  Extremities: dermastasis below knees b/l w/ skin changes, significant edema up to knees, there is erythema that is extending above the R knee on the lateral thigh   Vascular: 2+ radial, DP/PT pulses B/L  Neurologic: sedated       LABS:                        7.1    16.49 )-----------( 100      ( 15 Sep 2022 05:57 )             22.5     09-15    133<L>  |  100  |  18  ----------------------------<  141<H>  3.7   |  24  |  1.41<H>    Ca    8.2<L>      15 Sep 2022 05:57  Phos  3.3     09-15  Mg     2.2     09-15    TPro  6.6  /  Alb  2.2<L>  /  TBili  1.3<H>  /  DBili  x   /  AST  31  /  ALT  25  /  AlkPhos  253<H>  09-15    PT/INR - ( 14 Sep 2022 13:57 )   PT: 12.5 sec;   INR: 1.05          PTT - ( 15 Sep 2022 05:57 )  PTT:79.8 sec        MICROBIOLOGY:        RADIOLOGY & ADDITIONAL STUDIES:

## 2022-09-15 NOTE — PROGRESS NOTE ADULT - ATTENDING COMMENTS
seen on CVVHD with Dr Parra, agree with above  tolerating rx, on stable pressors . system working well with AC   cont rx  as above-- try some UF again if tolerates  considering severe overload

## 2022-09-15 NOTE — CONSULT NOTE ADULT - PROBLEM SELECTOR RECOMMENDATION 9
Pt with functional decline over past 3 years with more acute decline over past few months. Wheelchair bound.  - Supportive care

## 2022-09-15 NOTE — PROGRESS NOTE ADULT - ASSESSMENT
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 Patient was found to be severely septic i/s/o GBS bacteremia w/ b/l lower cellulitis noted on exam. TTE done i/s/o GBS bacteremia which was unremarkable. Currently patient is on CTX 2g IV QD which should be continued. Patient is s/p arrest on the medical floors ROSC achieved and now intubated and sedated on pressors in the ICU. Unknown cause of arrest. Patient with downtrending WBC on CTX 2g. Her course was c/b fail chest and left sided PTX which is s/p L chest tube own 09/08. Patient on 09/10 was noted to have worsening BRANT on CKD with possible ATN and minimal UOP so levophed was started to help with MAPs and perfusion and propofol was started. She developed a fever of 100.5 09/11 at 8pm with a Tmax of 100.8F 09/12AM . She was noted to have increased thick secretions from ETT which appeared possibly purulent. She was broaden to cefepime and Vancomycin to cover for VAP. Of note patient with HD cath placed to [facility] placed yesterday for CVVHD. Patient had an uptrend in WBC 15.87-->18.44. MRSA swab negative however patient intubated so may not be accurate. She currently still has persistent leukocytosis however had been afebrile. Vanc supratherpautic on trough from 9pm 09/13 - 26.0, and 09/14 @noon which was 23.3. Vanc currently being held. Likely i/s/o recent difficulties with successful CVVHD so not fully clearing.     Recommendations:   - Recommend CT Chest/abdomen/plevis non con and oral con - to evaluate for another source of persistent leukocytosis  - Continue with cefepime 2g q12H   - Repeat Vanc trough this evening, if Trough <20 then can restart Vancomycin 1g q12 hours trough prior to 4th dose. Goal trough 13-17.   - Follow sputum culture results from 09/12   - Follow blood cultures x2 from 09/12       Team 1 will continue to follow. Case discussed with Dr. Dominguez and primary team.     Note not finalized until attending attestation is complete

## 2022-09-15 NOTE — CONSULT NOTE ADULT - PROBLEM SELECTOR RECOMMENDATION 5
GOC ongoing. Will continue to follow.    Alivia Weldon MD  Palliative Fellow, PGY5  Geriatrics and Palliative Consult Service

## 2022-09-15 NOTE — CONSULT NOTE ADULT - PROBLEM SELECTOR RECOMMENDATION 3
New renal failure in setting of cardiac arrest and likely ATN from hypotension. On CVVHD but not tolerating well, on pressor support but still unable to take fluid off via CVVHD.  - Management per nephrology, recs appreciated  - Appears unlikely that kidneys will recover substantially. Will need to address renal failure and hemodialysis as part of overarching GOC discussion with HCP at next meeting

## 2022-09-15 NOTE — CHART NOTE - NSCHARTNOTEFT_GEN_A_CORE
Admitting Diagnosis:   Patient is a 81y old  Female who presents with a chief complaint of Acute hypoxic respiratory failure (15 Sep 2022 12:27)      PAST MEDICAL & SURGICAL HISTORY:  Lyme disease      DVT (deep venous thrombosis)      Skin cancer      Brain embolism and thrombosis      MRSA (methicillin resistant Staphylococcus aureus)      PNA (pneumonia)      COPD (chronic obstructive pulmonary disease)      H/O angioplasty      Status post laparoscopic-assisted sigmoidectomy      H/O shoulder surgery          Current Nutrition Order:   Diet, NPO with Tube Feed:   Tube Feeding Modality: Nasogastric  Nepro with Carb Steady (NEPRORTH)  Total Volume for 24 Hours (mL): 960  Total Number of Cans: 4  Continuous  Starting Tube Feed Rate {mL per Hour}: 24     Every 3 hours  Until Goal Tube Feed Rate (mL per Hour): 40  Tube Feed Duration (in Hours): 24  Tube Feed Start Time: 16:00  Liquid Protein Supplement     Qty per Day:  1 (09-15-22 @ 11:21)      PO Intake: N/A    GI Issues: Abdomen ND/NT, +BS x4, LBM 9/12    Pain: No non-verbal indicators present     Skin Integrity: Stg II PI sacrum, +3 gen. edema     Labs:   09-15    133<L>  |  100  |  18  ----------------------------<  141<H>  3.7   |  24  |  1.41<H>    Ca    8.2<L>      15 Sep 2022 05:57  Phos  3.3     09-15  Mg     2.2     09-15    TPro  6.6  /  Alb  2.2<L>  /  TBili  1.3<H>  /  DBili  x   /  AST  31  /  ALT  25  /  AlkPhos  253<H>  09-15    CAPILLARY BLOOD GLUCOSE      POCT Blood Glucose.: 172 mg/dL (15 Sep 2022 13:22)  POCT Blood Glucose.: 130 mg/dL (15 Sep 2022 05:59)  POCT Blood Glucose.: 137 mg/dL (14 Sep 2022 23:57)  POCT Blood Glucose.: 147 mg/dL (14 Sep 2022 17:25)      Medications:  MEDICATIONS  (STANDING):  acetylcysteine 10%  Inhalation 4 milliLiter(s) Inhalation every 6 hours  albuterol/ipratropium for Nebulization 3 milliLiter(s) Nebulizer every 6 hours  cefepime   IVPB 2000 milliGRAM(s) IV Intermittent every 12 hours  chlorhexidine 0.12% Liquid 15 milliLiter(s) Oral Mucosa every 12 hours  chlorhexidine 2% Cloths 1 Application(s) Topical <User Schedule>  CRRT Treatment    <Continuous>  dextrose 5%. 1000 milliLiter(s) (50 mL/Hr) IV Continuous <Continuous>  dextrose 5%. 1000 milliLiter(s) (100 mL/Hr) IV Continuous <Continuous>  dextrose 50% Injectable 25 Gram(s) IV Push once  dextrose 50% Injectable 12.5 Gram(s) IV Push once  dextrose 50% Injectable 25 Gram(s) IV Push once  fentaNYL   Infusion 0.5 MICROgram(s)/kG/Hr (5.12 mL/Hr) IV Continuous <Continuous>  glucagon  Injectable 1 milliGRAM(s) IntraMuscular once  heparin  Infusion.  Unit(s)/Hr (18 mL/Hr) IV Continuous <Continuous>  influenza  Vaccine (HIGH DOSE) 0.7 milliLiter(s) IntraMuscular once  insulin lispro (ADMELOG) corrective regimen sliding scale   SubCutaneous every 6 hours  lidocaine   4% Patch 1 Patch Transdermal every 24 hours  midazolam Infusion 0.02 mG/kG/Hr (2.05 mL/Hr) IV Continuous <Continuous>  norepinephrine Infusion 0.05 MICROgram(s)/kG/Min (4.8 mL/Hr) IV Continuous <Continuous>  pantoprazole  Injectable 40 milliGRAM(s) IV Push every 24 hours  Phoxillum Filtration BK 4 / 2.5 5000 milliLiter(s) (2000 mL/Hr) CRRT <Continuous>  polyethylene glycol 3350 17 Gram(s) Oral daily  senna 2 Tablet(s) Oral daily  vasopressin Infusion 0.03 Unit(s)/Min (1.8 mL/Hr) IV Continuous <Continuous>    MEDICATIONS  (PRN):  dextrose Oral Gel 15 Gram(s) Oral once PRN Blood Glucose LESS THAN 70 milliGRAM(s)/deciliter  sodium chloride 0.9% lock flush 10 milliLiter(s) IV Push every 1 hour PRN Pre/post blood products, medications, blood draw, and to maintain line patency      Height for BMI (CENTIMETERS)	172.7 Centimeter(s)  Weight for BMI (lbs)	225.5 lb  Weight for BMI (kg)	102.3 kg  Body Mass Index	34.2    Weight Change: No new wt since admit.     Estimated energy needs:   Weight used for calculations	IBW  Estimated Energy Needs Weight (lbs)	140.2 lb  Estimated Energy Needs Weight (kg)	63.6 kg  Estimated Energy Needs From (shane/kg)	25  Estimated Energy Needs To (shane/kg)	30  Estimated Energy Needs Calculated From (shane/kg)	1590  Estimated Energy Needs Calculated To (shane/kg)	1908  Weight used for calculations	IBW  Estimated Protein Needs Weight (lbs)	140.2 lb  Estimated Protein Needs Weight (kg)	63.6 kg  Estimated Protein Needs From (g/kg)	1.4  Estimated Protein Needs To (g/kg)	1.6  Estimated Protein Needs Calculated From (g/kg)	89.04  Estimated Protein Needs Calculated To (g/kg)	101.76  Estimated Fluid Needs Weight (lbs)	140.2 lb  Estimated Fluid Needs Weight (kg)	63.6 kg  Estimated Fluid Needs From (ml/kg)	30  Estimated Fluid Needs To (ml/kg)	35  Estimated Fluid Needs Calculated From (ml/kg)	1908  Estimated Fluid Needs Calculated To (ml/kg)	2226  -Needs determined using IBW with consideration for critical condition requiring mechanical ventilation.     Subjective: 81F with complex medical history including obesity, HTN, HLD, HFpEF (EF 55-60% last echo 2/20), COPD (on home O2-3L), chronic LE edema with neuropathic pain and cellulitis, DALIA, PVD p/w respiratory distress x 1 day found to have cellulitis on lower extremity. 9/9: TF started. 9/12: Started CVVHD, TF changed to Nepro. 9/13: Elevated GRV, TF held.     Pt care discussed in IDT rounds. Rx and labs reviewed. Pt intubated and sedated at time of assessment; vent to VC-AC, MAP 70, fentanyl ggt, precedex ggt, norepinephrine ggt, vasopressin ggt. Pt requiring multiple pressors; MAP within limits to continue feeds -continue to monitor MAP. Propofol has been d/c'd, plan to advance TF to goal -see recs below. No new reported GI distress at time of assessment. RDN will continue to monitor per protocol and PRN.      Previous Nutrition Diagnosis: Inadequate Oral Intake r/t critical illness requiring mechanical ventilation AEB need for NPO status    Active [ x ]  Resolved [   ]    If resolved, new PES:     Goal: Pt will meet 75% or more of protein/energy needs via most appropriate route for nutrition    Recommendations:  -Now off propofol, recommend advance TF to goal    *Nepro advance by 20 ml/hr q4hr toward goal of 40 ml/hr with LPS x1/day to provide 960 ml TF (102%RDI), 1828 kcal, 93 gProt., and 698 ml FW. This is 22.9 non-protein kcal and 1.46 gProt. per kg IBW 63.6kg.   -Monitor TF tolerance   -Monitor chemistry, GI fxn, and skin integrity     Risk Level: High [ x ] Moderate [   ] Low [   ]

## 2022-09-15 NOTE — PROGRESS NOTE ADULT - ATTENDING COMMENTS
No event overnight.  Patient remains intubated sedated, on pressor. WBC stable at 16.  BCx and sputum culture neg.  Unclear source of septic shock - ?PNA but sputum culture neg.  Unable to do CT c/a/p given unstable condition on CVVHD.  Cont empiric vanc/cefepime for now.  Will follow up GOC discussion.    Team 1 will follow you.  Dr. Ricci is covering me tomorrow and I will return on Saturday.   Case d/w primary team.    Rissa Dominguez MD, MS  Infectious Disease attending  work cell [phone]   For any questions during evening/weekend/holiday, please page ID on call

## 2022-09-15 NOTE — CONSULT NOTE ADULT - SUBJECTIVE AND OBJECTIVE BOX
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 pt normally gets around with walker, AA0x3, uses nebs on occasion but doesn't help, found by HHA today, somnolent but arousable in usual state of health yesterday.    In the ED:  VITAL SIGNS: Last 24 Hours  T(F): 99.6 (06 Sep 2022 20:40), Max: 105.2 (06 Sep 2022 10:41)  HR: 105 (06 Sep 2022 20:40) (101 - 145)  BP: 134/67 (06 Sep 2022 20:40) (108/53 - 148/49)  RR: 22 (06 Sep 2022 20:40) (18 - 22)  SpO2: 96% (06 Sep 2022 20:40) (95% - 100%)    Labs: WBC 25.88, Hgb 7.9, lactate 5.1 -> 1.3, Cr. 2.18, BNP 49989  UA positive for bacteria and epithelial cells    Imaging:     < from: CT Lower Extremity No Cont, Bilateral (09.06.22 @ 16:06) >  IMPRESSION:    Severe bilateral lower extremity cellulitis without drainable fluid   collection.    No CT evidence of osteomyelitis.    < from: CT Abdomen and Pelvis No Cont (09.06.22 @ 16:05) >  IMPRESSION:  Study performed without intravenous or oral contrast.  Centrilobular emphysema.  No evidence of pneumonia.  No hydronephrosis.   Retroperitoneal adenopathy. Slightly increased as compared to CT   3/4/2018. Differential diagnosis includes lymphoproliferative disorder.  Status post sigmoid resection.  3.0 cm slightly hyperdense solid cortical mass upper pole right kidney.   Is indeterminate. Differential diagnosis includes proteinaceous cyst,   tumor. Favor the former diagnosis. Correlate with targeted renal   sonography and MR. Has increased in size as compared to CT 9/10/2020.    < end of copied text >      Interventions: Duoneb x1, solumedrol 40, Cefepime 1g, Vancomycin 1g, 1L NS bolus (06 Sep 2022 21:12)    PERTINENT PM/SXH:   Lyme disease    DVT (deep venous thrombosis)    Skin cancer    Brain embolism and thrombosis    MRSA (methicillin resistant Staphylococcus aureus)    PNA (pneumonia)    COPD (chronic obstructive pulmonary disease)      H/O angioplasty    Status post laparoscopic-assisted sigmoidectomy    H/O shoulder surgery      FAMILY HISTORY:  FH: heart failure  mother        ITEMS NOT CHECKED ARE NOT PRESENT    SOCIAL HISTORY:   Significant other/partner[ ]  Children[ ]  Adventist/Spirituality:  Substance hx:  [ ]   Tobacco hx:  [ ]   Alcohol hx: [ ]   Home Opioid hx:  [X] I-Stop Reference No: [id number]    Patient Name: Bryn Silva Date: 1940  Address: [address] [place], [place] [address]Sex: Female  Rx Written	Rx Dispensed	Drug	Quantity	Days Supply	Prescriber Name	Prescriber Marielena #	Payment Method	Dispenser  06/28/2022	07/01/2022	hydromorphone 4 mg tablet	120	30	PetitoFestus	[id number]	Insurance	[facility]  05/24/2022	05/25/2022	hydromorphone 4 mg tablet	150	30	Petito, Festus	[id number]	[facility]  02/01/2022	02/01/2022	hydromorphone 4 mg tablet	120	30	Petito, Festus	[id number]	[facility]  12/07/2021	12/08/2021	hydromorphone 4 mg tablet	120	30	Petito, Festus	[id number]	[facility]  09/30/2021	10/01/2021	hydromorphone 4 mg tablet	120	30	Petito, Festus	[id number]	[facility]    Living Situation: [ ]Home  [ ]Long term care  [ ]Rehab [ ]Other    ADVANCE DIRECTIVES:    DNR/MOLST  [ ]  Living Will  [ ]   DECISION MAKER(s):  [ ] Health Care Proxy(s)  [ ] Surrogate(s)  [ ] Guardian           Name(s): Phone Number(s):    BASELINE (I)ADL(s) (prior to admission):  [place]: [ ]Total  [ ] Moderate [ ]Dependent    Allergies    Altabax (Unknown)  Augmentin (Unknown)  clindamycin (Unknown)  Vaseline (Swelling)    Intolerances    MEDICATIONS  (STANDING):  acetylcysteine 10%  Inhalation 4 milliLiter(s) Inhalation every 6 hours  albuterol/ipratropium for Nebulization 3 milliLiter(s) Nebulizer every 6 hours  cefepime   IVPB 2000 milliGRAM(s) IV Intermittent every 12 hours  chlorhexidine 0.12% Liquid 15 milliLiter(s) Oral Mucosa every 12 hours  chlorhexidine 2% Cloths 1 Application(s) Topical <User Schedule>  CRRT Treatment    <Continuous>  dextrose 5%. 1000 milliLiter(s) (50 mL/Hr) IV Continuous <Continuous>  dextrose 5%. 1000 milliLiter(s) (100 mL/Hr) IV Continuous <Continuous>  dextrose 50% Injectable 12.5 Gram(s) IV Push once  dextrose 50% Injectable 25 Gram(s) IV Push once  dextrose 50% Injectable 25 Gram(s) IV Push once  fentaNYL   Infusion 0.5 MICROgram(s)/kG/Hr (5.12 mL/Hr) IV Continuous <Continuous>  glucagon  Injectable 1 milliGRAM(s) IntraMuscular once  heparin  Infusion.  Unit(s)/Hr (18 mL/Hr) IV Continuous <Continuous>  influenza  Vaccine (HIGH DOSE) 0.7 milliLiter(s) IntraMuscular once  insulin lispro (ADMELOG) corrective regimen sliding scale   SubCutaneous every 6 hours  lidocaine   4% Patch 1 Patch Transdermal every 24 hours  midazolam Infusion 0.02 mG/kG/Hr (2.05 mL/Hr) IV Continuous <Continuous>  norepinephrine Infusion 0.05 MICROgram(s)/kG/Min (4.8 mL/Hr) IV Continuous <Continuous>  pantoprazole  Injectable 40 milliGRAM(s) IV Push every 24 hours  Phoxillum Filtration BK 4 / 2.5 5000 milliLiter(s) (2000 mL/Hr) CRRT <Continuous>  polyethylene glycol 3350 17 Gram(s) Oral daily  senna 2 Tablet(s) Oral daily  vasopressin Infusion 0.03 Unit(s)/Min (1.8 mL/Hr) IV Continuous <Continuous>    MEDICATIONS  (PRN):  dextrose Oral Gel 15 Gram(s) Oral once PRN Blood Glucose LESS THAN 70 milliGRAM(s)/deciliter  sodium chloride 0.9% lock flush 10 milliLiter(s) IV Push every 1 hour PRN Pre/post blood products, medications, blood draw, and to maintain line patency    PRESENT SYMPTOMS: [ ]Unable to self-report  [ ] CPOT [ ] PAINADs [ ] RDOS  Source if other than patient:  [ ]Family   [ ]Team     Pain: [ ]yes [ ]no  QOL impact -   Location -                    Aggravating factors -  Quality -  Radiation -  Timing-  Severity (0-10 scale):  Minimal acceptable level (0-10 scale):     CPOT:    https://www.[facility].org/getattachment/[email or web address]-8k3x-3s5w-[email or web address]/Critical-Care-Pain-Observation-Tool-(CPOT)    PAIN AD Score:   http://geriatrictoolkit.[facility]/cog/painad.pdf (press ctrl +  left click to view)    Dyspnea:                           [ ]Mild [ ]Moderate [ ]Severe    RDOS:  https://homecareinformation.net/handouts/hen/Respiratory_Distress_Observation_Scale.pdf (Ctrl +  left click to view)     Anxiety:                             [ ]Mild [ ]Moderate [ ]Severe  Fatigue:                             [ ]Mild [ ]Moderate [ ]Severe  Nausea:                             [ ]Mild [ ]Moderate [ ]Severe  Loss of appetite:              [ ]Mild [ ]Moderate [ ]Severe  Constipation:                    [ ]Mild [ ]Moderate [ ]Severe    Other Symptoms:  [ ]All other review of systems negative     Palliative Performance Status Version 2:         %    http://npcrc.org/files/news/palliative_performance_scale_ppsv2.pdf    PHYSICAL EXAM:  Vital Signs Last 24 Hrs  T(C): 36.6 (15 Sep 2022 06:04), Max: 36.6 (15 Sep 2022 06:04)  T(F): 97.9 (15 Sep 2022 06:04), Max: 97.9 (15 Sep 2022 06:04)  HR: 69 (15 Sep 2022 09:00) (60 - 79)  BP: --  BP(mean): --  RR: 26 (15 Sep 2022 05:25) (22 - 26)  SpO2: 90% (15 Sep 2022 09:00) (90% - 99%)    Parameters below as of 15 Sep 2022 09:00  Patient On (Oxygen Delivery Method): ventilator    O2 Concentration (%): 50 I&O's Summary    14 Sep 2022 07:01  -  15 Sep 2022 07:00  --------------------------------------------------------  IN: 2776.4 mL / OUT: 1438 mL / NET: 1338.4 mL    15 Sep 2022 07:01  -  15 Sep 2022 10:02  --------------------------------------------------------  IN: 224.8 mL / OUT: 10 mL / NET: 214.8 mL      GENERAL: [ ]Cachexia    [ ]Alert  [ ]Oriented x   [ ]Lethargic  [ ]Unarousable  [ ]Verbal  [ ]Non-Verbal  Behavioral:   [ ] Anxiety  [ ] Delirium [ ] Agitation [ ] Other  HEENT:  [ ]Normal   [ ]Dry mouth   [ ]ET Tube/Trach  [ ]Oral lesions  PULMONARY:   [ ]Clear [ ]Tachypnea  [ ]Audible excessive secretions   [ ]Rhonchi        [ ]Right [ ]Left [ ]Bilateral  [ ]Crackles        [ ]Right [ ]Left [ ]Bilateral  [ ]Wheezing     [ ]Right [ ]Left [ ]Bilateral  [ ]Diminished breath sounds [ ]right [ ]left [ ]bilateral  CARDIOVASCULAR:    [ ]Regular [ ]Irregular [ ]Tachy  [ ]Jonathon [ ]Murmur [ ]Other  GASTROINTESTINAL:  [ ]Soft  [ ]Distended   [ ]+BS  [ ]Non tender [ ]Tender  [ ]Other [ ]PEG [ ]OGT/ NGT  Last BM:  GENITOURINARY:  [ ]Normal [ ] Incontinent   [ ]Oliguria/Anuria   [ ]Arroyo  MUSCULOSKELETAL:   [ ]Normal   [ ]Weakness  [ ]Bed/Wheelchair bound [ ]Edema  NEUROLOGIC:   [ ]No focal deficits  [ ]Cognitive impairment  [ ]Dysphagia [ ]Dysarthria [ ]Paresis [ ]Other   SKIN:   [ ]Normal  [ ]Rash  [ ]Other  [ ]Pressure ulcer(s)       Present on admission [ ]y [ ]n    CRITICAL CARE:  [ ] Shock Present  [ ]Septic [ ]Cardiogenic [ ]Neurologic [ ]Hypovolemic  [ ]  Vasopressors [ ]  Inotropes   [ ]Respiratory failure present [ ]Mechanical ventilation [ ]Non-invasive ventilatory support [ ]High flow  Mode: AC/ CMV (Assist Control/ Continuous Mandatory Ventilation), RR (machine): 26, TV (machine): 450, FiO2: 50, PEEP: 5, ITime: 0.8, MAP: 12, PIP: 23  [ ]Acute  [ ]Chronic [ ]Hypoxic  [ ]Hypercarbic [ ]Other  [ ]Other organ failure     LABS:                        7.1    16.49 )-----------( 100      ( 15 Sep 2022 05:57 )             22.5   09-15    133<L>  |  100  |  18  ----------------------------<  141<H>  3.7   |  24  |  1.41<H>    Ca    8.2<L>      15 Sep 2022 05:57  Phos  3.3     09-15  Mg     2.2     09-15    TPro  6.6  /  Alb  2.2<L>  /  TBili  1.3<H>  /  DBili  x   /  AST  31  /  ALT  25  /  AlkPhos  253<H>  09-15  PT/INR - ( 14 Sep 2022 13:57 )   PT: 12.5 sec;   INR: 1.05          PTT - ( 15 Sep 2022 05:57 )  PTT:79.8 sec      RADIOLOGY & ADDITIONAL STUDIES:    [ ]PPSV2 < or = to 30% [ ]significant weight loss  [ ]poor nutritional intake  [ ]anasarca[ ]Artificial Nutrition      REFERRALS:   [ ]Chaplaincy  [ ]Hospice  [ ]Child Life  [ ]Social Work  [ ]Case management [ ]Holistic Therapy  HPI:  81F PMH morbid obesity, HTN, HLD, HFpEF (EF 55-60% last echo 2/20), RADHA, COPD (on home O2-3L), chronic LE edema with neuropathic pain, DALIA, chronic back pain, PVD s/p LE stents, hx of multiple LE DVT (on Eliquis), Cerebral aneurysm s/p coil, 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 pt normally gets around with walker, AA0x3, uses nebs on occasion but doesn't help, found by HHA today, somnolent but arousable in usual state of health yesterday.    In the ED:  VITAL SIGNS: Last 24 Hours  T(F): 99.6 (06 Sep 2022 20:40), Max: 105.2 (06 Sep 2022 10:41)  HR: 105 (06 Sep 2022 20:40) (101 - 145)  BP: 134/67 (06 Sep 2022 20:40) (108/53 - 148/49)  RR: 22 (06 Sep 2022 20:40) (18 - 22)  SpO2: 96% (06 Sep 2022 20:40) (95% - 100%)    Labs: WBC 25.88, Hgb 7.9, lactate 5.1 -> 1.3, Cr. 2.18, BNP 43880  UA positive for bacteria and epithelial cells    Imaging:     < from: CT Lower Extremity No Cont, Bilateral (09.06.22 @ 16:06) >  IMPRESSION:    Severe bilateral lower extremity cellulitis without drainable fluid   collection.    No CT evidence of osteomyelitis.    < from: CT Abdomen and Pelvis No Cont (09.06.22 @ 16:05) >  IMPRESSION:  Study performed without intravenous or oral contrast.  Centrilobular emphysema.  No evidence of pneumonia.  No hydronephrosis.   Retroperitoneal adenopathy. Slightly increased as compared to CT   3/4/2018. Differential diagnosis includes lymphoproliferative disorder.  Status post sigmoid resection.  3.0 cm slightly hyperdense solid cortical mass upper pole right kidney.   Is indeterminate. Differential diagnosis includes proteinaceous cyst,   tumor. Favor the former diagnosis. Correlate with targeted renal   sonography and MR. Has increased in size as compared to CT 9/10/2020.    < end of copied text >      Interventions: Duoneb x1, solumedrol 40, Cefepime 1g, Vancomycin 1g, 1L NS bolus (06 Sep 2022 21:12)    Since admission, pt had a cardiac arrest with ROSC but c/b multiple rib fractures and acute renal failure likely 2/2 hypoperfusion of her kidneys. She was transferred to the MICU on a ventilator requiring pressors and CVVHD. Pt unable to be weaned off the ventilator secondary to flail chest. Also unable to have any fluid removed via CVVHD.     PERTINENT PM/SXH:   Lyme disease    DVT (deep venous thrombosis)    Skin cancer    Brain embolism and thrombosis    MRSA (methicillin resistant Staphylococcus aureus)    PNA (pneumonia)    COPD (chronic obstructive pulmonary disease)      H/O angioplasty    Status post laparoscopic-assisted sigmoidectomy    H/O shoulder surgery      FAMILY HISTORY:  FH: heart failure  mother        ITEMS NOT CHECKED ARE NOT PRESENT    SOCIAL HISTORY:   Significant other/partner[ ]  Children[ ]  Hoahaoism/Spirituality:  Substance hx:  [ ]   Tobacco hx:  [ ]   Alcohol hx: [ ]   Home Opioid hx:  [X] I-Stop Reference No: [id number]    Patient Name: Bryn Silva Date: 1940  Address: [address] [place], [place] [address]Sex: Female  Rx Written	Rx Dispensed	Drug	Quantity	Days Supply	Prescriber Name	Prescriber Marielena #	Payment Method	Dispenser  06/28/2022	07/01/2022	hydromorphone 4 mg tablet	120	30	Festus Huitron	[id number]	[facility]  05/24/2022	05/25/2022	hydromorphone 4 mg tablet	150	30	Festus Huitron	[id number]	[facility]  02/01/2022	02/01/2022	hydromorphone 4 mg tablet	120	30	Festus Huitron	[id number]	[facility]  12/07/2021	12/08/2021	hydromorphone 4 mg tablet	120	30	Festus Huitron	[id number]	[facility]  09/30/2021	10/01/2021	hydromorphone 4 mg tablet	120	30	Festus Huitron	[id number]	[facility]    Living Situation: [X]Home  [ ]Long term care  [ ]Rehab [ ]Other    ADVANCE DIRECTIVES:    DNR/MOLST  [X]  Living Will  [ ]   DECISION MAKER(s):  [X] Health Care Proxy(s)  [ ] Surrogate(s)  [ ] Guardian           Name(s): Lisa Miner  Phone Number(s): [phone]    BASELINE (I)ADL(s) (prior to admission):  [place]: [ ]Total  [ ] Moderate [X]Dependent    Allergies    Altabax (Unknown)  Augmentin (Unknown)  clindamycin (Unknown)  Vaseline (Swelling)    Intolerances    MEDICATIONS  (STANDING):  acetylcysteine 10%  Inhalation 4 milliLiter(s) Inhalation every 6 hours  albuterol/ipratropium for Nebulization 3 milliLiter(s) Nebulizer every 6 hours  cefepime   IVPB 2000 milliGRAM(s) IV Intermittent every 12 hours  chlorhexidine 0.12% Liquid 15 milliLiter(s) Oral Mucosa every 12 hours  chlorhexidine 2% Cloths 1 Application(s) Topical <User Schedule>  CRRT Treatment    <Continuous>  dextrose 5%. 1000 milliLiter(s) (50 mL/Hr) IV Continuous <Continuous>  dextrose 5%. 1000 milliLiter(s) (100 mL/Hr) IV Continuous <Continuous>  dextrose 50% Injectable 12.5 Gram(s) IV Push once  dextrose 50% Injectable 25 Gram(s) IV Push once  dextrose 50% Injectable 25 Gram(s) IV Push once  fentaNYL   Infusion 0.5 MICROgram(s)/kG/Hr (5.12 mL/Hr) IV Continuous <Continuous>  glucagon  Injectable 1 milliGRAM(s) IntraMuscular once  heparin  Infusion.  Unit(s)/Hr (18 mL/Hr) IV Continuous <Continuous>  influenza  Vaccine (HIGH DOSE) 0.7 milliLiter(s) IntraMuscular once  insulin lispro (ADMELOG) corrective regimen sliding scale   SubCutaneous every 6 hours  lidocaine   4% Patch 1 Patch Transdermal every 24 hours  midazolam Infusion 0.02 mG/kG/Hr (2.05 mL/Hr) IV Continuous <Continuous>  norepinephrine Infusion 0.05 MICROgram(s)/kG/Min (4.8 mL/Hr) IV Continuous <Continuous>  pantoprazole  Injectable 40 milliGRAM(s) IV Push every 24 hours  Phoxillum Filtration BK 4 / 2.5 5000 milliLiter(s) (2000 mL/Hr) CRRT <Continuous>  polyethylene glycol 3350 17 Gram(s) Oral daily  senna 2 Tablet(s) Oral daily  vasopressin Infusion 0.03 Unit(s)/Min (1.8 mL/Hr) IV Continuous <Continuous>    MEDICATIONS  (PRN):  dextrose Oral Gel 15 Gram(s) Oral once PRN Blood Glucose LESS THAN 70 milliGRAM(s)/deciliter  sodium chloride 0.9% lock flush 10 milliLiter(s) IV Push every 1 hour PRN Pre/post blood products, medications, blood draw, and to maintain line patency    PRESENT SYMPTOMS: [X]Unable to self-report  [ ] CPOT [ ] PAINADs [ ] RDOS  Source if other than patient:  [ ]Family   [ ]Team     Pain: [ ]yes [ ]no  QOL impact -   Location -                    Aggravating factors -  Quality -  Radiation -  Timing-  Severity (0-10 scale):  Minimal acceptable level (0-10 scale):       Dyspnea:                           [ ]Mild [ ]Moderate [ ]Severe  Anxiety:                             [ ]Mild [ ]Moderate [ ]Severe  Fatigue:                             [ ]Mild [ ]Moderate [ ]Severe  Nausea:                             [ ]Mild [ ]Moderate [ ]Severe  Loss of appetite:              [ ]Mild [ ]Moderate [ ]Severe  Constipation:                    [ ]Mild [ ]Moderate [ ]Severe    Other Symptoms:  [ ]All other review of systems negative     Palliative Performance Status Version 2:        10 %    http://npcrc.org/files/news/palliative_performance_scale_ppsv2.pdf    PHYSICAL EXAM:  Vital Signs Last 24 Hrs  T(C): 36.6 (15 Sep 2022 06:04), Max: 36.6 (15 Sep 2022 06:04)  T(F): 97.9 (15 Sep 2022 06:04), Max: 97.9 (15 Sep 2022 06:04)  HR: 69 (15 Sep 2022 09:00) (60 - 79)  BP: --  BP(mean): --  RR: 26 (15 Sep 2022 05:25) (22 - 26)  SpO2: 90% (15 Sep 2022 09:00) (90% - 99%)    Parameters below as of 15 Sep 2022 09:00  Patient On (Oxygen Delivery Method): ventilator    O2 Concentration (%): 50 I&O's Summary    14 Sep 2022 07:01  -  15 Sep 2022 07:00  --------------------------------------------------------  IN: 2776.4 mL / OUT: 1438 mL / NET: 1338.4 mL    15 Sep 2022 07:01  -  15 Sep 2022 10:02  --------------------------------------------------------  IN: 224.8 mL / OUT: 10 mL / NET: 214.8 mL      GENERAL: [ ]Cachexia    [ ]Alert  [ ]Oriented x   [ ]Lethargic  [X]Unarousable  [ ]Verbal  [ ]Non-Verbal  Behavioral:   [ ] Anxiety  [ ] Delirium [ ] Agitation [ ] Other  HEENT:  [ ]Normal   [ ]Dry mouth   [X]ET Tube/Trach  [ ]Oral lesions  PULMONARY:   [X]Clear [ ]Tachypnea  [ ]Audible excessive secretions   [ ]Rhonchi        [ ]Right [ ]Left [ ]Bilateral  [ ]Crackles        [ ]Right [ ]Left [ ]Bilateral  [ ]Wheezing     [ ]Right [ ]Left [ ]Bilateral  [ ]Diminished breath sounds [ ]right [ ]left [ ]bilateral  CARDIOVASCULAR:    [X]Regular [ ]Irregular [X]Tachy  [ ]Jonathon [ ]Murmur [ ]Other  GASTROINTESTINAL:  [X]Soft  [ ]Distended   [ ]+BS  [ ]Non tender [ ]Tender  [ ]Other [ ]PEG [X]OGT/ NGT  Last BM:  GENITOURINARY:  [ ]Normal [ ] Incontinent   [ ]Oliguria/Anuria   [X]Arroyo  MUSCULOSKELETAL:   [ ]Normal   [ ]Weakness  [X]Bed/Wheelchair bound [ ]Edema  NEUROLOGIC:   [X]No focal deficits  [ ]Cognitive impairment  [ ]Dysphagia [ ]Dysarthria [ ]Paresis [ ]Other   SKIN:   [X]Normal  [ ]Rash  [ ]Other  [ ]Pressure ulcer(s)       Present on admission [ ]y [ ]n    CRITICAL CARE:  [ ] Shock Present  [ ]Septic [ ]Cardiogenic [ ]Neurologic [ ]Hypovolemic  [X]  Vasopressors [X]  Inotropes   [ ]Respiratory failure present [X]Mechanical ventilation [ ]Non-invasive ventilatory support [ ]High flow  Mode: AC/ CMV (Assist Control/ Continuous Mandatory Ventilation), RR (machine): 26, TV (machine): 450, FiO2: 50, PEEP: 5, ITime: 0.8, MAP: 12, PIP: 23  [ ]Acute  [ ]Chronic [ ]Hypoxic  [ ]Hypercarbic [ ]Other  [X]Other organ failure: Renal failure, CVVHD    LABS:                        7.1    16.49 )-----------( 100      ( 15 Sep 2022 05:57 )             22.5   09-15    133<L>  |  100  |  18  ----------------------------<  141<H>  3.7   |  24  |  1.41<H>    Ca    8.2<L>      15 Sep 2022 05:57  Phos  3.3     09-15  Mg     2.2     09-15    TPro  6.6  /  Alb  2.2<L>  /  TBili  1.3<H>  /  DBili  x   /  AST  31  /  ALT  25  /  AlkPhos  253<H>  09-15    PT/INR - ( 14 Sep 2022 13:57 )   PT: 12.5 sec;   INR: 1.05     PTT - ( 15 Sep 2022 05:57 )  PTT:79.8 sec      RADIOLOGY & ADDITIONAL STUDIES:  < from: CT Head No Cont (09.08.22 @ 14:58) >  IMPRESSION: No intracranial hemorrhage or acute transcortical infarct. No   findings to suggest anoxic injury at this time.    < end of copied text >    < from: CT Chest No Cont (09.08.22 @ 14:58) >      IMPRESSION: Left pleural pigtail catheter in situ anteriorly. Tiny left   pneumothorax.  Small bilateral pleural effusions-new..  Acute bilateral displaced rib fractures anteriorly first through seventh.  Endotracheal tube in situ.    < end of copied text >      [X]PPSV2 < or = to 30% [ ]significant weight loss  [ ]poor nutritional intake  [ ]anasarca [X]Artificial Nutrition      REFERRALS:   [ ]Chaplaincy  [ ]Hospice  [ ]Child Life  [ ]Social Work  [ ]Case management [ ]Holistic Therapy

## 2022-09-15 NOTE — CONSULT NOTE ADULT - PROBLEM SELECTOR RECOMMENDATION 2
Pt with flail chest 2/2 ACLS in setting of cardiac arrest. Unable to be weaned off ventilator. Also with underlying COPD and CHF.  - Discussions ongoing with HCP regarding ventilatory support and possible tracheostomy. Will readdress in a few days.

## 2022-09-15 NOTE — ADVANCED PRACTICE NURSE CONSULT - REASON FOR CONSULT
WOC nurse consult to assess lower extremity ulcers. Patient evaluated by vascular. WOCN consult not indicated. WOCN discussed the same with house staff.

## 2022-09-15 NOTE — PROGRESS NOTE ADULT - ASSESSMENT
81F PMH CKD 4 (baseline around 2.5-2.9) HTN who presented with a one day history of respiratory distress and LE cellulitis hospital course c/b cardiac arrest on 9/7  Nephrology consulted for elevated creatinine.     Assessment/Plan:   # anuric  BRANT on CKD   Volume overloaded on physical exam   UA w/protein  uPCR 0.7  unclear baseline creatinine  etiology of BRANT includes:   likely iATN in the setting of cardiac arrest and shock        Plan   given anuric renal failure, and evidence of volume overload will continue with CVVHD will keep net even, will try again for net negative UF and continue if patient tolerates   CVVHD rx: qb 300ml/min  UF: net negative 50ml/hr  DFR: 2L/hour  Dialysate to Phoxillum   q6H BMP mag and phos while on CVVHD  maintain MAP >70 for adequate renal perfusion  strict i's and o's

## 2022-09-15 NOTE — PROGRESS NOTE ADULT - ASSESSMENT
80yo morbidly obese female w/ pmhx of HTN, HLD, HFpEF (EF 56%), COPD (on home O2-3L), chronic LE edema and cellulitis, DALIA, PVD p/w respiratory distress 2/2 severe sepsis due to cellulitis on lower extremity which progressed to septic shock in setting of possible VAP. Hospital course complicated by cardiac arrest, flail chest, and oliguric BRANT progressing to kidney failure.     NEURO  #Anoxic Encephelopathy  s/p cardiac arrest 2/2 hypoxia due to aspiration vs pulmonary edema. Prior to arrest, pt was awake and alert on the RMF. Difficult to assess current status due to continued ventilation and sedation requirements in the setting on possible VAP and flail chest. VC AC w/ FiO2 50, Vt 450, Ti 0.8, RR 26, PEEP 5. Improvement of respiratory acidosis w/ Mucomyst. Transitioned from propofol to versed in setting of increasing triglycerides.       PULM  #Pneumothorax  Pt presented to MICU with signs of decreased lung sliding on bedside ultrasound s/p cardiac arrest and chest compressions. Drop in sats to low 70s on 09/08 w/ x-ray confirming Left sided pneumothorax, no BP changes. Chest tube placed with improvement in sats to high 90s again. Repeat x-rays showing lung reexpansion  - chest tube still in pace in setting of continued intubation    #Broken ribs w/ flail chest  Pt with several broken ribs and signs of flail chest with difficulty breathing when sedation weaned off. Pt currently intubated and sedated. Thoracic surgery consulted for possible sternal plating. Anesthesia consulted, spinal block on 09/12 for pain management. Pt remains unstable and not surgery candidate at this time    #Chronic obstructive pulmonary disease (COPD).   Pt with COPD on home 3L. Patient initially presented with NRB, now intubated s/p cardiac arrest. Started on mucomyst    #Pulm HTN  possibly 2/2 longstanding DALIA. Evidence of new pulm HTN on echo w/ pap 48 compared to TTE from 2022. LE doppler on current visit negative, PE unlikely.     #Acute on chronic resp failure  Pt w/ COPD on home O2, currently worsening hypoxia 2/2 to Pneumonia vs flash pulmonary edema. Pt endured cardiac arrest secondary to resp failure likely in setting of aspiration while eating vs flash pulmonary edema 2/2 chronic htn and chronic HF (relatively the same EF as previous TTE). New onset pleural effusions likely in the same setting, pt s/p intubation. Plan as above    CARDIO  #Septic shock  Unknown source, originally thought to be 2/2 pneumonia. Cultures remain neg. Pt sedated, warm on palpation, not producing urine, normal lactate. Moist mucus membranes, edematous in extremities and receiving CVVHD. Hemodynamically stable while on pressors. Levo 0.1, vassopressin 0.3    #Chronic heart failure with preserved ejection fraction (HFpEF).   Pt on home bumex 2mg.  TTE from current visit showing EF of 56% with Pulmonary HTN - PAP 48. Experienced pulmonary edema and effusion postcardiac arrest.   Plan:  - c/w CVVHD to reduce fluid overload in the setting of acute kidney failure    #Cardiac Arrest  possibly 2/2 hypoxia in setting of aspiration vs  flash pulmonary edema. Pt relatively hypotensive throughout the day with episodes of hypertension to 130/140 systolic. received fluids on PM 09/07 and a unit of blood on 09/07 for anemia. In PM on 09/07, complained of difficulty breathing during meal, found unresponsive, hypotensive, w/ blue complexion by nurse.      #Afib  Experienced a-fib on 09/13 w RVR. self resolved w/o intervention. started on full anticoag.     #Hypertension.   Pt with history of hypertension on home diltiazem 180mg daily. Currently on pressors s/p septic shock.   Plan:  - holding home antihypertensive meds    GI  #HLD (hyperlipidemia).   Pt on atorvastatin 40mg daily.  Plan:  - c/w lipitor 40 mg QHS.      RENAL  #Oliguric BRANT on CKD  Baseline Cr 2.04 in July 2022, currently uptredning to 3.3. Likely 2/2 to hypoperfusion/ischemia in setting of sepsis and cardiac arrest. Nephro consulted. HD cath placed on 09/12 for CVVHD in setting of worsening electrolytes and increasing fluid status  Plan:  - c/w CVVHD cb 300ml/min net even w/ DFR: 2L/hour  - q6H BMP mag and phos while on CVVHD  - maintain MAP >70 for adequate renal perfusion  - strict I/Os    #Right kidney mass.   CTAP s/f slightly hyperdense solid cortical mass in upper pole of R kidney.  Plan:  - consider additional renal imaging when stable    ENDO  No active issues      HEME  #Anemia  Pt noted to have chronic anemia, presented with HgB of 6.8 w/ drop to 6.7 during course of stay. s/p 1 unit RBC on 09/07, remained stable until 09/13 when CVVHD began and drop in Hgb to 6.8. Received 1u RBC, back up to 7.4, possibly in setting of hemolysis with HD  Plan:  - f/u H+H  - active type and screen   - f/u hemolysis labs      ID  #Septic shock  Originally sepsis 2/2 LE Cellulitis which progressed to septic shock of unknown source, originally thought to be 2/2 to new onset pneumonia. New tmax and worsened pleural effusions on 09/12. BLE edema, erythema, scaly skin c/w cellulitis. CTX 2g IV QD on 09/7 but new t-max on night of 09/11-12, cultures remain neg. Transitioned to cefepime 1g q12 and received 1 dose vanc 1.5g (dose dependent due to kidney status). Trough goal of 13-17.  Cultures remain neg  Plan:  - c/w cefepime and vanc  - Continuing Levo requirement for MAP goal 70-75  - Vanc troph prior to next dose  - c/w tylenol 650 mg PO q6h PRN for pain/fever  - f/u further ID recs      E: K>4, Phos>3, Mg>2  N: Nepro diet - CVVHD   DVT ppx: Heparin full anticoag - afib  GI ppx: Protonix 40 BID  Access: 2 peripheral lines, left sided triple lumen, HD cath on right    DNR

## 2022-09-15 NOTE — CONSULT NOTE ADULT - CONVERSATION DETAILS
Spoke with patient's family friend, Lisa Miner, who has known the patient since she was a child and refers to the patient as her "aunt." Patient made Lisa her HCP in the past and Lisa endorsed that she wants to make decisions that are in line with what the patient would have wanted. We discussed how she has been on a ventilator for an extended period and it is likely she will not be able to be weaned off the ventilator in the coming days 2/2 her flail chest. Lisa explained that she would be agreeable to a tracheostomy for further ventilation if she could have some quality of life but if she was trached to just lie in bed all day and be unable to do anything meaningful, she would opt for a transition to comfort care. Lisa indicated that she was aware how sick Bryn is and that she was not hopeful that the patient would leave the hospital but wanted to see how she did over the next few days to get a better sense of her prognosis for some kind of meaningful recovery. If no meaningful quality of life were possible, she is prepared to discuss a symptom-directed approach.

## 2022-09-16 NOTE — PROGRESS NOTE ADULT - SUBJECTIVE AND OBJECTIVE BOX
--------------------------------------------------------------------------------  Chief Complaint: ESRD/Ongoing hemodialysis requirement    24 hour events/subjective:  Remains stable, no acute issues overnight. CVVHD without any issues tolerating 100cc net negative. On Phoxillum, orders adjusted    PAST HISTORY  --------------------------------------------------------------------------------  No significant changes to PMH, PSH, FHx, SHx, unless otherwise noted    ALLERGIES & MEDICATIONS  --------------------------------------------------------------------------------  Allergies    Altabax (Unknown)  Augmentin (Unknown)  clindamycin (Unknown)  Vaseline (Swelling)    Intolerances    Standing Inpatient Medications  acetylcysteine 10%  Inhalation 4 milliLiter(s) Inhalation every 6 hours  albuterol/ipratropium for Nebulization 3 milliLiter(s) Nebulizer every 6 hours  cefepime   IVPB 2000 milliGRAM(s) IV Intermittent every 12 hours  chlorhexidine 0.12% Liquid 15 milliLiter(s) Oral Mucosa every 12 hours  chlorhexidine 2% Cloths 1 Application(s) Topical <User Schedule>  CRRT Treatment    <Continuous>  dextrose 5%. 1000 milliLiter(s) IV Continuous <Continuous>  dextrose 5%. 1000 milliLiter(s) IV Continuous <Continuous>  dextrose 50% Injectable 25 Gram(s) IV Push once  dextrose 50% Injectable 12.5 Gram(s) IV Push once  dextrose 50% Injectable 25 Gram(s) IV Push once  fentaNYL   Infusion 0.5 MICROgram(s)/kG/Hr IV Continuous <Continuous>  glucagon  Injectable 1 milliGRAM(s) IntraMuscular once  heparin  Infusion.  Unit(s)/Hr IV Continuous <Continuous>  influenza  Vaccine (HIGH DOSE) 0.7 milliLiter(s) IntraMuscular once  insulin lispro (ADMELOG) corrective regimen sliding scale   SubCutaneous every 6 hours  lidocaine   4% Patch 1 Patch Transdermal every 24 hours  midazolam Infusion 0.02 mG/kG/Hr IV Continuous <Continuous>  norepinephrine Infusion 0.05 MICROgram(s)/kG/Min IV Continuous <Continuous>  pantoprazole  Injectable 40 milliGRAM(s) IV Push every 24 hours  Phoxillum Filtration BK 4 / 2.5 5000 milliLiter(s) CRRT <Continuous>  polyethylene glycol 3350 17 Gram(s) Oral daily  senna 2 Tablet(s) Oral daily  vasopressin Infusion 0.03 Unit(s)/Min IV Continuous <Continuous>    PRN Inpatient Medications  dextrose Oral Gel 15 Gram(s) Oral once PRN  sodium chloride 0.9% lock flush 10 milliLiter(s) IV Push every 1 hour PRN      REVIEW OF SYSTEMS  --------------------------------------------------------------------------------  All other systems were reviewed and are negative, except as noted.    VITALS/PHYSICAL EXAM  --------------------------------------------------------------------------------  T(C): 35.7 (09-16-22 @ 09:21), Max: 36.8 (09-15-22 @ 14:37)  HR: 62 (09-16-22 @ 10:00) (61 - 76)  BP: 125/58 (09-16-22 @ 09:00) (125/58 - 125/58)  RR: 26 (09-16-22 @ 10:00) (26 - 26)  SpO2: 94% (09-16-22 @ 10:00) (89% - 100%)  Wt(kg): --  Drug Dosing Weight  Height (cm): 172.7 (06 Sep 2022 21:17)  Weight (kg): 102.3 (06 Sep 2022 21:17)  BMI (kg/m2): 34.3 (06 Sep 2022 21:17)  BSA (m2): 2.15 (06 Sep 2022 21:17)      09-15-22 @ 07:01  -  09-16-22 @ 07:00  --------------------------------------------------------  IN: 2701 mL / OUT: 3539 mL / NET: -838 mL    09-16-22 @ 07:01  -  09-16-22 @ 11:04  --------------------------------------------------------  IN: 381.7 mL / OUT: 383 mL / NET: -1.3 mL    PHYSICAL EXAM:  GENERAL: intubated; sedated   CHEST/LUNG: decreased breath sounds at the bases  HEART: Regular rate and rhythm, no murmur, no gallops, no rub   ABDOMEN: Soft, nontender, non distended  EXTREMITIES: Anasarca  Access: Right IJ c/d/i      LABS/STUDIES  --------------------------------------------------------------------------------              7.2    12.91 >-----------<  68       [09-16-22 @ 04:53]              23.7     132  |  101  |  13  ----------------------------<  146      [09-16-22 @ 04:53]  3.5   |  24  |  1.19        Ca     8.0     [09-16-22 @ 04:53]      Mg     2.4     [09-16-22 @ 04:53]      Phos  3.4     [09-16-22 @ 04:53]    TPro  7.1  /  Alb  2.5  /  TBili  0.9  /  DBili  x   /  AST  42  /  ALT  33  /  AlkPhos  318  [09-16-22 @ 04:53]    PT/INR: PT 12.5 , INR 1.05       [09-14-22 @ 13:57]  PTT: 95.9       [09-16-22 @ 04:53]          [09-14-22 @ 11:53]    Iron 15, TIBC 242, %sat 6      [07-02-22 @ 22:28]  Ferritin 28      [07-02-22 @ 22:28]  Lipid: chol --, , HDL --, LDL --      [09-15-22 @ 05:57]        RADIOLOGY:  --------------------------------------------------------------------------------------    Hemoglobin: 7.2 g/dL (09-16-22 @ 04:53)  Phosphorus Level, Serum: 3.4 mg/dL (09-16-22 @ 04:53)  Phosphorus Level, Serum: 3.6 mg/dL (09-15-22 @ 20:52)  Hemoglobin: 7.1 g/dL (09-15-22 @ 20:52)    Albumin, Serum: 2.5 g/dL (09-16-22 @ 04:53)  Albumin, Serum: 2.2 g/dL (09-15-22 @ 05:57)    T(C): 35.7 (09-16-22 @ 09:21), Max: 36.8 (09-15-22 @ 14:37)  HR: 62 (09-16-22 @ 10:00) (61 - 76)  BP: 125/58 (09-16-22 @ 09:00) (125/58 - 125/58)  RR: 26 (09-16-22 @ 10:00) (26 - 26)  SpO2: 94% (09-16-22 @ 10:00) (89% - 100%)      CRRT Treatment:   Modality: CVVHD, Filter: NxStage CAR-505, Target Blood Flow: 300 mL/Min  Target Fluid Balance: Titrate to net NEGATIVE fluid balance, 100 mL/Hr (09-16-22 @ 11:01) [Active]  Phoxillum Filtration BK 4 / 2.5: Solution, 5000 milliLiter(s) infuse at 2000 mL/Hr; infuse through CRRT Circuit  Administration Instructions: Continuous Renal Replacement Therapy (CRRT)  Special Instructions: Dialysate (09-16-22 @ 11:01) [Active]   --------------------------------------------------------------------------------  Chief Complaint: ESRD/Ongoing hemodialysis requirement    24 hour events/subjective:  Remains stable, no acute issues overnight. CVVHD without any issues tolerating 100cc net negative. On Phoxillum, orders adjusted    PAST HISTORY  --------------------------------------------------------------------------------  No significant changes to PMH, PSH, FHx, SHx, unless otherwise noted    ALLERGIES & MEDICATIONS  --------------------------------------------------------------------------------  Allergies    Altabax (Unknown)  Augmentin (Unknown)  clindamycin (Unknown)  Vaseline (Swelling)    Intolerances    Standing Inpatient Medications  acetylcysteine 10%  Inhalation 4 milliLiter(s) Inhalation every 6 hours  albuterol/ipratropium for Nebulization 3 milliLiter(s) Nebulizer every 6 hours  cefepime   IVPB 2000 milliGRAM(s) IV Intermittent every 12 hours  chlorhexidine 0.12% Liquid 15 milliLiter(s) Oral Mucosa every 12 hours  chlorhexidine 2% Cloths 1 Application(s) Topical <User Schedule>  CRRT Treatment    <Continuous>  dextrose 5%. 1000 milliLiter(s) IV Continuous <Continuous>  dextrose 5%. 1000 milliLiter(s) IV Continuous <Continuous>  dextrose 50% Injectable 25 Gram(s) IV Push once  dextrose 50% Injectable 12.5 Gram(s) IV Push once  dextrose 50% Injectable 25 Gram(s) IV Push once  fentaNYL   Infusion 0.5 MICROgram(s)/kG/Hr IV Continuous <Continuous>  glucagon  Injectable 1 milliGRAM(s) IntraMuscular once  heparin  Infusion.  Unit(s)/Hr IV Continuous <Continuous>  influenza  Vaccine (HIGH DOSE) 0.7 milliLiter(s) IntraMuscular once  insulin lispro (ADMELOG) corrective regimen sliding scale   SubCutaneous every 6 hours  lidocaine   4% Patch 1 Patch Transdermal every 24 hours  midazolam Infusion 0.02 mG/kG/Hr IV Continuous <Continuous>  norepinephrine Infusion 0.05 MICROgram(s)/kG/Min IV Continuous <Continuous>  pantoprazole  Injectable 40 milliGRAM(s) IV Push every 24 hours  Phoxillum Filtration BK 4 / 2.5 5000 milliLiter(s) CRRT <Continuous>  polyethylene glycol 3350 17 Gram(s) Oral daily  senna 2 Tablet(s) Oral daily  vasopressin Infusion 0.03 Unit(s)/Min IV Continuous <Continuous>    PRN Inpatient Medications  dextrose Oral Gel 15 Gram(s) Oral once PRN  sodium chloride 0.9% lock flush 10 milliLiter(s) IV Push every 1 hour PRN      REVIEW OF SYSTEMS  --------------------------------------------------------------------------------  All other systems were reviewed and are negative, except as noted.    VITALS/PHYSICAL EXAM  --------------------------------------------------------------------------------  T(C): 35.7 (09-16-22 @ 09:21), Max: 36.8 (09-15-22 @ 14:37)  HR: 62 (09-16-22 @ 10:00) (61 - 76)  BP: 125/58 (09-16-22 @ 09:00) (125/58 - 125/58)  RR: 26 (09-16-22 @ 10:00) (26 - 26)  SpO2: 94% (09-16-22 @ 10:00) (89% - 100%)  Wt(kg): --  Drug Dosing Weight  Height (cm): 172.7 (06 Sep 2022 21:17)  Weight (kg): 102.3 (06 Sep 2022 21:17)  BMI (kg/m2): 34.3 (06 Sep 2022 21:17)  BSA (m2): 2.15 (06 Sep 2022 21:17)      09-15-22 @ 07:01  -  09-16-22 @ 07:00  --------------------------------------------------------  IN: 2701 mL / OUT: 3539 mL / NET: -838 mL    09-16-22 @ 07:01  -  09-16-22 @ 11:04  --------------------------------------------------------  IN: 381.7 mL / OUT: 383 mL / NET: -1.3 mL    PHYSICAL EXAM:  GENERAL: intubated; sedated   CHEST/LUNG: decreased breath sounds at the bases  HEART: Regular rate and rhythm, no murmur, no gallops, no rub   ABDOMEN: Soft, nontender, non distended  EXTREMITIES: Anasarca, warm  Access: Right IJ c/d/i, accessede    LABS/STUDIES  --------------------------------------------------------------------------------              7.2    12.91 >-----------<  68       [09-16-22 @ 04:53]              23.7     132  |  101  |  13  ----------------------------<  146      [09-16-22 @ 04:53]  3.5   |  24  |  1.19        Ca     8.0     [09-16-22 @ 04:53]      Mg     2.4     [09-16-22 @ 04:53]      Phos  3.4     [09-16-22 @ 04:53]    TPro  7.1  /  Alb  2.5  /  TBili  0.9  /  DBili  x   /  AST  42  /  ALT  33  /  AlkPhos  318  [09-16-22 @ 04:53]    PT/INR: PT 12.5 , INR 1.05       [09-14-22 @ 13:57]  PTT: 95.9       [09-16-22 @ 04:53]          [09-14-22 @ 11:53]    Iron 15, TIBC 242, %sat 6      [07-02-22 @ 22:28]  Ferritin 28      [07-02-22 @ 22:28]  Lipid: chol --, , HDL --, LDL --      [09-15-22 @ 05:57]        RADIOLOGY:  --------------------------------------------------------------------------------------    Hemoglobin: 7.2 g/dL (09-16-22 @ 04:53)  Phosphorus Level, Serum: 3.4 mg/dL (09-16-22 @ 04:53)  Phosphorus Level, Serum: 3.6 mg/dL (09-15-22 @ 20:52)  Hemoglobin: 7.1 g/dL (09-15-22 @ 20:52)    Albumin, Serum: 2.5 g/dL (09-16-22 @ 04:53)  Albumin, Serum: 2.2 g/dL (09-15-22 @ 05:57)    T(C): 35.7 (09-16-22 @ 09:21), Max: 36.8 (09-15-22 @ 14:37)  HR: 62 (09-16-22 @ 10:00) (61 - 76)  BP: 125/58 (09-16-22 @ 09:00) (125/58 - 125/58)  RR: 26 (09-16-22 @ 10:00) (26 - 26)  SpO2: 94% (09-16-22 @ 10:00) (89% - 100%)      CRRT Treatment:   Modality: CVVHD, Filter: NxStage CAR-505, Target Blood Flow: 300 mL/Min  Target Fluid Balance: Titrate to net NEGATIVE fluid balance, 100 mL/Hr (09-16-22 @ 11:01) [Active]  Phoxillum Filtration BK 4 / 2.5: Solution, 5000 milliLiter(s) infuse at 2000 mL/Hr; infuse through CRRT Circuit  Administration Instructions: Continuous Renal Replacement Therapy (CRRT)  Special Instructions: Dialysate (09-16-22 @ 11:01) [Active]

## 2022-09-16 NOTE — PROGRESS NOTE ADULT - SUBJECTIVE AND OBJECTIVE BOX
infectious diseases progress note    INTERVAL HPI/OVERNIGHT EVENTS:    ROS:  CONSTITUTIONAL:  Negative fever or chills, feels well, good appetite  EYES:  Negative  blurry vision or double vision  CARDIOVASCULAR:  Negative for chest pain or palpitations  RESPIRATORY:  Negative for cough, wheezing, or SOB   GASTROINTESTINAL:  Negative for nausea, vomiting, diarrhea, constipation, or abdominal pain  GENITOURINARY:  Negative frequency, urgency or dysuria  NEUROLOGIC:  No headache, confusion, dizziness, lightheadedness    Allergies    Altabax (Unknown)  Augmentin (Unknown)  clindamycin (Unknown)  Vaseline (Swelling)    Intolerances        ANTIBIOTICS/RELEVANT:  antimicrobials  cefepime   IVPB 2000 milliGRAM(s) IV Intermittent every 12 hours    immunologic:  influenza  Vaccine (HIGH DOSE) 0.7 milliLiter(s) IntraMuscular once    OTHER:  acetylcysteine 10%  Inhalation 4 milliLiter(s) Inhalation every 6 hours  albuterol/ipratropium for Nebulization 3 milliLiter(s) Nebulizer every 6 hours  chlorhexidine 0.12% Liquid 15 milliLiter(s) Oral Mucosa every 12 hours  chlorhexidine 2% Cloths 1 Application(s) Topical <User Schedule>  CRRT Treatment    <Continuous>  dextrose 5%. 1000 milliLiter(s) IV Continuous <Continuous>  dextrose 5%. 1000 milliLiter(s) IV Continuous <Continuous>  dextrose 50% Injectable 25 Gram(s) IV Push once  dextrose 50% Injectable 12.5 Gram(s) IV Push once  dextrose 50% Injectable 25 Gram(s) IV Push once  dextrose Oral Gel 15 Gram(s) Oral once PRN  fentaNYL   Infusion. 0.5 MICROgram(s)/kG/Hr IV Continuous <Continuous>  glucagon  Injectable 1 milliGRAM(s) IntraMuscular once  heparin  Infusion.  Unit(s)/Hr IV Continuous <Continuous>  insulin lispro (ADMELOG) corrective regimen sliding scale   SubCutaneous every 6 hours  lidocaine   4% Patch 1 Patch Transdermal every 24 hours  midazolam Infusion 0.02 mG/kG/Hr IV Continuous <Continuous>  norepinephrine Infusion 0.05 MICROgram(s)/kG/Min IV Continuous <Continuous>  pantoprazole  Injectable 40 milliGRAM(s) IV Push every 24 hours  Phoxillum Filtration BK 4 / 2.5 5000 milliLiter(s) CRRT <Continuous>  polyethylene glycol 3350 17 Gram(s) Oral daily  senna 2 Tablet(s) Oral daily  sodium chloride 0.9% lock flush 10 milliLiter(s) IV Push every 1 hour PRN  vasopressin Infusion 0.03 Unit(s)/Min IV Continuous <Continuous>      Objective:  Vital Signs Last 24 Hrs  T(C): 35.7 (16 Sep 2022 09:21), Max: 36.8 (15 Sep 2022 14:37)  T(F): 96.3 (16 Sep 2022 09:21), Max: 98.2 (15 Sep 2022 14:37)  HR: 62 (16 Sep 2022 12:00) (59 - 76)  BP: 125/58 (16 Sep 2022 09:00) (125/58 - 125/58)  BP(mean): 84 (16 Sep 2022 09:00) (84 - 84)  RR: 26 (16 Sep 2022 12:00) (26 - 26)  SpO2: 93% (16 Sep 2022 12:00) (89% - 100%)    Parameters below as of 16 Sep 2022 12:00  Patient On (Oxygen Delivery Method): ventilator    O2 Concentration (%): 50    PHYSICAL EXAM:  Constitutional:Well-appearing, conversive   Eyes:KIRBY, EOMI  Ear/Nose/Throat: no oral lesion, no sinus tenderness on percussion	  Neck:no JVD, no lymphadenopathy, supple  Respiratory: CTABL, no wheezes, rales, or rhonchi, speaking full sentences, on RA  Cardiovascular: S1S2 RRR, no murmurs, rubs or gallops   Gastrointestinal:soft, (+) BS, no HSM  Extremities:no erythema, tenderness, swelling or ulcerations   Vascular: 2+ peripheral pulses        LABS:                        7.2    12.91 )-----------( 68       ( 16 Sep 2022 04:53 )             23.7     09-16    132<L>  |  101  |  13  ----------------------------<  146<H>  3.5   |  24  |  1.19    Ca    8.0<L>      16 Sep 2022 04:53  Phos  3.4     09-16  Mg     2.4     09-16    TPro  7.1  /  Alb  2.5<L>  /  TBili  0.9  /  DBili  x   /  AST  42<H>  /  ALT  33  /  AlkPhos  318<H>  09-16    PT/INR - ( 14 Sep 2022 13:57 )   PT: 12.5 sec;   INR: 1.05          PTT - ( 16 Sep 2022 04:53 )  PTT:95.9 sec        MICROBIOLOGY:        RADIOLOGY & ADDITIONAL STUDIES: infectious diseases progress note    INTERVAL HPI/OVERNIGHT EVENTS: NAOE, limited history per patient.     ROS: Limited     Allergies    Altabax (Unknown)  Augmentin (Unknown)  clindamycin (Unknown)  Vaseline (Swelling)    Intolerances        ANTIBIOTICS/RELEVANT:  antimicrobials  cefepime   IVPB 2000 milliGRAM(s) IV Intermittent every 12 hours    immunologic:  influenza  Vaccine (HIGH DOSE) 0.7 milliLiter(s) IntraMuscular once    OTHER:  acetylcysteine 10%  Inhalation 4 milliLiter(s) Inhalation every 6 hours  albuterol/ipratropium for Nebulization 3 milliLiter(s) Nebulizer every 6 hours  chlorhexidine 0.12% Liquid 15 milliLiter(s) Oral Mucosa every 12 hours  chlorhexidine 2% Cloths 1 Application(s) Topical <User Schedule>  CRRT Treatment    <Continuous>  dextrose 5%. 1000 milliLiter(s) IV Continuous <Continuous>  dextrose 5%. 1000 milliLiter(s) IV Continuous <Continuous>  dextrose 50% Injectable 25 Gram(s) IV Push once  dextrose 50% Injectable 12.5 Gram(s) IV Push once  dextrose 50% Injectable 25 Gram(s) IV Push once  dextrose Oral Gel 15 Gram(s) Oral once PRN  fentaNYL   Infusion. 0.5 MICROgram(s)/kG/Hr IV Continuous <Continuous>  glucagon  Injectable 1 milliGRAM(s) IntraMuscular once  heparin  Infusion.  Unit(s)/Hr IV Continuous <Continuous>  insulin lispro (ADMELOG) corrective regimen sliding scale   SubCutaneous every 6 hours  lidocaine   4% Patch 1 Patch Transdermal every 24 hours  midazolam Infusion 0.02 mG/kG/Hr IV Continuous <Continuous>  norepinephrine Infusion 0.05 MICROgram(s)/kG/Min IV Continuous <Continuous>  pantoprazole  Injectable 40 milliGRAM(s) IV Push every 24 hours  Phoxillum Filtration BK 4 / 2.5 5000 milliLiter(s) CRRT <Continuous>  polyethylene glycol 3350 17 Gram(s) Oral daily  senna 2 Tablet(s) Oral daily  sodium chloride 0.9% lock flush 10 milliLiter(s) IV Push every 1 hour PRN  vasopressin Infusion 0.03 Unit(s)/Min IV Continuous <Continuous>      Objective:  Vital Signs Last 24 Hrs  T(C): 35.7 (16 Sep 2022 09:21), Max: 36.8 (15 Sep 2022 14:37)  T(F): 96.3 (16 Sep 2022 09:21), Max: 98.2 (15 Sep 2022 14:37)  HR: 62 (16 Sep 2022 12:00) (59 - 76)  BP: 125/58 (16 Sep 2022 09:00) (125/58 - 125/58)  BP(mean): 84 (16 Sep 2022 09:00) (84 - 84)  RR: 26 (16 Sep 2022 12:00) (26 - 26)  SpO2: 93% (16 Sep 2022 12:00) (89% - 100%)    Parameters below as of 16 Sep 2022 12:00  Patient On (Oxygen Delivery Method): ventilator    O2 Concentration (%): 50    PHYSICAL EXAM:  Constitutional:  intubated and sedated, ill appearing   HEENT: NC/AT, neck supple, RIJ HD cath, LIJ TLC   Respiratory: clear breath sounds b/l, chest tube in place on L   Cardiac: +S1/S2; RRR; no M/R/G  Gastrointestinal: soft, NT/ND; no rebound or guarding, rectal tube in place  Extremities: dermastasis below knees b/l w/ skin changes, significant edema up to knees, there is erythema that is extending above the R knee on the lateral thigh   Vascular: 2+ radial, DP/PT pulses B/L  Neurologic: sedated         LABS:                        7.2    12.91 )-----------( 68       ( 16 Sep 2022 04:53 )             23.7     09-16    132<L>  |  101  |  13  ----------------------------<  146<H>  3.5   |  24  |  1.19    Ca    8.0<L>      16 Sep 2022 04:53  Phos  3.4     09-16  Mg     2.4     09-16    TPro  7.1  /  Alb  2.5<L>  /  TBili  0.9  /  DBili  x   /  AST  42<H>  /  ALT  33  /  AlkPhos  318<H>  09-16    PT/INR - ( 14 Sep 2022 13:57 )   PT: 12.5 sec;   INR: 1.05          PTT - ( 16 Sep 2022 04:53 )  PTT:95.9 sec      MICROBIOLOGY:      RADIOLOGY & ADDITIONAL STUDIES:

## 2022-09-16 NOTE — PROGRESS NOTE ADULT - ASSESSMENT
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 Patient was found to be severely septic i/s/o GBS bacteremia w/ b/l lower cellulitis noted on exam. TTE done i/s/o GBS bacteremia which was unremarkable. Currently patient is on CTX 2g IV QD which should be continued. Patient is s/p arrest on the medical floors ROSC achieved and now intubated and sedated on pressors in the ICU. Unknown cause of arrest. Patient with downtrending WBC on CTX 2g. Her course was c/b fail chest and left sided PTX which is s/p L chest tube own 09/08. Patient on 09/10 was noted to have worsening BRANT on CKD with possible ATN and minimal UOP so levophed was started to help with MAPs and perfusion and propofol was started. She developed a fever of 100.5 09/11 at 8pm with a Tmax of 100.8F 09/12AM . She was noted to have increased thick secretions from ETT which appeared possibly purulent. She was broaden to cefepime and Vancomycin to cover for VAP. Of note patient with HD cath placed to [facility] placed yesterday for CVVHD. Patient had an uptrend in WBC 15.87-->18.44. MRSA swab negative however patient intubated so may not be accurate. She currently still has persistent leukocytosis however had been afebrile. Vanc supratherpautic on trough from 9pm 09/13 - 26.0, and 09/14 @noon which was 23.3. Vanc currently being held. Likely i/s/o recent difficulties with successful CVVHD so not fully clearing.     Recommendations:   - Recommend CT Chest/abdomen/plevis non con and oral con - to evaluate for another source of persistent leukocytosis  - Continue with cefepime 2g q12H   - Follow sputum culture results from 09/12   - Follow blood cultures x2 from 09/12       Team 1 will continue to follow. Case discussed with primary team.

## 2022-09-16 NOTE — PROGRESS NOTE ADULT - ASSESSMENT
81F PMH CKD 4 (baseline around 2.5-2.9) HTN who presented with a one day history of respiratory distress and LE cellulitis hospital course c/b cardiac arrest on 9/7  Nephrology consulted for elevated creatinine.     Assessment/Plan:   # anuric  BRANT on CKD   Volume overloaded on physical exam   UA w/protein  uPCR 0.7  unclear baseline creatinine  etiology of BRANT includes:   likely iATN in the setting of cardiac arrest and shock      Plan   given anuric renal failure, and evidence of volume overload will continue with CVVHD  -Goal net negative 100cc/hr  CVVHD rx: qb 300ml/min  UF: net negative 100ml/hr  DFR: 2L/hour  Dialysate to Phoxillum   q6H BMP mag and phos while on CVVHD  maintain MAP >70 for adequate renal perfusion  strict i's and o's 81F PMH CKD 4 (baseline around 2.5-2.9) HTN who presented with a one day history of respiratory distress and LE cellulitis hospital course c/b cardiac arrest on 9/7, acute renal failure requiring CVVHD Nephrology consulted for elevated creatinine.     Assessment/Plan:   # anuric  BRANT on CKD   Volume overloaded on physical exam   UA w/protein  uPCR 0.7  unclear baseline creatinine  etiology of BRANT includes:   likely iATN in the setting of cardiac arrest and shock . On KRT since 9/12, CVVHD    Plan   given anuric renal failure, and evidence of volume overload will continue with CVVHD  -Goal net negative 100cc/hr  CVVHD rx: qb 300ml/min  UF: net negative 100ml/hr  DFR: 2L/hour  Dialysate to Phoxillum   q6H BMP mag and phos while on CVVHD  maintain MAP >70 for adequate renal perfusion  strict i's and o's

## 2022-09-16 NOTE — PROGRESS NOTE ADULT - ASSESSMENT
82yo morbidly obese female w/ pmhx of HTN, HLD, HFpEF (EF 56%), COPD (on home O2-3L), chronic LE edema and cellulitis, DALIA, PVD p/w respiratory distress 2/2 severe sepsis due to cellulitis on lower extremity which progressed to septic shock in setting of possible VAP. Hospital course complicated by cardiac arrest, flail chest, and oliguric BRANT progressing to kidney failure.     NEURO  #Anoxic Encephelopathy  s/p cardiac arrest 2/2 hypoxia due to aspiration vs pulmonary edema. Prior to arrest, pt was awake and alert on the RMF. Difficult to assess current status due to continued ventilation and sedation requirements in the setting on possible VAP and flail chest. VC AC w/ FiO2 50, Vt 450, Ti 0.8, RR 26, PEEP 5. Improvement of respiratory acidosis w/ Mucomyst. Currently on fentanyl and versed      PULM  #Pneumonia  possibly STANISLAV. Pt w/ bilateral pleural effusions and possible underlying infiltrate, unclear on x-ray. Blood cultures remain negative, sputum cultures neg w/ gram stain showing rare epithelial cells, few WBC's, gram pos rods and cocci in pairs. Currently on empiric antibiotics, cefepime and vanc (dosed daily)    #Pneumothorax  Pt presented to MICU with signs of decreased lung sliding on bedside ultrasound s/p cardiac arrest and chest compressions. Drop in sats to low 70s on 09/08 w/ x-ray confirming Left sided pneumothorax, no BP changes. Chest tube placed with improvement in sats to high 90s again. Repeat x-rays showing lung reexpansion  - chest tube still in pace in setting of continued intubation    #Broken ribs w/ flail chest  Pt with several broken ribs and signs of flail chest with difficulty breathing when sedation weaned off. Pt currently intubated and sedated. Thoracic surgery consulted for possible sternal plating. Anesthesia consulted, spinal block on 09/12 for pain management. Pt remains unstable and not surgery candidate at this time    #Chronic obstructive pulmonary disease (COPD).   Pt with COPD on home 3L. Patient initially presented with NRB, now intubated s/p cardiac arrest. Started on mucomyst    #Pulm HTN  possibly 2/2 longstanding DALIA. Evidence of new pulm HTN on echo w/ pap 48 compared to TTE from 2022. LE doppler on current visit negative, PE unlikely.     #Acute on chronic resp failure  Pt w/ COPD on home O2, currently worsening hypoxia 2/2 to Pneumonia vs flash pulmonary edema. Pt endured cardiac arrest secondary to resp failure likely in setting of aspiration while eating vs flash pulmonary edema 2/2 chronic htn and chronic HF (relatively the same EF as previous TTE). New onset pleural effusions likely in the same setting, pt s/p intubation. Plan as above    CARDIO  #Septic shock  possibly 2/2 STANISLAV pneumonia. Cultures remain neg. Pt sedated, warm on palpation, not producing urine, normal lactate. Moist mucus membranes, edematous in extremities and receiving CVVHD. Hemodynamically stable while on pressors. Levo 0.1, vassopressin 0.3    #Chronic heart failure with preserved ejection fraction (HFpEF).   Pt on home bumex 2mg.  TTE from current visit showing EF of 56% with Pulmonary HTN - PAP 48. Experienced pulmonary edema and effusion postcardiac arrest.   Plan:  - c/w CVVHD to reduce fluid overload in the setting of acute kidney failure    #Cardiac Arrest  possibly 2/2 hypoxia in setting of aspiration vs  flash pulmonary edema. Pt relatively hypotensive throughout the day with episodes of hypertension to 130/140 systolic. received fluids on PM 09/07 and a unit of blood on 09/07 for anemia. In PM on 09/07, complained of difficulty breathing during meal, found unresponsive, hypotensive, w/ blue complexion by nurse.      #Afib  Experienced a-fib on 09/13 w RVR. self resolved w/o intervention. started on full anticoag.     #Hypertension.   Pt with history of hypertension on home diltiazem 180mg daily. Currently on pressors s/p septic shock.   Plan:  - holding home antihypertensive meds    GI  #HLD (hyperlipidemia).   Pt on atorvastatin 40mg daily.  Plan:  - c/w lipitor 40 mg QHS.      RENAL  #Anuric BRANT on CKD  Baseline Cr 2.04 in July 2022, currently uptrending to 3.3. Likely 2/2 to hypoperfusion/ischemia in setting of sepsis and cardiac arrest. Nephro consulted. HD cath placed on 09/12 for CVVHD in setting of worsening electrolytes and increasing fluid status. Goal net neg 100cc/hr  Plan:  - c/w qb 300ml/min  UF: net negative 100ml/hr  DFR: 2L/hour  - Dialysate - Phoxillum   - q6H BMP mag and phos while on CVVHD  - maintain MAP >70 for adequate renal perfusion  - strict I/Os    #Right kidney mass.   CTAP s/f slightly hyperdense solid cortical mass in upper pole of R kidney.  Plan:  - consider additional renal imaging when stable    ENDO  No active issues      HEME  #Anemia  Pt noted to have chronic anemia, presented with HgB of 6.8 w/ drop to 6.7 during course of stay. s/p 1 unit RBC on 09/07, remained stable until 09/13 when CVVHD began and drop in Hgb to 6.8. Received 1u RBC, back up to 7.4, possibly in setting of hemolysis with HD  Plan:  - f/u H+H  - active type and screen   - f/u hemolysis labs      ID  #Septic shock  Originally sepsis 2/2 LE Cellulitis which progressed to septic shock of unknown source, originally thought to be 2/2 to new onset pneumonia. New tmax and worsened pleural effusions on 09/12. BLE edema, erythema, scaly skin c/w cellulitis. CTX 2g IV QD on 09/7 but new t-max on night of 09/11-12, cultures remain neg. Transitioned to cefepime 1g q12 and received 1 dose vanc 1.5g (dose dependent due to kidney status). Trough goal of 13-17.  Cultures remain neg  Plan:  - c/w cefepime and vanc  - Continuing Levo requirement for MAP goal 70-75  - Vanc troph prior to next dose  - c/w tylenol 650 mg PO q6h PRN for pain/fever  - f/u further ID recs      E: K>4, Phos>3, Mg>2  N: Nepro diet - CVVHD   DVT ppx: Heparin full anticoag - afib  GI ppx: Protonix 40 BID  Access: 2 peripheral lines, left sided triple lumen, HD cath on right    DNR       80yo morbidly obese female w/ pmhx of HTN, HLD, HFpEF (EF 56%), COPD (on home O2-3L), chronic LE edema and cellulitis, DALIA, PVD p/w respiratory distress 2/2 severe sepsis due to cellulitis on lower extremity which progressed to septic shock in setting of possible VAP. Hospital course complicated by cardiac arrest, flail chest, and oliguric BRANT progressing to kidney failure.     NEURO  #Anoxic Encephelopathy  s/p cardiac arrest 2/2 hypoxia due to aspiration vs pulmonary edema. Prior to arrest, pt was awake and alert on the RMF. Difficult to assess current status due to continued ventilation and sedation requirements in the setting on possible VAP and flail chest. VC AC w/ FiO2 50, Vt 450, Ti 0.8, RR 26, PEEP 5. Improvement of respiratory acidosis w/ Mucomyst. Currently on fentanyl and versed      PULM  #Pneumonia  possibly STANISLAV. Pt w/ bilateral pleural effusions and possible underlying infiltrate, unclear on x-ray. Blood cultures remain negative, sputum cultures neg w/ gram stain showing rare epithelial cells, few WBC's, gram pos rods and cocci in pairs. Currently on empiric antibiotics, cefepime and vanc (dosed daily)    #Pneumothorax  Pt presented to MICU with signs of decreased lung sliding on bedside ultrasound s/p cardiac arrest and chest compressions. Drop in sats to low 70s on 09/08 w/ x-ray confirming Left sided pneumothorax, no BP changes. Chest tube placed with improvement in sats to high 90s again. Repeat x-rays showing lung reexpansion  - chest tube still in pace in setting of continued intubation    #Broken ribs w/ flail chest  Pt with several broken ribs and signs of flail chest with difficulty breathing when sedation weaned off. Pt currently intubated and sedated. Thoracic surgery consulted for possible sternal plating. Anesthesia consulted, spinal block on 09/12 for pain management. Pt remains unstable and not surgery candidate at this time    #Chronic obstructive pulmonary disease (COPD).   Pt with COPD on home 3L. Patient initially presented with NRB, now intubated s/p cardiac arrest. Started on mucomyst    #Pulm HTN  possibly 2/2 longstanding DALIA. Evidence of new pulm HTN on echo w/ pap 48 compared to TTE from 2022. LE doppler on current visit negative, PE unlikely.     #Acute on chronic resp failure  Pt w/ COPD on home O2, currently worsening hypoxia 2/2 to Pneumonia vs flash pulmonary edema. Pt endured cardiac arrest secondary to resp failure likely in setting of aspiration while eating vs flash pulmonary edema 2/2 chronic htn and chronic HF (relatively the same EF as previous TTE). New onset pleural effusions likely in the same setting, pt s/p intubation. Plan as above    CARDIO  #Septic shock  possibly 2/2 STANISLAV pneumonia. Cultures remain neg. Pt sedated, warm on palpation, not producing urine, normal lactate. Moist mucus membranes, edematous in extremities and receiving CVVHD. Hemodynamically stable while on pressors. Levo 0.1, vassopressin 0.3    #Chronic heart failure with preserved ejection fraction (HFpEF).   Pt on home bumex 2mg.  TTE from current visit showing EF of 56% with Pulmonary HTN - PAP 48. Experienced pulmonary edema and effusion postcardiac arrest.   Plan:  - c/w CVVHD to reduce fluid overload in the setting of acute kidney failure    #Cardiac Arrest  possibly 2/2 hypoxia in setting of aspiration vs  flash pulmonary edema. Pt relatively hypotensive throughout the day with episodes of hypertension to 130/140 systolic. received fluids on PM 09/07 and a unit of blood on 09/07 for anemia. In PM on 09/07, complained of difficulty breathing during meal, found unresponsive, hypotensive, w/ blue complexion by nurse.      #Afib  Experienced a-fib on 09/13 w RVR. self resolved w/o intervention. started on full anticoag.     #Hypertension.   Pt with history of hypertension on home diltiazem 180mg daily. Currently on pressors s/p septic shock.   Plan:  - holding home antihypertensive meds    GI  #HLD (hyperlipidemia).   Pt on atorvastatin 40mg daily.  Plan:  - c/w lipitor 40 mg QHS.      RENAL  #Anuric BRANT on CKD  Baseline Cr 2.04 in July 2022, currently uptrending to 3.3. Likely 2/2 to hypoperfusion/ischemia in setting of sepsis and cardiac arrest. Nephro consulted. HD cath placed on 09/12 for CVVHD in setting of worsening electrolytes and increasing fluid status. Goal net neg 100cc/hr  Plan:  - c/w qb 300ml/min  UF: net negative 100ml/hr  DFR: 2L/hour  - Dialysate - Phoxillum   - q6H BMP mag and phos while on CVVHD  - maintain MAP >70 for adequate renal perfusion  - strict I/Os    #Right kidney mass.   CTAP s/f slightly hyperdense solid cortical mass in upper pole of R kidney.  Plan:  - consider additional renal imaging when stable    ENDO  No active issues      HEME  #Anemia  Pt noted to have chronic anemia, presented with HgB of 6.8 w/ drop to 6.7 during course of stay. s/p 1 unit RBC on 09/07, remained stable until 09/13 when CVVHD began and drop in Hgb to 6.8. Received 1u RBC, back up to 7.4, hemolysis unlikely as hapto and bili normal  Plan:  - f/u H+H  - active type and screen     ID  #Septic shock  Originally sepsis 2/2 LE Cellulitis which progressed to septic shock of unknown source, originally thought to be 2/2 to new onset pneumonia. New tmax and worsened pleural effusions on 09/12. BLE edema, erythema, scaly skin c/w cellulitis. CTX 2g IV QD on 09/7 but new t-max on night of 09/11-12, cultures remain neg. Transitioned to cefepime 1g q12 and received 1 dose vanc 1.5g (dose dependent due to kidney status). Trough goal of 13-17.  Cultures remain neg  Plan:  - c/w cefepime and vanc  - Continuing Levo requirement for MAP goal 70-75  - Vanc troph prior to next dose  - c/w tylenol 650 mg PO q6h PRN for pain/fever  - f/u further ID recs      E: K>4, Phos>3, Mg>2  N: Nepro diet - CVVHD   DVT ppx: Heparin full anticoag - afib  GI ppx: Protonix 40 BID  Access: 2 peripheral lines, left sided triple lumen, HD cath on right    DNR

## 2022-09-16 NOTE — PROGRESS NOTE ADULT - SUBJECTIVE AND OBJECTIVE BOX
CECE GABBY ROONEY, 81y, Female  MRN-[id number]  Patient is a 81y old  Female who presents with a chief complaint of Acute hypoxic respiratory failure (16 Sep 2022 12:58)      OVERNIGHT EVENTS: none    SUBJECTIVE: Pt intubated and sedated, ROS unobtainable     12 Point ROS Negative unless noted otherwise above.  -------------------------------------------------------------------------------  VITAL SIGNS:  Vital Signs Last 24 Hrs  T(C): 35.8 (16 Sep 2022 14:28), Max: 36.5 (15 Sep 2022 17:54)  T(F): 96.4 (16 Sep 2022 14:28), Max: 97.7 (15 Sep 2022 17:54)  HR: 60 (16 Sep 2022 16:00) (59 - 73)  BP: 125/58 (16 Sep 2022 09:00) (125/58 - 125/58)  BP(mean): 84 (16 Sep 2022 09:00) (84 - 84)  RR: 26 (16 Sep 2022 16:00) (26 - 26)  SpO2: 97% (16 Sep 2022 16:00) (91% - 99%)    Parameters below as of 16 Sep 2022 16:00  Patient On (Oxygen Delivery Method): ventilator    O2 Concentration (%): 50  I&O's Summary    15 Sep 2022 07:01  -  16 Sep 2022 07:00  --------------------------------------------------------  IN: 2701 mL / OUT: 3539 mL / NET: -838 mL    16 Sep 2022 07:01  -  16 Sep 2022 16:09  --------------------------------------------------------  IN: 1109.5 mL / OUT: 1755 mL / NET: -645.5 mL        PHYSICAL EXAM:    General: intubated and sedated, obese, lying in bed w/ minimal responsiveness   HEENT: 2mm pupil minimally reactive to light; moist mucosal membranes  Neck: supple, trachea midline  Cardiovascular: distant heart sounds; +S1/S2  Respiratory: dec breath sounds BL lung bases  Gastrointestinal: soft, NT/ND; dec bowel sounds  Extremities: WWP; no edema or cyanosis  Vascular: 2+ right radial pulse, diminished left radial pulse  Neurological: AAOx0; no focal deficits    ALLERGIES:  Allergies    Altabax (Unknown)  Augmentin (Unknown)  clindamycin (Unknown)  Vaseline (Swelling)    Intolerances        MEDICATIONS:  MEDICATIONS  (STANDING):  acetylcysteine 10%  Inhalation 4 milliLiter(s) Inhalation every 6 hours  albuterol/ipratropium for Nebulization 3 milliLiter(s) Nebulizer every 6 hours  cefepime   IVPB 2000 milliGRAM(s) IV Intermittent every 12 hours  chlorhexidine 0.12% Liquid 15 milliLiter(s) Oral Mucosa every 12 hours  chlorhexidine 2% Cloths 1 Application(s) Topical <User Schedule>  CRRT Treatment    <Continuous>  dextrose 5%. 1000 milliLiter(s) (50 mL/Hr) IV Continuous <Continuous>  dextrose 5%. 1000 milliLiter(s) (100 mL/Hr) IV Continuous <Continuous>  dextrose 50% Injectable 25 Gram(s) IV Push once  dextrose 50% Injectable 12.5 Gram(s) IV Push once  dextrose 50% Injectable 25 Gram(s) IV Push once  fentaNYL   Infusion. 0.5 MICROgram(s)/kG/Hr (5.12 mL/Hr) IV Continuous <Continuous>  glucagon  Injectable 1 milliGRAM(s) IntraMuscular once  heparin  Infusion.  Unit(s)/Hr (18 mL/Hr) IV Continuous <Continuous>  influenza  Vaccine (HIGH DOSE) 0.7 milliLiter(s) IntraMuscular once  insulin lispro (ADMELOG) corrective regimen sliding scale   SubCutaneous every 6 hours  lidocaine   4% Patch 1 Patch Transdermal every 24 hours  midazolam Infusion 0.02 mG/kG/Hr (2.05 mL/Hr) IV Continuous <Continuous>  norepinephrine Infusion 0.05 MICROgram(s)/kG/Min (4.8 mL/Hr) IV Continuous <Continuous>  pantoprazole  Injectable 40 milliGRAM(s) IV Push every 24 hours  Phoxillum Filtration BK 4 / 2.5 5000 milliLiter(s) (2000 mL/Hr) CRRT <Continuous>  polyethylene glycol 3350 17 Gram(s) Oral daily  senna 2 Tablet(s) Oral daily  vasopressin Infusion 0.03 Unit(s)/Min (1.8 mL/Hr) IV Continuous <Continuous>    MEDICATIONS  (PRN):  dextrose Oral Gel 15 Gram(s) Oral once PRN Blood Glucose LESS THAN 70 milliGRAM(s)/deciliter  sodium chloride 0.9% lock flush 10 milliLiter(s) IV Push every 1 hour PRN Pre/post blood products, medications, blood draw, and to maintain line patency      -------------------------------------------------------------------------------  LABS:                        7.2    12.91 )-----------( 68       ( 16 Sep 2022 04:53 )             23.7     09-16    132<L>  |  101  |  13  ----------------------------<  146<H>  3.5   |  24  |  1.19    Ca    8.0<L>      16 Sep 2022 04:53  Phos  3.4     09-16  Mg     2.4     09-16    TPro  7.1  /  Alb  2.5<L>  /  TBili  0.9  /  DBili  x   /  AST  42<H>  /  ALT  33  /  AlkPhos  318<H>  09-16    LIVER FUNCTIONS - ( 16 Sep 2022 04:53 )  Alb: 2.5 g/dL / Pro: 7.1 g/dL / ALK PHOS: 318 U/L / ALT: 33 U/L / AST: 42 U/L / GGT: x           PTT - ( 16 Sep 2022 04:53 )  PTT:95.9 sec    CAPILLARY BLOOD GLUCOSE      POCT Blood Glucose.: 119 mg/dL (16 Sep 2022 12:08)      COVID-19 PCR: NotDetec (13 Sep 2022 04:34)  SARS-CoV-2: NotDetec (06 Sep 2022 10:32)  COVID-19 PCR: NotDetec (06 Sep 2022 10:25)  COVID-19 PCR: NotDetec (02 Jul 2022 13:33)      RADIOLOGY & ADDITIONAL TESTS: Reviewed.

## 2022-09-16 NOTE — PROGRESS NOTE ADULT - ATTENDING COMMENTS
I agree with the fellow's findings and plans as written above with the following additions/amendments:    Seen and examined at bedside on CVVHD. Reviewed orders, increased to 100cc net negative by verbal orders yesterday, tolerating well, no alarms or issues with machine. Pressors essentially stable. Continue CVVHD as above, further recs as above

## 2022-09-16 NOTE — CHART NOTE - NSCHARTNOTEFT_GEN_A_CORE
82yo morbidly obese female with a history of HTN, HLD, HFpEF (EF 56%), COPD (on home O2-3L), chronic LE edema with neuropathic pain and cellulitis, DALIA, PVD p/w respiratory distress x 1 day found to have cellulitis on lower extremity who is s/p cardiac arrest on 9/7/22 now intubated with multiple rib fractures and unable to come off ventilator. Thoracic surgery consulted for "flail chest like" seen in right chest wall. Chest CT reviewed and shows multiple right and left fractures.  If failing to come off ventilator can consider plating, however, due to patient instability not a surgical candidate at this time. Thoracic surgery will sign off at this time. Please re-consult for surgical consideration.

## 2022-09-16 NOTE — PROGRESS NOTE ADULT - ATTENDING COMMENTS
morbid obesity, HTN, HFpEF, COPD cellulitis s/p cardiac arrest, rib fractures, ATN, pna, septic shock  physical as above  continue cefepime  pressor needs are stable  now tolerating CVVH /hr  CT of c/a/p  continue nepro  rest as above  decision making of high complexity

## 2022-09-17 NOTE — PROGRESS NOTE ADULT - ASSESSMENT
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  Course c/b cardiac arrest followed by septic shock and hypoxic respiratory failure, likely 2/2 PNA.  Overall slowly improving, now afebrile, off pressors, WBC normal.    - cont cefepime 2g IV q12h   - check vanc level tomorrow, re-dose if <20  - would continue abx until 9/20 (total 7 days of abx for PNA plus GBS bacteremia 2 weeks course)    Thank you for your consult.  Please re-consult us or call us with questions.  Case d/w primary team.    Rissa Dominguez MD, MS  Infectious Disease attending  work cell [phone]  For any questions during evening/weekend/holiday, please page ID on call

## 2022-09-17 NOTE — PROGRESS NOTE ADULT - SUBJECTIVE AND OBJECTIVE BOX
INFECTIOUS DISEASES CONSULT FOLLOW-UP NOTE    INTERVAL HPI/OVERNIGHT EVENTS:  No event overnight  off pressors, afebrile, WBC normal  CT c/a/p showed likely PNA    ROS:   unable to obtain       ANTIBIOTICS/RELEVANT:    MEDICATIONS  (STANDING):  acetylcysteine 10%  Inhalation 4 milliLiter(s) Inhalation every 6 hours  albuterol/ipratropium for Nebulization 3 milliLiter(s) Nebulizer every 6 hours  cefepime   IVPB 2000 milliGRAM(s) IV Intermittent every 12 hours  chlorhexidine 0.12% Liquid 15 milliLiter(s) Oral Mucosa every 12 hours  chlorhexidine 2% Cloths 1 Application(s) Topical <User Schedule>  CRRT Treatment    <Continuous>  dextrose 5%. 1000 milliLiter(s) (50 mL/Hr) IV Continuous <Continuous>  dextrose 5%. 1000 milliLiter(s) (100 mL/Hr) IV Continuous <Continuous>  dextrose 50% Injectable 25 Gram(s) IV Push once  dextrose 50% Injectable 12.5 Gram(s) IV Push once  dextrose 50% Injectable 25 Gram(s) IV Push once  fentaNYL   Infusion. 0.5 MICROgram(s)/kG/Hr (5.12 mL/Hr) IV Continuous <Continuous>  glucagon  Injectable 1 milliGRAM(s) IntraMuscular once  heparin  Infusion 1500 Unit(s)/Hr (15 mL/Hr) IV Continuous <Continuous>  influenza  Vaccine (HIGH DOSE) 0.7 milliLiter(s) IntraMuscular once  insulin lispro (ADMELOG) corrective regimen sliding scale   SubCutaneous every 6 hours  lidocaine   4% Patch 1 Patch Transdermal every 24 hours  midazolam Infusion 0.02 mG/kG/Hr (2.05 mL/Hr) IV Continuous <Continuous>  norepinephrine Infusion 0.05 MICROgram(s)/kG/Min (4.8 mL/Hr) IV Continuous <Continuous>  pantoprazole  Injectable 40 milliGRAM(s) IV Push every 24 hours  Phoxillum Filtration BK 4 / 2.5 5000 milliLiter(s) (2000 mL/Hr) CRRT <Continuous>  polyethylene glycol 3350 17 Gram(s) Oral daily  senna 2 Tablet(s) Oral daily  vasopressin Infusion 0.03 Unit(s)/Min (1.8 mL/Hr) IV Continuous <Continuous>    MEDICATIONS  (PRN):  dextrose Oral Gel 15 Gram(s) Oral once PRN Blood Glucose LESS THAN 70 milliGRAM(s)/deciliter  sodium chloride 0.9% lock flush 10 milliLiter(s) IV Push every 1 hour PRN Pre/post blood products, medications, blood draw, and to maintain line patency        Vital Signs Last 24 Hrs  T(C): 35.9 (17 Sep 2022 14:03), Max: 36.4 (16 Sep 2022 20:00)  T(F): 96.6 (17 Sep 2022 14:03), Max: 97.6 (16 Sep 2022 20:00)  HR: 79 (17 Sep 2022 13:30) (60 - 84)  BP: --  BP(mean): --  RR: 26 (17 Sep 2022 13:30) (25 - 27)  SpO2: 95% (17 Sep 2022 13:30) (63% - 99%)    Parameters below as of 17 Sep 2022 13:30  Patient On (Oxygen Delivery Method): ventilator    O2 Concentration (%): 50    09-16-22 @ 07:01  -  09-17-22 @ 07:00  --------------------------------------------------------  IN: 2851.1 mL / OUT: 4620 mL / NET: -1768.9 mL    09-17-22 @ 07:01  -  09-17-22 @ 14:31  --------------------------------------------------------  IN: 550.8 mL / OUT: 255 mL / NET: 295.8 mL      PHYSICAL EXAM:  Constitutional: intubated and sedated  ENT: the ears and nose were normal in appearance. ET tube in place  Neck: the appearance of the neck was normal and the neck was supple.   Pulmonary: no respiratory distress and lungs were clear to auscultation bilaterally.   Heart: heart rate was normal and rhythm regular, normal S1 and S2  Vascular:. there was no peripheral edema  Abdomen: normal bowel sounds, soft, non-tender        LABS:                        7.6    10.08 )-----------( 38       ( 17 Sep 2022 04:39 )             25.5     09-17    136  |  105  |  12  ----------------------------<  112<H>  3.7   |  24  |  1.33<H>    Ca    8.0<L>      17 Sep 2022 12:02  Phos  4.0     09-17  Mg     2.3     09-17    TPro  7.2  /  Alb  2.6<L>  /  TBili  0.9  /  DBili  x   /  AST  41<H>  /  ALT  34  /  AlkPhos  325<H>  09-17    PTT - ( 17 Sep 2022 12:02 )  PTT:88.0 sec      MICROBIOLOGY:      RADIOLOGY & ADDITIONAL STUDIES:  CT c/a/p 9/16    IMPRESSION:    Left rib fractures as seen on prior study. No pneumothorax. Left chest   tube in place.    Small bilateral pleural effusions, increased since prior study.    Near complete atelectasis of the right lower lobe and dependent   atelectasis in the left lower lobe, increased since prior study.    Pulmonary emphysema.    IMPRESSION:    Mild distention of the gallbladder. No calcifiedgallstones. No  evidence of intrahepatic or extrahepatic biliary ductal dilatation.    Trace ascites. No abdominal fluid collection.

## 2022-09-17 NOTE — PROGRESS NOTE ADULT - ATTENDING COMMENTS
I agree with the fellow's findings and plans as written above with the following additions/amendments:    Seen and examined at bedside on CVVHD second time. Assisted with line reversal as above, now running well. Continue CVVHD as above

## 2022-09-17 NOTE — PROGRESS NOTE ADULT - SUBJECTIVE AND OBJECTIVE BOX
CECE GABBY ROONEY, 81y, Female  MRN-[id number]  Patient is a 81y old  Female who presents with a chief complaint of Acute hypoxic respiratory failure (17 Sep 2022 14:31)      OVERNIGHT EVENTS: Drop in Hgb to 6.9, recieved 1u RBC. PTT was 131, decreased heparin dose    SUBJECTIVE: Pt intubated and sedated, ROS unobtainable       12 Point ROS Negative unless noted otherwise above.  -------------------------------------------------------------------------------  VITAL SIGNS:  Vital Signs Last 24 Hrs  T(C): 35.9 (17 Sep 2022 14:03), Max: 36.4 (16 Sep 2022 20:00)  T(F): 96.6 (17 Sep 2022 14:03), Max: 97.6 (16 Sep 2022 20:00)  HR: 78 (17 Sep 2022 15:00) (60 - 84)  BP: --  BP(mean): --  RR: 26 (17 Sep 2022 15:00) (25 - 27)  SpO2: 98% (17 Sep 2022 15:00) (63% - 99%)    Parameters below as of 17 Sep 2022 15:00  Patient On (Oxygen Delivery Method): ventilator    O2 Concentration (%): 50  I&O's Summary    16 Sep 2022 07:01  -  17 Sep 2022 07:00  --------------------------------------------------------  IN: 2851.1 mL / OUT: 4620 mL / NET: -1768.9 mL    17 Sep 2022 07:01  -  17 Sep 2022 15:31  --------------------------------------------------------  IN: 878.9 mL / OUT: 632 mL / NET: 246.9 mL        PHYSICAL EXAM:    General: intubated and sedated, obese, lying in bed w/ minimal responsiveness   HEENT: 2mm pupil minimally reactive to light  Neck: supple, trachea midline  Cardiovascular: distant heart sounds; +S1/S2  Respiratory: dec breath sounds BL lung bases  Gastrointestinal: soft, NT/ND  Extremities: WWP; peripheral edema in hands and LE  Vascular: 2+ right radial pulse, diminished left radial pulse  Neurological: AAOx0; no focal  deficits    ALLERGIES:  Allergies    Altabax (Unknown)  Augmentin (Unknown)  clindamycin (Unknown)  Vaseline (Swelling)    Intolerances        MEDICATIONS:  MEDICATIONS  (STANDING):  acetylcysteine 10%  Inhalation 4 milliLiter(s) Inhalation every 6 hours  albuterol/ipratropium for Nebulization 3 milliLiter(s) Nebulizer every 6 hours  cefepime   IVPB 2000 milliGRAM(s) IV Intermittent every 12 hours  chlorhexidine 0.12% Liquid 15 milliLiter(s) Oral Mucosa every 12 hours  chlorhexidine 2% Cloths 1 Application(s) Topical <User Schedule>  dextrose 5%. 1000 milliLiter(s) (50 mL/Hr) IV Continuous <Continuous>  dextrose 5%. 1000 milliLiter(s) (100 mL/Hr) IV Continuous <Continuous>  dextrose 50% Injectable 25 Gram(s) IV Push once  dextrose 50% Injectable 12.5 Gram(s) IV Push once  dextrose 50% Injectable 25 Gram(s) IV Push once  fentaNYL   Infusion. 0.5 MICROgram(s)/kG/Hr (5.12 mL/Hr) IV Continuous <Continuous>  glucagon  Injectable 1 milliGRAM(s) IntraMuscular once  heparin  Infusion 1500 Unit(s)/Hr (15 mL/Hr) IV Continuous <Continuous>  influenza  Vaccine (HIGH DOSE) 0.7 milliLiter(s) IntraMuscular once  insulin lispro (ADMELOG) corrective regimen sliding scale   SubCutaneous every 6 hours  lidocaine   4% Patch 1 Patch Transdermal every 24 hours  midazolam Infusion 0.02 mG/kG/Hr (2.05 mL/Hr) IV Continuous <Continuous>  norepinephrine Infusion 0.05 MICROgram(s)/kG/Min (4.8 mL/Hr) IV Continuous <Continuous>  pantoprazole  Injectable 40 milliGRAM(s) IV Push every 24 hours  polyethylene glycol 3350 17 Gram(s) Oral daily  senna 2 Tablet(s) Oral daily  vasopressin Infusion 0.03 Unit(s)/Min (1.8 mL/Hr) IV Continuous <Continuous>    MEDICATIONS  (PRN):  dextrose Oral Gel 15 Gram(s) Oral once PRN Blood Glucose LESS THAN 70 milliGRAM(s)/deciliter  sodium chloride 0.9% lock flush 10 milliLiter(s) IV Push every 1 hour PRN Pre/post blood products, medications, blood draw, and to maintain line patency      -------------------------------------------------------------------------------  LABS:                        7.6    10.08 )-----------( 38       ( 17 Sep 2022 04:39 )             25.5     09-17    136  |  105  |  12  ----------------------------<  112<H>  3.7   |  24  |  1.33<H>    Ca    8.0<L>      17 Sep 2022 12:02  Phos  4.0     09-17  Mg     2.3     09-17    TPro  7.2  /  Alb  2.6<L>  /  TBili  0.9  /  DBili  x   /  AST  41<H>  /  ALT  34  /  AlkPhos  325<H>  09-17    LIVER FUNCTIONS - ( 17 Sep 2022 04:39 )  Alb: 2.6 g/dL / Pro: 7.2 g/dL / ALK PHOS: 325 U/L / ALT: 34 U/L / AST: 41 U/L / GGT: x           PTT - ( 17 Sep 2022 12:02 )  PTT:88.0 sec    CAPILLARY BLOOD GLUCOSE      POCT Blood Glucose.: 111 mg/dL (17 Sep 2022 11:47)      COVID-19 PCR: NotDetec (13 Sep 2022 04:34)  SARS-CoV-2: NotDetec (06 Sep 2022 10:32)  COVID-19 PCR: NotDetec (06 Sep 2022 10:25)  COVID-19 PCR: NotDetec (02 Jul 2022 13:33)      RADIOLOGY & ADDITIONAL TESTS: Reviewed.

## 2022-09-17 NOTE — PROGRESS NOTE ADULT - SUBJECTIVE AND OBJECTIVE BOX
Patient seen during CVVHD started to have elevated AP -500, line was checked and assessed, flushing well. Reversed Lines and issue resolved. Patient tolerating CVVHD on pressors. Plan to continue CVVHD       CRRT Treatment:   Modality: CVVHD, Filter: NxStage CAR-505, Target Blood Flow: 300 mL/Min  Target Fluid Balance: Titrate to net NEGATIVE fluid balance, 100 mL/Hr  Phoxillum Filtration BK 4 / 2.5: Solution, 5000 milliLiter(s) infuse at 2000 mL/Hr; infuse through CRRT Circuit  Administration Instructions: Continuous Renal Replacement Therapy (CRRT)    Vital Signs   T(F): 96.6   HR: 79   ABP: 131/52   RR: 26   SpO2: 98% , 50% Fi02, PRVC     Physical Exam;  GENERAL: intubated; sedated   CHEST/LUNG: decreased breath sounds at the bases  HEART: Regular rate and rhythm, no murmur, no gallops, no rub   ABDOMEN: Soft, nontender, non distended  EXTREMITIES: Anasarca, warm  Access: Right IJ c/d/i, access

## 2022-09-17 NOTE — PROGRESS NOTE ADULT - ATTENDING COMMENTS
I agree with the fellow's findings and plans as written above with the following additions/amendments:    Seen and examined at bedside on CVVHD, tolerating well, pressors minimal. Platelets low, no obvious source of bleeding or clots, discussed HIT with team. Continue CVVHD as above, further recs as above

## 2022-09-17 NOTE — PROGRESS NOTE ADULT - ASSESSMENT
81F PMH CKD 4 (baseline around 2.5-2.9) HTN who presented with a one day history of respiratory distress and LE cellulitis hospital course c/b cardiac arrest on 9/7, acute renal failure requiring CVVHD Nephrology consulted for elevated creatinine.     Assessment/Plan:   # anuric  BRANT on CKD   Thrombocytopenia   Anemia   Volume overloaded on physical exam   UA w/protein  uPCR 0.7  unclear baseline creatinine  etiology of BRANT includes:   likely iATN in the setting of cardiac arrest and shock . On KRT since 9/12, CVVHD    Plan   given anuric renal failure, and evidence of volume overload will continue with CVVHD  -Goal net negative 100cc/hr  CVVHD rx: qb 300ml/min  UF: net negative 100ml/hr  DFR: 2L/hour  Dialysate to Phoxillum   q6H BMP mag and phos while on CVVHD  maintain MAP >70 for adequate renal perfusion  Transfuse as needed   Plt began to decrease since Heparin drip initiated on 13, please send off HIT panel  Follow up Heme/Onc recs    strict i's and o's

## 2022-09-17 NOTE — PROGRESS NOTE ADULT - SUBJECTIVE AND OBJECTIVE BOX
Patient is a 81y Female seen and evaluated at bedside. No acute distress. Intubated and sedated. Tolerated CVVHD with net negative balance. Plan to continue CVVHD. AP: 71,  105      Meds:    acetylcysteine 10%  Inhalation 4 every 6 hours  albuterol/ipratropium for Nebulization 3 every 6 hours  cefepime   IVPB 2000 every 12 hours  chlorhexidine 0.12% Liquid 15 every 12 hours  chlorhexidine 2% Cloths 1 <User Schedule>  CRRT Treatment  <Continuous>  dextrose 5%. 1000 <Continuous>  dextrose 5%. 1000 <Continuous>  dextrose 50% Injectable 25 once  dextrose 50% Injectable 12.5 once  dextrose 50% Injectable 25 once  dextrose Oral Gel 15 once PRN  fentaNYL   Infusion. 0.5 <Continuous>  glucagon  Injectable 1 once  heparin  Infusion 1500 <Continuous>  influenza  Vaccine (HIGH DOSE) 0.7 once  insulin lispro (ADMELOG) corrective regimen sliding scale  every 6 hours  lidocaine   4% Patch 1 every 24 hours  midazolam Infusion 0.02 <Continuous>  norepinephrine Infusion 0.05 <Continuous>  pantoprazole  Injectable 40 every 24 hours  Phoxillum Filtration BK 4 / 2.5 5000 <Continuous>  polyethylene glycol 3350 17 daily  senna 2 daily  sodium chloride 0.9% lock flush 10 every 1 hour PRN  vasopressin Infusion 0.03 <Continuous>      T(C): , Max: 36.4 (09-16-22 @ 20:00)  T(F): , Max: 97.6 (09-16-22 @ 20:00)  HR: 75 (09-17-22 @ 13:00)  BP: --  BP(mean): --  RR: 26 (09-17-22 @ 13:00)  SpO2: 96% (09-17-22 @ 13:00)  Wt(kg): --    09-16 @ 07:01  -  09-17 @ 07:00  --------------------------------------------------------  IN: 2851.1 mL / OUT: 4620 mL / NET: -1768.9 mL    09-17 @ 07:01  -  09-17 @ 13:22  --------------------------------------------------------  IN: 550.8 mL / OUT: 255 mL / NET: 295.8 mL          Review of Systems:  ROS negative except as per HPI      PHYSICAL EXAM:  GENERAL: intubated; sedated   CHEST/LUNG: decreased breath sounds at the bases  HEART: Regular rate and rhythm, no murmur, no gallops, no rub   ABDOMEN: Soft, nontender, non distended  EXTREMITIES: Anasarca, warm  Access: Right IJ c/d/i, access       LABS:                        7.6    10.08 )-----------( 38       ( 17 Sep 2022 04:39 )             25.5     09-17    136  |  105  |  12  ----------------------------<  112<H>  3.7   |  24  |  1.33<H>    Ca    8.0<L>      17 Sep 2022 12:02  Phos  4.0     09-17  Mg     2.3     09-17    TPro  7.2  /  Alb  2.6<L>  /  TBili  0.9  /  DBili  x   /  AST  41<H>  /  ALT  34  /  AlkPhos  325<H>  09-17      PTT - ( 17 Sep 2022 12:02 )  PTT:88.0 sec          RADIOLOGY & ADDITIONAL STUDIES:

## 2022-09-17 NOTE — PROGRESS NOTE ADULT - ASSESSMENT
80yo morbidly obese female w/ pmhx of HTN, HLD, HFpEF (EF 56%), COPD (on home O2-3L), chronic LE edema and cellulitis, DALIA, PVD p/w respiratory distress 2/2 severe sepsis due to cellulitis on lower extremity which progressed to septic shock in setting of possible VAP. Hospital course complicated by cardiac arrest, flail chest, and oliguric BRANT progressing to kidney failure.     NEURO  #Anoxic Encephelopathy  s/p cardiac arrest 2/2 hypoxia due to aspiration vs pulmonary edema. Prior to arrest, pt was awake and alert on the RMF. Difficult to assess current status due to continued ventilation and sedation requirements in the setting on possible VAP and flail chest. VC AC w/ FiO2 50, Vt 450, Ti 0.85, RR 26, PEEP 5. Improvement of respiratory acidosis w/ Mucomyst. Currently on fentanyl and versed      PULM  #Pneumonia  possibly STANISLAV. Pt w/ bilateral pleural effusions and possible underlying infiltrate, unclear on x-ray. Blood cultures remain negative, sputum cultures neg w/ gram stain showing rare epithelial cells, few WBC's, gram pos rods and cocci in pairs. Currently on empiric antibiotics, cefepime and vanc (dosed daily)    #Pneumothorax  Pt presented to MICU with signs of decreased lung sliding on bedside ultrasound s/p cardiac arrest and chest compressions. Drop in sats to low 70s on 09/08 w/ x-ray confirming Left sided pneumothorax, no BP changes. Chest tube placed with improvement in sats to high 90s again. Repeat x-rays showing lung reexpansion  - chest tube still in pace in setting of continued intubation    #Broken ribs w/ flail chest  Pt with several broken ribs and signs of flail chest with difficulty breathing when sedation weaned off. Pt currently intubated and sedated. Thoracic surgery consulted for possible sternal plating. Anesthesia consulted, spinal block on 09/12 for pain management. Pt remains unstable and not surgery candidate at this time    #Chronic obstructive pulmonary disease (COPD).   Pt with COPD on home 3L. Patient initially presented with NRB, now intubated s/p cardiac arrest. Started on mucomyst    #Pulm HTN  possibly 2/2 longstanding DALIA. Evidence of new pulm HTN on echo w/ pap 48 compared to TTE from 2022. LE doppler on current visit negative, PE unlikely.     #Acute on chronic resp failure  Pt w/ COPD on home O2, currently worsening hypoxia 2/2 to Pneumonia vs flash pulmonary edema. Pt endured cardiac arrest secondary to resp failure likely in setting of aspiration while eating vs flash pulmonary edema 2/2 chronic htn and chronic HF (relatively the same EF as previous TTE). New onset pleural effusions likely in the same setting, pt s/p intubation. Plan as above    CARDIO  #Septic shock  possibly 2/2 STANISLAV pneumonia. Cultures remain neg. Pt sedated, warm on palpation, not producing urine, normal lactate. Moist mucus membranes, edematous in extremities and receiving CVVHD. Hemodynamically stable while on pressors. Levo 0.08, vassopressin 0.3. Wean pressors and sedation as tolerated    #Chronic heart failure with preserved ejection fraction (HFpEF).   Pt on home bumex 2mg.  TTE from current visit showing EF of 56% with Pulmonary HTN - PAP 48. Experienced pulmonary edema and effusion postcardiac arrest.   Plan:  - c/w CVVHD to reduce fluid overload in the setting of acute kidney failure    #Cardiac Arrest  possibly 2/2 hypoxia in setting of aspiration vs  flash pulmonary edema. Pt relatively hypotensive throughout the day with episodes of hypertension to 130/140 systolic. received fluids on PM 09/07 and a unit of blood on 09/07 for anemia. In PM on 09/07, complained of difficulty breathing during meal, found unresponsive, hypotensive, w/ blue complexion by nurse.      #Afib  Experienced a-fib on 09/13 w RVR. self resolved w/o intervention. started on full anticoag.     #Hypertension.   Pt with history of hypertension on home diltiazem 180mg daily. Currently on pressors s/p septic shock.   Plan:  - holding home antihypertensive meds    GI  #HLD (hyperlipidemia).   Pt on atorvastatin 40mg daily.  Plan:  - c/w lipitor 40 mg QHS.      RENAL  #Anuric BRANT on CKD  Baseline Cr 2.04 in July 2022, currently uptrending to 3.3. Likely 2/2 to hypoperfusion/ischemia in setting of sepsis and cardiac arrest. Nephro consulted. HD cath placed on 09/12 for CVVHD in setting of worsening electrolytes and increasing fluid status. Goal net neg 100cc/hr  Plan:  - c/w qb 300ml/min  UF: net negative 100ml/hr  DFR: 2L/hour  - Dialysate - Phoxillum   - q6H BMP mag and phos while on CVVHD  - maintain MAP >70 for adequate renal perfusion  - strict I/Os    #Right kidney mass.   CTAP s/f slightly hyperdense solid cortical mass in upper pole of R kidney.  Plan:  - consider additional renal imaging when stable    ENDO  No active issues      HEME  #Anemia  Pt noted to have chronic anemia, presented with HgB of 6.8 w/ drop to 6.7 during course of stay. s/p 1 unit RBC on 09/07, remained stable until 09/13 when CVVHD began and drop in Hgb to 6.8. Received 1u RBC, back up to 7.4, hemolysis unlikely as hapto and bili normal  Plan:  - f/u H+H  - active type and screen     ID  #Septic shock  Originally sepsis 2/2 LE Cellulitis which progressed to septic shock of unknown source, originally thought to be 2/2 to new onset pneumonia. New tmax and worsened pleural effusions on 09/12. BLE edema, erythema, scaly skin c/w cellulitis. CTX 2g IV QD on 09/7 but new t-max on night of 09/11-12, cultures remain neg. Transitioned to cefepime 1g q12 and received 1 dose vanc 1.5g (dose dependent due to kidney status). Trough goal of 13-17.  Cultures remain neg  Plan:  - cont cefepime 2g IV q12h   - check vanc level tomorrow, re-dose if <20  - Per ID, continue abx until 9/20  - Continuing Levo requirement for MAP goal 70-75  - c/w tylenol 650 mg PO q6h PRN for pain/fever  - f/u further ID recs      E: K>4, Phos>3, Mg>2  N: Nepro diet - CVVHD   DVT ppx: Heparin full anticoag - afib  GI ppx: Protonix 40 BID  Access: 2 peripheral lines, left sided triple lumen, HD cath on right    DNR

## 2022-09-18 NOTE — PROGRESS NOTE ADULT - SUBJECTIVE AND OBJECTIVE BOX
Patient is a 81y Female seen and evaluated at bedside. Patient intubated and sedated. Patient incontinent as per nurse. CVVHD again had clotting issues. Tolerating CVVHD. Will continue with CVVHD and monitor urine output. Plan for possible trach tomorrow.       Meds:    acetylcysteine 10%  Inhalation 4 every 6 hours  albuterol/ipratropium for Nebulization 3 every 6 hours  argatroban Infusion 1 <Continuous>  cefepime   IVPB 2000 every 12 hours  chlorhexidine 0.12% Liquid 15 every 12 hours  chlorhexidine 2% Cloths 1 <User Schedule>  CRRT Treatment  <Continuous>  dextrose 5%. 1000 <Continuous>  dextrose 5%. 1000 <Continuous>  dextrose 50% Injectable 25 once  dextrose 50% Injectable 12.5 once  dextrose 50% Injectable 25 once  dextrose Oral Gel 15 once PRN  fentaNYL   Infusion. 0.5 <Continuous>  glucagon  Injectable 1 once  influenza  Vaccine (HIGH DOSE) 0.7 once  insulin lispro (ADMELOG) corrective regimen sliding scale  every 6 hours  lidocaine   4% Patch 1 every 24 hours  midazolam Infusion 0.02 <Continuous>  norepinephrine Infusion 0.05 <Continuous>  pantoprazole  Injectable 40 every 24 hours  Phoxillum Filtration BK 4 / 2.5 5000 <Continuous>  polyethylene glycol 3350 17 daily  senna 2 daily  sodium chloride 0.9% lock flush 10 every 1 hour PRN      T(C): , Max: 36.8 (09-18-22 @ 06:00)  T(F): , Max: 98.2 (09-18-22 @ 06:00)  HR: 82 (09-18-22 @ 11:30)  BP: 95/47 (09-18-22 @ 08:05)  BP(mean): 68 (09-18-22 @ 08:05)  RR: 26 (09-18-22 @ 09:00)  SpO2: 93% (09-18-22 @ 11:30)  Wt(kg): --    09-17 @ 07:01  -  09-18 @ 07:00  --------------------------------------------------------  IN: 2286.4 mL / OUT: 2528 mL / NET: -241.6 mL          Review of Systems:  ROS negative except as per HPI      PHYSICAL EXAM:  GENERAL: intubated; sedated   CHEST/LUNG: decreased breath sounds at the bases  HEART: Regular rate and rhythm, no murmur, no gallops, no rub   ABDOMEN: Soft, nontender, non distended  EXTREMITIES: Anasarca, warm  Access: Right IJ c/d/i, access       LABS:                        7.1    7.00  )-----------( 26       ( 18 Sep 2022 06:05 )             23.9     09-18    133<L>  |  103  |  13  ----------------------------<  140<H>  3.7   |  22  |  1.39<H>    Ca    7.9<L>      18 Sep 2022 06:05  Phos  3.9     09-18  Mg     2.4     09-18    TPro  7.2  /  Alb  2.6<L>  /  TBili  0.9  /  DBili  x   /  AST  41<H>  /  ALT  34  /  AlkPhos  325<H>  09-17      PTT - ( 17 Sep 2022 23:34 )  PTT:69.6 sec          RADIOLOGY & ADDITIONAL STUDIES:

## 2022-09-18 NOTE — PROGRESS NOTE ADULT - SUBJECTIVE AND OBJECTIVE BOX
INTERVAL HPI/OVERNIGHT EVENTS:  Patient was seen and examined at bedside. Overnight, heparin gtt stopped d/t concern for HIT. Started on argatroban gtt in am. Patient intubated and sedated, NAD. ROS unable to be performed.    VITAL SIGNS:  T(F): 98.6 (09-18-22 @ 13:58)  HR: 83 (09-18-22 @ 13:00)  BP: 118/57 (09-18-22 @ 12:00)  RR: 26 (09-18-22 @ 13:00)  SpO2: 94% (09-18-22 @ 13:00)  Wt(kg): --      09-17-22 @ 07:01  -  09-18-22 @ 07:00  --------------------------------------------------------  IN: 2286.4 mL / OUT: 2528 mL / NET: -241.6 mL    09-18-22 @ 07:01  -  09-18-22 @ 14:15  --------------------------------------------------------  IN: 566.6 mL / OUT: 0 mL / NET: 566.6 mL        PHYSICAL EXAM:    Constitutional: NAD; intubated and sedated  HEENT: NC/AT; MMM; neck supple  Respiratory: CTA B/L; no W/R/R, no retractions  Cardiac: +S1/S2; RRR; no M/R/G  Gastrointestinal: soft, NT/ND; no rebound or guarding  Extremities: b/l LE edema and dermatitis   Vascular: 2+ radial, DP/PT pulses B/L  Neurologic: intubated and sedated    MEDICATIONS  (STANDING):  acetylcysteine 10%  Inhalation 4 milliLiter(s) Inhalation every 6 hours  albuterol/ipratropium for Nebulization 3 milliLiter(s) Nebulizer every 6 hours  argatroban Infusion 1.5 MICROgram(s)/kG/Min (9.21 mL/Hr) IV Continuous <Continuous>  cefepime   IVPB 2000 milliGRAM(s) IV Intermittent every 12 hours  chlorhexidine 0.12% Liquid 15 milliLiter(s) Oral Mucosa every 12 hours  chlorhexidine 2% Cloths 1 Application(s) Topical <User Schedule>  CRRT Treatment    <Continuous>  dextrose 5%. 1000 milliLiter(s) (50 mL/Hr) IV Continuous <Continuous>  dextrose 5%. 1000 milliLiter(s) (100 mL/Hr) IV Continuous <Continuous>  dextrose 50% Injectable 25 Gram(s) IV Push once  dextrose 50% Injectable 12.5 Gram(s) IV Push once  dextrose 50% Injectable 25 Gram(s) IV Push once  fentaNYL   Infusion. 0.5 MICROgram(s)/kG/Hr (5.12 mL/Hr) IV Continuous <Continuous>  glucagon  Injectable 1 milliGRAM(s) IntraMuscular once  influenza  Vaccine (HIGH DOSE) 0.7 milliLiter(s) IntraMuscular once  insulin lispro (ADMELOG) corrective regimen sliding scale   SubCutaneous every 6 hours  lidocaine   4% Patch 1 Patch Transdermal every 24 hours  midazolam Infusion 0.02 mG/kG/Hr (2.05 mL/Hr) IV Continuous <Continuous>  norepinephrine Infusion 0.05 MICROgram(s)/kG/Min (4.8 mL/Hr) IV Continuous <Continuous>  pantoprazole  Injectable 40 milliGRAM(s) IV Push every 24 hours  Phoxillum Filtration BK 4 / 2.5 5000 milliLiter(s) (2000 mL/Hr) CRRT <Continuous>  polyethylene glycol 3350 17 Gram(s) Oral daily  senna 2 Tablet(s) Oral daily    MEDICATIONS  (PRN):  dextrose Oral Gel 15 Gram(s) Oral once PRN Blood Glucose LESS THAN 70 milliGRAM(s)/deciliter  sodium chloride 0.9% lock flush 10 milliLiter(s) IV Push every 1 hour PRN Pre/post blood products, medications, blood draw, and to maintain line patency      Allergies    Altabax (Unknown)  Augmentin (Unknown)  clindamycin (Unknown)  Vaseline (Swelling)    Intolerances        LABS:                        7.1    7.00  )-----------( 26       ( 18 Sep 2022 06:05 )             23.9     09-18    133<L>  |  103  |  13  ----------------------------<  140<H>  3.7   |  22  |  1.39<H>    Ca    7.9<L>      18 Sep 2022 06:05  Phos  3.9     09-18  Mg     2.4     09-18    TPro  7.2  /  Alb  2.6<L>  /  TBili  0.9  /  DBili  x   /  AST  41<H>  /  ALT  34  /  AlkPhos  325<H>  09-17    PTT - ( 18 Sep 2022 11:27 )  PTT:50.9 sec      RADIOLOGY & ADDITIONAL TESTS:  Reviewed

## 2022-09-18 NOTE — PROGRESS NOTE ADULT - ASSESSMENT
82yo morbidly obese female w/ pmhx of HTN, HLD, HFpEF (EF 56%), COPD (on home O2-3L), chronic LE edema and cellulitis, DALIA, PVD p/w respiratory distress 2/2 severe sepsis due to cellulitis on lower extremity which progressed to septic shock in setting of possible VAP. Hospital course complicated by cardiac arrest, flail chest, and oliguric BRANT progressing to kidney failure.     NEURO  #AMS w/ suspicion for Anoxic Encephelopathy  s/p cardiac arrest 2/2 hypoxia due to aspiration vs pulmonary edema. Prior to arrest, pt was awake and alert on the RMF. Difficult to assess current status due to continued ventilation and sedation requirements in the setting on possible VAP and flail chest. VC AC w/ FiO2 50, Vt 450, Ti 0.85, RR 26, PEEP 5. Improvement of respiratory acidosis w/ Mucomyst. Currently on fentanyl and versed.    PULM  #Pneumonia  possibly STANISLAV. Pt w/ bilateral pleural effusions and possible underlying infiltrate, unclear on x-ray. Blood cultures remain negative, sputum cultures neg w/ gram stain showing rare epithelial cells, few WBC's, gram pos rods and cocci in pairs. Currently on empiric antibiotics, cefepime and vanc (dosed daily)    #Pneumothorax  Pt presented to MICU with signs of decreased lung sliding on bedside ultrasound s/p cardiac arrest and chest compressions. Drop in sats to low 70s on 09/08 w/ x-ray confirming Left sided pneumothorax, no BP changes. Chest tube placed with improvement in sats to high 90s again. Repeat x-rays showing lung reexpansion  - chest tube still in pace in setting of continued intubation    #Broken ribs w/ flail chest  Pt with several broken ribs and signs of flail chest with difficulty breathing when sedation weaned off. Pt currently intubated and sedated. Thoracic surgery consulted for possible sternal plating. Anesthesia consulted, spinal block on 09/12 for pain management. Pt remains unstable and not surgery candidate at this time    #Chronic obstructive pulmonary disease (COPD).   Pt with COPD on home 3L. Patient initially presented with NRB, now intubated s/p cardiac arrest. Started on mucomyst    #Pulm HTN  possibly 2/2 longstanding DALIA. Evidence of new pulm HTN on echo w/ pap 48 compared to TTE from 2022. LE doppler on current visit negative, PE unlikely.     #Acute on chronic resp failure  Pt w/ COPD on home O2, currently worsening hypoxia 2/2 to Pneumonia vs flash pulmonary edema. Pt endured cardiac arrest secondary to resp failure likely in setting of aspiration while eating vs flash pulmonary edema 2/2 chronic htn and chronic HF (relatively the same EF as previous TTE). New onset pleural effusions likely in the same setting, pt s/p intubation. Plan as above    CARDIO  #Septic shock  possibly 2/2 STANISLAV pneumonia. Cultures remain neg. Pt sedated, warm on palpation, not producing urine, normal lactate. Moist mucus membranes, edematous in extremities and receiving CVVHD. Hemodynamically stable while on pressors. Levo 0.08, vassopressin 0.3.  - Wean pressors and sedation as tolerated    #Chronic heart failure with preserved ejection fraction (HFpEF).   Pt on home bumex 2mg.  TTE from current visit showing EF of 56% with Pulmonary HTN - PAP 48. Experienced pulmonary edema and effusion postcardiac arrest.   Plan:  - c/w CVVHD to reduce fluid overload in the setting of acute kidney failure    #Cardiac Arrest  possibly 2/2 hypoxia in setting of aspiration vs  flash pulmonary edema. Pt relatively hypotensive throughout the day with episodes of hypertension to 130/140 systolic. received fluids on PM 09/07 and a unit of blood on 09/07 for anemia. In PM on 09/07, complained of difficulty breathing during meal, found unresponsive, hypotensive, w/ blue complexion by nurse.      #Afib  Experienced a-fib on 09/13 w RVR. self resolved w/o intervention. started on full anticoag.     #Hypertension.   Pt with history of hypertension on home diltiazem 180mg daily. Currently on pressors s/p septic shock.   Plan:  - holding home antihypertensive meds    GI  #HLD (hyperlipidemia).   Pt on atorvastatin 40mg daily.  Plan:  - c/w lipitor 40 mg QHS.      RENAL  #Anuric BRANT on CKD  Baseline Cr 2.04 in July 2022, currently uptrending to 3.3. Likely 2/2 to hypoperfusion/ischemia in setting of sepsis and cardiac arrest. Nephro consulted. HD cath placed on 09/12 for CVVHD in setting of worsening electrolytes and increasing fluid status. Goal net neg 100cc/hr  Plan:  - c/w qb 300ml/min  UF: net negative 100ml/hr  DFR: 2L/hour  - Dialysate - Phoxillum   - q6H BMP mag and phos while on CVVHD  - maintain MAP >70 for adequate renal perfusion  - strict I/Os    #Right kidney mass.   CTAP s/f slightly hyperdense solid cortical mass in upper pole of R kidney.  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  - f/u HIT labs  - d/maryann heparin  - started on argatroban    #Anemia  Pt noted to have chronic anemia, presented with HgB of 6.8 w/ drop to 6.7 during course of stay. s/p 1 unit RBC on 09/07, remained stable until 09/13 when CVVHD began and drop in Hgb to 6.8. Received 1u RBC, back up to 7.4, hemolysis unlikely as hapto and bili normal  Plan:  - f/u H+H  - active type and screen     ID  #Septic shock  Originally sepsis 2/2 LE Cellulitis which progressed to septic shock of unknown source, originally thought to be 2/2 to new onset pneumonia. New tmax and worsened pleural effusions on 09/12. BLE edema, erythema, scaly skin c/w cellulitis. CTX 2g IV QD on 09/7 but new t-max on night of 09/11-12, cultures remain neg. Transitioned to cefepime 1g q12 and received 1 dose vanc 1.5g (dose dependent due to kidney status). Trough goal of 13-17.  Cultures remain neg  Plan:  - cont cefepime 2g IV q12h   - check vanc level tomorrow, re-dose if <20  - Per ID, continue abx until 9/20  - Continuing Levo requirement for MAP goal 70-75  - c/w tylenol 650 mg PO q6h PRN for pain/fever  - f/u further ID recs      E: K>4, Phos>3, Mg>2  N: Nepro diet - CVVHD   DVT ppx: Heparin full anticoag - afib  GI ppx: Protonix 40 BID  Access: 2 peripheral lines, left sided triple lumen, HD cath on right    DNR

## 2022-09-18 NOTE — PROGRESS NOTE ADULT - ATTENDING COMMENTS
I agree with the fellow's findings and plans as written above with the following additions/amendments:    Seen and examined at bedside on CVVHD, tolerating, now on argatroban for possible HIT, awaiting assay results. Plan for trach tomorrow, did have some urine output but given procedure would continue CVVHD for today and assess for ability to transition or hold afterwards. continue CVVHD as above

## 2022-09-18 NOTE — PROGRESS NOTE ADULT - ASSESSMENT
81F PMH CKD 4 (baseline around 2.5-2.9) HTN who presented with a one day history of respiratory distress and LE cellulitis hospital course c/b cardiac arrest on 9/7, acute renal failure requiring CVVHD Nephrology consulted for elevated creatinine.     Assessment/Plan:   # anuric  BRANT on CKD   Thrombocytopenia   Anemia   Volume overloaded on physical exam   UA w/protein  uPCR 0.7  unclear baseline creatinine  etiology of BRANT includes:   likely iATN in the setting of cardiac arrest and shock . On KRT since 9/12, CVVHD    Plan   given anuric renal failure, and evidence of volume overload will continue with CVVHD  -Goal net negative 100cc/hr  CVVHD rx: qb 300ml/min  UF: net negative 100ml/hr  DFR: 2L/hour  Dialysate to Phoxillum   q6H BMP mag and phos while on CVVHD  maintain MAP >70 for adequate renal perfusion  Transfuse as needed   Patient now on Argatroban   Follow up Heme/Onc recs    strict i's and o's

## 2022-09-19 NOTE — PROGRESS NOTE ADULT - ASSESSMENT
Patient is a 82 yo F with CHF, CKD (baseline around 2.5-2.9) HTN who presented with a one day history of respiratory distress and LE cellulitis hospital course c/b cardiac arrest on 9/7, acute renal failure requiring CVVHD Nephrology consulted for elevated creatinine.     Anuric  BRANT on CKD  - likely iATN in setting of cardiac arrest and shock. Mild proteinuria likely tubular, now anuric  - On CVVHD since 9/12  - Continue CVVHD as above - CVVHD rx: qb 300ml/min  UF: net negative 100ml/hr  DFR: 2L/hour  Dialysate to Phoxillum   q6H BMP mag and phos while on CVVHD  maintain MAP >70 for adequate renal perfusion      Thrombocytopenia, Anemia - HIT confirmatory pending, no acute source of bleeding identified, receiving transfusion today  - Hep PF4AB positive, on argatroban  - Transfusions per primary team    Hypocalcemia - mild, phos at goal on phoxyllum

## 2022-09-19 NOTE — PROGRESS NOTE ADULT - PROBLEM SELECTOR PLAN 3
New renal failure in setting of cardiac arrest and likely ATN from hypotension. On CVVHD and tolerating better.  - Management per nephrology, recs appreciated  - Appears unlikely that kidneys will recover substantially. Per GOC discussion, HCP endorsed that patient would be open to HD if necessary.

## 2022-09-19 NOTE — PROGRESS NOTE ADULT - PROBLEM SELECTOR PLAN 1
Pt with functional decline over past 3 years with more acute decline over past few months. Wheelchair bound.  - Supportive care.

## 2022-09-19 NOTE — PROGRESS NOTE ADULT - SUBJECTIVE AND OBJECTIVE BOX
CECE GABBY ROONEY, 81y, Female  MRN-[id number]  Patient is a 81y old  Female who presents with a chief complaint of Acute hypoxic respiratory failure (19 Sep 2022 11:26)      OVERNIGHT EVENTS: Pt spiked fever of 100.9 in AM. Cultures done, will f/u results. Pt Hgb drop to 6.9, received 1 unit of blood.     SUBJECTIVE: Pt sedated and intubated, ROS could not be obtained.    12 Point ROS Negative unless noted otherwise above.  -------------------------------------------------------------------------------  VITAL SIGNS:  Vital Signs Last 24 Hrs  T(C): 37.5 (19 Sep 2022 08:58), Max: 38.3 (19 Sep 2022 04:31)  T(F): 99.5 (19 Sep 2022 08:58), Max: 100.9 (19 Sep 2022 04:31)  HR: 67 (19 Sep 2022 13:00) (67 - 100)  BP: --  BP(mean): --  RR: 26 (19 Sep 2022 13:00) (26 - 29)  SpO2: 94% (19 Sep 2022 13:00) (92% - 100%)    Parameters below as of 19 Sep 2022 13:00  Patient On (Oxygen Delivery Method): ventilator    O2 Concentration (%): 40  I&O's Summary    18 Sep 2022 07:01  -  19 Sep 2022 07:00  --------------------------------------------------------  IN: 2898.7 mL / OUT: 3935 mL / NET: -1036.3 mL    19 Sep 2022 07:01  -  19 Sep 2022 13:51  --------------------------------------------------------  IN: 686.5 mL / OUT: 529 mL / NET: 157.5 mL        PHYSICAL EXAM:    Constitutional: obese, sedated, lying in bed.   HEENT: 2 mm pupils minimal reactive to light, + gag reflex  Respiratory: CTA B/L; no W/R/R, no retractions  Cardiac: distant heart sounds, +S1/S2; RRR; no M/R/G  Gastrointestinal: soft, NT/ND; no rebound or guarding  Extremities: b/l LE edema and dermatitis   Vascular: 2+ radial pulse right, diminished left  Neurologic: intubated and sedated    ALLERGIES:  Allergies    Altabax (Unknown)  Augmentin (Unknown)  clindamycin (Unknown)  Vaseline (Swelling)    Intolerances        MEDICATIONS:  MEDICATIONS  (STANDING):  albuterol/ipratropium for Nebulization 3 milliLiter(s) Nebulizer every 6 hours  argatroban Infusion 1.5 MICROgram(s)/kG/Min (9.21 mL/Hr) IV Continuous <Continuous>  cefepime   IVPB 2000 milliGRAM(s) IV Intermittent every 12 hours  chlorhexidine 0.12% Liquid 15 milliLiter(s) Oral Mucosa every 12 hours  chlorhexidine 2% Cloths 1 Application(s) Topical <User Schedule>  CRRT Treatment    <Continuous>  dextrose 5%. 1000 milliLiter(s) (50 mL/Hr) IV Continuous <Continuous>  dextrose 5%. 1000 milliLiter(s) (100 mL/Hr) IV Continuous <Continuous>  dextrose 50% Injectable 25 Gram(s) IV Push once  dextrose 50% Injectable 12.5 Gram(s) IV Push once  dextrose 50% Injectable 25 Gram(s) IV Push once  fentaNYL   Infusion. 0.5 MICROgram(s)/kG/Hr (5.12 mL/Hr) IV Continuous <Continuous>  glucagon  Injectable 1 milliGRAM(s) IntraMuscular once  influenza  Vaccine (HIGH DOSE) 0.7 milliLiter(s) IntraMuscular once  insulin lispro (ADMELOG) corrective regimen sliding scale   SubCutaneous every 6 hours  lidocaine   4% Patch 1 Patch Transdermal every 24 hours  midazolam Infusion 0.02 mG/kG/Hr (2.05 mL/Hr) IV Continuous <Continuous>  norepinephrine Infusion 0.05 MICROgram(s)/kG/Min (4.8 mL/Hr) IV Continuous <Continuous>  pantoprazole  Injectable 40 milliGRAM(s) IV Push every 24 hours  Phoxillum Filtration BK 4 / 2.5 5000 milliLiter(s) (2500 mL/Hr) CRRT <Continuous>  polyethylene glycol 3350 17 Gram(s) Oral daily  senna 2 Tablet(s) Oral daily    MEDICATIONS  (PRN):  dextrose Oral Gel 15 Gram(s) Oral once PRN Blood Glucose LESS THAN 70 milliGRAM(s)/deciliter  sodium chloride 0.9% lock flush 10 milliLiter(s) IV Push every 1 hour PRN Pre/post blood products, medications, blood draw, and to maintain line patency      -------------------------------------------------------------------------------  LABS:                        7.8    6.51  )-----------( 39       ( 19 Sep 2022 11:24 )             26.4     09-19    135  |  105  |  14  ----------------------------<  158<H>  3.9   |  23  |  1.42<H>    Ca    7.9<L>      19 Sep 2022 11:24  Phos  4.1     09-19  Mg     2.3     09-19    TPro  7.0  /  Alb  2.3<L>  /  TBili  0.6  /  DBili  x   /  AST  25  /  ALT  23  /  AlkPhos  273<H>  09-19    LIVER FUNCTIONS - ( 19 Sep 2022 04:32 )  Alb: 2.3 g/dL / Pro: 7.0 g/dL / ALK PHOS: 273 U/L / ALT: 23 U/L / AST: 25 U/L / GGT: x           PTT - ( 19 Sep 2022 11:24 )  PTT:86.9 sec    CAPILLARY BLOOD GLUCOSE      POCT Blood Glucose.: 159 mg/dL (19 Sep 2022 11:29)      COVID-19 PCR: NotDetec (19 Sep 2022 11:24)  COVID-19 PCR: NotDetec (13 Sep 2022 04:34)  SARS-CoV-2: NotDetec (06 Sep 2022 10:32)  COVID-19 PCR: NotDetec (06 Sep 2022 10:25)  COVID-19 PCR: NotDetec (02 Jul 2022 13:33)      RADIOLOGY & ADDITIONAL TESTS: Reviewed.

## 2022-09-19 NOTE — PROGRESS NOTE ADULT - SUBJECTIVE AND OBJECTIVE BOX
NEPHROLOGY PROGRESS NOTE    Subjective: Patient seen and examined at bedside on CVVHD. Patient overall stable pressors, tolerating UF, other than intermittent pauses has achieved UF. Possible trach today. reviewed alarms and access pressures with nursing at bedside    [OBJECTIVE]:    Vital Signs:  T(F): , Max: 100.9 (09-19-22 @ 04:31)  HR:  (72 - 100)  BP:  (118/57 - 118/57)  BP(mean):  (82 - 82)  RR:  (26 - 29)  SpO2:  (89% - 100%)  Wt(kg): --  CVP(cm H2O): --  Mode: AC/ CMV (Assist Control/ Continuous Mandatory Ventilation), RR (machine): 262, RR (patient): 26, TV (machine): 450, FiO2: 50, PEEP: 5, ITime: 1, MAP: 12, PIP: 19    09-18 @ 07:01  -  09-19 @ 07:00  --------------------------------------------------------  IN: 2898.7 mL / OUT: 3935 mL / NET: -1036.3 mL    09-19 @ 07:01  -  09-19 @ 11:26  --------------------------------------------------------  IN: 429.2 mL / OUT: 289 mL / NET: 140.2 mL      CAPILLARY BLOOD GLUCOSE      POCT Blood Glucose.: 133 mg/dL (19 Sep 2022 06:28)      .  VITAL SIGNS:  T(F): 99.5 (09-19-22 @ 08:58), Max: 100.9 (09-19-22 @ 04:31)  HR: 72 (09-19-22 @ 11:00) (72 - 100)  BP: 118/57 (09-18-22 @ 12:00) (118/57 - 118/57)  BP(mean): 82 (09-18-22 @ 12:00) (82 - 82)  RR: 26 (09-19-22 @ 11:00) (26 - 29)  SpO2: 94% (09-19-22 @ 11:00) (89% - 100%)    PHYSICAL EXAM:  Constitutional: Intubated, sedated; NAD  HEENT: ETT tube in place, clear secretions  Respiratory: Mechanical breath sounds bilaterally, not overbreathing vent  Cardiac: +S1/S2; RRR; no M/R/G  Gastrointestinal: soft, NT/ND; no rebound or guarding; +BS  Extremities: WWP, no clubbing or cyanosis; general low level anasarca, improving  Dermatologic: skin warm, dry and intact; no rashes, wounds, or scars  Access: [facility] c/d/i, accessed    Medications:  MEDICATIONS  (STANDING):  albuterol/ipratropium for Nebulization 3 milliLiter(s) Nebulizer every 6 hours  argatroban Infusion 1.5 MICROgram(s)/kG/Min (9.21 mL/Hr) IV Continuous <Continuous>  cefepime   IVPB 2000 milliGRAM(s) IV Intermittent every 12 hours  chlorhexidine 0.12% Liquid 15 milliLiter(s) Oral Mucosa every 12 hours  chlorhexidine 2% Cloths 1 Application(s) Topical <User Schedule>  CRRT Treatment    <Continuous>  dextrose 5%. 1000 milliLiter(s) (50 mL/Hr) IV Continuous <Continuous>  dextrose 5%. 1000 milliLiter(s) (100 mL/Hr) IV Continuous <Continuous>  dextrose 50% Injectable 25 Gram(s) IV Push once  dextrose 50% Injectable 12.5 Gram(s) IV Push once  dextrose 50% Injectable 25 Gram(s) IV Push once  fentaNYL   Infusion. 0.5 MICROgram(s)/kG/Hr (5.12 mL/Hr) IV Continuous <Continuous>  glucagon  Injectable 1 milliGRAM(s) IntraMuscular once  influenza  Vaccine (HIGH DOSE) 0.7 milliLiter(s) IntraMuscular once  insulin lispro (ADMELOG) corrective regimen sliding scale   SubCutaneous every 6 hours  lidocaine   4% Patch 1 Patch Transdermal every 24 hours  midazolam Infusion 0.02 mG/kG/Hr (2.05 mL/Hr) IV Continuous <Continuous>  norepinephrine Infusion 0.05 MICROgram(s)/kG/Min (4.8 mL/Hr) IV Continuous <Continuous>  pantoprazole  Injectable 40 milliGRAM(s) IV Push every 24 hours  Phoxillum Filtration BK 4 / 2.5 5000 milliLiter(s) (2500 mL/Hr) CRRT <Continuous>  polyethylene glycol 3350 17 Gram(s) Oral daily  senna 2 Tablet(s) Oral daily    MEDICATIONS  (PRN):  dextrose Oral Gel 15 Gram(s) Oral once PRN Blood Glucose LESS THAN 70 milliGRAM(s)/deciliter  sodium chloride 0.9% lock flush 10 milliLiter(s) IV Push every 1 hour PRN Pre/post blood products, medications, blood draw, and to maintain line patency      Allergies:  Allergies    Altabax (Unknown)  Augmentin (Unknown)  clindamycin (Unknown)  Vaseline (Swelling)    Intolerances        Labs:                        6.9    5.29  )-----------( 34       ( 19 Sep 2022 04:32 )             23.9     09-19    135  |  104  |  12  ----------------------------<  120<H>  4.0   |  22  |  1.37<H>    Ca    7.9<L>      19 Sep 2022 04:32  Phos  3.8     09-19  Mg     2.3     09-19    TPro  7.0  /  Alb  2.3<L>  /  TBili  0.6  /  DBili  x   /  AST  25  /  ALT  23  /  AlkPhos  273<H>  09-19    PTT - ( 19 Sep 2022 04:32 )  PTT:81.3 sec      Radiology and other tests:  Reviewed    Hemoglobin: 6.9 g/dL (09-19-22 @ 04:32)  Phosphorus Level, Serum: 3.8 mg/dL (09-19-22 @ 04:32)  Phosphorus Level, Serum: 3.8 mg/dL (09-19-22 @ 00:38)  Hemoglobin: 7.2 g/dL (09-18-22 @ 18:41)    Albumin, Serum: 2.3 g/dL (09-19-22 @ 04:32)  Albumin, Serum: 2.4 g/dL (09-18-22 @ 18:41)        CRRT Treatment:   Modality: CVVHD, Filter: NxStage CAR-505, Target Blood Flow: 300 mL/Min  Target Fluid Balance: Titrate to net NEGATIVE fluid balance, 100 mL/Hr (09-19-22 @ 09:58) [Active]  Phoxillum Filtration BK 4 / 2.5: Solution, 5000 milliLiter(s) infuse at 2500 mL/Hr; infuse through CRRT Circuit  Administration Instructions: Continuous Renal Replacement Therapy (CRRT)  Special Instructions: Dialysate (09-19-22 @ 09:58) [Active]

## 2022-09-19 NOTE — PROGRESS NOTE ADULT - PROBLEM SELECTOR PLAN 2
Pt with flail chest 2/2 ACLS in setting of cardiac arrest. Unable to be weaned off ventilator. Also with underlying COPD and CHF.  - Discussions ongoing with HCP regarding ventilatory support and possible tracheostomy. HCP still deciding.

## 2022-09-19 NOTE — PROGRESS NOTE ADULT - ATTENDING COMMENTS
cellulitis s/p cardiac arrest with rib fractures, ATN, septic shock, pna, morbid obesity, PVD, DALIA  physical as above  cont NE for MAP over 65  CVVH removing 100 ml/hr  vancomycin/cefepime continue  RASS -4  trach tomorrow  decision making of high complexity

## 2022-09-19 NOTE — PROGRESS NOTE ADULT - PROBLEM SELECTOR PLAN 5
GOC ongoing. Will continue to follow.    Alivia Weldon MD  Palliative Fellow, PGY5  Geriatrics and Palliative Consult Service.

## 2022-09-19 NOTE — PROGRESS NOTE ADULT - SUBJECTIVE AND OBJECTIVE BOX
SUBJECTIVE AND OBJECTIVE:  Indication for Geriatrics and Palliative Care Services: [facility]    OVERNIGHT EVENTS: Febrile overnight to 100.9.    Allergies    Altabax (Unknown)  Augmentin (Unknown)  clindamycin (Unknown)  Vaseline (Swelling)    Intolerances    MEDICATIONS  (STANDING):  albuterol/ipratropium for Nebulization 3 milliLiter(s) Nebulizer every 6 hours  argatroban Infusion 1.5 MICROgram(s)/kG/Min (9.21 mL/Hr) IV Continuous <Continuous>  cefepime   IVPB 2000 milliGRAM(s) IV Intermittent every 12 hours  chlorhexidine 0.12% Liquid 15 milliLiter(s) Oral Mucosa every 12 hours  chlorhexidine 2% Cloths 1 Application(s) Topical <User Schedule>  CRRT Treatment    <Continuous>  dextrose 5%. 1000 milliLiter(s) (50 mL/Hr) IV Continuous <Continuous>  dextrose 5%. 1000 milliLiter(s) (100 mL/Hr) IV Continuous <Continuous>  dextrose 50% Injectable 25 Gram(s) IV Push once  dextrose 50% Injectable 12.5 Gram(s) IV Push once  dextrose 50% Injectable 25 Gram(s) IV Push once  fentaNYL   Infusion. 0.5 MICROgram(s)/kG/Hr (5.12 mL/Hr) IV Continuous <Continuous>  glucagon  Injectable 1 milliGRAM(s) IntraMuscular once  influenza  Vaccine (HIGH DOSE) 0.7 milliLiter(s) IntraMuscular once  insulin lispro (ADMELOG) corrective regimen sliding scale   SubCutaneous every 6 hours  lidocaine   4% Patch 1 Patch Transdermal every 24 hours  midazolam Infusion 0.02 mG/kG/Hr (2.05 mL/Hr) IV Continuous <Continuous>  norepinephrine Infusion 0.05 MICROgram(s)/kG/Min (4.8 mL/Hr) IV Continuous <Continuous>  pantoprazole  Injectable 40 milliGRAM(s) IV Push every 24 hours  Phoxillum Filtration BK 4 / 2.5 5000 milliLiter(s) (2500 mL/Hr) CRRT <Continuous>  polyethylene glycol 3350 17 Gram(s) Oral daily  senna 2 Tablet(s) Oral daily    MEDICATIONS  (PRN):  dextrose Oral Gel 15 Gram(s) Oral once PRN Blood Glucose LESS THAN 70 milliGRAM(s)/deciliter  sodium chloride 0.9% lock flush 10 milliLiter(s) IV Push every 1 hour PRN Pre/post blood products, medications, blood draw, and to maintain line patency      ITEMS UNCHECKED ARE NOT PRESENT    PRESENT SYMPTOMS: [X]Unable to self-report  Source if other than patient:  [ ]Family   [ ]Team     Pain:  [ ]yes [ ]no  QOL impact -   Location -                    Aggravating factors -  Quality -  Radiation -  Timing-  Severity (0-10 scale):  Minimal acceptable level (0-10 scale):     Dyspnea:                           [ ]Mild [ ]Moderate [ ]Severe  Anxiety:                             [ ]Mild [ ]Moderate [ ]Severe  Fatigue:                             [ ]Mild [ ]Moderate [ ]Severe  Nausea:                             [ ]Mild [ ]Moderate [ ]Severe  Loss of appetite:              [ ]Mild [ ]Moderate [ ]Severe  Constipation:                    [ ]Mild [ ]Moderate [ ]Severe    Other Symptoms:  [ ]All other review of systems negative     Palliative Performance Status Version 2:         10%      http://npcrc.org/files/news/palliative_performance_scale_ppsv2.pdf    PHYSICAL EXAM:  Vital Signs Last 24 Hrs  T(C): 37 (19 Sep 2022 13:59), Max: 38.3 (19 Sep 2022 04:31)  T(F): 98.6 (19 Sep 2022 13:59), Max: 100.9 (19 Sep 2022 04:31)  HR: 69 (19 Sep 2022 15:00) (66 - 100)  BP: --  BP(mean): --  RR: 26 (19 Sep 2022 15:00) (26 - 29)  SpO2: 92% (19 Sep 2022 15:00) (92% - 100%)    Parameters below as of 19 Sep 2022 15:00  Patient On (Oxygen Delivery Method): ventilator    O2 Concentration (%): 40 I&O's Summary    18 Sep 2022 07:01  -  19 Sep 2022 07:00  --------------------------------------------------------  IN: 2898.7 mL / OUT: 3935 mL / NET: -1036.3 mL    19 Sep 2022 07:01  -  19 Sep 2022 15:28  --------------------------------------------------------  IN: 1059.4 mL / OUT: 1016 mL / NET: 43.4 mL       GENERAL: [ ]Cachexia    [ ]Alert  [ ]Oriented x   [ ]Lethargic  [X]Unarousable  [ ]Verbal  [ ]Non-Verbal  Behavioral:   [ ]Anxiety  [ ]Delirium [ ]Agitation [ ]Other  HEENT:  [ ]Normal   [ ]Dry mouth   [X]ET Tube/Trach  [ ]Oral lesions  PULMONARY:   [X]Clear [ ]Tachypnea  [ ]Audible excessive secretions   [ ]Rhonchi        [ ]Right [ ]Left [ ]Bilateral  [ ]Crackles        [ ]Right [ ]Left [ ]Bilateral  [ ]Wheezing     [ ]Right [ ]Left [ ]Bilateral  [ ]Diminished BS [ ] Right [ ]Left [ ]Bilateral  CARDIOVASCULAR:    [X]Regular [ ]Irregular [ ]Tachy  [ ]Jonathon [ ]Murmur [ ]Other  GASTROINTESTINAL:  [X]Soft  [ ]Distended   [ ]+BS  [ ]Non tender [ ]Tender  [ ]Other [ ]PEG [ ]OGT/ NGT   Last BM:   GENITOURINARY:  [ ]Normal [ ]Incontinent   [X]Oliguria/Anuria   [ ][place]o  MUSCULOSKELETAL:   [ ]Normal   [ ]Weakness  [X]Bed/Wheelchair bound [ ]Edema  NEUROLOGIC:   [X]No focal deficits  [ ] Cognitive impairment  [ ] Dysphagia [ ]Dysarthria [ ] Paresis [ ]Other   SKIN:   [X]Normal  [ ]Rash  [ ]Other  [ ]Pressure ulcer(s) [ ]y [ ]n present on admission      LABS:                        7.8    6.51  )-----------( 39       ( 19 Sep 2022 11:24 )             26.4   09-19    135  |  105  |  14  ----------------------------<  158<H>  3.9   |  23  |  1.42<H>    Ca    7.9<L>      19 Sep 2022 11:24  Phos  4.1     09-19  Mg     2.3     09-19    TPro  7.0  /  Alb  2.3<L>  /  TBili  0.6  /  DBili  x   /  AST  25  /  ALT  23  /  AlkPhos  273<H>  09-19  PTT - ( 19 Sep 2022 11:24 )  PTT:86.9 sec      RADIOLOGY & ADDITIONAL STUDIES:    Protein Calorie Malnutrition Present: [ ]mild [ ]moderate [ ]severe [ ]underweight [ ]morbid obesity  https://www.andeal.org/vault/2440/web/files/ONC/Table_Clinical%20Characteristics%20to%20Document%20Malnutrition-White%20JV%20et%20al%202012.pdf    Height (cm): 172.7 (09-06-22 @ 21:17), 162.6 (07-02-22 @ 11:14), 162.6 (03-09-22 @ 22:48)  Weight (kg): 102.3 (09-06-22 @ 21:17), 117.9 (07-02-22 @ 11:14), 117.9 (03-09-22 @ 22:48)  BMI (kg/m2): 34.3 (09-06-22 @ 21:17), 44.6 (07-02-22 @ 11:14), 44.6 (03-09-22 @ 22:48)    [X]PPSV2 < or = 30%  [ ]significant weight loss [X]poor nutritional intake [ ]anasarca[ ]Artificial Nutrition    REFERRALS:   [ ]Chaplaincy  [ ]Hospice  [ ]Child Life  [ ]Social Work  [ ]Case management [ ]Holistic Therapy

## 2022-09-19 NOTE — PROGRESS NOTE ADULT - CONVERSATION DETAILS
Spoke with patient's HCP Lisa regarding her clinical status and goals of care. She indicated that the last conversation she had with the MICU team, her "aunt" appeared to be making small improvements but that was on Friday and she was waiting for a callback today. I explained that some improvements were made but she was not making big clinical improvements. She was tolerating CVVHD better than before but that it was unclear if she would tolerate intermittent HD in the future given how big of a strain it can be on the body. We also discussed that she is still spiking fevers and had one overnight. Lastly, we revisited the discussion of tracheostomy. Lisa indicated that Bryn would not necessarily be against a trach but that she had said she would not want a feeding tube. I explained that with the tracheostomy, a feeding tube would need to be placed and Lisa said that she would have to think about it further. She was still hopeful that giving Bryn more time to recover by doing a tracheostomy would allow her to recover enough of her mental status to make these types of decisions herself though she was also clear that she understands the prognosis is poor.

## 2022-09-19 NOTE — PROGRESS NOTE ADULT - ASSESSMENT
80 yo M morbidly obese F with HTN, HLD, HFpEF (EF 56%), COPD (on home O2-3L), chronic LE edema and cellulitis, DALIA, PVD p/w respiratory distress 2/2 severe sepsis due to cellulitis on lower extremity which progressed to septic shock in setting of possible VAP. Hospital course complicated by cardiac arrest, flail chest, and oliguric BRANT progressing to kidney failure.

## 2022-09-19 NOTE — PROGRESS NOTE ADULT - ASSESSMENT
80yo morbidly obese female w/ pmhx of HTN, HLD, HFpEF (EF 56%), COPD (on home O2-3L), chronic LE edema and cellulitis, DALIA, PVD p/w respiratory distress 2/2 severe sepsis due to cellulitis on lower extremity which progressed to septic shock in setting of pneumonia. Hospital course complicated by cardiac arrest, flail chest, and oliguric BRANT progressing to kidney failure.     NEURO  #Sedated w/ suspicion for Anoxic Encephelopathy  s/p cardiac arrest 2/2 hypoxia due to aspiration. Prior to arrest, pt was awake and alert on the RMF. Difficult to assess current status due to continued ventilation and sedation requirements in the setting on possible VAP and flail chest. VC AC w/ FiO2 40, Vt 450, RR 26, PEEP 5. Currently on fentanyl and versed.    PULM  #Pneumonia  possibly STANISLAV. Pt w/ bilateral pleural effusions and possible underlying infiltrate, unclear on x-ray. Blood cultures remain negative, sputum cultures neg w/ gram stain showing rare epithelial cells, few WBC's, gram pos rods and cocci in pairs. Currently on empiric antibiotics, cefepime and vanc (dosed daily)    #Acute on chronic resp failure  Pt w/ COPD on home O2, currently worsening hypoxia 2/2 to Pneumonia vs flash pulmonary edema. Pt endured cardiac arrest secondary to resp failure likely in setting of aspiration while eating vs flash pulmonary edema 2/2 chronic htn and chronic HF (relatively the same EF as previous TTE). New onset pleural effusions likely in the same setting, pt s/p intubation. Plan as above    #Pneumothorax  Pt presented to MICU with signs of decreased lung sliding on bedside ultrasound s/p cardiac arrest and chest compressions. Drop in sats to low 70s on 09/08 w/ x-ray confirming Left sided pneumothorax, no BP changes. Chest tube placed with improvement in sats to high 90s again. Repeat x-rays showing lung reexpansion  - chest tube still in pace, set to water seal    #Broken ribs w/ flail chest  Pt with several broken ribs and signs of flail chest with difficulty breathing when sedation weaned off. Pt currently intubated and sedated. Thoracic surgery consulted for possible sternal plating. Anesthesia consulted, spinal block on 09/12 for pain management. Pt remains unstable and not surgery candidate at this time    #Chronic obstructive pulmonary disease (COPD).   Pt with COPD on home 3L. Patient initially presented with NRB, now intubated s/p cardiac arrest. Started on mucomyst      CARDIO  #Septic shock  2/2 pneumonia. Cultures remain neg. Perfusion - warm on palpation, not producing urine, normal lactate. Volume - Moist mucus membranes, edematous in extremities and receiving CVVHD. Hemodynamically stable while on pressors. Levo 0.1564  - Wean pressors and sedation as tolerated    #Chronic heart failure with preserved ejection fraction (HFpEF).   Pt on home bumex 2mg.  TTE from current visit showing EF of 56% with Pulmonary HTN - PAP 48. Experienced pulmonary edema and effusion postcardiac arrest.   Plan:  - c/w CVVHD to reduce fluid overload in the setting of acute kidney failure    #Cardiac Arrest on 09/07  possibly 2/2 hypoxia in setting of aspiration vs flash pulmonary edema. Pt relatively hypotensive throughout the day with episodes of hypertension to 130/140 systolic. received fluids on PM 09/07 and a unit of blood on 09/07 for anemia. In PM on 09/07, complained of difficulty breathing during meal, found unresponsive, hypotensive, w/ blue complexion by nurse.      #Afib  Experienced a-fib on 09/13 w RVR. self resolved w/o intervention. 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   Pt with history of hypertension on home diltiazem 180mg daily. Currently on pressors s/p septic shock.   Plan:  - holding home antihypertensive meds    GI  No active issues      RENAL  #Anuric BRANT on CKD  Baseline Cr 2.04 in July 2022, currently uptrending to 3.3. Likely 2/2 to hypoperfusion/ischemia in setting of sepsis and cardiac arrest. Nephro consulted. HD cath placed on 09/12 for CVVHD in setting of worsening electrolytes and increasing fluid status. Goal net neg 100cc/hr  Plan:  - c/w qb 300ml/min  UF: net negative 100ml/hr  DFR: 2L/hour  - Dialysate - Phoxillum   - q6H BMP mag and phos while on CVVHD  - maintain MAP >70 for adequate renal perfusion  - strict I/Os    #Right kidney mass.   CTAP s/f slightly hyperdense solid cortical mass in upper pole of R kidney.  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 D/c'd heparin and started on Argatroban   - f/u ptt    #Anemia  Pt noted to have chronic anemia, presented with HgB of 6.8 w/ drop to 6.7 during course of stay. s/p 1 unit RBC on 09/07, remained stable until 09/13 when CVVHD began and drop in Hgb to 6.8. Received 1u RBC, back up to 7.4, hemolysis unlikely as hapto and bili normal. Received another unit on 09/19  Plan:  - f/u H+H  - active type and screen     ID  #Septic shock  likely 2/2 pneumonia. New tmax and worsened pleural effusions on 09/12. BLE edema, erythema, scaly skin c/w cellulitis. CTX 2g IV QD on 09/7 but new t-max on night of 09/11-12, cultures remain neg. Transitioned to cefepime 1g q12 and received 1 dose vanc 1.5g (dose dependent due to kidney status). Trough goal of 13-17.  Pt spike another fever on 09/19 after a week of being afebrile, cultures sent.  Plan:  - cont cefepime 2g IV q12h   - check vanc level tomorrow, re-dose if <20  - Per ID, continue abx until 9/20  - Continuing Levo requirement for MAP goal 70-75  - c/w tylenol 650 mg PO q6h PRN for pain/fever  - f/u further ID recs      E: K>4, Phos>3, Mg>2  N: Nepro diet - CVVHD   DVT ppx: Argatroban   GI ppx: Protonix 40 BID  Access: 2 peripheral lines, left sided triple lumen, HD cath on right    DNR

## 2022-09-20 NOTE — PROGRESS NOTE ADULT - ASSESSMENT
Patient is a 80 yo F with CHF, CKD (baseline around 2.5-2.9) HTN who presented with a one day history of respiratory distress and LE cellulitis hospital course c/b cardiac arrest on 9/7, acute renal failure requiring CVVHD Nephrology consulted for elevated creatinine.     Anuric  BRANT on CKD  - likely iATN in setting of cardiac arrest and shock. 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

## 2022-09-20 NOTE — PROGRESS NOTE ADULT - SUBJECTIVE AND OBJECTIVE BOX
--------------------------------------------------------------------------------  Chief Complaint: ESRD/Ongoing hemodialysis requirement    24 hour events/subjective:  NAEO. Stable. Remains intubated and sedated. Has not demonstrated signs of renal recovery thus far. U/O 250cc.       PAST HISTORY  --------------------------------------------------------------------------------  No significant changes to PMH, PSH, FHx, SHx, unless otherwise noted    ALLERGIES & MEDICATIONS  --------------------------------------------------------------------------------  Allergies    Altabax (Unknown)  Augmentin (Unknown)  clindamycin (Unknown)  Vaseline (Swelling)    Intolerances      Standing Inpatient Medications  albuterol/ipratropium for Nebulization 3 milliLiter(s) Nebulizer every 6 hours  argatroban Infusion 1.5 MICROgram(s)/kG/Min IV Continuous <Continuous>  cefepime   IVPB 2000 milliGRAM(s) IV Intermittent every 12 hours  chlorhexidine 0.12% Liquid 15 milliLiter(s) Oral Mucosa every 12 hours  chlorhexidine 2% Cloths 1 Application(s) Topical <User Schedule>  CRRT Treatment    <Continuous>  CRRT Treatment    <Continuous>  dextrose 5%. 1000 milliLiter(s) IV Continuous <Continuous>  dextrose 5%. 1000 milliLiter(s) IV Continuous <Continuous>  dextrose 50% Injectable 25 Gram(s) IV Push once  dextrose 50% Injectable 12.5 Gram(s) IV Push once  dextrose 50% Injectable 25 Gram(s) IV Push once  fentaNYL   Infusion. 0.5 MICROgram(s)/kG/Hr IV Continuous <Continuous>  glucagon  Injectable 1 milliGRAM(s) IntraMuscular once  influenza  Vaccine (HIGH DOSE) 0.7 milliLiter(s) IntraMuscular once  insulin lispro (ADMELOG) corrective regimen sliding scale   SubCutaneous every 6 hours  lidocaine   4% Patch 1 Patch Transdermal every 24 hours  midazolam Infusion 0.02 mG/kG/Hr IV Continuous <Continuous>  norepinephrine Infusion 0.05 MICROgram(s)/kG/Min IV Continuous <Continuous>  pantoprazole  Injectable 40 milliGRAM(s) IV Push every 24 hours  Phoxillum Filtration BK 4 / 2.5 5000 milliLiter(s) CRRT <Continuous>  Phoxillum Filtration BK 4 / 2.5 5000 milliLiter(s) CRRT <Continuous>  polyethylene glycol 3350 17 Gram(s) Oral daily  propofol Infusion 5 MICROgram(s)/kG/Min IV Continuous <Continuous>  senna 2 Tablet(s) Oral daily    PRN Inpatient Medications  dextrose Oral Gel 15 Gram(s) Oral once PRN  sodium chloride 0.9% lock flush 10 milliLiter(s) IV Push every 1 hour PRN      REVIEW OF SYSTEMS  --------------------------------------------------------------------------------  All other systems were reviewed and are negative, except as noted.    VITALS/PHYSICAL EXAM  --------------------------------------------------------------------------------  T(C): 37.3 (09-20-22 @ 09:16), Max: 37.4 (09-20-22 @ 06:04)  HR: 81 (09-20-22 @ 11:00) (66 - 92)  BP: 114/55 (09-20-22 @ 09:20) (100/49 - 114/55)  RR: 26 (09-20-22 @ 11:00) (23 - 30)  SpO2: 92% (09-20-22 @ 11:00) (90% - 99%)  Wt(kg): --  Drug Dosing Weight  Height (cm): 172.7 (06 Sep 2022 21:17)  Weight (kg): 102.3 (06 Sep 2022 21:17)  BMI (kg/m2): 34.3 (06 Sep 2022 21:17)  BSA (m2): 2.15 (06 Sep 2022 21:17)        09-19-22 @ 07:01  -  09-20-22 @ 07:00  --------------------------------------------------------  IN: 2466.3 mL / OUT: 4762 mL / NET: -2295.7 mL    09-20-22 @ 07:01  -  09-20-22 @ 11:06  --------------------------------------------------------  IN: 291.4 mL / OUT: 539 mL / NET: -247.6 mL    PHYSICAL EXAM:  Constitutional: Intubated, sedated; NAD  HEENT: ETT tube in place, clear secretions  Respiratory: Mechanical breath sounds bilaterally, not overbreathing vent  Cardiac: +S1/S2; RRR; no M/R/G  Gastrointestinal: soft, NT/ND; no rebound or guarding; +BS  Extremities: WWP, no clubbing or cyanosis; general low level anasarca, improving  Dermatologic: skin warm, dry and intact; no rashes, wounds, or scars  Access: NABOR garcia c/d/i, accessed    LABS/STUDIES  --------------------------------------------------------------------------------              8.3    3.87  >-----------<  50       [09-20-22 @ 05:00]              28.0     134  |  105  |  12  ----------------------------<  101      [09-20-22 @ 05:00]  4.1   |  23  |  1.01        Ca     7.7     [09-20-22 @ 05:00]      Mg     2.4     [09-20-22 @ 05:00]      Phos  4.2     [09-20-22 @ 05:00]    TPro  7.4  /  Alb  2.4  /  TBili  0.7  /  DBili  x   /  AST  26  /  ALT  21  /  AlkPhos  236  [09-20-22 @ 05:00]      PTT: 81.7       [09-20-22 @ 05:00]      Iron 15, TIBC 242, %sat 6      [07-02-22 @ 22:28]  Ferritin 28      [07-02-22 @ 22:28]  Lipid: chol --, , HDL --, LDL --      [09-15-22 @ 05:57]        RADIOLOGY:  --------------------------------------------------------------------------------------  Hemoglobin: 8.3 g/dL (09-20-22 @ 05:00)  Phosphorus Level, Serum: 4.2 mg/dL (09-20-22 @ 05:00)  Phosphorus Level, Serum: 4.0 mg/dL (09-20-22 @ 00:01)  Phosphorus Level, Serum: 4.0 mg/dL (09-19-22 @ 17:57)    Albumin, Serum: 2.4 g/dL (09-20-22 @ 05:00)  Albumin, Serum: 2.3 g/dL (09-19-22 @ 04:32)    T(C): 37.3 (09-20-22 @ 09:16), Max: 37.4 (09-20-22 @ 06:04)  HR: 81 (09-20-22 @ 11:00) (66 - 92)  BP: 114/55 (09-20-22 @ 09:20) (100/49 - 114/55)  RR: 26 (09-20-22 @ 11:00) (23 - 30)  SpO2: 92% (09-20-22 @ 11:00) (90% - 99%)      CRRT Treatment:   Modality: CVVHD, Filter: NxStage CAR-505, Target Blood Flow: 300 mL/Min  Target Fluid Balance: Titrate to net NEGATIVE fluid balance, 100 mL/Hr (09-20-22 @ 11:06) [Active]  Phoxillum Filtration BK 4 / 2.5: Solution, 5000 milliLiter(s) infuse at 2500 mL/Hr; infuse through CRRT Circuit  Administration Instructions: Continuous Renal Replacement Therapy (CRRT)  Special Instructions: Dialysate (09-20-22 @ 11:06) [Active]    Noted to have clotting on HD filter side, remains on argatroban but being titrated down for Trach procedure soon. Will likely need filter replacement sometime later today. Arterial, Venous pressures noted within normal limits

## 2022-09-20 NOTE — CHART NOTE - NSCHARTNOTEFT_GEN_A_CORE
Admitting Diagnosis:   Patient is a 81y old  Female who presents with a chief complaint of Acute hypoxic respiratory failure (20 Sep 2022 11:06)      PAST MEDICAL & SURGICAL HISTORY:  Lyme disease      DVT (deep venous thrombosis)      Skin cancer      Brain embolism and thrombosis      MRSA (methicillin resistant Staphylococcus aureus)      PNA (pneumonia)      COPD (chronic obstructive pulmonary disease)      H/O angioplasty      Status post laparoscopic-assisted sigmoidectomy      H/O shoulder surgery          Current Nutrition Order:   Diet, NPO after Midnight:      NPO Start Date: 19-Sep-2022,   NPO Start Time: 23:59 (09-19-22 @ 18:29)      PO Intake: N/A    GI Issues: Abdomen ND/NT, +BS x4, LBM 9/19    Pain: No non-verbal indicators present    Skin Integrity: Stg II PI sacrum, unstageable PI lip, +2 gen. edema     Labs:   09-20    134<L>  |  104  |  12  ----------------------------<  104<H>  4.0   |  23  |  1.08    Ca    8.0<L>      20 Sep 2022 11:58  Phos  4.2     09-20  Mg     2.4     09-20    TPro  7.2  /  Alb  2.5<L>  /  TBili  0.7  /  DBili  x   /  AST  25  /  ALT  20  /  AlkPhos  221<H>  09-20    CAPILLARY BLOOD GLUCOSE      POCT Blood Glucose.: 93 mg/dL (20 Sep 2022 11:01)  POCT Blood Glucose.: 103 mg/dL (20 Sep 2022 06:03)  POCT Blood Glucose.: 114 mg/dL (19 Sep 2022 23:20)  POCT Blood Glucose.: 145 mg/dL (19 Sep 2022 17:16)      Medications:  MEDICATIONS  (STANDING):  albuterol/ipratropium for Nebulization 3 milliLiter(s) Nebulizer every 6 hours  cefepime   IVPB 2000 milliGRAM(s) IV Intermittent every 12 hours  chlorhexidine 0.12% Liquid 15 milliLiter(s) Oral Mucosa every 12 hours  chlorhexidine 2% Cloths 1 Application(s) Topical <User Schedule>  cisatracurium Injectable 20 milliGRAM(s) IV Push once  CRRT Treatment    <Continuous>  dextrose 5%. 1000 milliLiter(s) (50 mL/Hr) IV Continuous <Continuous>  dextrose 5%. 1000 milliLiter(s) (100 mL/Hr) IV Continuous <Continuous>  dextrose 50% Injectable 25 Gram(s) IV Push once  dextrose 50% Injectable 12.5 Gram(s) IV Push once  dextrose 50% Injectable 25 Gram(s) IV Push once  fentaNYL   Infusion. 0.5 MICROgram(s)/kG/Hr (5.12 mL/Hr) IV Continuous <Continuous>  glucagon  Injectable 1 milliGRAM(s) IntraMuscular once  influenza  Vaccine (HIGH DOSE) 0.7 milliLiter(s) IntraMuscular once  insulin lispro (ADMELOG) corrective regimen sliding scale   SubCutaneous every 6 hours  lidocaine   4% Patch 1 Patch Transdermal every 24 hours  midazolam Infusion 0.02 mG/kG/Hr (2.05 mL/Hr) IV Continuous <Continuous>  norepinephrine Infusion 0.05 MICROgram(s)/kG/Min (4.8 mL/Hr) IV Continuous <Continuous>  pantoprazole  Injectable 40 milliGRAM(s) IV Push every 24 hours  Phoxillum Filtration BK 4 / 2.5 5000 milliLiter(s) (2500 mL/Hr) CRRT <Continuous>  polyethylene glycol 3350 17 Gram(s) Oral daily  propofol Infusion 5 MICROgram(s)/kG/Min (3.07 mL/Hr) IV Continuous <Continuous>  senna 2 Tablet(s) Oral daily    MEDICATIONS  (PRN):  dextrose Oral Gel 15 Gram(s) Oral once PRN Blood Glucose LESS THAN 70 milliGRAM(s)/deciliter  sodium chloride 0.9% lock flush 10 milliLiter(s) IV Push every 1 hour PRN Pre/post blood products, medications, blood draw, and to maintain line patency      Height for BMI (CENTIMETERS)	172.7 Centimeter(s)  Weight for BMI (lbs)	225.5 lb  Weight for BMI (kg)	102.3 kg  Body Mass Index	34.2    Weight Change: No new wt since admit.     Estimated energy needs:   Weight used for calculations	IBW  Estimated Energy Needs Weight (lbs)	140.2 lb  Estimated Energy Needs Weight (kg)	63.6 kg  Estimated Energy Needs From (shane/kg)	25  Estimated Energy Needs To (shane/kg)	30  Estimated Energy Needs Calculated From (shane/kg)	1590  Estimated Energy Needs Calculated To (shane/kg)	1908  Weight used for calculations	IBW  Estimated Protein Needs Weight (lbs)	140.2 lb  Estimated Protein Needs Weight (kg)	63.6 kg  Estimated Protein Needs From (g/kg)	1.4  Estimated Protein Needs To (g/kg)	1.6  Estimated Protein Needs Calculated From (g/kg)	89.04  Estimated Protein Needs Calculated To (g/kg)	101.76  Estimated Fluid Needs Weight (lbs)	140.2 lb  Estimated Fluid Needs Weight (kg)	63.6 kg  Estimated Fluid Needs From (ml/kg)	30  Estimated Fluid Needs To (ml/kg)	35  Estimated Fluid Needs Calculated From (ml/kg)	1908  Estimated Fluid Needs Calculated To (ml/kg)	2226  -Needs determined using IBW with consideration for critical condition requiring mechanical ventilation.     Subjective: 81F with complex medical history including obesity, HTN, HLD, HFpEF (EF 55-60% last echo 2/20), COPD (on home O2-3L), chronic LE edema with neuropathic pain and cellulitis, DALIA, PVD p/w respiratory distress x 1 day found to have cellulitis on lower extremity. 9/9: TF started. 9/12: Started CVVHD, TF changed to Nepro. 9/13: Elevated GRV, TF held. 9/17: Weaning off pressors/sedation. 9/18: Febrile. 9/20: On propofol in prep for trache.     Pt care discussed in IDT rounds. Rx and labs reviewed. Pt intubated and sedated at time of assessment; vent to VC-AC, MAP 70, fentanyl ggt, versed ggt, norepinephrine ggt, Propofol @12.3 ml/hr (325 kcal/day). TF held in preparation for trache today; previously tolerating at goal. Continuing GOC discussion with family/care team. No new GI distress reported at time of assessment. RDN will continue to monitor per protocol and PRN.     Previous Nutrition Diagnosis: Inadequate Oral Intake r/t critical illness requiring mechanical ventilation AEB need for NPO status    Active [ x ]  Resolved [   ]    If resolved, new PES:     Goal: Pt will meet 75% or more of protein/energy needs via most appropriate route for nutrition    Recommendations:  -Restart TF when medically able   *If no propofol, recommend Nepro advance by 20 ml/hr q4hr toward goal of 40 ml/hr with LPS x1/day to provide 960 ml TF (102%RDI), 1828 kcal, 93 gProt., and 698 ml FW. This is 22.9 non-protein kcal and 1.46 gProt. per kg IBW 63.6kg.   -Monitor TF tolerance; determine need for PEG iso trache placement   -Monitor chemistry, GI fxn, and skin integrity     Risk Level: High [ x ] Moderate [   ] Low [   ]

## 2022-09-20 NOTE — PROGRESS NOTE ADULT - ASSESSMENT
80yo morbidly obese female w/ pmhx of HTN, HLD, HFpEF (EF 56%), COPD (on home O2-3L), chronic LE edema and cellulitis, DALIA, PVD p/w respiratory distress 2/2 severe sepsis due to cellulitis on lower extremity which progressed to septic shock in setting of pneumonia. Hospital course complicated by cardiac arrest, flail chest, and oliguric BRANT progressing to kidney failure.     NEURO  #Sedated  s/p cardiac arrest 2/2 hypoxia due to aspiration. Prior to arrest, pt was awake and alert on the RMF. Difficult to assess current status due to continued ventilation and sedation requirements in the setting on possible VAP and flail chest. VC AC w/ FiO2 40, Vt 450, RR 26, PEEP 5. Currently on fentanyl and versed.    PULM  #Pneumonia  possibly STANISLAV. Pt w/ bilateral pleural effusions and possible underlying infiltrate, unclear on x-ray. Blood cultures remain negative, sputum cultures neg w/ gram stain showing rare epithelial cells, few WBC's, gram pos rods and cocci in pairs. Currently on empiric antibiotics, cefepime and vanc (dosed daily)    #Acute on chronic resp failure  Pt w/ COPD on home O2, currently worsening hypoxia 2/2 to Pneumonia vs flash pulmonary edema. Pt endured cardiac arrest secondary to resp failure likely in setting of aspiration while eating vs flash pulmonary edema 2/2 chronic htn and chronic HF (relatively the same EF as previous TTE). New onset pleural effusions likely in the same setting, pt s/p intubation. Plan for trach on Thursday    #Pneumothorax  Pt presented to MICU with signs of decreased lung sliding on bedside ultrasound s/p cardiac arrest and chest compressions. Drop in sats to low 70s on 09/08 w/ x-ray confirming Left sided pneumothorax, no BP changes. Chest tube placed with improvement in sats to high 90s again. Repeat x-rays showing lung reexpansion  - chest tube still in pace, set to water seal    #Broken ribs w/ flail chest  Pt with several broken ribs and signs of flail chest with difficulty breathing when sedation weaned off. Pt currently intubated and sedated. Thoracic surgery consulted for possible sternal plating. Anesthesia consulted, spinal block on 09/12 for pain management. Pt remains unstable and not surgery candidate at this time    #Chronic obstructive pulmonary disease (COPD).   Pt with COPD on home 3L. Patient initially presented with NRB, now intubated s/p cardiac arrest. Started on mucomyst      CARDIO  #Septic shock - resolved   2/2 pneumonia. Cultures remain neg. Perfusion - warm on palpation, not producing urine, normal lactate. Volume - Moist mucus membranes, edematous in extremities and receiving CVVHD. Hemodynamically stable while on pressors. Levo 0.1564  - Wean pressors and sedation as tolerated    #Chronic heart failure with preserved ejection fraction (HFpEF).   Pt on home bumex 2mg.  TTE from current visit showing EF of 56% with Pulmonary HTN - PAP 48. Experienced pulmonary edema and effusion postcardiac arrest.   Plan:  - c/w CVVHD to reduce fluid overload in the setting of acute kidney failure    #Cardiac Arrest on 09/07  possibly 2/2 hypoxia in setting of aspiration vs flash pulmonary edema. Pt relatively hypotensive throughout the day with episodes of hypertension to 130/140 systolic. received fluids on PM 09/07 and a unit of blood on 09/07 for anemia. In PM on 09/07, complained of difficulty breathing during meal, found unresponsive, hypotensive, w/ blue complexion by nurse.      #Afib  Experienced a-fib on 09/13 w RVR. self resolved w/o intervention. 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   Pt with history of hypertension on home diltiazem 180mg daily. Currently on pressors s/p septic shock.   Plan:  - holding home antihypertensive meds    GI  No active issues      RENAL  #Anuric BRANT on CKD  Baseline Cr 2.04 in July 2022, currently uptrending to 3.3. Likely 2/2 to hypoperfusion/ischemia in setting of sepsis and cardiac arrest. Nephro consulted. HD cath placed on 09/12 for CVVHD in setting of worsening electrolytes and increasing fluid status. Goal net neg 100cc/hr  Plan:  - c/w qb 300ml/min  UF: net negative 100ml/hr  DFR: 2L/hour  - Dialysate - Phoxillum   - q6H BMP mag and phos while on CVVHD  - maintain MAP >70 for adequate renal perfusion  - strict I/Os    #Right kidney mass.   CTAP s/f slightly hyperdense solid cortical mass in upper pole of R kidney.  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 D/c'd heparin and started on Argatroban  - f/u ptt    #Anemia  Pt noted to have chronic anemia, presented with HgB of 6.8 w/ drop to 6.7 during course of stay. s/p 1 unit RBC on 09/07, remained stable until 09/13 when CVVHD began and drop in Hgb to 6.8. Received 1u RBC, back up to 7.4, hemolysis unlikely as hapto and bili normal. Received another unit on 09/19  Plan:  - f/u H+H  - active type and screen     ID  #Septic shock - resolved   likely 2/2 pneumonia. New tmax and worsened pleural effusions on 09/12. BLE edema, erythema, scaly skin c/w cellulitis. CTX 2g IV QD on 09/7 but new t-max on night of 09/11-12, cultures remain neg. Transitioned to cefepime 1g q12 and received 1 dose vanc 1.5g (dose dependent due to kidney status). Trough goal of 13-17.  Pt spike another fever on 09/19 after a week of being afebrile, cultures sent. Completed antibiotic course of cefepime and vanc on 09/20      E: K>4, Phos>3, Mg>2  N: Nepro diet - CVVHD   DVT ppx: Argatroban   GI ppx: Protonix 40 BID  Access: 2 peripheral lines, left sided triple lumen, HD cath on right    DNR

## 2022-09-20 NOTE — PROGRESS NOTE ADULT - ATTENDING COMMENTS
I agree with the fellow's findings and plans as written above with the following additions/amendments:    Seen and examined at bedside on CVVHD - clots in filter noted, discussed with nursing who is aware and prepared to rinse back if need be. Otherwise no acute events, tolerating CVVHD with fluid off, will continue until possible trach. Continue CVVHD as above, further recs as above

## 2022-09-20 NOTE — PROGRESS NOTE ADULT - SUBJECTIVE AND OBJECTIVE BOX
CA LAGUERRE, 81y, Female  [id number]  Patient is a 81y old  Female who presents with a chief complaint of Acute hypoxic respiratory failure (20 Sep 2022 11:06)      OVERNIGHT EVENTS: n/a    SUBJECTIVE: Pt assessed at bedside, intubated and sedated. ROS unobtainable     12 Point ROS Negative unless noted otherwise above.  -------------------------------------------------------------------------------  VITAL SIGNS:  Vital Signs Last 24 Hrs  T(C): 36.8 (20 Sep 2022 17:03), Max: 37.4 (20 Sep 2022 06:04)  T(F): 98.2 (20 Sep 2022 17:03), Max: 99.3 (20 Sep 2022 06:04)  HR: 91 (20 Sep 2022 16:15) (73 - 92)  BP: 114/55 (20 Sep 2022 09:20) (114/55 - 114/55)  BP(mean): 79 (20 Sep 2022 09:20) (79 - 79)  RR: 26 (20 Sep 2022 16:15) (23 - 30)  SpO2: 93% (20 Sep 2022 16:15) (91% - 95%)    Parameters below as of 20 Sep 2022 16:15  Patient On (Oxygen Delivery Method): ventilator    O2 Concentration (%): 40  I&O's Summary    19 Sep 2022 07:01  -  20 Sep 2022 07:00  --------------------------------------------------------  IN: 2466.3 mL / OUT: 4762 mL / NET: -2295.7 mL    20 Sep 2022 07:01  -  20 Sep 2022 17:36  --------------------------------------------------------  IN: 875.4 mL / OUT: 1629 mL / NET: -753.6 mL        PHYSICAL EXAM:    General: obese, sedated, lying in bed.   HEENT: 2 mm pupils minimal reactive to light, + gag reflex  Respiratory: CTA B/L; no W/R/R, no retractions  Cardiac: distant heart sounds, +S1/S2; RRR; no M/R/G  Gastrointestinal: soft, NT/ND; no rebound or guarding  Extremities: b/l LE edema and dermatitis   Vascular: 2+ radial pulse right, diminished left  Neurologic: intubated and sedated    ALLERGIES:  Allergies    Altabax (Unknown)  Augmentin (Unknown)  clindamycin (Unknown)  Vaseline (Swelling)    Intolerances        MEDICATIONS:  MEDICATIONS  (STANDING):  albuterol/ipratropium for Nebulization 3 milliLiter(s) Nebulizer every 6 hours  argatroban Infusion 1.5 MICROgram(s)/kG/Min (9.21 mL/Hr) IV Continuous <Continuous>  chlorhexidine 0.12% Liquid 15 milliLiter(s) Oral Mucosa every 12 hours  chlorhexidine 2% Cloths 1 Application(s) Topical <User Schedule>  CRRT Treatment    <Continuous>  dextrose 5%. 1000 milliLiter(s) (50 mL/Hr) IV Continuous <Continuous>  dextrose 5%. 1000 milliLiter(s) (100 mL/Hr) IV Continuous <Continuous>  dextrose 50% Injectable 25 Gram(s) IV Push once  dextrose 50% Injectable 12.5 Gram(s) IV Push once  dextrose 50% Injectable 25 Gram(s) IV Push once  fentaNYL   Infusion. 0.5 MICROgram(s)/kG/Hr (5.12 mL/Hr) IV Continuous <Continuous>  glucagon  Injectable 1 milliGRAM(s) IntraMuscular once  influenza  Vaccine (HIGH DOSE) 0.7 milliLiter(s) IntraMuscular once  insulin lispro (ADMELOG) corrective regimen sliding scale   SubCutaneous every 6 hours  lidocaine   4% Patch 1 Patch Transdermal every 24 hours  midazolam Infusion 0.02 mG/kG/Hr (2.05 mL/Hr) IV Continuous <Continuous>  norepinephrine Infusion 0.05 MICROgram(s)/kG/Min (4.8 mL/Hr) IV Continuous <Continuous>  pantoprazole  Injectable 40 milliGRAM(s) IV Push every 24 hours  Phoxillum Filtration BK 4 / 2.5 5000 milliLiter(s) (2500 mL/Hr) CRRT <Continuous>  polyethylene glycol 3350 17 Gram(s) Oral daily  propofol Infusion 5 MICROgram(s)/kG/Min (3.07 mL/Hr) IV Continuous <Continuous>  senna 2 Tablet(s) Oral daily    MEDICATIONS  (PRN):  dextrose Oral Gel 15 Gram(s) Oral once PRN Blood Glucose LESS THAN 70 milliGRAM(s)/deciliter  sodium chloride 0.9% lock flush 10 milliLiter(s) IV Push every 1 hour PRN Pre/post blood products, medications, blood draw, and to maintain line patency      -------------------------------------------------------------------------------  LABS:                        8.3    3.87  )-----------( 50       ( 20 Sep 2022 05:00 )             28.0     09-20    134<L>  |  104  |  12  ----------------------------<  104<H>  4.0   |  23  |  1.08    Ca    8.0<L>      20 Sep 2022 11:58  Phos  4.2     09-20  Mg     2.4     09-20    TPro  7.2  /  Alb  2.5<L>  /  TBili  0.7  /  DBili  x   /  AST  25  /  ALT  20  /  AlkPhos  221<H>  09-20    LIVER FUNCTIONS - ( 20 Sep 2022 11:58 )  Alb: 2.5 g/dL / Pro: 7.2 g/dL / ALK PHOS: 221 U/L / ALT: 20 U/L / AST: 25 U/L / GGT: x           PT/INR - ( 20 Sep 2022 13:48 )   PT: 17.2 sec;   INR: 1.44          PTT - ( 20 Sep 2022 13:48 )  PTT:57.1 sec  Urinalysis Basic - ( 19 Sep 2022 16:08 )    Color: Kerri / Appearance: SL Cloudy / SG: >=1.030 / pH: x  Gluc: x / Ketone: Trace mg/dL  / Bili: Small / Urobili: 1.0 E.U./dL   Blood: x / Protein: 100 mg/dL / Nitrite: POSITIVE   Leuk Esterase: Trace / RBC: < 5 /HPF / WBC < 5 /HPF   Sq Epi: x / Non Sq Epi: 5-10 /HPF / Bacteria: Many /HPF      CAPILLARY BLOOD GLUCOSE      POCT Blood Glucose.: 100 mg/dL (20 Sep 2022 17:24)      Culture - Sputum (collected 19 Sep 2022 17:57)  Source: ET Tube ET Tube  Gram Stain (20 Sep 2022 08:30):    Rare epithelial cells    Moderate White blood cells    Few Gram Positive Rods    Rare Gram Negative Rods    Rare Yeast  Preliminary Report (20 Sep 2022 08:30):    Normal Respiratory Jana present to date    Urinalysis with Rflx Culture (collected 19 Sep 2022 16:08)    Culture - Blood (collected 19 Sep 2022 07:28)  Source: .Blood Blood-Peripheral  Preliminary Report (20 Sep 2022 14:00):    No growth at 1 day.    Culture - Blood (collected 19 Sep 2022 07:28)  Source: .Blood Blood-Peripheral  Preliminary Report (20 Sep 2022 14:00):    No growth at 1 day.      COVID-19 PCR: NotDetec (19 Sep 2022 11:24)  COVID-19 PCR: NotDetec (13 Sep 2022 04:34)  SARS-CoV-2: NotDetec (06 Sep 2022 10:32)  COVID-19 PCR: NotDetec (06 Sep 2022 10:25)  COVID-19 PCR: NotDetec (02 Jul 2022 13:33)      RADIOLOGY & ADDITIONAL TESTS: Reviewed.

## 2022-09-20 NOTE — PROCEDURE NOTE - NSUSCPTCODES_ED_ALL
91087 Duplex Scan of Extremity Veins (Unilateral)/40856 Unlisted US Procedure with Report [id number] Duplex Scan of Extremity Veins (Unilateral)

## 2022-09-21 NOTE — PROCEDURE NOTE - NSUSBLINES_ED_ALL
Right Upper Lobe/Right Middle Lobe/Right Lower Lobe/Left Upper Lobe/Left Lower Lobe

## 2022-09-21 NOTE — PROCEDURE NOTE - NSUSPOCSTATEMENT_ED_ALL
The patient/family was/were informed of limited nature of the exam. Representative images were printed to be scanned into the chart or directly uploaded into the medical record.

## 2022-09-21 NOTE — PROCEDURE NOTE - NSUSCPTCODES_ED_ALL
[id number] Duplex Scan of Extremity Veins (Unilateral)/[id number] Unlisted US Procedure with Report [id number] Duplex Scan of Extremity Veins (Unilateral)

## 2022-09-21 NOTE — PROCEDURE NOTE - NSUSFINDINGS_ED_ALL
Right Effusion/Left Effusion
Right Effusion/Left Effusion
Right Lung Slide/Left Lung Slide
negative DVT/List any findings
Right Effusion/Left Effusion
no DVTs/List any findings
no hydronephrosis/Other
Right Lung Slide/Left Lung Slide
Right Effusion/Left Effusion

## 2022-09-21 NOTE — PROCEDURE NOTE - NSUS ED PROCEDURE ASSISTED BY
Assistance was available

## 2022-09-21 NOTE — PROGRESS NOTE ADULT - SUBJECTIVE AND OBJECTIVE BOX
Patient is a 81y Female seen and evaluated at bedside. No acute distress, intubated and sedated. Tolerating CVVHD with fluid off.  Continue with CVVHD as planned.       Meds:    albuterol/ipratropium for Nebulization 3 every 6 hours  argatroban Infusion 0.75 <Continuous>  artificial  tears Solution 1 two times a day  BACItracin   Ointment 1 every 8 hours  chlorhexidine 0.12% Liquid 15 every 12 hours  chlorhexidine 2% Cloths 1 <User Schedule>  CRRT Treatment  <Continuous>  dextrose 5%. 1000 <Continuous>  dextrose 5%. 1000 <Continuous>  dextrose 50% Injectable 25 once  dextrose 50% Injectable 12.5 once  dextrose 50% Injectable 25 once  dextrose Oral Gel 15 once PRN  fentaNYL   Infusion. 0.5 <Continuous>  glucagon  Injectable 1 once  influenza  Vaccine (HIGH DOSE) 0.7 once  insulin lispro (ADMELOG) corrective regimen sliding scale  every 6 hours  lidocaine   4% Patch 1 every 24 hours  midazolam Infusion 0.02 <Continuous>  norepinephrine Infusion 0.05 <Continuous>  pantoprazole  Injectable 40 every 24 hours  Phoxillum Filtration BK 4 / 2.5 5000 <Continuous>  polyethylene glycol 3350 17 daily  propofol Infusion 5 <Continuous>  senna 2 daily  sodium chloride 0.9% lock flush 10 every 1 hour PRN      T(C): , Max: 37.1 (09-20-22 @ 21:44)  T(F): , Max: 98.8 (09-20-22 @ 21:44)  HR: 78 (09-21-22 @ 11:16)  BP: 128/60 (09-21-22 @ 09:00)  BP(mean): 86 (09-21-22 @ 09:00)  RR: 26 (09-21-22 @ 11:00)  SpO2: 91% (09-21-22 @ 11:16)  Wt(kg): --    09-20 @ 07:01  -  09-21 @ 07:00  --------------------------------------------------------  IN: 2473 mL / OUT: 4126 mL / NET: -1653 mL    09-21 @ 07:01  -  09-21 @ 12:01  --------------------------------------------------------  IN: 357.8 mL / OUT: 621 mL / NET: -263.2 mL          Review of Systems:  ROS negative except as per HPI      PHYSICAL EXAM:  Constitutional: Intubated, sedated; NAD  HEENT: ETT tube in place, clear secretions  Respiratory: Mechanical breath sounds bilaterally, not overbreathing vent  Cardiac: +S1/S2; RRR; no M/R/G  Gastrointestinal: soft, NT/ND; no rebound or guarding; +BS  Extremities: WWP, no clubbing or cyanosis; general low level anasarca, improving  Dermatologic: skin warm, dry and intact; no rashes, wounds, or scars  Access: [facility] c/d/i, accessed    LABS:                        7.2    5.23  )-----------( 40       ( 21 Sep 2022 03:19 )             24.5     09-21    135  |  105  |  13  ----------------------------<  112<H>  4.3   |  22  |  1.14    Ca    7.8<L>      21 Sep 2022 03:19  Phos  4.8     09-21  Mg     2.4     09-21    TPro  6.9  /  Alb  2.3<L>  /  TBili  0.8  /  DBili  x   /  AST  49<H>  /  ALT  22  /  AlkPhos  206<H>  09-21      PT/INR - ( 20 Sep 2022 13:48 )   PT: 17.2 sec;   INR: 1.44          PTT - ( 21 Sep 2022 03:19 )  PTT:63.0 sec  Urinalysis Basic - ( 19 Sep 2022 16:08 )    Color: Kerri / Appearance: SL Cloudy / SG: >=1.030 / pH: x  Gluc: x / Ketone: Trace mg/dL  / Bili: Small / Urobili: 1.0 E.U./dL   Blood: x / Protein: 100 mg/dL / Nitrite: POSITIVE   Leuk Esterase: Trace / RBC: < 5 /HPF / WBC < 5 /HPF   Sq Epi: x / Non Sq Epi: 5-10 /HPF / Bacteria: Many /HPF            RADIOLOGY & ADDITIONAL STUDIES:           Patient is a 81y Female seen and evaluated at bedside. No acute distress, intubated and sedated. Tolerating CVVHD with fluid off.  Continue with CVVHD as planned.       Meds:    albuterol/ipratropium for Nebulization 3 every 6 hours  argatroban Infusion 0.75 <Continuous>  artificial  tears Solution 1 two times a day  BACItracin   Ointment 1 every 8 hours  chlorhexidine 0.12% Liquid 15 every 12 hours  chlorhexidine 2% Cloths 1 <User Schedule>  CRRT Treatment  <Continuous>  dextrose 5%. 1000 <Continuous>  dextrose 5%. 1000 <Continuous>  dextrose 50% Injectable 25 once  dextrose 50% Injectable 12.5 once  dextrose 50% Injectable 25 once  dextrose Oral Gel 15 once PRN  fentaNYL   Infusion. 0.5 <Continuous>  glucagon  Injectable 1 once  influenza  Vaccine (HIGH DOSE) 0.7 once  insulin lispro (ADMELOG) corrective regimen sliding scale  every 6 hours  lidocaine   4% Patch 1 every 24 hours  midazolam Infusion 0.02 <Continuous>  norepinephrine Infusion 0.05 <Continuous>  pantoprazole  Injectable 40 every 24 hours  Phoxillum Filtration BK 4 / 2.5 5000 <Continuous>  polyethylene glycol 3350 17 daily  propofol Infusion 5 <Continuous>  senna 2 daily  sodium chloride 0.9% lock flush 10 every 1 hour PRN      T(C): , Max: 37.1 (09-20-22 @ 21:44)  T(F): , Max: 98.8 (09-20-22 @ 21:44)  HR: 78 (09-21-22 @ 11:16)  BP: 128/60 (09-21-22 @ 09:00)  BP(mean): 86 (09-21-22 @ 09:00)  RR: 26 (09-21-22 @ 11:00)  SpO2: 91% (09-21-22 @ 11:16)  Wt(kg): --    09-20 @ 07:01  -  09-21 @ 07:00  --------------------------------------------------------  IN: 2473 mL / OUT: 4126 mL / NET: -1653 mL    09-21 @ 07:01  -  09-21 @ 12:01  --------------------------------------------------------  IN: 357.8 mL / OUT: 621 mL / NET: -263.2 mL          Review of Systems:  ROS negative except as per HPI      PHYSICAL EXAM:  Constitutional: Intubated, sedated; NAD  HEENT: ETT tube in place, clear secretions  Respiratory: Mechanical breath sounds bilaterally, not overbreathing vent  Cardiac: +S1/S2; RRR; no M/R/G  Gastrointestinal: soft, NT/ND; no rebound or guarding; +BS  Extremities: WWP, no clubbing or cyanosis; general low level anasarca, improving  Dermatologic: skin warm, dry and intact; no rashes, wounds, or scars  Access: [facility] c/d/i, accessed    LABS:                        7.2    5.23  )-----------( 40       ( 21 Sep 2022 03:19 )             24.5     09-21    135  |  105  |  13  ----------------------------<  112<H>  4.3   |  22  |  1.14    Ca    7.8<L>      21 Sep 2022 03:19  Phos  4.8     09-21  Mg     2.4     09-21    TPro  6.9  /  Alb  2.3<L>  /  TBili  0.8  /  DBili  x   /  AST  49<H>  /  ALT  22  /  AlkPhos  206<H>  09-21      PT/INR - ( 20 Sep 2022 13:48 )   PT: 17.2 sec;   INR: 1.44          PTT - ( 21 Sep 2022 03:19 )  PTT:63.0 sec  Urinalysis Basic - ( 19 Sep 2022 16:08 )    Color: Kerri / Appearance: SL Cloudy / SG: >=1.030 / pH: x  Gluc: x / Ketone: Trace mg/dL  / Bili: Small / Urobili: 1.0 E.U./dL   Blood: x / Protein: 100 mg/dL / Nitrite: POSITIVE   Leuk Esterase: Trace / RBC: < 5 /HPF / WBC < 5 /HPF   Sq Epi: x / Non Sq Epi: 5-10 /HPF / Bacteria: Many /HPF            RADIOLOGY & ADDITIONAL STUDIES:    Hemoglobin: 7.2 g/dL (09-21-22 @ 03:19)  Phosphorus Level, Serum: 4.8 mg/dL (09-21-22 @ 03:19)  Phosphorus Level, Serum: 5.1 mg/dL (09-20-22 @ 23:48)  Phosphorus Level, Serum: 5.0 mg/dL (09-20-22 @ 18:20)    Albumin, Serum: 2.3 g/dL (09-21-22 @ 03:19)  Albumin, Serum: 2.4 g/dL (09-20-22 @ 18:19)        CRRT Treatment:   Modality: CVVHD, Filter: NxStage CAR-505, Target Blood Flow: 300 mL/Min  Target Fluid Balance: Titrate to net NEGATIVE fluid balance, 100 mL/Hr (09-21-22 @ 16:02) [Active]  Phoxillum Filtration BK 4 / 2.5: Solution, 5000 milliLiter(s) infuse at 2500 mL/Hr; infuse through CRRT Circuit  Administration Instructions: Continuous Renal Replacement Therapy (CRRT)  Special Instructions: Dialysate (09-21-22 @ 16:02) [Active]

## 2022-09-21 NOTE — PROCEDURE NOTE - NSUSEFFUSION_ED_ ALL
[facility] ([facility]) 8/11/2019 8:36 AM    Hospital Admission: 8/10/2019  3:48 PM   ICU Admission: 8/10/2019    Active Diagnoses and Problems  Principal Problem:    Acute on chronic respiratory failure with hypoxia and hypercapnia (CMS/HCC)  Active Problems:    Pulmonary Hypertension, severe d/t mitral regurgitation    Non-rheumatic mitral regurgitation    CAD (coronary artery disease)    CKD (chronic kidney disease) stage 5 => Dialysis initiated 5/14/2019    Chronic atrial fibrillation (CMS/HCC)    Chronic diastolic CHF (congestive heart failure) (CMS/[facility])    Current use of long term anticoagulation       [facility]    8/10/2019 CICU for acute hypoxemic, hypercapnic respiratory failure. Possible pneumonia. Mitral regurgitation with secondary pulmonary hypertension tricuspid regurgitation and chronic diastolic heart failure    Vital Signs and Fluid Balance    Vital Last Value 24 Hour Range   Temperature 98.2 °F (36.8 °C) (08/11/19 0400) Temp  Min: 96.2 °F (35.7 °C)  Max: 98.2 °F (36.8 °C)   Pulse 118 (08/11/19 0715) Pulse  Min: 93  Max: 143   Respiratory 18 (08/11/19 0715) Resp  Min: 8  Max: 25   Non-Invasive  Blood Pressure 108/56 (08/11/19 0715) BP  Min: 51/36  Max: 195/92   Pulse Oximetry 100 % (08/11/19 0715) SpO2  Min: 81 %  Max: 100 %   Arterial   Blood Pressure   No data recorded       I/O last 3 completed shifts:  In: -   Out: 425 [Urine:425]  Patient Vitals for the past 72 hrs:   Weight   08/10/19 2155 46.3 kg   08/10/19 1838 47.1 kg     Admission: Weight: 47.1 kg (08/10/19 1838)  BMI (Calculated): 22.47 (08/10/19 1838)      Impression and Interventions       Inconsistent grimace to pain. Otherwise unresponsive. Symmetric face. Hyporeflexic  => Fentanyl prn    Valvular heart disease. Secondary pulmonary hypertension. Cold digits with poor capillary refill. Norepinephrine infusing 3 mcg/min. Discussed with nephrology. Patient is really not obtained symptomatic improvement with dialytic therapy since 
None
May.  => Hold off dialytic therapy today  => Decrease IV fluid    Hypoxemic respiratory failure. Pulmonary edema with bilateral pleural effusions & passive atelectasis. Ventilated with assist control. Ventilating volumes adjusted to 300 mL ×15 breaths per minute. PEEP increased to 7. We have been able to wean FiO2 to 40% over the course of the day.    Normal liver function tests. We'll hold off placing nasojejunal feeding tube given concerns raised by family.    Sacral decub stage 1-2 noted on admission & evaluated with wound care.    Prolonged discussion with the patient's daughter & son at the bedside with respect to patient's wishes and advance care planning. We've reviewed together her advance directive. Patient has terminal valvular heart disease & pulmonary hypertension. Her advance directive is clear that she would not want invasive life support measures.  CODE STATUS: DO NOT RESUSCITATE. Maximal medical support.  Tomorrow morning, when the daughter arrives she would like us to change the focus of our care to comfort measures only. We discussed terminal wean & extubation.    [facility] Attestation       Ms. Goodman is critically ill as documented above. I evaluated the patient and reviewed imaging and laboratory data. Critical care services I provided [id number] > 90 minutes not including time allocated for procedures.  Time required for my critical care services which I have documented is not included in charges for critical care services rendered by other clinicians.. Total time for critical care services rendered independently by [facility] clinicians so far today > 90 minutes.    Carlito Liu MD  [phone] (Pager)  [phone] ([facility] Telephone Menu)      
None

## 2022-09-21 NOTE — PROGRESS NOTE ADULT - ASSESSMENT
82yo morbidly obese female w/ pmhx of HTN, HLD, HFpEF (EF 56%), COPD (on home O2-3L), chronic LE edema and cellulitis, DALIA, PVD p/w respiratory distress 2/2 severe sepsis due to cellulitis on lower extremity which progressed to septic shock in setting of pneumonia. Hospital course complicated by cardiac arrest, flail chest, and oliguric BRANT progressing to kidney failure.     NEURO  #Encephalopathy 2/2 to cardiac arrest  s/p cardiac arrest 2/2 hypoxia due to aspiration. Prior to arrest, pt was awake and alert on the RMF. Difficult to assess current status due to continued ventilation and sedation requirements in the setting on possible VAP and flail chest. VC AC w/ FiO2 40, Vt 450, RR 26, PEEP 5. Currently on fentanyl and versed.  - continue fentanyl and versed  - continue to assess mentation     PULM    #Acute on chronic resp failure  Pt w/ COPD on home 3 L O2, currently worsening hypoxia 2/2 to Pneumonia vs flash pulmonary edema. Pt endured cardiac arrest secondary to resp failure likely in setting of aspiration while eating vs flash pulmonary edema 2/2 chronic htn and chronic HF (relatively the same EF as previous TTE). New onset pleural effusions likely in the same setting, pt s/p intubation.  - Trach placement tomorrow- will hold Argatroban tomorrow, ordered coags for AM and T+S, NPO at midnight    #Pneumothorax  Pt presented to MICU with signs of decreased lung sliding on bedside ultrasound s/p cardiac arrest and chest compressions. Drop in sats to low 70s on 09/08 w/ x-ray confirming Left sided pneumothorax, no BP changes. Chest tube placed with improvement in sats to high 90s again. Repeat x-rays showing lung reexpansion  - chest tube still in pace, set to water seal, will keep chest tube in place iso PEEP     #Broken ribs w/ flail chest  Pt with several broken ribs and signs of flail chest with difficulty breathing when sedation weaned off. Pt currently intubated and sedated. Thoracic surgery consulted for possible sternal plating. Anesthesia consulted, spinal block on 09/12 for pain management. Pt remains unstable and not surgery candidate at this time.  - continue to monitor respirations    #Chronic obstructive pulmonary disease (COPD).   Pt with COPD on home 3L. Patient initially presented with NRB, now intubated s/p cardiac arrest. Started on mucomyst  - continue mucomyst      CARDIO  #Septic shock - resolved   2/2 pneumonia. Cultures remain neg. Perfusion - warm on palpation, not producing urine, normal lactate. Volume - Moist mucus membranes, edematous in extremities and receiving CVVHD. Hemodynamically stable while on pressors. Levo 0.1564  - Wean pressors and sedation as tolerated    #Chronic heart failure with preserved ejection fraction (HFpEF).   Pt on home bumex 2mg.  TTE from current visit showing EF of 56% with Pulmonary HTN - PAP 48. Experienced pulmonary edema and effusion postcardiac arrest.   Plan:  - c/w CVVHD to reduce fluid overload in the setting of acute kidney failure    #Cardiac Arrest on 09/07  possibly 2/2 hypoxia in setting of aspiration vs flash pulmonary edema. Pt relatively hypotensive throughout the day with episodes of hypertension to 130/140 systolic. received fluids on PM 09/07 and a unit of blood on 09/07 for anemia. In PM on 09/07, complained of difficulty breathing during meal, found unresponsive, hypotensive, w/ blue complexion by nurse.      #Afib  Experienced a-fib on 09/13 w RVR. self resolved w/o intervention. 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   Pt with history of hypertension on home diltiazem 180mg daily. Currently on pressors s/p septic shock.   Plan:  - holding home antihypertensive meds    GI  No active issues      RENAL  #Anuric BRANT on CKD  Baseline Cr 2.04 in July 2022, currently uptrending to 3.3. Likely 2/2 to hypoperfusion/ischemia in setting of sepsis and cardiac arrest. Nephro consulted. HD cath placed on 09/12 for CVVHD in setting of worsening electrolytes and increasing fluid status. Goal net neg 100cc/hr  Plan:  - c/w qb 300ml/min  UF: net negative 100ml/hr  DFR: 2L/hour  - Dialysate - Phoxillum   - q6H BMP mag and phos while on CVVHD  - maintain MAP >70 for adequate renal perfusion  - strict I/Os    #Right kidney mass.   CTAP s/f slightly hyperdense solid cortical mass in upper pole of R kidney.  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 D/c'd heparin and started on Argatroban  - f/u ptt    #Anemia  Pt noted to have chronic anemia, presented with HgB of 6.8 w/ drop to 6.7 during course of stay. s/p 1 unit RBC on 09/07, remained stable until 09/13 when CVVHD began and drop in Hgb to 6.8. Received 1u RBC, back up to 7.4, hemolysis unlikely as hapto and bili normal. Received another unit on 09/19  Plan:  - f/u H+H  - active type and screen     ID  #Pneumonia  possibly [email or web address]. Pt w/ bilateral pleural effusions and possible underlying infiltrate, unclear on x-ray. Blood cultures remain negative, sputum cultures neg w/ gram stain showing rare epithelial cells, few WBC's, gram pos rods and cocci in pairs. Currently on empiric antibiotics, cefepime and vanc (dosed daily)    #Septic shock - resolved   likely 2/2 pneumonia. New tmax and worsened pleural effusions on 09/12. BLE edema, erythema, scaly skin c/w cellulitis. CTX 2g IV QD on 09/7 but new t-max on night of 09/11-12, cultures remain neg. Transitioned to cefepime 1g q12 and received 1 dose vanc 1.5g (dose dependent due to kidney status). Trough goal of 13-17.  Pt spike another fever on 09/19 after a week of being afebrile, cultures sent. Completed antibiotic course of cefepime and vanc on 09/20      E: K>4, Phos>3, Mg>2  N: Nepro diet - CVVHD   DVT ppx: Argatroban   GI ppx: Protonix 40 BID  Access: 2 peripheral lines, left sided triple lumen, HD cath on right    DNR       80yo morbidly obese female w/ pmhx of HTN, HLD, HFpEF (EF 56%), COPD (on home O2-3L), chronic LE edema and cellulitis, DALIA, PVD p/w respiratory distress 2/2 severe sepsis due to cellulitis on lower extremity which progressed to septic shock in setting of pneumonia. Hospital course complicated by cardiac arrest, flail chest, and oliguric BRANT progressing to kidney failure.     NEURO  #Encephalopathy 2/2 to cardiac arrest  s/p cardiac arrest 2/2 hypoxia due to aspiration. Prior to arrest, pt was awake and alert on the RMF. Difficult to assess current status due to continued ventilation and sedation requirements in the setting on possible VAP and flail chest. VC AC w/ FiO2 40, Vt 450, RR 26, PEEP 5. Currently on fentanyl and versed.  - continue fentanyl and versed  - continue to assess mentation     PULM    #Acute on chronic resp failure  Pt w/ COPD on home 3 L O2, currently worsening hypoxia 2/2 to Pneumonia vs flash pulmonary edema. Pt endured cardiac arrest secondary to resp failure likely in setting of aspiration while eating vs flash pulmonary edema 2/2 chronic htn and chronic HF (relatively the same EF as previous TTE). New onset pleural effusions likely in the same setting, pt s/p intubation.  - Trach placement tomorrow- will hold Argatroban tomorrow, ordered coags for AM and T+S, NPO at midnight    #Pneumothorax  Pt presented to MICU with signs of decreased lung sliding on bedside ultrasound s/p cardiac arrest and chest compressions. Drop in sats to low 70s on 09/08 w/ x-ray confirming Left sided pneumothorax, no BP changes. Chest tube placed with improvement in sats to high 90s again. Repeat x-rays showing lung reexpansion  - chest tube still in pace, set to water seal, will keep chest tube in place iso PEEP     #Broken ribs w/ flail chest  Pt with several broken ribs and signs of flail chest with difficulty breathing when sedation weaned off. Pt currently intubated and sedated. Thoracic surgery consulted for possible sternal plating. Anesthesia consulted, spinal block on 09/12 for pain management. Pt remains unstable and not surgery candidate at this time.  - continue to monitor respirations    #Chronic obstructive pulmonary disease (COPD).   Pt with COPD on home 3L. Patient initially presented with NRB, now intubated s/p cardiac arrest. Started on mucomyst  - continue mucomyst      CARDIO  #Septic shock - resolved   2/2 pneumonia. Cultures remain neg. Perfusion - warm on palpation, not producing urine, normal lactate. Volume - Moist mucus membranes, edematous in extremities and receiving CVVHD. Hemodynamically stable while on pressors.  - continue Levo .05 mcg/kg/min  - continue fentanyl 0.5 mcg/kg/hr, midazolam 0.02 mg/kg/hr, propofol 5 mcg/kg/min  - Wean pressors and sedation as tolerated    #Chronic heart failure with preserved ejection fraction (HFpEF).   Pt on home bumex 2mg.  TTE from current visit showing EF of 56% with Pulmonary HTN - PAP 48. Experienced pulmonary edema and effusion postcardiac arrest.   Plan:  - continue CVVHD to reduce fluid overload in the setting of acute kidney failure    #Cardiac Arrest on 09/07  possibly 2/2 hypoxia in setting of aspiration vs flash pulmonary edema. Pt relatively hypotensive throughout the day with episodes of hypertension to 130/140 systolic. received fluids on PM 09/07 and a unit of blood on 09/07 for anemia. In PM on 09/07, complained of difficulty breathing during meal, found unresponsive, hypotensive, w/ blue complexion by nurse.    - currently intubated and sedated    #Afib  Experienced a-fib on 09/13 w RVR. self resolved w/o intervention. 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   Pt with history of hypertension on home diltiazem 180mg daily. Currently on pressors s/p septic shock.   Plan:  - holding home antihypertensive meds    GI  No active issues      RENAL  #Anuric BRANT on CKD  Baseline Cr 2.04 in July 2022, currently uptrending to 3.3. Likely 2/2 to hypoperfusion/ischemia in setting of sepsis and cardiac arrest. Nephro consulted. HD cath placed on 09/12 for CVVHD in setting of worsening electrolytes and increasing fluid status. Goal net neg 100cc/hr  Plan:  - c/w qb 300ml/min  UF: net negative 100ml/hr  DFR: 2L/hour  - Dialysate - Phoxillum   - q6H BMP mag and phos while on CVVHD  - maintain MAP >70 for adequate renal perfusion  - strict I/Os    #Right kidney mass.   CTAP s/f slightly hyperdense solid cortical mass in upper pole of R kidney.  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 D/c'd heparin and started on Argatroban  - f/u ptt    #Anemia  Pt noted to have chronic anemia, presented with HgB of 6.8 w/ drop to 6.7 during course of stay. s/p 1 unit RBC on 09/07, remained stable until 09/13 when CVVHD began and drop in Hgb to 6.8. Received 1u RBC, back up to 7.4, hemolysis unlikely as hapto and bili normal. Received another unit on 09/19  Plan:  - f/u H+H  - active type and screen     ID  #Pneumonia  possibly [email or web address]. Pt w/ bilateral pleural effusions and possible underlying infiltrate, unclear on x-ray. Blood cultures remain negative, sputum cultures neg w/ gram stain showing rare epithelial cells, few WBC's, gram pos rods and cocci in pairs. Currently on empiric antibiotics, cefepime and vanc (dosed daily)    #Septic shock - resolved   likely 2/2 pneumonia. New tmax and worsened pleural effusions on 09/12. BLE edema, erythema, scaly skin c/w cellulitis. CTX 2g IV QD on 09/7 but new t-max on night of 09/11-12, cultures remain neg. Transitioned to cefepime 1g q12 and received 1 dose vanc 1.5g (dose dependent due to kidney status). Trough goal of 13-17.  Pt spike another fever on 09/19 after a week of being afebrile, cultures sent. Completed antibiotic course of cefepime and vanc on 09/20      E: K>4, Phos>3, Mg>2  N: Nepro diet - CVVHD   DVT ppx: Argatroban   GI ppx: Protonix 40 BID  Access: 2 peripheral lines, left sided triple lumen, HD cath on right    DNR       82yo morbidly obese female w/ pmhx of HTN, HLD, HFpEF (EF 56%), COPD (on home O2-3L), chronic LE edema and cellulitis, DALIA, PVD p/w respiratory distress 2/2 severe sepsis due to cellulitis on lower extremity which progressed to septic shock in setting of pneumonia. Hospital course complicated by cardiac arrest, flail chest, and oliguric BRANT progressing to kidney failure.     NEURO  #Encephalopathy 2/2 to cardiac arrest  s/p cardiac arrest 2/2 hypoxia due to aspiration. Prior to arrest, pt was awake and alert on the RMF. Difficult to assess current status due to continued ventilation and sedation requirements in the setting on possible VAP and flail chest. VC AC w/ FiO2 40, Vt 450, RR 26, PEEP 5. Currently on fentanyl and versed.  - continue fentanyl and versed  - continue to assess mentation     PULM    #Acute on chronic resp failure  Pt w/ COPD on home 3 L O2, currently worsening hypoxia 2/2 to Pneumonia vs flash pulmonary edema. Pt endured cardiac arrest secondary to resp failure likely in setting of aspiration while eating vs flash pulmonary edema 2/2 chronic htn and chronic HF (relatively the same EF as previous TTE). New onset pleural effusions likely in the same setting, pt s/p intubation.  - Trach placement tomorrow- will hold Argatroban tomorrow, ordered coags for AM and T+S, NPO at midnight    #Pneumothorax  Pt presented to MICU with signs of decreased lung sliding on bedside ultrasound s/p cardiac arrest and chest compressions. Drop in sats to low 70s on 09/08 w/ x-ray confirming Left sided pneumothorax, no BP changes. Chest tube placed with improvement in sats to high 90s again. Repeat x-rays showing lung reexpansion  - chest tube still in pace, set to water seal, will keep chest tube in place iso PEEP     #Broken ribs w/ flail chest  Pt with several broken ribs and signs of flail chest with difficulty breathing when sedation weaned off. Pt currently intubated and sedated. Thoracic surgery consulted for possible sternal plating. Anesthesia consulted, spinal block on 09/12 for pain management. Pt remains unstable and not surgery candidate at this time.  - continue to monitor respirations    #Chronic obstructive pulmonary disease (COPD).   Pt with COPD on home 3L. Patient initially presented with NRB, now intubated s/p cardiac arrest. Started on mucomyst  - continue mucomyst      CARDIO  #Septic shock - resolved   2/2 pneumonia. Cultures remain neg. Perfusion - warm on palpation, not producing urine, normal lactate. Volume - Moist mucus membranes, edematous in extremities and receiving CVVHD. Hemodynamically stable while on pressors.  - continue Levo .05 mcg/kg/min  - continue fentanyl 0.5 mcg/kg/hr, midazolam 0.02 mg/kg/hr, propofol 5 mcg/kg/min  - Wean pressors and sedation as tolerated    #Chronic heart failure with preserved ejection fraction (HFpEF).   Pt on home bumex 2mg.  TTE from current visit showing EF of 56% with Pulmonary HTN - PAP 48. Experienced pulmonary edema and effusion postcardiac arrest.   Plan:  - continue CVVHD to reduce fluid overload in the setting of acute kidney failure    #Cardiac Arrest on 09/07  possibly 2/2 hypoxia in setting of aspiration vs flash pulmonary edema. Pt relatively hypotensive throughout the day with episodes of hypertension to 130/140 systolic. received fluids on PM 09/07 and a unit of blood on 09/07 for anemia. In PM on 09/07, complained of difficulty breathing during meal, found unresponsive, hypotensive, w/ blue complexion by nurse.    - currently intubated and sedated    #Afib  Experienced a-fib on 09/13 w RVR. self resolved w/o intervention. 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   Pt with history of hypertension on home diltiazem 180mg daily. Currently on pressors s/p septic shock.   Plan:  - holding home antihypertensive meds    GI  No active issues      RENAL  #Anuric BRANT on CKD  Baseline Cr 2.04 in July 2022, currently uptrending to 3.3. Likely iATN 2/2 to hypoperfusion/ischemia in setting of sepsis and cardiac arrest. HD cath placed on 09/12 for CVVHD in setting of worsening electrolytes and increasing fluid status. Goal net neg 100cc/hr. Patient currently anuric  - On CVVHD since 9/12  - Continue CVVHD as above - CVVHD rx: qb 300ml/min  UF: net negative 100ml/hr  DFR: 2.5L/hour  Dialysate to Phoxillum   q6H BMP mag and phos while on CVVHD  maintain MAP >70 for adequate renal perfusion  - strict I/Os  - renal following, appreciate recs    #Right kidney mass.   CTAP s/f slightly hyperdense solid cortical mass in upper pole of R kidney.  Plan:  - consider additional renal imaging when stable    ENDO  No active issues      HEME  Heparin Induced Thrombocytopenia  Hep PF4 Ab positive  D/c'd heparin and started on Argatroban  - PTT goal of 45-90  - aptt q 2 hrs  - will hold argatroban tomorrow AM for trach     #Anemia  Pt noted to have chronic anemia, presented with HgB of 6.8 w/ drop to 6.7 during course of stay. s/p 1 unit RBC on 09/07, remained stable until 09/13 when CVVHD began and drop in Hgb to 6.8. Received 1u RBC, back up to 7.4, hemolysis unlikely as hapto and bili normal. Received another unit on 09/19  Plan:  - f/u H+H  - active type and screen     ID  #Pneumonia  possibly [email or web address]. Pt w/ bilateral pleural effusions and possible underlying infiltrate, unclear on x-ray. Blood cultures remain negative, sputum cultures neg w/ gram stain showing rare epithelial cells, few WBC's, gram pos rods and cocci in pairs. Patient finished cefepime and vanc on 9/20    #Septic shock - resolved   likely 2/2 pneumonia. New tmax and worsened pleural effusions on 09/12. BLE edema, erythema, scaly skin c/w cellulitis. CTX 2g IV QD on 09/7 but new t-max on night of 09/11-12, cultures remain neg. Transitioned to cefepime 1g q12 and received 1 dose vanc 1.5g (dose dependent due to kidney status). Trough goal of 13-17.  Pt spike another fever on 09/19 after a week of being afebrile, cultures sent. Completed antibiotic course of cefepime and vanc on 09/20      E: K>4, Phos>3, Mg>2  N: Nepro diet - CVVHD   DVT ppx: Argatroban   GI ppx: Protonix 40 BID  Access: 2 peripheral lines, left sided triple lumen, HD cath on right    DNR       82yo morbidly obese female w/ pmhx of HTN, HLD, HFpEF (EF 56%), COPD (on home O2-3L), chronic LE edema and cellulitis, DALIA, PVD p/w respiratory distress 2/2 severe sepsis due to cellulitis on lower extremity which progressed to septic shock in setting of pneumonia. Hospital course complicated by cardiac arrest, flail chest, and oliguric BRANT progressing to kidney failure.     NEURO  #Encephalopathy 2/2 to cardiac arrest  s/p cardiac arrest 2/2 hypoxia due to aspiration. Prior to arrest, pt was awake and alert on the RMF. Difficult to assess current status due to continued ventilation and sedation requirements in the setting on possible VAP and flail chest. VC AC w/ FiO2 40, Vt 450, RR 26, PEEP 5. Currently on fentanyl and versed.  - continue fentanyl and versed  - continue to assess mentation     PULM    #Acute on chronic resp failure  Pt w/ COPD on home 3 L O2, currently worsening hypoxia 2/2 to Pneumonia vs flash pulmonary edema. Pt endured cardiac arrest secondary to resp failure likely in setting of aspiration while eating vs flash pulmonary edema 2/2 chronic htn and chronic HF (relatively the same EF as previous TTE). New onset pleural effusions likely in the same setting, pt s/p intubation.  - Trach placement tomorrow- will hold Argatroban tomorrow, ordered coags for AM and T+S, NPO at midnight    #Pneumothorax  Pt presented to MICU with signs of decreased lung sliding on bedside ultrasound s/p cardiac arrest and chest compressions. Drop in sats to low 70s on 09/08 w/ x-ray confirming Left sided pneumothorax, no BP changes. Chest tube placed with improvement in sats to high 90s again. Repeat x-rays showing lung reexpansion  - chest tube still in pace, set to water seal, will keep chest tube in place iso PEEP     #Broken ribs w/ flail chest  Pt with several broken ribs and signs of flail chest with difficulty breathing when sedation weaned off. Pt currently intubated and sedated. Thoracic surgery consulted for possible sternal plating. Anesthesia consulted, spinal block on 09/12 for pain management. Pt remains unstable and not surgery candidate at this time.  - continue to monitor respirations    #Chronic obstructive pulmonary disease (COPD).   Pt with COPD on home 3L. Patient initially presented with NRB, now intubated s/p cardiac arrest. Started on mucomyst  - continue mucomyst      CARDIO  #Septic shock - resolved   2/2 pneumonia. Cultures remain neg. Perfusion - warm on palpation, not producing urine, normal lactate. Volume - Moist mucus membranes, edematous in extremities and receiving CVVHD. Hemodynamically stable while on pressors.  - continue Levo .05 mcg/kg/min  - continue fentanyl 0.5 mcg/kg/hr, midazolam 0.02 mg/kg/hr, propofol 5 mcg/kg/min  - Wean pressors and sedation as tolerated    #Chronic heart failure with preserved ejection fraction (HFpEF).   Pt on home bumex 2mg.  TTE from current visit showing EF of 56% with Pulmonary HTN - PAP 48. Experienced pulmonary edema and effusion postcardiac arrest.   Plan:  - continue CVVHD to reduce fluid overload in the setting of acute kidney failure    #Cardiac Arrest on 09/07  possibly 2/2 hypoxia in setting of aspiration vs flash pulmonary edema. Pt relatively hypotensive throughout the day with episodes of hypertension to 130/140 systolic. received fluids on PM 09/07 and a unit of blood on 09/07 for anemia. In PM on 09/07, complained of difficulty breathing during meal, found unresponsive, hypotensive, w/ blue complexion by nurse.    - currently intubated and sedated    #Afib  Experienced a-fib on 09/13 w RVR. self resolved w/o intervention. 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   Pt with history of hypertension on home diltiazem 180mg daily. Currently on pressors s/p septic shock.   Plan:  - holding home antihypertensive meds    GI  No active issues      RENAL  #Anuric BRANT on CKD  Baseline Cr 2.04 in July 2022, currently uptrending to 3.3. Likely iATN 2/2 to hypoperfusion/ischemia in setting of sepsis and cardiac arrest. HD cath placed on 09/12 for CVVHD in setting of worsening electrolytes and increasing fluid status. Goal net neg 100cc/hr. Patient currently anuric  - On CVVHD since 9/12  - Continue CVVHD as above - CVVHD rx: qb 300ml/min  UF: net negative 100ml/hr  DFR: 2.5L/hour  Dialysate to Phoxillum   q6H BMP mag and phos while on CVVHD  maintain MAP >70 for adequate renal perfusion  - strict I/Os  - renal following, appreciate recs    #Right kidney mass.   CTAP s/f slightly hyperdense solid cortical mass in upper pole of R kidney.  Plan:  - consider additional renal imaging when stable    ENDO  No active issues      HEME  Heparin Induced Thrombocytopenia  Hep PF4 Ab positive  D/c'd heparin and started on Argatroban  - PTT goal of 45-90  - check PTT daily  - will hold argatroban tomorrow AM for trach     #Anemia  Pt noted to have chronic anemia, presented with HgB of 6.8 w/ drop to 6.7 during course of stay. s/p 1 unit RBC on 09/07, remained stable until 09/13 when CVVHD began and drop in Hgb to 6.8. Received 1u RBC, back up to 7.4, hemolysis unlikely as hapto and bili normal. Received another unit on 09/19  Plan:  - f/u H+H  - active type and screen     ID  #Pneumonia  possibly [email or web address]. Pt w/ bilateral pleural effusions and possible underlying infiltrate, unclear on x-ray. Blood cultures remain negative, sputum cultures neg w/ gram stain showing rare epithelial cells, few WBC's, gram pos rods and cocci in pairs. Patient finished cefepime and vanc on 9/20    #Septic shock - resolved   likely 2/2 pneumonia. New tmax and worsened pleural effusions on 09/12. BLE edema, erythema, scaly skin c/w cellulitis. CTX 2g IV QD on 09/7 but new t-max on night of 09/11-12, cultures remain neg. Transitioned to cefepime 1g q12 and received 1 dose vanc 1.5g (dose dependent due to kidney status). Trough goal of 13-17.  Pt spike another fever on 09/19 after a week of being afebrile, cultures sent. Completed antibiotic course of cefepime and vanc on 09/20      E: K>4, Phos>3, Mg>2  N: Nepro diet - CVVHD   DVT ppx: Argatroban   GI ppx: Protonix 40 BID  Access: 2 peripheral lines, left sided triple lumen, HD cath on right    DNR

## 2022-09-21 NOTE — PROGRESS NOTE ADULT - SUBJECTIVE AND OBJECTIVE BOX
GABBY LAYTON  81y  Female    Patient is a 81y old  Female who presents with a chief complaint of Acute hypoxic respiratory failure (21 Sep 2022 11:58)      INTERVAL HPI/OVERNIGHT EVENTS: As per night team, patient desaturated and FIO2 was increased to 50. AM FI02 decreased back to 40. Patient was seen and examined at bedside. Patient is intubated and sedated. Patient unresponsive and not able to respond to ROS.       T(C): 36 (09-21-22 @ 14:29), Max: 37.1 (09-20-22 @ 21:44)  HR: 79 (09-21-22 @ 15:00) (70 - 93)  BP: 128/60 (09-21-22 @ 09:00) (128/60 - 128/60)  RR: 26 (09-21-22 @ 15:00) (25 - 26)  SpO2: 93% (09-21-22 @ 15:00) (90% - 100%)  Wt(kg): --Vital Signs Last 24 Hrs  T(C): 36 (21 Sep 2022 14:29), Max: 37.1 (20 Sep 2022 21:44)  T(F): 96.8 (21 Sep 2022 14:29), Max: 98.8 (20 Sep 2022 21:44)  HR: 79 (21 Sep 2022 15:00) (70 - 93)  BP: 128/60 (21 Sep 2022 09:00) (128/60 - 128/60)  BP(mean): 86 (21 Sep 2022 09:00) (86 - 86)  RR: 26 (21 Sep 2022 15:00) (25 - 26)  SpO2: 93% (21 Sep 2022 15:00) (90% - 100%)    Parameters below as of 21 Sep 2022 15:00  Patient On (Oxygen Delivery Method): ventilator    O2 Concentration (%): 37    PHYSICAL EXAM:  GENERAL: morbidly obese female on ventilation and sedated; NAD  Neuro: AAOx0; + gag reflex with bloody sputum and +sluggish light pupillary reflex; patient unable to be aroused, not able to follow commands  HEENT: NC/AT; MMM; wound expanding philtrum; neck supple; no scleral icterus; nasal passages clear; no thyroid or LN enlargement  Heart: RRR; +s1 and s2; no MRG; non displaced PMI; no JVD  Lungs: mechanical breath sounds; on ventilation   GI: protuberant abdomen; nondistended; normal bowel sounds [id number]   Extremities: cellulitis to knee b/l; UE 2+ edema with 1+ pulses  Skin: cellulitis of LE and edematous of UE extending to the forearm       Consultant(s) Notes Reviewed:  [x ] YES  [ ] NO  Care Discussed with Consultants/Other Providers [ x] YES  [ ] NO    LABS:                        7.2    5.23  )-----------( 40       ( 21 Sep 2022 03:19 )             24.5     09-21    135  |  105  |  13  ----------------------------<  112<H>  4.3   |  22  |  1.14    Ca    7.8<L>      21 Sep 2022 03:19  Phos  4.8     09-21  Mg     2.4     09-21    TPro  6.9  /  Alb  2.3<L>  /  TBili  0.8  /  DBili  x   /  AST  49<H>  /  ALT  22  /  AlkPhos  206<H>  09-21      PT/INR - ( 20 Sep 2022 13:48 )   PT: 17.2 sec;   INR: 1.44          PTT - ( 21 Sep 2022 03:19 )  PTT:63.0 sec  Urinalysis Basic - ( 19 Sep 2022 16:08 )    Color: Kerri / Appearance: SL Cloudy / SG: >=1.030 / pH: x  Gluc: x / Ketone: Trace mg/dL  / Bili: Small / Urobili: 1.0 E.U./dL   Blood: x / Protein: 100 mg/dL / Nitrite: POSITIVE   Leuk Esterase: Trace / RBC: < 5 /HPF / WBC < 5 /HPF   Sq Epi: x / Non Sq Epi: 5-10 /HPF / Bacteria: Many /HPF      CAPILLARY BLOOD GLUCOSE      POCT Blood Glucose.: 140 mg/dL (21 Sep 2022 11:04)  POCT Blood Glucose.: 115 mg/dL (21 Sep 2022 05:17)  POCT Blood Glucose.: 101 mg/dL (20 Sep 2022 23:11)  POCT Blood Glucose.: 100 mg/dL (20 Sep 2022 17:24)      ABG - ( 21 Sep 2022 03:19 )  pH, Arterial: 7.32  pH, Blood: x     /  pCO2: 41    /  pO2: 72    / HCO3: 21    / Base Excess: -4.8  /  SaO2: 97.1              Urinalysis Basic - ( 19 Sep 2022 16:08 )    Color: Kerri / Appearance: SL Cloudy / SG: >=1.030 / pH: x  Gluc: x / Ketone: Trace mg/dL  / Bili: Small / Urobili: 1.0 E.U./dL   Blood: x / Protein: 100 mg/dL / Nitrite: POSITIVE   Leuk Esterase: Trace / RBC: < 5 /HPF / WBC < 5 /HPF   Sq Epi: x / Non Sq Epi: 5-10 /HPF / Bacteria: Many /HPF        MEDICATIONS  (STANDING):  albuterol/ipratropium for Nebulization 3 milliLiter(s) Nebulizer every 6 hours  argatroban Infusion 0.75 MICROgram(s)/kG/Min (4.6 mL/Hr) IV Continuous <Continuous>  artificial  tears Solution 1 Drop(s) Both EYES two times a day  BACItracin   Ointment 1 Application(s) Topical every 8 hours  chlorhexidine 0.12% Liquid 15 milliLiter(s) Oral Mucosa every 12 hours  chlorhexidine 2% Cloths 1 Application(s) Topical <User Schedule>  CRRT Treatment    <Continuous>  CRRT Treatment    <Continuous>  dextrose 5%. 1000 milliLiter(s) (50 mL/Hr) IV Continuous <Continuous>  dextrose 5%. 1000 milliLiter(s) (100 mL/Hr) IV Continuous <Continuous>  dextrose 50% Injectable 25 Gram(s) IV Push once  dextrose 50% Injectable 12.5 Gram(s) IV Push once  dextrose 50% Injectable 25 Gram(s) IV Push once  fentaNYL   Infusion. 0.5 MICROgram(s)/kG/Hr (5.12 mL/Hr) IV Continuous <Continuous>  glucagon  Injectable 1 milliGRAM(s) IntraMuscular once  influenza  Vaccine (HIGH DOSE) 0.7 milliLiter(s) IntraMuscular once  insulin lispro (ADMELOG) corrective regimen sliding scale   SubCutaneous every 6 hours  lidocaine   4% Patch 1 Patch Transdermal every 24 hours  midazolam Infusion 0.02 mG/kG/Hr (2.05 mL/Hr) IV Continuous <Continuous>  norepinephrine Infusion 0.05 MICROgram(s)/kG/Min (4.8 mL/Hr) IV Continuous <Continuous>  pantoprazole  Injectable 40 milliGRAM(s) IV Push every 24 hours  Phoxillum Filtration BK 4 / 2.5 5000 milliLiter(s) (2500 mL/Hr) CRRT <Continuous>  polyethylene glycol 3350 17 Gram(s) Oral daily  propofol Infusion 5 MICROgram(s)/kG/Min (3.07 mL/Hr) IV Continuous <Continuous>  senna 2 Tablet(s) Oral daily    MEDICATIONS  (PRN):  dextrose Oral Gel 15 Gram(s) Oral once PRN Blood Glucose LESS THAN 70 milliGRAM(s)/deciliter  sodium chloride 0.9% lock flush 10 milliLiter(s) IV Push every 1 hour PRN Pre/post blood products, medications, blood draw, and to maintain line patency      RADIOLOGY & ADDITIONAL TESTS:    Imaging Personally Reviewed:  [ ] YES  [ ] NO   GABBY LAYTON  81y  Female    Patient is a 81y old  Female who presents with a chief complaint of Acute hypoxic respiratory failure (21 Sep 2022 11:58)  Hospital course: 80 yo morbidly obese female w/ pmhx of HTN, HLD, HFpEF (EF 56%), COPD (on home O2-3L), chronic LE edema and cellulitis, DALIA, PVD p/w respiratory distress 2/2 severe sepsis due to cellulitis on lower extremity. Course c/b cardiac arrest followed by septic shock and hypoxic respiratory failure, likely 2/2 PNA. Cardiac arrest led to flail chest w pneumothorax, oliguric BRANT progressing to kidney failure.     INTERVAL HPI/OVERNIGHT EVENTS: As per night team, patient desaturated and FIO2 was increased to 50. AM FI02 decreased back to 40. Patient was seen and examined at bedside. Patient is intubated and sedated. Patient unresponsive and not able to respond to ROS.       T(C): 36 (09-21-22 @ 14:29), Max: 37.1 (09-20-22 @ 21:44)  HR: 79 (09-21-22 @ 15:00) (70 - 93)  BP: 128/60 (09-21-22 @ 09:00) (128/60 - 128/60)  RR: 26 (09-21-22 @ 15:00) (25 - 26)  SpO2: 93% (09-21-22 @ 15:00) (90% - 100%)  Wt(kg): --Vital Signs Last 24 Hrs  T(C): 36 (21 Sep 2022 14:29), Max: 37.1 (20 Sep 2022 21:44)  T(F): 96.8 (21 Sep 2022 14:29), Max: 98.8 (20 Sep 2022 21:44)  HR: 79 (21 Sep 2022 15:00) (70 - 93)  BP: 128/60 (21 Sep 2022 09:00) (128/60 - 128/60)  BP(mean): 86 (21 Sep 2022 09:00) (86 - 86)  RR: 26 (21 Sep 2022 15:00) (25 - 26)  SpO2: 93% (21 Sep 2022 15:00) (90% - 100%)    Parameters below as of 21 Sep 2022 15:00  Patient On (Oxygen Delivery Method): ventilator    O2 Concentration (%): 37    PHYSICAL EXAM:  GENERAL: morbidly obese female on ventilation and sedated; NAD  Neuro: AAOx0; + gag reflex with bloody sputum and +sluggish light pupillary reflex; patient unable to be aroused, not able to follow commands  HEENT: NC/AT; MMM; wound expanding philtrum; neck supple; no scleral icterus; nasal passages clear; no thyroid or LN enlargement  Heart: RRR; +s1 and s2; no MRG; non displaced PMI; no JVD  Lungs: mechanical breath sounds; on ventilation   GI: protuberant abdomen; nondistended; normal bowel sounds [id number]   Extremities: cellulitis to knee b/l; UE 2+ edema with 1+ pulses  Skin: cellulitis of LE and edematous of UE extending to the forearm       Consultant(s) Notes Reviewed:  [x ] YES  [ ] NO  Care Discussed with Consultants/Other Providers [ x] YES  [ ] NO    LABS:                        7.2    5.23  )-----------( 40       ( 21 Sep 2022 03:19 )             24.5     09-21    135  |  105  |  13  ----------------------------<  112<H>  4.3   |  22  |  1.14    Ca    7.8<L>      21 Sep 2022 03:19  Phos  4.8     09-21  Mg     2.4     09-21    TPro  6.9  /  Alb  2.3<L>  /  TBili  0.8  /  DBili  x   /  AST  49<H>  /  ALT  22  /  AlkPhos  206<H>  09-21      PT/INR - ( 20 Sep 2022 13:48 )   PT: 17.2 sec;   INR: 1.44          PTT - ( 21 Sep 2022 03:19 )  PTT:63.0 sec  Urinalysis Basic - ( 19 Sep 2022 16:08 )    Color: Kerri / Appearance: SL Cloudy / SG: >=1.030 / pH: x  Gluc: x / Ketone: Trace mg/dL  / Bili: Small / Urobili: 1.0 E.U./dL   Blood: x / Protein: 100 mg/dL / Nitrite: POSITIVE   Leuk Esterase: Trace / RBC: < 5 /HPF / WBC < 5 /HPF   Sq Epi: x / Non Sq Epi: 5-10 /HPF / Bacteria: Many /HPF      CAPILLARY BLOOD GLUCOSE      POCT Blood Glucose.: 140 mg/dL (21 Sep 2022 11:04)  POCT Blood Glucose.: 115 mg/dL (21 Sep 2022 05:17)  POCT Blood Glucose.: 101 mg/dL (20 Sep 2022 23:11)  POCT Blood Glucose.: 100 mg/dL (20 Sep 2022 17:24)      ABG - ( 21 Sep 2022 03:19 )  pH, Arterial: 7.32  pH, Blood: x     /  pCO2: 41    /  pO2: 72    / HCO3: 21    / Base Excess: -4.8  /  SaO2: 97.1              Urinalysis Basic - ( 19 Sep 2022 16:08 )    Color: Kerri / Appearance: SL Cloudy / SG: >=1.030 / pH: x  Gluc: x / Ketone: Trace mg/dL  / Bili: Small / Urobili: 1.0 E.U./dL   Blood: x / Protein: 100 mg/dL / Nitrite: POSITIVE   Leuk Esterase: Trace / RBC: < 5 /HPF / WBC < 5 /HPF   Sq Epi: x / Non Sq Epi: 5-10 /HPF / Bacteria: Many /HPF        MEDICATIONS  (STANDING):  albuterol/ipratropium for Nebulization 3 milliLiter(s) Nebulizer every 6 hours  argatroban Infusion 0.75 MICROgram(s)/kG/Min (4.6 mL/Hr) IV Continuous <Continuous>  artificial  tears Solution 1 Drop(s) Both EYES two times a day  BACItracin   Ointment 1 Application(s) Topical every 8 hours  chlorhexidine 0.12% Liquid 15 milliLiter(s) Oral Mucosa every 12 hours  chlorhexidine 2% Cloths 1 Application(s) Topical <User Schedule>  CRRT Treatment    <Continuous>  CRRT Treatment    <Continuous>  dextrose 5%. 1000 milliLiter(s) (50 mL/Hr) IV Continuous <Continuous>  dextrose 5%. 1000 milliLiter(s) (100 mL/Hr) IV Continuous <Continuous>  dextrose 50% Injectable 25 Gram(s) IV Push once  dextrose 50% Injectable 12.5 Gram(s) IV Push once  dextrose 50% Injectable 25 Gram(s) IV Push once  fentaNYL   Infusion. 0.5 MICROgram(s)/kG/Hr (5.12 mL/Hr) IV Continuous <Continuous>  glucagon  Injectable 1 milliGRAM(s) IntraMuscular once  influenza  Vaccine (HIGH DOSE) 0.7 milliLiter(s) IntraMuscular once  insulin lispro (ADMELOG) corrective regimen sliding scale   SubCutaneous every 6 hours  lidocaine   4% Patch 1 Patch Transdermal every 24 hours  midazolam Infusion 0.02 mG/kG/Hr (2.05 mL/Hr) IV Continuous <Continuous>  norepinephrine Infusion 0.05 MICROgram(s)/kG/Min (4.8 mL/Hr) IV Continuous <Continuous>  pantoprazole  Injectable 40 milliGRAM(s) IV Push every 24 hours  Phoxillum Filtration BK 4 / 2.5 5000 milliLiter(s) (2500 mL/Hr) CRRT <Continuous>  polyethylene glycol 3350 17 Gram(s) Oral daily  propofol Infusion 5 MICROgram(s)/kG/Min (3.07 mL/Hr) IV Continuous <Continuous>  senna 2 Tablet(s) Oral daily    MEDICATIONS  (PRN):  dextrose Oral Gel 15 Gram(s) Oral once PRN Blood Glucose LESS THAN 70 milliGRAM(s)/deciliter  sodium chloride 0.9% lock flush 10 milliLiter(s) IV Push every 1 hour PRN Pre/post blood products, medications, blood draw, and to maintain line patency      RADIOLOGY & ADDITIONAL TESTS:    Imaging Personally Reviewed:  [ ] YES  [ ] NO

## 2022-09-21 NOTE — PROCEDURE NOTE - NSUSDIAGNOSIS_ED_ALL
Grossly Normal Function
Normal Kidneys
Normal Kidneys/Collapsed Bladder
Pleural Effusion Right/Pleural Effusion Left/Pulmonary Edema
Grossly Normal Function
Pulmonary Edema
Small PLEFs/Pleural Effusion Right/Pleural Effusion Left
Grossly Normal Function/RV Dilation
No Identified Pneumothorax
Pleural Effusion Right/Pleural Effusion Left/Pulmonary Edema
Grossly Normal Function
No Identified Pneumothorax
Pleural Effusion Right/Pleural Effusion Left/Pulmonary Edema/Pneumothorax Left

## 2022-09-21 NOTE — PROCEDURE NOTE - NSUS ED INFORMED CONSENT1
Benefits, risks, and possible complications of procedure explained to patient/caregiver who verbalized understanding and gave verbal consent.
This was an emergent procedure and consent was implied.
This was an emergent procedure and consent was implied.
Benefits, risks, and possible complications of procedure explained to patient/caregiver who verbalized understanding and gave verbal consent.
Benefits, risks, and possible complications of procedure explained to patient/caregiver who verbalized understanding and gave verbal consent.
This was an emergent procedure and consent was implied.
Benefits, risks, and possible complications of procedure explained to patient/caregiver who verbalized understanding and gave verbal consent.
This was an emergent procedure and consent was implied.
This was an emergent procedure and consent was implied.
Benefits, risks, and possible complications of procedure explained to patient/caregiver who verbalized understanding and gave verbal consent.
This was an emergent procedure and consent was implied.
Benefits, risks, and possible complications of procedure explained to patient/caregiver who verbalized understanding and gave verbal consent.
This was an emergent procedure and consent was implied.
Benefits, risks, and possible complications of procedure explained to patient/caregiver who verbalized understanding and gave verbal consent.
Benefits, risks, and possible complications of procedure explained to patient/caregiver who verbalized understanding and gave verbal consent.
This was an emergent procedure and consent was implied.

## 2022-09-21 NOTE — PROCEDURE NOTE - NSUS ED PROCEDURE DATE TIME1 DT
10-Sep-2022 07:41
12-Sep-2022 10:17
20-Sep-2022 13:57
08-Sep-2022 12:04
09-Sep-2022 09:27
12-Sep-2022 10:18
08-Sep-2022 09:07
21-Sep-2022 09:08
09-Sep-2022 10:26
11-Sep-2022 09:27
08-Sep-2022 09:09
11-Sep-2022 09:29
21-Sep-2022 09:10
11-Sep-2022 09:28
20-Sep-2022 13:54
21-Sep-2022 09:09

## 2022-09-21 NOTE — PROCEDURE NOTE - NSUS ED INDICATIONS1
Hypoxia/Post-Intubation
Hypoxia
Other
DVT study/Specify the Indication for Ultrasonography
Post-Intubation
DVT study/Specify the Indication for Ultrasonography
Post-Intubation
oliguria/Other
Concern for Fluid Overload
Concern for Fluid Overload
Trauma
cardiac arrest/Concern for Fluid Overload
Anuria/Other
Hypoxia
Hypoxia
cardiac arrest

## 2022-09-21 NOTE — PROGRESS NOTE ADULT - ATTENDING COMMENTS
I agree with the fellow's findings and plans as written above with the following additions/amendments:    Seen and examined at bedside on CVVHD. Tolerating well, no issues although line positional. Access pressures stable. Continue CVVHD as above, further recs as above

## 2022-09-21 NOTE — PROCEDURE NOTE - NSUSCARDIACWINDOWS_ED_ALL
Parasternal Long/Parasternal Short/Apical 4-Chamber/Sub-Xyphoid View
Sub-Xyphoid View
Parasternal Long/Parasternal Short/Apical 4-Chamber/Sub-Xyphoid View
Sub-Xyphoid View

## 2022-09-21 NOTE — PROCEDURE NOTE - NSUS ED PROC ASSISTED BY1 FT
Altschul
Oks
Altschul
Altschul
Oks
Altschul
Oks
Altschul
Altschul
Oks
Altschul
Altschul
Oks
Altschul

## 2022-09-22 NOTE — PROGRESS NOTE ADULT - ATTENDING COMMENTS
I agree with the fellow's findings and plans as written above with the following additions/amendments:    Seen and examined at bedside. s/p trach, some bleeding, continues off argatroban, to restart CVVHD when able to tolerate back on argatroban. Otherwise no sign of renal recovery, monitoring, further recs as above

## 2022-09-22 NOTE — PROGRESS NOTE ADULT - ASSESSMENT
Patient is a 80 yo F with CHF, CKD (baseline around 2.5-2.9) HTN who presented with a one day history of respiratory distress and LE cellulitis hospital course c/b cardiac arrest on 9/7, acute renal failure requiring CVVHD Nephrology consulted for elevated creatinine.     #Anuric  BRANT on CKD   likely iATN in setting of cardiac arrest and shock. Mild proteinuria likely tubular, now anuric  On CVVHD since 9/12    Plan:  Continue CVVHD   CVVHD rx: qb 300ml/min  UF: net negative 100ml/hr  DFR: 2.5L/hour   Dialysate to Phoxillum   Q6H BMP mag and phos while on CVVHD  maintain MAP >70 for adequate renal perfusion      #Thrombocytopenia, Anemia   HIT confirmatory pending, no acute source of bleeding identified  Hep PF4AB positive    Plan:   C/w  argatroban  Transfusions per primary team    #Hypocalcemia    mild, phos at goal on phoxillum

## 2022-09-22 NOTE — PROGRESS NOTE ADULT - CRITICAL CARE ATTENDING COMMENT
Lower extremity cellulitis c/b septic shock, cardiac arrest with resultant flail chest and pneumothorax, acute hypoxemic respiratory failure, BRANT requiring new dialysis and HIT. Pending tracheostomy placement this week. C/w CVVHD. Poor prognosis. Full code.
Lower extremity cellulitis c/b septic shock, cardiac arrest with resultant flail chest and pneumothorax, acute hypoxemic respiratory failure, BRANT requiring new dialysis and HIT. Tracheostomy at bedside today. Transition to intermittent HD in AM. Poor prognosis. Full code.
Patient seen and examined;  CVVHD on going  Wean pressors  Antibiotics as per ID
Patient seen and examined;  Trach will ne needed;  Very agitated without sedation
Lower extremity cellulitis c/b septic shock, cardiac arrest with resultant flail chest and pneumothorax, acute hypoxemic respiratory failure, BRANT requiring new dialysis and HIT. Pending tracheostomy placement in AM; will need pre procedure platelets to goal of > 50. C/w CVVHD. Poor prognosis. Full code.

## 2022-09-22 NOTE — CHART NOTE - NSCHARTNOTEFT_GEN_A_CORE
PROCEDURE NOTE:  HD Catheter removal    Site:   RIJ HD Catheter    Requested by primary team to remove RIJ HD catheter. Last INR, aPTT, Platelets reviewed. .Explained the procedure. Placed in Trendelenburg. Catheter removed and tip intact.  Pressure applied for greater than 5 mins, no hematoma or bleeding noted. Patient tolerated procedure well. A dry sterile dressing applied, RN aware.

## 2022-09-22 NOTE — PROCEDURE NOTE - ADDITIONAL PROCEDURE DETAILS
Self
PIV x 2 placed: Right upper arm 20g, Left forearm 18g
Wire visualized in long and short axis of venous vessel prior to dilation. +Agitated saline visualized in RA in subxiphoid view.
Endurance catheter x 2 placed: Right forearm 20g, Right upper arm 18g

## 2022-09-22 NOTE — PROGRESS NOTE ADULT - ASSESSMENT
80yo morbidly obese female w/ pmhx of HTN, HLD, HFpEF (EF 56%), COPD (on home O2-3L), chronic LE edema and cellulitis, DALIA, PVD p/w respiratory distress 2/2 severe sepsis due to cellulitis on lower extremity which progressed to septic shock in setting of pneumonia. Hospital course complicated by cardiac arrest, flail chest, and oliguric BRANT progressing to kidney failure.     NEURO  #Encephalopathy 2/2 to cardiac arrest  s/p cardiac arrest 2/2 hypoxia due to aspiration. Prior to arrest, pt was awake and alert on the RMF. Difficult to assess current status due to continued ventilation and sedation requirements in the setting on possible VAP and flail chest. VC AC w/ FiO2 40, Vt 450, RR 26, PEEP 5. Currently on fentanyl and versed.  - continue fentanyl and versed  - continue to assess mentation     PULM    #Acute on chronic resp failure  Pt w/ COPD on home 3 L O2, currently worsening hypoxia 2/2 to Pneumonia vs flash pulmonary edema. Pt endured cardiac arrest secondary to resp failure likely in setting of aspiration while eating vs flash pulmonary edema 2/2 chronic htn and chronic HF (relatively the same EF as previous TTE). New onset pleural effusions likely in the same setting, pt s/p intubation.  - Trach placement today- Argatroban held this AM    #Pneumothorax  Pt presented to MICU with signs of decreased lung sliding on bedside ultrasound s/p cardiac arrest and chest compressions. Drop in sats to low 70s on 09/08 w/ x-ray confirming Left sided pneumothorax, no BP changes. Chest tube placed with improvement in sats to high 90s again. Repeat x-rays showing lung reexpansion  - chest tube still in pace, set to water seal, will keep chest tube in place iso PEEP     #Broken ribs w/ flail chest  Pt with several broken ribs and signs of flail chest with difficulty breathing when sedation weaned off. Pt currently intubated and sedated. Thoracic surgery consulted for possible sternal plating. Anesthesia consulted, spinal block on 09/12 for pain management. Pt remains unstable and not surgery candidate at this time.  - continue to monitor respirations    #Chronic obstructive pulmonary disease (COPD).   Pt with COPD on home 3L. Patient initially presented with NRB, now intubated s/p cardiac arrest. Started on mucomyst  - continue mucomyst      CARDIOVASCULAR  #Cardiac Arrest  Patient intubated and sedated 2/2 to cardiac arrest  - continue fentanyl, midazolam, and propofol     #Septic shock - resolved   2/2 pneumonia. Cultures remain neg. Perfusion - warm on palpation, not producing urine, normal lactate. Volume - Moist mucus membranes, edematous in extremities and receiving CVVHD. Hemodynamically stable while on pressors.  - continue Levo .05 mcg/kg/min  - continue fentanyl 0.5 mcg/kg/hr, midazolam 0.02 mg/kg/hr, propofol 5 mcg/kg/min  - Wean pressors and sedation as tolerated    #Chronic heart failure with preserved ejection fraction (HFpEF).   Pt on home bumex 2mg.  TTE from current visit showing EF of 56% with Pulmonary HTN - PAP 48. Experienced pulmonary edema and effusion postcardiac arrest.   Plan:  - continue CVVHD to reduce fluid overload in the setting of acute kidney failure    #Cardiac Arrest on 09/07  possibly 2/2 hypoxia in setting of aspiration vs flash pulmonary edema. Pt relatively hypotensive throughout the day with episodes of hypertension to 130/140 systolic. received fluids on PM 09/07 and a unit of blood on 09/07 for anemia. In PM on 09/07, complained of difficulty breathing during meal, found unresponsive, hypotensive, w/ blue complexion by nurse.    - currently intubated and sedated    #Afib  Experienced a-fib on 09/13 w RVR. self resolved w/o intervention. 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   Pt with history of hypertension on home diltiazem 180mg daily. Currently on pressors s/p septic shock.   Plan:  - holding home antihypertensive meds    GI  No active issues      RENAL  #Anuric BRANT on CKD  Baseline Cr 2.04 in July 2022, currently uptrending to 3.3. Likely iATN 2/2 to hypoperfusion/ischemia in setting of sepsis and cardiac arrest. HD cath placed on 09/12 for CVVHD in setting of worsening electrolytes and increasing fluid status. Goal net neg 100cc/hr. Patient currently anuric  - On CVVHD since 9/12  - Continue CVVHD as above - CVVHD rx: qb 300ml/min  UF: net negative 100ml/hr  DFR: 2.5L/hour  Dialysate to Phoxillum   q6H BMP mag and phos while on CVVHD  maintain MAP >70 for adequate renal perfusion  - strict I/Os  - renal following, appreciate recs    #Right kidney mass.   CTAP s/f slightly hyperdense solid cortical mass in upper pole of R kidney.  Plan:  - consider additional renal imaging when stable    ENDO  No active issues      HEME  Heparin Induced Thrombocytopenia  Hep PF4 Ab positive. D/c'd heparin and started on Argatroban. 9/22 plt: 32, will give 1 unit pRBC iso procedure today  - PTT goal of 45-90  - check PTT daily  -  argatroban held for trach     #Anemia  Pt noted to have chronic anemia, presented with HgB of 6.8 w/ drop to 6.7 during course of stay. s/p 1 unit RBC on 09/07, remained stable until 09/13 when CVVHD began and drop in Hgb to 6.8. Received 1u RBC, back up to 7.4, hemolysis unlikely as hapto and bili normal. Received another unit on 09/19  Plan:  - f/u H+H  - active type and screen     ID  #Cellulitis  Patient initially presented to ED w b/l LE cellulitis and was treated with antibiotics.   - As per vascular, wrap kerlix gauze and ACE bandages around both legs. Wrap toes to just below the knee.   - Change bandages every few days  - continue to monitor     #Pneumonia  RESOLVED  possibly STANISLAV. Pt w/ bilateral pleural effusions and possible underlying infiltrate, unclear on x-ray. Blood cultures remain negative, sputum cultures neg w/ gram stain showing rare epithelial cells, few WBC's, gram pos rods and cocci in pairs. Patient finished cefepime and vanc on 9/20    #Septic shock  RESOLVED  likely 2/2 pneumonia. New tmax and worsened pleural effusions on 09/12. BLE edema, erythema, scaly skin c/w cellulitis. CTX 2g IV QD on 09/7 but new t-max on night of 09/11-12, cultures remain neg. Transitioned to cefepime 1g q12 and received 1 dose vanc 1.5g (dose dependent due to kidney status). Trough goal of 13-17.  Pt spike another fever on 09/19 after a week of being afebrile, cultures sent. Completed antibiotic course of cefepime and vanc on 09/20    stage 2 pressure ulcer  Patient with stage 2 sacral pressure ulcer.   - Wound care consulted      Philtrum Wound  Patient with 3 cm cut on upper lip 2/2 to intubation  - consider ENT consult for stitches     E: K>4, Phos>3, Mg>2  N: Nepro diet - CVVHD   DVT ppx: Argatroban   GI ppx: Protonix 40 BID  Access: 2 peripheral lines, left sided triple lumen, HD cath on right    DNR

## 2022-09-22 NOTE — BRIEF OPERATIVE NOTE - OPERATION/FINDINGS
Consent: Telephone consent via HCP (Lisa) over telephone. Consent form in chart.    Preprocedure: Time-out performed. The area was cleaned with a CHG scrub and draped with large sterile barrier. Hand hygiene was performed, and cap, mask, sterile gown, and sterile gloves were worn. The patient was covered by a large sterile drape. Sterile technique was maintained for the entire procedure.    Anesthesia: Patient already sedated with Propofol, Fentanyl and Versed drips. Nimbex 20mg IVP given once prior to procedure.     Procedure: The patient was placed in the supine position. The anterior neck was prepped and draped in usual sterile fashion. 1% lidocaine w/ Epinephrine was administered approximately 2 fingerbreadths above the sternal notch for local anesthesia. Proper midline position was confirmed by bouncing the needle from the tracheostomy tray over the trachea with bronchoscopic examination. The needle was advanced into the trachea and proper positioning was confirmed with direct visualization. The needle was then removed leaving a white outer cannula in position. The wire from the tracheostomy tray was then advanced through the white outer cannula. The cannula was then removed. A 1.5 cm vertical incision was made at the wire site. The small, blue dilator was then advanced over the wire into the trachea. Once proper dilatation was achieved, the dilator was removed. The large, tapered dilator was then advanced over the wire into the trachea. The dilator was removed leaving the wire and white inner cannula in position. The endotracheal tube was then gradually withdrawn within the trachea under direct bronchoscopic visualization.  A number 7.5 percutaneous Shiley tracheostomy tube was then advanced over the wire and white inner cannula into the trachea. Proper positioning was confirmed with bronchoscopic visualization. The tracheostomy tube was then sutured in place with two nylon sutures. It was further secured with a tracheostomy tie.

## 2022-09-22 NOTE — PROGRESS NOTE ADULT - SUBJECTIVE AND OBJECTIVE BOX
GABBY LAYTON  81y  Female    Patient is a 81y old  Female who presents with a chief complaint of Acute hypoxic respiratory failure (22 Sep 2022 11:06)      INTERVAL HPI/OVERNIGHT EVENTS: Patient was made NPO at midnight and Argatroban was stopped in the morning in preparation for tracheostomy. Patient was seen and examined at bedside. Patient was intubated and sedated. Unable to obtain ROS      T(C): 36.2 (09-22-22 @ 06:04), Max: 36.9 (09-22-22 @ 01:04)  HR: 78 (09-22-22 @ 11:00) (76 - 100)  BP: 175/74 (09-22-22 @ 09:00) (175/74 - 175/74)  RR: 26 (09-22-22 @ 11:00) (20 - 26)  SpO2: 93% (09-22-22 @ 11:00) (90% - 100%)  Wt(kg): --Vital Signs Last 24 Hrs  T(C): 36.2 (22 Sep 2022 06:04), Max: 36.9 (22 Sep 2022 01:04)  T(F): 97.2 (22 Sep 2022 06:04), Max: 98.4 (22 Sep 2022 01:04)  HR: 78 (22 Sep 2022 11:00) (76 - 100)  BP: 175/74 (22 Sep 2022 09:00) (175/74 - 175/74)  BP(mean): 106 (22 Sep 2022 09:00) (106 - 106)  RR: 26 (22 Sep 2022 11:00) (20 - 26)  SpO2: 93% (22 Sep 2022 11:00) (90% - 100%)    Parameters below as of 22 Sep 2022 11:00  Patient On (Oxygen Delivery Method): ventilator    O2 Concentration (%): 40    PHYSICAL EXAM:  GENERAL: morbidly obese female on ventilation and sedated; NAD  Neuro: AAOx0; + gag reflex with bloody sputum and +sluggish light pupillary reflex; patient unable to be aroused, not able to follow commands  HEENT: NC/AT; MMM; wound expanding philtrum; neck supple; no scleral icterus; nasal passages clear; no thyroid or LN enlargement  Heart: RRR; +s1 and s2; no MRG; non displaced PMI; no JVD  Lungs: mechanical breath sounds; on ventilation   GI: protuberant abdomen; nondistended; normal bowel sounds [id number]   Extremities: cellulitis to knee b/l; UE 2+ edema with 1+ pulses  Skin: cellulitis of LE and edematous of UE extending to the forearm     Consultant(s) Notes Reviewed:  [x ] YES  [ ] NO  Care Discussed with Consultants/Other Providers [ x] YES  [ ] NO    LABS:                        7.4    7.61  )-----------( 32       ( 22 Sep 2022 05:44 )             26.0     09-22    133<L>  |  103  |  11  ----------------------------<  111<H>  4.1   |  22  |  0.99    Ca    7.8<L>      22 Sep 2022 05:44  Phos  4.0     09-22  Mg     2.5     09-22    TPro  7.4  /  Alb  2.3<L>  /  TBili  0.6  /  DBili  x   /  AST  24  /  ALT  18  /  AlkPhos  209<H>  09-22      PT/INR - ( 22 Sep 2022 05:44 )   PT: 20.5 sec;   INR: 1.71          PTT - ( 22 Sep 2022 05:44 )  PTT:73.3 sec    CAPILLARY BLOOD GLUCOSE      POCT Blood Glucose.: 133 mg/dL (22 Sep 2022 11:26)  POCT Blood Glucose.: 107 mg/dL (22 Sep 2022 05:30)  POCT Blood Glucose.: 131 mg/dL (22 Sep 2022 00:10)  POCT Blood Glucose.: 133 mg/dL (21 Sep 2022 17:32)      ABG - ( 21 Sep 2022 03:19 )  pH, Arterial: 7.32  pH, Blood: x     /  pCO2: 41    /  pO2: 72    / HCO3: 21    / Base Excess: -4.8  /  SaO2: 97.1                  MEDICATIONS  (STANDING):  albuterol/ipratropium for Nebulization 3 milliLiter(s) Nebulizer every 6 hours  artificial  tears Solution 1 Drop(s) Both EYES two times a day  BACItracin   Ointment 1 Application(s) Topical every 8 hours  chlorhexidine 0.12% Liquid 15 milliLiter(s) Oral Mucosa every 12 hours  chlorhexidine 2% Cloths 1 Application(s) Topical <User Schedule>  CRRT Treatment    <Continuous>  dextrose 5%. 1000 milliLiter(s) (100 mL/Hr) IV Continuous <Continuous>  dextrose 5%. 1000 milliLiter(s) (50 mL/Hr) IV Continuous <Continuous>  dextrose 50% Injectable 25 Gram(s) IV Push once  dextrose 50% Injectable 12.5 Gram(s) IV Push once  dextrose 50% Injectable 25 Gram(s) IV Push once  fentaNYL   Infusion. 0.5 MICROgram(s)/kG/Hr (5.12 mL/Hr) IV Continuous <Continuous>  glucagon  Injectable 1 milliGRAM(s) IntraMuscular once  influenza  Vaccine (HIGH DOSE) 0.7 milliLiter(s) IntraMuscular once  insulin lispro (ADMELOG) corrective regimen sliding scale   SubCutaneous every 6 hours  lidocaine   4% Patch 1 Patch Transdermal every 24 hours  midazolam Infusion 0.02 mG/kG/Hr (2.05 mL/Hr) IV Continuous <Continuous>  norepinephrine Infusion 0.05 MICROgram(s)/kG/Min (4.8 mL/Hr) IV Continuous <Continuous>  pantoprazole  Injectable 40 milliGRAM(s) IV Push every 24 hours  Phoxillum Filtration BK 4 / 2.5 5000 milliLiter(s) (2500 mL/Hr) CRRT <Continuous>  polyethylene glycol 3350 17 Gram(s) Oral daily  propofol Infusion 5 MICROgram(s)/kG/Min (3.07 mL/Hr) IV Continuous <Continuous>  senna 2 Tablet(s) Oral daily    MEDICATIONS  (PRN):  dextrose Oral Gel 15 Gram(s) Oral once PRN Blood Glucose LESS THAN 70 milliGRAM(s)/deciliter  sodium chloride 0.9% lock flush 10 milliLiter(s) IV Push every 1 hour PRN Pre/post blood products, medications, blood draw, and to maintain line patency      RADIOLOGY & ADDITIONAL TESTS:    Imaging Personally Reviewed:  [ ] YES  [ ] NO

## 2022-09-22 NOTE — PROCEDURE NOTE - NSBRONCHPROCDETAILS_GEN_A_CORE_FT
Bronchoscope inserted through ETT. Airway evaluation revealed Sharp Cait. MAG and LLL evaluation revealed clear airways. RUL, RML, RLL revealed clear airways. ETT noted to be in good position. ETT withdrawn and insertion of percutaneous tracheostomy directly visualized. Bronchoscope then withdrawn from ETT. Minimal bleeding noted.

## 2022-09-22 NOTE — PROGRESS NOTE ADULT - SUBJECTIVE AND OBJECTIVE BOX
Patient is a 81y Female seen and evaluated at bedside. No acute distress, intubated and sedated. Plan for trach today. Tolerating CVVHD with fluid off, however continues to clot when off anticoagulation. Plan to continue CVVHD with fluid off.       Meds:    albuterol/ipratropium for Nebulization 3 every 6 hours  artificial  tears Solution 1 two times a day  BACItracin   Ointment 1 every 8 hours  chlorhexidine 0.12% Liquid 15 every 12 hours  chlorhexidine 2% Cloths 1 <User Schedule>  CRRT Treatment  <Continuous>  dextrose 5%. 1000 <Continuous>  dextrose 5%. 1000 <Continuous>  dextrose 50% Injectable 25 once  dextrose 50% Injectable 12.5 once  dextrose 50% Injectable 25 once  dextrose Oral Gel 15 once PRN  fentaNYL   Infusion. 0.5 <Continuous>  glucagon  Injectable 1 once  influenza  Vaccine (HIGH DOSE) 0.7 once  insulin lispro (ADMELOG) corrective regimen sliding scale  every 6 hours  lidocaine   4% Patch 1 every 24 hours  midazolam Infusion 0.02 <Continuous>  norepinephrine Infusion 0.05 <Continuous>  pantoprazole  Injectable 40 every 24 hours  Phoxillum Filtration BK 4 / 2.5 5000 <Continuous>  polyethylene glycol 3350 17 daily  propofol Infusion 5 <Continuous>  senna 2 daily  sodium chloride 0.9% lock flush 10 every 1 hour PRN      T(C): , Max: 36.9 (09-22-22 @ 01:04)  T(F): , Max: 98.4 (09-22-22 @ 01:04)  HR: 78 (09-22-22 @ 11:00)  BP: 175/74 (09-22-22 @ 09:00)  BP(mean): 106 (09-22-22 @ 09:00)  RR: 26 (09-22-22 @ 11:00)  SpO2: 93% (09-22-22 @ 11:00)  Wt(kg): --    09-21 @ 07:01  -  09-22 @ 07:00  --------------------------------------------------------  IN: 2477.4 mL / OUT: 4712 mL / NET: -2234.6 mL    09-22 @ 07:01 - 09-22 @ 11:06  --------------------------------------------------------  IN: 316 mL / OUT: 131 mL / NET: 185 mL          Review of Systems:  ROS negative except as per HPI      PHYSICAL EXAM:  Constitutional: Intubated, sedated; NAD  HEENT: ETT tube in place, clear secretions  Respiratory: Mechanical breath sounds bilaterally, not overbreathing vent  Cardiac: +S1/S2; RRR; no M/R/G  Gastrointestinal: soft, NT/ND; no rebound or guarding; +BS  Extremities: WWP, no clubbing or cyanosis; general low level anasarca, improving  Dermatologic: skin warm, dry and intact; no rashes, wounds, or scars  Access: [facility] humera c/d/i, accessed      LABS:                        7.4    7.61  )-----------( 32       ( 22 Sep 2022 05:44 )             26.0     09-22    133<L>  |  103  |  11  ----------------------------<  111<H>  4.1   |  22  |  0.99    Ca    7.8<L>      22 Sep 2022 05:44  Phos  4.0     09-22  Mg     2.5     09-22    TPro  7.4  /  Alb  2.3<L>  /  TBili  0.6  /  DBili  x   /  AST  24  /  ALT  18  /  AlkPhos  209<H>  09-22      PT/INR - ( 22 Sep 2022 05:44 )   PT: 20.5 sec;   INR: 1.71          PTT - ( 22 Sep 2022 05:44 )  PTT:73.3 sec          RADIOLOGY & ADDITIONAL STUDIES:

## 2022-09-23 NOTE — PROGRESS NOTE ADULT - SUBJECTIVE AND OBJECTIVE BOX
Patient is a 81y Female seen and evaluated at bedside. No acute distress, s/p trach, sedated. Patient still on pressors. No CVVHD in last 24hrs. sCr elevated and still anuric.  Will continue to monitor and resume CVVHD.       Meds:    albuterol/ipratropium for Nebulization 3 every 6 hours  artificial  tears Solution 1 two times a day  BACItracin   Ointment 1 every 8 hours  chlorhexidine 0.12% Liquid 15 every 12 hours  chlorhexidine 2% Cloths 1 <User Schedule>  CRRT Treatment  <Continuous>  dextrose 5%. 1000 <Continuous>  dextrose 5%. 1000 <Continuous>  dextrose 50% Injectable 25 once  dextrose 50% Injectable 12.5 once  dextrose 50% Injectable 25 once  dextrose Oral Gel 15 once PRN  glucagon  Injectable 1 once  influenza  Vaccine (HIGH DOSE) 0.7 once  insulin lispro (ADMELOG) corrective regimen sliding scale  every 6 hours  lidocaine   4% Patch 1 every 24 hours  norepinephrine Infusion 0.05 <Continuous>  pantoprazole  Injectable 40 every 24 hours  Phoxillum Filtration BK 4 / 2.5 5000 <Continuous>  polyethylene glycol 3350 17 daily  senna 2 daily  sodium chloride 0.9% lock flush 10 every 1 hour PRN      T(C): , Max: 37.2 (09-23-22 @ 01:04)  T(F): , Max: 99 (09-23-22 @ 01:04)  HR: 90 (09-23-22 @ 11:00)  BP: 135/58 (09-23-22 @ 11:00)  BP(mean): 83 (09-23-22 @ 11:00)  RR: 26 (09-23-22 @ 11:00)  SpO2: 96% (09-23-22 @ 11:00)  Wt(kg): --    09-22 @ 07:01  -  09-23 @ 07:00  --------------------------------------------------------  IN: 2090.5 mL / OUT: 317 mL / NET: 1773.5 mL    09-23 @ 07:01  -  09-23 @ 11:29  --------------------------------------------------------  IN: 81.2 mL / OUT: 0 mL / NET: 81.2 mL          Review of Systems:  ROS negative except as per HPI      PHYSICAL EXAM:  Constitutional: trach , sedated; NAD  HEENT: MMM, NC/AT, wound expanding philtrum; neck supple;  Respiratory: Mechanical breath sounds bilaterally, not overbreathing vent  Cardiac: +S1/S2; RRR; no M/R/G  Gastrointestinal: soft, NT/ND; no rebound or guarding; +BS  Extremities: WWP, no clubbing or cyanosis; general low level anasarca, improving  Dermatologic: skin warm, dry and intact; no rashes, wounds, or scars  Access: None       LABS:                        7.9    7.60  )-----------( 123      ( 23 Sep 2022 04:11 )             26.6     09-23    131<L>  |  101  |  18  ----------------------------<  114<H>  4.3   |  21<L>  |  1.86<H>    Ca    8.3<L>      23 Sep 2022 04:11  Phos  5.8     09-23  Mg     2.5     09-23    TPro  7.2  /  Alb  2.4<L>  /  TBili  0.6  /  DBili  x   /  AST  19  /  ALT  14  /  AlkPhos  187<H>  09-23      PT/INR - ( 22 Sep 2022 17:02 )   PT: 14.1 sec;   INR: 1.18          PTT - ( 23 Sep 2022 04:11 )  PTT:32.1 sec          RADIOLOGY & ADDITIONAL STUDIES:           Patient is a 81y Female seen and evaluated at bedside. No acute distress, s/p trach, sedated. Patient still on pressors. No CVVHD in last 24hrs. sCr elevated and still anuric.       Meds:    albuterol/ipratropium for Nebulization 3 every 6 hours  artificial  tears Solution 1 two times a day  BACItracin   Ointment 1 every 8 hours  chlorhexidine 0.12% Liquid 15 every 12 hours  chlorhexidine 2% Cloths 1 <User Schedule>  CRRT Treatment  <Continuous>  dextrose 5%. 1000 <Continuous>  dextrose 5%. 1000 <Continuous>  dextrose 50% Injectable 25 once  dextrose 50% Injectable 12.5 once  dextrose 50% Injectable 25 once  dextrose Oral Gel 15 once PRN  glucagon  Injectable 1 once  influenza  Vaccine (HIGH DOSE) 0.7 once  insulin lispro (ADMELOG) corrective regimen sliding scale  every 6 hours  lidocaine   4% Patch 1 every 24 hours  norepinephrine Infusion 0.05 <Continuous>  pantoprazole  Injectable 40 every 24 hours  Phoxillum Filtration BK 4 / 2.5 5000 <Continuous>  polyethylene glycol 3350 17 daily  senna 2 daily  sodium chloride 0.9% lock flush 10 every 1 hour PRN      T(C): , Max: 37.2 (09-23-22 @ 01:04)  T(F): , Max: 99 (09-23-22 @ 01:04)  HR: 90 (09-23-22 @ 11:00)  BP: 135/58 (09-23-22 @ 11:00)  BP(mean): 83 (09-23-22 @ 11:00)  RR: 26 (09-23-22 @ 11:00)  SpO2: 96% (09-23-22 @ 11:00)  Wt(kg): --    09-22 @ 07:01  -  09-23 @ 07:00  --------------------------------------------------------  IN: 2090.5 mL / OUT: 317 mL / NET: 1773.5 mL    09-23 @ 07:01  -  09-23 @ 11:29  --------------------------------------------------------  IN: 81.2 mL / OUT: 0 mL / NET: 81.2 mL          Review of Systems:  ROS negative except as per HPI      PHYSICAL EXAM:  Constitutional: trach , sedated; NAD  HEENT: MMM, NC/AT, wound expanding philtrum; neck supple;  Respiratory: Mechanical breath sounds bilaterally, not overbreathing vent  Cardiac: +S1/S2; RRR; no M/R/G  Gastrointestinal: soft, NT/ND; no rebound or guarding; +BS  Extremities: WWP, no clubbing or cyanosis; general low level anasarca, improving  Dermatologic: skin warm, dry and intact; no rashes, wounds, or scars  Access: None       LABS:                        7.9    7.60  )-----------( 123      ( 23 Sep 2022 04:11 )             26.6     09-23    131<L>  |  101  |  18  ----------------------------<  114<H>  4.3   |  21<L>  |  1.86<H>    Ca    8.3<L>      23 Sep 2022 04:11  Phos  5.8     09-23  Mg     2.5     09-23    TPro  7.2  /  Alb  2.4<L>  /  TBili  0.6  /  DBili  x   /  AST  19  /  ALT  14  /  AlkPhos  187<H>  09-23      PT/INR - ( 22 Sep 2022 17:02 )   PT: 14.1 sec;   INR: 1.18          PTT - ( 23 Sep 2022 04:11 )  PTT:32.1 sec          RADIOLOGY & ADDITIONAL STUDIES:

## 2022-09-23 NOTE — PROGRESS NOTE ADULT - ATTENDING COMMENTS
I agree with the fellow's findings and plans as written above with the following additions/amendments:    Seen and examined at bedside. Unlikely to recovery kidney function in the short term given anuria and length of time on CVVHD. monitoring for recovery or acute indication for KRT. Further recs as above

## 2022-09-23 NOTE — PROGRESS NOTE ADULT - ASSESSMENT
80yo morbidly obese female w/ pmhx of HTN, HLD, HFpEF (EF 56%), COPD (on home O2-3L), chronic LE edema and cellulitis, DALIA, PVD p/w respiratory distress 2/2 severe sepsis due to cellulitis on lower extremity which progressed to septic shock in setting of pneumonia. Hospital course complicated by cardiac arrest, flail chest, and oliguric BRANT progressing to kidney failure.     NEURO  #Encephalopathy 2/2 to cardiac arrest  s/p cardiac arrest 2/2 hypoxia due to aspiration. Prior to arrest, pt was awake and alert on the RMF. Difficult to assess current status due to continued ventilation and sedation requirements in the setting on possible VAP and flail chest. VC AC w/ FiO2 40, Vt 450, RR 26, PEEP 5. Currently on fentanyl and versed.  - continue fentanyl and versed  - continue to assess mentation     PULM    #Acute on chronic resp failure  Pt w/ COPD on home 3 L O2, currently worsening hypoxia 2/2 to Pneumonia vs flash pulmonary edema. Pt endured cardiac arrest secondary to resp failure likely in setting of aspiration while eating vs flash pulmonary edema 2/2 chronic htn and chronic HF (relatively the same EF as previous TTE). New onset pleural effusions likely in the same setting, pt s/p intubation.  - Trach placement today- Argatroban held this AM    #Pneumothorax  Pt presented to MICU with signs of decreased lung sliding on bedside ultrasound s/p cardiac arrest and chest compressions. Drop in sats to low 70s on 09/08 w/ x-ray confirming Left sided pneumothorax, no BP changes. Chest tube placed with improvement in sats to high 90s again. Repeat x-rays showing lung reexpansion  - chest tube still in pace, set to water seal, will keep chest tube in place iso PEEP     #Broken ribs w/ flail chest  Pt with several broken ribs and signs of flail chest with difficulty breathing when sedation weaned off. Pt currently intubated and sedated. Thoracic surgery consulted for possible sternal plating. Anesthesia consulted, spinal block on 09/12 for pain management. Pt remains unstable and not surgery candidate at this time.  - continue to monitor respirations    #Chronic obstructive pulmonary disease (COPD).   Pt with COPD on home 3L. Patient initially presented with NRB, now intubated s/p cardiac arrest. Started on mucomyst  - continue mucomyst      CARDIOVASCULAR  #Cardiac Arrest  Patient intubated and sedated 2/2 to cardiac arrest  - continue fentanyl, midazolam, and propofol     #Septic shock - resolved   2/2 pneumonia. Cultures remain neg. Perfusion - warm on palpation, not producing urine, normal lactate. Volume - Moist mucus membranes, edematous in extremities and receiving CVVHD. Hemodynamically stable while on pressors.  - continue Levo .05 mcg/kg/min  - continue fentanyl 0.5 mcg/kg/hr, midazolam 0.02 mg/kg/hr, propofol 5 mcg/kg/min  - Wean pressors and sedation as tolerated    #Chronic heart failure with preserved ejection fraction (HFpEF).   Pt on home bumex 2mg.  TTE from current visit showing EF of 56% with Pulmonary HTN - PAP 48. Experienced pulmonary edema and effusion postcardiac arrest.   Plan:  - continue CVVHD to reduce fluid overload in the setting of acute kidney failure    #Cardiac Arrest on 09/07  possibly 2/2 hypoxia in setting of aspiration vs flash pulmonary edema. Pt relatively hypotensive throughout the day with episodes of hypertension to 130/140 systolic. received fluids on PM 09/07 and a unit of blood on 09/07 for anemia. In PM on 09/07, complained of difficulty breathing during meal, found unresponsive, hypotensive, w/ blue complexion by nurse.    - currently intubated and sedated    #Afib  Experienced a-fib on 09/13 w RVR. self resolved w/o intervention. 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   Pt with history of hypertension on home diltiazem 180mg daily. Currently on pressors s/p septic shock.   Plan:  - holding home antihypertensive meds    GI  No active issues      RENAL  #Anuric BRANT on CKD  Baseline Cr 2.04 in July 2022, currently uptrending to 3.3. Likely iATN 2/2 to hypoperfusion/ischemia in setting of sepsis and cardiac arrest. HD cath placed on 09/12 for CVVHD in setting of worsening electrolytes and increasing fluid status. Goal net neg 100cc/hr. Patient currently anuric  - On CVVHD since 9/12  - Continue CVVHD as above - CVVHD rx: qb 300ml/min  UF: net negative 100ml/hr  DFR: 2.5L/hour  Dialysate to Phoxillum   q6H BMP mag and phos while on CVVHD  maintain MAP >70 for adequate renal perfusion  - strict I/Os  - renal following, appreciate recs    #Right kidney mass.   CTAP s/f slightly hyperdense solid cortical mass in upper pole of R kidney.  Plan:  - consider additional renal imaging when stable    ENDO  No active issues      HEME  Heparin Induced Thrombocytopenia  Hep PF4 Ab positive. D/c'd heparin and started on Argatroban. 9/22 plt: 32, will give 1 unit pRBC iso procedure today  - PTT goal of 45-90  - check PTT daily  -  argatroban held for trach     #Anemia  Pt noted to have chronic anemia, presented with HgB of 6.8 w/ drop to 6.7 during course of stay. s/p 1 unit RBC on 09/07, remained stable until 09/13 when CVVHD began and drop in Hgb to 6.8. Received 1u RBC, back up to 7.4, hemolysis unlikely as hapto and bili normal. Received another unit on 09/19  Plan:  - f/u H+H  - active type and screen     ID  #Cellulitis  Patient initially presented to ED w b/l LE cellulitis and was treated with antibiotics.   - As per vascular, wrap kerlix gauze and ACE bandages around both legs. Wrap toes to just below the knee.   - Change bandages every few days  - continue to monitor     #Pneumonia  RESOLVED  possibly STANISLAV. Pt w/ bilateral pleural effusions and possible underlying infiltrate, unclear on x-ray. Blood cultures remain negative, sputum cultures neg w/ gram stain showing rare epithelial cells, few WBC's, gram pos rods and cocci in pairs. Patient finished cefepime and vanc on 9/20    #Septic shock  RESOLVED  likely 2/2 pneumonia. New tmax and worsened pleural effusions on 09/12. BLE edema, erythema, scaly skin c/w cellulitis. CTX 2g IV QD on 09/7 but new t-max on night of 09/11-12, cultures remain neg. Transitioned to cefepime 1g q12 and received 1 dose vanc 1.5g (dose dependent due to kidney status). Trough goal of 13-17.  Pt spike another fever on 09/19 after a week of being afebrile, cultures sent. Completed antibiotic course of cefepime and vanc on 09/20    stage 2 pressure ulcer  Patient with stage 2 sacral pressure ulcer.   - Wound care consulted      Philtrum Wound  Patient with 3 cm cut on upper lip 2/2 to intubation  - consider ENT consult for stitches     E: K>4, Phos>3, Mg>2  N: Nepro diet - CVVHD   DVT ppx: Argatroban   GI ppx: Protonix 40 BID  Access: 2 peripheral lines, left sided triple lumen, HD cath on right    DNR       80yo morbidly obese female w/ pmhx of HTN, HLD, HFpEF (EF 56%), COPD (on home O2-3L), chronic LE edema and cellulitis, DALIA, PVD p/w respiratory distress 2/2 severe sepsis due to cellulitis on lower extremity which progressed to septic shock in setting of pneumonia. Hospital course complicated by cardiac arrest, flail chest, and oliguric BRANT progressing to kidney failure.     NEURO  #Hypoxic Encephalopathy 2/2 to cardiac arrest  s/p cardiac arrest 2/2 hypoxia due to aspiration. Prior to arrest, pt was awake and alert on the RMF. Difficult to assess current status due to continued ventilation and sedation requirements in the setting on possible VAP and flail chest.   - sedative medications discontinued to assess mentation  - continue to assess mentation     PULM  #Acute on chronic resp failure  Pt w/ COPD on home 3 L O2, currently worsening hypoxia 2/2 to Pneumonia vs flash pulmonary edema. Pt endured cardiac arrest secondary to resp failure likely in setting of aspiration while eating vs flash pulmonary edema 2/2 chronic htn and chronic HF (relatively the same EF as previous TTE). New onset pleural effusions likely in the same setting, pt s/p intubation.  - Trach placement yesterday w/ transfusion of platelets and 1 unit pRBC. Tracheostomy without erythema or swelling  - continue ventilation and monitor oxygenation status    #Pneumothorax  Pt presented to MICU with signs of decreased lung sliding on bedside ultrasound s/p cardiac arrest and chest compressions. Drop in sats to low 70s on 09/08 w/ x-ray confirming Left sided pneumothorax, no BP changes. Chest tube placed with improvement in sats to high 90s again. Repeat x-rays showing lung reexpansion  - chest tube still in pace, set to water seal, will keep chest tube in place iso PEEP     #Broken ribs w/ flail chest  Pt with several broken ribs and signs of flail chest with difficulty breathing when sedation weaned off. Pt currently intubated and sedated. Thoracic surgery consulted for possible sternal plating. Anesthesia consulted, spinal block on 09/12 for pain management. Pt remains unstable and not surgery candidate at this time. Will reconsult once patient is stable  - continue to monitor respirations    #Chronic obstructive pulmonary disease (COPD).   Pt with COPD on home 3L. Patient initially presented with NRB, now intubated s/p cardiac arrest. Started on mucomyst  - continue mucomyst and monitor oxygenation    CARDIOVASCULAR  #Cardiac Arrest  Patient intubated and sedated 2/2 to cardiac arrest  - 9/23     #Septic shock - resolved   2/2 pneumonia. Cultures remain neg. Perfusion - warm on palpation, not producing urine, normal lactate. Volume - Moist mucus membranes, edematous in extremities and receiving CVVHD. Hemodynamically stable while on pressors.  - continue Levo .05 mcg/kg/min  - continue fentanyl 0.5 mcg/kg/hr, midazolam 0.02 mg/kg/hr, propofol 5 mcg/kg/min  - Wean pressors and sedation as tolerated    #Chronic heart failure with preserved ejection fraction (HFpEF).   Pt on home bumex 2mg.  TTE from current visit showing EF of 56% with Pulmonary HTN - PAP 48. Experienced pulmonary edema and effusion postcardiac arrest.   Plan:  - continue CVVHD to reduce fluid overload in the setting of acute kidney failure    #Cardiac Arrest on 09/07  possibly 2/2 hypoxia in setting of aspiration vs flash pulmonary edema. Pt relatively hypotensive throughout the day with episodes of hypertension to 130/140 systolic. received fluids on PM 09/07 and a unit of blood on 09/07 for anemia. In PM on 09/07, complained of difficulty breathing during meal, found unresponsive, hypotensive, w/ blue complexion by nurse.    - currently intubated and sedated    #Afib  Experienced a-fib on 09/13 w RVR. self resolved w/o intervention. 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   Pt with history of hypertension on home diltiazem 180mg daily. Currently on pressors s/p septic shock.   Plan:  - holding home antihypertensive meds    GI  No active issues      RENAL  #Anuric BRANT on CKD  Baseline Cr 2.04 in July 2022, currently uptrending to 3.3. Likely iATN 2/2 to hypoperfusion/ischemia in setting of sepsis and cardiac arrest. HD cath placed on 09/12 for CVVHD in setting of worsening electrolytes and increasing fluid status. Goal net neg 100cc/hr. Patient currently anuric  - On CVVHD since 9/12  - Continue CVVHD as above - CVVHD rx: qb 300ml/min  UF: net negative 100ml/hr  DFR: 2.5L/hour  Dialysate to Phoxillum   q6H BMP mag and phos while on CVVHD  maintain MAP >70 for adequate renal perfusion  - strict I/Os  - renal following, appreciate recs    #Right kidney mass.   CTAP s/f slightly hyperdense solid cortical mass in upper pole of R kidney.  Plan:  - consider additional renal imaging when stable    ENDO  No active issues      HEME  Heparin Induced Thrombocytopenia  Hep PF4 Ab positive. D/c'd heparin and started on Argatroban. 9/22 plt: 32, will give 1 unit pRBC iso procedure today  - PTT goal of 45-90  - check PTT daily  -  argatroban held for trach     #Anemia  Pt noted to have chronic anemia, presented with HgB of 6.8 w/ drop to 6.7 during course of stay. s/p 1 unit RBC on 09/07, remained stable until 09/13 when CVVHD began and drop in Hgb to 6.8. Received 1u RBC, back up to 7.4, hemolysis unlikely as hapto and bili normal. Received another unit on 09/19  Plan:  - f/u H+H  - active type and screen     ID  #Cellulitis  Patient initially presented to ED w b/l LE cellulitis and was treated with antibiotics.   - As per vascular, wrap kerlix gauze and ACE bandages around both legs. Wrap toes to just below the knee.   - Change bandages every few days  - continue to monitor     #Pneumonia  RESOLVED  possibly STANISLAV. Pt w/ bilateral pleural effusions and possible underlying infiltrate, unclear on x-ray. Blood cultures remain negative, sputum cultures neg w/ gram stain showing rare epithelial cells, few WBC's, gram pos rods and cocci in pairs. Patient finished cefepime and vanc on 9/20    #Septic shock  RESOLVED  likely 2/2 pneumonia. New tmax and worsened pleural effusions on 09/12. BLE edema, erythema, scaly skin c/w cellulitis. CTX 2g IV QD on 09/7 but new t-max on night of 09/11-12, cultures remain neg. Transitioned to cefepime 1g q12 and received 1 dose vanc 1.5g (dose dependent due to kidney status). Trough goal of 13-17.  Pt spike another fever on 09/19 after a week of being afebrile, cultures sent. Completed antibiotic course of cefepime and vanc on 09/20    stage 2 pressure ulcer  Patient with stage 2 sacral pressure ulcer.   - Wound care consulted      Philtrum Wound  Patient with 3 cm cut on upper lip 2/2 to intubation  - consider ENT consult for stitches     E: K>4, Phos>3, Mg>2  N: Nepro diet - CVVHD   DVT ppx: Argatroban   GI ppx: Protonix 40 BID  Access: 2 peripheral lines, left sided triple lumen, HD cath on right    DNR       80yo morbidly obese female w/ pmhx of HTN, HLD, HFpEF (EF 56%), COPD (on home O2-3L), chronic LE edema and cellulitis, DALIA, PVD p/w respiratory distress 2/2 severe sepsis due to cellulitis on lower extremity which progressed to septic shock in setting of pneumonia. Hospital course complicated by cardiac arrest, flail chest, and oliguric BRANT progressing to kidney failure.     NEURO  #Hypoxic Encephalopathy 2/2 to cardiac arrest  s/p cardiac arrest 2/2 hypoxia due to aspiration. Prior to arrest, pt was awake and alert on the RMF. Difficult to assess current status due to continued ventilation and sedation requirements in the setting on possible VAP and flail chest.   - sedative medications discontinued to assess mentation  - continue to assess mentation     PULM  #Acute on chronic resp failure  Pt w/ COPD on home 3 L O2, currently worsening hypoxia 2/2 to Pneumonia vs flash pulmonary edema. Pt endured cardiac arrest secondary to resp failure likely in setting of aspiration while eating vs flash pulmonary edema 2/2 chronic htn and chronic HF (relatively the same EF as previous TTE). New onset pleural effusions likely in the same setting, pt s/p intubation.  - Trach placement yesterday w/ transfusion of platelets and 1 unit pRBC. Tracheostomy without erythema or swelling  - continue ventilation and monitor oxygenation status    #Pneumothorax  Pt presented to MICU with signs of decreased lung sliding on bedside ultrasound s/p cardiac arrest and chest compressions. Drop in sats to low 70s on 09/08 w/ x-ray confirming Left sided pneumothorax, no BP changes. Chest tube placed with improvement in sats to high 90s again. Repeat x-rays showing lung reexpansion  - chest tube still in pace, set to water seal, will keep chest tube in place iso PEEP     #Broken ribs w/ flail chest  Pt with several broken ribs and signs of flail chest with difficulty breathing when sedation weaned off. Pt currently intubated and sedated. Thoracic surgery consulted for possible sternal plating. Anesthesia consulted, spinal block on 09/12 for pain management. Pt remains unstable and not surgery candidate at this time. Will reconsult once patient is stable  - continue to monitor respirations    #Chronic obstructive pulmonary disease (COPD).   Pt with COPD on home 3L. Patient initially presented with NRB, now intubated s/p cardiac arrest. Started on mucomyst  - continue mucomyst and monitor oxygenation    CARDIOVASCULAR  #Cardiac Arrest  Patient intubated and sedated 2/2 to cardiac arrest  - 9/23 patient weaned from sedation  - argatroban restarted w PTT checks q2    #Septic shock   2/2 pneumonia. Cultures remain neg. Perfusion - warm on palpation, not producing urine, normal lactate. Volume - Moist mucus membranes, edematous in extremities and receiving CVVHD. Hemodynamically stable while on pressors.  - continue Levo .05 mcg/kg/min  - Wean pressors and sedation as tolerated    #Chronic heart failure with preserved ejection fraction (HFpEF).   Pt on home bumex 2mg.  TTE from current visit showing EF of 56% with Pulmonary HTN - PAP 48. Experienced pulmonary edema and effusion postcardiac arrest.   Plan:  - continue CVVHD to reduce fluid overload in the setting of acute kidney failure  - IR consulted for HD catheter placement    #Afib  Experienced a-fib on 09/13 w RVR. self resolved w/o intervention. 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 Currently on pressors s/p septic shock.   - holding home antihypertensive meds     GI  No active issues      RENAL  #Anuric BRANT on CKD  Baseline Cr 2.04 in July 2022, currently uptrending to 3.3. Likely iATN 2/2 to hypoperfusion/ischemia in setting of sepsis and cardiac arrest. HD cath placed on 09/12 for CVVHD in setting of worsening electrolytes and increasing fluid status. Goal net neg 100cc/hr. Patient currently anuric  - On CVVHD since 9/12  - Continue CVVHD as above - CVVHD rx: qb 300ml/min  UF: net negative 100ml/hr  DFR: 2.5L/hour  Dialysate to Phoxillum   q6H BMP mag and phos while on CVVHD  maintain MAP >70 for adequate renal perfusion  - strict I/Os  - IR consulted for HD catheter placement   - renal following, appreciate recs    #Right kidney mass.   CTAP s/f slightly hyperdense solid cortical mass in upper pole of R kidney.  Plan:  - consider additional renal imaging when stable    ENDO  No active issues      HEME  Heparin Induced Thrombocytopenia  Hep PF4 Ab positive. D/c'd heparin and started on Argatroban. 9/22 plt: 32, will give 1 unit pRBC iso procedure today  - PTT goal of 45-90  - Restart Argatroban 9/23  - check PTT q2    #Anemia  Pt noted to have chronic anemia, presented with HgB of 6.8 w/ drop to 6.7 during course of stay. s/p 4 units RBC on 09/07, 9/13, 9/19, and 9/22  - monitor CBC   - maintain active type and screen (last 9/22)    ID  #Cellulitis  Patient initially presented to ED w b/l LE cellulitis and was treated with antibiotics.   - As per vascular, wrap kerlix gauze and ACE bandages around both legs. Wrap toes to just below the knee.   - Change bandages every few days  - continue to monitor     #Pneumonia  RESOLVED  possibly STANISLAV. Pt w/ bilateral pleural effusions and possible underlying infiltrate, unclear on x-ray. Blood cultures remain negative, sputum cultures neg w/ gram stain showing rare epithelial cells, few WBC's, gram pos rods and cocci in pairs. Patient finished cefepime and vanc on 9/20    #Septic shock  RESOLVED  likely 2/2 pneumonia. New tmax and worsened pleural effusions on 09/12. BLE edema, erythema, scaly skin c/w cellulitis. CTX 2g IV QD on 09/7 but new t-max on night of 09/11-12, cultures remain neg. Transitioned to cefepime 1g q12 and received 1 dose vanc 1.5g (dose dependent due to kidney status). Trough goal of 13-17.  Pt spike another fever on 09/19 after a week of being afebrile, cultures sent. Completed antibiotic course of cefepime and vanc on 09/20    stage 2 pressure ulcer  Patient with stage 2 sacral pressure ulcer.   - Wound care consulted      Philtrum Wound  Patient with 3 cm cut on upper lip 2/2 to intubation  - consider ENT consult for stitches     E: K>4, Phos>3, Mg>2  N: Nepro diet - CVVHD   DVT ppx: Argatroban   GI ppx: Protonix 40 BID  Access: 2 peripheral lines, left sided triple lumen, HD cath on right    DNR       82yo morbidly obese female w/ pmhx of HTN, HLD, HFpEF (EF 56%), COPD (on home O2-3L), chronic LE edema and cellulitis, DALIA, PVD p/w respiratory distress 2/2 severe sepsis due to cellulitis on lower extremity which progressed to septic shock in setting of pneumonia. Hospital course complicated by cardiac arrest, flail chest, and oliguric BRANT progressing to kidney failure.     NEURO  #Hypoxic Encephalopathy 2/2 to cardiac arrest  s/p cardiac arrest 2/2 hypoxia due to aspiration. Prior to arrest, pt was awake and alert on the RMF. Difficult to assess current status due to continued ventilation and sedation requirements in the setting on possible VAP and flail chest.   - sedative medications discontinued to assess mentation  - continue to assess mentation     PULM  #Acute on chronic resp failure  Pt w/ COPD on home 3 L O2, currently worsening hypoxia 2/2 to Pneumonia vs flash pulmonary edema. Pt endured cardiac arrest secondary to resp failure likely in setting of aspiration while eating vs flash pulmonary edema 2/2 chronic htn and chronic HF (relatively the same EF as previous TTE). New onset pleural effusions likely in the same setting, pt s/p intubation.  - Trach placement yesterday w/ transfusion of platelets and 1 unit pRBC. Tracheostomy without erythema or swelling  - continue ventilation and monitor oxygenation status    #Pneumothorax  Pt presented to MICU with signs of decreased lung sliding on bedside ultrasound s/p cardiac arrest and chest compressions. Drop in sats to low 70s on 09/08 w/ x-ray confirming Left sided pneumothorax, no BP changes. Chest tube placed with improvement in sats to high 90s again. Repeat x-rays showing lung reexpansion  - chest tube still in pace, set to water seal, will keep chest tube in place iso PEEP     #Broken ribs w/ flail chest  Pt with several broken ribs and signs of flail chest with difficulty breathing when sedation weaned off. Pt currently intubated and sedated. Thoracic surgery consulted for possible sternal plating. Anesthesia consulted, spinal block on 09/12 for pain management. Pt remains unstable and not surgery candidate at this time. Will reconsult once patient is stable  - continue to monitor respirations    #Chronic obstructive pulmonary disease (COPD).   Pt with COPD on home 3L. Patient initially presented with NRB, now intubated s/p cardiac arrest. Started on mucomyst  - continue mucomyst and monitor oxygenation    CARDIOVASCULAR  #Cardiac Arrest  Patient intubated and sedated 2/2 to cardiac arrest  - 9/23 patient weaned from sedation  - argatroban restarted w PTT checks q2    #Septic shock   2/2 pneumonia. Cultures remain neg. Perfusion - warm on palpation, not producing urine, normal lactate. Volume - Moist mucus membranes, edematous in extremities and receiving CVVHD. Hemodynamically stable while on pressors.  - continue Levo .05 mcg/kg/min  - Wean pressors and sedation as tolerated    #Chronic heart failure with preserved ejection fraction (HFpEF).   Pt on home bumex 2mg.  TTE from current visit showing EF of 56% with Pulmonary HTN - PAP 48. Experienced pulmonary edema and effusion postcardiac arrest.   Plan:  - continue CVVHD to reduce fluid overload in the setting of acute kidney failure  - IR consulted for HD catheter placement    #Afib  Experienced a-fib on 09/13 w RVR. self resolved w/o intervention. 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 Currently on pressors s/p septic shock.   - holding home antihypertensive meds     GI  No active issues      RENAL  #Anuric BRANT on CKD  Baseline Cr 2.04 in July 2022, currently uptrending to 3.3. Likely iATN 2/2 to hypoperfusion/ischemia in setting of sepsis and cardiac arrest. HD cath placed on 09/12 for CVVHD in setting of worsening electrolytes and increasing fluid status. Goal net neg 100cc/hr. Patient currently anuric  - On CVVHD since 9/12  - Continue CVVHD as above - CVVHD rx: qb 300ml/min  UF: net negative 100ml/hr  DFR: 2.5L/hour  Dialysate to Phoxillum   q6H BMP mag and phos while on CVVHD  maintain MAP >70 for adequate renal perfusion  - strict I/Os  - IR consulted for HD catheter placement   - renal following, appreciate recs    #Right kidney mass.   CTAP s/f slightly hyperdense solid cortical mass in upper pole of R kidney.  Plan:  - consider additional renal imaging when stable    ENDO  No active issues      HEME  Heparin Induced Thrombocytopenia  Hep PF4 Ab positive. D/c'd heparin and started on Argatroban. 9/22 plt: 32, will give 1 unit pRBC iso procedure today  - PTT goal of 45-90  - Argatroban restarted 9/23  - check PTT q2    #Anemia  Pt noted to have chronic anemia, presented with HgB of 6.8 w/ drop to 6.7 during course of stay. s/p 4 units RBC on 09/07, 9/13, 9/19, and 9/22  - monitor CBC   - maintain active type and screen (last 9/22)    ID  #Cellulitis  Patient initially presented to ED w b/l LE cellulitis and was treated with antibiotics.   - As per vascular, wrap kerlix gauze and ACE bandages around both legs. Wrap toes to just below the knee.   - Change bandages every few days  - continue to monitor     #Pneumonia  RESOLVED  possibly STANISLAV. Pt w/ bilateral pleural effusions and possible underlying infiltrate, unclear on x-ray. Blood cultures remain negative, sputum cultures neg w/ gram stain showing rare epithelial cells, few WBC's, gram pos rods and cocci in pairs. Patient finished cefepime and vanc on 9/20    stage 2 pressure ulcer  Patient with stage 2 sacral pressure ulcer.   - Wound care consulted    Philtrum Wound  Patient with 3 cm cut on upper lip 2/2 to intubation  - consider ENT consult for stitches     E: K>4, Phos>3, Mg>2  N: Nepro diet - CVVHD   DVT ppx: Argatroban   GI ppx: Protonix 40 BID  Access: 2 peripheral lines, CVVHD port on R  code status: DNR

## 2022-09-23 NOTE — CHART NOTE - NSCHARTNOTEFT_GEN_A_CORE
Left axillary arterial line removed. Pressure held until hemostasis achieved. Dressing dry and intact.

## 2022-09-23 NOTE — PROGRESS NOTE ADULT - ASSESSMENT
Patient is a 82 yo F with CHF, CKD (baseline around 2.5-2.9) HTN who presented with a one day history of respiratory distress and LE cellulitis hospital course c/b cardiac arrest on 9/7, acute renal failure requiring CVVHD Nephrology consulted for elevated creatinine.     #Anuric BRANT on CKD   likely iATN in setting of cardiac arrest and shock.   On CVVHD since 9/12  Anuric for around ~2 weeks, given no CVVHD in last 24 hours and 1 point rise in sCr, patient will have issues with clearance and will continue to need KRT     Plan:  will continue with CVVHD for now, once patient HD stable plan for   Continue CVVHD as listed below   CVVHD rx: qb 300ml/min  UF: net negative 100ml/hr  DFR: 2.5L/hour   Dialysate to Phoxillum   Q6H BMP mag and phos while on CVVHD  maintain MAP >70 for adequate renal perfusion      #Thrombocytopenia, Anemia   HIT confirmatory pending, no acute source of bleeding identified  Hep PF4AB positive    Plan:   C/w  argatroban  Transfusions per primary team    #Hypocalcemia    mild, phos at goal on phoxillum Patient is a 80 yo F with CHF, CKD (baseline around 2.5-2.9) HTN who presented with a one day history of respiratory distress and LE cellulitis hospital course c/b cardiac arrest on 9/7, acute renal failure requiring CVVHD Nephrology consulted for elevated creatinine.     #Anuric BRANT on CKD   likely iATN in setting of cardiac arrest and shock.   On CVVHD since 9/12  Anuric for around ~2 weeks, given no CVVHD in last 24 hours and 1 point rise in sCr, patient will have issues with clearance and will continue to need KRT       Plan:  -Plan for TDC   -Will reassess patient tomorrow for CVVHD or HD   -Daily BMP, mag and phos   -Maintain MAP >70 for adequate renal perfusion  -Monitor for urine output for any         #Thrombocytopenia, Anemia   HIT confirmatory pending, no acute source of bleeding identified  Hep PF4AB positive    Plan:   C/w  argatroban  Transfusions per primary team

## 2022-09-23 NOTE — CHART NOTE - NSCHARTNOTEFT_GEN_A_CORE
Admitting Diagnosis:   Patient is a 81y old  Female who presents with a chief complaint of Acute hypoxic respiratory failure (23 Sep 2022 11:28)      PAST MEDICAL & SURGICAL HISTORY:  Lyme disease      DVT (deep venous thrombosis)      Skin cancer      Brain embolism and thrombosis      MRSA (methicillin resistant Staphylococcus aureus)      PNA (pneumonia)      COPD (chronic obstructive pulmonary disease)      H/O angioplasty      Status post laparoscopic-assisted sigmoidectomy      H/O shoulder surgery          Current Nutrition Order:   Diet, NPO with Tube Feed:   Tube Feeding Modality: Nasogastric  Nepro with Carb Steady (NEPRORTH)  Total Volume for 24 Hours (mL): 960  Total Number of Cans: 4  Continuous  Starting Tube Feed Rate {mL per Hour}: 24  Increase Tube Feed Rate by (mL): 10     Every 3 hours  Until Goal Tube Feed Rate (mL per Hour): 40  Tube Feed Duration (in Hours): 24  Tube Feed Start Time: 16:00  Liquid Protein Supplement     Qty per Day:  1 (09-23-22 @ 14:16)    PO Intake: N/A    GI Issues: Abdomen ND/NT, +BS x4, LBM 9/23    Pain: No non-verbal indicators present     Skin Integrity: Stg II PI sacrum, unstageable PI lip, +3 BUE, +4 BLE     Labs:   09-23    131<L>  |  101  |  18  ----------------------------<  114<H>  4.3   |  21<L>  |  1.86<H>    Ca    8.3<L>      23 Sep 2022 04:11  Phos  5.8     09-23  Mg     2.5     09-23    TPro  7.2  /  Alb  2.4<L>  /  TBili  0.6  /  DBili  x   /  AST  19  /  ALT  14  /  AlkPhos  187<H>  09-23    CAPILLARY BLOOD GLUCOSE      POCT Blood Glucose.: 111 mg/dL (23 Sep 2022 11:20)  POCT Blood Glucose.: 111 mg/dL (23 Sep 2022 05:22)  POCT Blood Glucose.: 108 mg/dL (23 Sep 2022 04:01)  POCT Blood Glucose.: 133 mg/dL (22 Sep 2022 23:34)  POCT Blood Glucose.: 125 mg/dL (22 Sep 2022 17:17)      Medications:  MEDICATIONS  (STANDING):  albuterol/ipratropium for Nebulization 3 milliLiter(s) Nebulizer every 6 hours  argatroban Infusion 0.75 MICROgram(s)/kG/Min (4.6 mL/Hr) IV Continuous <Continuous>  artificial  tears Solution 1 Drop(s) Both EYES two times a day  BACItracin   Ointment 1 Application(s) Topical every 8 hours  chlorhexidine 0.12% Liquid 15 milliLiter(s) Oral Mucosa every 12 hours  chlorhexidine 2% Cloths 1 Application(s) Topical <User Schedule>  CRRT Treatment    <Continuous>  dextrose 5%. 1000 milliLiter(s) (50 mL/Hr) IV Continuous <Continuous>  dextrose 5%. 1000 milliLiter(s) (100 mL/Hr) IV Continuous <Continuous>  dextrose 50% Injectable 25 Gram(s) IV Push once  dextrose 50% Injectable 12.5 Gram(s) IV Push once  dextrose 50% Injectable 25 Gram(s) IV Push once  glucagon  Injectable 1 milliGRAM(s) IntraMuscular once  influenza  Vaccine (HIGH DOSE) 0.7 milliLiter(s) IntraMuscular once  insulin lispro (ADMELOG) corrective regimen sliding scale   SubCutaneous every 6 hours  lidocaine   4% Patch 1 Patch Transdermal every 24 hours  norepinephrine Infusion 0.05 MICROgram(s)/kG/Min (4.8 mL/Hr) IV Continuous <Continuous>  pantoprazole  Injectable 40 milliGRAM(s) IV Push every 24 hours  Phoxillum Filtration BK 4 / 2.5 5000 milliLiter(s) (2500 mL/Hr) CRRT <Continuous>  polyethylene glycol 3350 17 Gram(s) Oral daily  senna 2 Tablet(s) Oral daily    MEDICATIONS  (PRN):  dextrose Oral Gel 15 Gram(s) Oral once PRN Blood Glucose LESS THAN 70 milliGRAM(s)/deciliter  sodium chloride 0.9% lock flush 10 milliLiter(s) IV Push every 1 hour PRN Pre/post blood products, medications, blood draw, and to maintain line patency    Height for BMI (CENTIMETERS)	172.7 Centimeter(s)  Weight for BMI (lbs)	225.5 lb  Weight for BMI (kg)	102.3 kg  Body Mass Index	34.2    Weight Change: No new wt since admit.     Estimated energy needs:   Weight used for calculations	IBW  Estimated Energy Needs Weight (lbs)	140.2 lb  Estimated Energy Needs Weight (kg)	63.6 kg  Estimated Energy Needs From (shane/kg)	25  Estimated Energy Needs To (shane/kg)	30  Estimated Energy Needs Calculated From (shane/kg)	1590  Estimated Energy Needs Calculated To (shane/kg)	1908  Weight used for calculations	IBW  Estimated Protein Needs Weight (lbs)	140.2 lb  Estimated Protein Needs Weight (kg)	63.6 kg  Estimated Protein Needs From (g/kg)	1.4  Estimated Protein Needs To (g/kg)	1.6  Estimated Protein Needs Calculated From (g/kg)	89.04  Estimated Protein Needs Calculated To (g/kg)	101.76  Estimated Fluid Needs Weight (lbs)	140.2 lb  Estimated Fluid Needs Weight (kg)	63.6 kg  Estimated Fluid Needs From (ml/kg)	30  Estimated Fluid Needs To (ml/kg)	35  Estimated Fluid Needs Calculated From (ml/kg)	1908  Estimated Fluid Needs Calculated To (ml/kg)	2226  -Needs determined using IBW with consideration for critical condition requiring mechanical ventilation.     Subjective: 81F with complex medical history including obesity, HTN, HLD, HFpEF (EF 55-60% last echo 2/20), COPD (on home O2-3L), chronic LE edema with neuropathic pain and cellulitis, DALIA, PVD p/w respiratory distress x 1 day found to have cellulitis on lower extremity. 9/9: TF started. 9/12: Started CVVHD, TF changed to Nepro. 9/13: Elevated GRV, TF held. 9/17: Weaning off pressors/sedation. 9/18: Febrile. 9/20: On propofol in prep for trache. 9/22: Tracheostomy. Propofol off s/p procedure.     Pt care discussed in IDT rounds. Rx and labs reviewed. Pt trached and sedated at time of assessment; vent to VC-AC, MAP 81, fentanyl ggt, versed ggt, norepinephrine ggt, no propofol at time of assessment. Pt s/p tracheostomy on 9/22; lowering sedation and propofol not running, plan to restart feeds today. GI for possible PEG. Nephro following; may need continued HD -possible IR for port placement. No new GI distress at time of assessment. RDN will continue to monitor per protocol and PRN.     Previous Nutrition Diagnosis: Inadequate Oral Intake r/t critical illness requiring mechanical ventilation AEB need for NPO status    Active [ x ]  Resolved [   ]    If resolved, new PES:     Goal: Pt will meet 75% or more of protein/energy needs via most appropriate route for nutrition    Recommendations:  -Restart TF when medically able   *Recommend Nepro advance by 20 ml/hr q4hr toward goal of 40 ml/hr with LPS x1/day to provide 960 ml TF (102%RDI), 1828 kcal, 93 gProt., and 698 ml FW. This is 22.9 non-protein kcal and 1.46 gProt. per kg IBW 63.6kg.   -Monitor TF tolerance; mnoitor GI eval and need for PEG iso trache placement   -Monitor chemistry, GI fxn, and skin integrity     Risk Level: High [ x ] Moderate [   ] Low [   ]

## 2022-09-23 NOTE — PROGRESS NOTE ADULT - ATTENDING COMMENTS
Lower extremity cellulitis c/b septic shock, cardiac arrest with resultant flail chest and pneumothorax, acute hypoxemic respiratory failure, BRANT requiring new dialysis and HIT. S/p tracheostomy. PEG on hold as per family wishes. Off sedation and monitoring for improvement in encephalopathy.

## 2022-09-23 NOTE — PROGRESS NOTE ADULT - SUBJECTIVE AND OBJECTIVE BOX
GABBY LAYTON  81y  Female    Patient is a 81y old  Female who presents with a chief complaint of Acute hypoxic respiratory failure (23 Sep 2022 11:28)      INTERVAL HPI/OVERNIGHT EVENTS: As per night team, no overnight events. Patient was seen and examined at bedside. ROS was not obtained.       T(C): 37.4 (09-23-22 @ 14:41), Max: 37.4 (09-23-22 @ 14:41)  HR: 93 (09-23-22 @ 14:00) (78 - 93)  BP: 125/60 (09-23-22 @ 14:00) (102/46 - 135/58)  RR: 26 (09-23-22 @ 14:00) (26 - 26)  SpO2: 94% (09-23-22 @ 14:00) (90% - 100%)  Wt(kg): --Vital Signs Last 24 Hrs  T(C): 37.4 (23 Sep 2022 14:41), Max: 37.4 (23 Sep 2022 14:41)  T(F): 99.3 (23 Sep 2022 14:41), Max: 99.3 (23 Sep 2022 14:41)  HR: 93 (23 Sep 2022 14:00) (78 - 93)  BP: 125/60 (23 Sep 2022 14:00) (102/46 - 135/58)  BP(mean): 87 (23 Sep 2022 14:00) (66 - 90)  RR: 26 (23 Sep 2022 14:00) (26 - 26)  SpO2: 94% (23 Sep 2022 14:00) (90% - 100%)    Parameters below as of 23 Sep 2022 14:00  Patient On (Oxygen Delivery Method): ventilator    O2 Concentration (%): 50    PHYSICAL EXAM:  GENERAL: morbidly obese female on ventilation and sedated; NAD  Neuro: AAOx0; + gag reflex with bloody sputum and +sluggish light pupillary reflex; patient unable to be aroused, not able to follow commands  HEENT: trachea with no erythema or swelling; NC/AT; MMM; wound expanding philtrum; neck supple; no scleral icterus; nasal passages clear; no thyroid or LN enlargement  Heart: RRR; +s1 and s2; no MRG; non displaced PMI; no JVD  Lungs: mechanical breath sounds; on ventilation   GI: protuberant abdomen; nondistended; normal bowel sounds [id number]   Extremities: cellulitis to knee b/l; UE 2+ edema with 1+ pulses  Skin: cellulitis of LE and edematous of UE extending to the forearm     Consultant(s) Notes Reviewed:  [x ] YES  [ ] NO  Care Discussed with Consultants/Other Providers [ x] YES  [ ] NO    LABS:                        7.9    7.60  )-----------( 123      ( 23 Sep 2022 04:11 )             26.6     09-23    131<L>  |  101  |  18  ----------------------------<  114<H>  4.3   |  21<L>  |  1.86<H>    Ca    8.3<L>      23 Sep 2022 04:11  Phos  5.8     09-23  Mg     2.5     09-23    TPro  7.2  /  Alb  2.4<L>  /  TBili  0.6  /  DBili  x   /  AST  19  /  ALT  14  /  AlkPhos  187<H>  09-23      PT/INR - ( 22 Sep 2022 17:02 )   PT: 14.1 sec;   INR: 1.18          PTT - ( 23 Sep 2022 04:11 )  PTT:32.1 sec    CAPILLARY BLOOD GLUCOSE      POCT Blood Glucose.: 111 mg/dL (23 Sep 2022 11:20)  POCT Blood Glucose.: 111 mg/dL (23 Sep 2022 05:22)  POCT Blood Glucose.: 108 mg/dL (23 Sep 2022 04:01)  POCT Blood Glucose.: 133 mg/dL (22 Sep 2022 23:34)  POCT Blood Glucose.: 125 mg/dL (22 Sep 2022 17:17)      ABG - ( 23 Sep 2022 04:07 )  pH, Arterial: 7.30  pH, Blood: x     /  pCO2: 41    /  pO2: 84    / HCO3: 20    / Base Excess: -5.9  /  SaO2: 98.4                  MEDICATIONS  (STANDING):  albuterol/ipratropium for Nebulization 3 milliLiter(s) Nebulizer every 6 hours  argatroban Infusion 0.75 MICROgram(s)/kG/Min (4.6 mL/Hr) IV Continuous <Continuous>  artificial  tears Solution 1 Drop(s) Both EYES two times a day  BACItracin   Ointment 1 Application(s) Topical every 8 hours  chlorhexidine 0.12% Liquid 15 milliLiter(s) Oral Mucosa every 12 hours  chlorhexidine 2% Cloths 1 Application(s) Topical <User Schedule>  CRRT Treatment    <Continuous>  dextrose 5%. 1000 milliLiter(s) (50 mL/Hr) IV Continuous <Continuous>  dextrose 5%. 1000 milliLiter(s) (100 mL/Hr) IV Continuous <Continuous>  dextrose 50% Injectable 25 Gram(s) IV Push once  dextrose 50% Injectable 12.5 Gram(s) IV Push once  dextrose 50% Injectable 25 Gram(s) IV Push once  glucagon  Injectable 1 milliGRAM(s) IntraMuscular once  influenza  Vaccine (HIGH DOSE) 0.7 milliLiter(s) IntraMuscular once  insulin lispro (ADMELOG) corrective regimen sliding scale   SubCutaneous every 6 hours  lidocaine   4% Patch 1 Patch Transdermal every 24 hours  norepinephrine Infusion 0.05 MICROgram(s)/kG/Min (4.8 mL/Hr) IV Continuous <Continuous>  pantoprazole  Injectable 40 milliGRAM(s) IV Push every 24 hours  Phoxillum Filtration BK 4 / 2.5 5000 milliLiter(s) (2500 mL/Hr) CRRT <Continuous>  polyethylene glycol 3350 17 Gram(s) Oral daily  senna 2 Tablet(s) Oral daily    MEDICATIONS  (PRN):  dextrose Oral Gel 15 Gram(s) Oral once PRN Blood Glucose LESS THAN 70 milliGRAM(s)/deciliter  sodium chloride 0.9% lock flush 10 milliLiter(s) IV Push every 1 hour PRN Pre/post blood products, medications, blood draw, and to maintain line patency      RADIOLOGY & ADDITIONAL TESTS:    Imaging Personally Reviewed:  [ ] YES  [ ] NO

## 2022-09-24 NOTE — PROGRESS NOTE ADULT - SUBJECTIVE AND OBJECTIVE BOX
INTERVAL HPI/OVERNIGHT EVENTS: PTTs therapeutic x 2. Tachycardic overnight.     SUBJECTIVE: Patient seen and examined at bedside. On argatroban, levophed.        VITAL SIGNS:  ICU Vital Signs Last 24 Hrs  T(C): 37.3 (24 Sep 2022 22:23), Max: 37.8 (24 Sep 2022 00:58)  T(F): 99.1 (24 Sep 2022 22:23), Max: 100 (24 Sep 2022 00:58)  HR: 85 (24 Sep 2022 22:00) (85 - 114)  BP: 117/57 (24 Sep 2022 22:00) (81/39 - 117/58)  BP(mean): 82 (24 Sep 2022 22:00) (56 - 84)  ABP: --  ABP(mean): --  RR: 26 (24 Sep 2022 22:00) (22 - 31)  SpO2: 99% (24 Sep 2022 22:00) (80% - 100%)    O2 Parameters below as of 24 Sep 2022 22:00  Patient On (Oxygen Delivery Method): ventilator    O2 Concentration (%): 40      Mode: CPAP with PS, FiO2: 40, PEEP: 5, PS: 5, MAP: 7, PIP: 11  Plateau pressure:   P/F ratio:     09-23 @ 07:01  -  09-24 @ 07:00  --------------------------------------------------------  IN: 935.7 mL / OUT: 0 mL / NET: 935.7 mL    09-24 @ 07:01  -  09-24 @ 22:28  --------------------------------------------------------  IN: 425.2 mL / OUT: 0 mL / NET: 425.2 mL      CAPILLARY BLOOD GLUCOSE      POCT Blood Glucose.: 133 mg/dL (24 Sep 2022 17:44)    ECG:    PHYSICAL EXAM:  GENERAL: morbidly obese female on ventilation and sedated; NAD  Neuro: AAOx0; + gag reflex with bloody sputum and +sluggish light pupillary reflex; patient unable to be aroused, not able to follow commands  HEENT: trachea with no erythema or swelling; NC/AT; MMM; wound expanding philtrum; neck supple; no scleral icterus; nasal passages clear; no thyroid or LN enlargement  Heart: RRR; +s1 and s2; no MRG; non displaced PMI; no JVD  Lungs: mechanical breath sounds; on ventilation   GI: protuberant abdomen; nondistended; normal bowel sounds [id number]   Extremities: cellulitis to knee b/l; UE 2+ edema with 1+ pulses  Skin: cellulitis of LE and edematous of UE extending to the forearm    MEDICATIONS:  MEDICATIONS  (STANDING):  albuterol/ipratropium for Nebulization 3 milliLiter(s) Nebulizer every 6 hours  argatroban Infusion 0.85 MICROgram(s)/kG/Min (5.22 mL/Hr) IV Continuous <Continuous>  artificial  tears Solution 1 Drop(s) Both EYES two times a day  BACItracin   Ointment 1 Application(s) Topical every 8 hours  chlorhexidine 0.12% Liquid 15 milliLiter(s) Oral Mucosa every 12 hours  chlorhexidine 2% Cloths 1 Application(s) Topical <User Schedule>  dextrose 5%. 1000 milliLiter(s) (50 mL/Hr) IV Continuous <Continuous>  dextrose 5%. 1000 milliLiter(s) (100 mL/Hr) IV Continuous <Continuous>  dextrose 50% Injectable 25 Gram(s) IV Push once  dextrose 50% Injectable 12.5 Gram(s) IV Push once  dextrose 50% Injectable 25 Gram(s) IV Push once  glucagon  Injectable 1 milliGRAM(s) IntraMuscular once  influenza  Vaccine (HIGH DOSE) 0.7 milliLiter(s) IntraMuscular once  insulin lispro (ADMELOG) corrective regimen sliding scale   SubCutaneous every 6 hours  lidocaine   4% Patch 1 Patch Transdermal every 24 hours  norepinephrine Infusion 0.05 MICROgram(s)/kG/Min (9.59 mL/Hr) IV Continuous <Continuous>  pantoprazole  Injectable 40 milliGRAM(s) IV Push every 24 hours  polyethylene glycol 3350 17 Gram(s) Oral daily  senna 2 Tablet(s) Oral daily    MEDICATIONS  (PRN):  dextrose Oral Gel 15 Gram(s) Oral once PRN Blood Glucose LESS THAN 70 milliGRAM(s)/deciliter  sodium chloride 0.9% lock flush 10 milliLiter(s) IV Push every 1 hour PRN Pre/post blood products, medications, blood draw, and to maintain line patency      ALLERGIES:  Allergies    Altabax (Unknown)  Augmentin (Unknown)  clindamycin (Unknown)  Vaseline (Swelling)    Intolerances        LABS:                        7.4    6.42  )-----------( 117      ( 24 Sep 2022 06:01 )             25.2     09-24    133<L>  |  103  |  30<H>  ----------------------------<  131<H>  4.5   |  20<L>  |  3.00<H>    Ca    8.1<L>      24 Sep 2022 06:01  Phos  7.4     09-24  Mg     2.5     09-24    TPro  7.2  /  Alb  2.4<L>  /  TBili  0.6  /  DBili  x   /  AST  19  /  ALT  14  /  AlkPhos  187<H>  09-23    PTT - ( 24 Sep 2022 06:01 )  PTT:51.3 sec      RADIOLOGY & ADDITIONAL TESTS: Reviewed.

## 2022-09-24 NOTE — PROGRESS NOTE ADULT - SUBJECTIVE AND OBJECTIVE BOX
--------------------------------------------------------------------------------  Chief Complaint: ESRD/Ongoing hemodialysis requirement    24 hour events/subjective:  Remains stable. Anuric. Labs reviewed Stable. No overt indication for HD today.    PAST HISTORY  --------------------------------------------------------------------------------  No significant changes to PMH, PSH, FHx, SHx, unless otherwise noted    ALLERGIES & MEDICATIONS  --------------------------------------------------------------------------------  Allergies    Altabax (Unknown)  Augmentin (Unknown)  clindamycin (Unknown)  Vaseline (Swelling)    Intolerances      Standing Inpatient Medications  albuterol/ipratropium for Nebulization 3 milliLiter(s) Nebulizer every 6 hours  argatroban Infusion 0.85 MICROgram(s)/kG/Min IV Continuous <Continuous>  artificial  tears Solution 1 Drop(s) Both EYES two times a day  BACItracin   Ointment 1 Application(s) Topical every 8 hours  chlorhexidine 0.12% Liquid 15 milliLiter(s) Oral Mucosa every 12 hours  chlorhexidine 2% Cloths 1 Application(s) Topical <User Schedule>  dextrose 5%. 1000 milliLiter(s) IV Continuous <Continuous>  dextrose 5%. 1000 milliLiter(s) IV Continuous <Continuous>  dextrose 50% Injectable 25 Gram(s) IV Push once  dextrose 50% Injectable 12.5 Gram(s) IV Push once  dextrose 50% Injectable 25 Gram(s) IV Push once  glucagon  Injectable 1 milliGRAM(s) IntraMuscular once  influenza  Vaccine (HIGH DOSE) 0.7 milliLiter(s) IntraMuscular once  insulin lispro (ADMELOG) corrective regimen sliding scale   SubCutaneous every 6 hours  lidocaine   4% Patch 1 Patch Transdermal every 24 hours  norepinephrine Infusion 0.05 MICROgram(s)/kG/Min IV Continuous <Continuous>  pantoprazole  Injectable 40 milliGRAM(s) IV Push every 24 hours  polyethylene glycol 3350 17 Gram(s) Oral daily  senna 2 Tablet(s) Oral daily    PRN Inpatient Medications  dextrose Oral Gel 15 Gram(s) Oral once PRN  sodium chloride 0.9% lock flush 10 milliLiter(s) IV Push every 1 hour PRN      REVIEW OF SYSTEMS  --------------------------------------------------------------------------------  All other systems were reviewed and are negative, except as noted.    VITALS/PHYSICAL EXAM  --------------------------------------------------------------------------------  T(C): 37.4 (09-24-22 @ 10:11), Max: 37.8 (09-24-22 @ 00:58)  HR: 101 (09-24-22 @ 09:00) (89 - 114)  BP: 100/46 (09-24-22 @ 09:00) (93/44 - 135/58)  RR: 29 (09-24-22 @ 09:00) (22 - 30)  SpO2: 95% (09-24-22 @ 09:00) (80% - 98%)  Wt(kg): --  Drug Dosing Weight  Height (cm): 172.7 (06 Sep 2022 21:17)  Weight (kg): 102.3 (06 Sep 2022 21:17)  BMI (kg/m2): 34.3 (06 Sep 2022 21:17)  BSA (m2): 2.15 (06 Sep 2022 21:17)        09-23-22 @ 07:01  -  09-24-22 @ 07:00  --------------------------------------------------------  IN: 935.7 mL / OUT: 0 mL / NET: 935.7 mL    09-24-22 @ 07:01  -  09-24-22 @ 10:20  --------------------------------------------------------  IN: 95.3 mL / OUT: 0 mL / NET: 95.3 mL    PHYSICAL EXAM:  Constitutional: trach , sedated; NAD  HEENT: MMM, NC/AT, wound expanding philtrum; neck supple;  Respiratory: Mechanical breath sounds bilaterally, not overbreathing vent  Cardiac: +S1/S2; RRR; no M/R/G  Gastrointestinal: soft, NT/ND; no rebound or guarding; +BS  Extremities: WWP, no clubbing or cyanosis; general low level anasarca, improving  Dermatologic: skin warm, dry and intact; no rashes, wounds, or scars  Access: None     LABS/STUDIES  --------------------------------------------------------------------------------              7.4    6.42  >-----------<  117      [09-24-22 @ 06:01]              25.2     133  |  103  |  30  ----------------------------<  131      [09-24-22 @ 06:01]  4.5   |  20  |  3.00        Ca     8.1     [09-24-22 @ 06:01]      Mg     2.5     [09-24-22 @ 06:01]      Phos  7.4     [09-24-22 @ 06:01]    TPro  7.2  /  Alb  2.4  /  TBili  0.6  /  DBili  x   /  AST  19  /  ALT  14  /  AlkPhos  187  [09-23-22 @ 04:11]    PT/INR: PT 14.1 , INR 1.18       [09-22-22 @ 17:02]  PTT: 51.3       [09-24-22 @ 06:01]      Iron 15, TIBC 242, %sat 6      [07-02-22 @ 22:28]  Ferritin 28      [07-02-22 @ 22:28]  Lipid: chol --, , HDL --, LDL --      [09-22-22 @ 05:44]        RADIOLOGY:  --------------------------------------------------------------------------------------

## 2022-09-24 NOTE — PROGRESS NOTE ADULT - ASSESSMENT
82yo morbidly obese female w/ pmhx of HTN, HLD, HFpEF (EF 56%), COPD (on home O2-3L), chronic LE edema and cellulitis, DALIA, PVD p/w respiratory distress 2/2 severe sepsis due to cellulitis on lower extremity which progressed to septic shock in setting of pneumonia. Hospital course complicated by cardiac arrest, flail chest, and oliguric BRANT progressing to kidney failure.     NEURO  #Hypoxic Encephalopathy 2/2 to cardiac arrest  s/p cardiac arrest 2/2 hypoxia due to aspiration. Prior to arrest, pt was awake and alert on the RMF. Difficult to assess current status due to continued ventilation and sedation requirements in the setting on possible VAP and flail chest.   - sedative medications discontinued to assess mentation, plan to wake her up, completely off sedation.  - continue to assess mentation     PULM  #Acute on chronic resp failure  Pt w/ COPD on home 3 L O2, currently worsening hypoxia 2/2 to Pneumonia vs flash pulmonary edema. Pt endured cardiac arrest secondary to resp failure likely in setting of aspiration while eating vs flash pulmonary edema 2/2 chronic htn and chronic HF (relatively the same EF as previous TTE). New onset pleural effusions likely in the same setting, pt s/p intubation.  - Trach placement yesterday w/ transfusion of platelets and 1 unit pRBC. Tracheostomy without erythema or swelling  - continue ventilation and monitor oxygenation status    #Pneumothorax  Pt presented to MICU with signs of decreased lung sliding on bedside ultrasound s/p cardiac arrest and chest compressions. Drop in sats to low 70s on 09/08 w/ x-ray confirming Left sided pneumothorax, no BP changes. Chest tube placed with improvement in sats to high 90s again. Repeat x-rays showing lung reexpansion  - chest tube still in pace, set to water seal, will keep chest tube in place iso PEEP     #Broken ribs w/ flail chest  Pt with several broken ribs and signs of flail chest with difficulty breathing when sedation weaned off. Pt currently intubated and sedated. Thoracic surgery consulted for possible sternal plating. Anesthesia consulted, spinal block on 09/12 for pain management. Pt remains unstable and not surgery candidate at this time. Will reconsult once patient is stable  - continue to monitor respirations    #Chronic obstructive pulmonary disease (COPD).   Pt with COPD on home 3L. Patient initially presented with NRB, now intubated s/p cardiac arrest. Started on mucomyst  - continue mucomyst and monitor oxygenation    CARDIOVASCULAR  #Cardiac Arrest  Patient intubated and sedated 2/2 to cardiac arrest  - 9/23 patient weaned from sedation  - argatroban restarted w PTT checks q2      #Septic shock   2/2 pneumonia. Cultures remain neg. Perfusion - warm on palpation, not producing urine, normal lactate. Volume - Moist mucus membranes, edematous in extremities and receiving CVVHD. Hemodynamically stable while on pressors.  - continue Levo .05 mcg/kg/min single concentration.   - Wean pressors and sedation as tolerated    #Chronic heart failure with preserved ejection fraction (HFpEF).   Pt on home bumex 2mg.  TTE from current visit showing EF of 56% with Pulmonary HTN - PAP 48. Experienced pulmonary edema and effusion postcardiac arrest.   Plan:  - continue CVVHD to reduce fluid overload in the setting of acute kidney failure  - IR consulted for HD catheter placement    #Afib  Experienced a-fib on 09/13 w RVR. self resolved w/o intervention. 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 Currently on pressors s/p septic shock.   - holding home antihypertensive meds     GI  No active issues      RENAL  #Anuric BRANT on CKD  Baseline Cr 2.04 in July 2022, currently uptrending to 3.3. Likely iATN 2/2 to hypoperfusion/ischemia in setting of sepsis and cardiac arrest. HD cath placed on 09/12 for CVVHD in setting of worsening electrolytes and increasing fluid status. Goal net neg 100cc/hr. Patient currently anuric  - On CVVHD since 9/12  - Continue CVVHD as above - CVVHD rx: qb 300ml/min  UF: net negative 100ml/hr  DFR: 2.5L/hour  Dialysate to Phoxillum   q6H BMP mag and phos while on CVVHD  maintain MAP >70 for adequate renal perfusion  - strict I/Os  - IR consulted for HD catheter placement   - renal following, appreciate recs    #Right kidney mass.   CTAP s/f slightly hyperdense solid cortical mass in upper pole of R kidney.  Plan:  - consider additional renal imaging when stable    ENDO  No active issues      HEME  Heparin Induced Thrombocytopenia  Hep PF4 Ab positive. D/c'd heparin and started on Argatroban. 9/22 plt: 32, will give 1 unit pRBC iso procedure today  - PTT goal of 45-90  - Argatroban restarted 9/23  - check PTT q2    #Anemia  Pt noted to have chronic anemia, presented with HgB of 6.8 w/ drop to 6.7 during course of stay. s/p 4 units RBC on 09/07, 9/13, 9/19, and 9/22  - monitor CBC   - maintain active type and screen (last 9/22)    ID  #Cellulitis  Patient initially presented to ED w b/l LE cellulitis and was treated with antibiotics.   - As per vascular, wrap kerlix gauze and ACE bandages around both legs. Wrap toes to just below the knee.   - Change bandages every few days  - continue to monitor     #Pneumonia  RESOLVED  possibly STANISLAV. Pt w/ bilateral pleural effusions and possible underlying infiltrate, unclear on x-ray. Blood cultures remain negative, sputum cultures neg w/ gram stain showing rare epithelial cells, few WBC's, gram pos rods and cocci in pairs. Patient finished cefepime and vanc on 9/20    stage 2 pressure ulcer  Patient with stage 2 sacral pressure ulcer.   - Wound care consulted    Philtrum Wound  Patient with 3 cm cut on upper lip 2/2 to intubation  - consider ENT consult for stitches     E: K>4, Phos>3, Mg>2  N: Nepro diet - CVVHD   DVT ppx: Argatroban   GI ppx: Protonix 40 BID  Access: 2 peripheral lines, CVVHD port on R  code status: DNR       99

## 2022-09-24 NOTE — PROGRESS NOTE ADULT - ATTENDING COMMENTS
Lower extremity cellulitis c/b septic shock, cardiac arrest with resultant flail chest and pneumothorax, acute hypoxemic respiratory failure, BRANT requiring new dialysis and HIT. S/p tracheostomy. PEG on hold as per family wishes. Off sedation and monitoring for improvement in encephalopathy which has been minimal; will need imaging/further workup if not improved in the next 24-48 hours.

## 2022-09-25 NOTE — PROGRESS NOTE ADULT - SUBJECTIVE AND OBJECTIVE BOX
GABBY LAYTON  81y  Female    Patient is a 81y old  Female who presents with a chief complaint of Acute hypoxic respiratory failure (25 Sep 2022 12:31)      INTERVAL HPI/OVERNIGHT EVENTS: As per night team, no overnight events. Patient was seen and examined at bedside. Patient unresponsive, currently intubated.         T(C): 36.7 (09-25-22 @ 17:35), Max: 37.4 (09-25-22 @ 01:05)  HR: 112 (09-25-22 @ 18:00) (83 - 112)  BP: 125/55 (09-25-22 @ 18:00) (89/42 - 125/55)  RR: 35 (09-25-22 @ 18:00) (26 - 35)  SpO2: 97% (09-25-22 @ 18:00) (90% - 100%)  Wt(kg): --Vital Signs Last 24 Hrs  T(C): 36.7 (25 Sep 2022 17:35), Max: 37.4 (25 Sep 2022 01:05)  T(F): 98.1 (25 Sep 2022 17:35), Max: 99.3 (25 Sep 2022 01:05)  HR: 112 (25 Sep 2022 18:00) (83 - 112)  BP: 125/55 (25 Sep 2022 18:00) (89/42 - 125/55)  BP(mean): 79 (25 Sep 2022 18:00) (61 - 82)  RR: 35 (25 Sep 2022 18:00) (26 - 35)  SpO2: 97% (25 Sep 2022 18:00) (90% - 100%)    Parameters below as of 25 Sep 2022 18:00  Patient On (Oxygen Delivery Method): ventilator    O2 Concentration (%): 40    PHYSICAL EXAM:  GENERAL: morbidly obese female on ventilation and sedated; NAD  Neuro: AAOx0; + gag reflex with bloody sputum and +sluggish light pupillary reflex; patient unable to be aroused, not able to follow commands  HEENT: trachea with no erythema or swelling; NC/AT; MMM; wound expanding philtrum; neck supple; no scleral icterus; nasal passages clear; no thyroid or LN enlargement  Heart: RRR; +s1 and s2; no MRG; non displaced PMI; no JVD  Lungs: mechanical breath sounds; on ventilation   GI: protuberant abdomen; nondistended; normal bowel sounds [id number]   Extremities: cellulitis to knee b/l; UE 2+ edema with 1+ pulses  Skin: cellulitis of LE and edematous of UE extending to the forearm  Consultant(s) Notes Reviewed:  [x ] YES  [ ] NO  Care Discussed with Consultants/Other Providers [ x] YES  [ ] NO    LABS:                        7.7    7.41  )-----------( 144      ( 25 Sep 2022 06:18 )             26.1     09-25    130<L>  |  102  |  43<H>  ----------------------------<  128<H>  4.5   |  18<L>  |  3.75<H>    Ca    8.0<L>      25 Sep 2022 06:18  Phos  8.2     09-25  Mg     2.6     09-25    TPro  7.0  /  Alb  2.0<L>  /  TBili  0.4  /  DBili  x   /  AST  13  /  ALT  11  /  AlkPhos  154<H>  09-25      PTT - ( 25 Sep 2022 06:18 )  PTT:53.7 sec    CAPILLARY BLOOD GLUCOSE      POCT Blood Glucose.: 130 mg/dL (25 Sep 2022 17:05)  POCT Blood Glucose.: 131 mg/dL (25 Sep 2022 11:08)  POCT Blood Glucose.: 149 mg/dL (25 Sep 2022 07:47)  POCT Blood Glucose.: 129 mg/dL (24 Sep 2022 23:52)            MEDICATIONS  (STANDING):  albuterol/ipratropium for Nebulization 3 milliLiter(s) Nebulizer every 6 hours  argatroban Infusion 0.85 MICROgram(s)/kG/Min (5.22 mL/Hr) IV Continuous <Continuous>  artificial  tears Solution 1 Drop(s) Both EYES two times a day  BACItracin   Ointment 1 Application(s) Topical every 8 hours  chlorhexidine 0.12% Liquid 15 milliLiter(s) Oral Mucosa every 12 hours  chlorhexidine 2% Cloths 1 Application(s) Topical <User Schedule>  dextrose 5%. 1000 milliLiter(s) (50 mL/Hr) IV Continuous <Continuous>  dextrose 5%. 1000 milliLiter(s) (100 mL/Hr) IV Continuous <Continuous>  dextrose 50% Injectable 25 Gram(s) IV Push once  dextrose 50% Injectable 12.5 Gram(s) IV Push once  dextrose 50% Injectable 25 Gram(s) IV Push once  glucagon  Injectable 1 milliGRAM(s) IntraMuscular once  influenza  Vaccine (HIGH DOSE) 0.7 milliLiter(s) IntraMuscular once  insulin lispro (ADMELOG) corrective regimen sliding scale   SubCutaneous every 6 hours  lidocaine   4% Patch 1 Patch Transdermal every 24 hours  norepinephrine Infusion 0.05 MICROgram(s)/kG/Min (9.59 mL/Hr) IV Continuous <Continuous>  nystatin Powder 1 Application(s) Topical three times a day  pantoprazole  Injectable 40 milliGRAM(s) IV Push every 24 hours  polyethylene glycol 3350 17 Gram(s) Oral daily  senna 2 Tablet(s) Oral daily    MEDICATIONS  (PRN):  dextrose Oral Gel 15 Gram(s) Oral once PRN Blood Glucose LESS THAN 70 milliGRAM(s)/deciliter  sodium chloride 0.9% lock flush 10 milliLiter(s) IV Push every 1 hour PRN Pre/post blood products, medications, blood draw, and to maintain line patency      RADIOLOGY & ADDITIONAL TESTS:    Imaging Personally Reviewed:  [ ] YES  [ ] NO

## 2022-09-25 NOTE — PROGRESS NOTE ADULT - ASSESSMENT
80yo morbidly obese female w/ pmhx of HTN, HLD, HFpEF (EF 56%), COPD (on home O2-3L), chronic LE edema and cellulitis, DALIA, PVD p/w respiratory distress 2/2 severe sepsis due to cellulitis on lower extremity which progressed to septic shock in setting of pneumonia. Hospital course complicated by cardiac arrest, flail chest, and oliguric BRANT progressing to kidney failure.     NEURO  #Hypoxic Encephalopathy 2/2 to cardiac arrest  s/p cardiac arrest 2/2 hypoxia due to aspiration. Prior to arrest, pt was awake and alert on the RMF. Difficult to assess current status due to continued ventilation and sedation requirements in the setting on possible VAP and flail chest.   - sedative medications discontinued to assess mentation, plan to wake her up, completely off sedation.  - continue to assess mentation     PULM  #Acute on chronic resp failure  Pt w/ COPD on home 3 L O2, currently worsening hypoxia 2/2 to Pneumonia vs flash pulmonary edema. Pt endured cardiac arrest secondary to resp failure likely in setting of aspiration while eating vs flash pulmonary edema 2/2 chronic htn and chronic HF (relatively the same EF as previous TTE). New onset pleural effusions likely in the same setting, pt s/p intubation.  - Trach placement yesterday w/ transfusion of platelets and 1 unit pRBC. Tracheostomy without erythema or swelling  - continue ventilation and monitor oxygenation status  - d/c chest tube removal  - trach collar 9/25    #Pneumothorax  Pt presented to MICU with signs of decreased lung sliding on bedside ultrasound s/p cardiac arrest and chest compressions. Drop in sats to low 70s on 09/08 w/ x-ray confirming Left sided pneumothorax, no BP changes. Chest tube placed with improvement in sats to high 90s again. Repeat x-rays showing lung reexpansion  - chest tube still in pace, set to water seal, will keep chest tube in place iso PEEP     #Broken ribs w/ flail chest  Pt with several broken ribs and signs of flail chest with difficulty breathing when sedation weaned off. Pt currently intubated and sedated. Thoracic surgery consulted for possible sternal plating. Anesthesia consulted, spinal block on 09/12 for pain management. Pt remains unstable and not surgery candidate at this time. Will reconsult once patient is stable  - continue to monitor respirations    #Chronic obstructive pulmonary disease (COPD).   Pt with COPD on home 3L. Patient initially presented with NRB, now intubated s/p cardiac arrest. Started on mucomyst  - continue mucomyst and monitor oxygenation    CARDIOVASCULAR  #Cardiac Arrest  Patient intubated and sedated 2/2 to cardiac arrest  - 9/23 patient weaned from sedation  - argatroban restarted w PTT checks q2      #Septic shock   2/2 pneumonia. Cultures remain neg. Perfusion - warm on palpation, not producing urine, normal lactate. Volume - Moist mucus membranes, edematous in extremities and receiving CVVHD. Hemodynamically stable while on pressors.  - continue Levo .05 mcg/kg/min single concentration.   - Wean pressors and sedation as tolerated    #Chronic heart failure with preserved ejection fraction (HFpEF).   Pt on home bumex 2mg.  TTE from current visit showing EF of 56% with Pulmonary HTN - PAP 48. Experienced pulmonary edema and effusion postcardiac arrest.   Plan:  - continue CVVHD to reduce fluid overload in the setting of acute kidney failure  - IR consulted for HD catheter placement    #Afib  Experienced a-fib on 09/13 w RVR. self resolved w/o intervention. 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 Currently on pressors s/p septic shock.   - holding home antihypertensive meds     GI  No active issues      RENAL  #Anuric BRANT on CKD  Baseline Cr 2.04 in July 2022, currently uptrending to 3.3. Likely iATN 2/2 to hypoperfusion/ischemia in setting of sepsis and cardiac arrest. HD cath placed on 09/12 for CVVHD in setting of worsening electrolytes and increasing fluid status. Goal net neg 100cc/hr. Patient currently anuric  - On CVVHD since 9/12  - Continue CVVHD as above - CVVHD rx: qb 300ml/min  UF: net negative 100ml/hr  DFR: 2.5L/hour  Dialysate to Phoxillum   q6H BMP mag and phos while on CVVHD  maintain MAP >70 for adequate renal perfusion  - strict I/Os  - IR consulted for HD catheter placement on Monday, will get AM labs  - renal following, appreciate recs    #Right kidney mass.   CTAP s/f slightly hyperdense solid cortical mass in upper pole of R kidney.  Plan:  - consider additional renal imaging when stable    ENDO  No active issues      HEME  Heparin Induced Thrombocytopenia  Hep PF4 Ab positive. D/c'd heparin and started on Argatroban. 9/22 plt: 32, will give 1 unit pRBC iso procedure today  - PTT goal of 45-90  - Argatroban restarted 9/23  - check PTT q2    #Anemia  Pt noted to have chronic anemia, presented with HgB of 6.8 w/ drop to 6.7 during course of stay. s/p 4 units RBC on 09/07, 9/13, 9/19, and 9/22  - monitor CBC   - maintain active type and screen (last 9/22)    ID  #Cellulitis  Patient initially presented to ED w b/l LE cellulitis and was treated with antibiotics.   - As per vascular, wrap kerlix gauze and ACE bandages around both legs. Wrap toes to just below the knee.   - Change bandages every few days  - continue to monitor     #Pneumonia  RESOLVED  possibly STANISLAV. Pt w/ bilateral pleural effusions and possible underlying infiltrate, unclear on x-ray. Blood cultures remain negative, sputum cultures neg w/ gram stain showing rare epithelial cells, few WBC's, gram pos rods and cocci in pairs. Patient finished cefepime and vanc on 9/20    stage 2 pressure ulcer  Patient with stage 2 sacral pressure ulcer.   - Wound care consulted    Philtrum Wound  Patient with 3 cm cut on upper lip 2/2 to intubation  - consider ENT consult for stitches     E: K>4, Phos>3, Mg>2  N: Nepro diet - CVVHD   DVT ppx: Argatroban   GI ppx: Protonix 40 BID  Access: 2 peripheral lines, CVVHD port on R  code status: DNR

## 2022-09-25 NOTE — PROGRESS NOTE ADULT - ASSESSMENT
Patient is a 80 yo F with CHF, CKD (baseline around 2.5-2.9) HTN who presented with a one day history of respiratory distress and LE cellulitis hospital course c/b cardiac arrest on 9/7, acute renal failure requiring CVVHD Nephrology consulted for elevated creatinine.     #Anuric BRANT on CKD   likely iATN in setting of cardiac arrest and shock.   On CVVHD since 9/12  Anuric for around ~2 weeks, given no CVVHD in last 24 hours and 1 point rise in sCr, patient will have issues with clearance and will continue to need KRT     Plan:  -Plan for TDC - can continue with line holiday, no overt indication for HD today  -Daily BMP, mag and phos   -Maintain MAP >70 for adequate renal perfusion  -Monitor for urine output for any       #Thrombocytopenia, Anemia   HIT confirmatory pending, no acute source of bleeding identified  Hep PF4AB positive    Plan:   C/w  argatroban  Transfusions per primary team Patient is a 80 yo F with CHF, CKD (baseline around 2.5-2.9) HTN who presented with a one day history of respiratory distress and LE cellulitis hospital course c/b cardiac arrest on 9/7, acute renal failure requiring CVVHD Nephrology consulted for elevated creatinine.     #Anuric BRANT on CKD   likely iATN in setting of cardiac arrest and shock.   On CVVHD since 9/12  Anuric for around ~2 weeks, given no CVVHD in last 24 hours and 1 point rise in sCr, patient will have issues with clearance and will continue to need KRT     Plan:  -Plan for TDC - can continue with line holiday, no overt indication for HD today  -Get pre-op labs tonight, speak with IR in AM  -Daily BMP, mag and phos   -Maintain MAP >70 for adequate renal perfusion  -Monitor for urine output for any       #Thrombocytopenia, Anemia   HIT confirmatory pending, no acute source of bleeding identified  Hep PF4AB positive    Plan:   C/w  argatroban  Transfusions per primary team

## 2022-09-25 NOTE — PROGRESS NOTE ADULT - ATTENDING COMMENTS
Lower extremity cellulitis c/b septic shock, cardiac arrest with resultant flail chest and pneumothorax, acute hypoxemic respiratory failure, BRANT requiring new dialysis and HIT. S/p tracheostomy. PEG on hold as per family wishes. Off sedation and monitoring for improvement in encephalopathy which has been minimal; will need imaging/further workup if not improved in the next 24-48 hours.  Transition to trach collar.

## 2022-09-25 NOTE — PROGRESS NOTE ADULT - SUBJECTIVE AND OBJECTIVE BOX
--------------------------------------------------------------------------------  Chief Complaint: ESRD/Ongoing hemodialysis requirement    24 hour events/subjective:  Remains stable intubated. No indications for HD today. Plan for TDC 9/26.     PAST HISTORY  --------------------------------------------------------------------------------  No significant changes to PMH, PSH, FHx, SHx, unless otherwise noted    ALLERGIES & MEDICATIONS  --------------------------------------------------------------------------------  Allergies    Altabax (Unknown)  Augmentin (Unknown)  clindamycin (Unknown)  Vaseline (Swelling)    Intolerances      Standing Inpatient Medications  albuterol/ipratropium for Nebulization 3 milliLiter(s) Nebulizer every 6 hours  argatroban Infusion 0.85 MICROgram(s)/kG/Min IV Continuous <Continuous>  artificial  tears Solution 1 Drop(s) Both EYES two times a day  BACItracin   Ointment 1 Application(s) Topical every 8 hours  chlorhexidine 0.12% Liquid 15 milliLiter(s) Oral Mucosa every 12 hours  chlorhexidine 2% Cloths 1 Application(s) Topical <User Schedule>  dextrose 5%. 1000 milliLiter(s) IV Continuous <Continuous>  dextrose 5%. 1000 milliLiter(s) IV Continuous <Continuous>  dextrose 50% Injectable 25 Gram(s) IV Push once  dextrose 50% Injectable 12.5 Gram(s) IV Push once  dextrose 50% Injectable 25 Gram(s) IV Push once  glucagon  Injectable 1 milliGRAM(s) IntraMuscular once  influenza  Vaccine (HIGH DOSE) 0.7 milliLiter(s) IntraMuscular once  insulin lispro (ADMELOG) corrective regimen sliding scale   SubCutaneous every 6 hours  lidocaine   4% Patch 1 Patch Transdermal every 24 hours  norepinephrine Infusion 0.05 MICROgram(s)/kG/Min IV Continuous <Continuous>  nystatin Powder 1 Application(s) Topical three times a day  pantoprazole  Injectable 40 milliGRAM(s) IV Push every 24 hours  polyethylene glycol 3350 17 Gram(s) Oral daily  senna 2 Tablet(s) Oral daily    PRN Inpatient Medications  dextrose Oral Gel 15 Gram(s) Oral once PRN  sodium chloride 0.9% lock flush 10 milliLiter(s) IV Push every 1 hour PRN      REVIEW OF SYSTEMS  ------------------------------------------------------------------------------  All other systems were reviewed and are negative, except as noted.    VITALS/PHYSICAL EXAM  --------------------------------------------------------------------------------  T(C): 36.3 (09-25-22 @ 09:10), Max: 37.4 (09-25-22 @ 01:05)  HR: 105 (09-25-22 @ 10:00) (83 - 108)  BP: 106/46 (09-25-22 @ 10:00) (81/39 - 119/53)  RR: 32 (09-25-22 @ 10:00) (26 - 33)  SpO2: 94% (09-25-22 @ 10:00) (90% - 100%)  Wt(kg): --  Drug Dosing Weight  Height (cm): 172.7 (06 Sep 2022 21:17)  Weight (kg): 102.3 (06 Sep 2022 21:17)  BMI (kg/m2): 34.3 (06 Sep 2022 21:17)  BSA (m2): 2.15 (06 Sep 2022 21:17)        09-24-22 @ 07:01  -  09-25-22 @ 07:00  --------------------------------------------------------  IN: 897.5 mL / OUT: 130 mL / NET: 767.5 mL    09-25-22 @ 07:01  -  09-25-22 @ 10:25  --------------------------------------------------------  IN: 149 mL / OUT: 0 mL / NET: 149 mL    PHYSICAL EXAM:  Constitutional: trach , sedated; NAD  HEENT: MMM, NC/AT, wound expanding philtrum; neck supple;  Respiratory: Mechanical breath sounds bilaterally, not overbreathing vent  Cardiac: +S1/S2; RRR; no M/R/G  Gastrointestinal: soft, NT/ND; no rebound or guarding; +BS  Extremities: WWP, no clubbing or cyanosis; general low level anasarca, improving  Dermatologic: skin warm, dry and intact; no rashes, wounds, or scars    LABS/STUDIES  --------------------------------------------------------------------------------              7.7    7.41  >-----------<  144      [09-25-22 @ 06:18]              26.1     130  |  102  |  43  ----------------------------<  128      [09-25-22 @ 06:18]  4.5   |  18  |  3.75        Ca     8.0     [09-25-22 @ 06:18]      Mg     2.6     [09-25-22 @ 06:18]      Phos  8.2     [09-25-22 @ 06:18]    TPro  7.0  /  Alb  2.0  /  TBili  0.4  /  DBili  x   /  AST  13  /  ALT  11  /  AlkPhos  154  [09-25-22 @ 06:18]      PTT: 53.7       [09-25-22 @ 06:18]      Iron 15, TIBC 242, %sat 6      [07-02-22 @ 22:28]  Ferritin 28      [07-02-22 @ 22:28]  Lipid: chol --, , HDL --, LDL --      [09-22-22 @ 05:44]        RADIOLOGY:  --------------------------------------------------------------------------------------

## 2022-09-26 NOTE — PROGRESS NOTE ADULT - ASSESSMENT
82yo morbidly obese female w/ pmhx of HTN, HLD, HFpEF (EF 56%), COPD (on home O2-3L), chronic LE edema and cellulitis, DALIA, PVD p/w respiratory distress 2/2 severe sepsis due to cellulitis on lower extremity which progressed to septic shock in setting of pneumonia. Hospital course complicated by cardiac arrest, flail chest, and oliguric BRANT progressing to kidney failure.     NEURO  #Hypoxic Encephalopathy 2/2 to cardiac arrest  s/p cardiac arrest 2/2 hypoxia due to aspiration. Prior to arrest, pt was awake and alert on the RMF. Difficult to assess current status due to continued ventilation and sedation requirements in the setting on possible VAP and flail chest. Sedative medications discontinued.   - continue to assess mentation   - f/up CT head     PULM  #Acute on chronic resp failure  Pt w/ COPD on home 3 L O2, currently worsening hypoxia 2/2 likely in setting of aspiration while eating vs flash pulmonary edema 2/2 chronic htn and chronic HF (relatively the same EF as previous TTE). Tracheostomy in place. Patient unable to tolerate trach collar and failed CPAP.   - continue ventilation and monitor oxygenation status    #Pneumothorax  RESOLVED  Pt presented to MICU with signs of decreased lung sliding on bedside ultrasound s/p cardiac arrest and chest compressions. Chest tube removed 9/25 with no signs of pneumothorax    #Broken ribs w/ flail chest  Pt with several broken ribs and signs of flail chest with difficulty breathing when sedation weaned off. Thoracic surgery consulted for possible sternal plating. Pt remains unstable and not surgery candidate at this time. Will reconsult once patient is stable  - continue to monitor respirations    #Chronic obstructive pulmonary disease (COPD).   Pt with COPD on home 3L.   - continue to monitor oxygen saturation     CARDIOVASCULAR  #Cardiac Arrest  Patient intubated and sedated 2/2 to cardiac arrest  - 9/23 patient weaned from sedation  - argatroban restarted w PTT checks q2    #Septic shock   RESOLVED  2/2 pneumonia. Patient currently not needing pressor requirement.   - Continue to monitor off pressors     #Chronic heart failure with preserved ejection fraction (HFpEF).   Pt on home bumex 2mg.  TTE from current visit showing EF of 56% with Pulmonary HTN - PAP 48. Experienced pulmonary edema and effusion postcardiac arrest.   Plan:  - continue CVVHD to reduce fluid overload in the setting of acute kidney failure  - IR consulted for HD catheter placement    #Afib  Experienced a-fib on 09/13 w RVR. self resolved w/o intervention. 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 Currently on pressors s/p septic shock.   - holding home antihypertensive meds     GI  No active issues      RENAL  #Anuric BRANT on CKD  Baseline Cr 2.04 in July 2022, currently uptrending to 3.3. Likely iATN 2/2 to hypoperfusion/ischemia in setting of sepsis and cardiac arrest. HD cath placed on 09/12 for CVVHD in setting of worsening electrolytes and increasing fluid status. Goal net neg 100cc/hr. Patient currently anuric  - On CVVHD since 9/12  - Continue CVVHD as above - CVVHD rx: qb 300ml/min  UF: net negative 100ml/hr  DFR: 2.5L/hour  Dialysate to Phoxillum   q6H BMP mag and phos while on CVVHD  maintain MAP >70 for adequate renal perfusion  - strict I/Os  - IR consulted for HD catheter placement on Monday, will get AM labs  - renal following, appreciate recs    #Right kidney mass.   CTAP s/f slightly hyperdense solid cortical mass in upper pole of R kidney.  Plan:  - consider additional renal imaging when stable    ENDO  No active issues      HEME  Heparin Induced Thrombocytopenia  Hep PF4 Ab positive. D/c'd heparin and started on Argatroban. 9/22 plt: 32, will give 1 unit pRBC iso procedure today  - PTT goal of 45-90  - Argatroban restarted 9/23  - check PTT q2    #Anemia  Pt noted to have chronic anemia, presented with HgB of 6.8 w/ drop to 6.7 during course of stay. s/p 4 units RBC on 09/07, 9/13, 9/19, and 9/22  - monitor CBC   - maintain active type and screen (last 9/22)    ID  #Cellulitis  Patient initially presented to ED w b/l LE cellulitis and was treated with antibiotics.   - As per vascular, wrap kerlix gauze and ACE bandages around both legs. Wrap toes to just below the knee.   - Change bandages every few days  - continue to monitor     #Pneumonia  RESOLVED  possibly STANISLAV. Pt w/ bilateral pleural effusions and possible underlying infiltrate, unclear on x-ray. Blood cultures remain negative, sputum cultures neg w/ gram stain showing rare epithelial cells, few WBC's, gram pos rods and cocci in pairs. Patient finished cefepime and vanc on 9/20    stage 2 pressure ulcer  Patient with stage 2 sacral pressure ulcer.   - Wound care consulted    Philtrum Wound  Patient with 3 cm cut on upper lip 2/2 to intubation  - consider ENT consult for stitches     E: K>4, Phos>3, Mg>2  N: Nepro diet - CVVHD   DVT ppx: Argatroban   GI ppx: Protonix 40 BID  Access: 2 peripheral lines, CVVHD port on R  code status: DNR       80yo morbidly obese female w/ pmhx of HTN, HLD, HFpEF (EF 56%), COPD (on home O2-3L), chronic LE edema and cellulitis, DALIA, PVD p/w respiratory distress 2/2 severe sepsis due to cellulitis on lower extremity which progressed to septic shock in setting of pneumonia. Hospital course complicated by cardiac arrest, flail chest, and oliguric BRANT progressing to kidney failure.     NEURO  #Hypoxic Encephalopathy 2/2 to cardiac arrest  s/p cardiac arrest 2/2 hypoxia due to aspiration. Prior to arrest, pt was awake and alert on the RMF. Difficult to assess current status due to continued ventilation and sedation requirements in the setting on possible VAP and flail chest. Sedative medications discontinued w no improvement of mental status  - continue to assess mentation   - f/up CT head     PULM  #Acute on chronic resp failure  Pt w/ COPD on home 3 L O2, currently worsening hypoxia 2/2 likely in setting of aspiration while eating vs flash pulmonary edema 2/2 chronic htn and chronic HF (relatively the same EF as previous TTE). Tracheostomy in place. Patient unable to tolerate trach collar and failed CPAP, currently on ventilator (ACVC mode)  - continue ventilation and monitor oxygenation status    #Pneumothorax  RESOLVED  Pt presented to MICU with signs of decreased lung sliding on bedside ultrasound s/p cardiac arrest and chest compressions. Chest tube removed 9/25 with no signs of pneumothorax    #Broken ribs w/ flail chest  Pt with several broken ribs and signs of flail chest with difficulty breathing when sedation weaned off. Thoracic surgery consulted for possible sternal plating. Pt remains unstable and not surgery candidate at this time. Will reconsult once patient is stable  - continue to monitor respirations    #Chronic obstructive pulmonary disease (COPD).   Pt with COPD on home 3L.   - continue to monitor oxygen saturation     CARDIOVASCULAR  #Cardiac Arrest  Patient intubated and sedated 2/2 to cardiac arrest  - 9/23 patient weaned from sedation and no longer intubated  - argatroban restarted w PTT checks q2    #Septic shock   RESOLVED  2/2 pneumonia. Patient currently not needing pressor requirement.   - Continue to monitor off pressors     #Chronic heart failure with preserved ejection fraction (HFpEF).   Pt on home bumex 2mg.  TTE from current visit showing EF of 56% with Pulmonary HTN - PAP 48. Experienced pulmonary edema and effusion postcardiac arrest.   Plan:  - IR to place TDC today 9/26    #Afib  Experienced a-fib on 09/13 w RVR. self resolved w/o intervention. 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 Currently on pressors s/p septic shock.   - holding home antihypertensive meds     GI  No active issues      RENAL  #Anuric BRANT on CKD  Baseline Cr 2.04 in July 2022, currently uptrending to 3.3. Likely iATN 2/2 to hypoperfusion/ischemia in setting of sepsis and cardiac arrest. HD cath placed on 09/12 for CVVHD in setting of worsening electrolytes and increasing fluid status. Goal net neg 100cc/hr. Patient currently anuric  -IR to place TDC today   - maintain MAP >70 for adequate renal perfusion  - strict I/Os  - renal following, appreciate recs    #Right kidney mass.   CTAP s/f slightly hyperdense solid cortical mass in upper pole of R kidney.  Plan:  - consider additional renal imaging when stable    ENDO  No active issues      HEME  Heparin Induced Thrombocytopenia  RESOLVED   Hep PF4 Ab positive. D/c'd heparin and started on Argatroban. 9/22 plt: 32, will give 1 unit pRBC iso procedure today  - PTT goal of 45-90  - Argatroban restarted 9/23  - check PTT q2 until x3 therapeutic, then daily    #Anemia  Pt noted to have chronic anemia, presented with HgB of 6.8 w/ drop to 6.7 during course of stay. s/p 4 units RBC on 09/07, 9/13, 9/19, and 9/22  - monitor CBC   - maintain active type and screen (last 9/27)    ID  #Cellulitis  RESOLVED  Patient initially presented to ED w b/l LE cellulitis and was treated with antibiotics.   - As per vascular, wrap kerlix gauze and ACE bandages around both legs. Wrap toes to just below the knee.   - Change bandages every few days  - continue to monitor     #Pneumonia  RESOLVED  possibly STANISLAV. Pt w/ bilateral pleural effusions and possible underlying infiltrate, unclear on x-ray. Blood cultures remain negative, sputum cultures neg w/ gram stain showing rare epithelial cells, few WBC's, gram pos rods and cocci in pairs. Patient finished cefepime and vanc on 9/20    stage 2 pressure ulcer  Patient with stage 2 sacral pressure ulcer.   - Wound care consulted    Philtrum Wound  Patient with 3 cm cut on upper lip 2/2 to intubation  - consider ENT consult for stitches     E: K>4, Phos>3, Mg>2  N: Nepro diet - CVVHD   DVT ppx: Argatroban   GI ppx: Protonix 40 BID  Access: 2 peripheral lines, CVVHD port on R  code status: DNR       82yo morbidly obese female w/ pmhx of HTN, HLD, HFpEF (EF 56%), COPD (on home O2-3L), chronic LE edema and cellulitis, DALIA, PVD p/w respiratory distress 2/2 severe sepsis due to cellulitis on lower extremity which progressed to septic shock in setting of pneumonia. Hospital course complicated by cardiac arrest, flail chest, and oliguric BRANT progressing to kidney failure.     NEURO  #Hypoxic Encephalopathy 2/2 to cardiac arrest  s/p cardiac arrest 2/2 hypoxia due to aspiration. Prior to arrest, pt was awake and alert on the RMF. Difficult to assess current status due to continued ventilation and sedation requirements in the setting on possible VAP and flail chest. Sedative medications discontinued w no improvement of mental status  - continue to assess mentation   - f/up CT head     PULM  #Acute on chronic resp failure  Pt w/ COPD on home 3 L O2, currently worsening hypoxia 2/2 likely in setting of aspiration while eating vs flash pulmonary edema 2/2 chronic htn and chronic HF (relatively the same EF as previous TTE). Tracheostomy in place. Patient unable to tolerate trach collar and failed CPAP, currently on ventilator (ACVC mode)  - continue ventilation and monitor oxygenation status    #Pneumothorax  RESOLVED  Pt presented to MICU with signs of decreased lung sliding on bedside ultrasound s/p cardiac arrest and chest compressions. Chest tube removed 9/25 with no signs of pneumothorax    #Broken ribs w/ flail chest  Pt with several broken ribs and signs of flail chest with difficulty breathing when sedation weaned off. Thoracic surgery consulted for possible sternal plating. Pt remains unstable and not surgery candidate at this time. Will reconsult once patient is stable  - continue to monitor respirations    #Chronic obstructive pulmonary disease (COPD).   Pt with COPD on home 3L.   - continue to monitor oxygen saturation     CARDIOVASCULAR  #Cardiac Arrest  Patient intubated and sedated 2/2 to cardiac arrest  - 9/23 patient weaned from sedation and no longer intubated  - argatroban restarted w PTT checks q2    #Septic shock   RESOLVED  2/2 pneumonia. Patient currently not needing pressor requirement.   - Continue to monitor off pressors     #Chronic heart failure with preserved ejection fraction (HFpEF).   Pt on home bumex 2mg.  TTE from current visit showing EF of 56% with Pulmonary HTN - PAP 48. Experienced pulmonary edema and effusion postcardiac arrest.   Plan:  - IR to place TDC today 9/26    #Afib  Experienced a-fib on 09/13 w RVR. self resolved w/o intervention. 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 Currently on pressors s/p septic shock.   - holding home antihypertensive meds     GI  No active issues      RENAL  #Anuric BRANT on CKD  Baseline Cr 2.04 in July 2022, currently uptrending to 4.34. Likely iATN 2/2 to hypoperfusion/ischemia in setting of sepsis and cardiac arrest. HD cath placed on 09/12 for CVVHD in setting of worsening electrolytes and increasing fluid status, now with TDC.   -IR to place TDC today   - maintain MAP >70 for adequate renal perfusion  - strict I/Os  - renal following, appreciate recs    Hyperphosphatemia  Patient without dialysis for >48 hrs. Phosphate level 8.4   - Start sevelamer q 8 hrs    #Right kidney mass.   CTAP s/f slightly hyperdense solid cortical mass in upper pole of R kidney.  Plan:  - consider additional renal imaging when stable    ENDO  No active issues      HEME  Heparin Induced Thrombocytopenia  RESOLVED   Hep PF4 Ab positive and serotonin negative. Was on Argatroban, once serotonin neg, switched back to Eliquis 2.5 BID on 9/26  - continue Eliquis 2.5 BID    #Anemia  Pt noted to have chronic anemia, presented with HgB of 6.8 w/ drop to 6.7 during course of stay. s/p 4 units RBC on 09/07, 9/13, 9/19, and 9/22  - monitor CBC   - maintain active type and screen (last 9/27)    ID  #Cellulitis  RESOLVED  Patient initially presented to ED w b/l LE cellulitis and was treated with antibiotics.   - As per vascular, wrap kerlix gauze and ACE bandages around both legs. Wrap toes to just below the knee.   - Change bandages every few days  - continue to monitor     #Pneumonia  RESOLVED  possibly STANISLAV. Pt w/ bilateral pleural effusions and possible underlying infiltrate, unclear on x-ray. Blood cultures remain negative, sputum cultures neg w/ gram stain showing rare epithelial cells, few WBC's, gram pos rods and cocci in pairs. Patient finished cefepime and vanc on 9/20    stage 2 pressure ulcer  Patient with stage 2 sacral pressure ulcer.   - Wound care consulted    Philtrum Wound  Patient with 3 cm cut on upper lip 2/2 to intubation  - consider ENT consult for stitches     E: K>4, Phos>3, Mg>2  N: Nepro diet - CVVHD   DVT ppx: Argatroban   GI ppx: Protonix 40 BID  Access: 2 peripheral lines, CVVHD port on R  code status: DNR

## 2022-09-26 NOTE — CONSULT NOTE ADULT - ASSESSMENT
80yo morbidly obese female w/ pmhx of HTN, HLD, HFpEF, COPD, chronic LE edema and cellulitis, DALIA, PVD p/w respiratory distress 2/2 severe sepsis due to cellulitis on lower extremity which progressed to septic shock in setting of pneumonia. Hospital course complicated by cardiac arrest, flail chest, and oliguric BRANT progressing to kidney failure. IR consulted for tunneled HD catheter placemen. Case reviewed by Dr. Vasquez.    Plan for tunneled HD catheter placement with sedation.     NPO prior to procedure  Coags  Hold anticoagulation at least 2hrs prior to procedure  Will need up to date COVID test within 72hrs of procedure

## 2022-09-26 NOTE — PROGRESS NOTE ADULT - ASSESSMENT
Patient is a 80 yo F with CHF, CKD (baseline around 2.5-2.9) HTN who presented with a one day history of respiratory distress and LE cellulitis hospital course c/b cardiac arrest on 9/7, acute renal failure requiring CVVHD Nephrology consulted for elevated creatinine.     #Anuric BRANT on CKD   likely iATN in setting of cardiac arrest and shock.   On CVVHD since 9/12  Anuric for around ~2 weeks, given no CVVHD in last 24 hours and 1 point rise in sCr, patient will have issues with clearance and will continue to need KRT     Plan:  -Plan for TDC today - no overt indication for HD today, once line placed will start HD  -Get pre-op labs tonight, speak with IR in AM  -Daily BMP, mag and phos   -Maintain MAP >70 for adequate renal perfusion  -Monitor for urine output for any       #Thrombocytopenia, Anemia   HIT confirmatory pending, no acute source of bleeding identified  Hep PF4AB positive    Plan:   C/w  argatroban  Transfusions per primary team Patient is a 82 yo F with CHF, CKD (baseline around 2.5-2.9) HTN who presented with a one day history of respiratory distress and LE cellulitis hospital course c/b cardiac arrest on 9/7, acute renal failure requiring CVVHD Nephrology consulted for elevated creatinine.     #Anuric BRANT on CKD   likely iATN in setting of cardiac arrest and shock.   On CVVHD 9/12-9/22  Anuric for around ~2 weeks, given continued rise in sCr, patient will have issues with clearance and will continue to need KRT     Plan:  -Plan for TDC today - no overt indication for HD today, once line placed will start HD  -Daily BMP, mag and phos   -Maintain MAP >70 for adequate renal perfusion  -Monitor for urine output for any       #Thrombocytopenia, Anemia   Hep PF4AB positive but HIT confirmatory negative, no acute source of bleeding identified      Plan:   C/w  argatroban  Transfusions per primary team    Hyponatremia - mild, 2/2 anuric renal failure    Acidosis - 2/2 renal failure

## 2022-09-26 NOTE — CONSULT NOTE ADULT - CONSULT REQUESTED DATE/TIME
07-Sep-2022 11:01
15-Sep-2022 10:01
07-Sep-2022 11:52
23-Sep-2022 12:40
12-Sep-2022 09:10
07-Sep-2022 07:05
09-Sep-2022 13:33

## 2022-09-26 NOTE — PROGRESS NOTE ADULT - ATTENDING COMMENTS
Lower extremity cellulitis c/b septic shock, cardiac arrest with resultant flail chest and pneumothorax, acute hypoxemic respiratory failure, BRANT requiring new dialysis (renal function restored) and HIT. S/p tracheostomy. PEG on hold as per family wishes. Off sedation and monitoring for improvement in encephalopathy which has been minimal; CT head negative for anoxia. Transition to trach collar.

## 2022-09-26 NOTE — CONSULT NOTE ADULT - PROVIDER SPECIALTY LIST ADULT
Nephrology
Intervent Radiology
Palliative Care
Rehab Medicine
Thoracic Surgery
Vascular Surgery
Infectious Disease

## 2022-09-26 NOTE — PROGRESS NOTE ADULT - SUBJECTIVE AND OBJECTIVE BOX
GABBY LAYTON  81y  Female    Patient is a 81y old  Female who presents with a chief complaint of Acute hypoxic respiratory failure (26 Sep 2022 10:46)      INTERVAL HPI/OVERNIGHT EVENTS: As per night team, chest x-ray s/p chest tube removal shows no signs of pneumothorax. No ROS was obtained.        T(C): 37.4 (09-26-22 @ 09:29), Max: 38.2 (09-25-22 @ 23:00)  HR: 105 (09-26-22 @ 16:00) (98 - 113)  BP: 134/63 (09-26-22 @ 16:00) (85/37 - 134/63)  RR: 28 (09-26-22 @ 16:00) (26 - 52)  SpO2: 98% (09-26-22 @ 16:00) (97% - 100%)  Wt(kg): --Vital Signs Last 24 Hrs  T(C): 37.4 (26 Sep 2022 09:29), Max: 38.2 (25 Sep 2022 23:00)  T(F): 99.3 (26 Sep 2022 09:29), Max: 100.8 (25 Sep 2022 23:00)  HR: 105 (26 Sep 2022 16:00) (98 - 113)  BP: 134/63 (26 Sep 2022 16:00) (85/37 - 134/63)  BP(mean): 91 (26 Sep 2022 16:00) (53 - 91)  RR: 28 (26 Sep 2022 16:00) (26 - 52)  SpO2: 98% (26 Sep 2022 16:00) (97% - 100%)    Parameters below as of 26 Sep 2022 16:00  Patient On (Oxygen Delivery Method): ventilator    O2 Concentration (%): 40    PHYSICAL EXAM:  GENERAL: morbidly obese female on ventilator  Neuro: AAOx0; + gag reflex and + pupillary light reflex; patient unable to be aroused, not able to follow commands  HEENT: trachea with no erythema or swelling; NC/AT; MMM; wound expanding philtrum; neck supple; no scleral icterus; nasal passages clear; no thyroid or LN enlargement  Heart: RRR; +s1 and s2; no MRG; non displaced PMI; no JVD  Lungs: mechanical breath sounds; on ventilation   GI: protuberant abdomen; nondistended; normal bowel sounds [id number]   Extremities: cellulitis to knee b/l; UE 2+ edema with 1+ pulses  Skin: cellulitis of LE and edematous of UE extending to the forearm    Consultant(s) Notes Reviewed:  [x ] YES  [ ] NO  Care Discussed with Consultants/Other Providers [ x] YES  [ ] NO    LABS:                        7.6    8.47  )-----------( 174      ( 26 Sep 2022 05:10 )             25.4     09-26    132<L>  |  102  |  57<H>  ----------------------------<  119<H>  4.9   |  18<L>  |  4.34<H>    Ca    8.3<L>      26 Sep 2022 05:10  Phos  8.4     09-26  Mg     2.6     09-26    TPro  7.4  /  Alb  2.1<L>  /  TBili  0.4  /  DBili  x   /  AST  14  /  ALT  11  /  AlkPhos  154<H>  09-26      PT/INR - ( 26 Sep 2022 12:36 )   PT: 16.8 sec;   INR: 1.41          PTT - ( 26 Sep 2022 12:36 )  PTT:44.5 sec    CAPILLARY BLOOD GLUCOSE      POCT Blood Glucose.: 116 mg/dL (26 Sep 2022 11:06)  POCT Blood Glucose.: 136 mg/dL (26 Sep 2022 06:16)  POCT Blood Glucose.: 126 mg/dL (26 Sep 2022 00:03)  POCT Blood Glucose.: 130 mg/dL (25 Sep 2022 17:05)            MEDICATIONS  (STANDING):  albuterol/ipratropium for Nebulization 3 milliLiter(s) Nebulizer every 6 hours  apixaban 2.5 milliGRAM(s) Oral every 12 hours  artificial  tears Solution 1 Drop(s) Both EYES two times a day  BACItracin   Ointment 1 Application(s) Topical every 8 hours  chlorhexidine 0.12% Liquid 15 milliLiter(s) Oral Mucosa every 12 hours  chlorhexidine 2% Cloths 1 Application(s) Topical <User Schedule>  dextrose 5%. 1000 milliLiter(s) (50 mL/Hr) IV Continuous <Continuous>  dextrose 5%. 1000 milliLiter(s) (100 mL/Hr) IV Continuous <Continuous>  dextrose 50% Injectable 25 Gram(s) IV Push once  dextrose 50% Injectable 12.5 Gram(s) IV Push once  dextrose 50% Injectable 25 Gram(s) IV Push once  glucagon  Injectable 1 milliGRAM(s) IntraMuscular once  influenza  Vaccine (HIGH DOSE) 0.7 milliLiter(s) IntraMuscular once  insulin lispro (ADMELOG) corrective regimen sliding scale   SubCutaneous every 6 hours  lidocaine   4% Patch 1 Patch Transdermal every 24 hours  midodrine 30 milliGRAM(s) Oral every 8 hours  norepinephrine Infusion 0.05 MICROgram(s)/kG/Min (9.59 mL/Hr) IV Continuous <Continuous>  nystatin Powder 1 Application(s) Topical three times a day  pantoprazole   Suspension 40 milliGRAM(s) Enteral Tube at bedtime  polyethylene glycol 3350 17 Gram(s) Oral daily  senna 2 Tablet(s) Oral daily  sevelamer carbonate Powder 1600 milliGRAM(s) Enteral Tube every 8 hours    MEDICATIONS  (PRN):  dextrose Oral Gel 15 Gram(s) Oral once PRN Blood Glucose LESS THAN 70 milliGRAM(s)/deciliter  sodium chloride 0.9% lock flush 10 milliLiter(s) IV Push every 1 hour PRN Pre/post blood products, medications, blood draw, and to maintain line patency      RADIOLOGY & ADDITIONAL TESTS:    Imaging Personally Reviewed:  [ ] YES  [ ] NO     GABBY LAYTON  81y  Female    Patient is a 81y old  Female who presents with a chief complaint of Acute hypoxic respiratory failure (26 Sep 2022 10:46)      INTERVAL HPI/OVERNIGHT EVENTS: As per night team, chest x-ray s/p chest tube removal shows no signs of pneumothorax. No ROS was obtained.        T(C): 37.4 (09-26-22 @ 09:29), Max: 38.2 (09-25-22 @ 23:00)  HR: 105 (09-26-22 @ 16:00) (98 - 113)  BP: 134/63 (09-26-22 @ 16:00) (85/37 - 134/63)  RR: 28 (09-26-22 @ 16:00) (26 - 52)  SpO2: 98% (09-26-22 @ 16:00) (97% - 100%)  Wt(kg): --Vital Signs Last 24 Hrs  T(C): 37.4 (26 Sep 2022 09:29), Max: 38.2 (25 Sep 2022 23:00)  T(F): 99.3 (26 Sep 2022 09:29), Max: 100.8 (25 Sep 2022 23:00)  HR: 105 (26 Sep 2022 16:00) (98 - 113)  BP: 134/63 (26 Sep 2022 16:00) (85/37 - 134/63)  BP(mean): 91 (26 Sep 2022 16:00) (53 - 91)  RR: 28 (26 Sep 2022 16:00) (26 - 52)  SpO2: 98% (26 Sep 2022 16:00) (97% - 100%)    Parameters below as of 26 Sep 2022 16:00  Patient On (Oxygen Delivery Method): ventilator    O2 Concentration (%): 40    PHYSICAL EXAM:  GENERAL: morbidly obese female on ventilator  Neuro: AAOx0; + gag reflex and + pupillary light reflex; patient unable to be aroused, not able to follow commands  HEENT: tracheostomy with no erythema or swelling; NC/AT; MMM; open wound expanding philtrum; neck supple; no scleral icterus; nasal passage w NG tube; no thyroid or LN enlargement  Heart: RRR; +s1 and s2; no MRG; non displaced PMI; no JVD  Lungs: mechanical breath sounds on ventilator  GI: protuberant abdomen; nondistended; normal bowel sounds [id number]   Extremities: dry skin to knee b/l; UE 2+ edema with 1+ pulses  Skin: cellulitis of LE    Consultant(s) Notes Reviewed:  [x ] YES  [ ] NO  Care Discussed with Consultants/Other Providers [ x] YES  [ ] NO    LABS:                        7.6    8.47  )-----------( 174      ( 26 Sep 2022 05:10 )             25.4     09-26    132<L>  |  102  |  57<H>  ----------------------------<  119<H>  4.9   |  18<L>  |  4.34<H>    Ca    8.3<L>      26 Sep 2022 05:10  Phos  8.4     09-26  Mg     2.6     09-26    TPro  7.4  /  Alb  2.1<L>  /  TBili  0.4  /  DBili  x   /  AST  14  /  ALT  11  /  AlkPhos  154<H>  09-26      PT/INR - ( 26 Sep 2022 12:36 )   PT: 16.8 sec;   INR: 1.41          PTT - ( 26 Sep 2022 12:36 )  PTT:44.5 sec    CAPILLARY BLOOD GLUCOSE      POCT Blood Glucose.: 116 mg/dL (26 Sep 2022 11:06)  POCT Blood Glucose.: 136 mg/dL (26 Sep 2022 06:16)  POCT Blood Glucose.: 126 mg/dL (26 Sep 2022 00:03)  POCT Blood Glucose.: 130 mg/dL (25 Sep 2022 17:05)            MEDICATIONS  (STANDING):  albuterol/ipratropium for Nebulization 3 milliLiter(s) Nebulizer every 6 hours  apixaban 2.5 milliGRAM(s) Oral every 12 hours  artificial  tears Solution 1 Drop(s) Both EYES two times a day  BACItracin   Ointment 1 Application(s) Topical every 8 hours  chlorhexidine 0.12% Liquid 15 milliLiter(s) Oral Mucosa every 12 hours  chlorhexidine 2% Cloths 1 Application(s) Topical <User Schedule>  dextrose 5%. 1000 milliLiter(s) (50 mL/Hr) IV Continuous <Continuous>  dextrose 5%. 1000 milliLiter(s) (100 mL/Hr) IV Continuous <Continuous>  dextrose 50% Injectable 25 Gram(s) IV Push once  dextrose 50% Injectable 12.5 Gram(s) IV Push once  dextrose 50% Injectable 25 Gram(s) IV Push once  glucagon  Injectable 1 milliGRAM(s) IntraMuscular once  influenza  Vaccine (HIGH DOSE) 0.7 milliLiter(s) IntraMuscular once  insulin lispro (ADMELOG) corrective regimen sliding scale   SubCutaneous every 6 hours  lidocaine   4% Patch 1 Patch Transdermal every 24 hours  midodrine 30 milliGRAM(s) Oral every 8 hours  norepinephrine Infusion 0.05 MICROgram(s)/kG/Min (9.59 mL/Hr) IV Continuous <Continuous>  nystatin Powder 1 Application(s) Topical three times a day  pantoprazole   Suspension 40 milliGRAM(s) Enteral Tube at bedtime  polyethylene glycol 3350 17 Gram(s) Oral daily  senna 2 Tablet(s) Oral daily  sevelamer carbonate Powder 1600 milliGRAM(s) Enteral Tube every 8 hours    MEDICATIONS  (PRN):  dextrose Oral Gel 15 Gram(s) Oral once PRN Blood Glucose LESS THAN 70 milliGRAM(s)/deciliter  sodium chloride 0.9% lock flush 10 milliLiter(s) IV Push every 1 hour PRN Pre/post blood products, medications, blood draw, and to maintain line patency      RADIOLOGY & ADDITIONAL TESTS:    Imaging Personally Reviewed:  [ ] YES  [ ] NO

## 2022-09-26 NOTE — PROGRESS NOTE ADULT - SUBJECTIVE AND OBJECTIVE BOX
Patient is a 81y Female seen and evaluated at bedside. No acute events, off sedation and on low dose pressor. Plan for TDC today. VSS       Meds:    albuterol/ipratropium for Nebulization 3 every 6 hours  argatroban Infusion 0.85 <Continuous>  artificial  tears Solution 1 two times a day  BACItracin   Ointment 1 every 8 hours  chlorhexidine 0.12% Liquid 15 every 12 hours  chlorhexidine 2% Cloths 1 <User Schedule>  dextrose 5%. 1000 <Continuous>  dextrose 5%. 1000 <Continuous>  dextrose 50% Injectable 25 once  dextrose 50% Injectable 12.5 once  dextrose 50% Injectable 25 once  dextrose Oral Gel 15 once PRN  glucagon  Injectable 1 once  influenza  Vaccine (HIGH DOSE) 0.7 once  insulin lispro (ADMELOG) corrective regimen sliding scale  every 6 hours  lidocaine   4% Patch 1 every 24 hours  norepinephrine Infusion 0.05 <Continuous>  nystatin Powder 1 three times a day  pantoprazole  Injectable 40 every 24 hours  polyethylene glycol 3350 17 daily  senna 2 daily  sodium chloride 0.9% lock flush 10 every 1 hour PRN      T(C): , Max: 38.2 (09-25-22 @ 23:00)  T(F): , Max: 100.8 (09-25-22 @ 23:00)  HR: 103 (09-26-22 @ 10:00)  BP: 118/53 (09-26-22 @ 10:00)  BP(mean): 76 (09-26-22 @ 10:00)  RR: 30 (09-26-22 @ 10:00)  SpO2: 99% (09-26-22 @ 10:00)  Wt(kg): --    09-25 @ 07:01  -  09-26 @ 07:00  --------------------------------------------------------  IN: 1674.4 mL / OUT: 200 mL / NET: 1474.4 mL    09-26 @ 07:01  -  09-26 @ 10:46  --------------------------------------------------------  IN: 153 mL / OUT: 0 mL / NET: 153 mL          Review of Systems:  ROS negative except as per HPI      PHYSICAL EXAM:  Constitutional: trach ; NAD  HEENT: MMM, NC/AT, wound expanding philtrum; neck supple;  Respiratory: Mechanical breath sounds bilaterally, not overbreathing vent  Cardiac: +S1/S2; RRR; no M/R/G  Gastrointestinal: soft, NT/ND; no rebound or guarding; +BS  Extremities: WWP, no clubbing or cyanosis; general low level anasarca, improving  Dermatologic: skin warm, dry and intact; no rashes, wounds, or scars  Access: None       LABS:                        7.6    8.47  )-----------( 174      ( 26 Sep 2022 05:10 )             25.4     09-26    132<L>  |  102  |  57<H>  ----------------------------<  119<H>  4.9   |  18<L>  |  4.34<H>    Ca    8.3<L>      26 Sep 2022 05:10  Phos  8.4     09-26  Mg     2.6     09-26    TPro  7.4  /  Alb  2.1<L>  /  TBili  0.4  /  DBili  x   /  AST  14  /  ALT  11  /  AlkPhos  154<H>  09-26      PTT - ( 25 Sep 2022 06:18 )  PTT:53.7 sec          RADIOLOGY & ADDITIONAL STUDIES:           Patient is a 81y Female seen and evaluated at bedside. No acute events, off sedation and on low dose pressor. Plan for TDC today. VSS       Meds:    albuterol/ipratropium for Nebulization 3 every 6 hours  argatroban Infusion 0.85 <Continuous>  artificial  tears Solution 1 two times a day  BACItracin   Ointment 1 every 8 hours  chlorhexidine 0.12% Liquid 15 every 12 hours  chlorhexidine 2% Cloths 1 <User Schedule>  dextrose 5%. 1000 <Continuous>  dextrose 5%. 1000 <Continuous>  dextrose 50% Injectable 25 once  dextrose 50% Injectable 12.5 once  dextrose 50% Injectable 25 once  dextrose Oral Gel 15 once PRN  glucagon  Injectable 1 once  influenza  Vaccine (HIGH DOSE) 0.7 once  insulin lispro (ADMELOG) corrective regimen sliding scale  every 6 hours  lidocaine   4% Patch 1 every 24 hours  norepinephrine Infusion 0.05 <Continuous>  nystatin Powder 1 three times a day  pantoprazole  Injectable 40 every 24 hours  polyethylene glycol 3350 17 daily  senna 2 daily  sodium chloride 0.9% lock flush 10 every 1 hour PRN      T(C): , Max: 38.2 (09-25-22 @ 23:00)  T(F): , Max: 100.8 (09-25-22 @ 23:00)  HR: 103 (09-26-22 @ 10:00)  BP: 118/53 (09-26-22 @ 10:00)  BP(mean): 76 (09-26-22 @ 10:00)  RR: 30 (09-26-22 @ 10:00)  SpO2: 99% (09-26-22 @ 10:00)  Wt(kg): --    09-25 @ 07:01  -  09-26 @ 07:00  --------------------------------------------------------  IN: 1674.4 mL / OUT: 200 mL / NET: 1474.4 mL    09-26 @ 07:01  -  09-26 @ 10:46  --------------------------------------------------------  IN: 153 mL / OUT: 0 mL / NET: 153 mL          Review of Systems:  ROS negative except as per HPI      PHYSICAL EXAM:  Constitutional: trach ; NAD  HEENT: MMM, NC/AT, wound expanding philtrum; neck supple;  Respiratory: Mechanical breath sounds bilaterally, not overbreathing vent  Cardiac: +S1/S2; RRR; no M/R/G  Gastrointestinal: soft, NT/ND; no rebound or guarding; +BS  Extremities: WWP, no clubbing or cyanosis; general low level anasarca, improving  Dermatologic: skin warm, dry and intact; no rashes, wounds, or scars  Access: None       LABS:                        7.6    8.47  )-----------( 174      ( 26 Sep 2022 05:10 )             25.4     09-26    132<L>  |  102  |  57<H>  ----------------------------<  119<H>  4.9   |  18<L>  |  4.34<H>    Ca    8.3<L>      26 Sep 2022 05:10  Phos  8.4     09-26  Mg     2.6     09-26    TPro  7.4  /  Alb  2.1<L>  /  TBili  0.4  /  DBili  x   /  AST  14  /  ALT  11  /  AlkPhos  154<H>  09-26      PTT - ( 25 Sep 2022 06:18 )  PTT:53.7 sec          RADIOLOGY & ADDITIONAL STUDIES:      Creatinine, Serum: 4.34 mg/dL (09-26-22 @ 05:10)  Creatinine, Serum: 3.75 mg/dL (09-25-22 @ 06:18)  Creatinine, Serum: 3.00 mg/dL (09-24-22 @ 06:01)  Creatinine, Serum: 1.86 mg/dL (09-23-22 @ 04:11)  Creatinine, Serum: 1.28 mg/dL (09-22-22 @ 14:57)  Creatinine, Serum: 0.99 mg/dL (09-22-22 @ 05:44)  Creatinine, Serum: 0.98 mg/dL (09-21-22 @ 23:14)  Creatinine, Serum: 0.99 mg/dL (09-21-22 @ 18:56)  Creatinine, Serum: 1.14 mg/dL (09-21-22 @ 03:19)  Creatinine, Serum: 1.21 mg/dL (09-20-22 @ 23:48)       Yesterday, Patient is a 81y Female seen and evaluated at bedside. No acute events, off sedation and on low dose pressor. Plan for TDC today.       Meds:    albuterol/ipratropium for Nebulization 3 every 6 hours  argatroban Infusion 0.85 <Continuous>  artificial  tears Solution 1 two times a day  BACItracin   Ointment 1 every 8 hours  chlorhexidine 0.12% Liquid 15 every 12 hours  chlorhexidine 2% Cloths 1 <User Schedule>  dextrose 5%. 1000 <Continuous>  dextrose 5%. 1000 <Continuous>  dextrose 50% Injectable 25 once  dextrose 50% Injectable 12.5 once  dextrose 50% Injectable 25 once  dextrose Oral Gel 15 once PRN  glucagon  Injectable 1 once  influenza  Vaccine (HIGH DOSE) 0.7 once  insulin lispro (ADMELOG) corrective regimen sliding scale  every 6 hours  lidocaine   4% Patch 1 every 24 hours  norepinephrine Infusion 0.05 <Continuous>  nystatin Powder 1 three times a day  pantoprazole  Injectable 40 every 24 hours  polyethylene glycol 3350 17 daily  senna 2 daily  sodium chloride 0.9% lock flush 10 every 1 hour PRN      T(C): , Max: 38.2 (09-25-22 @ 23:00)  T(F): , Max: 100.8 (09-25-22 @ 23:00)  HR: 103 (09-26-22 @ 10:00)  BP: 118/53 (09-26-22 @ 10:00)  BP(mean): 76 (09-26-22 @ 10:00)  RR: 30 (09-26-22 @ 10:00)  SpO2: 99% (09-26-22 @ 10:00)  Wt(kg): --    09-25 @ 07:01  -  09-26 @ 07:00  --------------------------------------------------------  IN: 1674.4 mL / OUT: 200 mL / NET: 1474.4 mL    09-26 @ 07:01  -  09-26 @ 10:46  --------------------------------------------------------  IN: 153 mL / OUT: 0 mL / NET: 153 mL          Review of Systems:  ROS negative except as per HPI      PHYSICAL EXAM:  Constitutional: trach ; NAD  HEENT: MMM, NC/AT, wound expanding philtrum; neck supple;  Respiratory: Mechanical breath sounds bilaterally, not overbreathing vent  Cardiac: +S1/S2; RRR; no M/R/G  Gastrointestinal: soft, NT/ND; no rebound or guarding; +BS  Extremities: WWP, no clubbing or cyanosis; general low level anasarca, improving  Dermatologic: skin warm, dry and intact; no rashes, wounds, or scars  Access: None       LABS:                        7.6    8.47  )-----------( 174      ( 26 Sep 2022 05:10 )             25.4     09-26    132<L>  |  102  |  57<H>  ----------------------------<  119<H>  4.9   |  18<L>  |  4.34<H>    Ca    8.3<L>      26 Sep 2022 05:10  Phos  8.4     09-26  Mg     2.6     09-26    TPro  7.4  /  Alb  2.1<L>  /  TBili  0.4  /  DBili  x   /  AST  14  /  ALT  11  /  AlkPhos  154<H>  09-26      PTT - ( 25 Sep 2022 06:18 )  PTT:53.7 sec          RADIOLOGY & ADDITIONAL STUDIES:      Creatinine, Serum: 4.34 mg/dL (09-26-22 @ 05:10)  Creatinine, Serum: 3.75 mg/dL (09-25-22 @ 06:18)  Creatinine, Serum: 3.00 mg/dL (09-24-22 @ 06:01)  Creatinine, Serum: 1.86 mg/dL (09-23-22 @ 04:11)  Creatinine, Serum: 1.28 mg/dL (09-22-22 @ 14:57)  Creatinine, Serum: 0.99 mg/dL (09-22-22 @ 05:44)  Creatinine, Serum: 0.98 mg/dL (09-21-22 @ 23:14)  Creatinine, Serum: 0.99 mg/dL (09-21-22 @ 18:56)  Creatinine, Serum: 1.14 mg/dL (09-21-22 @ 03:19)  Creatinine, Serum: 1.21 mg/dL (09-20-22 @ 23:48)

## 2022-09-26 NOTE — CONSULT NOTE ADULT - SUBJECTIVE AND OBJECTIVE BOX
80yo morbidly obese female w/ pmhx of HTN, HLD, HFpEF (EF 56%), COPD (on home O2-3L), chronic LE edema and cellulitis, DALIA, PVD p/w respiratory distress 2/2 severe sepsis due to cellulitis on lower extremity which progressed to septic shock in setting of pneumonia. Hospital course complicated by cardiac arrest, flail chest, and oliguric BRANT progressing to kidney failure.

## 2022-09-26 NOTE — CONSULT NOTE ADULT - REASON FOR ADMISSION
Acute hypoxic respiratory failure

## 2022-09-27 NOTE — PROGRESS NOTE ADULT - SUBJECTIVE AND OBJECTIVE BOX
GABBY LAYTON  81y  Female    Patient is a 81y old  Female who presents with a chief complaint of Acute hypoxic respiratory failure (27 Sep 2022 14:11)      INTERVAL HPI/OVERNIGHT EVENTS: As per night team, no events. Patient was on CPAP and ventilator overnight. Patient was seen and examined at bedside. Patient unarousable. ROS  was not obtained 2/2 to mental status.    T(C): 37.2 (09-27-22 @ 13:01), Max: 37.5 (09-27-22 @ 09:58)  HR: 95 (09-27-22 @ 14:00) (85 - 106)  BP: 127/58 (09-27-22 @ 14:00) (104/53 - 151/67)  RR: 26 (09-27-22 @ 14:00) (22 - 35)  SpO2: 98% (09-27-22 @ 14:00) (92% - 100%)  Wt(kg): --Vital Signs Last 24 Hrs  T(C): 37.2 (27 Sep 2022 13:01), Max: 37.5 (27 Sep 2022 09:58)  T(F): 99 (27 Sep 2022 13:01), Max: 99.5 (27 Sep 2022 09:58)  HR: 95 (27 Sep 2022 14:00) (85 - 106)  BP: 127/58 (27 Sep 2022 14:00) (104/53 - 151/67)  BP(mean): 84 (27 Sep 2022 14:00) (76 - 97)  RR: 26 (27 Sep 2022 14:00) (22 - 35)  SpO2: 98% (27 Sep 2022 14:00) (92% - 100%)    Parameters below as of 27 Sep 2022 14:00  Patient On (Oxygen Delivery Method): ventilator    O2 Concentration (%): 40    PHYSICAL EXAM:  GENERAL: morbidly obese female on ventilator  Neuro: AAOx0; + cough reflex and + symmetric pupillary light reflex; patient unable to be aroused, not able to follow commands  HEENT: tracheostomy with no erythema or swelling; NC/AT; MMM; open wound expanding philtrum; neck supple; no scleral icterus; nasal passage w NG tube; no thyroid or LN enlargement  Heart: distant heart sounds appreciated; RRR; +s1 and s2; no MRG; non displaced PMI  Lungs: coarse crackles hear anteriorly throughout lung fields  GI: protuberant abdomen; nondistended; normal bowel sounds [id number]   Extremities: patients lower extremities to knee wrapped with ACE bandage and SCDs; UE 2+ edema with 1+ pulses  Skin: cellulitis of LE wrapped with ACE bandages  Consultant(s) Notes Reviewed:  [x ] YES  [ ] NO  Care Discussed with Consultants/Other Providers [ x] YES  [ ] NO    LABS:                        7.0    9.75  )-----------( 184      ( 27 Sep 2022 05:39 )             23.6     09-27    130<L>  |  101  |  70<H>  ----------------------------<  107<H>  5.4<H>   |  19<L>  |  4.66<H>    Ca    8.2<L>      27 Sep 2022 05:39  Phos  9.7     09-27  Mg     2.7     09-27    TPro  7.1  /  Alb  2.2<L>  /  TBili  0.3  /  DBili  x   /  AST  11  /  ALT  9<L>  /  AlkPhos  140<H>  09-27      PT/INR - ( 26 Sep 2022 12:36 )   PT: 16.8 sec;   INR: 1.41          PTT - ( 26 Sep 2022 12:36 )  PTT:44.5 sec    CAPILLARY BLOOD GLUCOSE      POCT Blood Glucose.: 116 mg/dL (27 Sep 2022 11:17)  POCT Blood Glucose.: 110 mg/dL (27 Sep 2022 05:20)  POCT Blood Glucose.: 121 mg/dL (26 Sep 2022 23:21)  POCT Blood Glucose.: 98 mg/dL (26 Sep 2022 17:34)            MEDICATIONS  (STANDING):  albuterol/ipratropium for Nebulization 3 milliLiter(s) Nebulizer every 6 hours  apixaban 2.5 milliGRAM(s) Oral every 12 hours  artificial  tears Solution 1 Drop(s) Both EYES two times a day  BACItracin   Ointment 1 Application(s) Topical every 8 hours  chlorhexidine 0.12% Liquid 15 milliLiter(s) Oral Mucosa every 12 hours  chlorhexidine 2% Cloths 1 Application(s) Topical <User Schedule>  dextrose 5%. 1000 milliLiter(s) (50 mL/Hr) IV Continuous <Continuous>  dextrose 5%. 1000 milliLiter(s) (100 mL/Hr) IV Continuous <Continuous>  dextrose 50% Injectable 25 Gram(s) IV Push once  dextrose 50% Injectable 12.5 Gram(s) IV Push once  dextrose 50% Injectable 25 Gram(s) IV Push once  glucagon  Injectable 1 milliGRAM(s) IntraMuscular once  influenza  Vaccine (HIGH DOSE) 0.7 milliLiter(s) IntraMuscular once  insulin lispro (ADMELOG) corrective regimen sliding scale   SubCutaneous every 6 hours  lidocaine   4% Patch 1 Patch Transdermal every 24 hours  midodrine 10 milliGRAM(s) Oral every 8 hours  nystatin Powder 1 Application(s) Topical three times a day  pantoprazole   Suspension 40 milliGRAM(s) Enteral Tube at bedtime  polyethylene glycol 3350 17 Gram(s) Oral daily  senna 2 Tablet(s) Oral daily  sevelamer carbonate Powder 1600 milliGRAM(s) Enteral Tube every 8 hours    MEDICATIONS  (PRN):  dextrose Oral Gel 15 Gram(s) Oral once PRN Blood Glucose LESS THAN 70 milliGRAM(s)/deciliter  HYDROmorphone  Injectable 0.5 milliGRAM(s) IV Push every 2 hours PRN agonal breathing  sodium chloride 0.9% lock flush 10 milliLiter(s) IV Push every 1 hour PRN Pre/post blood products, medications, blood draw, and to maintain line patency      RADIOLOGY & ADDITIONAL TESTS:    Imaging Personally Reviewed:  [ ] YES  [ ] NO     GABBY LAYTON  81y  Female    Patient is a 81y old  Female who presents with a chief complaint of Acute hypoxic respiratory failure (27 Sep 2022 14:11)      INTERVAL HPI/OVERNIGHT EVENTS: As per night team, no events. Patient was on CPAP and ventilator overnight. Patient was seen and examined at bedside. Patient unarousable. ROS was not obtained 2/2 to mental status.    T(C): 37.2 (09-27-22 @ 13:01), Max: 37.5 (09-27-22 @ 09:58)  HR: 95 (09-27-22 @ 14:00) (85 - 106)  BP: 127/58 (09-27-22 @ 14:00) (104/53 - 151/67)  RR: 26 (09-27-22 @ 14:00) (22 - 35)  SpO2: 98% (09-27-22 @ 14:00) (92% - 100%)  Wt(kg): --Vital Signs Last 24 Hrs  T(C): 37.2 (27 Sep 2022 13:01), Max: 37.5 (27 Sep 2022 09:58)  T(F): 99 (27 Sep 2022 13:01), Max: 99.5 (27 Sep 2022 09:58)  HR: 95 (27 Sep 2022 14:00) (85 - 106)  BP: 127/58 (27 Sep 2022 14:00) (104/53 - 151/67)  BP(mean): 84 (27 Sep 2022 14:00) (76 - 97)  RR: 26 (27 Sep 2022 14:00) (22 - 35)  SpO2: 98% (27 Sep 2022 14:00) (92% - 100%)    Parameters below as of 27 Sep 2022 14:00  Patient On (Oxygen Delivery Method): ventilator    O2 Concentration (%): 40    PHYSICAL EXAM:  GENERAL: morbidly obese female on ventilator  Neuro: AAOx0; + cough reflex and + symmetric pupillary light reflex; patient unable to be aroused, not able to follow commands  HEENT: tracheostomy with no erythema or swelling; NC/AT; MMM; open wound expanding philtrum; neck supple; no scleral icterus; nasal passage w NG tube; no thyroid or LN enlargement  Heart: distant heart sounds appreciated; RRR; +s1 and s2; no MRG; non displaced PMI  Lungs: coarse crackles hear anteriorly throughout lung fields  GI: protuberant abdomen; nondistended; normal bowel sounds [id number]   Extremities: patients lower extremities to knee wrapped with ACE bandage and SCDs; UE 2+ edema with 1+ pulses  Skin: cellulitis of LE wrapped with ACE bandages  Consultant(s) Notes Reviewed:  [x ] YES  [ ] NO  Care Discussed with Consultants/Other Providers [ x] YES  [ ] NO    LABS:                        7.0    9.75  )-----------( 184      ( 27 Sep 2022 05:39 )             23.6     09-27    130<L>  |  101  |  70<H>  ----------------------------<  107<H>  5.4<H>   |  19<L>  |  4.66<H>    Ca    8.2<L>      27 Sep 2022 05:39  Phos  9.7     09-27  Mg     2.7     09-27    TPro  7.1  /  Alb  2.2<L>  /  TBili  0.3  /  DBili  x   /  AST  11  /  ALT  9<L>  /  AlkPhos  140<H>  09-27      PT/INR - ( 26 Sep 2022 12:36 )   PT: 16.8 sec;   INR: 1.41          PTT - ( 26 Sep 2022 12:36 )  PTT:44.5 sec    CAPILLARY BLOOD GLUCOSE      POCT Blood Glucose.: 116 mg/dL (27 Sep 2022 11:17)  POCT Blood Glucose.: 110 mg/dL (27 Sep 2022 05:20)  POCT Blood Glucose.: 121 mg/dL (26 Sep 2022 23:21)  POCT Blood Glucose.: 98 mg/dL (26 Sep 2022 17:34)            MEDICATIONS  (STANDING):  albuterol/ipratropium for Nebulization 3 milliLiter(s) Nebulizer every 6 hours  apixaban 2.5 milliGRAM(s) Oral every 12 hours  artificial  tears Solution 1 Drop(s) Both EYES two times a day  BACItracin   Ointment 1 Application(s) Topical every 8 hours  chlorhexidine 0.12% Liquid 15 milliLiter(s) Oral Mucosa every 12 hours  chlorhexidine 2% Cloths 1 Application(s) Topical <User Schedule>  dextrose 5%. 1000 milliLiter(s) (50 mL/Hr) IV Continuous <Continuous>  dextrose 5%. 1000 milliLiter(s) (100 mL/Hr) IV Continuous <Continuous>  dextrose 50% Injectable 25 Gram(s) IV Push once  dextrose 50% Injectable 12.5 Gram(s) IV Push once  dextrose 50% Injectable 25 Gram(s) IV Push once  glucagon  Injectable 1 milliGRAM(s) IntraMuscular once  influenza  Vaccine (HIGH DOSE) 0.7 milliLiter(s) IntraMuscular once  insulin lispro (ADMELOG) corrective regimen sliding scale   SubCutaneous every 6 hours  lidocaine   4% Patch 1 Patch Transdermal every 24 hours  midodrine 10 milliGRAM(s) Oral every 8 hours  nystatin Powder 1 Application(s) Topical three times a day  pantoprazole   Suspension 40 milliGRAM(s) Enteral Tube at bedtime  polyethylene glycol 3350 17 Gram(s) Oral daily  senna 2 Tablet(s) Oral daily  sevelamer carbonate Powder 1600 milliGRAM(s) Enteral Tube every 8 hours    MEDICATIONS  (PRN):  dextrose Oral Gel 15 Gram(s) Oral once PRN Blood Glucose LESS THAN 70 milliGRAM(s)/deciliter  HYDROmorphone  Injectable 0.5 milliGRAM(s) IV Push every 2 hours PRN agonal breathing  sodium chloride 0.9% lock flush 10 milliLiter(s) IV Push every 1 hour PRN Pre/post blood products, medications, blood draw, and to maintain line patency      RADIOLOGY & ADDITIONAL TESTS:    Imaging Personally Reviewed:  [ ] YES  [ ] NO

## 2022-09-27 NOTE — PROGRESS NOTE ADULT - ASSESSMENT
Patient is a 80 yo F with CHF, CKD (baseline around 2.5-2.9) HTN who presented with a one day history of respiratory distress and LE cellulitis hospital course c/b cardiac arrest on 9/7, acute renal failure requiring CVVHD with now transition to iHD    #Anuric BRANT on CKD    iATN in setting of cardiac arrest and shock.   On CVVHD 9/12-9/22  transition to iHD on 9/27    Plan:  -HD today for UF and clearance  -Daily BMP, mag and phos   -Maintain MAP >70 for adequate renal perfusion  -Monitor for urine output for any       #Thrombocytopenia, Anemia   Hep PF4AB positive but HIT confirmatory negative, no acute source of bleeding identified      Plan:   C/w  argatroban  Transfusions per primary team  to receieve 1 unit PRBC w/ HD today     Hyponatremia - mild, 2/2 anuric renal failure    Acidosis - 2/2 renal failure    Franny Trujillo D.O  PGY 5 nephrology fellow  [phone]

## 2022-09-27 NOTE — PROGRESS NOTE ADULT - ATTENDING COMMENTS
Cellulitis, COPD, HFpEF, s/p cardiac arrest, mutiple sepsis episodes, ATN, metabolic encephalopathy, AF  physical as above  HD to remove fluid with anasarca  continue off sedation except PRN vent asynchrony  follow off antibiotics  rest as above  decision making of high complexity

## 2022-09-27 NOTE — PROGRESS NOTE ADULT - ATTENDING COMMENTS
I agree with the fellow's findings and plans as written above with the following additions/amendments:    Seen and examined at bedside on HD, tolerating UF, not on pressors, continue HD as above, further recs as above

## 2022-09-27 NOTE — PROGRESS NOTE ADULT - ASSESSMENT
82yo morbidly obese female w/ pmhx of HTN, HLD, HFpEF (EF 56%), COPD (on home O2-3L), chronic LE edema and cellulitis, DALIA, PVD p/w respiratory distress 2/2 severe sepsis due to cellulitis on lower extremity which progressed to septic shock in setting of pneumonia. Hospital course complicated by cardiac arrest, flail chest, and oliguric BRANT progressing to kidney failure.     NEURO  #Hypoxic Encephalopathy 2/2 to cardiac arrest vs Sedation  s/p cardiac arrest 2/2 hypoxia due to aspiration. Prior to arrest, pt was awake and alert on the RMF. Difficult to assess current status due to continued ventilation and sedation requirements in the setting on possible VAP and flail chest. Sedative medications discontinued w no improvement of mental status. 9/26 CT head showed +grey white differentiation w no edema.   - continue to assess mentation   - given morbid obesity, possible prolonged sedation. Consider reversing sedative medications to assess mental status    PULM  #Acute on chronic resp failure  Pt w/ COPD on home 3 L O2, currently worsening hypoxia 2/2 likely in setting of aspiration while eating vs flash pulmonary edema 2/2 chronic htn and chronic HF (relatively the same EF as previous TTE). Tracheostomy in place. Patient unable to tolerate trach collar.   - continue CPAP as tolerated w ventilator if needed    #Broken ribs w/ flail chest  Pt with several broken ribs and signs of flail chest with difficulty breathing when sedation weaned off. Thoracic surgery consulted for possible sternal plating. Pt remains unstable and not surgery candidate at this time.   - continue to monitor respirations    #Chronic obstructive pulmonary disease (COPD)  Pt with COPD on home 3L.   - continue to monitor oxygen saturation     CARDIOVASCULAR  #Cardiac Arrest  Patient intubated and sedated 2/2 to cardiac arrest  - 9/23 patient weaned from sedation w trach    #Septic shock   RESOLVED  2/2 pneumonia.   - midodrine decreased 10 mg q8 hrs     #Chronic heart failure with preserved ejection fraction (HFpEF).   Pt on home bumex 2mg.  TTE from current visit showing EF of 56% with Pulmonary HTN - PAP 48. Experienced pulmonary edema and effusion postcardiac arrest. Patient currently euvolemic.   Plan:  - Continue to monitor hemodynamics    #Afib  Experienced a-fib on 09/13 w RVR. self resolved w/o intervention. 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 Serotonin negative. Started on Apixaban 2.5 mg BID  - continue Eliquis 2.5 mg BID iso age and Cr    #Hypertension  Pt with history of hypertension on home diltiazem 180mg daily. Currently on pressors s/p septic shock.   - holding home antihypertensive meds     GI  No active issues      RENAL  #Anuric BRANT on CKD  Baseline Cr 2.04 in July 2022, currently uptrending . Likely iATN 2/2 to hypoperfusion/ischemia in setting of sepsis and cardiac arrest. HD cath placed on 09/12 for CVVHD in setting of worsening electrolytes and increasing fluid status, 9/26 now with TDC.   -TDC placement yesterday, plan for dialysis today  - maintain MAP >70 for adequate renal perfusion  - strict I/Os  - renal following, appreciate recs    Hyperphosphatemia  Patient without dialysis for >72 hrs.   - Continue sevelamer q 8 hrs  - Start Phoslo  - Plan for Dialysis today     #Right kidney mass.   CTAP s/f slightly hyperdense solid cortical mass in upper pole of R kidney.  Plan:  - consider additional renal imaging when stable    ENDO  Continue corrective sliding scale      HEME  #Anemia  Pt noted to have chronic anemia, presented with HgB of 6.8 w/ drop to 6.7 during course of stay. s/p 4 units RBC on 09/07, 9/13, 9/19, and 9/22  - monitor CBC   - maintain active type and screen (last 9/27)    ID  #Cellulitis  RESOLVED  Patient initially presented to ED w b/l LE cellulitis and was treated with antibiotics.   - As per vascular, wrap kerlix gauze and ACE bandages around both legs. Wrap toes to just below the knee.   - Change bandages every few days  - continue to monitor     #Pneumonia  RESOLVED  possibly STANISLAV. Pt w/ bilateral pleural effusions and possible underlying infiltrate, unclear on x-ray. Blood cultures remain negative, sputum cultures neg w/ gram stain showing rare epithelial cells, few WBC's, gram pos rods and cocci in pairs. Patient finished cefepime and vanc on 9/20    stage 2 pressure ulcer  Patient with stage 2 sacral pressure ulcer.   - Wound care consulted    Philtrum Wound  Patient with 3 cm deep cut on upper lip 2/2 to intubation  - consult Plastics today     Skin  Lines: HD cath, endurance cath     GOC  Patient is DNR/DNI    E: K>4, Phos>3, Mg>2  N: Nepro diet - CVVHD   DVT ppx: Argatroban   GI ppx: Protonix 40 BID  Access: 2 peripheral lines, CVVHD port on R  code status: DNR

## 2022-09-27 NOTE — PROGRESS NOTE ADULT - SUBJECTIVE AND OBJECTIVE BOX
Patient is a 81y Female seen and evaluated at bedside on HD- patient no longer on pressor support had R TDC placed yesterday /58 K 5.4 Na 130 bicarb 19       Meds:    albuterol/ipratropium for Nebulization 3 every 6 hours  apixaban 2.5 every 12 hours  artificial  tears Solution 1 two times a day  BACItracin   Ointment 1 every 8 hours  chlorhexidine 0.12% Liquid 15 every 12 hours  chlorhexidine 2% Cloths 1 <User Schedule>  dextrose 5%. 1000 <Continuous>  dextrose 5%. 1000 <Continuous>  dextrose 50% Injectable 25 once  dextrose 50% Injectable 12.5 once  dextrose 50% Injectable 25 once  dextrose Oral Gel 15 once PRN  glucagon  Injectable 1 once  HYDROmorphone  Injectable 0.5 every 2 hours PRN  influenza  Vaccine (HIGH DOSE) 0.7 once  insulin lispro (ADMELOG) corrective regimen sliding scale  every 6 hours  lidocaine   4% Patch 1 every 24 hours  midodrine 10 every 8 hours  nystatin Powder 1 three times a day  pantoprazole   Suspension 40 at bedtime  polyethylene glycol 3350 17 daily  senna 2 daily  sevelamer carbonate Powder 1600 every 8 hours  sodium chloride 0.9% lock flush 10 every 1 hour PRN      T(C): , Max: 37.5 (22 @ 09:58)  T(F): , Max: 99.5 (22 @ 09:58)  HR: 90 (22 @ 13:00)  BP: 125/58 (22 @ 13:00)  BP(mean): 83 (22 @ 13:00)  RR: 26 (22 @ 13:00)  SpO2: 100% (22 @ 13:00)  Wt(kg): --     @ 07:01  -   @ 07:00  --------------------------------------------------------  IN: 1041.3 mL / OUT: 350 mL / NET: 691.3 mL     @ 07:01  -   @ 13:22  --------------------------------------------------------  IN: 500 mL / OUT: 0 mL / NET: 500 mL          Review of Systems:  all other ROS negative       PHYSICAL EXAM:  GENERAL: intubated; sedated   CHEST/LUNG: mechanical breath sounds   HEART: normal S1S2, RRR  ABDOMEN: Soft, Nontender, +BS,   EXTREMITIES: No clubbing, cyanosis, or edema   ACCESS: + R TDC    LABS:                        7.0    9.75  )-----------( 184      ( 27 Sep 2022 05:39 )             23.6         130<L>  |  101  |  70<H>  ----------------------------<  107<H>  5.4<H>   |  19<L>  |  4.66<H>    Ca    8.2<L>      27 Sep 2022 05:39  Phos  9.7       Mg     2.7         TPro  7.1  /  Alb  2.2<L>  /  TBili  0.3  /  DBili  x   /  AST  11  /  ALT  9<L>  /  AlkPhos  140<H>      Hepatitis B Surface Antibody: Nonreact ( @ 10:03)  Hepatitis C Virus S/CO Ratio: 0.03 S/CO ( @ 10:03)    PT/INR - ( 26 Sep 2022 12:36 )   PT: 16.8 sec;   INR: 1.41          PTT - ( 26 Sep 2022 12:36 )  PTT:44.5 sec          RADIOLOGY & ADDITIONAL STUDIES:        Hemoglobin: 7.0 g/dL (22 @ 05:39)  Phosphorus Level, Serum: 9.7 mg/dL (22 @ 05:39)  Hemoglobin: 7.6 g/dL (22 @ 05:10)  Phosphorus Level, Serum: 8.4 mg/dL (22 @ 05:10)    Hepatitis B Surface Antigen: Nonreact (22 @ 10:03)  Hepatitis B Surface Antibody: Nonreact (22 @ 10:03)    sevelamer carbonate 800 milliGRAM(s) Oral every 8 hours, 22 @ 11:31, STAT  sevelamer carbonate Powder 1600 milliGRAM(s) Enteral Tube every 8 hours, 22 @ 11:33, Routine    Hemodialysis Treatment.:     Schedule: Once, Modality: Hemodialysis, Access: Internal Jugular Central Venous Catheter    Dialyzer: Optiflux [id number], Time: 180 Min    Blood Flow: 400 mL/Min , Dialysate Flow: 500 mL/Min, Dialysate Temp: 36.5, Tubinmm (Adult)    Target Fluid Removal: 1 Liters    Dialysate Electrolytes (mEq/L): Potassium 2, Calcium 2.5, Sodium 138, Bicarbonate 35 (22 @ 08:10) [Completed]

## 2022-09-28 NOTE — PROGRESS NOTE ADULT - ATTENDING COMMENTS
morbid obesity, AHRF after cardiac arrest, cellulitis, septic shock, pna, metabolic encephalopathy, ATN  physical as above  some increase in eye opening with flumazenil  increase aggressiveness of UF  continue feeds  decrease midodrine  decision making of high complexity

## 2022-09-28 NOTE — PROGRESS NOTE ADULT - ASSESSMENT
82yo morbidly obese female w/ pmhx of HTN, HLD, HFpEF (EF 56%), COPD (on home O2-3L), chronic LE edema and cellulitis, DALIA, PVD p/w respiratory distress 2/2 severe sepsis due to cellulitis on lower extremity which progressed to septic shock in setting of pneumonia. Hospital course complicated by cardiac arrest, flail chest, and oliguric BRANT progressing to kidney failure.     NEURO  #Hypoxic Encephalopathy 2/2 to cardiac arrest vs Sedation  s/p cardiac arrest 2/2 hypoxia due to aspiration. Prior to arrest, pt was awake and alert on the RMF. Difficult to assess current status due to continued ventilation and sedation requirements in the setting on possible VAP and flail chest. Sedative medications discontinued w no improvement of mental status. 9/26 CT head showed +grey white differentiation w no edema.   - continue to assess mentation   - given morbid obesity, possible prolonged sedation.   - attempt reversing sedative with flumazenil today    PULM  #Acute on chronic resp failure  Pt w/ COPD on home 3 L O2, currently worsening hypoxia 2/2 likely in setting of aspiration while eating vs flash pulmonary edema 2/2 chronic htn and chronic HF (relatively the same EF as previous TTE). Tracheostomy in place. Patient unable to tolerate trach collar.   - Patient w increased respirations on CPAP, continue ventilator and wean as tolerated    #Broken ribs w/ flail chest  Pt with several broken ribs and signs of flail chest with difficulty breathing when sedation weaned off. Thoracic surgery consulted for possible sternal plating. Pt remains unstable and not surgery candidate at this time.   - continue to monitor respirations    #Chronic obstructive pulmonary disease (COPD)  Pt with COPD on home 3L.   - continue to monitor oxygen saturation     CARDIOVASCULAR  #Cardiac Arrest  Patient intubated and sedated 2/2 to cardiac arrest. 9/23 patient weaned from sedation w trach. Patient still requiring pressor support  - continue midodrine    #Septic shock   2/2 pneumonia vs cardiogenic  - midodrine decreased 5 mg q8 hrs     #Chronic heart failure with preserved ejection fraction (HFpEF).   Pt on home bumex 2mg.  TTE from current visit showing EF of 56% with Pulmonary HTN - PAP 48. Experienced pulmonary edema and effusion postcardiac arrest. Patient currently euvolemic.   Plan:  - Continue to monitor hemodynamics    #Afib  Experienced a-fib on 09/13 w RVR. self resolved w/o intervention. 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 Serotonin negative. Started on Apixaban 2.5 mg BID  - continue Eliquis 2.5 mg BID iso age and Cr    #Hypertension  Pt with history of hypertension on home diltiazem 180mg daily. Currently on pressors s/p septic shock.   - holding home antihypertensive meds     GI  No active issues      RENAL  #Anuric BRANT on CKD  Baseline Cr 2.04 in July 2022, currently uptrending . Likely iATN 2/2 to hypoperfusion/ischemia in setting of sepsis and cardiac arrest. HD cath placed on 09/12 for CVVHD in setting of worsening electrolytes and increasing fluid status, 9/26 now with TDC.   -dialysis yesterday w removal of 1 L; today w 3 L fluid  - aquaforesis w HD to help volume status  - maintain MAP >70 for adequate renal perfusion  - strict I/Os  - renal following, appreciate recs  - hyperphosphatemia: continue sevelamer q 8 hrs    #Right kidney mass.   CTAP s/f slightly hyperdense solid cortical mass in upper pole of R kidney.  Plan:  - consider additional renal imaging when stable    ENDO  Continue corrective sliding scale      HEME  #Anemia  Pt noted to have chronic anemia, presented with HgB of 6.8 w/ drop to 6.7 during course of stay. s/p 4 units RBC on 09/07, 9/13, 9/19, and 9/22  - monitor CBC   - maintain active type and screen (last 9/27)    ID  #Cellulitis  Patient initially presented to ED w b/l LE cellulitis and was treated with antibiotics. Acutely worsening on L side.   - As per vascular, wrap kerlix gauze and ACE bandages around both legs. Wrap toes to just below the knee.   - Change bandages every few days  - continue to monitor     #Pneumonia  RESOLVED  possibly STANISLAV. Pt w/ bilateral pleural effusions and possible underlying infiltrate, unclear on x-ray. Blood cultures remain negative, sputum cultures neg w/ gram stain showing rare epithelial cells, few WBC's, gram pos rods and cocci in pairs. Patient finished cefepime and vanc on 9/20    stage 2 pressure ulcer  Patient with stage 2 sacral pressure ulcer.   - Wound care consulted    Philtrum Wound  Patient with 3 cm deep cut on upper lip 2/2 to intubation  - consider plastics consult    Skin  Lines: HD cath, endurance cath     GOC  Patient is DNR/DNI    E: K>4, Phos>3, Mg>2  N: Nepro diet - CVVHD   DVT ppx: Argatroban   GI ppx: Protonix 40 BID  Access: 2 peripheral lines, CVVHD port on R  code status: DNR

## 2022-09-28 NOTE — PROGRESS NOTE ADULT - SUBJECTIVE AND OBJECTIVE BOX
Patient seen on HD, hemodynamically stable, in no acute distress. Continue HD as prescribed.   Hemodialysis Treatment.:     Schedule: Once, Modality: Ultrafiltration, Access: Internal Jugular Central Venous Catheter    Dialyzer: Optiflux [id number], Time: 180 Min    Blood Flow: 400 mL/Min , Tubinmm (Adult)    Target Fluid Removal: 3 Liters    Vital Signs:  T(F): 99.9   HR: 110   BP: 145/67   RR: 30  SpO2: 93% on 30% Fi02 PRVC     Physical Exam:  Constitutional: trach ; NAD, tolerating HD   HEENT: MMM, NC/AT, wound expanding philtrum; neck supple;  Respiratory: Mechanical breath sounds bilaterally, not overbreathing vent  Cardiac: +S1/S2; RRR; no M/R/G  Gastrointestinal: soft, NT/ND; no rebound or guarding; +BS  Extremities: WWP, no clubbing or cyanosis; general low level anasarca, improving  Dermatologic: skin warm, dry and intact; no rashes, wounds, or scars  Access: R TDC c/d/i

## 2022-09-28 NOTE — CHART NOTE - NSCHARTNOTEFT_GEN_A_CORE
Admitting Diagnosis:   Patient is a 82y old  Female who presents with a chief complaint of Acute hypoxic respiratory failure (28 Sep 2022 12:36)      PAST MEDICAL & SURGICAL HISTORY:  Lyme disease      DVT (deep venous thrombosis)      Skin cancer      Brain embolism and thrombosis      MRSA (methicillin resistant Staphylococcus aureus)      PNA (pneumonia)      COPD (chronic obstructive pulmonary disease)      H/O angioplasty      Status post laparoscopic-assisted sigmoidectomy      H/O shoulder surgery          Current Nutrition Order: Nepro @40 ml/hr with LPS x1/day        PO Intake: N/A    GI Issues: Abdomen ND/NT, +BS x4, LBM 9/28    Pain: No non-verbal indicators present     Skin Integrity: PI stg II sacrum, unstageable upper lip, +3 gen. edema, +4 BLE    Labs:   09-28    132<L>  |  98  |  43<H>  ----------------------------<  118<H>  4.2   |  22  |  3.30<H>    Ca    8.0<L>      28 Sep 2022 05:38  Phos  5.4     09-28  Mg     2.5     09-28    TPro  7.6  /  Alb  2.0<L>  /  TBili  0.4  /  DBili  x   /  AST  18  /  ALT  9<L>  /  AlkPhos  141<H>  09-28    CAPILLARY BLOOD GLUCOSE      POCT Blood Glucose.: 118 mg/dL (28 Sep 2022 11:48)  POCT Blood Glucose.: 113 mg/dL (28 Sep 2022 05:34)  POCT Blood Glucose.: 110 mg/dL (27 Sep 2022 23:04)  POCT Blood Glucose.: 109 mg/dL (27 Sep 2022 17:21)      Medications:  MEDICATIONS  (STANDING):  albuterol/ipratropium for Nebulization 3 milliLiter(s) Nebulizer every 6 hours  apixaban 2.5 milliGRAM(s) Oral every 12 hours  artificial  tears Solution 1 Drop(s) Both EYES two times a day  BACItracin   Ointment 1 Application(s) Topical every 8 hours  chlorhexidine 0.12% Liquid 15 milliLiter(s) Oral Mucosa every 12 hours  chlorhexidine 2% Cloths 1 Application(s) Topical <User Schedule>  dextrose 5%. 1000 milliLiter(s) (50 mL/Hr) IV Continuous <Continuous>  dextrose 5%. 1000 milliLiter(s) (100 mL/Hr) IV Continuous <Continuous>  dextrose 50% Injectable 25 Gram(s) IV Push once  dextrose 50% Injectable 12.5 Gram(s) IV Push once  dextrose 50% Injectable 25 Gram(s) IV Push once  glucagon  Injectable 1 milliGRAM(s) IntraMuscular once  influenza  Vaccine (HIGH DOSE) 0.7 milliLiter(s) IntraMuscular once  insulin lispro (ADMELOG) corrective regimen sliding scale   SubCutaneous every 6 hours  lidocaine   4% Patch 1 Patch Transdermal every 24 hours  midodrine 5 milliGRAM(s) Oral every 8 hours  nystatin Powder 1 Application(s) Topical three times a day  pantoprazole   Suspension 40 milliGRAM(s) Enteral Tube at bedtime  polyethylene glycol 3350 17 Gram(s) Oral daily  senna 2 Tablet(s) Oral daily  sevelamer carbonate Powder 1600 milliGRAM(s) Enteral Tube every 8 hours    MEDICATIONS  (PRN):  acetaminophen    Suspension .. 650 milliGRAM(s) Oral every 6 hours PRN Temp greater or equal to 38C (100.4F), Mild Pain (1 - 3), Moderate Pain (4 - 6)  dextrose Oral Gel 15 Gram(s) Oral once PRN Blood Glucose LESS THAN 70 milliGRAM(s)/deciliter  HYDROmorphone  Injectable 0.5 milliGRAM(s) IV Push every 2 hours PRN Tachypnea, RR greater than 30  sodium chloride 0.9% lock flush 10 milliLiter(s) IV Push every 1 hour PRN Pre/post blood products, medications, blood draw, and to maintain line patency    Height for BMI (CENTIMETERS)	172.7 Centimeter(s)  Weight for BMI (lbs)	225.5 lb  Weight for BMI (kg)	102.3 kg  Body Mass Index	34.2    Weight Change: No new wt since admit.     Estimated energy needs:   Weight used for calculations	IBW  Estimated Energy Needs Weight (lbs)	140.2 lb  Estimated Energy Needs Weight (kg)	63.6 kg  Estimated Energy Needs From (shane/kg)	25  Estimated Energy Needs To (shane/kg)	30  Estimated Energy Needs Calculated From (shane/kg)	1590  Estimated Energy Needs Calculated To (shane/kg)	1908  Weight used for calculations	IBW  Estimated Protein Needs Weight (lbs)	140.2 lb  Estimated Protein Needs Weight (kg)	63.6 kg  Estimated Protein Needs From (g/kg)	1.4  Estimated Protein Needs To (g/kg)	1.6  Estimated Protein Needs Calculated From (g/kg)	89.04  Estimated Protein Needs Calculated To (g/kg)	101.76  Estimated Fluid Needs Weight (lbs)	140.2 lb  Estimated Fluid Needs Weight (kg)	63.6 kg  Estimated Fluid Needs From (ml/kg)	30  Estimated Fluid Needs To (ml/kg)	35  Estimated Fluid Needs Calculated From (ml/kg)	1908  Estimated Fluid Needs Calculated To (ml/kg)	2226  -Needs determined using IBW with consideration for critical condition requiring mechanical ventilation.     Subjective: 81F with complex medical history including obesity, HTN, HLD, HFpEF (EF 55-60% last echo 2/20), COPD (on home O2-3L), chronic LE edema with neuropathic pain and cellulitis, DALIA, PVD p/w respiratory distress x 1 day found to have cellulitis on lower extremity. 9/9: TF started. 9/12: Started CVVHD, TF changed to Nepro. 9/13: Elevated GRV, TF held. 9/17: Weaning off pressors/sedation. 9/18: Febrile. 9/20: On propofol in prep for trache. 9/22: Tracheostomy. Propofol off s/p procedure. 9/25: Did not tolerate trache collar. 9/26: TF held.     Pt care discussed in IDT rounds. Rx and labs reviewed. Intubated and sedated at time of assessment; vent to VC-AC, , no pressors, no propofol. Pt continue on Nepro for enteral nutrition support. Monitor ability to tolerate trache collar and initiate a PO diet. No new reports GI distress or other nutritional concerns. Tolerating TF at goal; continue nutritional plan of care. RDN will continue to monitor per protocol and PRN.     Previous Nutrition Diagnosis: Inadequate Oral Intake r/t critical illness requiring mechanical ventilation AEB need for NPO status    Active [ x ]  Resolved [   ]    If resolved, new PES:     Goal: Pt will meet 75% or more of protein/energy needs via most appropriate route for nutrition    Recommendations:  -Continue TF as ordered    *Recommend Nepro advance by 20 ml/hr q4hr toward goal of 40 ml/hr with LPS x1/day to provide 960 ml TF (102%RDI), 1828 kcal, 93 gProt., and 698 ml FW. This is 22.9 non-protein kcal and 1.46 gProt. per kg IBW 63.6kg.   -Monitor TF tolerance; monitor GI eval and need for PEG iso trache placement   -Monitor chemistry, GI fxn, and skin integrity     Risk Level: High [   ] Moderate [ x ] Low [   ].

## 2022-09-28 NOTE — PROGRESS NOTE ADULT - ATTENDING COMMENTS
I agree with the fellow's findings and plans as written above with the following additions/amendments:    Seen and examined at bedside on HD, tolerating UF only, will continue HD as above for fluid removal, next full HD tomorrow. Continue HD as above

## 2022-09-28 NOTE — PROGRESS NOTE ADULT - SUBJECTIVE AND OBJECTIVE BOX
GABBY LAYTON  82y  Female    Patient is a 82y old  Female who presents with a chief complaint of Acute hypoxic respiratory failure (28 Sep 2022 12:36)      INTERVAL HPI/OVERNIGHT EVENTS: As per night team, Tmax 100.8; blood and sputum cultures were sent. Patient was seen and examined at bedside. Patient was not able to be aroused. ROS not obtained.        T(C): 37.7 (09-28-22 @ 18:13), Max: 38.2 (09-28-22 @ 01:00)  HR: 106 (09-28-22 @ 18:00) (76 - 112)  BP: 123/57 (09-28-22 @ 18:00) (108/54 - 163/73)  RR: 32 (09-28-22 @ 18:00) (20 - 44)  SpO2: 92% (09-28-22 @ 18:00) (87% - 98%)  Wt(kg): --Vital Signs Last 24 Hrs  T(C): 37.7 (28 Sep 2022 18:13), Max: 38.2 (28 Sep 2022 01:00)  T(F): 99.9 (28 Sep 2022 18:13), Max: 100.8 (28 Sep 2022 01:00)  HR: 106 (28 Sep 2022 18:00) (76 - 112)  BP: 123/57 (28 Sep 2022 18:00) (108/54 - 163/73)  BP(mean): 82 (28 Sep 2022 18:00) (72 - 106)  RR: 32 (28 Sep 2022 18:00) (20 - 44)  SpO2: 92% (28 Sep 2022 18:00) (87% - 98%)    Parameters below as of 28 Sep 2022 18:00  Patient On (Oxygen Delivery Method): ventilator    O2 Concentration (%): 30    PHYSICAL EXAM:  GENERAL: morbidly obese female on ventilator  Neuro: AAOx0; + cough reflex and + symmetric pupillary light reflex; patient unable to be aroused, not able to follow commands  HEENT: tracheostomy with no erythema or swelling; NC/AT; MMM; open wound expanding philtrum; neck supple; no scleral icterus; nasal passage w NG tube; no thyroid or LN enlargement  Heart: distant heart sounds appreciated; RRR; +s1 and s2; no MRG; non displaced PMI  Lungs: coarse crackles hear anteriorly throughout lung fields  GI: protuberant abdomen; nondistended; normal bowel sounds [id number]   Extremities: patients lower extremities to knee wrapped with ACE bandage and SCDs; UE 2+ edema with 1+ pulses  Skin: cellulitis of LE wrapped with ACE bandages, L LE cellulitis increased pass the knee to the lateral posterior side    Consultant(s) Notes Reviewed:  [x ] YES  [ ] NO  Care Discussed with Consultants/Other Providers [ x] YES  [ ] NO    LABS:                        8.2    8.67  )-----------( 154      ( 28 Sep 2022 05:37 )             26.7     09-28    132<L>  |  98  |  43<H>  ----------------------------<  118<H>  4.2   |  22  |  3.30<H>    Ca    8.0<L>      28 Sep 2022 05:38  Phos  5.4     09-28  Mg     2.5     09-28    TPro  7.6  /  Alb  2.0<L>  /  TBili  0.4  /  DBili  x   /  AST  18  /  ALT  9<L>  /  AlkPhos  141<H>  09-28          CAPILLARY BLOOD GLUCOSE      POCT Blood Glucose.: 123 mg/dL (28 Sep 2022 17:10)  POCT Blood Glucose.: 118 mg/dL (28 Sep 2022 11:48)  POCT Blood Glucose.: 113 mg/dL (28 Sep 2022 05:34)  POCT Blood Glucose.: 110 mg/dL (27 Sep 2022 23:04)      ABG - ( 28 Sep 2022 05:28 )  pH, Arterial: 7.45  pH, Blood: x     /  pCO2: 34    /  pO2: 89    / HCO3: 24    / Base Excess: 0.1   /  SaO2: 98.6                  MEDICATIONS  (STANDING):  albuterol/ipratropium for Nebulization 3 milliLiter(s) Nebulizer every 6 hours  apixaban 2.5 milliGRAM(s) Oral every 12 hours  artificial  tears Solution 1 Drop(s) Both EYES two times a day  BACItracin   Ointment 1 Application(s) Topical every 8 hours  chlorhexidine 0.12% Liquid 15 milliLiter(s) Oral Mucosa every 12 hours  chlorhexidine 2% Cloths 1 Application(s) Topical <User Schedule>  dextrose 5%. 1000 milliLiter(s) (50 mL/Hr) IV Continuous <Continuous>  dextrose 5%. 1000 milliLiter(s) (100 mL/Hr) IV Continuous <Continuous>  dextrose 50% Injectable 25 Gram(s) IV Push once  dextrose 50% Injectable 12.5 Gram(s) IV Push once  dextrose 50% Injectable 25 Gram(s) IV Push once  glucagon  Injectable 1 milliGRAM(s) IntraMuscular once  influenza  Vaccine (HIGH DOSE) 0.7 milliLiter(s) IntraMuscular once  insulin lispro (ADMELOG) corrective regimen sliding scale   SubCutaneous every 6 hours  lidocaine   4% Patch 1 Patch Transdermal every 24 hours  midodrine 5 milliGRAM(s) Oral every 8 hours  nystatin Powder 1 Application(s) Topical three times a day  pantoprazole   Suspension 40 milliGRAM(s) Enteral Tube at bedtime  polyethylene glycol 3350 17 Gram(s) Oral daily  senna 2 Tablet(s) Oral daily  sevelamer carbonate Powder 1600 milliGRAM(s) Enteral Tube every 8 hours  tobramycin 0.3% Ophthalmic Solution 2 Drop(s) Right EYE every 6 hours    MEDICATIONS  (PRN):  acetaminophen    Suspension .. 650 milliGRAM(s) Oral every 6 hours PRN Temp greater or equal to 38C (100.4F), Mild Pain (1 - 3), Moderate Pain (4 - 6)  dextrose Oral Gel 15 Gram(s) Oral once PRN Blood Glucose LESS THAN 70 milliGRAM(s)/deciliter  HYDROmorphone  Injectable 0.5 milliGRAM(s) IV Push every 2 hours PRN Tachypnea, RR greater than 30  sodium chloride 0.9% lock flush 10 milliLiter(s) IV Push every 1 hour PRN Pre/post blood products, medications, blood draw, and to maintain line patency      RADIOLOGY & ADDITIONAL TESTS:    Imaging Personally Reviewed:  [ ] YES  [ ] NO

## 2022-09-28 NOTE — PROGRESS NOTE ADULT - SUBJECTIVE AND OBJECTIVE BOX
Patient is a 82y Female seen and evaluated at bedside. No acute distress, trach to vent, off pressors and sedation. Tolerated HD yesterday. Plan for HD today with UF only.       Meds:    acetaminophen    Suspension .. 650 every 6 hours PRN  albuterol/ipratropium for Nebulization 3 every 6 hours  apixaban 2.5 every 12 hours  artificial  tears Solution 1 two times a day  BACItracin   Ointment 1 every 8 hours  chlorhexidine 0.12% Liquid 15 every 12 hours  chlorhexidine 2% Cloths 1 <User Schedule>  dextrose 5%. 1000 <Continuous>  dextrose 5%. 1000 <Continuous>  dextrose 50% Injectable 25 once  dextrose 50% Injectable 12.5 once  dextrose 50% Injectable 25 once  dextrose Oral Gel 15 once PRN  glucagon  Injectable 1 once  HYDROmorphone  Injectable 0.5 every 2 hours PRN  influenza  Vaccine (HIGH DOSE) 0.7 once  insulin lispro (ADMELOG) corrective regimen sliding scale  every 6 hours  lidocaine   4% Patch 1 every 24 hours  midodrine 5 every 8 hours  nystatin Powder 1 three times a day  pantoprazole   Suspension 40 at bedtime  polyethylene glycol 3350 17 daily  senna 2 daily  sevelamer carbonate Powder 1600 every 8 hours  sodium chloride 0.9% lock flush 10 every 1 hour PRN      T(C): , Max: 38.2 (09-28-22 @ 01:00)  T(F): , Max: 100.8 (09-28-22 @ 01:00)  HR: 111 (09-28-22 @ 10:19)  BP: 156/67 (09-28-22 @ 10:19)  BP(mean): 105 (09-28-22 @ 10:19)  RR: 27 (09-28-22 @ 10:19)  SpO2: 91% (09-28-22 @ 10:19)  Wt(kg): --    09-27 @ 07:01  -  09-28 @ 07:00  --------------------------------------------------------  IN: 2360 mL / OUT: 1900 mL / NET: 460 mL    09-28 @ 07:01  -  09-28 @ 11:04  --------------------------------------------------------  IN: 40 mL / OUT: 0 mL / NET: 40 mL          Review of Systems:  ROS negative except as per HPI      PHYSICAL EXAM:  Constitutional: trach ; NAD  HEENT: MMM, NC/AT, wound expanding philtrum; neck supple;  Respiratory: Mechanical breath sounds bilaterally, not overbreathing vent  Cardiac: +S1/S2; RRR; no M/R/G  Gastrointestinal: soft, NT/ND; no rebound or guarding; +BS  Extremities: WWP, no clubbing or cyanosis; general low level anasarca, improving  Dermatologic: skin warm, dry and intact; no rashes, wounds, or scars  Access: R [facility] c/d/i       LABS:                        8.2    8.67  )-----------( 154      ( 28 Sep 2022 05:37 )             26.7     09-28    132<L>  |  98  |  43<H>  ----------------------------<  118<H>  4.2   |  22  |  3.30<H>    Ca    8.0<L>      28 Sep 2022 05:38  Phos  5.4     09-28  Mg     2.5     09-28    TPro  7.6  /  Alb  2.0<L>  /  TBili  0.4  /  DBili  x   /  AST  18  /  ALT  9<L>  /  AlkPhos  141<H>  09-28      PT/INR - ( 26 Sep 2022 12:36 )   PT: 16.8 sec;   INR: 1.41          PTT - ( 26 Sep 2022 12:36 )  PTT:44.5 sec          RADIOLOGY & ADDITIONAL STUDIES:           Patient is a 82y Female seen and evaluated at bedside. No acute distress, trach to vent, off pressors and sedation. Tolerated HD yesterday. Plan for HD today with UF only.       Meds:    acetaminophen    Suspension .. 650 every 6 hours PRN  albuterol/ipratropium for Nebulization 3 every 6 hours  apixaban 2.5 every 12 hours  artificial  tears Solution 1 two times a day  BACItracin   Ointment 1 every 8 hours  chlorhexidine 0.12% Liquid 15 every 12 hours  chlorhexidine 2% Cloths 1 <User Schedule>  dextrose 5%. 1000 <Continuous>  dextrose 5%. 1000 <Continuous>  dextrose 50% Injectable 25 once  dextrose 50% Injectable 12.5 once  dextrose 50% Injectable 25 once  dextrose Oral Gel 15 once PRN  glucagon  Injectable 1 once  HYDROmorphone  Injectable 0.5 every 2 hours PRN  influenza  Vaccine (HIGH DOSE) 0.7 once  insulin lispro (ADMELOG) corrective regimen sliding scale  every 6 hours  lidocaine   4% Patch 1 every 24 hours  midodrine 5 every 8 hours  nystatin Powder 1 three times a day  pantoprazole   Suspension 40 at bedtime  polyethylene glycol 3350 17 daily  senna 2 daily  sevelamer carbonate Powder 1600 every 8 hours  sodium chloride 0.9% lock flush 10 every 1 hour PRN      T(C): , Max: 38.2 (22 @ 01:00)  T(F): , Max: 100.8 (22 @ 01:00)  HR: 111 (22 @ 10:19)  BP: 156/67 (22 @ 10:19)  BP(mean): 105 (22 @ 10:19)  RR: 27 (22 @ 10:19)  SpO2: 91% (22 @ 10:19)  Wt(kg): --     @ 07:01  -   @ 07:00  --------------------------------------------------------  IN: 2360 mL / OUT: 1900 mL / NET: 460 mL     @ 07:01  -   @ 11:04  --------------------------------------------------------  IN: 40 mL / OUT: 0 mL / NET: 40 mL          Review of Systems:  ROS negative except as per HPI      PHYSICAL EXAM:  Constitutional: trach ; NAD  HEENT: MMM, NC/AT, wound expanding philtrum; neck supple;  Respiratory: Mechanical breath sounds bilaterally, not overbreathing vent  Cardiac: +S1/S2; RRR; no M/R/G  Gastrointestinal: soft, NT/ND; no rebound or guarding; +BS  Extremities: WWP, no clubbing or cyanosis; general low level anasarca, improving  Dermatologic: skin warm, dry and intact; no rashes, wounds, or scars  Access: R [facility] c/d/i       LABS:                        8.2    8.67  )-----------( 154      ( 28 Sep 2022 05:37 )             26.7         132<L>  |  98  |  43<H>  ----------------------------<  118<H>  4.2   |  22  |  3.30<H>    Ca    8.0<L>      28 Sep 2022 05:38  Phos  5.4       Mg     2.5         TPro  7.6  /  Alb  2.0<L>  /  TBili  0.4  /  DBili  x   /  AST  18  /  ALT  9<L>  /  AlkPhos  141<H>        PT/INR - ( 26 Sep 2022 12:36 )   PT: 16.8 sec;   INR: 1.41          PTT - ( 26 Sep 2022 12:36 )  PTT:44.5 sec          RADIOLOGY & ADDITIONAL STUDIES:      Phosphorus Level, Serum: 5.4 mg/dL (22 @ 05:38)  Hemoglobin: 8.2 g/dL (22 @ 05:37)  Hemoglobin: 7.0 g/dL (22 @ 05:39)  Phosphorus Level, Serum: 9.7 mg/dL (22 @ 05:39)    Albumin, Serum: 2.0 g/dL (22 @ 05:38)  Hepatitis B Surface Antigen: Nonreact (22 @ 10:03)    sevelamer carbonate 800 milliGRAM(s) Oral every 8 hours, 22 @ 11:31, STAT  sevelamer carbonate Powder 1600 milliGRAM(s) Enteral Tube every 8 hours, 22 @ 11:33, Routine      Hemodialysis Treatment.:     Schedule: Once, Modality: Ultrafiltration, Access: Internal Jugular Central Venous Catheter    Dialyzer: Optiflux [id number], Time: 180 Min    Blood Flow: 400 mL/Min , Tubinmm (Adult)    Target Fluid Removal: 3 Liters (22 @ 08:07) [Completed]

## 2022-09-28 NOTE — PROGRESS NOTE ADULT - ASSESSMENT
Patient is a 80 yo F with CHF, CKD (baseline around 2.5-2.9) HTN who presented with a one day history of respiratory distress and LE cellulitis hospital course c/b cardiac arrest on 9/7, acute renal failure requiring CVVHD with now transition to iHD    #Anuric BRANT on CKD    iATN in setting of cardiac arrest and shock.   On CVVHD 9/12-9/22  transition to iHD on 9/27    Plan:  -HD today for UF   -Daily BMP, mag and phos   -Maintain MAP >70 for adequate renal perfusion  -Monitor for urine output for any       #Thrombocytopenia, Anemia   Hep PF4AB positive but HIT confirmatory negative, no acute source of bleeding identified  s/p 1UPRBC, Hgb 8.2    Plan:   C/w  argatroban  Transfusions per primary team      Hyponatremia - mild, 2/2 anuric renal failure

## 2022-09-29 NOTE — PROGRESS NOTE ADULT - ASSESSMENT
80yo morbidly obese female w/ pmhx of HTN, HLD, HFpEF (EF 56%), COPD (on home O2-3L), chronic LE edema and cellulitis, DALIA, PVD p/w respiratory distress 2/2 severe sepsis due to cellulitis on lower extremity which progressed to septic shock in setting of pneumonia. Hospital course complicated by cardiac arrest, flail chest, and oliguric BRANT progressing to kidney failure.     NEURO  #AMS 2/2 to sedation  Reversal of sedative yesterday w flumazenil successful. 9/26 CT head showed +grey white differentiation w no edema.   - continue to assess mentation   - given morbid obesity, possible prolonged sedation     PULM  #Acute on chronic resp failure  Pt w/ COPD on home 3 L O2, currently worsening hypoxia 2/2 likely in setting of aspiration while eating vs flash pulmonary edema 2/2 chronic htn and chronic HF (relatively the same EF as previous TTE). Tracheostomy in place. Patient unable to tolerate trach collar.   - Patient w increased respirations on CPAP, continue ventilator and wean as tolerated    #Broken ribs w/ flail chest  Pt with several broken ribs and signs of flail chest with difficulty breathing when sedation weaned off. Thoracic surgery consulted for possible sternal plating. Pt remains unstable and not surgery candidate at this time.   - continue to monitor respirations    #Chronic obstructive pulmonary disease (COPD)  Pt with COPD on home 3L.   - continue to monitor oxygen saturation     CARDIOVASCULAR  #Cardiac Arrest  Patient intubated and sedated 2/2 to cardiac arrest. 9/23 patient weaned from sedation w trach. Patient not requiring midodrine  - continue to monitor vitals    #Septic shock   2/2 pneumonia vs cardiogenic  - Midodrine stopped 9/29    #Chronic heart failure with preserved ejection fraction (HFpEF).   Pt on home bumex 2mg.  TTE from current visit showing EF of 56% with Pulmonary HTN - PAP 48. Experienced pulmonary edema and effusion postcardiac arrest.  Plan:  - Continue to monitor hemodynamics    #Afib  Experienced a-fib on 09/13 w RVR. self resolved w/o intervention. 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 Serotonin negative. Started on Apixaban 2.5 mg BID  - continue Eliquis 2.5 mg BID iso age and Cr    #Hypertension  Pt with history of hypertension on home diltiazem 180mg daily. Currently on pressors s/p septic shock.   - holding home antihypertensive meds     GI  No active issues    RENAL  #Anuric BRANT on CKD  Baseline Cr 2.04 in July 2022, currently uptrending . Likely iATN 2/2 to hypoperfusion/ischemia in setting of sepsis and cardiac arrest. HD cath placed on 09/12 for CVVHD in setting of worsening electrolytes and increasing fluid status, 9/26 now with TDC. Acute increase in BUN most likely 2/2 to aquaporesis.  -dialysis yesterday w removal of 3 L; today w 2 L   - aquaforesis w HD to help volume status  - maintain MAP >70 for adequate renal perfusion  - strict I/Os  - renal following, appreciate recs  - hyperphosphatemia: continue sevelamer q 8 hrs    #Right kidney mass.   CTAP s/f slightly hyperdense solid cortical mass in upper pole of R kidney.  Plan:  - consider additional renal imaging when stable    ENDO  Continue corrective sliding scale    HEME  #Anemia  Pt noted to have chronic anemia, presented with HgB of 6.8 w/ drop to 6.7 during course of stay. s/p 4 units RBC on 09/07, 9/13, 9/19, and 9/22  - monitor CBC   - maintain active type and screen (last 9/27)    ID  #Cellulitis  Patient initially presented to ED w b/l LE cellulitis and was treated with antibiotics. Acutely worsening on L side.   - As per vascular, wrap kerlix gauze and ACE bandages around both legs. Wrap toes to just below the knee.   - Change bandages every few days  - continue to monitor     #Pneumonia  RESOLVED  possibly STANISLAV. Pt w/ bilateral pleural effusions and possible underlying infiltrate, unclear on x-ray. Blood cultures remain negative, sputum cultures neg w/ gram stain showing rare epithelial cells, few WBC's, gram pos rods and cocci in pairs. Patient finished cefepime and vanc on 9/20    #stage 2 pressure ulcer  Patient with stage 2 sacral pressure ulcer.   - Wound care consulted    #Philtrum Wound  Patient with 3 cm deep cut on upper lip 2/2 to intubation  - plastics consult    #Blepharitis  Patient with R eye erythema and crusting.   - continue Tobramycin 2 drops q6 hrs  Skin  Lines: HD cath, endurance cath     GOC  Patient is DNR/DNI    E: K>4, Phos>3, Mg>2  N: Tube feeds   DVT ppx: Argatroban   GI ppx: Protonix 40 BID  code status: DNR

## 2022-09-29 NOTE — PROGRESS NOTE ADULT - SUBJECTIVE AND OBJECTIVE BOX
Patient is a 82y Female seen and evaluated at bedside. No acute distress, trach to vent, off pressors. Patient tolerated HD well with only UF yesterday. Also started on Aquapheresis in between HD for further fluid removal. Patient tolerated procedure and total of 1L removed, however discontinued due to blood clot. Plan for HD today.       Meds:    acetaminophen    Suspension .. 650 every 6 hours PRN  albuterol/ipratropium for Nebulization 3 every 6 hours  apixaban 2.5 every 12 hours  artificial  tears Solution 1 two times a day  BACItracin   Ointment 1 every 8 hours  chlorhexidine 0.12% Liquid 15 every 12 hours  chlorhexidine 2% Cloths 1 <User Schedule>  dextrose 5%. 1000 <Continuous>  dextrose 5%. 1000 <Continuous>  dextrose 50% Injectable 25 once  dextrose 50% Injectable 12.5 once  dextrose 50% Injectable 25 once  dextrose Oral Gel 15 once PRN  glucagon  Injectable 1 once  HYDROmorphone  Injectable 0.5 every 2 hours PRN  influenza  Vaccine (HIGH DOSE) 0.7 once  insulin lispro (ADMELOG) corrective regimen sliding scale  every 6 hours  lidocaine   4% Patch 1 every 24 hours  nystatin Powder 1 three times a day  pantoprazole   Suspension 40 at bedtime  polyethylene glycol 3350 17 daily  senna 2 daily  sevelamer carbonate Powder 1600 every 8 hours  sodium chloride 0.9% lock flush 10 every 1 hour PRN  tobramycin 0.3% Ophthalmic Solution 2 every 6 hours      T(C): , Max: 37.7 (09-28-22 @ 18:13)  T(F): , Max: 99.9 (09-28-22 @ 18:13)  HR: 107 (09-29-22 @ 10:00)  BP: 142/63 (09-29-22 @ 10:00)  BP(mean): 90 (09-29-22 @ 10:00)  RR: 30 (09-29-22 @ 10:00)  SpO2: 100% (09-29-22 @ 10:00)  Wt(kg): --    09-28 @ 07:01  -  09-29 @ 07:00  --------------------------------------------------------  IN: 840 mL / OUT: 3379 mL / NET: -2539 mL    09-29 @ 07:01 - 09-29 @ 10:29  --------------------------------------------------------  IN: 120 mL / OUT: 150 mL / NET: -30 mL          Review of Systems:  ROS negative except as per HPI      PHYSICAL EXAM:  Constitutional: trach ; NAD  HEENT: MMM, NC/AT, wound expanding philtrum; neck supple;  Respiratory: Mechanical breath sounds bilaterally, not overbreathing vent  Cardiac: +S1/S2; RRR; no M/R/G  Gastrointestinal: soft, NT/ND; no rebound or guarding; +BS  Extremities: WWP, no clubbing or cyanosis; general low level anasarca, improving  Dermatologic: skin warm, dry and intact; no rashes, wounds, or scars  Access: R [facility] c/d/i       LABS:                        8.3    8.16  )-----------( 155      ( 29 Sep 2022 07:04 )             26.5     09-29    128<L>  |  94<L>  |  57<H>  ----------------------------<  136<H>  4.3   |  22  |  3.93<H>    Ca    8.4      29 Sep 2022 07:04  Phos  6.1     09-29  Mg     2.7     09-29    TPro  7.6  /  Alb  2.0<L>  /  TBili  0.4  /  DBili  x   /  AST  18  /  ALT  9<L>  /  AlkPhos  141<H>  09-28                RADIOLOGY & ADDITIONAL STUDIES:           Patient is a 82y Female seen and evaluated at bedside. No acute distress, trach to vent, off pressors. Patient tolerated HD well with only UF yesterday. Also started on Aquapheresis in between HD for further fluid removal. Patient tolerated procedure and total of 1L removed, however discontinued due to blood clot. Plan for HD today.       Meds:    acetaminophen    Suspension .. 650 every 6 hours PRN  albuterol/ipratropium for Nebulization 3 every 6 hours  apixaban 2.5 every 12 hours  artificial  tears Solution 1 two times a day  BACItracin   Ointment 1 every 8 hours  chlorhexidine 0.12% Liquid 15 every 12 hours  chlorhexidine 2% Cloths 1 <User Schedule>  dextrose 5%. 1000 <Continuous>  dextrose 5%. 1000 <Continuous>  dextrose 50% Injectable 25 once  dextrose 50% Injectable 12.5 once  dextrose 50% Injectable 25 once  dextrose Oral Gel 15 once PRN  glucagon  Injectable 1 once  HYDROmorphone  Injectable 0.5 every 2 hours PRN  influenza  Vaccine (HIGH DOSE) 0.7 once  insulin lispro (ADMELOG) corrective regimen sliding scale  every 6 hours  lidocaine   4% Patch 1 every 24 hours  nystatin Powder 1 three times a day  pantoprazole   Suspension 40 at bedtime  polyethylene glycol 3350 17 daily  senna 2 daily  sevelamer carbonate Powder 1600 every 8 hours  sodium chloride 0.9% lock flush 10 every 1 hour PRN  tobramycin 0.3% Ophthalmic Solution 2 every 6 hours      T(C): , Max: 37.7 (22 @ 18:13)  T(F): , Max: 99.9 (22 @ 18:13)  HR: 107 (22 @ 10:00)  BP: 142/63 (22 @ 10:00)  BP(mean): 90 (22 @ 10:00)  RR: 30 (22 @ 10:00)  SpO2: 100% (22 @ 10:00)  Wt(kg): --     @ 07:01  -   @ 07:00  --------------------------------------------------------  IN: 840 mL / OUT: 3379 mL / NET: -2539 mL     @ 07:01  -   @ 10:29  --------------------------------------------------------  IN: 120 mL / OUT: 150 mL / NET: -30 mL          Review of Systems:  ROS negative except as per HPI      PHYSICAL EXAM:  Constitutional: trach ; NAD  HEENT: MMM, NC/AT, wound expanding philtrum; neck supple;  Respiratory: Mechanical breath sounds bilaterally, not overbreathing vent  Cardiac: +S1/S2; RRR; no M/R/G  Gastrointestinal: soft, NT/ND; no rebound or guarding; +BS  Extremities: WWP, no clubbing or cyanosis; general low level anasarca, improving  Dermatologic: skin warm, dry and intact; no rashes, wounds, or scars  Access: R [facility] c/d/i       LABS:                        8.3    8.16  )-----------( 155      ( 29 Sep 2022 07:04 )             26.5         128<L>  |  94<L>  |  57<H>  ----------------------------<  136<H>  4.3   |  22  |  3.93<H>    Ca    8.4      29 Sep 2022 07:04  Phos  6.1       Mg     2.7         TPro  7.6  /  Alb  2.0<L>  /  TBili  0.4  /  DBili  x   /  AST  18  /  ALT  9<L>  /  AlkPhos  141<H>                  RADIOLOGY & ADDITIONAL STUDIES:    Hemoglobin: 8.3 g/dL (22 @ 07:04)  Phosphorus Level, Serum: 6.1 mg/dL (22 @ 07:04)  Phosphorus Level, Serum: 5.4 mg/dL (22 @ 05:38)  Hemoglobin: 8.2 g/dL (22 @ 05:37)    Albumin, Serum: 2.0 g/dL (22 @ 05:38)  Hepatitis B Surface Antigen: Nonreact (22 @ 10:03)    sevelamer carbonate 800 milliGRAM(s) Oral every 8 hours, 22 @ 11:31, STAT  sevelamer carbonate Powder 1600 milliGRAM(s) Enteral Tube every 8 hours, 22 @ 11:33, Routine      Hemodialysis Treatment.:     Schedule: Once, Modality: Hemodialysis, Access: Internal Jugular Central Venous Catheter    Dialyzer: Optiflux [id number], Time: 180 Min    Blood Flow: 400 mL/Min , Dialysate Flow: 500 mL/Min, Dialysate Temp: 36.5, Tubinmm (Adult)    Target Fluid Removal: 2 Liters    Dialysate Electrolytes (mEq/L): Potassium 3, Calcium 2.5, Sodium 138, Bicarbonate 35 (22 @ 08:20) [Completed]

## 2022-09-29 NOTE — PROGRESS NOTE ADULT - SUBJECTIVE AND OBJECTIVE BOX
Patient seen on HD tolerating procedure well and is hemodynamically stable. Having issues with catheter causing arterial site resistance therefore can only maintain BFR between 300-350. Patient will need TPA to help break up clot. Plan to continue HD as planned.   Hemodialysis Treatment.:     Schedule: Once, Modality: Hemodialysis, Access: Internal Jugular Central Venous Catheter    Dialyzer: Optiflux [id number], Time: 180 Min    Blood Flow: 300-350 mL/Min , Dialysate Flow: 500 mL/Min, Dialysate Temp: 36.5, Tubinmm (Adult)    Target Fluid Removal: 2 Liters    Dialysate Electrolytes (mEq/L): Potassium 3, Calcium 2.5, Sodium 138, Bicarbonate 35           Vital Signs:    T(F): 99.3  HR: 108  BP: 113/56  ABP: --  RR: 34  SpO2: 100%  Mode: AC/ CMV (Assist Control/ Continuous Mandatory Ventilation), RR (machine): 26, TV (machine): 450, FiO2: 30, PEEP: 5               PHYSICAL EXAM:  Constitutional: trach ; NAD, tolerating HD   HEENT: MMM, NC/AT, wound expanding philtrum; neck supple;  Respiratory: Mechanical breath sounds bilaterally, not overbreathing vent  Cardiac: +S1/S2; RRR; no M/R/G  Gastrointestinal: soft, NT/ND; no rebound or guarding; +BS  Extremities: WWP, no clubbing or cyanosis; general low level anasarca, improving  Dermatologic: skin warm, dry and intact; no rashes, wounds, or scars  Access: R TDC c/d/i

## 2022-09-29 NOTE — PROGRESS NOTE ADULT - ATTENDING COMMENTS
I agree with the fellow's findings and plans as written above with the following additions/amendments:    Seen and examined at bedside on HD, tolerating well, poor blood flow through cath noted, likely clot given aquapharesis, will discuss tpa instillation. Continue HD as above, further recs as above

## 2022-09-29 NOTE — PROGRESS NOTE ADULT - ASSESSMENT
Patient is a 82 yo F with CHF, CKD (baseline around 2.5-2.9) HTN who presented with a one day history of respiratory distress and LE cellulitis hospital course c/b cardiac arrest on 9/7, acute renal failure requiring CVVHD with now transition to iHD    #Anuric BRANT on CKD    iATN in setting of cardiac arrest and shock.   On CVVHD 9/12-9/22  transition to iHD on 9/27  Still HD dependent     Plan:  -HD today with UF and Clearance   -Daily BMP, mag and phos   -Maintain MAP >70 for adequate renal perfusion  -Monitor for urine output for        #Thrombocytopenia, Anemia   Hep PF4AB positive but HIT confirmatory negative, no acute source of bleeding identified  s/p 1UPRBC, Hgb 8.2    Plan:   C/w  argatroban  Transfusions per primary team      Hyponatremia - mild, 2/2 anuric renal failure Patient is a 82 yo F with CHF, CKD (baseline around 2.5-2.9) HTN who presented with a one day history of respiratory distress and LE cellulitis hospital course c/b cardiac arrest on 9/7, acute renal failure requiring CVVHD with now transition to iHD    #Anuric BRANT on CKD   iATN in setting of cardiac arrest and shock.   On CVVHD 9/12-9/22  transition to iHD on 9/27  remains dialysis dependent- not currently showing signs of renal recovery, anuric     Plan:  -HD today with UF and Clearance   -Will hold aquapheresis for now and reassess volume status   -Daily BMP, mag and phos   -Maintain MAP >70 for adequate renal perfusion  -Monitor for any urine out put       #Thrombocytopenia, Anemia   Hep PF4AB positive but HIT confirmatory negative, no acute source of bleeding identified  s/p 1UPRBC, Hgb 8.2    Plan:   C/w  argatroban  Transfusions per primary team  follow up iron panel  EPO with HD       Hyponatremia - mild, 2/2 anuric renal failure, will monitor shifts on HD

## 2022-09-29 NOTE — PROGRESS NOTE ADULT - SUBJECTIVE AND OBJECTIVE BOX
GABBY LAYTON  82y  Female    Patient is a 82y old  Female who presents with a chief complaint of Acute hypoxic respiratory failure (29 Sep 2022 14:06)      INTERVAL HPI/OVERNIGHT EVENTS: As per night team, aquaphoresis clotted at 5:30 am. Renal fellow aware, will continue after HD today. Patient was seen and examined at bedside. Patient not responsive; ROS not obtained.       T(C): 37.2 (09-29-22 @ 14:08), Max: 37.7 (09-28-22 @ 18:13)  HR: 103 (09-29-22 @ 14:00) (12 - 112)  BP: 135/63 (09-29-22 @ 14:00) (113/56 - 173/93)  RR: 33 (09-29-22 @ 14:00) (28 - 49)  SpO2: 100% (09-29-22 @ 14:00) (90% - 100%)  Wt(kg): --Vital Signs Last 24 Hrs  T(C): 37.2 (29 Sep 2022 14:08), Max: 37.7 (28 Sep 2022 18:13)  T(F): 99 (29 Sep 2022 14:08), Max: 99.9 (28 Sep 2022 18:13)  HR: 103 (29 Sep 2022 14:00) (12 - 112)  BP: 135/63 (29 Sep 2022 14:00) (113/56 - 173/93)  BP(mean): 90 (29 Sep 2022 14:00) (80 - 122)  RR: 33 (29 Sep 2022 14:00) (28 - 49)  SpO2: 100% (29 Sep 2022 14:00) (90% - 100%)    Parameters below as of 29 Sep 2022 14:00  Patient On (Oxygen Delivery Method): ventilator    O2 Concentration (%): 30    PHYSICAL EXAM:  GENERAL: morbidly obese female on ventilator  Neuro: AAOx0; + cough reflex and + symmetric pupillary light reflex; patient unable to be aroused, not able to follow commands  HEENT: tracheostomy with no erythema or swelling; NC/AT; MMM; 3 cm open wound expanding philtrum; neck supple; no scleral icterus; nasal passage w NG tube; no thyroid or LN enlargement; R eye with blepharitis.   Heart: distant heart sounds appreciated; RRR; +s1 and s2; no MRG; non displaced PMI  Lungs: coarse crackles hear anteriorly throughout lung fields  GI: protuberant abdomen; nondistended; normal bowel sounds [id number]   Extremities: patients lower extremities to knee wrapped with ACE bandage and SCDs; UE 2+ edema with 1+ pulses  Skin: cellulitis of LE wrapped with ACE bandages    Consultant(s) Notes Reviewed:  [x ] YES  [ ] NO  Care Discussed with Consultants/Other Providers [ x] YES  [ ] NO    LABS:                        8.3    8.16  )-----------( 155      ( 29 Sep 2022 07:04 )             26.5     09-29    128<L>  |  94<L>  |  57<H>  ----------------------------<  136<H>  4.3   |  22  |  3.93<H>    Ca    8.4      29 Sep 2022 07:04  Phos  6.1     09-29  Mg     2.7     09-29    TPro  7.6  /  Alb  2.0<L>  /  TBili  0.4  /  DBili  x   /  AST  18  /  ALT  9<L>  /  AlkPhos  141<H>  09-28          CAPILLARY BLOOD GLUCOSE      POCT Blood Glucose.: 118 mg/dL (29 Sep 2022 11:01)  POCT Blood Glucose.: 132 mg/dL (29 Sep 2022 06:33)  POCT Blood Glucose.: 115 mg/dL (29 Sep 2022 00:26)  POCT Blood Glucose.: 123 mg/dL (28 Sep 2022 17:10)      ABG - ( 28 Sep 2022 05:28 )  pH, Arterial: 7.45  pH, Blood: x     /  pCO2: 34    /  pO2: 89    / HCO3: 24    / Base Excess: 0.1   /  SaO2: 98.6                  MEDICATIONS  (STANDING):  albuterol/ipratropium for Nebulization 3 milliLiter(s) Nebulizer every 6 hours  apixaban 2.5 milliGRAM(s) Oral every 12 hours  artificial  tears Solution 1 Drop(s) Both EYES two times a day  BACItracin   Ointment 1 Application(s) Topical every 8 hours  chlorhexidine 0.12% Liquid 15 milliLiter(s) Oral Mucosa every 12 hours  chlorhexidine 2% Cloths 1 Application(s) Topical <User Schedule>  dextrose 5%. 1000 milliLiter(s) (100 mL/Hr) IV Continuous <Continuous>  dextrose 5%. 1000 milliLiter(s) (50 mL/Hr) IV Continuous <Continuous>  dextrose 50% Injectable 25 Gram(s) IV Push once  dextrose 50% Injectable 12.5 Gram(s) IV Push once  dextrose 50% Injectable 25 Gram(s) IV Push once  glucagon  Injectable 1 milliGRAM(s) IntraMuscular once  influenza  Vaccine (HIGH DOSE) 0.7 milliLiter(s) IntraMuscular once  insulin lispro (ADMELOG) corrective regimen sliding scale   SubCutaneous every 6 hours  lidocaine   4% Patch 1 Patch Transdermal every 24 hours  nystatin Powder 1 Application(s) Topical three times a day  pantoprazole   Suspension 40 milliGRAM(s) Enteral Tube at bedtime  polyethylene glycol 3350 17 Gram(s) Oral daily  senna 2 Tablet(s) Oral daily  sevelamer carbonate Powder 1600 milliGRAM(s) Enteral Tube every 8 hours  tobramycin 0.3% Ophthalmic Solution 2 Drop(s) Right EYE every 6 hours    MEDICATIONS  (PRN):  acetaminophen    Suspension .. 650 milliGRAM(s) Oral every 6 hours PRN Temp greater or equal to 38C (100.4F), Mild Pain (1 - 3), Moderate Pain (4 - 6)  dextrose Oral Gel 15 Gram(s) Oral once PRN Blood Glucose LESS THAN 70 milliGRAM(s)/deciliter  HYDROmorphone  Injectable 0.5 milliGRAM(s) IV Push every 2 hours PRN Tachypnea, RR greater than 30  sodium chloride 0.9% lock flush 10 milliLiter(s) IV Push every 1 hour PRN Pre/post blood products, medications, blood draw, and to maintain line patency      RADIOLOGY & ADDITIONAL TESTS:    Imaging Personally Reviewed:  [ ] YES  [ ] NO

## 2022-09-29 NOTE — PROGRESS NOTE ADULT - ATTENDING COMMENTS
Morbid obesity, COPD, AF, cellulitis, DM2, HFpEF, ATN s/p cardiac arrest with rib fractures  physical as above  continue CVVH and aquapheresis  continue AC  follow sugars  continue off antibiotics  continue eliquis  decision making of high complexity

## 2022-09-30 NOTE — PROGRESS NOTE ADULT - SUBJECTIVE AND OBJECTIVE BOX
Patient is a 82y Female seen and evaluated at bedside. No acute distress, tolerated HD well and hemodynamically stable throughout procedure, but had issues with BF resistance however achieved adequate clearance, will dialyze today and administer TPA in catheter.       Meds:    acetaminophen    Suspension .. 650 every 6 hours PRN  albuterol/ipratropium for Nebulization 3 every 6 hours  alteplase for catheter clearance 2 once  alteplase for catheter clearance 2 once  apixaban 2.5 every 12 hours  artificial  tears Solution 1 two times a day  chlorhexidine 0.12% Liquid 15 every 12 hours  chlorhexidine 2% Cloths 1 <User Schedule>  dextrose 5%. 1000 <Continuous>  dextrose 5%. 1000 <Continuous>  dextrose 50% Injectable 25 once  dextrose 50% Injectable 12.5 once  dextrose 50% Injectable 25 once  dextrose Oral Gel 15 once PRN  epoetin sherry-epbx (RETACRIT) Injectable 66218 once  glucagon  Injectable 1 once  HYDROmorphone  Injectable 0.5 every 2 hours PRN  influenza  Vaccine (HIGH DOSE) 0.7 once  insulin lispro (ADMELOG) corrective regimen sliding scale  every 6 hours  iron sucrose IVPB 200 every 24 hours  lidocaine   4% Patch 1 every 24 hours  mupirocin 2% Ointment 1 two times a day  nystatin Powder 1 three times a day  pantoprazole   Suspension 40 at bedtime  polyethylene glycol 3350 17 daily  senna 2 daily  sevelamer carbonate Powder 1600 every 8 hours  sodium chloride 0.9% lock flush 10 every 1 hour PRN  tobramycin 0.3% Ophthalmic Solution 2 every 6 hours      T(C): , Max: 37.9 (09-30-22 @ 06:02)  T(F): , Max: 100.2 (09-30-22 @ 06:02)  HR: 109 (09-30-22 @ 12:07)  BP: 132/62 (09-30-22 @ 12:00)  BP(mean): 89 (09-30-22 @ 12:00)  RR: 30 (09-30-22 @ 12:07)  SpO2: 98% (09-30-22 @ 12:07)  Wt(kg): --    09-29 @ 07:01  -  09-30 @ 07:00  --------------------------------------------------------  IN: 1040 mL / OUT: 2350 mL / NET: -1310 mL    09-30 @ 07:01  -  09-30 @ 13:04  --------------------------------------------------------  IN: 80 mL / OUT: 0 mL / NET: 80 mL          Review of Systems:  ROS negative except as per HPI      PHYSICAL EXAM:  Constitutional: trach ; NAD  HEENT: MMM, NC/AT, wound expanding philtrum; neck supple;  Respiratory: Mechanical breath sounds bilaterally, not overbreathing vent  Cardiac: +S1/S2; RRR; no M/R/G  Gastrointestinal: soft, NT/ND; no rebound or guarding; +BS  Extremities: WWP, no clubbing or cyanosis; general low level anasarca, improving  Dermatologic: skin warm, dry and intact; no rashes, wounds, or scars  Access: R [facility] c/d/i       LABS:                        7.6    6.30  )-----------( 136      ( 30 Sep 2022 04:49 )             24.3     09-30    132<L>  |  96  |  40<H>  ----------------------------<  101<H>  4.4   |  22  |  3.13<H>    Ca    8.7      30 Sep 2022 04:49  Phos  4.7     09-30  Mg     2.4     09-30                  RADIOLOGY & ADDITIONAL STUDIES:           Patient is a 82y Female seen and evaluated at bedside. No acute distress, tolerated HD well and hemodynamically stable throughout procedure, but had issues with BF resistance however achieved adequate clearance, will dialyze today and administer TPA in catheter.       Meds:    acetaminophen    Suspension .. 650 every 6 hours PRN  albuterol/ipratropium for Nebulization 3 every 6 hours  alteplase for catheter clearance 2 once  alteplase for catheter clearance 2 once  apixaban 2.5 every 12 hours  artificial  tears Solution 1 two times a day  chlorhexidine 0.12% Liquid 15 every 12 hours  chlorhexidine 2% Cloths 1 <User Schedule>  dextrose 5%. 1000 <Continuous>  dextrose 5%. 1000 <Continuous>  dextrose 50% Injectable 25 once  dextrose 50% Injectable 12.5 once  dextrose 50% Injectable 25 once  dextrose Oral Gel 15 once PRN  epoetin sherry-epbx (RETACRIT) Injectable 76371 once  glucagon  Injectable 1 once  HYDROmorphone  Injectable 0.5 every 2 hours PRN  influenza  Vaccine (HIGH DOSE) 0.7 once  insulin lispro (ADMELOG) corrective regimen sliding scale  every 6 hours  iron sucrose IVPB 200 every 24 hours  lidocaine   4% Patch 1 every 24 hours  mupirocin 2% Ointment 1 two times a day  nystatin Powder 1 three times a day  pantoprazole   Suspension 40 at bedtime  polyethylene glycol 3350 17 daily  senna 2 daily  sevelamer carbonate Powder 1600 every 8 hours  sodium chloride 0.9% lock flush 10 every 1 hour PRN  tobramycin 0.3% Ophthalmic Solution 2 every 6 hours      T(C): , Max: 37.9 (22 @ 06:02)  T(F): , Max: 100.2 (22 @ 06:02)  HR: 109 (22 @ 12:07)  BP: 132/62 (22 @ 12:00)  BP(mean): 89 (22 @ 12:00)  RR: 30 (22 @ 12:07)  SpO2: 98% (22 @ 12:07)  Wt(kg): --     @ 07:01  -   @ 07:00  --------------------------------------------------------  IN: 1040 mL / OUT: 2350 mL / NET: -1310 mL     @ 07:01  -   @ 13:04  --------------------------------------------------------  IN: 80 mL / OUT: 0 mL / NET: 80 mL          Review of Systems:  ROS negative except as per HPI      PHYSICAL EXAM:  Constitutional: trach ; NAD  HEENT: MMM, NC/AT, wound expanding philtrum; neck supple;  Respiratory: Mechanical breath sounds bilaterally, not overbreathing vent  Cardiac: +S1/S2; RRR; no M/R/G  Gastrointestinal: soft, NT/ND; no rebound or guarding; +BS  Extremities: WWP, no clubbing or cyanosis; general low level anasarca, improving  Dermatologic: skin warm, dry and intact; no rashes, wounds, or scars  Access: R [facility] c/d/i       LABS:                        7.6    6.30  )-----------( 136      ( 30 Sep 2022 04:49 )             24.3         132<L>  |  96  |  40<H>  ----------------------------<  101<H>  4.4   |  22  |  3.13<H>    Ca    8.7      30 Sep 2022 04:49  Phos  4.7       Mg     2.4                       RADIOLOGY & ADDITIONAL STUDIES:        Hemoglobin: 7.6 g/dL (22 @ 04:49)  Phosphorus Level, Serum: 4.7 mg/dL (22 @ 04:49)  Hemoglobin: 8.3 g/dL (22 @ 07:04)  Phosphorus Level, Serum: 6.1 mg/dL (22 @ 07:04)    Albumin, Serum: 2.0 g/dL (22 @ 05:38)  Hepatitis B Surface Antigen: Nonreact (22 @ 10:03)    epoetin sherry-epbx (RETACRIT) Injectable 68607 Unit(s) IV Push once, 22 @ 09:17, Routine, Stop order after: 1 Doses  sevelamer carbonate 800 milliGRAM(s) Oral every 8 hours, 22 @ 11:31, STAT  sevelamer carbonate Powder 1600 milliGRAM(s) Enteral Tube every 8 hours, 22 @ 11:33, Routine      Hemodialysis Treatment.:     Schedule: Once, Modality: Hemodialysis, Access: Internal Jugular Central Venous Catheter    Dialyzer: Optiflux [id number], Time: 180 Min    Blood Flow: 400 mL/Min , Dialysate Flow: 500 mL/Min, Dialysate Temp: 36.5, Tubinmm (Adult)    Target Fluid Removal: 2 Liters    Dialysate Electrolytes (mEq/L): Potassium 2, Calcium 2.5, Sodium 138, Bicarbonate 35 (22 @ 15:24) [Completed]

## 2022-09-30 NOTE — PROGRESS NOTE ADULT - ASSESSMENT
Patient is a 80 yo F with CHF, CKD (baseline around 2.5-2.9) HTN who presented with a one day history of respiratory distress and LE cellulitis hospital course c/b cardiac arrest on 9/7, acute renal failure requiring CVVHD with now transition to iHD    #Anuric BRANT on CKD   iATN in setting of cardiac arrest and shock.   On CVVHD 9/12-9/22  transition to iHD on 9/27  remains dialysis dependent- not currently showing signs of renal recovery, anuric     Plan:  -HD today with UF and Clearance along with TPA to help clear catheter for potential clot   -Daily BMP, mag and phos   -Maintain MAP >70 for adequate renal perfusion  -Monitor for any urine out put     Anemia:   Hgb 7.6    Plan:   On Apixaban   Transfusions per primary team  follow up iron panel  EPO with HD       Hyponatremia - mild, 2/2 anuric renal failure, will monitor shifts on HD  Followed protocol

## 2022-09-30 NOTE — PROGRESS NOTE ADULT - ATTENDING COMMENTS
COPD, DM@, morbd obesity, CHF, PVD s/p cardiac arrest, rib fractures, ATN, multiple pneumonias, AF  physical as above  to reexplore ribcage plating with thoracic  continue negative fluid balance  follow low grade fevers  continue AC  tolerating feeds  decision making of high complexity

## 2022-09-30 NOTE — PROGRESS NOTE ADULT - ASSESSMENT
82yo morbidly obese female w/ pmhx of HTN, HLD, HFpEF (EF 56%), COPD (on home O2-3L), chronic LE edema and cellulitis, DALIA, PVD p/w respiratory distress 2/2 severe sepsis due to cellulitis on lower extremity which progressed to septic shock in setting of pneumonia. Hospital course complicated by cardiac arrest, flail chest, and oliguric BRANT progressing to kidney failure.     NEURO  #AMS 2/2 to sedation  Reversal of sedative yesterday w flumazenil successful. 9/26 CT head showed +grey white differentiation w no edema.   - continue to assess mentation   - given morbid obesity, possible prolonged sedation     PULM  #Acute on chronic resp failure  Pt w/ COPD on home 3 L O2, currently worsening hypoxia 2/2 likely in setting of aspiration while eating vs flash pulmonary edema 2/2 chronic htn and chronic HF (relatively the same EF as previous TTE). Tracheostomy in place. Patient unable to tolerate trach collar.   - Patient w increased respirations on CPAP, continue ventilator and wean as tolerated    #Broken ribs w/ flail chest  Pt with several broken ribs and signs of flail chest with difficulty breathing when sedation weaned off. Thoracic surgery consulted for possible sternal plating. Pt remains unstable and not surgery candidate at this time.   - continue to monitor respirations    #Chronic obstructive pulmonary disease (COPD)  Pt with COPD on home 3L.   - continue to monitor oxygen saturation     CARDIOVASCULAR  #Cardiac Arrest  Patient intubated and sedated 2/2 to cardiac arrest. 9/23 patient weaned from sedation w trach. Patient not requiring midodrine  - continue to monitor vitals    #Broken ribs w/ flail chest  Pt with several broken ribs and signs of flail chest with difficulty breathing when sedation weaned off.   -Thoracic surgery consulted for possible sternal plating.    #Septic shock   2/2 pneumonia vs cardiogenic  - Midodrine stopped 9/29    #Chronic heart failure with preserved ejection fraction (HFpEF).   Pt on home bumex 2mg.  TTE from current visit showing EF of 56% with Pulmonary HTN - PAP 48. Experienced pulmonary edema and effusion postcardiac arrest.  Plan:  - Continue to monitor hemodynamics    #Afib  Experienced a-fib on 09/13 w RVR. self resolved w/o intervention. 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 Serotonin negative. Started on Apixaban 2.5 mg BID  - continue Eliquis 2.5 mg BID iso age and Cr    #Hypertension  Pt with history of hypertension on home diltiazem 180mg daily. Currently on pressors s/p septic shock.   - holding home antihypertensive meds     GI  No active issues    RENAL  #Anuric BRANT on CKD  Baseline Cr 2.04 in July 2022, currently uptrending . Likely iATN 2/2 to hypoperfusion/ischemia in setting of sepsis and cardiac arrest. HD cath placed on 09/12 for CVVHD in setting of worsening electrolytes and increasing fluid status, 9/26 now with TDC. Acute increase in BUN most likely 2/2 to aquaporesis.  -dialysis yesterday w removal of 3 L; today w 2 L   - aquaforesis w HD to help volume status  - maintain MAP >70 for adequate renal perfusion  - strict I/Os  - renal following, appreciate recs  - hyperphosphatemia: continue sevelamer q 8 hrs    #Right kidney mass.   CTAP s/f slightly hyperdense solid cortical mass in upper pole of R kidney.  Plan:  - consider additional renal imaging when stable    ENDO  Continue corrective sliding scale    HEME  #Anemia  Pt noted to have chronic anemia, presented with HgB of 6.8 w/ drop to 6.7 during course of stay. s/p 4 units RBC on 09/07, 9/13, 9/19, and 9/22  - monitor CBC   - maintain active type and screen (last 9/27)    ID  #Cellulitis  Patient initially presented to ED w b/l LE cellulitis and was treated with antibiotics. Acutely worsening on L side.   - As per vascular, wrap kerlix gauze and ACE bandages around both legs. Wrap toes to just below the knee.   - Change bandages every few days  - continue to monitor     #Pneumonia  RESOLVED  possibly STANISLAV. Pt w/ bilateral pleural effusions and possible underlying infiltrate, unclear on x-ray. Blood cultures remain negative, sputum cultures neg w/ gram stain showing rare epithelial cells, few WBC's, gram pos rods and cocci in pairs. Patient finished cefepime and vanc on 9/20    #stage 2 pressure ulcer  Patient with stage 2 sacral pressure ulcer.   - Wound care consulted    #Philtrum Wound  Patient with 3 cm deep cut on upper lip 2/2 to intubation  - plastics consult    #Blepharitis  Patient with R eye erythema and crusting.   - continue Tobramycin 2 drops q6 hrs  Skin  Lines: HD cath, endurance cath     GOC  Patient is DNR/DNI    E: K>4, Phos>3, Mg>2  N: Tube feeds   DVT ppx: Argatroban   GI ppx: Protonix 40 BID  code status: DNR       82yo morbidly obese female w/ pmhx of HTN, HLD, HFpEF (EF 56%), COPD (on home O2-3L), chronic LE edema and cellulitis, DALIA, PVD p/w respiratory distress 2/2 severe sepsis due to cellulitis on lower extremity which progressed to septic shock in setting of pneumonia. Hospital course complicated by cardiac arrest, flail chest, and oliguric BRANT progressing to kidney failure.     NEURO  #AMS 2/2 to sedation vs Toxic Metabolic Encephalopathy  Reversal of sedative w flumazenil transient, unable to assess mentation. Patient is s/p cardiac arrest with successful ROSC. 9/26 CT head showed +grey white differentiation w no edema.   - continue to assess mentation     PULM  #Acute on chronic resp failure  Pt w/ COPD on home 3 L O2, currently worsening hypoxia 2/2 likely in setting of aspiration while eating vs flash pulmonary edema 2/2 chronic htn and chronic HF (relatively the same EF as previous TTE). Tracheostomy in place. Patient unable to tolerate trach collar.   - Patient w increased respirations on CPAP, continue ventilator and wean as tolerated    #Broken ribs w/ flail chest  Pt with several broken ribs and signs of flail chest with difficulty breathing when sedation weaned off.    - continue to monitor respirations  - consult thoracic surgery for sternal plating    #Chronic obstructive pulmonary disease (COPD)  Pt with COPD on home 3L.   - continue to monitor oxygen saturation     CARDIOVASCULAR    #Cardiac Arrest  Patient intubated and sedated 2/2 to cardiac arrest. 9/23 patient weaned from sedation w trach. Patient not requiring midodrine  - continue to monitor vitals    #Chronic heart failure with preserved ejection fraction (HFpEF).   Pt on home bumex 2mg.  TTE from current visit showing EF of 56% with Pulmonary HTN - PAP 48. Experienced pulmonary edema and effusion postcardiac arrest.  Plan:  - Continue to monitor hemodynamics    #Afib  Experienced a-fib on 09/13 w RVR. self resolved w/o intervention. 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 Serotonin negative. Started on Apixaban 2.5 mg BID  - continue Eliquis 2.5 mg BID iso age and Cr    #Hypertension  Pt with history of hypertension on home diltiazem 180mg daily. Patient currently normotensive  - holding home antihypertensive meds     GI  No active issues    RENAL  #Anuric BRANT on CKD  Baseline Cr 2.04 in July 2022, currently uptrending . Likely iATN 2/2 to hypoperfusion/ischemia in setting of sepsis and cardiac arrest. HD cath placed on 09/12 for CVVHD in setting of worsening electrolytes and increasing fluid status, 9/26 now with TDC.  -dialysis yesterday w removal of 2 L; today w 2 L   - aquaforesis held today  - maintain MAP >70 for adequate renal perfusion  - strict I/Os, no nephrotoxic meds, renally dose meds  - renal following, appreciate recs  - hyperphosphatemia: continue sevelamer q 8 hrs    #Right kidney mass.   CTAP s/f slightly hyperdense solid cortical mass in upper pole of R kidney.  Plan:  - consider additional renal imaging when stable    ENDO  Continue corrective sliding scale    HEME  #Anemia  Pt noted to have chronic anemia, presented with HgB of 6.8 w/ drop to 6.7 during course of stay. s/p 4 units RBC on 09/07, 9/13, 9/19, and 9/22  - monitor CBC   - maintain active type and screen (last 9/27)    ID  #Cellulitis  Patient initially presented to ED w b/l LE cellulitis and was treated with antibiotics.   - As per vascular, wrap kerlix gauze and ACE bandages around both legs. Wrap toes to just below the knee.   - Change bandages every few days  - continue to monitor     #Pneumonia  RESOLVED  possibly STANISLAV. Pt w/ bilateral pleural effusions and possible underlying infiltrate, unclear on x-ray. Blood cultures remain negative, sputum cultures neg w/ gram stain showing rare epithelial cells, few WBC's, gram pos rods and cocci in pairs. Patient finished cefepime and vanc on 9/20    #stage 2 pressure ulcer  Patient with stage 2 sacral pressure ulcer.   - Wound care consulted    #Philtrum Wound  Patient with 1 cm deep cut on upper lip 2/2 to intubation    #Blepharitis  Patient with R eye erythema and crusting.   - continue Tobramycin 2 drops q6 hrs  Skin  Lines: HD cath, endurance cath     GOC  Patient is DNR/DNI    E: K>4, Phos>3, Mg>2  N: Tube feeds   DVT ppx: Argatroban   GI ppx: Protonix 40 BID  code status: DNR       82yo morbidly obese female w/ pmhx of HTN, HLD, HFpEF (EF 56%), COPD (on home O2-3L), chronic LE edema and cellulitis, DALIA, PVD p/w respiratory distress 2/2 severe sepsis due to cellulitis on lower extremity which progressed to septic shock in setting of pneumonia. Hospital course complicated by cardiac arrest, flail chest, and oliguric BRANT progressing to kidney failure.     NEURO  #AMS 2/2 to sedation vs Toxic Metabolic Encephalopathy  Reversal of sedative w flumazenil transient, unable to assess mentation. Patient is s/p cardiac arrest with successful ROSC. 9/26 CT head showed +grey white differentiation w no edema.   - continue to assess mentation     PULM  #Acute on chronic resp failure  Pt w/ COPD on home 3 L O2, currently worsening hypoxia 2/2 likely in setting of aspiration while eating vs flash pulmonary edema 2/2 chronic htn and chronic HF (relatively the same EF as previous TTE). Tracheostomy in place. Patient unable to tolerate trach collar.   - Patient w increased respirations on CPAP, continue ventilator and wean as tolerated    #Broken ribs w/ flail chest  Pt with several broken ribs and signs of flail chest with difficulty breathing when sedation weaned off.    - continue to monitor respirations  - consult thoracic surgery for sternal plating    #Chronic obstructive pulmonary disease (COPD)  Pt with COPD on home 3L.   - continue to monitor oxygen saturation     CARDIOVASCULAR    #Cardiac Arrest  Patient intubated and sedated 2/2 to cardiac arrest. 9/23 patient weaned from sedation w trach. Patient not requiring midodrine  - continue to monitor vitals    #Chronic heart failure with preserved ejection fraction (HFpEF).   Pt on home bumex 2mg.  TTE from current visit showing EF of 56% with Pulmonary HTN - PAP 48. Experienced pulmonary edema and effusion postcardiac arrest.  Plan:  - Continue to monitor hemodynamics    #Afib  Experienced a-fib on 09/13 w RVR. self resolved w/o intervention. 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 Serotonin negative. Started on Apixaban 2.5 mg BID  - continue Eliquis 2.5 mg BID iso age and Cr    #Hypertension  Pt with history of hypertension on home diltiazem 180mg daily. Patient currently normotensive  - holding home antihypertensive meds     GI  No active issues    RENAL  #Anuric BRANT on CKD  Baseline Cr 2.04 in July 2022, currently uptrending . Likely iATN 2/2 to hypoperfusion/ischemia in setting of sepsis and cardiac arrest. HD cath placed on 09/12 for CVVHD in setting of worsening electrolytes and increasing fluid status, 9/26 now with TDC.  -dialysis yesterday w removal of 2 L; today w 2 L   - aquaforesis held today  - maintain MAP >70 for adequate renal perfusion  - strict I/Os, no nephrotoxic meds, renally dose meds  - renal following, appreciate recs  - hyperphosphatemia: continue sevelamer q 8 hrs    #Right kidney mass.   CTAP s/f slightly hyperdense solid cortical mass in upper pole of R kidney.  Plan:  - consider additional renal imaging when stable    ENDO  Continue corrective sliding scale    HEME  #Anemia  Pt noted to have chronic anemia, presented with HgB of 6.8 w/ drop to 6.7 during course of stay. s/p 4 units RBC on 09/07, 9/13, 9/19, and 9/22  - monitor CBC   - maintain active type and screen (last 9/30)    ID  #Cellulitis  Patient initially presented to ED w b/l LE cellulitis and was treated with antibiotics.   - As per vascular, wrap kerlix gauze and ACE bandages around both legs. Wrap toes to just below the knee.   - Change bandages every few days  - continue to monitor     #Pneumonia  RESOLVED  possibly STANISLAV. Pt w/ bilateral pleural effusions and possible underlying infiltrate, unclear on x-ray. Blood cultures remain negative, sputum cultures neg w/ gram stain showing rare epithelial cells, few WBC's, gram pos rods and cocci in pairs. Patient finished cefepime and vanc on 9/20    #stage 2 pressure ulcer  Patient with stage 2 sacral pressure ulcer.   - Wound care consulted    #Philtrum Wound  Patient with 1 cm deep cut on upper lip 2/2 to intubation    #Blepharitis  Patient with R eye erythema and crusting.   - continue Tobramycin 2 drops q6 hrs  Skin  Lines: HD cath, endurance cath     GOC  Patient is DNR/DNI    E: K>4, Phos>3, Mg>2  N: Tube feeds   DVT ppx: Argatroban   GI ppx: Protonix 40 BID  code status: DNR

## 2022-09-30 NOTE — PROGRESS NOTE ADULT - ATTENDING COMMENTS
I agree with the fellow's findings and plans as written above with the following additions/amendments:    Seen and examined at bedside on HD, tolerating well, higher AP with higher BFR, will dwell tPA after HD. Otherwise no issues, continue HD as above

## 2022-09-30 NOTE — PROGRESS NOTE ADULT - SUBJECTIVE AND OBJECTIVE BOX
Patient seen on HD tolerating procedure well. Patient does not offer any complaints and is hemodynamically stable.  Continue HD as prescribed.   Hemodialysis Treatment.:     Schedule: Once, Modality: Hemodialysis, Access: Internal Jugular Central Venous Catheter    Dialyzer: Optiflux [id number], Time: 180 Min    Blood Flow: 400 mL/Min , Dialysate Flow: 500 mL/Min, Dialysate Temp: 36.5, Tubinmm (Adult)    Target Fluid Removal: 2 Liters    Dialysate Electrolytes (mEq/L): Potassium 2, Calcium 2.5, Sodium 138, Bicarbonate 35       Vital Signs:    T(F): 100.1  HR: 109  BP: 132/62  RR: 30  SpO2: 98%  Mode: AC/ CMV (Assist Control/ Continuous Mandatory Ventilation), RR (machine): 26, TV (machine): 450, FiO2: 30, PEEP: 5,            PHYSICAL EXAM:  Constitutional: trach ; NAD, tolerating HD   HEENT: MMM, NC/AT, wound expanding philtrum; neck supple;  Respiratory: Mechanical breath sounds bilaterally, not overbreathing vent  Cardiac: +S1/S2; RRR; no M/R/G  Gastrointestinal: soft, NT/ND; no rebound or guarding; +BS  Extremities: WWP, no clubbing or cyanosis; general low level anasarca, improving  Dermatologic: skin warm, dry and intact; no rashes, wounds, or scars  Access: R TDC c/d/i

## 2022-09-30 NOTE — PROGRESS NOTE ADULT - SUBJECTIVE AND OBJECTIVE BOX
GABBY LAYTON  82y  Female    Patient is a 82y old  Female who presents with a chief complaint of Acute hypoxic respiratory failure (30 Sep 2022 13:04)      INTERVAL HPI/OVERNIGHT EVENTS: As per night team, no overnight events.     ROS: fever/chills, fatigue, nausea, vomiting, headache, stuffiness, sore throat, chest pain, palpitations, edema, SOB, cough, wheezing, changes in appetite, changes in bowel movements, contipation, diarrhea, abdominal pain, dizziness, fainting/LOC      T(C): 37.8 (09-30-22 @ 09:11), Max: 37.9 (09-30-22 @ 06:02)  HR: 109 (09-30-22 @ 12:07) (98 - 116)  BP: 132/62 (09-30-22 @ 12:00) (103/56 - 146/64)  RR: 30 (09-30-22 @ 12:07) (29 - 60)  SpO2: 98% (09-30-22 @ 12:07) (94% - 100%)  Wt(kg): --Vital Signs Last 24 Hrs  T(C): 37.8 (30 Sep 2022 09:11), Max: 37.9 (30 Sep 2022 06:02)  T(F): 100 (30 Sep 2022 09:11), Max: 100.2 (30 Sep 2022 06:02)  HR: 109 (30 Sep 2022 12:07) (98 - 116)  BP: 132/62 (30 Sep 2022 12:00) (103/56 - 146/64)  BP(mean): 89 (30 Sep 2022 12:00) (74 - 94)  RR: 30 (30 Sep 2022 12:07) (29 - 60)  SpO2: 98% (30 Sep 2022 12:07) (94% - 100%)    Parameters below as of 30 Sep 2022 12:07  Patient On (Oxygen Delivery Method): ventilator    O2 Concentration (%): 30    PHYSICAL EXAM:  GENERAL: NAD; well-groomed; well-developed; AAO x 3; good concentration   Neuro: CN2-12 grossly intact; speech clear; +2/4 DTR in UE and LE b/l; light touch sensation intact of UE and le b/l;  negative Romberg test; no pronator drift; intact tandem gait; intact heel and toe walking; intact finger to nose testing; intact rapid alternating movements  HEENT: NC/AT; MMM; neck supple; good dentition; no nystagmus; no scleral icterus; nasal passages clear; no throid or LN enlargement  Heart: RRR; +s1 and s2; no MRG (or grade 2 _ murmur appreciated at _ ICS); non displaced PMI; no JVD  Lungs: CTA b/l; normal effort; no accesory muscle use; symmetric inhalation and exhalation; vesicular breath sounds; no WWR; normal tactile fremitus; persussion resonant  GI: nondistended; nontender; normal bowel sounds [id number]; percussion typmanic; no organomegaly   Extremities: +2 pulses in UE and LE b/l; no clubbing, cyanosis, or edema b/l, capillary refill <2 sec b/l  Skin: skin dry and warm; no skin tenting; no rashes or lesions  MSK: normal tone; +5/5 strength in upper and lower extremities b/l    Consultant(s) Notes Reviewed:  [x ] YES  [ ] NO  Care Discussed with Consultants/Other Providers [ x] YES  [ ] NO    LABS:                        7.6    6.30  )-----------( 136      ( 30 Sep 2022 04:49 )             24.3     09-30    132<L>  |  96  |  40<H>  ----------------------------<  101<H>  4.4   |  22  |  3.13<H>    Ca    8.7      30 Sep 2022 04:49  Phos  4.7     09-30  Mg     2.4     09-30            CAPILLARY BLOOD GLUCOSE      POCT Blood Glucose.: 120 mg/dL (30 Sep 2022 12:00)  POCT Blood Glucose.: 116 mg/dL (30 Sep 2022 05:48)  POCT Blood Glucose.: 108 mg/dL (29 Sep 2022 23:48)  POCT Blood Glucose.: 97 mg/dL (29 Sep 2022 18:04)            MEDICATIONS  (STANDING):  albuterol/ipratropium for Nebulization 3 milliLiter(s) Nebulizer every 6 hours  alteplase for catheter clearance 2 milliGRAM(s) Catheter once  alteplase for catheter clearance 2 milliGRAM(s) Catheter once  apixaban 2.5 milliGRAM(s) Oral every 12 hours  artificial  tears Solution 1 Drop(s) Both EYES two times a day  chlorhexidine 0.12% Liquid 15 milliLiter(s) Oral Mucosa every 12 hours  chlorhexidine 2% Cloths 1 Application(s) Topical <User Schedule>  dextrose 5%. 1000 milliLiter(s) (50 mL/Hr) IV Continuous <Continuous>  dextrose 5%. 1000 milliLiter(s) (100 mL/Hr) IV Continuous <Continuous>  dextrose 50% Injectable 25 Gram(s) IV Push once  dextrose 50% Injectable 12.5 Gram(s) IV Push once  dextrose 50% Injectable 25 Gram(s) IV Push once  epoetin sherry-epbx (RETACRIT) Injectable 94444 Unit(s) IV Push once  glucagon  Injectable 1 milliGRAM(s) IntraMuscular once  influenza  Vaccine (HIGH DOSE) 0.7 milliLiter(s) IntraMuscular once  insulin lispro (ADMELOG) corrective regimen sliding scale   SubCutaneous every 6 hours  iron sucrose IVPB 200 milliGRAM(s) IV Intermittent every 24 hours  lidocaine   4% Patch 1 Patch Transdermal every 24 hours  mupirocin 2% Ointment 1 Application(s) Topical two times a day  nystatin Powder 1 Application(s) Topical three times a day  pantoprazole   Suspension 40 milliGRAM(s) Enteral Tube at bedtime  polyethylene glycol 3350 17 Gram(s) Oral daily  senna 2 Tablet(s) Oral daily  sevelamer carbonate Powder 1600 milliGRAM(s) Enteral Tube every 8 hours  tobramycin 0.3% Ophthalmic Solution 2 Drop(s) Right EYE every 6 hours    MEDICATIONS  (PRN):  acetaminophen    Suspension .. 650 milliGRAM(s) Oral every 6 hours PRN Temp greater or equal to 38C (100.4F), Mild Pain (1 - 3), Moderate Pain (4 - 6)  dextrose Oral Gel 15 Gram(s) Oral once PRN Blood Glucose LESS THAN 70 milliGRAM(s)/deciliter  HYDROmorphone  Injectable 0.5 milliGRAM(s) IV Push every 2 hours PRN Tachypnea, RR greater than 30  sodium chloride 0.9% lock flush 10 milliLiter(s) IV Push every 1 hour PRN Pre/post blood products, medications, blood draw, and to maintain line patency      RADIOLOGY & ADDITIONAL TESTS:    Imaging Personally Reviewed:  [ ] YES  [ ] NO   GABBY LAYTON  82y  Female    Patient is a 82y old  Female who presents with a chief complaint of Acute hypoxic respiratory failure (30 Sep 2022 13:04)      INTERVAL HPI/OVERNIGHT EVENTS: As per night team, no overnight events. Patient was resting comfortably in bed. Patient opened eyes to stimulation and winces to pain. ROS unable to be obtained.         T(C): 37.8 (09-30-22 @ 09:11), Max: 37.9 (09-30-22 @ 06:02)  HR: 109 (09-30-22 @ 12:07) (98 - 116)  BP: 132/62 (09-30-22 @ 12:00) (103/56 - 146/64)  RR: 30 (09-30-22 @ 12:07) (29 - 60)  SpO2: 98% (09-30-22 @ 12:07) (94% - 100%)  Wt(kg): --Vital Signs Last 24 Hrs  T(C): 37.8 (30 Sep 2022 09:11), Max: 37.9 (30 Sep 2022 06:02)  T(F): 100 (30 Sep 2022 09:11), Max: 100.2 (30 Sep 2022 06:02)  HR: 109 (30 Sep 2022 12:07) (98 - 116)  BP: 132/62 (30 Sep 2022 12:00) (103/56 - 146/64)  BP(mean): 89 (30 Sep 2022 12:00) (74 - 94)  RR: 30 (30 Sep 2022 12:07) (29 - 60)  SpO2: 98% (30 Sep 2022 12:07) (94% - 100%)    Parameters below as of 30 Sep 2022 12:07  Patient On (Oxygen Delivery Method): ventilator    O2 Concentration (%): 30    PHYSICAL EXAM:  GENERAL: morbidly obese female on ventilator  Neuro: AAOx0; + cough reflex and + symmetric pupillary light reflex; patient unable to be aroused, not able to follow commands  HEENT: tracheostomy with no erythema or swelling; NC/AT; MMM; 1 cm open wound expanding philtrum; neck supple; no scleral icterus; nasal passage w NG tube; no thyroid or LN enlargement; R eye with blepharitis.   Heart: distant heart sounds appreciated; RRR; +s1 and s2; no MRG; non displaced PMI  Lungs: coarse crackles hear anteriorly throughout lung fields; asymmetrical inhalation on L side  GI: protuberant abdomen; nondistended; normal bowel sounds [id number]   Extremities: patients lower extremities to knee wrapped with ACE bandage and SCDs; UE 2+ edema with 1+ pulses  Skin: cellulitis of LE wrapped with ACE bandages    Consultant(s) Notes Reviewed:  [x ] YES  [ ] NO  Care Discussed with Consultants/Other Providers [ x] YES  [ ] NO    LABS:                        7.6    6.30  )-----------( 136      ( 30 Sep 2022 04:49 )             24.3     09-30    132<L>  |  96  |  40<H>  ----------------------------<  101<H>  4.4   |  22  |  3.13<H>    Ca    8.7      30 Sep 2022 04:49  Phos  4.7     09-30  Mg     2.4     09-30            CAPILLARY BLOOD GLUCOSE      POCT Blood Glucose.: 120 mg/dL (30 Sep 2022 12:00)  POCT Blood Glucose.: 116 mg/dL (30 Sep 2022 05:48)  POCT Blood Glucose.: 108 mg/dL (29 Sep 2022 23:48)  POCT Blood Glucose.: 97 mg/dL (29 Sep 2022 18:04)            MEDICATIONS  (STANDING):  albuterol/ipratropium for Nebulization 3 milliLiter(s) Nebulizer every 6 hours  alteplase for catheter clearance 2 milliGRAM(s) Catheter once  alteplase for catheter clearance 2 milliGRAM(s) Catheter once  apixaban 2.5 milliGRAM(s) Oral every 12 hours  artificial  tears Solution 1 Drop(s) Both EYES two times a day  chlorhexidine 0.12% Liquid 15 milliLiter(s) Oral Mucosa every 12 hours  chlorhexidine 2% Cloths 1 Application(s) Topical <User Schedule>  dextrose 5%. 1000 milliLiter(s) (50 mL/Hr) IV Continuous <Continuous>  dextrose 5%. 1000 milliLiter(s) (100 mL/Hr) IV Continuous <Continuous>  dextrose 50% Injectable 25 Gram(s) IV Push once  dextrose 50% Injectable 12.5 Gram(s) IV Push once  dextrose 50% Injectable 25 Gram(s) IV Push once  epoetin sherry-epbx (RETACRIT) Injectable 85418 Unit(s) IV Push once  glucagon  Injectable 1 milliGRAM(s) IntraMuscular once  influenza  Vaccine (HIGH DOSE) 0.7 milliLiter(s) IntraMuscular once  insulin lispro (ADMELOG) corrective regimen sliding scale   SubCutaneous every 6 hours  iron sucrose IVPB 200 milliGRAM(s) IV Intermittent every 24 hours  lidocaine   4% Patch 1 Patch Transdermal every 24 hours  mupirocin 2% Ointment 1 Application(s) Topical two times a day  nystatin Powder 1 Application(s) Topical three times a day  pantoprazole   Suspension 40 milliGRAM(s) Enteral Tube at bedtime  polyethylene glycol 3350 17 Gram(s) Oral daily  senna 2 Tablet(s) Oral daily  sevelamer carbonate Powder 1600 milliGRAM(s) Enteral Tube every 8 hours  tobramycin 0.3% Ophthalmic Solution 2 Drop(s) Right EYE every 6 hours    MEDICATIONS  (PRN):  acetaminophen    Suspension .. 650 milliGRAM(s) Oral every 6 hours PRN Temp greater or equal to 38C (100.4F), Mild Pain (1 - 3), Moderate Pain (4 - 6)  dextrose Oral Gel 15 Gram(s) Oral once PRN Blood Glucose LESS THAN 70 milliGRAM(s)/deciliter  HYDROmorphone  Injectable 0.5 milliGRAM(s) IV Push every 2 hours PRN Tachypnea, RR greater than 30  sodium chloride 0.9% lock flush 10 milliLiter(s) IV Push every 1 hour PRN Pre/post blood products, medications, blood draw, and to maintain line patency      RADIOLOGY & ADDITIONAL TESTS:    Imaging Personally Reviewed:  [ ] YES  [ ] NO     GABBY LAYTON  82y  Female    Patient is a 82y old  Female who presents with a chief complaint of Acute hypoxic respiratory failure (30 Sep 2022 13:04)      INTERVAL HPI/OVERNIGHT EVENTS: As per night team, no overnight events. Patient was resting comfortably in bed. Patient opened eyes to stimulation and winces to pain. ROS unable to be obtained.         T(C): 37.8 (09-30-22 @ 09:11), Max: 37.9 (09-30-22 @ 06:02)  HR: 109 (09-30-22 @ 12:07) (98 - 116)  BP: 132/62 (09-30-22 @ 12:00) (103/56 - 146/64)  RR: 30 (09-30-22 @ 12:07) (29 - 60)  SpO2: 98% (09-30-22 @ 12:07) (94% - 100%)  Wt(kg): --Vital Signs Last 24 Hrs  T(C): 37.8 (30 Sep 2022 09:11), Max: 37.9 (30 Sep 2022 06:02)  T(F): 100 (30 Sep 2022 09:11), Max: 100.2 (30 Sep 2022 06:02)  HR: 109 (30 Sep 2022 12:07) (98 - 116)  BP: 132/62 (30 Sep 2022 12:00) (103/56 - 146/64)  BP(mean): 89 (30 Sep 2022 12:00) (74 - 94)  RR: 30 (30 Sep 2022 12:07) (29 - 60)  SpO2: 98% (30 Sep 2022 12:07) (94% - 100%)    Parameters below as of 30 Sep 2022 12:07  Patient On (Oxygen Delivery Method): ventilator    O2 Concentration (%): 30    PHYSICAL EXAM:  GENERAL: morbidly obese female on ventilator  Neuro: AAOx0; + cough reflex and + symmetric pupillary light reflex; patient unable to be aroused, not able to follow commands  HEENT: tracheostomy with no erythema or swelling; NC/AT; MMM; 1 cm open wound expanding philtrum; neck supple; no scleral icterus; nasal passage w NG tube; no thyroid or LN enlargement; R eye with blepharitis.   Heart: distant heart sounds appreciated; RRR; +s1 and s2; no MRG; non displaced PMI  Lungs: coarse crackles hear anteriorly throughout lung fields; asymmetrical inhalation  GI: protuberant abdomen; nondistended; normal bowel sounds [id number]   Extremities: patients lower extremities to knee wrapped with ACE bandage and SCDs, +1 pitting edema;; UE 2+ edema with 1+ pulses  Skin: cellulitis of LE wrapped with ACE bandages    Consultant(s) Notes Reviewed:  [x ] YES  [ ] NO  Care Discussed with Consultants/Other Providers [ x] YES  [ ] NO    LABS:                        7.6    6.30  )-----------( 136      ( 30 Sep 2022 04:49 )             24.3     09-30    132<L>  |  96  |  40<H>  ----------------------------<  101<H>  4.4   |  22  |  3.13<H>    Ca    8.7      30 Sep 2022 04:49  Phos  4.7     09-30  Mg     2.4     09-30            CAPILLARY BLOOD GLUCOSE      POCT Blood Glucose.: 120 mg/dL (30 Sep 2022 12:00)  POCT Blood Glucose.: 116 mg/dL (30 Sep 2022 05:48)  POCT Blood Glucose.: 108 mg/dL (29 Sep 2022 23:48)  POCT Blood Glucose.: 97 mg/dL (29 Sep 2022 18:04)            MEDICATIONS  (STANDING):  albuterol/ipratropium for Nebulization 3 milliLiter(s) Nebulizer every 6 hours  alteplase for catheter clearance 2 milliGRAM(s) Catheter once  alteplase for catheter clearance 2 milliGRAM(s) Catheter once  apixaban 2.5 milliGRAM(s) Oral every 12 hours  artificial  tears Solution 1 Drop(s) Both EYES two times a day  chlorhexidine 0.12% Liquid 15 milliLiter(s) Oral Mucosa every 12 hours  chlorhexidine 2% Cloths 1 Application(s) Topical <User Schedule>  dextrose 5%. 1000 milliLiter(s) (50 mL/Hr) IV Continuous <Continuous>  dextrose 5%. 1000 milliLiter(s) (100 mL/Hr) IV Continuous <Continuous>  dextrose 50% Injectable 25 Gram(s) IV Push once  dextrose 50% Injectable 12.5 Gram(s) IV Push once  dextrose 50% Injectable 25 Gram(s) IV Push once  epoetin sherry-epbx (RETACRIT) Injectable 62478 Unit(s) IV Push once  glucagon  Injectable 1 milliGRAM(s) IntraMuscular once  influenza  Vaccine (HIGH DOSE) 0.7 milliLiter(s) IntraMuscular once  insulin lispro (ADMELOG) corrective regimen sliding scale   SubCutaneous every 6 hours  iron sucrose IVPB 200 milliGRAM(s) IV Intermittent every 24 hours  lidocaine   4% Patch 1 Patch Transdermal every 24 hours  mupirocin 2% Ointment 1 Application(s) Topical two times a day  nystatin Powder 1 Application(s) Topical three times a day  pantoprazole   Suspension 40 milliGRAM(s) Enteral Tube at bedtime  polyethylene glycol 3350 17 Gram(s) Oral daily  senna 2 Tablet(s) Oral daily  sevelamer carbonate Powder 1600 milliGRAM(s) Enteral Tube every 8 hours  tobramycin 0.3% Ophthalmic Solution 2 Drop(s) Right EYE every 6 hours    MEDICATIONS  (PRN):  acetaminophen    Suspension .. 650 milliGRAM(s) Oral every 6 hours PRN Temp greater or equal to 38C (100.4F), Mild Pain (1 - 3), Moderate Pain (4 - 6)  dextrose Oral Gel 15 Gram(s) Oral once PRN Blood Glucose LESS THAN 70 milliGRAM(s)/deciliter  HYDROmorphone  Injectable 0.5 milliGRAM(s) IV Push every 2 hours PRN Tachypnea, RR greater than 30  sodium chloride 0.9% lock flush 10 milliLiter(s) IV Push every 1 hour PRN Pre/post blood products, medications, blood draw, and to maintain line patency      RADIOLOGY & ADDITIONAL TESTS:    Imaging Personally Reviewed:  [ ] YES  [ ] NO

## 2022-10-01 NOTE — PROGRESS NOTE ADULT - SUBJECTIVE AND OBJECTIVE BOX
GABBY LAYTON  82y  Female    Patient is a 82y old  Female who presents with a chief complaint of Acute hypoxic respiratory failure (01 Oct 2022 13:37)     Yesterday, 2 L HD removal . U/S still showed b/l posterior b-lines but none anteriorly. ISO anemia with low iron, started IV iron for 5 days. Will follow up with thoracic surgery for chest plating.  ETT tube sputum growing corynbacterium striatum (likely susceptible to vanc)- clinically stable and rare WBC; will hold abx for now.     INTERVAL HPI/OVERNIGHT EVENTS: As per night team, no events overnight. Patient is unresponsive and does not follow commands.     T(C): 38.8 (10-01-22 @ 19:20), Max: 38.8 (10-01-22 @ 18:00)  HR: 123 (10-01-22 @ 20:00) (106 - 127)  BP: 106/49 (10-01-22 @ 20:00) (93/44 - 154/69)  RR: 34 (10-01-22 @ 20:00) (22 - 41)  SpO2: 97% (10-01-22 @ 20:00) (93% - 100%)  Wt(kg): --Vital Signs Last 24 Hrs  T(C): 38.8 (01 Oct 2022 19:20), Max: 38.8 (01 Oct 2022 18:00)  T(F): 101.8 (01 Oct 2022 19:20), Max: 101.9 (01 Oct 2022 18:00)  HR: 123 (01 Oct 2022 20:00) (106 - 127)  BP: 106/49 (01 Oct 2022 20:00) (93/44 - 154/69)  BP(mean): 71 (01 Oct 2022 20:00) (64 - 99)  RR: 34 (01 Oct 2022 20:00) (22 - 41)  SpO2: 97% (01 Oct 2022 20:00) (93% - 100%)    Parameters below as of 01 Oct 2022 20:00  Patient On (Oxygen Delivery Method): ventilator    O2 Concentration (%): 50    PHYSICAL EXAM:  GENERAL: morbidly obese female on ventilator  Neuro: AAOx0; + cough reflex and + symmetric pupillary light reflex; patient unable to be aroused, not able to follow commands  HEENT: tracheostomy with no erythema or swelling; NC/AT; MMM; 1 cm open wound expanding philtrum; neck supple; no scleral icterus; nasal passage w NG tube; no thyroid or LN enlargement; R eye with blepharitis.   Heart: distant heart sounds appreciated; RRR; +s1 and s2; no MRG; non displaced PMI  Lungs: coarse crackles hear anteriorly throughout lung fields; asymmetrical inhalation  GI: protuberant abdomen; nondistended; normal bowel sounds [id number]   Extremities: patients lower extremities to knee wrapped with ACE bandage and SCDs, +1 pitting edema;; UE 2+ edema with 1+ pulses  Skin: cellulitis of LE wrapped with ACE bandages    Consultant(s) Notes Reviewed:  [x ] YES  [ ] NO  Care Discussed with Consultants/Other Providers [ x] YES  [ ] NO    LABS:                        7.9    5.79  )-----------( 149      ( 01 Oct 2022 05:38 )             25.9     10-01    135  |  97  |  28<H>  ----------------------------<  115<H>  4.0   |  24  |  2.46<H>    Ca    8.7      01 Oct 2022 05:38  Phos  3.5     10-01  Mg     2.3     10-01            CAPILLARY BLOOD GLUCOSE      POCT Blood Glucose.: 146 mg/dL (01 Oct 2022 18:22)  POCT Blood Glucose.: 132 mg/dL (01 Oct 2022 10:58)  POCT Blood Glucose.: 117 mg/dL (01 Oct 2022 05:34)  POCT Blood Glucose.: 138 mg/dL (01 Oct 2022 00:04)            MEDICATIONS  (STANDING):  albuterol/ipratropium for Nebulization 3 milliLiter(s) Nebulizer every 6 hours  apixaban 2.5 milliGRAM(s) Oral every 12 hours  artificial  tears Solution 1 Drop(s) Both EYES two times a day  chlorhexidine 0.12% Liquid 15 milliLiter(s) Oral Mucosa every 12 hours  chlorhexidine 2% Cloths 1 Application(s) Topical <User Schedule>  dextrose 5%. 1000 milliLiter(s) (50 mL/Hr) IV Continuous <Continuous>  dextrose 5%. 1000 milliLiter(s) (100 mL/Hr) IV Continuous <Continuous>  dextrose 50% Injectable 25 Gram(s) IV Push once  dextrose 50% Injectable 12.5 Gram(s) IV Push once  dextrose 50% Injectable 25 Gram(s) IV Push once  glucagon  Injectable 1 milliGRAM(s) IntraMuscular once  influenza  Vaccine (HIGH DOSE) 0.7 milliLiter(s) IntraMuscular once  insulin lispro (ADMELOG) corrective regimen sliding scale   SubCutaneous every 6 hours  iron sucrose IVPB 200 milliGRAM(s) IV Intermittent every 24 hours  lidocaine   4% Patch 1 Patch Transdermal every 24 hours  mupirocin 2% Ointment 1 Application(s) Topical two times a day  norepinephrine Infusion 0.05 MICROgram(s)/kG/Min (9.59 mL/Hr) IV Continuous <Continuous>  nystatin Powder 1 Application(s) Topical three times a day  pantoprazole   Suspension 40 milliGRAM(s) Enteral Tube at bedtime  polyethylene glycol 3350 17 Gram(s) Oral daily  propofol Infusion 5 MICROgram(s)/kG/Min (3.07 mL/Hr) IV Continuous <Continuous>  senna 2 Tablet(s) Oral daily  sevelamer carbonate Powder 1600 milliGRAM(s) Enteral Tube every 8 hours  sodium chloride 3%  Inhalation 4 milliLiter(s) Inhalation every 12 hours  tobramycin 0.3% Ophthalmic Solution 2 Drop(s) Right EYE every 6 hours    MEDICATIONS  (PRN):  acetaminophen    Suspension .. 650 milliGRAM(s) Oral every 6 hours PRN Temp greater or equal to 38C (100.4F), Mild Pain (1 - 3), Moderate Pain (4 - 6)  dextrose Oral Gel 15 Gram(s) Oral once PRN Blood Glucose LESS THAN 70 milliGRAM(s)/deciliter  HYDROmorphone  Injectable 0.5 milliGRAM(s) IV Push every 2 hours PRN Tachypnea, RR greater than 30  sodium chloride 0.9% lock flush 10 milliLiter(s) IV Push every 1 hour PRN Pre/post blood products, medications, blood draw, and to maintain line patency      RADIOLOGY & ADDITIONAL TESTS:    Imaging Personally Reviewed:  [ ] YES  [ ] NO

## 2022-10-01 NOTE — PROGRESS NOTE ADULT - ASSESSMENT
Patient is a 80 yo F with CHF, CKD (baseline around 2.5-2.9) HTN who presented with a one day history of respiratory distress and LE cellulitis hospital course c/b cardiac arrest on 9/7, acute renal failure requiring CVVHD with now transition to iHD    #Anuric BRANT on CKD   iATN in setting of cardiac arrest and shock.   On CVVHD 9/12-9/22  transition to iHD on 9/27  remains dialysis dependent- not currently showing signs of renal recovery, anuric     Plan:  -HD today with UF only, catheter worked well after tPA yesterday  -Daily BMP, mag and phos   -Maintain MAP >70 for adequate renal perfusion  -Monitor for any urine out put     Anemia - blood loss and inflammatory state, ESKD  Plan:   On Apixaban   Transfusions per primary team  follow up iron panel  EPO with HD       Hyponatremia - mild, 2/2 anuric renal failure, will monitor shifts on HD- improved    Continue HD as above

## 2022-10-01 NOTE — PROGRESS NOTE ADULT - ATTENDING COMMENTS
Increasing temps and on U/S pt has consolidation noted. Agree with Homero post HD on dialysis days to treat Corynebacterium in sputum. Continue off sedation. No wean. Fluid removal with HD.

## 2022-10-01 NOTE — ED ADULT NURSE NOTE - NSIMPLEMENTINTERV_GEN_ALL_ED
Implemented All Fall Risk Interventions:  [place] to call system. Call bell, personal items and telephone within reach. Instruct patient to call for assistance. Room bathroom lighting operational. Non-slip footwear when patient is off stretcher. Physically safe environment: no spills, clutter or unnecessary equipment. Stretcher in lowest position, wheels locked, appropriate side rails in place. Provide visual cue, wrist band, yellow gown, etc. Monitor gait and stability. Monitor for mental status changes and reorient to person, place, and time. Review medications for side effects contributing to fall risk. Reinforce activity limits and safety measures with patient and family. Negative

## 2022-10-01 NOTE — PROGRESS NOTE ADULT - ASSESSMENT
80yo morbidly obese female w/ pmhx of HTN, HLD, HFpEF (EF 56%), COPD (on home O2-3L), chronic LE edema and cellulitis, DALIA, PVD p/w respiratory distress 2/2 severe sepsis due to cellulitis on lower extremity which progressed to septic shock in setting of pneumonia. Hospital course complicated by cardiac arrest, flail chest, and oliguric BRANT progressing to kidney failure.     NEURO  #AMS 2/2 to sedation vs Toxic Metabolic Encephalopathy  Reversal of sedative w flumazenil transient, unable to assess mentation. Patient is s/p cardiac arrest with successful ROSC. 9/26 CT head showed +grey white differentiation w no edema. Patient resedated 10/1 iso increased WOB and pain.   - continue to assess mentation     PULM  #Acute on chronic resp failure  Pt w/ COPD on home 3 L O2, currently worsening hypoxia 2/2 likely in setting of aspiration while eating vs flash pulmonary edema 2/2 chronic htn and chronic HF (relatively the same EF as previous TTE). Tracheostomy in place. Patient unable to tolerate trach collar.   - Patient w increased WOB and pain during the day. Patient resedated w propofol and currently on levo 0.08    #Broken ribs w/ flail chest  Pt with several broken ribs and signs of flail chest with difficulty breathing when sedation weaned off.    - continue to monitor respirations  - consult thoracic surgery for sternal plating    #Chronic obstructive pulmonary disease (COPD)  Pt with COPD on home 3L.   - continue to monitor oxygen saturation     CARDIOVASCULAR    #Cardiac Arrest  Patient intubated and sedated 2/2 to cardiac arrest. 9/23 patient weaned from sedation w trach. Patient not requiring midodrine  - continue to monitor vitals    #Chronic heart failure with preserved ejection fraction (HFpEF).   Pt on home bumex 2mg.  TTE from current visit showing EF of 56% with Pulmonary HTN - PAP 48. Experienced pulmonary edema and effusion postcardiac arrest.  Plan:  - Continue to monitor hemodynamics    #Afib  Experienced a-fib on 09/13 w RVR. self resolved w/o intervention. 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 Serotonin negative. Started on Apixaban 2.5 mg BID  - continue Eliquis 2.5 mg BID iso age and Cr    #Hypertension  Pt with history of hypertension on home diltiazem 180mg daily. Patient currently normotensive  - holding home antihypertensive meds     GI  No active issues    RENAL  #Anuric BRANT on CKD  Baseline Cr 2.04 in July 2022, currently uptrending . Likely iATN 2/2 to hypoperfusion/ischemia in setting of sepsis and cardiac arrest. HD cath placed on 09/12 for CVVHD in setting of worsening electrolytes and increasing fluid status, 9/26 now with TDC.  -dialysis yesterday w removal of 2 L; today w 3 L  - aquaforesis held today  - maintain MAP >70 for adequate renal perfusion  - strict I/Os, no nephrotoxic meds, renally dose meds  - renal following, appreciate recs  - hyperphosphatemia: continue sevelamer q 8 hrs    #Right kidney mass.   CTAP s/f slightly hyperdense solid cortical mass in upper pole of R kidney.  Plan:  - consider additional renal imaging when stable    ENDO  Continue corrective sliding scale    HEME  #Anemia  Pt noted to have chronic anemia, presented with HgB of 6.8 w/ drop to 6.7 during course of stay. s/p 4 units RBC on 09/07, 9/13, 9/19, and 9/22  - monitor CBC   - maintain active type and screen (last 9/30)  - continue IV iron for 5 days (day 1: 9/30)    ID    #fever  Patient with low grade fevers over the past few days, now febrile to 101.8 iso ET tube w corynbacterium striatum, started vancomycin 10/1. CXR showed possible mucus plugging, hypertonic saline nebs ordered.   - f/up blood, urine, and sputum cultures  - vanc trough before 4th dose    #Cellulitis  Patient initially presented to ED w b/l LE cellulitis and was treated with antibiotics.   - As per vascular, wrap kerlix gauze and ACE bandages around both legs. Wrap toes to just below the knee.   - Change bandages every few days  - continue to monitor     #Pneumonia  RESOLVED  possibly STANISLAV. Pt w/ bilateral pleural effusions and possible underlying infiltrate, unclear on x-ray. Blood cultures remain negative, sputum cultures neg w/ gram stain showing rare epithelial cells, few WBC's, gram pos rods and cocci in pairs. Patient finished cefepime and vanc on 9/20    #stage 2 pressure ulcer  Patient with stage 2 sacral pressure ulcer.   - Wound care consulted    #Philtrum Wound  Patient with 1 cm deep cut on upper lip 2/2 to intubation    #Blepharitis  Patient with R eye erythema and crusting.   - continue Tobramycin 2 drops q6 hrs  Skin  Lines: HD cath, endurance cath     GOC  Patient is DNR/DNI    E: K>4, Phos>3, Mg>2  N: Tube feeds   DVT ppx: Argatroban   GI ppx: Protonix 40 BID  code status: DNR

## 2022-10-02 NOTE — PROGRESS NOTE ADULT - ASSESSMENT
Patient is a 82 yo F with CHF, CKD (baseline around 2.5-2.9) HTN who presented with a one day history of respiratory distress and LE cellulitis hospital course c/b cardiac arrest on 9/7, acute renal failure requiring CVVHD with now transition to iHD    #Anuric BRANT on CKD   iATN in setting of cardiac arrest and shock.   On CVVHD 9/12-9/22  transition to iHD on 9/27  remains dialysis dependent- not currently showing signs of renal recovery, anuric     Plan:  -HD tomorrow with high UF for fluid overload and hyponatremia  -Daily BMP, mag and phos   -Maintain MAP >70 for adequate renal perfusion  -Monitor for any urine out put     Anemia - blood loss and inflammatory state, ESKD  Plan:   On Apixaban   Transfusions per primary team  EPO with HD       Hyponatremia - mild, 2/2 anuric renal failure with free water intake from meds, please ensure all meds in NS as possible

## 2022-10-02 NOTE — PROGRESS NOTE ADULT - SUBJECTIVE AND OBJECTIVE BOX
INTERVAL HPI/OVERNIGHT EVENTS:  FARIBA    SUBJECTIVE: Patient seen and examined at bedside, sedated, minimally responsive to touch and voice, not following commands       VITAL SIGNS:  ICU Vital Signs Last 24 Hrs  T(C): 37.7 (02 Oct 2022 14:12), Max: 38.8 (01 Oct 2022 18:00)  T(F): 99.9 (02 Oct 2022 14:12), Max: 101.9 (01 Oct 2022 18:00)  HR: 103 (02 Oct 2022 16:00) (88 - 127)  BP: 93/45 (02 Oct 2022 16:00) (76/35 - 137/62)  BP(mean): 65 (02 Oct 2022 16:00) (51 - 89)  ABP: --  ABP(mean): --  RR: 30 (02 Oct 2022 16:00) (8 - 42)  SpO2: 99% (02 Oct 2022 16:00) (96% - 100%)    O2 Parameters below as of 02 Oct 2022 16:00  Patient On (Oxygen Delivery Method): ventilator    O2 Concentration (%): 50      Mode: AC/ CMV (Assist Control/ Continuous Mandatory Ventilation), RR (machine): 26, TV (machine): 450, FiO2: 50, PEEP: 5, ITime: 0.8, MAP: 11, PIP: 20  Plateau pressure:   P/F ratio:     10-01 @ 07:01  -  10-02 @ 07:00  --------------------------------------------------------  IN: 2719.1 mL / OUT: 3150 mL / NET: -430.9 mL    10-02 @ 07:01  -  10-02 @ 17:03  --------------------------------------------------------  IN: 638.2 mL / OUT: 120 mL / NET: 518.2 mL      CAPILLARY BLOOD GLUCOSE      POCT Blood Glucose.: 115 mg/dL (02 Oct 2022 11:46)    ECG:    PHYSICAL EXAM:    GENERAL: morbidly obese female, sedated.   Neuro: AAOx0; + symmetric pupillary light reflex; patient arousable to touch, not able to follow commands  HEENT: tracheostomy with no erythema or swelling; NC/AT; MMM; 1 cm open wound expanding philtrum; neck supple; no scleral icterus; nasal passage w NG tube; no thyroid or LN enlargement; R eye with blepharitis.   Heart: distant heart sounds appreciated; RRR; +s1 and s2; no MRG; non displaced PMI  Lungs: coarse crackles hear anteriorly throughout lung fields; asymmetrical inhalation  GI: protuberant abdomen; nondistended; normal bowel sounds [id number]   Extremities: patients lower extremities to knee wrapped with ACE bandage and SCDs, +1 pitting edema;; UE 2+ edema with 1+ pulses  Skin: cellulitis of LE wrapped with ACE bandages    MEDICATIONS:  MEDICATIONS  (STANDING):  albuterol/ipratropium for Nebulization 3 milliLiter(s) Nebulizer every 6 hours  apixaban 2.5 milliGRAM(s) Oral every 12 hours  artificial  tears Solution 1 Drop(s) Both EYES two times a day  chlorhexidine 0.12% Liquid 15 milliLiter(s) Oral Mucosa every 12 hours  chlorhexidine 2% Cloths 1 Application(s) Topical <User Schedule>  dextrose 5%. 1000 milliLiter(s) (100 mL/Hr) IV Continuous <Continuous>  dextrose 5%. 1000 milliLiter(s) (50 mL/Hr) IV Continuous <Continuous>  dextrose 50% Injectable 25 Gram(s) IV Push once  dextrose 50% Injectable 12.5 Gram(s) IV Push once  dextrose 50% Injectable 25 Gram(s) IV Push once  glucagon  Injectable 1 milliGRAM(s) IntraMuscular once  influenza  Vaccine (HIGH DOSE) 0.7 milliLiter(s) IntraMuscular once  insulin lispro (ADMELOG) corrective regimen sliding scale   SubCutaneous every 6 hours  iron sucrose IVPB 200 milliGRAM(s) IV Intermittent every 24 hours  lidocaine   4% Patch 1 Patch Transdermal every 24 hours  mupirocin 2% Ointment 1 Application(s) Topical two times a day  norepinephrine Infusion 0.05 MICROgram(s)/kG/Min (9.59 mL/Hr) IV Continuous <Continuous>  nystatin Powder 1 Application(s) Topical three times a day  pantoprazole   Suspension 40 milliGRAM(s) Enteral Tube at bedtime  polyethylene glycol 3350 17 Gram(s) Oral daily  senna 2 Tablet(s) Oral daily  sevelamer carbonate Powder 1600 milliGRAM(s) Enteral Tube every 8 hours  tobramycin 0.3% Ophthalmic Solution 2 Drop(s) Right EYE every 6 hours    MEDICATIONS  (PRN):  acetaminophen    Suspension .. 650 milliGRAM(s) Oral every 6 hours PRN Temp greater or equal to 38C (100.4F), Mild Pain (1 - 3), Moderate Pain (4 - 6)  dextrose Oral Gel 15 Gram(s) Oral once PRN Blood Glucose LESS THAN 70 milliGRAM(s)/deciliter  HYDROmorphone  Injectable 0.5 milliGRAM(s) IV Push every 2 hours PRN Tachypnea, RR greater than 30  sodium chloride 0.9% lock flush 10 milliLiter(s) IV Push every 1 hour PRN Pre/post blood products, medications, blood draw, and to maintain line patency      ALLERGIES:  Allergies    Altabax (Unknown)  Augmentin (Unknown)  clindamycin (Unknown)  Vaseline (Swelling)    Intolerances        LABS:                        8.1    10.03 )-----------( 208      ( 02 Oct 2022 16:20 )             27.2     10-02    128<L>  |  92<L>  |  33<H>  ----------------------------<  127<H>  4.2   |  26  |  3.06<H>    Ca    9.5      02 Oct 2022 16:20  Phos  3.7     10-02  Mg     2.2     10-02            RADIOLOGY & ADDITIONAL TESTS: Reviewed.

## 2022-10-02 NOTE — PROGRESS NOTE ADULT - ASSESSMENT
82yo morbidly obese female w/ pmhx of HTN, HLD, HFpEF (EF 56%), COPD (on home O2-3L), chronic LE edema and cellulitis, DALIA, PVD p/w respiratory distress 2/2 severe sepsis due to cellulitis on lower extremity which progressed to septic shock in setting of pneumonia. Hospital course complicated by cardiac arrest, flail chest, and oliguric BRANT progressing to kidney failure.     NEURO  #AMS 2/2 to sedation vs Toxic Metabolic Encephalopathy  Reversal of sedative w flumazenil transient, unable to assess mentation. Patient is s/p cardiac arrest with successful ROSC. 9/26 CT head showed +grey white differentiation w no edema. Patient resedated 10/1 iso increased WOB and pain.   - continue to assess mentation   - Come off propofol for sedation    PULM  #Acute on chronic resp failure  Pt w/ COPD on home 3 L O2, currently worsening hypoxia 2/2 likely in setting of aspiration while eating vs flash pulmonary edema 2/2 chronic htn and chronic HF (relatively the same EF as previous TTE). Tracheostomy in place. Patient unable to tolerate trach collar.   - Patient w increased WOB and pain during the day. Still on levo.     #Broken ribs w/ flail chest  Pt with several broken ribs and signs of flail chest with difficulty breathing when sedation weaned off.    - continue to monitor respirations  - consult thoracic surgery for sternal plating    #Chronic obstructive pulmonary disease (COPD)  Pt with COPD on home 3L.   - continue to monitor oxygen saturation     CARDIOVASCULAR    #Cardiac Arrest  Patient intubated and sedated 2/2 to cardiac arrest. 9/23 patient weaned from sedation w trach. Patient not requiring midodrine  - continue to monitor vitals    #Chronic heart failure with preserved ejection fraction (HFpEF).   Pt on home bumex 2mg.  TTE from current visit showing EF of 56% with Pulmonary HTN - PAP 48. Experienced pulmonary edema and effusion postcardiac arrest.  Plan:  - Continue to monitor hemodynamics    #Afib  Experienced a-fib on 09/13 w RVR. self resolved w/o intervention. 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 Serotonin negative. Started on Apixaban 2.5 mg BID  - continue Eliquis 2.5 mg BID iso age and Cr    #Hypertension  Pt with history of hypertension on home diltiazem 180mg daily. Patient currently normotensive  - holding home antihypertensive meds     GI  No active issues    RENAL  #Anuric BRANT on CKD  Baseline Cr 2.04 in July 2022, currently uptrending . Likely iATN 2/2 to hypoperfusion/ischemia in setting of sepsis and cardiac arrest. HD cath placed on 09/12 for CVVHD in setting of worsening electrolytes and increasing fluid status, 9/26 now with TDC.  -dialysis yesterday w removal of 2 L; today w 3 L  - aquaforesis held today  - maintain MAP >70 for adequate renal perfusion  - strict I/Os, no nephrotoxic meds, renally dose meds  - renal following, appreciate recs  - hyperphosphatemia: continue sevelamer q 8 hrs  - HD tomorrow 10/3.    #Right kidney mass.   CTAP s/f slightly hyperdense solid cortical mass in upper pole of R kidney.  Plan:  - consider additional renal imaging when stable    ENDO  Continue corrective sliding scale    HEME      ID    #fever  Patient with low grade fevers over the past few days, now febrile to 101.8 iso ET tube w corynbacterium striatum, started vancomycin 10/1. CXR showed possible mucus plugging, hypertonic saline nebs ordered.   - f/up blood, urine, and sputum cultures.  - Hold vanc today 10/2  - vanc trough before 4th dose    #Cellulitis  Patient initially presented to ED w b/l LE cellulitis and was treated with antibiotics.   - As per vascular, wrap kerlix gauze and ACE bandages around both legs. Wrap toes to just below the knee.   - Change bandages every few days  - continue to monitor     #Pneumonia  RESOLVED  possibly STANISLAV. Pt w/ bilateral pleural effusions and possible underlying infiltrate, unclear on x-ray. Blood cultures remain negative, sputum cultures neg w/ gram stain showing rare epithelial cells, few WBC's, gram pos rods and cocci in pairs. Patient finished cefepime and vanc on 9/20    #stage 2 pressure ulcer  Patient with stage 2 sacral pressure ulcer.   - Wound care consulted    #Philtrum Wound  Patient with 1 cm deep cut on upper lip 2/2 to intubation    #Blepharitis  Patient with R eye erythema and crusting.   - continue Tobramycin 2 drops q6 hrs  Skin  Lines: HD cath, endurance cath     GOC  Patient is DNR/DNI    E: K>4, Phos>3, Mg>2  N: Tube feeds   DVT ppx: Argatroban   GI ppx: Protonix 40 BID  code status: DNR

## 2022-10-02 NOTE — PROGRESS NOTE ADULT - SUBJECTIVE AND OBJECTIVE BOX
NEPHROLOGY PROGRESS NOTE    Subjective: Patient seen and examined at bedside. NAD, not responsive. Continues to be febrile intermittently    [OBJECTIVE]:    Vital Signs:  T(F): , Max: 101.9 (10-01-22 @ 18:00)  HR:  (88 - 127)  BP:  (76/35 - 154/69)  BP(mean):  (51 - 99)  ABP: --  ABP(mean): --  RR:  (8 - 42)  SpO2:  (93% - 100%)  CVP(mm Hg): --  CVP(cm H2O): --  Mode: AC/ CMV (Assist Control/ Continuous Mandatory Ventilation), RR (machine): 26, RR (patient): 26, TV (machine): 450, FiO2: 50, PEEP: 5, ITime: 0.8, MAP: 11, PIP: 20    10-01 @ 07:01  -  10-02 @ 07:00  --------------------------------------------------------  IN: 2719.1 mL / OUT: 3150 mL / NET: -430.9 mL    10-02 @ 07:01  -  10-02 @ 14:07  --------------------------------------------------------  IN: 447.6 mL / OUT: 0 mL / NET: 447.6 mL      CAPILLARY BLOOD GLUCOSE      POCT Blood Glucose.: 115 mg/dL (02 Oct 2022 11:46)      Physical Exam:  T(F): 99.5 (10-02-22 @ 09:22)  HR: 105 (10-02-22 @ 14:06)  BP: 82/39 (10-02-22 @ 14:06)  RR: 30 (10-02-22 @ 14:06)  SpO2: 100% (10-02-22 @ 14:06)  Wt(kg): --    Constitutional: trach ; NAD  HEENT: MMM, NC/AT, wound expanding philtrum; neck supple;  Respiratory: Mechanical breath sounds bilaterally, not overbreathing vent  Cardiac: +S1/S2; RRR; no M/R/G  Gastrointestinal: soft, NT/ND; no rebound or guarding; +BS  Extremities: WWP, no clubbing or cyanosis; general low level anasarca, improving  Dermatologic: skin warm, dry and intact; no rashes, wounds, or scars  Access: R TDC c/d/i    Medications:  MEDICATIONS  (STANDING):  albuterol/ipratropium for Nebulization 3 milliLiter(s) Nebulizer every 6 hours  apixaban 2.5 milliGRAM(s) Oral every 12 hours  artificial  tears Solution 1 Drop(s) Both EYES two times a day  chlorhexidine 0.12% Liquid 15 milliLiter(s) Oral Mucosa every 12 hours  chlorhexidine 2% Cloths 1 Application(s) Topical <User Schedule>  dextrose 5%. 1000 milliLiter(s) (50 mL/Hr) IV Continuous <Continuous>  dextrose 5%. 1000 milliLiter(s) (100 mL/Hr) IV Continuous <Continuous>  dextrose 50% Injectable 25 Gram(s) IV Push once  dextrose 50% Injectable 12.5 Gram(s) IV Push once  dextrose 50% Injectable 25 Gram(s) IV Push once  glucagon  Injectable 1 milliGRAM(s) IntraMuscular once  influenza  Vaccine (HIGH DOSE) 0.7 milliLiter(s) IntraMuscular once  insulin lispro (ADMELOG) corrective regimen sliding scale   SubCutaneous every 6 hours  iron sucrose IVPB 200 milliGRAM(s) IV Intermittent every 24 hours  lidocaine   4% Patch 1 Patch Transdermal every 24 hours  mupirocin 2% Ointment 1 Application(s) Topical two times a day  nystatin Powder 1 Application(s) Topical three times a day  pantoprazole   Suspension 40 milliGRAM(s) Enteral Tube at bedtime  polyethylene glycol 3350 17 Gram(s) Oral daily  senna 2 Tablet(s) Oral daily  sevelamer carbonate Powder 1600 milliGRAM(s) Enteral Tube every 8 hours  tobramycin 0.3% Ophthalmic Solution 2 Drop(s) Right EYE every 6 hours    MEDICATIONS  (PRN):  acetaminophen    Suspension .. 650 milliGRAM(s) Oral every 6 hours PRN Temp greater or equal to 38C (100.4F), Mild Pain (1 - 3), Moderate Pain (4 - 6)  dextrose Oral Gel 15 Gram(s) Oral once PRN Blood Glucose LESS THAN 70 milliGRAM(s)/deciliter  HYDROmorphone  Injectable 0.5 milliGRAM(s) IV Push every 2 hours PRN Tachypnea, RR greater than 30  sodium chloride 0.9% lock flush 10 milliLiter(s) IV Push every 1 hour PRN Pre/post blood products, medications, blood draw, and to maintain line patency      Allergies:  Allergies    Altabax (Unknown)  Augmentin (Unknown)  clindamycin (Unknown)  Vaseline (Swelling)    Intolerances        Labs:                        7.5    11.93 )-----------( 166      ( 02 Oct 2022 05:21 )             24.6     10-02    129<L>  |  92<L>  |  27<H>  ----------------------------<  133<H>  4.1   |  26  |  2.64<H>    Ca    8.9      02 Oct 2022 05:21  Phos  2.7     10-02  Mg     2.1     10-02            Radiology and other tests:  Reviewed

## 2022-10-02 NOTE — PROGRESS NOTE ADULT - ATTENDING COMMENTS
Pt with decrease in temps and Vanco to continue. Dose post HD. Failed CPAP trial. Fluid removal with HD.

## 2022-10-03 NOTE — PROGRESS NOTE ADULT - ATTENDING COMMENTS
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 Oliguric. Tolerated 3L UF today with HD. Getting epo and Fe for anemia. Will plan for HD again tomorrow given elevated Vanco levels

## 2022-10-03 NOTE — PROGRESS NOTE ADULT - ASSESSMENT
80yo morbidly obese female w/ pmhx of HTN, HLD, HFpEF (EF 56%), COPD (on home O2-3L), chronic LE edema and cellulitis, DALIA, PVD p/w respiratory distress 2/2 severe sepsis due to cellulitis on lower extremity which progressed to septic shock in setting of pneumonia. Hospital course complicated by cardiac arrest, flail chest, and oliguric BRANT progressing to kidney failure.     NEURO    #AMS 2/2 to sedation vs Toxic Metabolic Encephalopathy  Reversal of sedative w flumazenil transient, unable to assess mentation. Patient is s/p cardiac arrest with successful ROSC. 9/26 CT head showed +grey white differentiation w no edema. Patient resedated 10/1 iso increased WOB and pain, now off sedation.   - continue to assess mentation       PULM  #Acute on chronic resp failure  Pt w/ COPD on home 3 L O2, currently worsening hypoxia 2/2 likely in setting of aspiration while eating vs flash pulmonary edema 2/2 chronic htn and chronic HF (relatively the same EF as previous TTE). Tracheostomy in place. Patient unable to tolerate trach collar. Patient with increased WOB and wincing on exam   -continue ventilation     #Broken ribs w/ flail chest  Pt with several broken ribs and signs of flail chest with difficulty breathing when sedation weaned off.    - continue to monitor respirations  - consider consult thoracic surgery for sternal plating    #Chronic obstructive pulmonary disease (COPD)  Pt with COPD on home 3L.   - continue to monitor oxygen saturation; patient currently on ventilator    CARDIOVASCULAR    #Cardiac Arrest  Patient intubated and sedated 2/2 to cardiac arrest. 9/23 patient weaned from sedation w trach. Patient not requiring midodrine  - continue to monitor vitals    #Chronic heart failure with preserved ejection fraction (HFpEF).   Pt on home bumex 2mg.  TTE from current visit showing EF of 56% with Pulmonary HTN - PAP 48. Experienced pulmonary edema and effusion postcardiac arrest.  - Continue to monitor hemodynamics    #Afib  Experienced a-fib on 09/13 w RVR. self resolved w/o intervention. 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 Serotonin negative. Started on Apixaban 2.5 mg BID  - continue Eliquis 2.5 mg BID iso age and Cr    #Hypertension  Pt with history of hypertension on home diltiazem 180mg daily. Patient currently normotensive  - holding home antihypertensive meds     GI  No active issues    RENAL  #Anuric BRANT on CKD  Baseline Cr 2.04 in July 2022, currently uptrending. Lung with inspiratory crackles, 1+ b/l LE edema. Likely iATN 2/2 to hypoperfusion/ischemia in setting of sepsis and cardiac arrest. HD cath placed on 09/12 for CVVHD in setting of worsening electrolytes and increasing fluid status, 9/26 now with TDC.  -HD today w planned removal of 3 L  - maintain MAP >70 for adequate renal perfusion  - strict I/Os, no nephrotoxic meds, renally dose meds  - renal following, appreciate recs  - hyperphosphatemia: continue sevelamer     #Right kidney mass.   CTAP s/f slightly hyperdense solid cortical mass in upper pole of R kidney.  Plan:  - consider additional renal imaging when stable    ENDO  Continue corrective sliding scale    HEME      ID    #fever  Patient with low grade fevers over the past few days, now febrile to 101.8 iso ET tube w corynbacterium striatum, started vancomycin 10/1. CXR showed possible mucus plugging, hypertonic saline nebs ordered.   - f/up blood, urine, and sputum cultures.  - Hold vanc today 10/2  - vanc trough before 4th dose    #Cellulitis  Patient initially presented to ED w b/l LE cellulitis and was treated with antibiotics.   - As per vascular, wrap kerlix gauze and ACE bandages around both legs. Wrap toes to just below the knee.   - Change bandages every few days  - continue to monitor     #Pneumonia  RESOLVED  possibly STANISLAV. Pt w/ bilateral pleural effusions and possible underlying infiltrate, unclear on x-ray. Blood cultures remain negative, sputum cultures neg w/ gram stain showing rare epithelial cells, few WBC's, gram pos rods and cocci in pairs. Patient finished cefepime and vanc on 9/20    #stage 2 pressure ulcer  Patient with stage 2 sacral pressure ulcer.   - Wound care consulted    #Philtrum Wound  Patient with 1 cm deep cut on upper lip 2/2 to intubation    #Blepharitis  Patient with R eye erythema and crusting.   - continue Tobramycin 2 drops q6 hrs  Skin  Lines: HD cath, endurance cath     GOC  Patient is DNR/DNI    E: K>4, Phos>3, Mg>2  N: Tube feeds   DVT ppx: Argatroban   GI ppx: Protonix 40 BID  code status: DNR       80yo morbidly obese female w/ pmhx of HTN, HLD, HFpEF (EF 56%), COPD (on home O2-3L), chronic LE edema and cellulitis, DALIA, PVD p/w respiratory distress 2/2 severe sepsis due to cellulitis on lower extremity which progressed to septic shock in setting of pneumonia. Hospital course complicated by cardiac arrest, flail chest, and oliguric BRANT progressing to kidney failure.     NEURO    #AMS 2/2 to sedation vs Toxic Metabolic Encephalopathy  Reversal of sedative w flumazenil transient, unable to assess mentation. Patient is s/p cardiac arrest with successful ROSC. 9/26 CT head showed +grey white differentiation w no edema. Patient resedated 10/1 iso increased WOB and pain, now off sedation.   - continue to assess mentation       PULM  #Acute on chronic resp failure  Pt w/ COPD on home 3 L O2, currently worsening hypoxia 2/2 likely in setting of aspiration while eating vs flash pulmonary edema 2/2 chronic htn and chronic HF (relatively the same EF as previous TTE). Tracheostomy in place. Patient unable to tolerate trach collar. Patient with increased WOB and wincing on exam   -continue ventilation     #Broken ribs w/ flail chest  Pt with several broken ribs and signs of flail chest with difficulty breathing when sedation weaned off.    - continue to monitor respirations  - consider consult thoracic surgery for sternal plating    #Chronic obstructive pulmonary disease (COPD)  Pt with COPD on home 3L.   - continue to monitor oxygen saturation; patient currently on ventilator    CARDIOVASCULAR    #Cardiac Arrest  Patient intubated and sedated 2/2 to cardiac arrest. 9/23 patient weaned from sedation w trach. Patient not requiring midodrine  - continue to monitor vitals    #Chronic heart failure with preserved ejection fraction (HFpEF).   Pt on home bumex 2mg.  TTE from current visit showing EF of 56% with Pulmonary HTN - PAP 48. Experienced pulmonary edema and effusion postcardiac arrest.  - Continue to monitor hemodynamics    #Afib  Experienced a-fib on 09/13 w RVR. self resolved w/o intervention. 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 Serotonin negative. Started on Apixaban 2.5 mg BID  - continue Eliquis 2.5 mg BID iso age and Cr    #Hypertension  Pt with history of hypertension on home diltiazem 180mg daily. Patient currently normotensive  - holding home antihypertensive meds     GI  No active issues    RENAL  #Anuric BRANT on CKD  Baseline Cr 2.04 in July 2022, currently uptrending. Lung with inspiratory crackles, 1+ b/l LE edema. Likely iATN 2/2 to hypoperfusion/ischemia in setting of sepsis and cardiac arrest. HD cath placed on 09/12 for CVVHD in setting of worsening electrolytes and increasing fluid status, 9/26 now with TDC.  -HD today w planned removal of 3 L  - maintain MAP >70 for adequate renal perfusion  - strict I/Os, no nephrotoxic meds, renally dose meds  - renal following, appreciate recs  - hyperphosphatemia: continue sevelamer     #Right kidney mass.   CTAP s/f slightly hyperdense solid cortical mass in upper pole of R kidney.  Plan:  - consider additional renal imaging when stable    ENDO  Continue corrective sliding scale    HEME  #Anemia   Patient with Hgb of 7.2 noted today 10/3  - monitor CBC  - f/up reticulocyte count  - maintain active T &S (ordered for 10/4 AM)  - continue IV iron for 5 days (day 1 9/30)      ID    #fever  Patient with low grade fevers over the past few days, now febrile to 101.8 iso ET tube w corynbacterium striatum, started vancomycin 10/1. CXR showed possible mucus plugging, hypertonic saline nebs ordered.   - f/up blood, urine, and sputum cultures.  - vanc dose by level: held since Saturday 2/2 to elevated trough; goal trough 15-20    #Cellulitis  Patient initially presented to ED w b/l LE cellulitis and was treated with antibiotics.   - As per vascular, wrap kerlix gauze and ACE bandages around both legs. Wrap toes to just below the knee.   - Change bandages every few days  - continue to monitor     #Pneumonia  RESOLVED  possibly STANISLAV. Pt w/ bilateral pleural effusions and possible underlying infiltrate, unclear on x-ray. Blood cultures remain negative, sputum cultures neg w/ gram stain showing rare epithelial cells, few WBC's, gram pos rods and cocci in pairs. Patient finished cefepime and vanc on 9/20    #stage 2 pressure ulcer  Patient with stage 2 sacral pressure ulcer.   - Wound care consulted    #Philtrum Wound  Patient with 1 cm deep cut on upper lip 2/2 to intubation    #Blepharitis  Patient with R eye erythema and crusting.   - continue Tobramycin 2 drops q6 hrs  Skin  Lines: HD cath, endurance cath     GOC  Patient is DNR/DNI    E: K>4, Phos>3, Mg>2  N: Tube feeds   DVT ppx: Argatroban   GI ppx: Protonix 40 BID  code status: DNR

## 2022-10-03 NOTE — PROGRESS NOTE ADULT - SUBJECTIVE AND OBJECTIVE BOX
--------------------------------------------------------------------------------  Chief Complaint: ESRD/Ongoing hemodialysis requirement    24 hour events/subjective:  Seen this AM, remains stable. U/o in 24 hrs 200cc. To get HD today       PAST HISTORY  --------------------------------------------------------------------------------  No significant changes to PMH, PSH, FHx, SHx, unless otherwise noted    ALLERGIES & MEDICATIONS  --------------------------------------------------------------------------------  Allergies    Altabax (Unknown)  Augmentin (Unknown)  clindamycin (Unknown)  Vaseline (Swelling)    Intolerances      Standing Inpatient Medications  albuterol/ipratropium for Nebulization 3 milliLiter(s) Nebulizer every 6 hours  apixaban 2.5 milliGRAM(s) Oral every 12 hours  artificial  tears Solution 1 Drop(s) Both EYES two times a day  chlorhexidine 0.12% Liquid 15 milliLiter(s) Oral Mucosa every 12 hours  chlorhexidine 2% Cloths 1 Application(s) Topical <User Schedule>  dextrose 5%. 1000 milliLiter(s) IV Continuous <Continuous>  dextrose 5%. 1000 milliLiter(s) IV Continuous <Continuous>  dextrose 50% Injectable 25 Gram(s) IV Push once  dextrose 50% Injectable 12.5 Gram(s) IV Push once  dextrose 50% Injectable 25 Gram(s) IV Push once  epoetin sherry-epbx (RETACRIT) Injectable 26991 Unit(s) IV Push once  glucagon  Injectable 1 milliGRAM(s) IntraMuscular once  HYDROmorphone  Injectable 0.5 milliGRAM(s) IV Push every 4 hours  influenza  Vaccine (HIGH DOSE) 0.7 milliLiter(s) IntraMuscular once  insulin lispro (ADMELOG) corrective regimen sliding scale   SubCutaneous every 6 hours  iron sucrose IVPB 200 milliGRAM(s) IV Intermittent every 24 hours  lidocaine   4% Patch 1 Patch Transdermal every 24 hours  mupirocin 2% Ointment 1 Application(s) Topical two times a day  nystatin Powder 1 Application(s) Topical three times a day  pantoprazole   Suspension 40 milliGRAM(s) Enteral Tube at bedtime  polyethylene glycol 3350 17 Gram(s) Oral daily  senna 2 Tablet(s) Oral daily  sevelamer carbonate Powder 800 milliGRAM(s) Enteral Tube every 8 hours  tobramycin 0.3% Ophthalmic Solution 2 Drop(s) Right EYE every 6 hours    PRN Inpatient Medications  acetaminophen    Suspension .. 650 milliGRAM(s) Oral every 6 hours PRN  dextrose Oral Gel 15 Gram(s) Oral once PRN  sodium chloride 0.9% lock flush 10 milliLiter(s) IV Push every 1 hour PRN      REVIEW OF SYSTEMS  --------------------------------------------------------------------------------  All other systems were reviewed and are negative, except as noted.    VITALS/PHYSICAL EXAM  --------------------------------------------------------------------------------  T(C): 37.4 (10-03-22 @ 09:16), Max: 37.7 (10-02-22 @ 14:12)  HR: 111 (10-03-22 @ 10:00) (90 - 112)  BP: 142/62 (10-03-22 @ 10:00) (76/35 - 145/65)  RR: 24 (10-03-22 @ 10:00) (21 - 36)  SpO2: 96% (10-03-22 @ 10:00) (94% - 100%)  Wt(kg): --  Drug Dosing Weight  Height (cm): 172.7 (06 Sep 2022 21:17)  Weight (kg): 102.3 (06 Sep 2022 21:17)  BMI (kg/m2): 34.3 (06 Sep 2022 21:17)  BSA (m2): 2.15 (06 Sep 2022 21:17)        10-02-22 @ 07:01  -  10-03-22 @ 07:00  --------------------------------------------------------  IN: 1620.2 mL / OUT: 320 mL / NET: 1300.2 mL    10-03-22 @ 07:01  -  10-03-22 @ 11:13  --------------------------------------------------------  IN: 120 mL / OUT: 0 mL / NET: 120 mL    Constitutional: trach ; NAD  HEENT: MMM, NC/AT, wound expanding philtrum; neck supple;  Respiratory: Mechanical breath sounds bilaterally, not overbreathing vent  Cardiac: +S1/S2; RRR; no M/R/G  Gastrointestinal: soft, NT/ND; no rebound or guarding; +BS  Extremities: WWP, no clubbing or cyanosis; general low level anasarca, improving  Dermatologic: skin warm, dry and intact; no rashes, wounds, or scars  Access: R [facility] c/d/i      LABS/STUDIES  --------------------------------------------------------------------------------              7.2    7.88  >-----------<  189      [10-03-22 @ 05:30]              24.5     133  |  95  |  34  ----------------------------<  104      [10-03-22 @ 05:30]  4.1   |  26  |  3.50        Ca     9.4     [10-03-22 @ 05:30]      Mg     2.4     [10-03-22 @ 05:30]      Phos  3.8     [10-03-22 @ 05:30]            Iron 15, TIBC 242, %sat 6      [07-02-22 @ 22:28]  Ferritin 28      [07-02-22 @ 22:28]  Lipid: chol --, , HDL --, LDL --      [10-03-22 @ 05:30]    HBsAb Nonreact      [09-27-22 @ 10:03]  HBsAg Nonreact      [09-27-22 @ 10:03]  HCV 0.03, Nonreact      [09-27-22 @ 10:03]      RADIOLOGY:  -------------------------------------------------------------------------------------- --------------------------------------------------------------------------------  Chief Complaint: ESRD/Ongoing hemodialysis requirement    24 hour events/subjective:  Seen this AM, remains stable. U/o in 24 hrs 200cc. To get HD today       PAST HISTORY  --------------------------------------------------------------------------------  No significant changes to PMH, PSH, FHx, SHx, unless otherwise noted    ALLERGIES & MEDICATIONS  --------------------------------------------------------------------------------  Allergies    Altabax (Unknown)  Augmentin (Unknown)  clindamycin (Unknown)  Vaseline (Swelling)    Intolerances      Standing Inpatient Medications  albuterol/ipratropium for Nebulization 3 milliLiter(s) Nebulizer every 6 hours  apixaban 2.5 milliGRAM(s) Oral every 12 hours  artificial  tears Solution 1 Drop(s) Both EYES two times a day  chlorhexidine 0.12% Liquid 15 milliLiter(s) Oral Mucosa every 12 hours  chlorhexidine 2% Cloths 1 Application(s) Topical <User Schedule>  dextrose 5%. 1000 milliLiter(s) IV Continuous <Continuous>  dextrose 5%. 1000 milliLiter(s) IV Continuous <Continuous>  dextrose 50% Injectable 25 Gram(s) IV Push once  dextrose 50% Injectable 12.5 Gram(s) IV Push once  dextrose 50% Injectable 25 Gram(s) IV Push once  epoetin sherry-epbx (RETACRIT) Injectable 23269 Unit(s) IV Push once  glucagon  Injectable 1 milliGRAM(s) IntraMuscular once  HYDROmorphone  Injectable 0.5 milliGRAM(s) IV Push every 4 hours  influenza  Vaccine (HIGH DOSE) 0.7 milliLiter(s) IntraMuscular once  insulin lispro (ADMELOG) corrective regimen sliding scale   SubCutaneous every 6 hours  iron sucrose IVPB 200 milliGRAM(s) IV Intermittent every 24 hours  lidocaine   4% Patch 1 Patch Transdermal every 24 hours  mupirocin 2% Ointment 1 Application(s) Topical two times a day  nystatin Powder 1 Application(s) Topical three times a day  pantoprazole   Suspension 40 milliGRAM(s) Enteral Tube at bedtime  polyethylene glycol 3350 17 Gram(s) Oral daily  senna 2 Tablet(s) Oral daily  sevelamer carbonate Powder 800 milliGRAM(s) Enteral Tube every 8 hours  tobramycin 0.3% Ophthalmic Solution 2 Drop(s) Right EYE every 6 hours    PRN Inpatient Medications  acetaminophen    Suspension .. 650 milliGRAM(s) Oral every 6 hours PRN  dextrose Oral Gel 15 Gram(s) Oral once PRN  sodium chloride 0.9% lock flush 10 milliLiter(s) IV Push every 1 hour PRN      REVIEW OF SYSTEMS  unable to assess    VITALS/PHYSICAL EXAM  --------------------------------------------------------------------------------  T(C): 37.4 (10-03-22 @ 09:16), Max: 37.7 (10-02-22 @ 14:12)  HR: 111 (10-03-22 @ 10:00) (90 - 112)  BP: 142/62 (10-03-22 @ 10:00) (76/35 - 145/65)  RR: 24 (10-03-22 @ 10:00) (21 - 36)  SpO2: 96% (10-03-22 @ 10:00) (94% - 100%)  Wt(kg): --  Drug Dosing Weight  Height (cm): 172.7 (06 Sep 2022 21:17)  Weight (kg): 102.3 (06 Sep 2022 21:17)  BMI (kg/m2): 34.3 (06 Sep 2022 21:17)  BSA (m2): 2.15 (06 Sep 2022 21:17)        10-02-22 @ 07:01  -  10-03-22 @ 07:00  --------------------------------------------------------  IN: 1620.2 mL / OUT: 320 mL / NET: 1300.2 mL    10-03-22 @ 07:01  -  10-03-22 @ 11:13  --------------------------------------------------------  IN: 120 mL / OUT: 0 mL / NET: 120 mL    Constitutional: trach ; NAD  HEENT: MMM, NC/AT, wound expanding philtrum; neck supple;  Respiratory: Mechanical breath sounds bilaterally, not overbreathing vent  Cardiac: +S1/S2; RRR; no M/R/G  Gastrointestinal: soft, NT/ND; no rebound or guarding; +BS  Extremities: WWP, no clubbing or cyanosis; general low level anasarca, improving  Dermatologic: skin warm, dry and intact; no rashes, wounds, or scars  Access: R [facility] c/d/i      LABS/STUDIES  --------------------------------------------------------------------------------              7.2    7.88  >-----------<  189      [10-03-22 @ 05:30]              24.5     133  |  95  |  34  ----------------------------<  104      [10-03-22 @ 05:30]  4.1   |  26  |  3.50        Ca     9.4     [10-03-22 @ 05:30]      Mg     2.4     [10-03-22 @ 05:30]      Phos  3.8     [10-03-22 @ 05:30]            Iron 15, TIBC 242, %sat 6      [07-02-22 @ 22:28]  Ferritin 28      [07-02-22 @ 22:28]  Lipid: chol --, , HDL --, LDL --      [10-03-22 @ 05:30]    HBsAb Nonreact      [09-27-22 @ 10:03]  HBsAg Nonreact      [09-27-22 @ 10:03]  HCV 0.03, Nonreact      [09-27-22 @ 10:03]      RADIOLOGY:  --------------------------------------------------------------------------------------

## 2022-10-03 NOTE — PROGRESS NOTE ADULT - ASSESSMENT
Patient is a 82 yo F with CHF, CKD (baseline around 2.5-2.9) HTN who presented with a one day history of respiratory distress and LE cellulitis hospital course c/b cardiac arrest on , acute renal failure requiring CVVHD with now transition to iHD    #Anuric BRANT on CKD   iATN in setting of cardiac arrest and shock.   On CVVHD -  transition to iHD on   remains dialysis dependent- not currently showing signs of renal recovery, anuric     Plan:  -HD today as per schedule   -UF with HD  -Daily BMP, mag and phos   -Maintain MAP >70 for adequate renal perfusion  -Monitor for any urine out put     Anemia - blood loss and inflammatory state, ESKD  Plan:   On Apixaban   Transfusions per primary team  EPO with HD       Hyponatremia - mild, 2/2 anuric renal failure with free water intake from meds, please ensure all meds in NS as possible         Hemoglobin: 7.2 g/dL (10-03-22 @ 05:30)  Phosphorus Level, Serum: 3.8 mg/dL (10-03-22 @ 05:30)  Hemoglobin: 8.1 g/dL (10-02-22 @ 16:20)  Phosphorus Level, Serum: 3.7 mg/dL (10-02-22 @ 16:20)    Albumin, Serum: 2.0 g/dL (22 @ 05:38)  Hepatitis B Surface Antigen: Nonreact (22 @ 10:03)    T(C): 37.4 (10-03-22 @ 09:16), Max: 37.7 (10-02-22 @ 14:12)  HR: 111 (10-03-22 @ 10:00) (90 - 112)  BP: 142/62 (10-03-22 @ 10:00) (76/35 - 145/65)  RR: 24 (10-03-22 @ 10:00) (21 - 36)  SpO2: 96% (10-03-22 @ 10:00) (94% - 100%)  epoetin sherry-epbx (RETACRIT) Injectable [id number] Unit(s) IV Push once, 22 @ 09:17, Routine, Stop order after: 1 Doses  epoetin sherry-epbx (RETACRIT) Injectable [id number] Unit(s) IV Push once, 10-03-22 @ 07:41, Routine, Stop order after: 1 Doses  sevelamer carbonate 800 milliGRAM(s) Oral every 8 hours, 22 @ 11:31, STAT  sevelamer carbonate Powder 1600 milliGRAM(s) Enteral Tube every 8 hours, 22 @ 11:33, Routine  sevelamer carbonate Powder 800 milliGRAM(s) Enteral Tube every 8 hours, 10-03-22 @ 10:46, Routine      Hemodialysis Treatment.:     Schedule: Once, Modality: Hemodialysis, Access: Internal Jugular Central Venous Catheter    Dialyzer: Optiflux [id number], Time: 180 Min    Blood Flow: 400 mL/Min , Dialysate Flow: 500 mL/Min, Dialysate Temp: 36.5, Tubinmm (Adult)    Target Fluid Removal: 3 Liters    Dialysate Electrolytes (mEq/L): Potassium 3, Calcium 2.5, Sodium 138, Bicarbonate 35 (10-03-22 @ 07:41) [Active]     Patient is a 80 yo F with CHF, CKD (baseline around 2.5-2.9) HTN who presented with a one day history of respiratory distress and LE cellulitis hospital course c/b cardiac arrest on , acute renal failure requiring CVVHD with now transition to iHD    #Anuric BRANT on CKD   iATN in setting of cardiac arrest and shock.   On CVVHD -  transition to iHD on   remains dialysis dependent- not currently showing signs of renal recovery, anuric     Plan:  -HD today as per schedule   -UF with HD  -Daily BMP, mag and phos   -Maintain MAP >70 for adequate renal perfusion  -Monitor for any urine out put     Anemia - blood loss and inflammatory state, ESKD  Plan:   On Apixaban   Transfusions per primary team  EPO with HD       Hyponatremia - mild, 2/2 anuric renal failure with free water intake from meds, please ensure all meds in NS as possible         Hemoglobin: 7.2 g/dL (10-03-22 @ 05:30)  Phosphorus Level, Serum: 3.8 mg/dL (10-03-22 @ 05:30)  Hemoglobin: 8.1 g/dL (10-02-22 @ 16:20)  Phosphorus Level, Serum: 3.7 mg/dL (10-02-22 @ 16:20)    Albumin, Serum: 2.0 g/dL (22 @ 05:38)  Hepatitis B Surface Antigen: Nonreact (22 @ 10:03)    T(C): 37.4 (10-03-22 @ 09:16), Max: 37.7 (10-02-22 @ 14:12)  HR: 111 (10-03-22 @ 10:00) (90 - 112)  BP: 142/62 (10-03-22 @ 10:00) (76/35 - 145/65)  RR: 24 (10-03-22 @ 10:00) (21 - 36)  SpO2: 96% (10-03-22 @ 10:00) (94% - 100%)  epoetin sherry-epbx (RETACRIT) Injectable 40086 Unit(s) IV Push once, 22 @ 09:17, Routine, Stop order after: 1 Doses  epoetin sherry-epbx (RETACRIT) Injectable [id number] Unit(s) IV Push once, 10-03-22 @ 07:41, Routine, Stop order after: 1 Doses  sevelamer carbonate 800 milliGRAM(s) Oral every 8 hours, 22 @ 11:31, STAT  sevelamer carbonate Powder 1600 milliGRAM(s) Enteral Tube every 8 hours, 22 @ 11:33, Routine  sevelamer carbonate Powder 800 milliGRAM(s) Enteral Tube every 8 hours, 10-03-22 @ 10:46, Routine      Hemodialysis Treatment.:     Schedule: Once, Modality: Hemodialysis, Access: Internal Jugular Central Venous Catheter    Dialyzer: Optiflux [id number], Time: 180 Min    Blood Flow: 400 mL/Min , Dialysate Flow: 500 mL/Min, Dialysate Temp: 36.5, Tubinmm (Adult)    Target Fluid Removal: 3 Liters    Dialysate Electrolytes (mEq/L): Potassium 3, Calcium 2.5, Sodium 138, Bicarbonate 35 (10-03-22 @ 07:41) [Active]    Seen on HD, no acute complaints noted. 's. c/w prescription as outlined above     Patient is a 83yo F with CHF, CKD IV (baseline around 2.5-2.9) HTN who presented with a one day history of respiratory distress and LE cellulitis hospital course c/b cardiac arrest on , acute renal failure requiring CVVHD with now transition to iHD    #Anuric BRANT on CKD   iATN in setting of cardiac arrest and shock.   On CVVHD -  transition to iHD on   remains dialysis dependent- not currently showing signs of renal recovery, anuric.    Plan:  -HD today as per schedule   -UF with HD  -Daily BMP, mag and phos   -Maintain MAP >70 for adequate renal perfusion  -Monitor for any urine out put     Anemia - blood loss and inflammatory state, ESKD  Plan:   On Apixaban   Transfusions per primary team  EPO with HD       Hyponatremia - mild, 2/2 anuric renal failure with free water intake from meds, please ensure all meds in NS as possible         Hemoglobin: 7.2 g/dL (10-03-22 @ 05:30)  Phosphorus Level, Serum: 3.8 mg/dL (10-03-22 @ 05:30)  Hemoglobin: 8.1 g/dL (10-02-22 @ 16:20)  Phosphorus Level, Serum: 3.7 mg/dL (10-02-22 @ 16:20)    Albumin, Serum: 2.0 g/dL (22 @ 05:38)  Hepatitis B Surface Antigen: Nonreact (22 @ 10:03)    T(C): 37.4 (10-03-22 @ 09:16), Max: 37.7 (10-02-22 @ 14:12)  HR: 111 (10-03-22 @ 10:00) (90 - 112)  BP: 142/62 (10-03-22 @ 10:00) (76/35 - 145/65)  RR: 24 (10-03-22 @ 10:00) (21 - 36)  SpO2: 96% (10-03-22 @ 10:00) (94% - 100%)  epoetin sherry-epbx (RETACRIT) Injectable [id number] Unit(s) IV Push once, 22 @ 09:17, Routine, Stop order after: 1 Doses  epoetin sherry-epbx (RETACRIT) Injectable [id number] Unit(s) IV Push once, 10-03-22 @ 07:41, Routine, Stop order after: 1 Doses  sevelamer carbonate 800 milliGRAM(s) Oral every 8 hours, 22 @ 11:31, STAT  sevelamer carbonate Powder 1600 milliGRAM(s) Enteral Tube every 8 hours, 22 @ 11:33, Routine  sevelamer carbonate Powder 800 milliGRAM(s) Enteral Tube every 8 hours, 10-03-22 @ 10:46, Routine      Hemodialysis Treatment.:     Schedule: Once, Modality: Hemodialysis, Access: Internal Jugular Central Venous Catheter    Dialyzer: Optiflux [id number], Time: 180 Min    Blood Flow: 400 mL/Min , Dialysate Flow: 500 mL/Min, Dialysate Temp: 36.5, Tubinmm (Adult)    Target Fluid Removal: 3 Liters    Dialysate Electrolytes (mEq/L): Potassium 3, Calcium 2.5, Sodium 138, Bicarbonate 35 (10-03-22 @ 07:41) [Active]    Seen on HD, no acute complaints noted. 's. c/w prescription as outlined above

## 2022-10-03 NOTE — PROGRESS NOTE ADULT - ATTENDING COMMENTS
s/p cardiac arrest with AHRF, rib fractures, metabolic encephalopathy, ATN  physical as above  more responsive  add dilaudid for pain  resp mechanics look better so hold off on plating  try to keep fluids slightly negative thru week  discussing PEG  decision making of high complexity

## 2022-10-03 NOTE — PROGRESS NOTE ADULT - SUBJECTIVE AND OBJECTIVE BOX
ROSA ISELA LAYTONGI  82y  Female    Patient is a 82y old  Female who presents with a chief complaint of Acute hypoxic respiratory failure (03 Oct 2022 11:12)      INTERVAL HPI/OVERNIGHT EVENTS:     ROS: fever/chills, fatigue, nausea, vomiting, headache, stuffiness, sore throat, chest pain, palpitations, edema, SOB, cough, wheezing, changes in appetite, changes in bowel movements, contipation, diarrhea, abdominal pain, dizziness, fainting/LOC      T(C): 36.7 (10-03-22 @ 14:18), Max: 37.6 (10-02-22 @ 18:01)  HR: 111 (10-03-22 @ 15:00) (92 - 116)  BP: 110/69 (10-03-22 @ 15:00) (93/45 - 153/66)  RR: 31 (10-03-22 @ 15:00) (18 - 36)  SpO2: 98% (10-03-22 @ 15:00) (94% - 100%)  Wt(kg): --Vital Signs Last 24 Hrs  T(C): 36.7 (03 Oct 2022 14:18), Max: 37.6 (02 Oct 2022 18:01)  T(F): 98.1 (03 Oct 2022 14:18), Max: 99.7 (02 Oct 2022 18:01)  HR: 111 (03 Oct 2022 15:00) (92 - 116)  BP: 110/69 (03 Oct 2022 15:00) (93/45 - 153/66)  BP(mean): 86 (03 Oct 2022 15:00) (65 - 95)  RR: 31 (03 Oct 2022 15:00) (18 - 36)  SpO2: 98% (03 Oct 2022 15:00) (94% - 100%)    Parameters below as of 03 Oct 2022 15:00      O2 Concentration (%): 50    PHYSICAL EXAM:  GENERAL: NAD; well-groomed; well-developed; AAO x 3; good concentration   Neuro: CN2-12 grossly intact; speech clear; +2/4 DTR in UE and LE b/l; light touch sensation intact of UE and le b/l;  negative Romberg test; no pronator drift; intact tandem gait; intact heel and toe walking; intact finger to nose testing; intact rapid alternating movements  HEENT: NC/AT; MMM; neck supple; good dentition; no nystagmus; no scleral icterus; nasal passages clear; no throid or LN enlargement  Heart: RRR; +s1 and s2; no MRG (or grade 2 _ murmur appreciated at _ ICS); non displaced PMI; no JVD  Lungs: CTA b/l; normal effort; no accesory muscle use; symmetric inhalation and exhalation; vesicular breath sounds; no WWR; normal tactile fremitus; persussion resonant  GI: nondistended; nontender; normal bowel sounds [id number]; percussion typmanic; no organomegaly   Extremities: +2 pulses in UE and LE b/l; no clubbing, cyanosis, or edema b/l, capillary refill <2 sec b/l  Skin: skin dry and warm; no skin tenting; no rashes or lesions  MSK: normal tone; +5/5 strength in upper and lower extremities b/l    Consultant(s) Notes Reviewed:  [x ] YES  [ ] NO  Care Discussed with Consultants/Other Providers [ x] YES  [ ] NO    LABS:                        7.2    7.88  )-----------( 189      ( 03 Oct 2022 05:30 )             24.5     10-03    133<L>  |  95<L>  |  34<H>  ----------------------------<  104<H>  4.1   |  26  |  3.50<H>    Ca    9.4      03 Oct 2022 05:30  Phos  3.8     10-03  Mg     2.4     10-03            CAPILLARY BLOOD GLUCOSE      POCT Blood Glucose.: 113 mg/dL (03 Oct 2022 11:30)  POCT Blood Glucose.: 107 mg/dL (03 Oct 2022 05:26)  POCT Blood Glucose.: 113 mg/dL (02 Oct 2022 23:46)  POCT Blood Glucose.: 130 mg/dL (02 Oct 2022 17:18)      ABG - ( 02 Oct 2022 05:27 )  pH, Arterial: 7.47  pH, Blood: x     /  pCO2: 38    /  pO2: 161   / HCO3: 28    / Base Excess: 3.9   /  SaO2: 100.0                 MEDICATIONS  (STANDING):  albuterol/ipratropium for Nebulization 3 milliLiter(s) Nebulizer every 6 hours  apixaban 2.5 milliGRAM(s) Oral every 12 hours  artificial  tears Solution 1 Drop(s) Both EYES two times a day  chlorhexidine 0.12% Liquid 15 milliLiter(s) Oral Mucosa every 12 hours  chlorhexidine 2% Cloths 1 Application(s) Topical <User Schedule>  dextrose 5%. 1000 milliLiter(s) (50 mL/Hr) IV Continuous <Continuous>  dextrose 5%. 1000 milliLiter(s) (100 mL/Hr) IV Continuous <Continuous>  dextrose 50% Injectable 25 Gram(s) IV Push once  dextrose 50% Injectable 12.5 Gram(s) IV Push once  dextrose 50% Injectable 25 Gram(s) IV Push once  glucagon  Injectable 1 milliGRAM(s) IntraMuscular once  HYDROmorphone  Injectable 0.5 milliGRAM(s) IV Push every 4 hours  influenza  Vaccine (HIGH DOSE) 0.7 milliLiter(s) IntraMuscular once  insulin lispro (ADMELOG) corrective regimen sliding scale   SubCutaneous every 6 hours  iron sucrose IVPB 200 milliGRAM(s) IV Intermittent every 24 hours  lidocaine   4% Patch 1 Patch Transdermal every 24 hours  mupirocin 2% Ointment 1 Application(s) Topical two times a day  nystatin Powder 1 Application(s) Topical three times a day  pantoprazole   Suspension 40 milliGRAM(s) Enteral Tube at bedtime  polyethylene glycol 3350 17 Gram(s) Oral daily  senna 2 Tablet(s) Oral daily  sevelamer carbonate Powder 800 milliGRAM(s) Enteral Tube every 8 hours  tobramycin 0.3% Ophthalmic Solution 2 Drop(s) Right EYE every 6 hours    MEDICATIONS  (PRN):  acetaminophen    Suspension .. 650 milliGRAM(s) Oral every 6 hours PRN Temp greater or equal to 38C (100.4F), Mild Pain (1 - 3), Moderate Pain (4 - 6)  dextrose Oral Gel 15 Gram(s) Oral once PRN Blood Glucose LESS THAN 70 milliGRAM(s)/deciliter  sodium chloride 0.9% lock flush 10 milliLiter(s) IV Push every 1 hour PRN Pre/post blood products, medications, blood draw, and to maintain line patency      RADIOLOGY & ADDITIONAL TESTS:    Imaging Personally Reviewed:  [ ] YES  [ ] NO   GABBY LAYTON  82y  Female    Patient is a 82y old  Female who presents with a chief complaint of Acute hypoxic respiratory failure (03 Oct 2022 11:12)      INTERVAL HPI/OVERNIGHT EVENTS: As per night team, no overnight events. Patient was seen and examined at bedside. Patient opens eyes to name and able to follow commands (squeezes fingers when prompted).      T(C): 36.7 (10-03-22 @ 14:18), Max: 37.6 (10-02-22 @ 18:01)  HR: 111 (10-03-22 @ 15:00) (92 - 116)  BP: 110/69 (10-03-22 @ 15:00) (93/45 - 153/66)  RR: 31 (10-03-22 @ 15:00) (18 - 36)  SpO2: 98% (10-03-22 @ 15:00) (94% - 100%)  Wt(kg): --Vital Signs Last 24 Hrs  T(C): 36.7 (03 Oct 2022 14:18), Max: 37.6 (02 Oct 2022 18:01)  T(F): 98.1 (03 Oct 2022 14:18), Max: 99.7 (02 Oct 2022 18:01)  HR: 111 (03 Oct 2022 15:00) (92 - 116)  BP: 110/69 (03 Oct 2022 15:00) (93/45 - 153/66)  BP(mean): 86 (03 Oct 2022 15:00) (65 - 95)  RR: 31 (03 Oct 2022 15:00) (18 - 36)  SpO2: 98% (03 Oct 2022 15:00) (94% - 100%)    Parameters below as of 03 Oct 2022 15:00      O2 Concentration (%): 50    PHYSICAL EXAM:  GENERAL: morbidly obese female, sedated.   Neuro: AAOx0; + symmetric pupillary light reflex; patient arousable to touch, able to follow commands (squeeze fingers when prompted)  HEENT: tracheostomy with no erythema or swelling; NC/AT; MMM; 1 cm open wound expanding philtrum; neck supple; no scleral icterus; nasal passage w NG tube; no thyroid or LN enlargement; R eye with erythema, improved from prior  Heart: RRR; +s1 and s2; no MRG; non displaced PMI  Lungs: coarse crackles heard anteriorly most prominent in bases  GI: protuberant abdomen; nondistended; normal bowel sounds [id number]   Extremities: patients lower extremities to knee wrapped with ACE bandage and SCDs, +1 pitting edema;; UE 2+ edema with 1+ pulses  Skin: cellulitis of LE wrapped with ACE bandages    Consultant(s) Notes Reviewed:  [x ] YES  [ ] NO  Care Discussed with Consultants/Other Providers [ x] YES  [ ] NO    LABS:                        7.2    7.88  )-----------( 189      ( 03 Oct 2022 05:30 )             24.5     10-03    133<L>  |  95<L>  |  34<H>  ----------------------------<  104<H>  4.1   |  26  |  3.50<H>    Ca    9.4      03 Oct 2022 05:30  Phos  3.8     10-03  Mg     2.4     10-03            CAPILLARY BLOOD GLUCOSE      POCT Blood Glucose.: 113 mg/dL (03 Oct 2022 11:30)  POCT Blood Glucose.: 107 mg/dL (03 Oct 2022 05:26)  POCT Blood Glucose.: 113 mg/dL (02 Oct 2022 23:46)  POCT Blood Glucose.: 130 mg/dL (02 Oct 2022 17:18)      ABG - ( 02 Oct 2022 05:27 )  pH, Arterial: 7.47  pH, Blood: x     /  pCO2: 38    /  pO2: 161   / HCO3: 28    / Base Excess: 3.9   /  SaO2: 100.0                 MEDICATIONS  (STANDING):  albuterol/ipratropium for Nebulization 3 milliLiter(s) Nebulizer every 6 hours  apixaban 2.5 milliGRAM(s) Oral every 12 hours  artificial  tears Solution 1 Drop(s) Both EYES two times a day  chlorhexidine 0.12% Liquid 15 milliLiter(s) Oral Mucosa every 12 hours  chlorhexidine 2% Cloths 1 Application(s) Topical <User Schedule>  dextrose 5%. 1000 milliLiter(s) (50 mL/Hr) IV Continuous <Continuous>  dextrose 5%. 1000 milliLiter(s) (100 mL/Hr) IV Continuous <Continuous>  dextrose 50% Injectable 25 Gram(s) IV Push once  dextrose 50% Injectable 12.5 Gram(s) IV Push once  dextrose 50% Injectable 25 Gram(s) IV Push once  glucagon  Injectable 1 milliGRAM(s) IntraMuscular once  HYDROmorphone  Injectable 0.5 milliGRAM(s) IV Push every 4 hours  influenza  Vaccine (HIGH DOSE) 0.7 milliLiter(s) IntraMuscular once  insulin lispro (ADMELOG) corrective regimen sliding scale   SubCutaneous every 6 hours  iron sucrose IVPB 200 milliGRAM(s) IV Intermittent every 24 hours  lidocaine   4% Patch 1 Patch Transdermal every 24 hours  mupirocin 2% Ointment 1 Application(s) Topical two times a day  nystatin Powder 1 Application(s) Topical three times a day  pantoprazole   Suspension 40 milliGRAM(s) Enteral Tube at bedtime  polyethylene glycol 3350 17 Gram(s) Oral daily  senna 2 Tablet(s) Oral daily  sevelamer carbonate Powder 800 milliGRAM(s) Enteral Tube every 8 hours  tobramycin 0.3% Ophthalmic Solution 2 Drop(s) Right EYE every 6 hours    MEDICATIONS  (PRN):  acetaminophen    Suspension .. 650 milliGRAM(s) Oral every 6 hours PRN Temp greater or equal to 38C (100.4F), Mild Pain (1 - 3), Moderate Pain (4 - 6)  dextrose Oral Gel 15 Gram(s) Oral once PRN Blood Glucose LESS THAN 70 milliGRAM(s)/deciliter  sodium chloride 0.9% lock flush 10 milliLiter(s) IV Push every 1 hour PRN Pre/post blood products, medications, blood draw, and to maintain line patency      RADIOLOGY & ADDITIONAL TESTS:    Imaging Personally Reviewed:  [ ] YES  [ ] NO

## 2022-10-04 NOTE — PROGRESS NOTE ADULT - ATTENDING COMMENTS
Chronic metabolic encephalopathy with chronic resp failure s/p trach (vent dependent) with COPD with CHF. Rest as above.

## 2022-10-04 NOTE — PROGRESS NOTE ADULT - PROBLEM SELECTOR PLAN 5
Patient with Hgb of 7.3; reticulocyte count 1.8     - reticulocyte count indicative of underproduction  - monitor CBC  - maintain active T &S (last 10/4)  - continue IV iron for 5 days (day 1 9/30) Pt with COPD on home 3L.     - continue to monitor oxygen saturation; patient currently on ventilator

## 2022-10-04 NOTE — PROCEDURE NOTE - NSPOSTCAREGUIDE_GEN_A_CORE
Verbal/written post procedure instructions were given to patient/caregiver/Instructed patient/caregiver regarding signs and symptoms of infection/Keep the cast/splint/dressing clean and dry/Care for catheter as per unit/ICU protocols
Verbal/written post procedure instructions were given to patient/caregiver/Instructed patient/caregiver to follow-up with primary care physician/Instructed patient/caregiver regarding signs and symptoms of infection/Keep the cast/splint/dressing clean and dry/Care for catheter as per unit/ICU protocols
Verbal/written post procedure instructions were given to patient/caregiver/Instructed patient/caregiver regarding signs and symptoms of infection/Keep the cast/splint/dressing clean and dry/Care for catheter as per unit/ICU protocols
Verbal/written post procedure instructions were given to patient/caregiver/Instructed patient/caregiver to follow-up with primary care physician/Instructed patient/caregiver regarding signs and symptoms of infection/Keep the cast/splint/dressing clean and dry/Care for catheter as per unit/ICU protocols
Verbal/written post procedure instructions were given to patient/caregiver/Instructed patient/caregiver regarding signs and symptoms of infection/Keep the cast/splint/dressing clean and dry/Care for catheter as per unit/ICU protocols
Verbal/written post procedure instructions were given to patient/caregiver/Instructed patient/caregiver to follow-up with primary care physician/Instructed patient/caregiver regarding signs and symptoms of infection/Keep the cast/splint/dressing clean and dry/Care for catheter as per unit/ICU protocols
Verbal/written post procedure instructions were given to patient/caregiver/Instructed patient/caregiver regarding signs and symptoms of infection/Keep the cast/splint/dressing clean and dry/Care for catheter as per unit/ICU protocols
Verbal/written post procedure instructions were given to patient/caregiver/Instructed patient/caregiver to follow-up with primary care physician/Instructed patient/caregiver regarding signs and symptoms of infection/Keep the cast/splint/dressing clean and dry/Care for catheter as per unit/ICU protocols

## 2022-10-04 NOTE — PROGRESS NOTE ADULT - PROBLEM SELECTOR PLAN 8
Patient initially presented to ED w b/l LE cellulitis and was treated with antibiotics.     - As per vascular, wrap kerlix gauze and ACE bandages around both legs. Wrap toes to just below the knee.   - Change bandages every few days  - continue to monitor

## 2022-10-04 NOTE — CHART NOTE - NSCHARTNOTEFT_GEN_A_CORE
Admitting Diagnosis:   Patient is a 82y old  Female who presents with a chief complaint of Acute hypoxic respiratory failure (04 Oct 2022 15:47)      PAST MEDICAL & SURGICAL HISTORY:  Lyme disease      DVT (deep venous thrombosis)      Skin cancer      Brain embolism and thrombosis      MRSA (methicillin resistant Staphylococcus aureus)      PNA (pneumonia)      COPD (chronic obstructive pulmonary disease)      H/O angioplasty      Status post laparoscopic-assisted sigmoidectomy      H/O shoulder surgery          Current Nutrition Order: Nepro @40 ml/hr with LPS x1/day        PO Intake: N/A    GI Issues: Abdomen ND/NT, +BS x4, LBM 10/2    Pain: No non-verbal indicators present     Skin Integrity: PI stg II sacrum, unstageable upper lip, +2 BUE, +3 BLE    Labs:   10-04    135  |  98  |  22  ----------------------------<  112<H>  3.6   |  28  |  2.62<H>    Ca    9.3      04 Oct 2022 05:30  Phos  2.5     10-04  Mg     2.1     10-04      CAPILLARY BLOOD GLUCOSE      POCT Blood Glucose.: 117 mg/dL (04 Oct 2022 11:51)  POCT Blood Glucose.: 118 mg/dL (04 Oct 2022 06:14)  POCT Blood Glucose.: 102 mg/dL (04 Oct 2022 00:30)  POCT Blood Glucose.: 125 mg/dL (03 Oct 2022 16:47)      Medications:  MEDICATIONS  (STANDING):  albuterol/ipratropium for Nebulization 3 milliLiter(s) Nebulizer every 6 hours  apixaban 2.5 milliGRAM(s) Oral every 12 hours  artificial  tears Solution 1 Drop(s) Both EYES two times a day  chlorhexidine 0.12% Liquid 15 milliLiter(s) Oral Mucosa every 12 hours  chlorhexidine 2% Cloths 1 Application(s) Topical <User Schedule>  dextrose 5%. 1000 milliLiter(s) (50 mL/Hr) IV Continuous <Continuous>  dextrose 5%. 1000 milliLiter(s) (100 mL/Hr) IV Continuous <Continuous>  dextrose 50% Injectable 25 Gram(s) IV Push once  dextrose 50% Injectable 12.5 Gram(s) IV Push once  dextrose 50% Injectable 25 Gram(s) IV Push once  glucagon  Injectable 1 milliGRAM(s) IntraMuscular once  HYDROmorphone  Injectable 1 milliGRAM(s) IV Push every 4 hours  influenza  Vaccine (HIGH DOSE) 0.7 milliLiter(s) IntraMuscular once  insulin lispro (ADMELOG) corrective regimen sliding scale   SubCutaneous every 6 hours  lidocaine   4% Patch 1 Patch Transdermal every 24 hours  mupirocin 2% Ointment 1 Application(s) Topical two times a day  nystatin Powder 1 Application(s) Topical three times a day  pantoprazole   Suspension 40 milliGRAM(s) Enteral Tube at bedtime  polyethylene glycol 3350 17 Gram(s) Oral daily  senna 2 Tablet(s) Oral daily  sevelamer carbonate Powder 800 milliGRAM(s) Enteral Tube every 8 hours  tobramycin 0.3% Ophthalmic Solution 2 Drop(s) Right EYE every 6 hours    MEDICATIONS  (PRN):  acetaminophen    Suspension .. 650 milliGRAM(s) Oral every 6 hours PRN Temp greater or equal to 38C (100.4F), Mild Pain (1 - 3), Moderate Pain (4 - 6)  dextrose Oral Gel 15 Gram(s) Oral once PRN Blood Glucose LESS THAN 70 milliGRAM(s)/deciliter  sodium chloride 0.9% lock flush 10 milliLiter(s) IV Push every 1 hour PRN Pre/post blood products, medications, blood draw, and to maintain line patency    Height for BMI (CENTIMETERS)	172.7 Centimeter(s)  Weight for BMI (lbs)	225.5 lb  Weight for BMI (kg)	102.3 kg  Body Mass Index	34.2    Weight Change:   10/3 - 120.8 kg   Monitor sequential wts for validity of wt change. Pt with noted edema.     Estimated energy needs:   Weight used for calculations	IBW  Estimated Energy Needs Weight (lbs)	140.2 lb  Estimated Energy Needs Weight (kg)	63.6 kg  Estimated Energy Needs From (shane/kg)	25  Estimated Energy Needs To (shane/kg)	30  Estimated Energy Needs Calculated From (shane/kg)	1590  Estimated Energy Needs Calculated To (shane/kg)	1908  Weight used for calculations	IBW  Estimated Protein Needs Weight (lbs)	140.2 lb  Estimated Protein Needs Weight (kg)	63.6 kg  Estimated Protein Needs From (g/kg)	1.4  Estimated Protein Needs To (g/kg)	1.6  Estimated Protein Needs Calculated From (g/kg)	89.04  Estimated Protein Needs Calculated To (g/kg)	101.76  Estimated Fluid Needs Weight (lbs)	140.2 lb  Estimated Fluid Needs Weight (kg)	63.6 kg  Estimated Fluid Needs From (ml/kg)	30  Estimated Fluid Needs To (ml/kg)	35  Estimated Fluid Needs Calculated From (ml/kg)	1908  Estimated Fluid Needs Calculated To (ml/kg)	2226  -Needs determined using IBW with consideration for vented pt on trache.     Subjective: 81F with complex medical history including obesity, HTN, HLD, HFpEF (EF 55-60% last echo 2/20), COPD (on home O2-3L), chronic LE edema with neuropathic pain and cellulitis, DALIA, PVD p/w respiratory distress x 1 day found to have cellulitis on lower extremity. 9/9: TF started. 9/12: Started CVVHD, TF changed to Nepro. 9/13: Elevated GRV, TF held. 9/17: Weaning off pressors/sedation. 9/18: Febrile. 9/20: On propofol in prep for trache. 9/22: Tracheostomy. Propofol off s/p procedure. 9/25: Did not tolerate trache collar. 9/26: TF held. 10/2: D/c'd propofol. 10/4: Step down to 7LA.     Pt care discussed in IDT rounds. Rx and labs reviewed. Pt continue on trache and nutrition via NGT; vent to CPAP, MAP 90. Pt would benefit from PEG iso likely prolonged period of enteral nutrition support; continued GOC discussions with family -monitor agreeability and placement of PEG. Pt otherwise tolerated TF at goal with no new reports of GI distress or other nutritional concerns. RDN will continue to monitor per protocol and PRN.     Previous Nutrition Diagnosis: Inadequate Oral Intake r/t critical illness requiring mechanical ventilation AEB need for NPO status    Active [ x ]  Resolved [   ]    If resolved, new PES:     Goal: Pt will meet 75% or more of protein/energy needs via most appropriate route for nutrition    Recommendations:  -Continue TF as ordered    *Recommend Nepro advance by 20 ml/hr q4hr toward goal of 40 ml/hr with LPS x1/day to provide 960 ml TF (102%RDI), 1828 kcal, 93 gProt., and 698 ml FW. This is 22.9 non-protein kcal and 1.46 gProt. per kg IBW 63.6kg.   -Monitor TF tolerance; monitor potential for PEG placement   -Monitor chemistry, GI fxn, and skin integrity     Risk Level: High [   ] Moderate [ x ] Low [   ].

## 2022-10-04 NOTE — PROCEDURE NOTE - PROCEDURE DATE TIME, MLM
04-Oct-2022 18:48
12-Sep-2022 10:00
08-Sep-2022 08:10
12-Sep-2022 18:30
12-Sep-2022 17:45
22-Sep-2022 16:45
04-Oct-2022 17:35
12-Sep-2022 08:55
12-Sep-2022 15:15
22-Sep-2022 17:00

## 2022-10-04 NOTE — PROGRESS NOTE ADULT - PROBLEM SELECTOR PLAN 7
Patient with Hgb of 7.3; reticulocyte count 1.8     - reticulocyte count indicative of underproduction  - monitor CBC  - maintain active T &S (last 10/4)  - continue IV iron for 5 days (day 1 9/30)

## 2022-10-04 NOTE — PROGRESS NOTE ADULT - PROBLEM SELECTOR PROBLEM 3
Hypertension Chronic obstructive pulmonary disease (COPD) Acute on chronic respiratory failure with hypoxia

## 2022-10-04 NOTE — PROGRESS NOTE ADULT - PROBLEM SELECTOR PLAN 3
Pt with COPD on home 3L.     - continue to monitor oxygen saturation; patient currently on ventilator Pt w/ COPD on home 3 L O2, currently worsening hypoxia 2/2 likely in setting of aspiration while eating vs flash pulmonary edema 2/2 chronic htn and chronic HF (relatively the same EF as previous TTE). Tracheostomy placed (9/22). Patient unable to tolerate trach collar. Patient with increased WOB and wincing on exam     -Patient failed CPAP trial 10/4; continue on ventilator and wean when appropriate

## 2022-10-04 NOTE — PROGRESS NOTE ADULT - SUBJECTIVE AND OBJECTIVE BOX
OVERNIGHT EVENTS:      SUBJECTIVE / INTERVAL HPI: Patient seen and examined at bedside.     VITAL SIGNS:  Vital Signs Last 24 Hrs  T(C): 38.6 (04 Oct 2022 14:00), Max: 38.6 (04 Oct 2022 14:00)  T(F): 101.4 (04 Oct 2022 14:00), Max: 101.4 (04 Oct 2022 14:00)  HR: 114 (04 Oct 2022 14:00) (98 - 121)  BP: 164/61 (04 Oct 2022 12:00) (115/56 - 169/72)  BP(mean): 88 (04 Oct 2022 12:00) (73 - 103)  RR: 25 (04 Oct 2022 14:00) (20 - 38)  SpO2: 95% (04 Oct 2022 14:00) (93% - 97%)    Parameters below as of 04 Oct 2022 12:00  Patient On (Oxygen Delivery Method): ventilator    O2 Concentration (%): 50    PHYSICAL EXAM:    General: alert, in no acute distress  HEENT: NC/AT; PERRL, anicteric sclera; MMM  Neck: supple  Cardiovascular: +S1/S2, RRR  Respiratory: CTA B/L; no W/R/R  Gastrointestinal: soft, NT/ND; +BSx4  Extremities: WWP; no edema, clubbing or cyanosis  Vascular: 2+ radial, DP/PT pulses B/L  Neurological: AAOx3; no focal deficits    MEDICATIONS:  MEDICATIONS  (STANDING):  albuterol/ipratropium for Nebulization 3 milliLiter(s) Nebulizer every 6 hours  apixaban 2.5 milliGRAM(s) Oral every 12 hours  artificial  tears Solution 1 Drop(s) Both EYES two times a day  chlorhexidine 0.12% Liquid 15 milliLiter(s) Oral Mucosa every 12 hours  chlorhexidine 2% Cloths 1 Application(s) Topical <User Schedule>  dextrose 5%. 1000 milliLiter(s) (50 mL/Hr) IV Continuous <Continuous>  dextrose 5%. 1000 milliLiter(s) (100 mL/Hr) IV Continuous <Continuous>  dextrose 50% Injectable 25 Gram(s) IV Push once  dextrose 50% Injectable 12.5 Gram(s) IV Push once  dextrose 50% Injectable 25 Gram(s) IV Push once  glucagon  Injectable 1 milliGRAM(s) IntraMuscular once  HYDROmorphone  Injectable 1 milliGRAM(s) IV Push every 4 hours  influenza  Vaccine (HIGH DOSE) 0.7 milliLiter(s) IntraMuscular once  insulin lispro (ADMELOG) corrective regimen sliding scale   SubCutaneous every 6 hours  lidocaine   4% Patch 1 Patch Transdermal every 24 hours  mupirocin 2% Ointment 1 Application(s) Topical two times a day  nystatin Powder 1 Application(s) Topical three times a day  pantoprazole   Suspension 40 milliGRAM(s) Enteral Tube at bedtime  polyethylene glycol 3350 17 Gram(s) Oral daily  senna 2 Tablet(s) Oral daily  sevelamer carbonate Powder 800 milliGRAM(s) Enteral Tube every 8 hours  tobramycin 0.3% Ophthalmic Solution 2 Drop(s) Right EYE every 6 hours    MEDICATIONS  (PRN):  acetaminophen    Suspension .. 650 milliGRAM(s) Oral every 6 hours PRN Temp greater or equal to 38C (100.4F), Mild Pain (1 - 3), Moderate Pain (4 - 6)  dextrose Oral Gel 15 Gram(s) Oral once PRN Blood Glucose LESS THAN 70 milliGRAM(s)/deciliter  sodium chloride 0.9% lock flush 10 milliLiter(s) IV Push every 1 hour PRN Pre/post blood products, medications, blood draw, and to maintain line patency      ALLERGIES:  Allergies    Altabax (Unknown)  Augmentin (Unknown)  clindamycin (Unknown)  Vaseline (Swelling)    Intolerances        LABS:                        7.3    7.08  )-----------( 171      ( 04 Oct 2022 05:30 )             24.6     10-04    135  |  98  |  22  ----------------------------<  112<H>  3.6   |  28  |  2.62<H>    Ca    9.3      04 Oct 2022 05:30  Phos  2.5     10-04  Mg     2.1     10-04          CAPILLARY BLOOD GLUCOSE      POCT Blood Glucose.: 117 mg/dL (04 Oct 2022 11:51)      RADIOLOGY & ADDITIONAL TESTS: Reviewed. **Acceptance Note from MICU to [facility]**  Hospital course: 82 yo morbidly obese female w/ pmhx of HTN, HLD, HFpEF (EF 56%), COPD (on home O2-3L), chronic LE edema and cellulitis, DALIA, PVD p/w respiratory distress 2/2 severe sepsis due to cellulitis on lower extremity. Course c/b cardiac arrest followed by septic shock and hypoxic respiratory failure, likely 2/2 PNA. Cardiac arrest led to flail chest w pneumothorax, oliguric BRANT progressing to kidney failure; patient currently on TDC (followed by nephro). Patient extubated w trach, failed trach collar. Patient failed CPAP trial x 2 ( last one 10/4). Patients sedatives were stopped on 9/22 and mental status started to return 10/2 (now able to follow commands, opens eyes spontaneously). Active conversation with proxy, Lisa, about PEG tube (unsure as of now.) Patient also has been spiking fevers with ET tube growth of Corynebacterium on 9/28; started on vancomycin, however has been supratherapeutic (only given 1 dose).     OVERNIGHT EVENTS:    SUBJECTIVE / INTERVAL HPI: Patient seen and examined at bedside.     VITAL SIGNS:  Vital Signs Last 24 Hrs  T(C): 38.6 (04 Oct 2022 14:00), Max: 38.6 (04 Oct 2022 14:00)  T(F): 101.4 (04 Oct 2022 14:00), Max: 101.4 (04 Oct 2022 14:00)  HR: 114 (04 Oct 2022 14:00) (98 - 121)  BP: 164/61 (04 Oct 2022 12:00) (115/56 - 169/72)  BP(mean): 88 (04 Oct 2022 12:00) (73 - 103)  RR: 25 (04 Oct 2022 14:00) (20 - 38)  SpO2: 95% (04 Oct 2022 14:00) (93% - 97%)    Parameters below as of 04 Oct 2022 12:00  Patient On (Oxygen Delivery Method): ventilator    O2 Concentration (%): 50    PHYSICAL EXAM:  GENERAL: morbidly obese female, sedated.   Neuro: AAOx0; + symmetric pupillary light reflex; patient arousable to touch, able to follow commands (squeeze fingers when prompted)  HEENT: tracheostomy with no erythema or swelling; NC/AT; MMM; 1 cm open wound expanding philtrum; neck supple; no scleral icterus; nasal passage w NG tube; no thyroid or LN enlargement; R eye with erythema (improved from prior)  Heart: RRR; +s1 and s2; no MRG; non displaced PMI  Lungs: coarse crackles heard anteriorly most prominent in bases  GI: protuberant abdomen; nondistended; normal bowel sounds [id number]   Extremities: patients lower extremities to knee wrapped with ACE bandage and SCDs, +1 pitting edema; UE 1+ edema with 1+ pulses  Skin: cellulitis of LE b/l wrapped with ACE bandages      MEDICATIONS:  MEDICATIONS  (STANDING):  albuterol/ipratropium for Nebulization 3 milliLiter(s) Nebulizer every 6 hours  apixaban 2.5 milliGRAM(s) Oral every 12 hours  artificial  tears Solution 1 Drop(s) Both EYES two times a day  chlorhexidine 0.12% Liquid 15 milliLiter(s) Oral Mucosa every 12 hours  chlorhexidine 2% Cloths 1 Application(s) Topical <User Schedule>  dextrose 5%. 1000 milliLiter(s) (50 mL/Hr) IV Continuous <Continuous>  dextrose 5%. 1000 milliLiter(s) (100 mL/Hr) IV Continuous <Continuous>  dextrose 50% Injectable 25 Gram(s) IV Push once  dextrose 50% Injectable 12.5 Gram(s) IV Push once  dextrose 50% Injectable 25 Gram(s) IV Push once  glucagon  Injectable 1 milliGRAM(s) IntraMuscular once  HYDROmorphone  Injectable 1 milliGRAM(s) IV Push every 4 hours  influenza  Vaccine (HIGH DOSE) 0.7 milliLiter(s) IntraMuscular once  insulin lispro (ADMELOG) corrective regimen sliding scale   SubCutaneous every 6 hours  lidocaine   4% Patch 1 Patch Transdermal every 24 hours  mupirocin 2% Ointment 1 Application(s) Topical two times a day  nystatin Powder 1 Application(s) Topical three times a day  pantoprazole   Suspension 40 milliGRAM(s) Enteral Tube at bedtime  polyethylene glycol 3350 17 Gram(s) Oral daily  senna 2 Tablet(s) Oral daily  sevelamer carbonate Powder 800 milliGRAM(s) Enteral Tube every 8 hours  tobramycin 0.3% Ophthalmic Solution 2 Drop(s) Right EYE every 6 hours    MEDICATIONS  (PRN):  acetaminophen    Suspension .. 650 milliGRAM(s) Oral every 6 hours PRN Temp greater or equal to 38C (100.4F), Mild Pain (1 - 3), Moderate Pain (4 - 6)  dextrose Oral Gel 15 Gram(s) Oral once PRN Blood Glucose LESS THAN 70 milliGRAM(s)/deciliter  sodium chloride 0.9% lock flush 10 milliLiter(s) IV Push every 1 hour PRN Pre/post blood products, medications, blood draw, and to maintain line patency      ALLERGIES:  Allergies    Altabax (Unknown)  Augmentin (Unknown)  clindamycin (Unknown)  Vaseline (Swelling)    Intolerances        LABS:                        7.3    7.08  )-----------( 171      ( 04 Oct 2022 05:30 )             24.6     10-04    135  |  98  |  22  ----------------------------<  112<H>  3.6   |  28  |  2.62<H>    Ca    9.3      04 Oct 2022 05:30  Phos  2.5     10-04  Mg     2.1     10-04          CAPILLARY BLOOD GLUCOSE      POCT Blood Glucose.: 117 mg/dL (04 Oct 2022 11:51)      RADIOLOGY & ADDITIONAL TESTS: Reviewed. **Acceptance Note from MICU to [facility]**  Hospital course: 82 yo morbidly obese female w/ pmhx of HTN, HLD, HFpEF (EF 56%), COPD (on home O2-3L), chronic LE edema and cellulitis, DALIA, PVD p/w respiratory distress 2/2 severe sepsis due to cellulitis on lower extremity. Course c/b cardiac arrest followed by septic shock and hypoxic respiratory failure, likely 2/2 PNA. Cardiac arrest led to flail chest w pneumothorax, oliguric BRANT progressing to kidney failure; patient currently on TDC (followed by nephro). Patient extubated w trach, failed trach collar. Patient failed CPAP trial x 2 ( last one 10/4). Patients sedatives were stopped on 9/22 and mental status started to return 10/2 (now able to follow commands, opens eyes spontaneously). Active conversation with proxy, Lisa, about PEG tube (unsure as of now.) Patient also has been spiking fevers with ET tube growth of Corynebacterium on 9/28; started on vancomycin, however has been supratherapeutic (only given 1 dose).     SUBJECTIVE / INTERVAL HPI: Patient seen and examined at bedside. Patient is able to follow commands (blinking when prompted). AAOx0, unable to complete ROS.      VITAL SIGNS:  Vital Signs Last 24 Hrs  T(C): 38.6 (04 Oct 2022 14:00), Max: 38.6 (04 Oct 2022 14:00)  T(F): 101.4 (04 Oct 2022 14:00), Max: 101.4 (04 Oct 2022 14:00)  HR: 114 (04 Oct 2022 14:00) (98 - 121)  BP: 164/61 (04 Oct 2022 12:00) (115/56 - 169/72)  BP(mean): 88 (04 Oct 2022 12:00) (73 - 103)  RR: 25 (04 Oct 2022 14:00) (20 - 38)  SpO2: 95% (04 Oct 2022 14:00) (93% - 97%)    Parameters below as of 04 Oct 2022 12:00  Patient On (Oxygen Delivery Method): ventilator    O2 Concentration (%): 50    PHYSICAL EXAM:  GENERAL: morbidly obese female, sedated.   Neuro: AAOx0; + symmetric pupillary light reflex; patient arousable to touch, able to follow commands (squeeze fingers when prompted)  HEENT: tracheostomy with no erythema or swelling; NC/AT; MMM; 1 cm open wound expanding philtrum; neck supple; no scleral icterus; nasal passage w NG tube; no thyroid or LN enlargement; R eye with erythema (improved from prior)  Heart: RRR; +s1 and s2; no MRG; non displaced PMI  Lungs: coarse crackles heard anteriorly most prominent in bases  GI: protuberant abdomen; nondistended; normal bowel sounds [id number]   Extremities: patients lower extremities to knee wrapped with ACE bandage and SCDs, +1 pitting edema; UE 1+ edema with 1+ pulses  Skin: cellulitis of LE b/l wrapped with ACE bandages      MEDICATIONS:  MEDICATIONS  (STANDING):  albuterol/ipratropium for Nebulization 3 milliLiter(s) Nebulizer every 6 hours  apixaban 2.5 milliGRAM(s) Oral every 12 hours  artificial  tears Solution 1 Drop(s) Both EYES two times a day  chlorhexidine 0.12% Liquid 15 milliLiter(s) Oral Mucosa every 12 hours  chlorhexidine 2% Cloths 1 Application(s) Topical <User Schedule>  dextrose 5%. 1000 milliLiter(s) (50 mL/Hr) IV Continuous <Continuous>  dextrose 5%. 1000 milliLiter(s) (100 mL/Hr) IV Continuous <Continuous>  dextrose 50% Injectable 25 Gram(s) IV Push once  dextrose 50% Injectable 12.5 Gram(s) IV Push once  dextrose 50% Injectable 25 Gram(s) IV Push once  glucagon  Injectable 1 milliGRAM(s) IntraMuscular once  HYDROmorphone  Injectable 1 milliGRAM(s) IV Push every 4 hours  influenza  Vaccine (HIGH DOSE) 0.7 milliLiter(s) IntraMuscular once  insulin lispro (ADMELOG) corrective regimen sliding scale   SubCutaneous every 6 hours  lidocaine   4% Patch 1 Patch Transdermal every 24 hours  mupirocin 2% Ointment 1 Application(s) Topical two times a day  nystatin Powder 1 Application(s) Topical three times a day  pantoprazole   Suspension 40 milliGRAM(s) Enteral Tube at bedtime  polyethylene glycol 3350 17 Gram(s) Oral daily  senna 2 Tablet(s) Oral daily  sevelamer carbonate Powder 800 milliGRAM(s) Enteral Tube every 8 hours  tobramycin 0.3% Ophthalmic Solution 2 Drop(s) Right EYE every 6 hours    MEDICATIONS  (PRN):  acetaminophen    Suspension .. 650 milliGRAM(s) Oral every 6 hours PRN Temp greater or equal to 38C (100.4F), Mild Pain (1 - 3), Moderate Pain (4 - 6)  dextrose Oral Gel 15 Gram(s) Oral once PRN Blood Glucose LESS THAN 70 milliGRAM(s)/deciliter  sodium chloride 0.9% lock flush 10 milliLiter(s) IV Push every 1 hour PRN Pre/post blood products, medications, blood draw, and to maintain line patency      ALLERGIES:  Allergies    Altabax (Unknown)  Augmentin (Unknown)  clindamycin (Unknown)  Vaseline (Swelling)    Intolerances        LABS:                        7.3    7.08  )-----------( 171      ( 04 Oct 2022 05:30 )             24.6     10-04    135  |  98  |  22  ----------------------------<  112<H>  3.6   |  28  |  2.62<H>    Ca    9.3      04 Oct 2022 05:30  Phos  2.5     10-04  Mg     2.1     10-04          CAPILLARY BLOOD GLUCOSE      POCT Blood Glucose.: 117 mg/dL (04 Oct 2022 11:51)      RADIOLOGY & ADDITIONAL TESTS: Reviewed. **Acceptance Note from MICU to [facility]**  Hospital course: 80 yo morbidly obese female w/ pmhx of HTN, HLD, HFpEF (EF 56%), COPD (on home O2-3L), chronic LE edema and cellulitis, DALIA, PVD p/w respiratory distress 2/2 severe sepsis due to cellulitis on lower extremity. Course c/b cardiac arrest followed by septic shock and hypoxic respiratory failure, likely 2/2 PNA. Cardiac arrest led to flail chest w pneumothorax, oliguric BRANT progressing to kidney failure; patient currently on TDC (followed by nephro). Patient extubated w trach, (trach placed 9/22), failed trach collar. Patient failed CPAP trial x 2 ( last one 10/4). Patients sedatives were stopped on 9/22 and mental status started to return 10/2 (now able to follow commands, opens eyes spontaneously). Active conversation with proxy, Lisa, about PEG tube (unsure as of now.) Patient also has been spiking fevers with ET tube growth of Corynebacterium on 9/28; started on vancomycin, however has been supratherapeutic (only given 1 dose).     SUBJECTIVE / INTERVAL HPI: Patient seen and examined at bedside. Patient is able to follow commands (blinking when prompted). AAOx0, unable to complete ROS.    VITAL SIGNS:  Vital Signs Last 24 Hrs  T(C): 38.6 (04 Oct 2022 14:00), Max: 38.6 (04 Oct 2022 14:00)  T(F): 101.4 (04 Oct 2022 14:00), Max: 101.4 (04 Oct 2022 14:00)  HR: 114 (04 Oct 2022 14:00) (98 - 121)  BP: 164/61 (04 Oct 2022 12:00) (115/56 - 169/72)  BP(mean): 88 (04 Oct 2022 12:00) (73 - 103)  RR: 25 (04 Oct 2022 14:00) (20 - 38)  SpO2: 95% (04 Oct 2022 14:00) (93% - 97%)    Parameters below as of 04 Oct 2022 12:00  Patient On (Oxygen Delivery Method): ventilator    O2 Concentration (%): 50    PHYSICAL EXAM:  GENERAL: morbidly obese female, sedated.   Neuro: AAOx0; + symmetric pupillary light reflex; patient arousable to touch, able to follow commands (blinking when prompted)  HEENT: tracheostomy with no erythema or swelling; NC/AT; MMM; 1 cm open wound expanding philtrum; neck supple; no scleral icterus; nasal passage w NG tube; no thyroid or LN enlargement; R eye with erythema (improved from prior)  Heart: RRR; +s1 and s2; no MRG; non displaced PMI  Lungs: coarse crackles heard anteriorly most prominent in bases  GI: protuberant abdomen; nondistended; normal bowel sounds [id number]   Extremities: patients lower extremities to knee wrapped with ACE bandage and SCDs, +1 pitting edema; UE 1+ edema with 1+ pulses  Skin: cellulitis of LE b/l wrapped with ACE bandages      MEDICATIONS:  MEDICATIONS  (STANDING):  albuterol/ipratropium for Nebulization 3 milliLiter(s) Nebulizer every 6 hours  apixaban 2.5 milliGRAM(s) Oral every 12 hours  artificial  tears Solution 1 Drop(s) Both EYES two times a day  chlorhexidine 0.12% Liquid 15 milliLiter(s) Oral Mucosa every 12 hours  chlorhexidine 2% Cloths 1 Application(s) Topical <User Schedule>  dextrose 5%. 1000 milliLiter(s) (50 mL/Hr) IV Continuous <Continuous>  dextrose 5%. 1000 milliLiter(s) (100 mL/Hr) IV Continuous <Continuous>  dextrose 50% Injectable 25 Gram(s) IV Push once  dextrose 50% Injectable 12.5 Gram(s) IV Push once  dextrose 50% Injectable 25 Gram(s) IV Push once  glucagon  Injectable 1 milliGRAM(s) IntraMuscular once  HYDROmorphone  Injectable 1 milliGRAM(s) IV Push every 4 hours  influenza  Vaccine (HIGH DOSE) 0.7 milliLiter(s) IntraMuscular once  insulin lispro (ADMELOG) corrective regimen sliding scale   SubCutaneous every 6 hours  lidocaine   4% Patch 1 Patch Transdermal every 24 hours  mupirocin 2% Ointment 1 Application(s) Topical two times a day  nystatin Powder 1 Application(s) Topical three times a day  pantoprazole   Suspension 40 milliGRAM(s) Enteral Tube at bedtime  polyethylene glycol 3350 17 Gram(s) Oral daily  senna 2 Tablet(s) Oral daily  sevelamer carbonate Powder 800 milliGRAM(s) Enteral Tube every 8 hours  tobramycin 0.3% Ophthalmic Solution 2 Drop(s) Right EYE every 6 hours    MEDICATIONS  (PRN):  acetaminophen    Suspension .. 650 milliGRAM(s) Oral every 6 hours PRN Temp greater or equal to 38C (100.4F), Mild Pain (1 - 3), Moderate Pain (4 - 6)  dextrose Oral Gel 15 Gram(s) Oral once PRN Blood Glucose LESS THAN 70 milliGRAM(s)/deciliter  sodium chloride 0.9% lock flush 10 milliLiter(s) IV Push every 1 hour PRN Pre/post blood products, medications, blood draw, and to maintain line patency      ALLERGIES:  Allergies    Altabax (Unknown)  Augmentin (Unknown)  clindamycin (Unknown)  Vaseline (Swelling)    Intolerances        LABS:                        7.3    7.08  )-----------( 171      ( 04 Oct 2022 05:30 )             24.6     10-04    135  |  98  |  22  ----------------------------<  112<H>  3.6   |  28  |  2.62<H>    Ca    9.3      04 Oct 2022 05:30  Phos  2.5     10-04  Mg     2.1     10-04          CAPILLARY BLOOD GLUCOSE      POCT Blood Glucose.: 117 mg/dL (04 Oct 2022 11:51)      RADIOLOGY & ADDITIONAL TESTS: Reviewed.

## 2022-10-04 NOTE — PROCEDURE NOTE - NSICDXPROCEDURE_GEN_ALL_CORE_FT
PROCEDURES:  Arterial puncture 26-Sep-2022 19:43:49  Faviola Cummings  Insertion, needle, vein 04-Oct-2022 17:35:50  Faviola Cummings  Insertion of intravenous catheter in adult patient 04-Oct-2022 18:49:36  Faviola Cummings  Insertion, needle, vein 04-Oct-2022 18:49:42  Faviola Cummings  
PROCEDURES:  Ultrasound guidance for insertion of chest tube 08-Sep-2022 18:12:32  Tucker Brannon  
PROCEDURES:  Insertion of 2 intravenous catheters 22-Sep-2022 17:54:32  Salome Mcdermott  Ultrasound guidance for vascular access 22-Sep-2022 17:54:54  Salome Mcdermott  
PROCEDURES:  Culture blood 12-Sep-2022 13:24:09  Salome Mcdermott  Ultrasound guided venous access 12-Sep-2022 13:24:28  Salome Mcdermott  Blood draw, venipuncture 12-Sep-2022 13:24:36  Salome Mcdermott  
PROCEDURES:  Insertion of 2 intravenous catheters 22-Sep-2022 17:54:32  Salome Mcdermott  Ultrasound guidance for vascular access 22-Sep-2022 17:54:54  Salome Mcdermott  
PROCEDURES:  Insertion, arterial line, percutaneous 12-Sep-2022 18:07:27  Salome Mcdermott  Ultrasound guidance for vascular access 12-Sep-2022 18:07:37  Salome Mcdermott  
PROCEDURES:  Ultrasound guidance, for vascular access 12-Sep-2022 09:11:09  Salome Mcdermott  Arterial puncture 26-Sep-2022 19:43:49  Faviola Cummings  Insertion, needle, vein 04-Oct-2022 17:35:50  Faviola Cummings  
PROCEDURES:  Ultrasound guided venous access 12-Sep-2022 13:24:28  Salome Mcdermott  Insertion of central venous catheter with ultrasound guidance 12-Sep-2022 15:28:50  Salome Mcdermott  
PROCEDURES:  Insertion of central venous catheter with ultrasound guidance 12-Sep-2022 19:43:54  Bel Torres  
PROCEDURES:  Insertion, arterial line, radial, left 12-Sep-2022 09:10:56  Salome Mcdermott  Ultrasound guidance, for vascular access 12-Sep-2022 09:11:09  Salome Mcdermott

## 2022-10-04 NOTE — PROCEDURE NOTE - NSINFORMCONSENT_GEN_A_CORE
Benefits, risks, and possible complications of procedure explained to patient/caregiver who verbalized understanding and gave verbal consent.
This was an emergent procedure.
Benefits, risks, and possible complications of procedure explained to patient/caregiver who verbalized understanding and gave verbal consent.
This was an emergent procedure.
This was an emergent procedure.

## 2022-10-04 NOTE — PROGRESS NOTE ADULT - PROBLEM SELECTOR PLAN 6
Patient initially presented to ED w b/l LE cellulitis and was treated with antibiotics.     - As per vascular, wrap kerlix gauze and ACE bandages around both legs. Wrap toes to just below the knee.   - Change bandages every few days  - continue to monitor Pt on home bumex 2mg.  TTE from current visit showing EF of 56% with Pulmonary HTN - PAP 48. Experienced pulmonary edema and effusion postcardiac arrest.    - Continue to monitor hemodynamics

## 2022-10-04 NOTE — PROGRESS NOTE ADULT - SUBJECTIVE AND OBJECTIVE BOX
Hospital course: 80 yo morbidly obese female w/ pmhx of HTN, HLD, HFpEF (EF 56%), COPD (on home O2-3L), chronic LE edema and cellulitis, DALIA, PVD p/w respiratory distress 2/2 severe sepsis due to cellulitis on lower extremity. Course c/b cardiac arrest followed by septic shock and hypoxic respiratory failure, likely 2/2 PNA. Cardiac arrest led to flail chest w pneumothorax, oliguric BRANT progressing to kidney failure; patient currently on TDC (followed by nephro). Patient extubated w trach, failed trach collar. Patient failed CPAP trial x 2 ( last one 10/4). Patients sedatives were stopped on 9/22 and mental status started to return 10/2 (now able to follow commands, opens eyes spontaneously). Active conversation with proxy, Lisa, about PEG tube (unsure as of now.) Patient also has been spiking fevers with ET tube growth of Corynebacterium on 9/28; started on vancomycin, however has been supratherapeutic (only given 1 dose).     GABBY LAYTON  82y  Female    Patient is a 82y old  Female who presents with a chief complaint of Acute hypoxic respiratory failure (04 Oct 2022 07:50)      INTERVAL HPI/OVERNIGHT EVENTS: As per night team, NG tube clogged- replaced. Van level 45.5 (supratherapeutic). Patient seen and examined at bedside. Patient more alert, opens eyes to name and able to follow commands (will squeeze hand when prompted). Patient shakes her head yes when asked if she is in pain.       T(C): 38.6 (10-04-22 @ 14:00), Max: 38.6 (10-04-22 @ 14:00)  HR: 114 (10-04-22 @ 14:00) (98 - 121)  BP: 164/61 (10-04-22 @ 12:00) (110/69 - 169/72)  RR: 25 (10-04-22 @ 14:00) (20 - 38)  SpO2: 95% (10-04-22 @ 14:00) (93% - 98%)  Wt(kg): --Vital Signs Last 24 Hrs  T(C): 38.6 (04 Oct 2022 14:00), Max: 38.6 (04 Oct 2022 14:00)  T(F): 101.4 (04 Oct 2022 14:00), Max: 101.4 (04 Oct 2022 14:00)  HR: 114 (04 Oct 2022 14:00) (98 - 121)  BP: 164/61 (04 Oct 2022 12:00) (110/69 - 169/72)  BP(mean): 88 (04 Oct 2022 12:00) (73 - 103)  RR: 25 (04 Oct 2022 14:00) (20 - 38)  SpO2: 95% (04 Oct 2022 14:00) (93% - 98%)    Parameters below as of 04 Oct 2022 12:00  Patient On (Oxygen Delivery Method): ventilator    O2 Concentration (%): 50    PHYSICAL EXAM:  GENERAL: morbidly obese female, sedated.   Neuro: AAOx0; + symmetric pupillary light reflex; patient arousable to touch, able to follow commands (squeeze fingers when prompted)  HEENT: tracheostomy with no erythema or swelling; NC/AT; MMM; 1 cm open wound expanding philtrum; neck supple; no scleral icterus; nasal passage w NG tube; no thyroid or LN enlargement; R eye with erythema (improved from prior)  Heart: RRR; +s1 and s2; no MRG; non displaced PMI  Lungs: coarse crackles heard anteriorly most prominent in bases  GI: protuberant abdomen; nondistended; normal bowel sounds [id number]   Extremities: patients lower extremities to knee wrapped with ACE bandage and SCDs, +1 pitting edema; UE 1+ edema with 1+ pulses  Skin: cellulitis of LE b/l wrapped with ACE bandages    Consultant(s) Notes Reviewed:  [x ] YES  [ ] NO  Care Discussed with Consultants/Other Providers [ x] YES  [ ] NO    LABS:                        7.3    7.08  )-----------( 171      ( 04 Oct 2022 05:30 )             24.6     10-04    135  |  98  |  22  ----------------------------<  112<H>  3.6   |  28  |  2.62<H>    Ca    9.3      04 Oct 2022 05:30  Phos  2.5     10-04  Mg     2.1     10-04            CAPILLARY BLOOD GLUCOSE      POCT Blood Glucose.: 117 mg/dL (04 Oct 2022 11:51)  POCT Blood Glucose.: 118 mg/dL (04 Oct 2022 06:14)  POCT Blood Glucose.: 102 mg/dL (04 Oct 2022 00:30)  POCT Blood Glucose.: 125 mg/dL (03 Oct 2022 16:47)            MEDICATIONS  (STANDING):  albuterol/ipratropium for Nebulization 3 milliLiter(s) Nebulizer every 6 hours  apixaban 2.5 milliGRAM(s) Oral every 12 hours  artificial  tears Solution 1 Drop(s) Both EYES two times a day  chlorhexidine 0.12% Liquid 15 milliLiter(s) Oral Mucosa every 12 hours  chlorhexidine 2% Cloths 1 Application(s) Topical <User Schedule>  dextrose 5%. 1000 milliLiter(s) (50 mL/Hr) IV Continuous <Continuous>  dextrose 5%. 1000 milliLiter(s) (100 mL/Hr) IV Continuous <Continuous>  dextrose 50% Injectable 25 Gram(s) IV Push once  dextrose 50% Injectable 12.5 Gram(s) IV Push once  dextrose 50% Injectable 25 Gram(s) IV Push once  glucagon  Injectable 1 milliGRAM(s) IntraMuscular once  HYDROmorphone  Injectable 1 milliGRAM(s) IV Push every 4 hours  influenza  Vaccine (HIGH DOSE) 0.7 milliLiter(s) IntraMuscular once  insulin lispro (ADMELOG) corrective regimen sliding scale   SubCutaneous every 6 hours  lidocaine   4% Patch 1 Patch Transdermal every 24 hours  mupirocin 2% Ointment 1 Application(s) Topical two times a day  nystatin Powder 1 Application(s) Topical three times a day  pantoprazole   Suspension 40 milliGRAM(s) Enteral Tube at bedtime  polyethylene glycol 3350 17 Gram(s) Oral daily  senna 2 Tablet(s) Oral daily  sevelamer carbonate Powder 800 milliGRAM(s) Enteral Tube every 8 hours  tobramycin 0.3% Ophthalmic Solution 2 Drop(s) Right EYE every 6 hours    MEDICATIONS  (PRN):  acetaminophen    Suspension .. 650 milliGRAM(s) Oral every 6 hours PRN Temp greater or equal to 38C (100.4F), Mild Pain (1 - 3), Moderate Pain (4 - 6)  dextrose Oral Gel 15 Gram(s) Oral once PRN Blood Glucose LESS THAN 70 milliGRAM(s)/deciliter  sodium chloride 0.9% lock flush 10 milliLiter(s) IV Push every 1 hour PRN Pre/post blood products, medications, blood draw, and to maintain line patency      RADIOLOGY & ADDITIONAL TESTS:    Imaging Personally Reviewed:  [ ] YES  [ ] NO     **Transfer from MICU to [facility]**  Hospital course: 80 yo morbidly obese female w/ pmhx of HTN, HLD, HFpEF (EF 56%), COPD (on home O2-3L), chronic LE edema and cellulitis, DALIA, PVD p/w respiratory distress 2/2 severe sepsis due to cellulitis on lower extremity. Course c/b cardiac arrest followed by septic shock and hypoxic respiratory failure, likely 2/2 PNA. Cardiac arrest led to flail chest w pneumothorax, oliguric BRANT progressing to kidney failure; patient currently on TDC (followed by nephro). Patient extubated w trach, failed trach collar. Patient failed CPAP trial x 2 ( last one 10/4). Patients sedatives were stopped on 9/22 and mental status started to return 10/2 (now able to follow commands, opens eyes spontaneously). Active conversation with proxy, Lisa, about PEG tube (unsure as of now.) Patient also has been spiking fevers with ET tube growth of Corynebacterium on 9/28; started on vancomycin, however has been supratherapeutic (only given 1 dose).     GABBY LAYTON  82y  Female    Patient is a 82y old  Female who presents with a chief complaint of Acute hypoxic respiratory failure (04 Oct 2022 07:50)      INTERVAL HPI/OVERNIGHT EVENTS: As per night team, NG tube clogged- replaced. Van level 45.5 (supratherapeutic). Patient seen and examined at bedside. Patient more alert, opens eyes to name and able to follow commands (will squeeze hand when prompted). Patient shakes her head yes when asked if she is in pain.       T(C): 38.6 (10-04-22 @ 14:00), Max: 38.6 (10-04-22 @ 14:00)  HR: 114 (10-04-22 @ 14:00) (98 - 121)  BP: 164/61 (10-04-22 @ 12:00) (110/69 - 169/72)  RR: 25 (10-04-22 @ 14:00) (20 - 38)  SpO2: 95% (10-04-22 @ 14:00) (93% - 98%)  Wt(kg): --Vital Signs Last 24 Hrs  T(C): 38.6 (04 Oct 2022 14:00), Max: 38.6 (04 Oct 2022 14:00)  T(F): 101.4 (04 Oct 2022 14:00), Max: 101.4 (04 Oct 2022 14:00)  HR: 114 (04 Oct 2022 14:00) (98 - 121)  BP: 164/61 (04 Oct 2022 12:00) (110/69 - 169/72)  BP(mean): 88 (04 Oct 2022 12:00) (73 - 103)  RR: 25 (04 Oct 2022 14:00) (20 - 38)  SpO2: 95% (04 Oct 2022 14:00) (93% - 98%)    Parameters below as of 04 Oct 2022 12:00  Patient On (Oxygen Delivery Method): ventilator    O2 Concentration (%): 50    PHYSICAL EXAM:  GENERAL: morbidly obese female, sedated.   Neuro: AAOx0; + symmetric pupillary light reflex; patient arousable to touch, able to follow commands (squeeze fingers when prompted)  HEENT: tracheostomy with no erythema or swelling; NC/AT; MMM; 1 cm open wound expanding philtrum; neck supple; no scleral icterus; nasal passage w NG tube; no thyroid or LN enlargement; R eye with erythema (improved from prior)  Heart: RRR; +s1 and s2; no MRG; non displaced PMI  Lungs: coarse crackles heard anteriorly most prominent in bases  GI: protuberant abdomen; nondistended; normal bowel sounds [id number]   Extremities: patients lower extremities to knee wrapped with ACE bandage and SCDs, +1 pitting edema; UE 1+ edema with 1+ pulses  Skin: cellulitis of LE b/l wrapped with ACE bandages    Consultant(s) Notes Reviewed:  [x ] YES  [ ] NO  Care Discussed with Consultants/Other Providers [ x] YES  [ ] NO    LABS:                        7.3    7.08  )-----------( 171      ( 04 Oct 2022 05:30 )             24.6     10-04    135  |  98  |  22  ----------------------------<  112<H>  3.6   |  28  |  2.62<H>    Ca    9.3      04 Oct 2022 05:30  Phos  2.5     10-04  Mg     2.1     10-04            CAPILLARY BLOOD GLUCOSE      POCT Blood Glucose.: 117 mg/dL (04 Oct 2022 11:51)  POCT Blood Glucose.: 118 mg/dL (04 Oct 2022 06:14)  POCT Blood Glucose.: 102 mg/dL (04 Oct 2022 00:30)  POCT Blood Glucose.: 125 mg/dL (03 Oct 2022 16:47)            MEDICATIONS  (STANDING):  albuterol/ipratropium for Nebulization 3 milliLiter(s) Nebulizer every 6 hours  apixaban 2.5 milliGRAM(s) Oral every 12 hours  artificial  tears Solution 1 Drop(s) Both EYES two times a day  chlorhexidine 0.12% Liquid 15 milliLiter(s) Oral Mucosa every 12 hours  chlorhexidine 2% Cloths 1 Application(s) Topical <User Schedule>  dextrose 5%. 1000 milliLiter(s) (50 mL/Hr) IV Continuous <Continuous>  dextrose 5%. 1000 milliLiter(s) (100 mL/Hr) IV Continuous <Continuous>  dextrose 50% Injectable 25 Gram(s) IV Push once  dextrose 50% Injectable 12.5 Gram(s) IV Push once  dextrose 50% Injectable 25 Gram(s) IV Push once  glucagon  Injectable 1 milliGRAM(s) IntraMuscular once  HYDROmorphone  Injectable 1 milliGRAM(s) IV Push every 4 hours  influenza  Vaccine (HIGH DOSE) 0.7 milliLiter(s) IntraMuscular once  insulin lispro (ADMELOG) corrective regimen sliding scale   SubCutaneous every 6 hours  lidocaine   4% Patch 1 Patch Transdermal every 24 hours  mupirocin 2% Ointment 1 Application(s) Topical two times a day  nystatin Powder 1 Application(s) Topical three times a day  pantoprazole   Suspension 40 milliGRAM(s) Enteral Tube at bedtime  polyethylene glycol 3350 17 Gram(s) Oral daily  senna 2 Tablet(s) Oral daily  sevelamer carbonate Powder 800 milliGRAM(s) Enteral Tube every 8 hours  tobramycin 0.3% Ophthalmic Solution 2 Drop(s) Right EYE every 6 hours    MEDICATIONS  (PRN):  acetaminophen    Suspension .. 650 milliGRAM(s) Oral every 6 hours PRN Temp greater or equal to 38C (100.4F), Mild Pain (1 - 3), Moderate Pain (4 - 6)  dextrose Oral Gel 15 Gram(s) Oral once PRN Blood Glucose LESS THAN 70 milliGRAM(s)/deciliter  sodium chloride 0.9% lock flush 10 milliLiter(s) IV Push every 1 hour PRN Pre/post blood products, medications, blood draw, and to maintain line patency      RADIOLOGY & ADDITIONAL TESTS:    Imaging Personally Reviewed:  [ ] YES  [ ] NO

## 2022-10-04 NOTE — PROGRESS NOTE ADULT - PROBLEM SELECTOR PLAN 2
Successful reversal of sedative w flumazenil transient; now with improving mentation. 9/26 CT head showed +grey white differentiation w no edema. Patient off sedation.    - continue to assess mentation   - consider decreasing dilaudid

## 2022-10-04 NOTE — PROCEDURE NOTE - NSINDICATIONS_GEN_A_CORE
arterial puncture to obtain ABG's/blood sampling/cannulation purposes/critical patient/monitoring purposes
critical illness/emergency venous access/hypertonic/irritant infusion/venous access
antibiotic therapy
arterial puncture to obtain ABG's/blood sampling/cannulation purposes/critical patient/monitoring purposes
critical illness/dialysis/CRRT
other
other
pneumothorax

## 2022-10-04 NOTE — PROGRESS NOTE ADULT - PROBLEM SELECTOR PLAN 9
CTAP s/f slightly hyperdense solid cortical mass in upper pole of R kidney.    - consider additional renal imaging when stable Experienced a-fib on 09/13 w RVR. self resolved w/o intervention. 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 Serotonin negative. Started on Apixaban 2.5 mg BID    - continue Eliquis 2.5 mg BID iso age and Cr

## 2022-10-04 NOTE — PROGRESS NOTE ADULT - ASSESSMENT
82yo morbidly obese female w/ pmhx of HTN, HLD, HFpEF (EF 56%), COPD (on home O2-3L), chronic LE edema and cellulitis, DALIA, PVD p/w respiratory distress 2/2 severe sepsis due to cellulitis on lower extremity which progressed to septic shock in setting of pneumonia. Hospital course complicated by cardiac arrest, flail chest, and oliguric BRANT progressing to kidney failure.     NEURO    #AMS 2/2 to sedation vs Toxic Metabolic Encephalopathy  Successful reversal of sedative w flumazenil transient; now with improving mentation. 9/26 CT head showed +grey white differentiation w no edema. Patient off sedation  - continue to assess mentation   - consider decreasing dilaudid    PULM  #Acute on chronic resp failure  Pt w/ COPD on home 3 L O2, currently worsening hypoxia 2/2 likely in setting of aspiration while eating vs flash pulmonary edema 2/2 chronic htn and chronic HF (relatively the same EF as previous TTE). Tracheostomy in place. Patient unable to tolerate trach collar. Patient with increased WOB and wincing on exam   -Patient failed CPAP trial 10/4; continue on ventilator and wean when appropriate    #Broken ribs w/ flail chest  Pt with several broken ribs and signs of flail chest with difficulty breathing when sedation weaned off.    - continue to monitor respirations  - continue dilaudid for pain management  - consider consult thoracic surgery for sternal plating    #Chronic obstructive pulmonary disease (COPD)  Pt with COPD on home 3L.   - continue to monitor oxygen saturation; patient currently on ventilator    CARDIOVASCULAR    #Cardiac Arrest w resultant flail chest  Patient intubated and sedated 2/2 to cardiac arrest. 9/23 patient weaned from sedation w trach. Patient not requiring midodrine  - continue to monitor vitals  - if flail chest persists, thoracic surgery consult    #Chronic heart failure with preserved ejection fraction (HFpEF).   Pt on home bumex 2mg.  TTE from current visit showing EF of 56% with Pulmonary HTN - PAP 48. Experienced pulmonary edema and effusion postcardiac arrest.  - Continue to monitor hemodynamics    #Afib  Experienced a-fib on 09/13 w RVR. self resolved w/o intervention. 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 Serotonin negative. Started on Apixaban 2.5 mg BID  - continue Eliquis 2.5 mg BID iso age and Cr    #Hypertension  Pt with history of hypertension on home diltiazem 180mg daily. Patient currently normotensive  - holding home antihypertensive meds     GI  No active issues    RENAL  #Anuric BRANT on CKD  Baseline Cr 2.04 in July 2022, currently uptrending. Lung with inspiratory crackles, 1+ b/l LE edema. Likely iATN 2/2 to hypoperfusion/ischemia in setting of sepsis and cardiac arrest. HD cath placed on 09/12 for CVVHD in setting of worsening electrolytes and increasing fluid status, 9/26 now with TDC.  - continue dialysis when clinically indicated  - maintain MAP >70 for adequate renal perfusion  - strict I/Os, no nephrotoxic meds, renally dose meds  - renal following, appreciate recs  - hyperphosphatemia: continue sevelamer     #Right kidney mass.   CTAP s/f slightly hyperdense solid cortical mass in upper pole of R kidney.  Plan:  - consider additional renal imaging when stable    ENDO  Continue corrective sliding scale    HEME  #Anemia   Patient with Hgb of 7.3; reticulocyte count 1.8   - reticulocyte count indicative of underproduction  - monitor CBC  - maintain active T &S (last 10/4)  - continue IV iron for 5 days (day 1 9/30)    ID    #fever  Patient with low grade fevers over the past few days, now febrile to 101.8 iso ET tube w corynbacterium striatum, started vancomycin 10/1. CXR showed possible mucus plugging, hypertonic saline nebs ordered. 10/4 patient spiked another fever.   - f/up CXR, RVP, blood, urine, and sputum cultures  - vanc dose by level: held since Saturday 2/2 to elevated trough; goal trough 15-20    #Cellulitis  Patient initially presented to ED w b/l LE cellulitis and was treated with antibiotics.   - As per vascular, wrap kerlix gauze and ACE bandages around both legs. Wrap toes to just below the knee.   - Change bandages every few days  - continue to monitor     #Pneumonia  RESOLVED  possibly STANISLAV. Pt w/ bilateral pleural effusions and possible underlying infiltrate, unclear on x-ray. Blood cultures remain negative, sputum cultures neg w/ gram stain showing rare epithelial cells, few WBC's, gram pos rods and cocci in pairs. Patient finished cefepime and vanc on 9/20    #stage 2 pressure ulcer  Patient with stage 2 sacral pressure ulcer.   - Wound care consulted    #Philtrum Wound  Patient with 1 cm deep cut on upper lip 2/2 to intubation    #Blepharitis  Patient with R eye erythema and crusting.   - continue Tobramycin 2 drops q6 hrs  Skin  Lines: HD cath, endurance cath     GOC  Patient is DNR/DNI    E: K>4, Phos>3, Mg>2  N: Tube feeds   DVT ppx: apixaban  GI ppx: Protonix 40 BID  code status: DNR

## 2022-10-04 NOTE — PROGRESS NOTE ADULT - PROBLEM SELECTOR PLAN 1
Patient with low grade fevers over the past few days, now febrile to 101.8 iso ET tube w corynbacterium striatum, started vancomycin 10/1. CXR showed possible mucus plugging, hypertonic saline nebs ordered. 10/4 patient spiked another fever to 101.4.    - f/up CXR, RVP, blood, urine, and sputum cultures  - vanc dose by level: held since Saturday 2/2 to elevated trough; goal trough 15-20

## 2022-10-04 NOTE — PROGRESS NOTE ADULT - PROBLEM SELECTOR PLAN 11
F: None  E: K>4, Phos>3, Mg>2  N: Tube feeds   DVT ppx: apixaban  GI ppx: Protonix 40 BID  code status: DNR/DNI CTAP s/f slightly hyperdense solid cortical mass in upper pole of R kidney.    - consider additional renal imaging when stable

## 2022-10-04 NOTE — PROCEDURE NOTE - NSPROCDETAILS_GEN_ALL_CORE
Patrick technique/dressing applied/secured in place/sterile dressing applied/percutaneous
location identified, draped/prepped, sterile technique used/blood seen on insertion/dressing applied/flushes easily/secured in place/sterile technique, catheter placed
US guidance/location identified, draped/prepped, sterile technique used/blood seen on insertion/dressing applied/flushes easily/secured in place/sterile technique, catheter placed
location identified, draped/prepped, sterile technique used, needle inserted/introduced/positive blood return obtained via catheter/connected to a pressurized flush line/sutured in place/hemostasis with direct pressure, dressing applied/Seldinger technique/all materials/supplies accounted for at end of procedure
location identified, draped/prepped, sterile technique used/blood seen on insertion/dressing applied/flushes easily/secured in place/sterile technique, catheter placed
guidewire recovered/lumen(s) aspirated and flushed/sterile dressing applied/sterile technique, catheter placed/ultrasound guidance with use of sterile gel and probe cove
US guidance/location identified, draped/prepped, sterile technique used, needle inserted/introduced/positive blood return obtained via catheter/connected to a pressurized flush line/sutured in place/hemostasis with direct pressure, dressing applied/Seldinger technique/all materials/supplies accounted for at end of procedure
guidewire recovered/lumen(s) aspirated and flushed/sterile dressing applied/sterile technique, catheter placed/ultrasound guidance with use of sterile gel and probe cove

## 2022-10-04 NOTE — PROGRESS NOTE ADULT - PROBLEM SELECTOR PLAN 4
Pt on home bumex 2mg.  TTE from current visit showing EF of 56% with Pulmonary HTN - PAP 48. Experienced pulmonary edema and effusion postcardiac arrest.    - Continue to monitor hemodynamics Cardiac Arrest (9/7/22) w resultant flail chest. Patient intubated and sedated 2/2 to cardiac arrest. 9/23 patient weaned from sedation w trach. Patient not requiring midodrine. Pt with several broken ribs and signs of flail chest with difficulty breathing when sedation weaned off.      - continue to monitor vitals  - if flail chest persists, thoracic surgery consult  - continue to monitor respirations  - continue dilaudid for pain management  - consider consult thoracic surgery for sternal plating

## 2022-10-04 NOTE — PROGRESS NOTE ADULT - PROBLEM SELECTOR PLAN 10
Pt with history of hypertension on home diltiazem 180mg daily. Patient currently normotensive    - holding home antihypertensive meds Baseline Cr 2.04 in July 2022, currently uptrending. Lung with inspiratory crackles, 1+ b/l LE edema. Likely iATN 2/2 to hypoperfusion/ischemia in setting of sepsis and cardiac arrest. HD cath placed on 09/12 for CVVHD in setting of worsening electrolytes and increasing fluid status, 9/26 now with TDC.    - continue dialysis when clinically indicated  - maintain MAP >70 for adequate renal perfusion  - strict I/Os, no nephrotoxic meds, renally dose meds  - renal following, appreciate recs  - hyperphosphatemia: continue sevelamer

## 2022-10-04 NOTE — PROGRESS NOTE ADULT - PROBLEM SELECTOR PLAN 12
Pt with history of hypertension on home diltiazem 180mg daily. Patient currently normotensive    - holding home antihypertensive meds      #Preventive Measures  F: None  E: K>4, Phos>3, Mg>2  N: Tube feeds   DVT ppx: apixaban  GI ppx: Protonix 40 BID  code status: DNR/DNI  Dispo: [facility] Pt with history of hypertension on home diltiazem 180mg daily. Patient currently normotensive    - holding home antihypertensive meds      #Preventive Measures  F: None  E: K>4, Phos>3, Mg>2  N: Tube feeds via NG-Tube  DVT ppx: apixaban  GI ppx: Protonix 40 BID  code status: DNR/DNI  Dispo: 7[facility]

## 2022-10-04 NOTE — PROCEDURE NOTE - NSANESTHESIA_GEN_A_CORE
no anesthesia administered
no anesthesia administered
1% lidocaine

## 2022-10-04 NOTE — PROCEDURE NOTE - NSSITEPREP_SKIN_A_CORE
chlorhexidine/Adherence to aseptic technique: hand hygiene prior to donning barriers (gown, gloves), don cap and mask, sterile drape over patient
chlorhexidine
chlorhexidine/Adherence to aseptic technique: hand hygiene prior to donning barriers (gown, gloves), don cap and mask, sterile drape over patient
chlorhexidine
chlorhexidine
chlorhexidine/Adherence to aseptic technique: hand hygiene prior to donning barriers (gown, gloves), don cap and mask, sterile drape over patient
chlorhexidine/Adherence to aseptic technique: hand hygiene prior to donning barriers (gown, gloves), don cap and mask, sterile drape over patient
chlorhexidine

## 2022-10-04 NOTE — PROCEDURE NOTE - NSPROCNAME_GEN_A_CORE
Bronchoscopy
Point of Care Ultrasound Lung
Central Line Insertion
Point of Care Ultrasound Other
Point of Care Ultrasound Renal
Point of Care Ultrasound Other
Point of Care Ultrasound Pneumothorax
Point of Care Ultrasound Cardiac
Point of Care Ultrasound Cardiac
Central Line Insertion
Peripheral Line Insertion
Peripheral Line Insertion
Point of Care Ultrasound Cardiac
Point of Care Ultrasound Lung
Point of Care Ultrasound Lung
Point of Care Ultrasound Pneumothorax
Peripheral Line Insertion
Point of Care Ultrasound Lung
Point of Care Ultrasound Lung
Point of Care Ultrasound Renal
General
Peripheral Line Insertion
Point of Care Ultrasound Lung
Arterial Puncture/Cannulation
Point of Care Ultrasound Cardiac
Arterial Puncture/Cannulation
Chest Tube

## 2022-10-05 NOTE — PROGRESS NOTE ADULT - PROBLEM SELECTOR PLAN 4
Cardiac Arrest (9/7/22) w resultant flail chest. Patient intubated and sedated 2/2 to cardiac arrest. 9/23 patient weaned from sedation w trach. Patient not requiring midodrine. Pt with several broken ribs and signs of flail chest with difficulty breathing when sedation weaned off.      - continue to monitor vitals  - if flail chest persists, thoracic surgery consult  - continue to monitor respirations  - continue dilaudid for pain management  - consider consult thoracic surgery for sternal plating Cardiac Arrest (9/7/22) w resultant flail chest. Patient intubated and sedated 2/2 to cardiac arrest. 9/23 patient weaned from sedation w trach. Patient not requiring midodrine. Pt with several broken ribs and signs of flail chest with difficulty breathing when sedation weaned off.      - continue to monitor vitals  - if flail chest persists, thoracic surgery consult for sternal plating  - continue to monitor respirations  - pain management as above.

## 2022-10-05 NOTE — PROGRESS NOTE ADULT - SUBJECTIVE AND OBJECTIVE BOX
OVERNIGHT EVENTS:    SUBJECTIVE / INTERVAL HPI: Patient seen and examined at bedside.     VITAL SIGNS:  Vital Signs Last 24 Hrs  T(C): 36.9 (05 Oct 2022 05:22), Max: 38.9 (04 Oct 2022 18:00)  T(F): 98.4 (05 Oct 2022 05:22), Max: 102 (04 Oct 2022 18:00)  HR: 109 (05 Oct 2022 06:30) (96 - 120)  BP: 141/66 (05 Oct 2022 04:10) (116/56 - 164/61)  BP(mean): 95 (05 Oct 2022 04:10) (77 - 95)  RR: 23 (05 Oct 2022 06:30) (20 - 26)  SpO2: 96% (05 Oct 2022 06:30) (93% - 99%)    Parameters below as of 05 Oct 2022 06:30  Patient On (Oxygen Delivery Method): ventilator    O2 Concentration (%): 40    PHYSICAL EXAM:  -------    MEDICATIONS:  MEDICATIONS  (STANDING):  albuterol/ipratropium for Nebulization 3 milliLiter(s) Nebulizer every 6 hours  apixaban 2.5 milliGRAM(s) Oral every 12 hours  artificial  tears Solution 1 Drop(s) Both EYES two times a day  chlorhexidine 0.12% Liquid 15 milliLiter(s) Oral Mucosa every 12 hours  chlorhexidine 2% Cloths 1 Application(s) Topical <User Schedule>  dextrose 5%. 1000 milliLiter(s) (50 mL/Hr) IV Continuous <Continuous>  dextrose 5%. 1000 milliLiter(s) (100 mL/Hr) IV Continuous <Continuous>  dextrose 50% Injectable 25 Gram(s) IV Push once  dextrose 50% Injectable 12.5 Gram(s) IV Push once  dextrose 50% Injectable 25 Gram(s) IV Push once  glucagon  Injectable 1 milliGRAM(s) IntraMuscular once  HYDROmorphone  Injectable 0.5 milliGRAM(s) IV Push every 4 hours  influenza  Vaccine (HIGH DOSE) 0.7 milliLiter(s) IntraMuscular once  insulin lispro (ADMELOG) corrective regimen sliding scale   SubCutaneous every 6 hours  lidocaine   4% Patch 1 Patch Transdermal every 24 hours  mupirocin 2% Ointment 1 Application(s) Topical two times a day  nystatin Powder 1 Application(s) Topical three times a day  pantoprazole   Suspension 40 milliGRAM(s) Enteral Tube at bedtime  polyethylene glycol 3350 17 Gram(s) Oral daily  senna 2 Tablet(s) Oral daily  sevelamer carbonate Powder 800 milliGRAM(s) Enteral Tube every 8 hours  tobramycin 0.3% Ophthalmic Solution 2 Drop(s) Right EYE every 6 hours    MEDICATIONS  (PRN):  acetaminophen    Suspension .. 650 milliGRAM(s) Oral every 6 hours PRN Temp greater or equal to 38C (100.4F), Mild Pain (1 - 3), Moderate Pain (4 - 6)  dextrose Oral Gel 15 Gram(s) Oral once PRN Blood Glucose LESS THAN 70 milliGRAM(s)/deciliter  sodium chloride 0.9% lock flush 10 milliLiter(s) IV Push every 1 hour PRN Pre/post blood products, medications, blood draw, and to maintain line patency      ALLERGIES:  Allergies    Altabax (Unknown)  Augmentin (Unknown)  clindamycin (Unknown)  Vaseline (Swelling)    Intolerances        LABS:                        7.3    7.08  )-----------( 171      ( 04 Oct 2022 05:30 )             24.6     10-04    135  |  98  |  22  ----------------------------<  112<H>  3.6   |  28  |  2.62<H>    Ca    9.3      04 Oct 2022 05:30  Phos  2.5     10-04  Mg     2.1     10-04          CAPILLARY BLOOD GLUCOSE      POCT Blood Glucose.: 117 mg/dL (05 Oct 2022 06:27)      RADIOLOGY & ADDITIONAL TESTS: Reviewed. SUBJECTIVE / INTERVAL HPI: Patient seen and examined at bedside. Pt able to blink when prompted, but otherwise unable to participate in ROS.     VITAL SIGNS:  Vital Signs Last 24 Hrs  T(C): 36.9 (05 Oct 2022 05:22), Max: 38.9 (04 Oct 2022 18:00)  T(F): 98.4 (05 Oct 2022 05:22), Max: 102 (04 Oct 2022 18:00)  HR: 109 (05 Oct 2022 06:30) (96 - 120)  BP: 141/66 (05 Oct 2022 04:10) (116/56 - 164/61)  BP(mean): 95 (05 Oct 2022 04:10) (77 - 95)  RR: 23 (05 Oct 2022 06:30) (20 - 26)  SpO2: 96% (05 Oct 2022 06:30) (93% - 99%)    Parameters below as of 05 Oct 2022 06:30  Patient On (Oxygen Delivery Method): ventilator    O2 Concentration (%): 40    PHYSICAL EXAM:  GENERAL: obese female  Neuro: AAOx0; patient arousable to touch, able to follow commands (blinking when prompted)  HEENT: tracheostomy with no erythema or swelling; NC/AT; MMM; 1 cm open wound expanding philtrum; neck supple; no scleral icterus; nasal passage w NG tube; no thyroid or LN enlargement  Heart: RRR; +s1 and s2; no MRG; non displaced PMI  Lungs: bibasilar crackles   GI: protuberant abdomen; nondistended; +BS  Extremities: patients lower extremities to knee wrapped with ACE bandage and SCDs, +1 pitting edema; UE 1+ edema with 1+ pulses  Skin: cellulitis of LE b/l wrapped with ACE bandages    MEDICATIONS:  MEDICATIONS  (STANDING):  albuterol/ipratropium for Nebulization 3 milliLiter(s) Nebulizer every 6 hours  apixaban 2.5 milliGRAM(s) Oral every 12 hours  artificial  tears Solution 1 Drop(s) Both EYES two times a day  chlorhexidine 0.12% Liquid 15 milliLiter(s) Oral Mucosa every 12 hours  chlorhexidine 2% Cloths 1 Application(s) Topical <User Schedule>  dextrose 5%. 1000 milliLiter(s) (50 mL/Hr) IV Continuous <Continuous>  dextrose 5%. 1000 milliLiter(s) (100 mL/Hr) IV Continuous <Continuous>  dextrose 50% Injectable 25 Gram(s) IV Push once  dextrose 50% Injectable 12.5 Gram(s) IV Push once  dextrose 50% Injectable 25 Gram(s) IV Push once  glucagon  Injectable 1 milliGRAM(s) IntraMuscular once  HYDROmorphone  Injectable 0.5 milliGRAM(s) IV Push every 4 hours  influenza  Vaccine (HIGH DOSE) 0.7 milliLiter(s) IntraMuscular once  insulin lispro (ADMELOG) corrective regimen sliding scale   SubCutaneous every 6 hours  lidocaine   4% Patch 1 Patch Transdermal every 24 hours  mupirocin 2% Ointment 1 Application(s) Topical two times a day  nystatin Powder 1 Application(s) Topical three times a day  pantoprazole   Suspension 40 milliGRAM(s) Enteral Tube at bedtime  polyethylene glycol 3350 17 Gram(s) Oral daily  senna 2 Tablet(s) Oral daily  sevelamer carbonate Powder 800 milliGRAM(s) Enteral Tube every 8 hours  tobramycin 0.3% Ophthalmic Solution 2 Drop(s) Right EYE every 6 hours    MEDICATIONS  (PRN):  acetaminophen    Suspension .. 650 milliGRAM(s) Oral every 6 hours PRN Temp greater or equal to 38C (100.4F), Mild Pain (1 - 3), Moderate Pain (4 - 6)  dextrose Oral Gel 15 Gram(s) Oral once PRN Blood Glucose LESS THAN 70 milliGRAM(s)/deciliter  sodium chloride 0.9% lock flush 10 milliLiter(s) IV Push every 1 hour PRN Pre/post blood products, medications, blood draw, and to maintain line patency      ALLERGIES:  Allergies    Altabax (Unknown)  Augmentin (Unknown)  clindamycin (Unknown)  Vaseline (Swelling)    Intolerances        LABS:                        7.3    7.08  )-----------( 171      ( 04 Oct 2022 05:30 )             24.6     10-04    135  |  98  |  22  ----------------------------<  112<H>  3.6   |  28  |  2.62<H>    Ca    9.3      04 Oct 2022 05:30  Phos  2.5     10-04  Mg     2.1     10-04          CAPILLARY BLOOD GLUCOSE      POCT Blood Glucose.: 117 mg/dL (05 Oct 2022 06:27)      RADIOLOGY & ADDITIONAL TESTS: Reviewed.

## 2022-10-05 NOTE — PROGRESS NOTE ADULT - PROBLEM SELECTOR PLAN 2
Successful reversal of sedative w flumazenil transient; now with improving mentation. 9/26 CT head showed +grey white differentiation w no edema. Patient off sedation.    - continue to assess mentation   - consider decreasing dilaudid Successful reversal of sedative w flumazenil transient; now with improving mentation. 9/26 CT head showed +grey white differentiation w no edema. Patient off sedation.  - Pain regimen modified: Oxycodone 2.5mg q8h & dilaudid 0.25mg IVP q4h PRN for breakthrough pain  - continue to assess mentation

## 2022-10-05 NOTE — PROGRESS NOTE ADULT - TIME BILLING
Morbid obesity, COPD; GBS BSI 2/2 b/l LE SSTI; recent cardiac arrest s/p ROSC. Continues on cefepime (CVVHD dosing). Dr. Dominguez resumes care tomorrow.
As above; Coordination of care with primary team, discussed HD planning, hyponatremia  This excludes any time spent on separate procedures or teaching.
As above; Coordination of care with primary team, discussed HD planning  This excludes any time spent on separate procedures or teaching.
As above; Coordination of care with primary team, discussed KRT, bleeding  This excludes any time spent on separate procedures or teaching.
As above; Coordination of care with primary team, discussed HD planning, low chance of recovery  This excludes any time spent on separate procedures or teaching.
case complexity as above

## 2022-10-05 NOTE — PROGRESS NOTE ADULT - ATTENDING COMMENTS
Cardiac arrest complicated by chronic respiratory failure, flail chest, BRANT now on HD, aspiration pna w/ corynebacterium. Vent weaning - CPAP trial today, 10/5, change pain regimen to oxy PRN w/ break through low dose dilaudid. Fever improved, vanc level remains elevated, repeat trough after HD today, plan for 7 day course. HD today, obtain labs w/ HD. SW for LTAC placement. Need to discuss long term feeding tube w/ family as NGT remains high risk for aspiration, discomfort, and has needed to be replaced multiple times.

## 2022-10-05 NOTE — PROGRESS NOTE ADULT - PROBLEM SELECTOR PLAN 5
Pt with COPD on home 3L.     - continue to monitor oxygen saturation; patient currently on ventilator

## 2022-10-05 NOTE — PROGRESS NOTE ADULT - SUBJECTIVE AND OBJECTIVE BOX
Hemoglobin: 7.4 g/dL (10-05-22 @ 12:02)  Phosphorus Level, Serum: 3.1 mg/dL (10-05-22 @ 12:02)  Hemoglobin: 7.3 g/dL (10-05-22 @ 10:26)  Phosphorus Level, Serum: 3.2 mg/dL (10-05-22 @ 10:26)    Albumin, Serum: 2.3 g/dL (10-05-22 @ 12:02)  Albumin, Serum: 2.0 g/dL (22 @ 05:38)    T(C): 37.2 (10-05-22 @ 12:05), Max: 38.9 (10-04-22 @ 18:00)  HR: 96 (10-05-22 @ 09:46) (96 - 120)  BP: 174/75 (10-05-22 @ 12:05) (117/59 - 174/75)  RR: 21 (10-05-22 @ 12:05) (20 - 26)  SpO2: 98% (10-05-22 @ 12:05) (95% - 99%)  epoetin sherry-epbx (RETACRIT) Injectable 92347 Unit(s) IV Push once, 22 @ 09:17, Routine, Stop order after: 1 Doses  epoetin sherry-epbx (RETACRIT) Injectable 98917 Unit(s) IV Push once, 10-03-22 @ 07:41, Routine, Stop order after: 1 Doses  epoetin sherry-epbx (RETACRIT) Injectable 51269 Unit(s) IV Push once, 10-05-22 @ 07:36, Routine, Stop order after: 1 Doses  sevelamer carbonate 800 milliGRAM(s) Oral every 8 hours, 22 @ 11:31, STAT  sevelamer carbonate Powder 1600 milliGRAM(s) Enteral Tube every 8 hours, 22 @ 11:33, Routine  sevelamer carbonate Powder 800 milliGRAM(s) Enteral Tube every 8 hours, 10-03-22 @ 10:46, Routine      Hemodialysis Treatment.:     Schedule: Once, Modality: Hemodialysis, Access: Internal Jugular Central Venous Catheter    Dialyzer: Optiflux [id number], Time: 180 Min    Blood Flow: 400 mL/Min , Dialysate Flow: 500 mL/Min, Dialysate Temp: 36.5, Tubinmm (Adult)    Target Fluid Removal: 3 Liters    Dialysate Electrolytes (mEq/L): Potassium 3, Calcium 2.5, Sodium 138, Bicarbonate 35 (10-05-22 @ 07:36) [Completed]    Seen on HD, tolerating well. C/w prescription outlined as above

## 2022-10-05 NOTE — PROGRESS NOTE ADULT - PROBLEM SELECTOR PLAN 3
Pt w/ COPD on home 3 L O2, currently worsening hypoxia 2/2 likely in setting of aspiration while eating vs flash pulmonary edema 2/2 chronic htn and chronic HF (relatively the same EF as previous TTE). Tracheostomy placed (9/22). Patient unable to tolerate trach collar. Patient with increased WOB and wincing on exam     -Patient failed CPAP trial 10/4; continue on ventilator and wean when appropriate Pt w/ COPD on home 3 L O2, currently worsening hypoxia 2/2 likely in setting of aspiration while eating vs flash pulmonary edema 2/2 chronic htn and chronic HF (relatively the same EF as previous TTE). - Tracheostomy placed (9/22)   -Patient failed CPAP trial 10/4  - Today AM, started CPAP 10/5 and pt appears to be tolerating well. Will continue to monitor

## 2022-10-05 NOTE — PROGRESS NOTE ADULT - PROBLEM SELECTOR PLAN 1
Patient with low grade fevers over the past few days, now febrile to 101.8 iso ET tube w corynbacterium striatum, started vancomycin 10/1. CXR showed possible mucus plugging, hypertonic saline nebs ordered. 10/4 patient spiked another fever to 101.4.    - f/up CXR, RVP, blood, urine, and sputum cultures  - vanc dose by level: held since Saturday 2/2 to elevated trough; goal trough 15-20 Patient with persistent low-grade fevers, iso ET tube w corynbacterium striatum, started vancomycin 10/1. CXR showed possible mucus plugging, hypertonic saline nebs ordered.   - 10/4 patient spiked another fever to 101.4.  - CXR 10/4: Persistent left mid to lower lung field opacities may represent any combination of pleural effusion, atelectasis and/or pneumonia.   - Vanc dose by level: held since Saturday 2/2 trough (elevated again today at 50, goal trough 15-20)    - Blood and sputum cultures NGTD, will continue to follow  - Follow up RVP & Urine cultures

## 2022-10-05 NOTE — PROGRESS NOTE ADULT - PROBLEM SELECTOR PLAN 11
CTAP s/f slightly hyperdense solid cortical mass in upper pole of R kidney.    - consider additional renal imaging when stable

## 2022-10-05 NOTE — PROGRESS NOTE ADULT - SUBJECTIVE AND OBJECTIVE BOX
--------------------------------------------------------------------------------  Chief Complaint: ESRD/Ongoing hemodialysis requirement    24 hour events/subjective:  NAEO, remains stable. Due for HD today    PAST HISTORY  --------------------------------------------------------------------------------  No significant changes to PMH, PSH, FHx, SHx, unless otherwise noted    ALLERGIES & MEDICATIONS  --------------------------------------------------------------------------------  Allergies    Altabax (Unknown)  Augmentin (Unknown)  clindamycin (Unknown)  Vaseline (Swelling)    Intolerances      Standing Inpatient Medications  albuterol/ipratropium for Nebulization 3 milliLiter(s) Nebulizer every 6 hours  apixaban 2.5 milliGRAM(s) Oral every 12 hours  artificial  tears Solution 1 Drop(s) Both EYES two times a day  chlorhexidine 0.12% Liquid 15 milliLiter(s) Oral Mucosa every 12 hours  chlorhexidine 2% Cloths 1 Application(s) Topical <User Schedule>  dextrose 5%. 1000 milliLiter(s) IV Continuous <Continuous>  dextrose 5%. 1000 milliLiter(s) IV Continuous <Continuous>  dextrose 50% Injectable 25 Gram(s) IV Push once  dextrose 50% Injectable 12.5 Gram(s) IV Push once  dextrose 50% Injectable 25 Gram(s) IV Push once  epoetin sherry-epbx (RETACRIT) Injectable 59703 Unit(s) IV Push once  glucagon  Injectable 1 milliGRAM(s) IntraMuscular once  HYDROmorphone  Injectable 0.5 milliGRAM(s) IV Push every 4 hours  influenza  Vaccine (HIGH DOSE) 0.7 milliLiter(s) IntraMuscular once  insulin lispro (ADMELOG) corrective regimen sliding scale   SubCutaneous every 6 hours  lidocaine   4% Patch 1 Patch Transdermal every 24 hours  mupirocin 2% Ointment 1 Application(s) Topical two times a day  nystatin Powder 1 Application(s) Topical three times a day  pantoprazole   Suspension 40 milliGRAM(s) Enteral Tube at bedtime  polyethylene glycol 3350 17 Gram(s) Oral daily  senna 2 Tablet(s) Oral daily  sevelamer carbonate Powder 800 milliGRAM(s) Enteral Tube every 8 hours  tobramycin 0.3% Ophthalmic Solution 2 Drop(s) Right EYE every 6 hours    PRN Inpatient Medications  acetaminophen    Suspension .. 650 milliGRAM(s) Oral every 6 hours PRN  dextrose Oral Gel 15 Gram(s) Oral once PRN  sodium chloride 0.9% lock flush 10 milliLiter(s) IV Push every 1 hour PRN      REVIEW OF SYSTEMS  --------------------------------------------------------------------------------  All other systems were reviewed and are negative, except as noted.    VITALS/PHYSICAL EXAM  --------------------------------------------------------------------------------  T(C): 36.9 (10-05-22 @ 05:22), Max: 38.9 (10-04-22 @ 18:00)  HR: 96 (10-05-22 @ 08:20) (96 - 120)  BP: 117/59 (10-05-22 @ 08:20) (117/59 - 164/61)  RR: 20 (10-05-22 @ 08:20) (20 - 26)  SpO2: 98% (10-05-22 @ 08:20) (93% - 99%)  Wt(kg): --  Drug Dosing Weight  Height (cm): 172.7 (06 Sep 2022 21:17)  Weight (kg): 102.3 (06 Sep 2022 21:17)  BMI (kg/m2): 34.3 (06 Sep 2022 21:17)  BSA (m2): 2.15 (06 Sep 2022 21:17)        10-04-22 @ 07:01  -  10-05-22 @ 07:00  --------------------------------------------------------  IN: 900 mL / OUT: 0 mL / NET: 900 mL      Physical Exam:  Constitutional: trach ; NAD  HEENT: MMM, NC/AT, wound expanding philtrum; neck supple;  Respiratory: Mechanical breath sounds bilaterally, not overbreathing vent  Cardiac: +S1/S2; RRR; no M/R/G  Gastrointestinal: soft, NT/ND; no rebound or guarding; +BS  Extremities: WWP, no clubbing or cyanosis; general low level anasarca, improving  Dermatologic: skin warm, dry and intact; no rashes, wounds, or scars  Access: R [facility] c/d/i      LABS/STUDIES  --------------------------------------------------------------------------------              7.3    7.08  >-----------<  171      [10-04-22 @ 05:30]              24.6     135  |  98  |  22  ----------------------------<  112      [10-04-22 @ 05:30]  3.6   |  28  |  2.62        Ca     9.3     [10-04-22 @ 05:30]      Mg     2.1     [10-04-22 @ 05:30]      Phos  2.5     [10-04-22 @ 05:30]            Iron 15, TIBC 242, %sat 6      [07-02-22 @ 22:28]  Ferritin 28      [07-02-22 @ 22:28]  Lipid: chol --, , HDL --, LDL --      [10-03-22 @ 05:30]    HBsAb Nonreact      [09-27-22 @ 10:03]  HBsAg Nonreact      [09-27-22 @ 10:03]  HCV 0.03, Nonreact      [09-27-22 @ 10:03]      RADIOLOGY:  -------------------------------------------------------------------------------------- --------------------------------------------------------------------------------  Chief Complaint: ESRD/Ongoing hemodialysis requirement    24 hour events/subjective:  NAEO, remains stable. Due for HD today.  Opens eyes to her name, makes eye contact, withdraws to touch    PAST HISTORY  --------------------------------------------------------------------------------  No significant changes to PMH, PSH, FHx, SHx, unless otherwise noted    ALLERGIES & MEDICATIONS  --------------------------------------------------------------------------------  Allergies    Altabax (Unknown)  Augmentin (Unknown)  clindamycin (Unknown)  Vaseline (Swelling)    Intolerances      Standing Inpatient Medications  albuterol/ipratropium for Nebulization 3 milliLiter(s) Nebulizer every 6 hours  apixaban 2.5 milliGRAM(s) Oral every 12 hours  artificial  tears Solution 1 Drop(s) Both EYES two times a day  chlorhexidine 0.12% Liquid 15 milliLiter(s) Oral Mucosa every 12 hours  chlorhexidine 2% Cloths 1 Application(s) Topical <User Schedule>  dextrose 5%. 1000 milliLiter(s) IV Continuous <Continuous>  dextrose 5%. 1000 milliLiter(s) IV Continuous <Continuous>  dextrose 50% Injectable 25 Gram(s) IV Push once  dextrose 50% Injectable 12.5 Gram(s) IV Push once  dextrose 50% Injectable 25 Gram(s) IV Push once  epoetin sherry-epbx (RETACRIT) Injectable 79784 Unit(s) IV Push once  glucagon  Injectable 1 milliGRAM(s) IntraMuscular once  HYDROmorphone  Injectable 0.5 milliGRAM(s) IV Push every 4 hours  influenza  Vaccine (HIGH DOSE) 0.7 milliLiter(s) IntraMuscular once  insulin lispro (ADMELOG) corrective regimen sliding scale   SubCutaneous every 6 hours  lidocaine   4% Patch 1 Patch Transdermal every 24 hours  mupirocin 2% Ointment 1 Application(s) Topical two times a day  nystatin Powder 1 Application(s) Topical three times a day  pantoprazole   Suspension 40 milliGRAM(s) Enteral Tube at bedtime  polyethylene glycol 3350 17 Gram(s) Oral daily  senna 2 Tablet(s) Oral daily  sevelamer carbonate Powder 800 milliGRAM(s) Enteral Tube every 8 hours  tobramycin 0.3% Ophthalmic Solution 2 Drop(s) Right EYE every 6 hours    PRN Inpatient Medications  acetaminophen    Suspension .. 650 milliGRAM(s) Oral every 6 hours PRN  dextrose Oral Gel 15 Gram(s) Oral once PRN  sodium chloride 0.9% lock flush 10 milliLiter(s) IV Push every 1 hour PRN      REVIEW OF SYSTEMS  --------------------------------------------------------------------------------  unable to assess    VITALS/PHYSICAL EXAM  --------------------------------------------------------------------------------  T(C): 36.9 (10-05-22 @ 05:22), Max: 38.9 (10-04-22 @ 18:00)  HR: 96 (10-05-22 @ 08:20) (96 - 120)  BP: 117/59 (10-05-22 @ 08:20) (117/59 - 164/61)  RR: 20 (10-05-22 @ 08:20) (20 - 26)  SpO2: 98% (10-05-22 @ 08:20) (93% - 99%)  Wt(kg): --  Drug Dosing Weight  Height (cm): 172.7 (06 Sep 2022 21:17)  Weight (kg): 102.3 (06 Sep 2022 21:17)  BMI (kg/m2): 34.3 (06 Sep 2022 21:17)  BSA (m2): 2.15 (06 Sep 2022 21:17)        10-04-22 @ 07:01  -  10-05-22 @ 07:00  --------------------------------------------------------  IN: 900 mL / OUT: 0 mL / NET: 900 mL      Physical Exam:  Constitutional: trach ; NAD  HEENT: MMM, NC/AT, wound expanding philtrum; neck supple;  Respiratory: Mechanical breath sounds bilaterally, not overbreathing vent  Cardiac: +S1/S2; RRR; no M/R/G  Gastrointestinal: soft, NT/ND; no rebound or guarding; +BS  Extremities: WWP, no clubbing or cyanosis; general low level anasarca, improving  Dermatologic: skin warm, dry and intact; no rashes, wounds, or scars  Access: R [facility] c/d/i      LABS/STUDIES  --------------------------------------------------------------------------------              7.3    7.08  >-----------<  171      [10-04-22 @ 05:30]              24.6     135  |  98  |  22  ----------------------------<  112      [10-04-22 @ 05:30]  3.6   |  28  |  2.62        Ca     9.3     [10-04-22 @ 05:30]      Mg     2.1     [10-04-22 @ 05:30]      Phos  2.5     [10-04-22 @ 05:30]            Iron 15, TIBC 242, %sat 6      [07-02-22 @ 22:28]  Ferritin 28      [07-02-22 @ 22:28]  Lipid: chol --, , HDL --, LDL --      [10-03-22 @ 05:30]    HBsAb Nonreact      [09-27-22 @ 10:03]  HBsAg Nonreact      [09-27-22 @ 10:03]  HCV 0.03, Nonreact      [09-27-22 @ 10:03]      RADIOLOGY:  -------------------------------------------------------------------------------------- --------------------------------------------------------------------------------  Chief Complaint: ESRD/Ongoing hemodialysis requirement    24 hour events/subjective:  Noted to have temperature of 102 overnight. No new abx given. No Due for HD today.  Opens eyes to her name, makes eye contact, withdraws to touch.  Vancomycin levels noted 41.5, down from 45.5. Should have some clearance with HD today    PAST HISTORY  --------------------------------------------------------------------------------  No significant changes to PMH, PSH, FHx, SHx, unless otherwise noted    ALLERGIES & MEDICATIONS  --------------------------------------------------------------------------------  Allergies    Altabax (Unknown)  Augmentin (Unknown)  clindamycin (Unknown)  Vaseline (Swelling)    Intolerances      Standing Inpatient Medications  albuterol/ipratropium for Nebulization 3 milliLiter(s) Nebulizer every 6 hours  apixaban 2.5 milliGRAM(s) Oral every 12 hours  artificial  tears Solution 1 Drop(s) Both EYES two times a day  chlorhexidine 0.12% Liquid 15 milliLiter(s) Oral Mucosa every 12 hours  chlorhexidine 2% Cloths 1 Application(s) Topical <User Schedule>  dextrose 5%. 1000 milliLiter(s) IV Continuous <Continuous>  dextrose 5%. 1000 milliLiter(s) IV Continuous <Continuous>  dextrose 50% Injectable 25 Gram(s) IV Push once  dextrose 50% Injectable 12.5 Gram(s) IV Push once  dextrose 50% Injectable 25 Gram(s) IV Push once  epoetin sherry-epbx (RETACRIT) Injectable 88620 Unit(s) IV Push once  glucagon  Injectable 1 milliGRAM(s) IntraMuscular once  HYDROmorphone  Injectable 0.5 milliGRAM(s) IV Push every 4 hours  influenza  Vaccine (HIGH DOSE) 0.7 milliLiter(s) IntraMuscular once  insulin lispro (ADMELOG) corrective regimen sliding scale   SubCutaneous every 6 hours  lidocaine   4% Patch 1 Patch Transdermal every 24 hours  mupirocin 2% Ointment 1 Application(s) Topical two times a day  nystatin Powder 1 Application(s) Topical three times a day  pantoprazole   Suspension 40 milliGRAM(s) Enteral Tube at bedtime  polyethylene glycol 3350 17 Gram(s) Oral daily  senna 2 Tablet(s) Oral daily  sevelamer carbonate Powder 800 milliGRAM(s) Enteral Tube every 8 hours  tobramycin 0.3% Ophthalmic Solution 2 Drop(s) Right EYE every 6 hours    PRN Inpatient Medications  acetaminophen    Suspension .. 650 milliGRAM(s) Oral every 6 hours PRN  dextrose Oral Gel 15 Gram(s) Oral once PRN  sodium chloride 0.9% lock flush 10 milliLiter(s) IV Push every 1 hour PRN      REVIEW OF SYSTEMS  --------------------------------------------------------------------------------  unable to assess    VITALS/PHYSICAL EXAM  --------------------------------------------------------------------------------  T(C): 36.9 (10-05-22 @ 05:22), Max: 38.9 (10-04-22 @ 18:00)  HR: 96 (10-05-22 @ 08:20) (96 - 120)  BP: 117/59 (10-05-22 @ 08:20) (117/59 - 164/61)  RR: 20 (10-05-22 @ 08:20) (20 - 26)  SpO2: 98% (10-05-22 @ 08:20) (93% - 99%)  Wt(kg): --  Drug Dosing Weight  Height (cm): 172.7 (06 Sep 2022 21:17)  Weight (kg): 102.3 (06 Sep 2022 21:17)  BMI (kg/m2): 34.3 (06 Sep 2022 21:17)  BSA (m2): 2.15 (06 Sep 2022 21:17)        10-04-22 @ 07:01  -  10-05-22 @ 07:00  --------------------------------------------------------  IN: 900 mL / OUT: 0 mL / NET: 900 mL      Physical Exam:  Constitutional: trach ; NAD  HEENT: MMM, NC/AT, wound expanding philtrum; neck supple;  Respiratory: Mechanical breath sounds bilaterally, not overbreathing vent  Cardiac: +S1/S2; RRR; no M/R/G  Gastrointestinal: soft, NT/ND; no rebound or guarding; +BS  Extremities: WWP, no clubbing or cyanosis; general low level anasarca, improving  Dermatologic: skin warm, dry and intact; no rashes, wounds, or scars  Access: R [facility] c/d/i      LABS/STUDIES  --------------------------------------------------------------------------------              7.3    7.08  >-----------<  171      [10-04-22 @ 05:30]              24.6     135  |  98  |  22  ----------------------------<  112      [10-04-22 @ 05:30]  3.6   |  28  |  2.62        Ca     9.3     [10-04-22 @ 05:30]      Mg     2.1     [10-04-22 @ 05:30]      Phos  2.5     [10-04-22 @ 05:30]            Iron 15, TIBC 242, %sat 6      [07-02-22 @ 22:28]  Ferritin 28      [07-02-22 @ 22:28]  Lipid: chol --, , HDL --, LDL --      [10-03-22 @ 05:30]    HBsAb Nonreact      [09-27-22 @ 10:03]  HBsAg Nonreact      [09-27-22 @ 10:03]  HCV 0.03, Nonreact      [09-27-22 @ 10:03]      RADIOLOGY:  --------------------------------------------------------------------------------------

## 2022-10-05 NOTE — PROGRESS NOTE ADULT - PROBLEM SELECTOR PLAN 7
Patient with Hgb of 7.3; reticulocyte count 1.8     - reticulocyte count indicative of underproduction  - monitor CBC  - maintain active T &S (last 10/4)  - continue IV iron for 5 days (day 1 9/30) Patient with Hgb of 7.4 today  - Reticulocyte% on 10/4: 1.8     - reticulocyte count indicative of underproduction  - s/p 5-day course IV iron 200mg q24h  - monitor CBC  - maintain active T &S (last 10/4)

## 2022-10-05 NOTE — PROGRESS NOTE ADULT - PROBLEM SELECTOR PLAN 9
Experienced a-fib on 09/13 w RVR. self resolved w/o intervention. 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 Serotonin negative. Started on Apixaban 2.5 mg BID    - continue Eliquis 2.5 mg BID iso age and Cr

## 2022-10-05 NOTE — PROGRESS NOTE ADULT - ATTENDING COMMENTS
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 Gets HD via R IJ tunneled catheter on MWF. Plan for HD today with 3L UF.   Anemia stable, s/p 1g IV Fe, getting epo with HD  Spiked temp again yesterday evening, will follow blood cultures. Supratherapeutic Vanc levels, goal per team ~20, will plan for HD again tomorrow to aid with Vanc clearance.   Appears to be a little more responsive today, opens eyes and keeps eye contact when asked her name, withdraws to touch.  Reviewed imaging, initial CT with concern for possible R renal mass however on subsequent renal US and CT, appears to be a cyst rather than malignancy.

## 2022-10-05 NOTE — PROGRESS NOTE ADULT - PROBLEM SELECTOR PLAN 10
Baseline Cr 2.04 in July 2022, currently uptrending. Lung with inspiratory crackles, 1+ b/l LE edema. Likely iATN 2/2 to hypoperfusion/ischemia in setting of sepsis and cardiac arrest. HD cath placed on 09/12 for CVVHD in setting of worsening electrolytes and increasing fluid status, 9/26 now with TDC.    - continue dialysis when clinically indicated  - maintain MAP >70 for adequate renal perfusion  - strict I/Os, no nephrotoxic meds, renally dose meds  - renal following, appreciate recs  - hyperphosphatemia: continue sevelamer Baseline Cr 2.04 in July 2022, currently uptrending. Lung with inspiratory crackles, 1+ b/l LE edema. Likely iATN 2/2 to hypoperfusion/ischemia in setting of sepsis and cardiac arrest. HD cath placed on 09/12 for CVVHD in setting of worsening electrolytes and increasing fluid status, 9/26 now with TDC.    - continue dialysis when clinically indicated  - maintain MAP >70 for adequate renal perfusion  - strict I/Os, no nephrotoxic meds, renally dose meds  - renal following, appreciate recs  - continue sevelamer

## 2022-10-05 NOTE — PROGRESS NOTE ADULT - PROBLEM SELECTOR PLAN 6
Pt on home bumex 2mg.  TTE from current visit showing EF of 56% with Pulmonary HTN - PAP 48. Experienced pulmonary edema and effusion postcardiac arrest.    - Continue to monitor hemodynamics

## 2022-10-05 NOTE — PROGRESS NOTE ADULT - ASSESSMENT
Patient is a 81yo F with CHF, CKD IV (baseline around 2.5-2.9) HTN who presented with a one day history of respiratory distress and LE cellulitis hospital course c/b cardiac arrest on 9/7, acute renal failure requiring CVVHD with now transition to iHD    #Anuric BRANT on CKD   iATN in setting of cardiac arrest and shock.   On CVVHD 9/12-9/22  transition to iHD on 9/27  remains dialysis dependent- not currently showing signs of renal recovery, anuric.    Plan:  -HD today as per schedule   -UF with HD  -Daily BMP, mag and phos   -Maintain MAP >70 for adequate renal perfusion  -Monitor for any urine out put     Anemia - blood loss and inflammatory state, ESKD  Plan:   On Apixaban   Transfusions for Hgb < 7  EPO with HD      Patient is a 83yo F with CHF, CKD IV (baseline around 2.5-2.9) HTN who presented with a one day history of respiratory distress and LE cellulitis hospital course c/b cardiac arrest on , acute renal failure requiring CVVHD with now transition to iHD    #Anuric BRANT on CKD   iATN in setting of cardiac arrest and shock.   On CVVHD -  transition to iHD on   remains dialysis dependent- not currently showing signs of renal recovery, anuric.    Plan:  -HD today as per schedule   -UF with HD  -Daily BMP, mag and phos   -Maintain MAP >70 for adequate renal perfusion  -Monitor for any urine out put     Anemia - blood loss and inflammatory state, ESKD  Plan:   On Apixaban   Transfusions for Hgb < 7  EPO with HD       Hemoglobin: 7.3 g/dL (10-04-22 @ 05:30)  Phosphorus Level, Serum: 2.5 mg/dL (10-04-22 @ 05:30)  Hemoglobin: 7.2 g/dL (10-03-22 @ 05:30)  Phosphorus Level, Serum: 3.8 mg/dL (10-03-22 @ 05:30)    Albumin, Serum: 2.0 g/dL (22 @ 05:38)  Hepatitis B Surface Antigen: Nonreact (22 @ 10:03)    T(C): 36.9 (10-05-22 @ 05:22), Max: 38.9 (10-04-22 @ 18:00)  HR: 96 (10-05-22 @ 08:20) (96 - 120)  BP: 117/59 (10-05-22 @ 08:20) (117/59 - 164/61)  RR: 20 (10-05-22 @ 08:20) (20 - 26)  SpO2: 98% (10-05-22 @ 08:20) (93% - 99%)  epoetin sherry-epbx (RETACRIT) Injectable [id number] Unit(s) IV Push once, 22 @ 09:17, Routine, Stop order after: 1 Doses  epoetin sherry-epbx (RETACRIT) Injectable [id number] Unit(s) IV Push once, 10-03-22 @ 07:41, Routine, Stop order after: 1 Doses  epoetin sherry-epbx (RETACRIT) Injectable [id number] Unit(s) IV Push once, 10-05-22 @ 07:36, Routine, Stop order after: 1 Doses  sevelamer carbonate 800 milliGRAM(s) Oral every 8 hours, 22 @ 11:31, STAT  sevelamer carbonate Powder 1600 milliGRAM(s) Enteral Tube every 8 hours, 22 @ 11:33, Routine  sevelamer carbonate Powder 800 milliGRAM(s) Enteral Tube every 8 hours, 10-03-22 @ 10:46, Routine      Hemodialysis Treatment.:     Schedule: Once, Modality: Hemodialysis, Access: Internal Jugular Central Venous Catheter    Dialyzer: Optiflux [id number], Time: 180 Min    Blood Flow: 400 mL/Min , Dialysate Flow: 500 mL/Min, Dialysate Temp: 36.5, Tubinmm (Adult)    Target Fluid Removal: 3 Liters    Dialysate Electrolytes (mEq/L): Potassium 3, Calcium 2.5, Sodium 138, Bicarbonate 35 (10-05-22 @ 07:36) [Active]   Patient is a 83yo F with CHF, CKD IV (baseline around 2.5-2.9) HTN who presented with a one day history of respiratory distress and LE cellulitis hospital course c/b cardiac arrest on , acute renal failure requiring CVVHD with now transition to iHD    #Anuric BRANT on CKD IV likely now ESRD  iATN in setting of cardiac arrest and shock.   On CVVHD -  transitioned to iHD on   remains dialysis dependent- not currently showing signs of renal recovery, anuric.  Using R IJ tunneled hemodialysis catheter    Plan:  -HD today as per schedule   -UF with HD goal 3L  -Daily BMP, mag and phos   -Maintain MAP >70 for adequate renal perfusion  -Monitor for any urine out put     Anemia - blood loss and inflammatory state, ESKD  Plan:   On Apixaban   Transfusions for Hgb < 7  EPO with HD       Hemoglobin: 7.3 g/dL (10-04-22 @ 05:30)  Phosphorus Level, Serum: 2.5 mg/dL (10-04-22 @ 05:30)  Hemoglobin: 7.2 g/dL (10-03-22 @ 05:30)  Phosphorus Level, Serum: 3.8 mg/dL (10-03-22 @ 05:30)    Albumin, Serum: 2.0 g/dL (22 @ 05:38)  Hepatitis B Surface Antigen: Nonreact (22 @ 10:03)    T(C): 36.9 (10-05-22 @ 05:22), Max: 38.9 (10-04-22 @ 18:00)  HR: 96 (10-05-22 @ 08:20) (96 - 120)  BP: 117/59 (10-05-22 @ 08:20) (117/59 - 164/61)  RR: 20 (10-05-22 @ 08:20) (20 - 26)  SpO2: 98% (10-05-22 @ 08:20) (93% - 99%)  epoetin sherry-epbx (RETACRIT) Injectable [id number] Unit(s) IV Push once, 22 @ 09:17, Routine, Stop order after: 1 Doses  epoetin sherry-epbx (RETACRIT) Injectable [id number] Unit(s) IV Push once, 10-03-22 @ 07:41, Routine, Stop order after: 1 Doses  epoetin sherry-epbx (RETACRIT) Injectable [id number] Unit(s) IV Push once, 10-05-22 @ 07:36, Routine, Stop order after: 1 Doses  sevelamer carbonate 800 milliGRAM(s) Oral every 8 hours, 22 @ 11:31, STAT  sevelamer carbonate Powder 1600 milliGRAM(s) Enteral Tube every 8 hours, 22 @ 11:33, Routine  sevelamer carbonate Powder 800 milliGRAM(s) Enteral Tube every 8 hours, 10-03-22 @ 10:46, Routine      Hemodialysis Treatment.:     Schedule: Once, Modality: Hemodialysis, Access: Internal Jugular Central Venous Catheter    Dialyzer: Optiflux [id number], Time: 180 Min    Blood Flow: 400 mL/Min , Dialysate Flow: 500 mL/Min, Dialysate Temp: 36.5, Tubinmm (Adult)    Target Fluid Removal: 3 Liters    Dialysate Electrolytes (mEq/L): Potassium 3, Calcium 2.5, Sodium 138, Bicarbonate 35 (10-05-22 @ 07:36) [Active]   Patient is a 83yo F with CHF, CKD IV (baseline around 2.5-2.9) HTN who presented with a one day history of respiratory distress and LE cellulitis hospital course c/b cardiac arrest on , acute renal failure requiring CVVHD with now transition to iHD    #Anuric BRANT on CKD IV likely now ESRD  iATN in setting of cardiac arrest and shock.   On CVVHD -  transitioned to iHD on   remains dialysis dependent- not currently showing signs of renal recovery, anuric.  Using R IJ tunneled hemodialysis catheter    Plan:  -HD today as per schedule   -UF with HD goal 3L  -Daily BMP, mag and phos   -Maintain MAP >70 for adequate renal perfusion  -Monitor for any urine out put   -Continue checking vancomycin levels daily; as trough still remains supra-therapeutic. Expect some clearance with HD  -Bcx with NGTD  -Will repeat HD tomorrow to assist with clearance    Anemia - blood loss and inflammatory state, ESKD  Plan:   On Apixaban   Transfusions for Hgb < 7  EPO with HD       Hemoglobin: 7.3 g/dL (10-04-22 @ 05:30)  Phosphorus Level, Serum: 2.5 mg/dL (10-04-22 @ 05:30)  Hemoglobin: 7.2 g/dL (10-03-22 @ 05:30)  Phosphorus Level, Serum: 3.8 mg/dL (10-03-22 @ 05:30)    Albumin, Serum: 2.0 g/dL (22 @ 05:38)  Hepatitis B Surface Antigen: Nonreact (22 @ 10:03)    T(C): 36.9 (10-05-22 @ 05:22), Max: 38.9 (10-04-22 @ 18:00)  HR: 96 (10-05-22 @ 08:20) (96 - 120)  BP: 117/59 (10-05-22 @ 08:20) (117/59 - 164/61)  RR: 20 (10-05-22 @ 08:20) (20 - 26)  SpO2: 98% (10-05-22 @ 08:20) (93% - 99%)  epoetin sherry-epbx (RETACRIT) Injectable [id number] Unit(s) IV Push once, 22 @ 09:17, Routine, Stop order after: 1 Doses  epoetin sherry-epbx (RETACRIT) Injectable [id number] Unit(s) IV Push once, 10-03-22 @ 07:41, Routine, Stop order after: 1 Doses  epoetin sherry-epbx (RETACRIT) Injectable [id number] Unit(s) IV Push once, 10-05-22 @ 07:36, Routine, Stop order after: 1 Doses  sevelamer carbonate 800 milliGRAM(s) Oral every 8 hours, 22 @ 11:31, STAT  sevelamer carbonate Powder 1600 milliGRAM(s) Enteral Tube every 8 hours, 22 @ 11:33, Routine  sevelamer carbonate Powder 800 milliGRAM(s) Enteral Tube every 8 hours, 10-03-22 @ 10:46, Routine      Hemodialysis Treatment.:     Schedule: Once, Modality: Hemodialysis, Access: Internal Jugular Central Venous Catheter    Dialyzer: Optiflux [id number], Time: 180 Min    Blood Flow: 400 mL/Min , Dialysate Flow: 500 mL/Min, Dialysate Temp: 36.5, Tubinmm (Adult)    Target Fluid Removal: 3 Liters    Dialysate Electrolytes (mEq/L): Potassium 3, Calcium 2.5, Sodium 138, Bicarbonate 35 (10-05-22 @ 07:36) [Active]

## 2022-10-05 NOTE — PROGRESS NOTE ADULT - PROBLEM SELECTOR PLAN 12
Pt with history of hypertension on home diltiazem 180mg daily. Patient currently normotensive    - holding home antihypertensive meds      #Preventive Measures  F: None  E: K>4, Phos>3, Mg>2  N: Tube feeds via NG-Tube  DVT ppx: apixaban  GI ppx: Protonix 40 BID  code status: DNR/DNI  Dispo: [facility]

## 2022-10-06 NOTE — PROGRESS NOTE ADULT - SUBJECTIVE AND OBJECTIVE BOX
--------------------------------------------------------------------------------  Chief Complaint: ESRD/Ongoing hemodialysis requirement    24 hour events/subjective:  Seen this AM at bedside during HD. Overnight with fever to 100.9  Vanco trough at 50 despite HD on 10/5, unclear if due to volume of distribution.  Has not had recent dose. Will do HD today with higher DFR and check vanco trough for clearance.      PAST HISTORY  --------------------------------------------------------------------------------  No significant changes to PMH, PSH, FHx, SHx, unless otherwise noted    ALLERGIES & MEDICATIONS  --------------------------------------------------------------------------------  Allergies    Altabax (Unknown)  Augmentin (Unknown)  clindamycin (Unknown)  Vaseline (Swelling)    Intolerances      Standing Inpatient Medications  albuterol/ipratropium for Nebulization 3 milliLiter(s) Nebulizer every 6 hours  apixaban 2.5 milliGRAM(s) Oral every 12 hours  artificial  tears Solution 1 Drop(s) Both EYES two times a day  chlorhexidine 0.12% Liquid 15 milliLiter(s) Oral Mucosa every 12 hours  chlorhexidine 2% Cloths 1 Application(s) Topical <User Schedule>  dextrose 5%. 1000 milliLiter(s) IV Continuous <Continuous>  dextrose 5%. 1000 milliLiter(s) IV Continuous <Continuous>  dextrose 50% Injectable 25 Gram(s) IV Push once  dextrose 50% Injectable 12.5 Gram(s) IV Push once  dextrose 50% Injectable 25 Gram(s) IV Push once  glucagon  Injectable 1 milliGRAM(s) IntraMuscular once  influenza  Vaccine (HIGH DOSE) 0.7 milliLiter(s) IntraMuscular once  insulin lispro (ADMELOG) corrective regimen sliding scale   SubCutaneous every 6 hours  lidocaine   4% Patch 1 Patch Transdermal every 24 hours  mupirocin 2% Ointment 1 Application(s) Topical two times a day  nystatin Powder 1 Application(s) Topical three times a day  oxyCODONE    Solution 2.5 milliGRAM(s) Oral every 8 hours  pantoprazole   Suspension 40 milliGRAM(s) Enteral Tube at bedtime  polyethylene glycol 3350 17 Gram(s) Oral daily  senna 2 Tablet(s) Oral daily  sevelamer carbonate Powder 800 milliGRAM(s) Enteral Tube every 8 hours  sodium phosphate IVPB 15 milliMole(s) IV Intermittent once    PRN Inpatient Medications  acetaminophen    Suspension .. 650 milliGRAM(s) Oral every 6 hours PRN  dextrose Oral Gel 15 Gram(s) Oral once PRN  HYDROmorphone  Injectable 0.25 milliGRAM(s) IV Push every 4 hours PRN  sodium chloride 0.9% lock flush 10 milliLiter(s) IV Push every 1 hour PRN      REVIEW OF SYSTEMS  --------------------------------------------------------------------------------  All other systems were reviewed and are negative, except as noted.    VITALS/PHYSICAL EXAM  --------------------------------------------------------------------------------  T(C): 37.7 (10-06-22 @ 09:55), Max: 38.3 (10-06-22 @ 06:21)  HR: 111 (10-06-22 @ 09:55) (106 - 114)  BP: 125/61 (10-06-22 @ 09:55) (125/61 - 150/64)  RR: 30 (10-06-22 @ 09:55) (20 - 31)  SpO2: 99% (10-06-22 @ 09:55) (98% - 99%)  Wt(kg): --  Drug Dosing Weight  Height (cm): 172.7 (06 Sep 2022 21:17)  Weight (kg): 102.3 (06 Sep 2022 21:17)  BMI (kg/m2): 34.3 (06 Sep 2022 21:17)  BSA (m2): 2.15 (06 Sep 2022 21:17)        10-05-22 @ 07:01  -  10-06-22 @ 07:00  --------------------------------------------------------  IN: 1040 mL / OUT: 3425 mL / NET: -2385 mL    Physical Exam:  Constitutional: trach ; NAD  HEENT: MMM, NC/AT, wound expanding philtrum; neck supple;  Respiratory: Mechanical breath sounds bilaterally, not overbreathing vent  Cardiac: +S1/S2; RRR; no M/R/G  Gastrointestinal: soft, NT/ND; no rebound or guarding; +BS  Extremities: WWP, no clubbing or cyanosis; general low level anasarca, improving  Dermatologic: skin warm, dry and intact; no rashes, wounds, or scars  Access: R [facility] c/d/i    LABS/STUDIES  --------------------------------------------------------------------------------              8.4    8.93  >-----------<  200      [10-06-22 @ 06:01]              28.2     135  |  96  |  24  ----------------------------<  128      [10-06-22 @ 06:01]  4.0   |  27  |  3.02        Ca     10.1     [10-06-22 @ 06:01]      Mg     2.2     [10-06-22 @ 06:01]      Phos  2.4     [10-06-22 @ 06:01]    TPro  8.8  /  Alb  2.8  /  TBili  0.4  /  DBili  x   /  AST  22  /  ALT  16  /  AlkPhos  216  [10-06-22 @ 06:01]          Iron 15, TIBC 242, %sat 6      [07-02-22 @ 22:28]  Ferritin 28      [07-02-22 @ 22:28]  Lipid: chol --, , HDL --, LDL --      [10-03-22 @ 05:30]    HBsAb Nonreact      [09-27-22 @ 10:03]  HBsAg Nonreact      [09-27-22 @ 10:03]  HCV 0.03, Nonreact      [09-27-22 @ 10:03]      RADIOLOGY:  --------------------------------------------------------------------------------------

## 2022-10-06 NOTE — PROVIDER CONTACT NOTE (CRITICAL VALUE NOTIFICATION) - NAME OF MD/NP/PA/DO NOTIFIED:
Dr Bowser
Niels
Dr Jefferson
Dr. Bates
MD Villegas
MD. Schoenfeld
Dr benton
Mario Figueredo
Yobany SOTELO
Md Brandon Altman

## 2022-10-06 NOTE — PROGRESS NOTE ADULT - PROBLEM SELECTOR PLAN 2
Successful reversal of sedative w flumazenil transient; now with improving mentation. 9/26 CT head showed +grey white differentiation w no edema. Patient off sedation.  - Pain regimen modified: Oxycodone 2.5mg q8h & dilaudid 0.25mg IVP q4h PRN for breakthrough pain  - continue to assess mentation

## 2022-10-06 NOTE — PROGRESS NOTE ADULT - SUBJECTIVE AND OBJECTIVE BOX
OVERNIGHT EVENTS:    SUBJECTIVE / INTERVAL HPI: Patient seen and examined at bedside. Pt able to blink when prompted, but otherwise unable to participate in ROS.     VITAL SIGNS:  Vital Signs Last 24 Hrs  T(C): 37 (06 Oct 2022 13:55), Max: 38.3 (06 Oct 2022 06:21)  T(F): 98.6 (06 Oct 2022 13:55), Max: 100.9 (06 Oct 2022 06:21)  HR: 106 (06 Oct 2022 13:55) (106 - 114)  BP: 134/58 (06 Oct 2022 13:55) (125/61 - 150/64)  BP(mean): 83 (06 Oct 2022 13:55) (81 - 92)  RR: 30 (06 Oct 2022 13:55) (20 - 35)  SpO2: 100% (06 Oct 2022 13:55) (98% - 100%)    Parameters below as of 06 Oct 2022 13:55  Patient On (Oxygen Delivery Method): ventilator    O2 Concentration (%): 40    PHYSICAL EXAM:    GENERAL: obese female  Neuro: AAOx0; patient arousable to touch, able to follow commands (blinking when prompted)  HEENT: tracheostomy with no erythema or swelling; NC/AT; MMM; 1 cm open wound expanding philtrum; neck supple; no scleral icterus; nasal passage w NG tube;  Heart: RRR; +s1 and s2; no MRG; non displaced PMI  Lungs: bibasilar crackles   GI: protuberant abdomen; nondistended; +BS  Extremities: patients lower extremities to knee wrapped with ACE bandage and SCDs, +1 pitting edema; UE 1+ edema with 1+ pulses  Skin: cellulitis of LE b/l wrapped with ACE bandages    MEDICATIONS:  MEDICATIONS  (STANDING):  albuterol/ipratropium for Nebulization 3 milliLiter(s) Nebulizer every 6 hours  apixaban 2.5 milliGRAM(s) Oral every 12 hours  artificial  tears Solution 1 Drop(s) Both EYES two times a day  chlorhexidine 0.12% Liquid 15 milliLiter(s) Oral Mucosa every 12 hours  chlorhexidine 2% Cloths 1 Application(s) Topical <User Schedule>  dextrose 5%. 1000 milliLiter(s) (50 mL/Hr) IV Continuous <Continuous>  dextrose 5%. 1000 milliLiter(s) (100 mL/Hr) IV Continuous <Continuous>  dextrose 50% Injectable 25 Gram(s) IV Push once  dextrose 50% Injectable 12.5 Gram(s) IV Push once  dextrose 50% Injectable 25 Gram(s) IV Push once  glucagon  Injectable 1 milliGRAM(s) IntraMuscular once  influenza  Vaccine (HIGH DOSE) 0.7 milliLiter(s) IntraMuscular once  insulin lispro (ADMELOG) corrective regimen sliding scale   SubCutaneous every 6 hours  lidocaine   4% Patch 1 Patch Transdermal every 24 hours  mupirocin 2% Ointment 1 Application(s) Topical two times a day  nystatin Powder 1 Application(s) Topical three times a day  oxyCODONE    Solution 2.5 milliGRAM(s) Oral every 8 hours  pantoprazole   Suspension 40 milliGRAM(s) Enteral Tube at bedtime  polyethylene glycol 3350 17 Gram(s) Oral daily  senna 2 Tablet(s) Oral daily  sevelamer carbonate Powder 800 milliGRAM(s) Enteral Tube every 8 hours  sodium phosphate IVPB 15 milliMole(s) IV Intermittent once    MEDICATIONS  (PRN):  acetaminophen    Suspension .. 650 milliGRAM(s) Oral every 6 hours PRN Temp greater or equal to 38C (100.4F), Mild Pain (1 - 3), Moderate Pain (4 - 6)  dextrose Oral Gel 15 Gram(s) Oral once PRN Blood Glucose LESS THAN 70 milliGRAM(s)/deciliter  HYDROmorphone  Injectable 0.25 milliGRAM(s) IV Push every 4 hours PRN breakthrough pain  sodium chloride 0.9% lock flush 10 milliLiter(s) IV Push every 1 hour PRN Pre/post blood products, medications, blood draw, and to maintain line patency      ALLERGIES:  Allergies    Altabax (Unknown)  Augmentin (Unknown)  clindamycin (Unknown)  Vaseline (Swelling)    Intolerances        LABS:                        8.4    8.93  )-----------( 200      ( 06 Oct 2022 06:01 )             28.2     10-06    135  |  96  |  24<H>  ----------------------------<  128<H>  4.0   |  27  |  3.02<H>    Ca    10.1      06 Oct 2022 06:01  Phos  2.4     10-06  Mg     2.2     10-06    TPro  8.8<H>  /  Alb  2.8<L>  /  TBili  0.4  /  DBili  x   /  AST  22  /  ALT  16  /  AlkPhos  216<H>  10-06        CAPILLARY BLOOD GLUCOSE      POCT Blood Glucose.: 127 mg/dL (06 Oct 2022 11:30)      RADIOLOGY & ADDITIONAL TESTS: Reviewed.

## 2022-10-06 NOTE — PROVIDER CONTACT NOTE (CRITICAL VALUE NOTIFICATION) - PERSON GIVING RESULT:
lab
Lab
lab, torito [facility]
LAb
Blood Gas Lab
Chemistry
hematology
Ana from Chemistry
Chemistry
blood gas lab

## 2022-10-06 NOTE — PROGRESS NOTE ADULT - PROBLEM SELECTOR PLAN 1
Patient with persistent low-grade fevers, iso ET tube w corynbacterium striatum, started vancomycin 10/1.  - Vanc dose by level: held since Saturday 2/2 trough, elevated again today  - RVP neg. UCx NGTD    - Blood and sputum cultures NGTD, will continue to follow

## 2022-10-06 NOTE — PROVIDER CONTACT NOTE (CRITICAL VALUE NOTIFICATION) - TEST AND RESULT REPORTED:
Vanco Trough
lactate 5.1
Hgb 7.0
Troponin 294.2
.2
26% bands
pH - 7.19
Vanco level 45.5
Vancomycin 44.5
pH 7.20

## 2022-10-06 NOTE — PROGRESS NOTE ADULT - PROBLEM SELECTOR PLAN 3
Pt w/ COPD on home 3 L O2, currently worsening hypoxia 2/2 likely in setting of aspiration while eating vs flash pulmonary edema 2/2 chronic htn.   - Tracheostomy placed (9/22)  - failed CPAP trial today, continue daily CPAP trial

## 2022-10-06 NOTE — PROGRESS NOTE ADULT - PROBLEM SELECTOR PLAN 4
Cardiac Arrest (9/7/22) w resultant flail chest. Patient intubated and sedated 2/2 to cardiac arrest. 9/23 patient weaned from sedation w trach.   - continue to monitor  - pain management as above

## 2022-10-06 NOTE — PROGRESS NOTE ADULT - PROBLEM SELECTOR PLAN 10
Baseline Cr 2.04 in July 2022, currently uptrending. Lung with inspiratory crackles, 1+ b/l LE edema. Likely iATN 2/2 to hypoperfusion/ischemia in setting of sepsis and cardiac arrest. HD cath placed on 09/12 for CVVHD in setting of worsening electrolytes and increasing fluid status, 9/26 now with TDC.    - continue dialysis when clinically indicated  - maintain MAP >70 for adequate renal perfusion  - strict I/Os, no nephrotoxic meds, renally dose meds  - renal following, appreciate recs  - continue sevelamer

## 2022-10-06 NOTE — PROGRESS NOTE ADULT - PROBLEM SELECTOR PLAN 12
Pt with history of hypertension on home diltiazem 180mg daily. Patient currently normotensive    - holding home antihypertensive meds      #Preventive Measures  F: None  E: K>4, Phos>3, Mg>2  N: Tube feeds via NG-Tube  DVT ppx: apixaban  GI ppx: Protonix 40 BID  code status: DNR/DNI  Dispo: 7[facility]

## 2022-10-06 NOTE — PROGRESS NOTE ADULT - ASSESSMENT
Patient is a 81yo F with CHF, CKD IV (baseline around 2.5-2.9) HTN who presented with a one day history of respiratory distress and LE cellulitis hospital course c/b cardiac arrest on , acute renal failure requiring CVVHD with now transition to iHD    #Anuric iATN on CKD IV likely now ESRD  iATN in setting of cardiac arrest and shock.   On CVVHD -  transitioned to iHD on   remains dialysis dependent- not currently showing signs of renal recovery, anuric.  Using R IJ tunneled hemodialysis catheter    Plan:  -HD today as per schedule   -UF with HD  -Daily BMP, mag and phos   -Maintain MAP >70 for adequate renal perfusion  -Monitor for any urine out put   -Continue checking vancomycin levels daily; as trough still remains supra-therapeutic. Expect some clearance with HD  -Bcx with NGTD  -Can consider HD again on 10/7 for continued clearance    Anemia - blood loss and inflammatory state, ESKD  Plan:   On Apixaban   Transfusions for Hgb < 7  EPO with HD       Hemoglobin: 8.4 g/dL (10-06-22 @ 06:01)  Phosphorus Level, Serum: 2.4 mg/dL (10-06-22 @ 06:01)  Hemoglobin: 7.4 g/dL (10-05-22 @ 12:02)  Phosphorus Level, Serum: 3.1 mg/dL (10-05-22 @ 12:02)    Albumin, Serum: 2.8 g/dL (10-06-22 @ 06:01)  Albumin, Serum: 2.3 g/dL (10-05-22 @ 12:02)    T(C): 37.7 (10-06-22 @ 09:55), Max: 38.3 (10-06-22 @ 06:21)  HR: 111 (10-06-22 @ 09:55) (106 - 114)  BP: 125/61 (10-06-22 @ 09:55) (125/61 - 150/64)  RR: 30 (10-06-22 @ 09:55) (20 - 31)  SpO2: 99% (10-06-22 @ 09:55) (98% - 99%)  epoetin sherry-epbx (RETACRIT) Injectable [id number] Unit(s) IV Push once, 22 @ 09:17, Routine, Stop order after: 1 Doses  epoetin sherry-epbx (RETACRIT) Injectable [id number] Unit(s) IV Push once, 10-05-22 @ 07:36, Routine, Stop order after: 1 Doses  epoetin sherry-epbx (RETACRIT) Injectable [id number] Unit(s) IV Push once, 10-03-22 @ 07:41, Routine, Stop order after: 1 Doses  sevelamer carbonate 800 milliGRAM(s) Oral every 8 hours, 22 @ 11:31, STAT  sevelamer carbonate Powder 1600 milliGRAM(s) Enteral Tube every 8 hours, 22 @ 11:33, Routine  sevelamer carbonate Powder 800 milliGRAM(s) Enteral Tube every 8 hours, 10-03-22 @ 10:46, Routine      Hemodialysis Treatment.:     Schedule: Once, Modality: Hemodialysis, Access: Internal Jugular Central Venous Catheter    Dialyzer: Optiflux [id number], Time: 240 Min    Blood Flow: 400 mL/Min , Dialysate Flow: 500 mL/Min, Dialysate Temp: 36.5, Tubinmm (Adult)    Target Fluid Removal: 1 Liters    Dialysate Electrolytes (mEq/L): Potassium 3, Calcium 2.5, Sodium 138, Bicarbonate 35    Additional Instructions: do DFR of 800 for 1 hour (10-06-22 @ 07:35) [Completed]  Hemodialysis Treatment.:     Schedule: Once, Modality: Hemodialysis, Access: Internal Jugular Central Venous Catheter    Dialyzer: Optiflux [id number], Time: 240 Min    Blood Flow: 400 mL/Min , Dialysate Flow: 800 mL/Min, Dialysate Temp: 36.5, Tubinmm (Adult)    Target Fluid Removal: 1 Liters    Dialysate Electrolytes (mEq/L): Potassium 3, Calcium 2.5, Sodium 138, Bicarbonate 35 (10-06-22 @ 07:35) [Discontinued]    Seen on HD, tolerating higher DFR. Will check vanco level post HD.

## 2022-10-06 NOTE — PROGRESS NOTE ADULT - ATTENDING COMMENTS
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 Gets HD via R IJ tunneled catheter on MWF. Dialysis again today for supratherapeutic Vanc levels  Anemia stable, s/p 1g IV Fe, epo with HD  Reviewed imaging, initial CT with concern for possible R renal mass however on subsequent renal US and CT, appears to be a cyst rather than malignancy.

## 2022-10-06 NOTE — PROVIDER CONTACT NOTE (CRITICAL VALUE NOTIFICATION) - ACTION/TREATMENT ORDERED:
Redraw lab
As per chemistry, Hgb 7.0, MD. Shoenfeld made aware
no new orders as patient was recently intubated will continue to monitor closely
Hold Homero

## 2022-10-07 NOTE — CHART NOTE - NSCHARTNOTEFT_GEN_A_CORE
Advanced Care Planning:   Goals of care discussion with patients HCP (Lisa) and long time friend Dr. Eulogio Rothman regarding current medical condition, prognosis, goals of care. As per patients' wishes, she would not want long term aggressive interventions at the end of life if the chance of a significant recovery with minimal. At this point, given the patients' ESRD and requirement of HD, ongoing respiratory failure, with tracheostomy, and need for ventilatory support, no viable oral route, with current use of NGT, and no desire to ever have a PEG, Lisa and Dr. Rotmhan are electing for compassionate withdrawal from mechanical ventilation, discontinuation of HD, stopping of artifical NGT feeds, as well as any unnecessary blood work or imaging. Plan for compassionate withdrawal of vent on Sunday, likely 1pm.    In preparation:  Please discontinue HD.   Stop blood draws.  Stop any oral medications that are not directly related to comfort.   Stop NGT feeds.   Continue IV ABX until withdrawal of care on Sunday, in order to minimize fever and improve comfort.  Start Tylenol ATC.  No further cultures. MEWS exempt. Comfort care orders.  Start Dilaudid gtt. at 0.5ml/hr. today.   Discontinue oral Oxycodone meds.     On Sunday, please premedicate with IV Dilaudid and IV Ativan, in addition to Dilaudid gtt. prior to withdrawal of ventilatory support.   Remove NGT, discontinue ABX once ventilator support is withdrawn.

## 2022-10-07 NOTE — PROGRESS NOTE ADULT - PROBLEM SELECTOR PLAN 5
Pt with COPD on home 3L.     - continue to monitor oxygen saturation; patient currently on ventilator & daily CPAP trials Pt with COPD on home 3L.   - continue to monitor oxygen saturation; patient currently on ventilator & daily CPAP trials

## 2022-10-07 NOTE — PROGRESS NOTE ADULT - SUBJECTIVE AND OBJECTIVE BOX
--------------------------------------------------------------------------------  Chief Complaint: ESRD/Ongoing hemodialysis requirement    24 hour events/subjective:  Seen this AM, remains stable. More awake and alert, withdraws to pain. Vanco level post HD noted 37.7  Will dialyze today for extra clearance      PAST HISTORY  --------------------------------------------------------------------------------  No significant changes to PMH, PSH, FHx, SHx, unless otherwise noted    ALLERGIES & MEDICATIONS  --------------------------------------------------------------------------------  Allergies    Altabax (Unknown)  Augmentin (Unknown)  clindamycin (Unknown)  Vaseline (Swelling)    Intolerances      Standing Inpatient Medications  albuterol/ipratropium for Nebulization 3 milliLiter(s) Nebulizer every 6 hours  apixaban 2.5 milliGRAM(s) Oral every 12 hours  artificial  tears Solution 1 Drop(s) Both EYES two times a day  chlorhexidine 0.12% Liquid 15 milliLiter(s) Oral Mucosa every 12 hours  chlorhexidine 2% Cloths 1 Application(s) Topical <User Schedule>  dextrose 5%. 1000 milliLiter(s) IV Continuous <Continuous>  dextrose 5%. 1000 milliLiter(s) IV Continuous <Continuous>  dextrose 50% Injectable 25 Gram(s) IV Push once  dextrose 50% Injectable 12.5 Gram(s) IV Push once  dextrose 50% Injectable 25 Gram(s) IV Push once  glucagon  Injectable 1 milliGRAM(s) IntraMuscular once  influenza  Vaccine (HIGH DOSE) 0.7 milliLiter(s) IntraMuscular once  insulin lispro (ADMELOG) corrective regimen sliding scale   SubCutaneous every 6 hours  lidocaine   4% Patch 1 Patch Transdermal every 24 hours  mupirocin 2% Ointment 1 Application(s) Topical two times a day  nystatin Powder 1 Application(s) Topical three times a day  oxyCODONE    Solution 2.5 milliGRAM(s) Oral every 8 hours  pantoprazole   Suspension 40 milliGRAM(s) Enteral Tube at bedtime  piperacillin/tazobactam IVPB. 3.375 Gram(s) IV Intermittent once  piperacillin/tazobactam IVPB.- 3.375 Gram(s) IV Intermittent once  polyethylene glycol 3350 17 Gram(s) Oral daily  senna 2 Tablet(s) Oral daily    PRN Inpatient Medications  acetaminophen    Suspension .. 650 milliGRAM(s) Oral every 6 hours PRN  dextrose Oral Gel 15 Gram(s) Oral once PRN  HYDROmorphone  Injectable 0.25 milliGRAM(s) IV Push every 4 hours PRN  sodium chloride 0.9% lock flush 10 milliLiter(s) IV Push every 1 hour PRN      REVIEW OF SYSTEMS  --------------------------------------------------------------------------------  All other systems were reviewed and are negative, except as noted.    VITALS/PHYSICAL EXAM  --------------------------------------------------------------------------------  T(C): 38.3 (10-07-22 @ 09:50), Max: 38.5 (10-06-22 @ 20:07)  HR: 116 (10-07-22 @ 08:36) (104 - 116)  BP: 130/57 (10-07-22 @ 08:00) (115/54 - 147/63)  RR: 31 (10-07-22 @ 08:36) (22 - 37)  SpO2: 98% (10-07-22 @ 08:36) (98% - 100%)  Wt(kg): --  Drug Dosing Weight  Height (cm): 172.7 (06 Sep 2022 21:17)  Weight (kg): 102.3 (06 Sep 2022 21:17)  BMI (kg/m2): 34.3 (06 Sep 2022 21:17)  BSA (m2): 2.15 (06 Sep 2022 21:17)        10-06-22 @ 07:01  -  10-07-22 @ 07:00  --------------------------------------------------------  IN: 1370 mL / OUT: 1050 mL / NET: 320 mL    Physical Exam:  Constitutional: trach ; NAD  HEENT: MMM, NC/AT, wound expanding philtrum; neck supple;  Respiratory: Mechanical breath sounds bilaterally, not overbreathing vent  Cardiac: +S1/S2; RRR; no M/R/G  Gastrointestinal: soft, NT/ND; no rebound or guarding; +BS  Extremities: WWP, no clubbing or cyanosis; general low level anasarca, improving  Dermatologic: skin warm, dry and intact; no rashes, wounds, or scars  Access: [facility] c/d/i    LABS/STUDIES  --------------------------------------------------------------------------------              8.1    9.91  >-----------<  207      [10-07-22 @ 05:30]              27.8     132  |  93  |  15  ----------------------------<  121      [10-07-22 @ 05:30]  3.7   |  27  |  2.36        Ca     9.9     [10-07-22 @ 05:30]      Mg     2.1     [10-07-22 @ 05:30]      Phos  2.4     [10-07-22 @ 05:30]    TPro  9.1  /  Alb  2.8  /  TBili  0.4  /  DBili  x   /  AST  26  /  ALT  16  /  AlkPhos  202  [10-07-22 @ 05:30]          Iron 15, TIBC 242, %sat 6      [07-02-22 @ 22:28]  Ferritin 28      [07-02-22 @ 22:28]  Lipid: chol --, , HDL --, LDL --      [10-03-22 @ 05:30]    HBsAb Nonreact      [09-27-22 @ 10:03]  HBsAg Nonreact      [09-27-22 @ 10:03]  HCV 0.03, Nonreact      [09-27-22 @ 10:03]      RADIOLOGY:  --------------------------------------------------------------------------------------

## 2022-10-07 NOTE — ED ADULT NURSE NOTE - CAS TRG GENERAL NORM CIRC DET
Strong peripheral pulses Isotretinoin Counseling: Patient should get monthly blood tests, not donate blood, not drive at night if vision affected, not share medication, and not undergo elective surgery for 6 months after tx completed. Side effects reviewed, pt to contact office should one occur.

## 2022-10-07 NOTE — PROGRESS NOTE ADULT - ATTENDING COMMENTS
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 Gets HD via R IJ tunneled catheter on MWF. Dialysis again today for supratherapeutic Vanc levels, improving, down to 37 today. Likely next HD will be Monday.   Anemia stable, s/p 1g IV Fe, epo with HD  Reviewed imaging, initial CT with concern for possible R renal mass however on subsequent renal US and CT, appears to be a cyst rather than malignancy.

## 2022-10-07 NOTE — CHART NOTE - NSCHARTNOTESELECT_GEN_ALL_CORE
CODE BLUE/Rapid Response
Event Note
HD Catheter Removal/Event Note
Nutrition Services
Arterial Line Removal/Event Note
Event Note
Event Note
Nutrition Services
Palliative Care Attending
Thoracic Surgery/Off Service Note
Thoracic surgery/Off Service Note
anti-infective approval/Event Note
details…

## 2022-10-07 NOTE — PROGRESS NOTE ADULT - PROBLEM SELECTOR PLAN 1
Patient with persistent low-grade fevers, iso ET tube w corynbacterium striatum, started vancomycin 10/1.  - Vanc dose by level: held since Saturday 2/2 trough, elevated again today  - RVP neg. UCx NGTD    - Blood and sputum cultures NGTD, will continue to follow Patient with persistent low-grade fevers, but acutely febrile >101 overnight, blood cultures drawn  - On Vanc since 10/1, dosing by level, held since Saturday due to persistently elevated trough  - RVP neg.   - Previous Blood and sputum cultures (10/4) NGTD    - Will f/u BCx 10/7  - started Cefepime 1g q24h

## 2022-10-07 NOTE — PROGRESS NOTE ADULT - PROBLEM SELECTOR PLAN 3
Pt w/ COPD on home 3 L O2, currently worsening hypoxia 2/2 likely in setting of aspiration while eating vs flash pulmonary edema 2/2 chronic htn.   - Tracheostomy placed (9/22)  - failed CPAP trial today, continue daily CPAP trial Pt w/ COPD on home 3 L O2, currently worsening hypoxia 2/2 likely in setting of aspiration while eating vs flash pulmonary edema 2/2 chronic htn.   - Tracheostomy placed (9/22)  - continue daily CPAP trial

## 2022-10-07 NOTE — PROGRESS NOTE ADULT - ASSESSMENT
Patient is a 83yo F with CHF, CKD IV (baseline around 2.5-2.9) HTN who presented with a one day history of respiratory distress and LE cellulitis hospital course c/b cardiac arrest on , acute renal failure requiring CVVHD with now transition to iHD    #Anuric iATN on CKD IV likely now ESRD  iATN in setting of cardiac arrest and shock.   On CVVHD -  transitioned to iHD on   remains dialysis dependent- not currently showing signs of renal recovery, anuric.  Using R IJ tunneled hemodialysis catheter    Plan:  -Extra HD today for vancomycin clearance, last trough 37.7  -UF with HD  -Daily BMP, mag and phos   -Maintain MAP >70 for adequate renal perfusion  -Monitor for any urine out put   -Continue checking vancomycin levels daily; as trough still remains supra-therapeutic.  -Bcx with NGTD  -Repeat HD on 10/8 as per schedule    Anemia - blood loss and inflammatory state, ESKD  Plan:   On Apixaban   Transfusions for Hgb < 7  EPO with HD       Hemoglobin: 8.1 g/dL (10-07-22 @ 05:30)  Phosphorus Level, Serum: 2.4 mg/dL (10-07-22 @ 05:30)  Hemoglobin: 8.4 g/dL (10-06-22 @ 06:01)  Phosphorus Level, Serum: 2.4 mg/dL (10-06-22 @ 06:01)    Albumin, Serum: 2.8 g/dL (10-07-22 @ 05:30)  Albumin, Serum: 2.8 g/dL (10-06-22 @ 06:01)    T(C): 38.3 (10-07-22 @ 09:50), Max: 38.5 (10-06-22 @ 20:07)  HR: 116 (10-07-22 @ 08:36) (104 - 116)  BP: 130/57 (10-07-22 @ 08:00) (115/54 - 147/63)  RR: 31 (10-07-22 @ 08:36) (22 - 37)  SpO2: 98% (10-07-22 @ 08:36) (98% - 100%)  epoetin sherry-epbx (RETACRIT) Injectable 27701 Unit(s) IV Push once, 22 @ 09:17, Routine, Stop order after: 1 Doses  epoetin sherry-epbx (RETACRIT) Injectable 12522 Unit(s) IV Push once, 10-05-22 @ 07:36, Routine, Stop order after: 1 Doses  epoetin sherry-epbx (RETACRIT) Injectable 95683 Unit(s) IV Push once, 10-03-22 @ 07:41, Routine, Stop order after: 1 Doses  sevelamer carbonate 800 milliGRAM(s) Oral every 8 hours, 22 @ 11:31, STAT  sevelamer carbonate Powder 1600 milliGRAM(s) Enteral Tube every 8 hours, 22 @ 11:33, Routine  sevelamer carbonate Powder 800 milliGRAM(s) Enteral Tube every 8 hours, 10-03-22 @ 10:46, Routine      Hemodialysis Treatment.:     Schedule: Once, Modality: Hemodialysis, Access: Internal Jugular Central Venous Catheter    Dialyzer: Optiflux [id number], Time: 240 Min    Blood Flow: 400 mL/Min , Dialysate Flow: 800 mL/Min, Dialysate Temp: 36.5, Tubinmm (Adult)    Target Fluid Removal: 1 Liters    Dialysate Electrolytes (mEq/L): Potassium 3, Calcium 2.5, Sodium 138, Bicarbonate 35 (10-07-22 @ 09:58) [Active]

## 2022-10-07 NOTE — PROGRESS NOTE ADULT - ATTENDING COMMENTS
Cardiac arrest complicated by chronic respiratory failure, flail chest, BRANT now on HD, aspiration pna w/ corynebacterium. Vent weaning - CPAP trial was tolerated today because of tachypnea. persistent fevers, start cefepine. obtain ct c/a/p.

## 2022-10-07 NOTE — PROGRESS NOTE ADULT - PROBLEM SELECTOR PLAN 12
Pt with history of hypertension on home diltiazem 180mg daily. Patient currently normotensive    - holding home antihypertensive meds      #Preventive Measures  F: None  E: K>4, Phos>3, Mg>2  N: Tube feeds via NG-Tube  DVT ppx: apixaban  GI ppx: Protonix 40 BID  code status: DNR/DNI  Dispo: [id number]

## 2022-10-07 NOTE — PROGRESS NOTE ADULT - SUBJECTIVE AND OBJECTIVE BOX
OVERNIGHT EVENTS:    SUBJECTIVE / INTERVAL HPI: Patient seen and examined at bedside. No complaints at this time. Patient denies: fever, chills, dizziness, weakness, HA, Changes in vision, CP, palpitations, SOB, cough, N/V/D/C, dysuria, changes in bowel movements, LE edema. ROS otherwise negative.    VITAL SIGNS:  Vital Signs Last 24 Hrs  T(C): 38.3 (07 Oct 2022 14:00), Max: 38.5 (06 Oct 2022 20:07)  T(F): 100.9 (07 Oct 2022 14:00), Max: 101.3 (06 Oct 2022 20:07)  HR: 116 (07 Oct 2022 12:10) (104 - 118)  BP: 128/60 (07 Oct 2022 12:10) (115/54 - 147/63)  BP(mean): 86 (07 Oct 2022 12:10) (78 - 90)  RR: 18 (07 Oct 2022 12:10) (18 - 37)  SpO2: 97% (07 Oct 2022 12:10) (97% - 100%)    Parameters below as of 07 Oct 2022 12:10  Patient On (Oxygen Delivery Method): ventilator    O2 Concentration (%): 40    PHYSICAL EXAM:  GENERAL: obese female  Neuro: AAOx0; patient arousable to touch, able to follow commands (blinking & tracks with eye when prompted)  HEENT: tracheostomy with no erythema or swelling; NC/AT; MMM; 1 cm open wound expanding philtrum; neck supple; no scleral icterus; nasal passage w NG tube;  Heart: RRR; +S1 S2; no MRG; non displaced PMI  Lungs: bibasilar crackles   GI: protuberant abdomen; nondistended; +BS  Extremities: patients lower extremities to knee wrapped with ACE bandage and SCDs, +1 pitting edema; UE 1+ edema with 1+ pulses  Skin: cellulitis of LE b/l wrapped with ACE bandages    MEDICATIONS:  MEDICATIONS  (STANDING):  apixaban 2.5 milliGRAM(s) Oral every 12 hours  artificial  tears Solution 1 Drop(s) Both EYES two times a day  chlorhexidine 0.12% Liquid 15 milliLiter(s) Oral Mucosa every 12 hours  chlorhexidine 2% Cloths 1 Application(s) Topical <User Schedule>  HYDROmorphone Infusion 0.2 mG/Hr (0.2 mL/Hr) IV Continuous <Continuous>  lidocaine   4% Patch 1 Patch Transdermal every 24 hours  mupirocin 2% Ointment 1 Application(s) Topical two times a day  nystatin Powder 1 Application(s) Topical three times a day    MEDICATIONS  (PRN):  acetaminophen    Suspension .. 650 milliGRAM(s) Oral every 6 hours PRN Temp greater or equal to 38C (100.4F), Mild Pain (1 - 3), Moderate Pain (4 - 6)  HYDROmorphone  Injectable 1 milliGRAM(s) IV Push every 2 hours PRN respiratory rate >22  LORazepam   Injectable 1 milliGRAM(s) IV Push every 4 hours PRN Agitation      ALLERGIES:  Allergies    Altabax (Unknown)  Augmentin (Unknown)  clindamycin (Unknown)  Vaseline (Swelling)    Intolerances        LABS:                        8.1    9.91  )-----------( 207      ( 07 Oct 2022 05:30 )             27.8     10-07    132<L>  |  93<L>  |  15  ----------------------------<  121<H>  3.7   |  27  |  2.36<H>    Ca    9.9      07 Oct 2022 05:30  Phos  2.4     10-07  Mg     2.1     10-07    TPro  9.1<H>  /  Alb  2.8<L>  /  TBili  0.4  /  DBili  x   /  AST  26  /  ALT  16  /  AlkPhos  202<H>  10-07        CAPILLARY BLOOD GLUCOSE      POCT Blood Glucose.: 127 mg/dL (07 Oct 2022 12:00)      RADIOLOGY & ADDITIONAL TESTS: Reviewed. OVERNIGHT EVENTS:  Had a fever >101F, blood cultures drawn. Fever again this AM, gave 1g Ofirmev. CTAP.    SUBJECTIVE / INTERVAL HPI: Patient seen and examined at bedside.   VITAL SIGNS:  Vital Signs Last 24 Hrs  T(C): 38.3 (07 Oct 2022 14:00), Max: 38.5 (06 Oct 2022 20:07)  T(F): 100.9 (07 Oct 2022 14:00), Max: 101.3 (06 Oct 2022 20:07)  HR: 116 (07 Oct 2022 12:10) (104 - 118)  BP: 128/60 (07 Oct 2022 12:10) (115/54 - 147/63)  BP(mean): 86 (07 Oct 2022 12:10) (78 - 90)  RR: 18 (07 Oct 2022 12:10) (18 - 37)  SpO2: 97% (07 Oct 2022 12:10) (97% - 100%)    Parameters below as of 07 Oct 2022 12:10  Patient On (Oxygen Delivery Method): ventilator    O2 Concentration (%): 40    PHYSICAL EXAM:  GENERAL: obese female  Neuro: AAOx0; patient arousable to touch, able to follow commands (blinking & tracks with eye when prompted)  HEENT: tracheostomy with no erythema or swelling; NC/AT; MMM; 1 cm open wound expanding philtrum; neck supple; no scleral icterus; nasal passage w NG tube;  Heart: RRR; +S1 S2; no MRG; non displaced PMI  Lungs: bibasilar crackles   GI: protuberant abdomen; nondistended; +BS  Extremities: patients lower extremities to knee wrapped with ACE bandage and SCDs, +1 pitting edema; UE 1+ edema with 1+ pulses  Skin: cellulitis of LE b/l wrapped with ACE bandages    MEDICATIONS:  MEDICATIONS  (STANDING):  apixaban 2.5 milliGRAM(s) Oral every 12 hours  artificial  tears Solution 1 Drop(s) Both EYES two times a day  chlorhexidine 0.12% Liquid 15 milliLiter(s) Oral Mucosa every 12 hours  chlorhexidine 2% Cloths 1 Application(s) Topical <User Schedule>  HYDROmorphone Infusion 0.2 mG/Hr (0.2 mL/Hr) IV Continuous <Continuous>  lidocaine   4% Patch 1 Patch Transdermal every 24 hours  mupirocin 2% Ointment 1 Application(s) Topical two times a day  nystatin Powder 1 Application(s) Topical three times a day    MEDICATIONS  (PRN):  acetaminophen    Suspension .. 650 milliGRAM(s) Oral every 6 hours PRN Temp greater or equal to 38C (100.4F), Mild Pain (1 - 3), Moderate Pain (4 - 6)  HYDROmorphone  Injectable 1 milliGRAM(s) IV Push every 2 hours PRN respiratory rate >22  LORazepam   Injectable 1 milliGRAM(s) IV Push every 4 hours PRN Agitation      ALLERGIES:  Allergies    Altabax (Unknown)  Augmentin (Unknown)  clindamycin (Unknown)  Vaseline (Swelling)    Intolerances        LABS:                        8.1    9.91  )-----------( 207      ( 07 Oct 2022 05:30 )             27.8     10-07    132<L>  |  93<L>  |  15  ----------------------------<  121<H>  3.7   |  27  |  2.36<H>    Ca    9.9      07 Oct 2022 05:30  Phos  2.4     10-07  Mg     2.1     10-07    TPro  9.1<H>  /  Alb  2.8<L>  /  TBili  0.4  /  DBili  x   /  AST  26  /  ALT  16  /  AlkPhos  202<H>  10-07        CAPILLARY BLOOD GLUCOSE      POCT Blood Glucose.: 127 mg/dL (07 Oct 2022 12:00)      RADIOLOGY & ADDITIONAL TESTS: Reviewed.

## 2022-10-07 NOTE — CHART NOTE - NSCHARTNOTEFT_GEN_A_CORE
Infectious Diseases Anti-infective Approval Note    Medication:  Cefepime  Dose:  1 gram  Route:  IV  Frequency:  daily  Duration**:   3 days    Duration refers to duration of approval, not recommended duration of treatment    Dose may be adjusted as needed for alterations in renal function.    *THIS IS NOT AN INFECTIOUS DISEASES CONSULTATION*

## 2022-10-08 NOTE — PROGRESS NOTE ADULT - ASSESSMENT
82yo morbidly obese female w/ pmhx of HTN, HLD, HFpEF (EF 56%), COPD (on home O2-3L), chronic LE edema and cellulitis, DALIA, PVD p/w respiratory distress 2/2 severe sepsis due to cellulitis on lower extremity which progressed to septic shock in setting of pneumonia. Hospital course complicated by cardiac arrest, flail chest, and oliguric BRANT progressing to kidney failure.  82yo morbidly obese female w/ pmhx of HTN, HLD, HFpEF (EF 56%), COPD (on home O2-3L), chronic LE edema and cellulitis, DALIA, PVD p/w respiratory distress 2/2 severe sepsis due to cellulitis on lower extremity which progressed to septic shock in setting of pneumonia. Hospital course complicated by cardiac arrest, flail chest, and oliguric BRANT progressing to kidney failure, now comfort care (abx okay) w/ plan for extubation on 10/9.

## 2022-10-08 NOTE — PROGRESS NOTE ADULT - ATTENDING COMMENTS
Chronic metabolic encephalopathy with chronic resp failure s/p trach (vent dependent) with COPD with CHF. Comfort care, on dailudid drip. Plan for palliative extubation tomorrow. Rest as above.

## 2022-10-08 NOTE — PROGRESS NOTE ADULT - PROBLEM SELECTOR PLAN 9
Experienced a-fib on 09/13 w RVR. self resolved w/o intervention. 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 Serotonin negative. Started on Apixaban 2.5 mg BID    - continue Eliquis 2.5 mg BID iso age and Cr Experienced a-fib on 09/13 w RVR. self resolved w/o intervention. 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 Serotonin negative. Started on Apixaban 2.5 mg BID    - d/c eliquis (comfort care)

## 2022-10-08 NOTE — PROGRESS NOTE ADULT - PROBLEM SELECTOR PLAN 1
Patient with persistent low-grade fevers, but acutely febrile >101 overnight, blood cultures drawn  - On Vanc since 10/1, dosing by level, held since Saturday due to persistently elevated trough  - RVP neg.   - Previous Blood and sputum cultures (10/4) NGTD    - Will f/u BCx 10/7  - started Cefepime 1g q24h Patient with persistent low-grade fevers, but acutely febrile >101 overnight, blood cultures drawn  - On Vanc since 10/1, dosing by level, held since Saturday due to persistently elevated trough  - RVP neg.   - Previous Blood and sputum cultures (10/4) NGTD    - Will f/u BCx 10/7  - Cefepime 1g q24h  - c/w abx

## 2022-10-08 NOTE — PROGRESS NOTE ADULT - PROBLEM SELECTOR PLAN 11
CTAP s/f slightly hyperdense solid cortical mass in upper pole of R kidney.    - consider additional renal imaging when stable CTAP s/f slightly hyperdense solid cortical mass in upper pole of R kidney.    - comfort care

## 2022-10-08 NOTE — PROGRESS NOTE ADULT - PROBLEM SELECTOR PLAN 6
Pt on home bumex 2mg.  TTE from current visit showing EF of 56% with Pulmonary HTN - PAP 48. Experienced pulmonary edema and effusion postcardiac arrest.    - Continue to monitor hemodynamics Pt on home bumex 2mg.  TTE from current visit showing EF of 56% with Pulmonary HTN - PAP 48. Experienced pulmonary edema and effusion postcardiac arrest.    - Continue to monitor hemodynamics, update family w/ any changes

## 2022-10-08 NOTE — PROGRESS NOTE ADULT - PROBLEM SELECTOR PLAN 12
Pt with history of hypertension on home diltiazem 180mg daily. Patient currently normotensive    - holding home antihypertensive meds      #Preventive Measures  F: None  E: K>4, Phos>3, Mg>2  N: Tube feeds via NG-Tube  DVT ppx: apixaban  GI ppx: Protonix 40 BID  code status: DNR/DNI  Dispo: 7[facility] Pt with history of hypertension on home diltiazem 180mg daily. Patient currently normotensive    - comfort care      #Preventive Measures  F: None  E: K>4, Phos>3, Mg>2  N: comfort care  DVT ppx: comfort care  GI ppx: Protonix 40 BID  code status: DNR/DNI  Dispo: 7[facility]

## 2022-10-08 NOTE — PROGRESS NOTE ADULT - PROVIDER SPECIALTY LIST ADULT
Infectious Disease
MICU
Nephrology
Infectious Disease
Internal Medicine
MICU
Nephrology
Infectious Disease
Internal Medicine
MICU
Nephrology
Palliative Care
Infectious Disease
MICU
MICU
Nephrology
Internal Medicine

## 2022-10-08 NOTE — PROGRESS NOTE ADULT - PROBLEM SELECTOR PLAN 3
Pt w/ COPD on home 3 L O2, currently worsening hypoxia 2/2 likely in setting of aspiration while eating vs flash pulmonary edema 2/2 chronic htn.   - Tracheostomy placed (9/22)  - continue daily CPAP trial Pt w/ COPD on home 3 L O2, currently worsening hypoxia 2/2 likely in setting of aspiration while eating vs flash pulmonary edema 2/2 chronic htn.   - Tracheostomy placed (9/22)  - palliative extubation 10/9

## 2022-10-08 NOTE — PROGRESS NOTE ADULT - SUBJECTIVE AND OBJECTIVE BOX
SUBJECTIVE / INTERVAL HPI: Patient seen and examined at bedside. Overnight, SBPs in 80s, family notified. Pt not able to engage in ROS. Comfort care w/ palliative extubation tomorrow. Pt appears comfortable, on dilaudid gtt.    VITAL SIGNS:  Vital Signs Last 24 Hrs  T(C): 38.3 (08 Oct 2022 14:00), Max: 38.9 (07 Oct 2022 19:30)  T(F): 100.9 (08 Oct 2022 14:00), Max: 102 (07 Oct 2022 19:30)  HR: 112 (08 Oct 2022 13:27) (104 - 128)  BP: 116/56 (08 Oct 2022 12:30) (78/42 - 129/58)  BP(mean): 80 (08 Oct 2022 12:30) (54 - 83)  RR: 22 (08 Oct 2022 12:30) (18 - 40)  SpO2: 95% (08 Oct 2022 13:27) (92% - 100%)    Parameters below as of 08 Oct 2022 12:30  Patient On (Oxygen Delivery Method): ventilator    O2 Concentration (%): 40    PHYSICAL EXAM:    GENERAL: obese female  Neuro: AAOx0  HEENT: tracheostomy with no erythema or swelling; NC/AT; MMM; 1 cm open wound expanding philtrum; neck supple; no scleral icterus; nasal passage w NG tube;  Heart: RRR; +S1 S2; no MRG; non displaced PMI  Lungs: bibasilar crackles   GI: protuberant abdomen; nondistended; +BS  Extremities: patients lower extremities to knee wrapped with ACE bandage and SCDs, +1 pitting edema; UE 1+ edema with 1+ pulses  Skin: cellulitis of LE b/l wrapped with ACE bandages    MEDICATIONS:  MEDICATIONS  (STANDING):  artificial  tears Solution 1 Drop(s) Both EYES two times a day  cefepime   IVPB 1000 milliGRAM(s) IV Intermittent every 24 hours  chlorhexidine 0.12% Liquid 15 milliLiter(s) Oral Mucosa every 12 hours  chlorhexidine 2% Cloths 1 Application(s) Topical <User Schedule>  HYDROmorphone Infusion 0.5 mG/Hr (0.5 mL/Hr) IV Continuous <Continuous>  lidocaine   4% Patch 1 Patch Transdermal every 24 hours    MEDICATIONS  (PRN):  acetaminophen    Suspension .. 650 milliGRAM(s) Oral every 6 hours PRN Temp greater or equal to 38C (100.4F), Mild Pain (1 - 3), Moderate Pain (4 - 6)  HYDROmorphone  Injectable 1 milliGRAM(s) IV Push every 2 hours PRN respiratory rate >22  LORazepam   Injectable 1 milliGRAM(s) IV Push every 4 hours PRN Agitation      ALLERGIES:  Allergies    Altabax (Unknown)  Augmentin (Unknown)  clindamycin (Unknown)  Vaseline (Swelling)    Intolerances        LABS:                        8.1    9.91  )-----------( 207      ( 07 Oct 2022 05:30 )             27.8     10-07    132<L>  |  93<L>  |  15  ----------------------------<  121<H>  3.7   |  27  |  2.36<H>    Ca    9.9      07 Oct 2022 05:30  Phos  2.4     10-07  Mg     2.1     10-07    TPro  9.1<H>  /  Alb  2.8<L>  /  TBili  0.4  /  DBili  x   /  AST  26  /  ALT  16  /  AlkPhos  202<H>  10-07        CAPILLARY BLOOD GLUCOSE      POCT Blood Glucose.: 128 mg/dL (07 Oct 2022 18:20)      RADIOLOGY & ADDITIONAL TESTS: Reviewed.

## 2022-10-08 NOTE — PROGRESS NOTE ADULT - PROBLEM SELECTOR PLAN 2
Successful reversal of sedative w flumazenil transient; now with improving mentation. 9/26 CT head showed +grey white differentiation w no edema. Patient off sedation.  - Pain regimen modified: Oxycodone 2.5mg q8h & dilaudid 0.25mg IVP q4h PRN for breakthrough pain  - continue to assess mentation Successful reversal of sedative w flumazenil transient; now with improving mentation. 9/26 CT head showed +grey white differentiation w no edema. Patient off sedation.  - Pain regimen modified: Oxycodone 2.5mg q8h & dilaudid 0.25mg IVP q4h PRN for breakthrough pain; dilaudid gtt  - continue to assess mentation

## 2022-10-08 NOTE — PROGRESS NOTE ADULT - PROBLEM SELECTOR PLAN 7
- s/p 5-day course IV iron 200mg q24h  - monitor CBC  - maintain active T &S - s/p 5-day course IV iron 200mg q24h  - no lab draws

## 2022-10-08 NOTE — PROGRESS NOTE ADULT - REASON FOR ADMISSION
Acute hypoxic respiratory failure
fall at home with LOC

## 2022-10-08 NOTE — PROGRESS NOTE ADULT - PROBLEM SELECTOR PLAN 10
Baseline Cr 2.04 in July 2022, currently uptrending. Lung with inspiratory crackles, 1+ b/l LE edema. Likely iATN 2/2 to hypoperfusion/ischemia in setting of sepsis and cardiac arrest. HD cath placed on 09/12 for CVVHD in setting of worsening electrolytes and increasing fluid status, 9/26 now with TDC.    - continue dialysis when clinically indicated  - maintain MAP >70 for adequate renal perfusion  - strict I/Os, no nephrotoxic meds, renally dose meds  - renal following, appreciate recs  - continue sevelamer Baseline Cr 2.04 in July 2022, currently uptrending. Lung with inspiratory crackles, 1+ b/l LE edema. Likely iATN 2/2 to hypoperfusion/ischemia in setting of sepsis and cardiac arrest. HD cath placed on 09/12 for CVVHD in setting of worsening electrolytes and increasing fluid status, 9/26 now with TDC.    - comfort care

## 2022-10-08 NOTE — PROGRESS NOTE ADULT - ATTENDING SUPERVISION STATEMENT
Resident
Fellow
Resident
Fellow
Resident
Fellow
Resident
Fellow
Resident
Resident
Fellow
Fellow
Resident
Fellow
Resident

## 2022-10-08 NOTE — PROGRESS NOTE ADULT - PROBLEM SELECTOR PLAN 5
Pt with COPD on home 3L.   - continue to monitor oxygen saturation; patient currently on ventilator & daily CPAP trials Pt with COPD on home 3L.  Pt on vent  - palliative extubation tomorrow

## 2022-10-08 NOTE — PROGRESS NOTE ADULT - PROBLEM SELECTOR PLAN 8
Patient initially presented to ED w b/l LE cellulitis and was treated with antibiotics.     - As per vascular, wrap kerlix gauze and ACE bandages around both legs. Wrap toes to just below the knee.   - Change bandages every few days  - continue to monitor Patient initially presented to ED w b/l LE cellulitis and was treated with antibiotics.     - As per vascular, wrap kerlix gauze and ACE bandages around both legs. Wrap toes to just below the knee.   - Change bandages every few days  - c/w abx

## 2022-10-09 NOTE — DISCHARGE NOTE FOR THE EXPIRED PATIENT - HOSPITAL COURSE
81 yofemale w/ pmhx of HTN, HLD, HFpEF (EF 56%), COPD (on home O2-3L), chronic LE edema and cellulitis, DALIA, PVD p/w respiratory distress 2/2 severe sepsis due to cellulitis on lower extremity. Course c/b cardiac arrest followed by septic shock and hypoxic respiratory failure, likely 2/2 PNA. Cardiac arrest led to flail chest w pneumothorax, oliguric BRANT progressing to kidney failure; patient then received TDC for dialysis. Patient extubated w trach, (trach placed 9/22), failed trach collar. Patient failed CPAP trial x 2. Patients sedatives were stopped on 9/22 and mental status started to return 10/2 ( able to follow commands, opens eyes spontaneously). Patient also began been spiking fevers with ET tube growth of Corynebacterium on 9/28; started on vancomycin. She was continued on Cefepime and vancomycin, and later empiric Zosyn, with tylenol given for intermittent fevers and pain controlled with oxycodone and dilaudid. Palliative care team facilitated conversations regarding goals of care with patient's family and HCP, and decided upon palliative extubation on 10/9. On 10/9 patient's family gathered in room, and medical team was present to facilitate palliative extubation and pain control.    81 yofemale w/ pmhx of HTN, HLD, HFpEF (EF 56%), COPD (on home O2-3L), chronic LE edema and cellulitis, DALIA, PVD p/w respiratory distress 2/2 severe sepsis due to cellulitis on lower extremity. Course c/b cardiac arrest followed by septic shock and hypoxic respiratory failure, likely 2/2 PNA. Cardiac arrest led to flail chest w pneumothorax, oliguric BRANT progressing to kidney failure; patient then received TDC for dialysis. Patient extubated w trach, (trach placed 9/22), failed trach collar. Patient failed CPAP trial x 2. Patients sedatives were stopped on 9/22 and mental status started to return 10/2 ( able to follow commands, opens eyes spontaneously). Patient also began been spiking fevers with ET tube growth of Corynebacterium on 9/28; started on vancomycin. She was continued on Cefepime and vancomycin, and later empiric Zosyn, with tylenol given for intermittent fevers and pain controlled with oxycodone and dilaudid. Palliative care team facilitated conversations regarding goals of care with patient's family and HCP, and decided upon palliative extubation on 10/9. On 10/9 patient's family gathered in room, and medical team was present to facilitate palliative extubation and pain control.     Called to see patient for unresponsiveness. On exam the patient did not respond to verbal or physical stimuli. Absent heart and breath sounds. Absent peripheral pulses. Pupils are fixed and dilated. Patient pronounced dead at 20:50 (24 hour format). Dr. Mckeon notified. Next of kin/family Laura Winston at bedside notified. Autopsy declined. Cause of death is cardiopulmonary arrest secondary to sepsis and respiratory failure, secondary to underling complications from pneumonia and COPD.

## 2022-10-11 LAB
CULTURE RESULTS: SIGNIFICANT CHANGE UP
CULTURE RESULTS: SIGNIFICANT CHANGE UP
MISCELLANEOUS TEST NAME: SIGNIFICANT CHANGE UP
SPECIMEN SOURCE: SIGNIFICANT CHANGE UP
SPECIMEN SOURCE: SIGNIFICANT CHANGE UP

## 2022-10-11 NOTE — ED PROCEDURE NOTE - NS ED PROCEDURE NOTE1 TUBE APPROPRIATE POSITION
Cognitive Behavioral Therapy, Acceptance and Commitment Therapy, Emotion Regulation/Coping Skills taught, Psychoeducation 
Tube in appropriate position per imaging.

## 2022-10-21 DIAGNOSIS — I50.32 CHRONIC DIASTOLIC (CONGESTIVE) HEART FAILURE: ICD-10-CM

## 2022-10-21 DIAGNOSIS — J18.9 PNEUMONIA, UNSPECIFIED ORGANISM: ICD-10-CM

## 2022-10-21 DIAGNOSIS — R65.21 SEVERE SEPSIS WITH SEPTIC SHOCK: ICD-10-CM

## 2022-10-21 DIAGNOSIS — R34 ANURIA AND OLIGURIA: ICD-10-CM

## 2022-10-21 DIAGNOSIS — L03.116 CELLULITIS OF LEFT LOWER LIMB: ICD-10-CM

## 2022-10-21 DIAGNOSIS — D63.8 ANEMIA IN OTHER CHRONIC DISEASES CLASSIFIED ELSEWHERE: ICD-10-CM

## 2022-10-21 DIAGNOSIS — G93.41 METABOLIC ENCEPHALOPATHY: ICD-10-CM

## 2022-10-21 DIAGNOSIS — J93.9 PNEUMOTHORAX, UNSPECIFIED: ICD-10-CM

## 2022-10-21 DIAGNOSIS — I13.2 HYPERTENSIVE HEART AND CHRONIC KIDNEY DISEASE WITH HEART FAILURE AND WITH STAGE 5 CHRONIC KIDNEY DISEASE, OR END STAGE RENAL DISEASE: ICD-10-CM

## 2022-10-21 DIAGNOSIS — I73.9 PERIPHERAL VASCULAR DISEASE, UNSPECIFIED: ICD-10-CM

## 2022-10-21 DIAGNOSIS — I46.8 CARDIAC ARREST DUE TO OTHER UNDERLYING CONDITION: ICD-10-CM

## 2022-10-21 DIAGNOSIS — Z86.718 PERSONAL HISTORY OF OTHER VENOUS THROMBOSIS AND EMBOLISM: ICD-10-CM

## 2022-10-21 DIAGNOSIS — I27.20 PULMONARY HYPERTENSION, UNSPECIFIED: ICD-10-CM

## 2022-10-21 DIAGNOSIS — L89.152 PRESSURE ULCER OF SACRAL REGION, STAGE 2: ICD-10-CM

## 2022-10-21 DIAGNOSIS — M96.A3 MULTIPLE FRACTURES OF RIBS ASSOCIATED WITH CHEST COMPRESSION AND CARDIOPULMONARY RESUSCITATION: ICD-10-CM

## 2022-10-21 DIAGNOSIS — Z78.1 PHYSICAL RESTRAINT STATUS: ICD-10-CM

## 2022-10-21 DIAGNOSIS — L03.115 CELLULITIS OF RIGHT LOWER LIMB: ICD-10-CM

## 2022-10-21 DIAGNOSIS — E87.1 HYPO-OSMOLALITY AND HYPONATREMIA: ICD-10-CM

## 2022-10-21 DIAGNOSIS — Z90.49 ACQUIRED ABSENCE OF OTHER SPECIFIED PARTS OF DIGESTIVE TRACT: ICD-10-CM

## 2022-10-21 DIAGNOSIS — J43.2 CENTRILOBULAR EMPHYSEMA: ICD-10-CM

## 2022-10-21 DIAGNOSIS — E87.20 ACIDOSIS, UNSPECIFIED: ICD-10-CM

## 2022-10-21 DIAGNOSIS — D50.9 IRON DEFICIENCY ANEMIA, UNSPECIFIED: ICD-10-CM

## 2022-10-21 DIAGNOSIS — G47.33 OBSTRUCTIVE SLEEP APNEA (ADULT) (PEDIATRIC): ICD-10-CM

## 2022-10-21 DIAGNOSIS — B95.4 OTHER STREPTOCOCCUS AS THE CAUSE OF DISEASES CLASSIFIED ELSEWHERE: ICD-10-CM

## 2022-10-21 DIAGNOSIS — Z51.5 ENCOUNTER FOR PALLIATIVE CARE: ICD-10-CM

## 2022-10-21 DIAGNOSIS — E66.9 OBESITY, UNSPECIFIED: ICD-10-CM

## 2022-10-21 DIAGNOSIS — R53.2 FUNCTIONAL QUADRIPLEGIA: ICD-10-CM

## 2022-10-21 DIAGNOSIS — N18.6 END STAGE RENAL DISEASE: ICD-10-CM

## 2022-10-21 DIAGNOSIS — J96.01 ACUTE RESPIRATORY FAILURE WITH HYPOXIA: ICD-10-CM

## 2022-10-21 DIAGNOSIS — Z88.8 ALLERGY STATUS TO OTHER DRUGS, MEDICAMENTS AND BIOLOGICAL SUBSTANCES: ICD-10-CM

## 2022-10-21 DIAGNOSIS — E78.5 HYPERLIPIDEMIA, UNSPECIFIED: ICD-10-CM

## 2022-10-21 DIAGNOSIS — Z85.828 PERSONAL HISTORY OF OTHER MALIGNANT NEOPLASM OF SKIN: ICD-10-CM

## 2022-10-21 DIAGNOSIS — J96.21 ACUTE AND CHRONIC RESPIRATORY FAILURE WITH HYPOXIA: ICD-10-CM

## 2022-10-21 DIAGNOSIS — N17.0 ACUTE KIDNEY FAILURE WITH TUBULAR NECROSIS: ICD-10-CM

## 2022-10-21 DIAGNOSIS — N28.89 OTHER SPECIFIED DISORDERS OF KIDNEY AND URETER: ICD-10-CM

## 2022-10-21 DIAGNOSIS — Z95.820 PERIPHERAL VASCULAR ANGIOPLASTY STATUS WITH IMPLANTS AND GRAFTS: ICD-10-CM

## 2022-10-21 DIAGNOSIS — S22.5XXA FLAIL CHEST, INITIAL ENCOUNTER FOR CLOSED FRACTURE: ICD-10-CM

## 2022-10-21 DIAGNOSIS — A41.9 SEPSIS, UNSPECIFIED ORGANISM: ICD-10-CM

## 2022-10-21 DIAGNOSIS — Z66 DO NOT RESUSCITATE: ICD-10-CM

## 2022-10-21 DIAGNOSIS — Z88.1 ALLERGY STATUS TO OTHER ANTIBIOTIC AGENTS STATUS: ICD-10-CM

## 2022-10-21 DIAGNOSIS — J91.8 PLEURAL EFFUSION IN OTHER CONDITIONS CLASSIFIED ELSEWHERE: ICD-10-CM

## 2022-10-21 DIAGNOSIS — Z79.01 LONG TERM (CURRENT) USE OF ANTICOAGULANTS: ICD-10-CM

## 2022-12-21 NOTE — ED PROVIDER NOTE - QRS
12-20-22 @ 07:01  -  12-21-22 @ 07:00  --------------------------------------------------------  OUT:  Total OUT: 0 mL    Total NET: 20 mL      12-21-22 @ 07:01  -  12-21-22 @ 11:49  --------------------------------------------------------  OUT:  Total OUT: 0 mL    Total NET: 120 mL       84 12-20-22 @ 07:01  -  12-21-22 @ 07:00  --------------------------------------------------------  OUT:  Total OUT: 0 mL    Total NET: 20 mL      12-21-22 @ 07:01  -  12-21-22 @ 11:49  --------------------------------------------------------  OUT:  Total OUT: 0 mL    Total NET: 120 mL

## 2023-01-02 NOTE — PROGRESS NOTE ADULT - PROBLEM SELECTOR PLAN 10
A (CATHETER 6FR PGTL FL4 FR4 CRV [id number] FULL LGTH WIRE BRAID) catheter was used to cross the aortic valve and placed in the left ventricle.  LV pressure was recorded and measured. FR4 DVT ppx: eliquis 2.5mg BID  PPI ppx: None indicated  Diet: carb consistent  Dispo: telemetry to [facility]  CODE STATUS: FULL CODE DVT ppx: lovenox  PPI ppx: None indicated  Diet: carb consistent  Dispo: telemetry to [facility]  CODE STATUS: FULL CODE

## 2023-02-16 NOTE — ED PROCEDURE NOTE - CPROC ED ANATOMIC LOCATION1
internal jugular vein Slit Excision Additional Text (Leave Blank If You Do Not Want): A linear line was drawn on the skin overlying the lesion. An incision was made slowly until the lesion was visualized.  Once visualized, the lesion was removed with blunt dissection.

## 2023-04-24 NOTE — ED PROVIDER NOTE - OBJECTIVE STATEMENT
Chief Complaint   Patient presents with    Hypertension    Obesity    Anxiety     1 month followup    1. Have you been to the ER, urgent care clinic since your last visit? Hospitalized since your last visit?no    2. Have you seen or consulted any other health care providers outside of the [address] [place] [address] since your last visit? Include any pap smears or colon screening.  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 Patient arrives with o2 saturation in the 80s-not on her O2 but no in acute distress placed on nasal cannula with 02 saturation 100 percent on 2L. AOx3 she states that her legs have also been getting more swollen and red recently. 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 in February 2020 for LE cellulitis and CHF exacerbation, recent admission for COPD exacerbation with left sith rib fracture Now presents with SOB and fatigue since the previous night. Patient arrives with o2 saturation in the 80s-not on her O2 but not in acute distress placed on nasal cannula with 02 saturation 100 percent on 2L. AOx3 she states that her legs have also been getting more swollen and red recently.

## 2023-07-24 NOTE — H&P ADULT - PROBLEM/PLAN-5
Products Recommended: [employer] Detail Level: Detailed General Sunscreen Counseling: I recommended a broad spectrum sunscreen with a SPF of 30 or higher.  I explained that SPF 30 sunscreens block approximately 97 percent of the sun's harmful rays.  Sunscreens should be applied at least 15 minutes prior to expected sun exposure and then every 2 hours after that as long as sun exposure continues. If swimming or exercising sunscreen should be reapplied every 45 minutes to an hour after getting wet or sweating.  One ounce, or the equivalent of a shot glass full of sunscreen, is adequate to protect the skin not covered by a bathing suit. I also recommended a lip balm with a sunscreen as well. Sun protective clothing can be used in lieu of sunscreen but must be worn the entire time you are exposed to the sun's rays. DISPLAY PLAN FREE TEXT

## 2023-08-07 NOTE — ED ADULT NURSE NOTE - NS ED NURSE DISCH DISPOSITION
Blood ready at [email or web address]. Unit released.      Jose Alberto Norwood RN  08/07/23 [id number] Admitted

## 2023-10-12 NOTE — PATIENT PROFILE ADULT - NSPROGENOTHERPROVIDER_GEN_A_NUR
FAMILY HISTORY:  Family history of early CAD  FH: pulmonary embolism, brother  FH: stroke, father     home care

## 2023-11-21 NOTE — PROGRESS NOTE ADULT - PROBLEM SELECTOR PROBLEM 4
How Severe Are They?: severe Is This A New Presentation, Or A Follow-Up?: Skin Lesion CKD (chronic kidney disease), stage IV

## 2023-12-11 NOTE — H&P ADULT - BACK EXAM
What Type Of Note Output Would You Prefer (Optional)?: Standard Output How Severe Is Your Skin Lesion?: mild treated_been_treated Is This A New Presentation, Or A Follow-Up?: Skin Lesion strength intact/normal shape

## 2023-12-27 NOTE — ED PROVIDER NOTE - CARE PLAN
Medication: glimepiride   Last office visit date: 11/22/23  Medication Refill Protocol Failed.  Failed criteria:  . Sent to clinician to review.     Sulfonylurea Antihyperglycemics Refill Protocol - 12 Month Protocol [id number]/26/2023 10:17 AM   Protocol Details Medication (including dose and sig) on current meds list                                                            Principal Discharge DX:	COPD exacerbation  Secondary Diagnosis:	Hypoxemia

## 2024-02-06 NOTE — PATIENT PROFILE ADULT - FUNCTIONAL ASSESSMENT - BASIC MOBILITY SCORE.
Detail Level: Zone Photo Preface (Leave Blank If You Do Not Want): Photographs were obtained today 12

## 2024-03-16 NOTE — H&P ADULT - NSHPPOADEEPVENOUSTHROMB_GEN_A_CORE
Treating Constipation    Ensure that your fiber and fluid intake are adequate. High fiber foods include prunes, pears, apricots, plums, raisins, beans, peas, whole grain breads and cereals.    Decreasing your consumption of milk, dairy products, rice, bananas and low fiber cereals and breads should also help relieve constipation.    For acute or severe constipation, you may use Dulcolax suppository.    In addition to the suppositories, Take Milk of Magnesia twice daily for 3 to 4   days (up to 1 week) to ensure adequate bowel emptying.    Once constipation has been relieved, take MiraLAX  once daily for one month.     You may start giving the MiraLAX while weaning of the Milk of Magnesia.        
no

## 2024-04-17 NOTE — ED ADULT NURSE NOTE - FINAL NURSING ELECTRONIC SIGNATURE
Called patient and left a detailed message letting them know that we are doing telephone or video visits at this time and if they could please give us a call letting us know which they would like to do     26-Jul-2019 01:49 PPHx of MDD and social anxiety, hx of multiple prior IPP admissions (first at [facility] age 15, last at [facility] in March 2023

## 2024-04-22 LAB
CULTURE RESULTS: ABNORMAL
GRAM STN FLD: ABNORMAL
SPECIMEN SOURCE: SIGNIFICANT CHANGE UP

## 2024-10-03 NOTE — PATIENT PROFILE ADULT - NSPROGENOTHERPROVIDER_GEN_A_NUR
none 172.72 Constitutional: (-) fever  Eyes/ENT: (-) visual changes   Cardiovascular: (+) chest pain, (-) syncope  Respiratory: (-) cough, (+) shortness of breath  Gastrointestinal: (-) vomiting, (-) diarrhea  Genitourinary: (-) dysuria, (-) hesitancy, (-) frequency   Musculoskeletal: (-) neck pain, (-) back pain, (-) joint pain  Integumentary: (-) rash, (-) edema  Neurological: (-) headache, (-) altered mental status  Allergic/Immunologic: (-) pruritus

## 2024-10-21 NOTE — H&P ADULT - PATIENT ON (OXYGEN DELIVERY METHOD)
Pt with hx of Celiac disease, does not present with symptoms of Celiac flare    Plan:  - continue to monitor symptoms, avoid gluten in diet
nasal cannula

## 2024-11-13 NOTE — ED ADULT TRIAGE NOTE - NS ED NURSE DIRECT TO ROOM YN
No Detail Level: Detailed Depth Of Biopsy: dermis Was A Bandage Applied: Yes Size Of Lesion In Cm: 0 Biopsy Type: H and E Biopsy Method: Personna blade Anesthesia Type: 1% lidocaine without epinephrine Anesthesia Volume In Cc: 0.5 Hemostasis: Aluminum Chloride Wound Care: Vaseline Dressing: bandage Destruction After The Procedure: No Type Of Destruction Used: Curettage Curettage Text: light curettage done at base Cryotherapy Text: The wound bed was treated with cryotherapy after the biopsy was performed. Electrodesiccation Text: The wound bed was treated with electrodesiccation after the biopsy was performed. Electrodesiccation And Curettage Text: The wound bed was treated with electrodesiccation and curettage after the biopsy was performed. Silver Nitrate Text: The wound bed was treated with silver nitrate after the biopsy was performed. Lab: [id number] Lab Facility: [id number] Consent: Written consent was obtained and risks were reviewed including but not limited to scarring, infection, bleeding, scabbing, incomplete removal, nerve damage and allergy to anesthesia. Post-Care Instructions: I reviewed with the patient in detail post-care instructions. Patient is to keep the biopsy site dry overnight, and then apply bacitracin twice daily until healed. Patient may apply hydrogen peroxide soaks to remove any crusting. Notification Instructions: Patient will be notified of biopsy results. However, patient instructed to call the office if not contacted within 2 weeks. Billing Type: Third-Party Bill Information: Selecting Yes will display possible errors in your note based on the variables you have selected. This validation is only offered as a suggestion for you. PLEASE NOTE THAT THE VALIDATION TEXT WILL BE REMOVED WHEN YOU FINALIZE YOUR NOTE. IF YOU WANT TO FAX A PRELIMINARY NOTE YOU WILL NEED TO TOGGLE THIS TO 'NO' IF YOU DO NOT WANT IT IN YOUR FAXED NOTE.

## 2025-02-18 NOTE — ED PROVIDER NOTE - INPATIENT RESIDENT/ACP NOTIFIED DATE
Nutrition Assessment   Reason for consult/assessment: Reassessment    Diagnosis, Labs, Medication, History: Reviewed    Pertinent nutrition history prior to admission: reviewed    Pertinent nutrition information pertaining to hospital course: met with patient, discussed with RN                                Oral diet order: Consistent carbohydrate  Nutrition supplement/snacks: BID Ensure max-4-5 noted in room during visit, patient reports drinks some, does like strawberry           Diet tolerance: Tolerating with poor appetite/intakes recorded (consuming 0-25% and % meals, -BM (none during admission, per RN moderate stool burden noted on KUB))  Nutrition supplement / snack tolerance: Tolerating    Food allergies: None known    Anthropometrics Information     % Weight change: variable weight trend, will follow, admission 2/7/25 91.9 kg, 2/14: 88.4 kg; current 96.6 kg (-) fluid balance                      Treatment Plan/Interventions   Meals & snacks: moderate CHO consistent diet, encourage TID meals, encourage protein foods    Nutrition supplement therapy: adjust to daily Ensure Max protein supplement                                                                                                    Goals & Monitoring  Intervention: Meals and snacks, Nutrition supplement therapy    Goal: Tolerate oral diet, Meet >/equal 75% estimated needs, Increase oral intake to >/equal 75% of meals and supplements, Maintain or improve weight  Intervention goal status: Some digression away from goal  Time frame to achieve goal: Ongoing    Dietitian will monitor: Anthropometric Measurements, Food, beverage, and nutrient intake, Biochemical data, medical tests, procedures          Nutrition Diagnosis/PES   Nutrition Diagnosis: Inadequate oral intake     Related to: Abdominal pain, Confusion/mental status changes, Decreased intake, Increased nutritional demands for healing  As evidenced by: Weight loss over time,  Documented/reported poor oral intake, Calculated needs (not refusing as many meals but consuming 0-100%)     Primary nutrition diagnosis status: Active nutrition diagnosis                 16-Feb-2020 17:24

## 2025-02-26 NOTE — PROGRESS NOTE ADULT - ATTENDING COMMENTS
Yes I agree with the fellow's findings and plans as written above with the following additions/amendments:    Seen and examined at bedside, NPO, awaiting possible TDC today. Otherwise no acute changes, still on low dose pressors, vent settings minimal with trach, tolerating. No acute indications for KRT however once tdc placed will start iHD if hemodynamically stable. Further recs as above

## 2025-04-14 NOTE — ED ADULT TRIAGE NOTE - AS HEIGHT TYPE
Continue atorvastatin.   Orders:    Follow Up In [facility] - PCP - Established    atorvastatin (Lipitor) 10 mg tablet; Take 1 tablet (10 mg) by mouth once daily at bedtime.    Lipid Panel; Future     stated